# Patient Record
Sex: FEMALE | Race: WHITE | NOT HISPANIC OR LATINO | Employment: OTHER | URBAN - METROPOLITAN AREA
[De-identification: names, ages, dates, MRNs, and addresses within clinical notes are randomized per-mention and may not be internally consistent; named-entity substitution may affect disease eponyms.]

---

## 2017-01-23 ENCOUNTER — HOSPITAL ENCOUNTER (OUTPATIENT)
Dept: PULMONOLOGY | Facility: CLINIC | Age: 72
Discharge: HOME | End: 2017-01-23
Payer: MEDICARE

## 2017-01-23 ENCOUNTER — OFFICE VISIT (OUTPATIENT)
Dept: PULMONOLOGY | Facility: CLINIC | Age: 72
End: 2017-01-23
Payer: MEDICARE

## 2017-01-23 VITALS
HEART RATE: 78 BPM | BODY MASS INDEX: 23.92 KG/M2 | DIASTOLIC BLOOD PRESSURE: 68 MMHG | HEIGHT: 62 IN | WEIGHT: 130 LBS | SYSTOLIC BLOOD PRESSURE: 148 MMHG | OXYGEN SATURATION: 94 %

## 2017-01-23 DIAGNOSIS — J44.0 CHRONIC OBSTRUCTIVE PULMONARY DISEASE WITH ACUTE LOWER RESPIRATORY INFECTION: Primary | ICD-10-CM

## 2017-01-23 DIAGNOSIS — J43.1 PANLOBULAR EMPHYSEMA: ICD-10-CM

## 2017-01-23 LAB
PRE FEV1 FVC: 61
PRE FEV1: 0.47
PRE FVC: 0.77
PREDICTED FEV1 FVC: 79
PREDICTED FEV1: 2
PREDICTED FVC: 2.58

## 2017-01-23 PROCEDURE — 3077F SYST BP >= 140 MM HG: CPT | Mod: S$GLB,,, | Performed by: INTERNAL MEDICINE

## 2017-01-23 PROCEDURE — 94010 BREATHING CAPACITY TEST: CPT | Mod: S$GLB,,, | Performed by: INTERNAL MEDICINE

## 2017-01-23 PROCEDURE — 1160F RVW MEDS BY RX/DR IN RCRD: CPT | Mod: S$GLB,,, | Performed by: INTERNAL MEDICINE

## 2017-01-23 PROCEDURE — 1159F MED LIST DOCD IN RCRD: CPT | Mod: S$GLB,,, | Performed by: INTERNAL MEDICINE

## 2017-01-23 PROCEDURE — 1126F AMNT PAIN NOTED NONE PRSNT: CPT | Mod: S$GLB,,, | Performed by: INTERNAL MEDICINE

## 2017-01-23 PROCEDURE — 99499 UNLISTED E&M SERVICE: CPT | Mod: S$PBB,,, | Performed by: INTERNAL MEDICINE

## 2017-01-23 PROCEDURE — 3078F DIAST BP <80 MM HG: CPT | Mod: S$GLB,,, | Performed by: INTERNAL MEDICINE

## 2017-01-23 PROCEDURE — 99999 PR PBB SHADOW E&M-EST. PATIENT-LVL III: CPT | Mod: PBBFAC,,, | Performed by: INTERNAL MEDICINE

## 2017-01-23 PROCEDURE — 1157F ADVNC CARE PLAN IN RCRD: CPT | Mod: S$GLB,,, | Performed by: INTERNAL MEDICINE

## 2017-01-23 PROCEDURE — 99214 OFFICE O/P EST MOD 30 MIN: CPT | Mod: 25,S$GLB,, | Performed by: INTERNAL MEDICINE

## 2017-01-23 NOTE — PROGRESS NOTES
Subjective:       Patient ID: Leyla Mari is a 71 y.o. female.    Chief Complaint: Shortness of Breath and COPD    HPI  72 yo female with severe emphysema comes for follow up since she recently started a new bronchodilator. She states that she is doing well, and has had side effects. Tolerates the medication. bevespi.      No flowsheet data found.]  Review of Systems   Constitutional: Negative.    HENT: Negative.    Eyes: Negative.    Respiratory: Negative.         Severe copd   Cardiovascular: Negative.    Genitourinary: Negative.    Musculoskeletal: Negative.    Skin: Negative.    Gastrointestinal: Negative.    Neurological: Negative.    Psychiatric/Behavioral: Negative.        Objective:      Physical Exam   Constitutional: She is oriented to person, place, and time. She appears well-developed and well-nourished. No distress.   HENT:   Head: Normocephalic and atraumatic.   Right Ear: External ear normal.   Left Ear: External ear normal.   Nose: Nose normal.   Mouth/Throat: Oropharynx is clear and moist.   Eyes: Conjunctivae and EOM are normal. Pupils are equal, round, and reactive to light.   Neck: Normal range of motion. Neck supple. No JVD present. No thyromegaly present.   Cardiovascular: Normal rate, regular rhythm, normal heart sounds and intact distal pulses.  Exam reveals no gallop.    No murmur heard.  Pulmonary/Chest: Breath sounds normal. No stridor. No respiratory distress. She has no wheezes. She has no rales. She exhibits no tenderness.   Markedly decreased  Air entry   Abdominal: Soft. Bowel sounds are normal. She exhibits no distension and no mass. There is no tenderness. There is no rebound and no guarding.   Musculoskeletal: Normal range of motion. She exhibits no edema.   Lymphadenopathy:     She has no cervical adenopathy.   Neurological: She is alert and oriented to person, place, and time. She has normal reflexes. She displays normal reflexes. No cranial nerve deficit.   Skin: Skin is warm  and dry. No rash noted.   Psychiatric: She has a normal mood and affect. Her behavior is normal. Judgment and thought content normal.   Nursing note and vitals reviewed.          Assessment:       1. Chronic obstructive pulmonary disease with acute lower respiratory infection        Outpatient Encounter Prescriptions as of 1/23/2017   Medication Sig Dispense Refill    albuterol (PROAIR HFA) 90 mcg/actuation inhaler Inhale 1 puff into the lungs every 4 (four) hours as needed for Wheezing or Shortness of Breath. 1 Inhaler 12    albuterol (PROVENTIL) 2.5 mg /3 mL (0.083 %) nebulizer solution Take 3 mLs (2.5 mg total) by nebulization every 4 (four) hours as needed for Wheezing or Shortness of Breath. 120 each 11    alprazolam (XANAX) 0.25 MG tablet Take 1 tablet (0.25 mg total) by mouth 3 (three) times daily as needed. 90 tablet 3    aspirin (ECOTRIN) 81 MG EC tablet Take 81 mg by mouth once daily.        budesonide-formoterol 160-4.5 mcg (SYMBICORT) 160-4.5 mcg/actuation HFAA INHALE 2 PUFFS INTO THE LUNGS BY MOUTH TWICE DAILY 10.2 g 12    diltiazem (CARDIZEM CD) 180 MG 24 hr capsule Take 1 capsule (180 mg total) by mouth once daily. 90 capsule 3    doxycycline (MONODOX) 100 MG capsule Take 1 capsule (100 mg total) by mouth 2 (two) times daily. 20 capsule 0    FLUZONE HIGH-DOSE 2016-17, PF, 180 mcg/0.5 mL Syrg 1 Dose once.  0    hydrochlorothiazide (HYDRODIURIL) 25 MG tablet Take 1 tablet (25 mg total) by mouth once daily. 90 tablet 3    hydrOXYzine HCl (ATARAX) 25 MG tablet Take 1 tablet (25 mg total) by mouth nightly as needed for Itching. 30 tablet 0    potassium chloride SA (K-DUR,KLOR-CON) 20 MEQ tablet Take on tablet by mouth every day 90 tablet 3    pravastatin (PRAVACHOL) 20 MG tablet TAKE 1 TABLET BY MOUTH EVERY DAY 90 tablet 3    predniSONE (DELTASONE) 10 MG tablet 3 tabs every am x 7 days, then 2 tabs every am for 7 days/stop 35 tablet 1    TUDORZA PRESSAIR 400 mcg/actuation AePB Inhale 1 puff  into the lungs 2 (two) times daily. 1 each 12     No facility-administered encounter medications on file as of 1/23/2017.      Orders Placed This Encounter   Procedures    Spirometry without Bronchodilator     Standing Status:   Future     Standing Expiration Date:   1/23/2018     Plan:            Fev-1:Stable  At 0.48 liters  Sa02:95% IMP: Pulmonary Emphysema, Pink Puffer

## 2017-01-24 DIAGNOSIS — J43.8 OTHER EMPHYSEMA: Primary | ICD-10-CM

## 2017-01-29 RX ORDER — PRAVASTATIN SODIUM 20 MG/1
TABLET ORAL
Qty: 90 TABLET | Refills: 2 | Status: SHIPPED | OUTPATIENT
Start: 2017-01-29 | End: 2017-10-17 | Stop reason: SDUPTHER

## 2017-02-16 DIAGNOSIS — Z86.010 HISTORY OF COLON POLYPS: Primary | ICD-10-CM

## 2017-02-16 RX ORDER — POLYETHYLENE GLYCOL 3350, SODIUM SULFATE, SODIUM CHLORIDE, POTASSIUM CHLORIDE, SODIUM ASCORBATE, AND ASCORBIC ACID 7.5-2.691G
KIT ORAL
Qty: 1 BOTTLE | Refills: 0 | Status: ON HOLD | OUTPATIENT
Start: 2017-02-16 | End: 2017-02-27

## 2017-02-26 ENCOUNTER — HOSPITAL ENCOUNTER (INPATIENT)
Facility: HOSPITAL | Age: 72
LOS: 1 days | Discharge: HOME | DRG: 871 | End: 2017-02-27
Attending: EMERGENCY MEDICINE | Admitting: HOSPITALIST
Payer: MEDICARE

## 2017-02-26 DIAGNOSIS — R09.02 HYPOXIA: ICD-10-CM

## 2017-02-26 DIAGNOSIS — J18.9 CAP (COMMUNITY ACQUIRED PNEUMONIA): Primary | ICD-10-CM

## 2017-02-26 LAB
ALBUMIN SERPL BCP-MCNC: 3.3 G/DL
ALLENS TEST: ABNORMAL
ALP SERPL-CCNC: 99 U/L
ALT SERPL W/O P-5'-P-CCNC: 20 U/L
ANION GAP SERPL CALC-SCNC: 11 MMOL/L
AST SERPL-CCNC: 26 U/L
BASOPHILS # BLD AUTO: 0.01 K/UL
BASOPHILS NFR BLD: 0.1 %
BILIRUB SERPL-MCNC: 0.7 MG/DL
BNP SERPL-MCNC: 37 PG/ML
BUN SERPL-MCNC: 10 MG/DL
CALCIUM SERPL-MCNC: 9.2 MG/DL
CHLORIDE SERPL-SCNC: 100 MMOL/L
CO2 SERPL-SCNC: 23 MMOL/L
CREAT SERPL-MCNC: 0.7 MG/DL
DELSYS: ABNORMAL
DIFFERENTIAL METHOD: ABNORMAL
EOSINOPHIL # BLD AUTO: 0 K/UL
EOSINOPHIL NFR BLD: 0.3 %
ERYTHROCYTE [DISTWIDTH] IN BLOOD BY AUTOMATED COUNT: 13 %
EST. GFR  (AFRICAN AMERICAN): >60 ML/MIN/1.73 M^2
EST. GFR  (NON AFRICAN AMERICAN): >60 ML/MIN/1.73 M^2
FIO2: 28
FLOW: 2
FLUAV AG SPEC QL IA: NEGATIVE
FLUBV AG SPEC QL IA: NEGATIVE
GLUCOSE SERPL-MCNC: 163 MG/DL
HCO3 UR-SCNC: 24 MMOL/L (ref 24–28)
HCT VFR BLD AUTO: 42.6 %
HGB BLD-MCNC: 14.6 G/DL
LYMPHOCYTES # BLD AUTO: 0.8 K/UL
LYMPHOCYTES NFR BLD: 4.9 %
MCH RBC QN AUTO: 31.6 PG
MCHC RBC AUTO-ENTMCNC: 34.3 %
MCV RBC AUTO: 92 FL
MODE: ABNORMAL
MONOCYTES # BLD AUTO: 0.9 K/UL
MONOCYTES NFR BLD: 5.9 %
NEUTROPHILS # BLD AUTO: 13.5 K/UL
NEUTROPHILS NFR BLD: 88.8 %
PCO2 BLDA: 38.5 MMHG (ref 35–45)
PH SMN: 7.4 [PH] (ref 7.35–7.45)
PLATELET # BLD AUTO: 176 K/UL
PMV BLD AUTO: 10 FL
PO2 BLDA: 67 MMHG (ref 80–100)
POC BE: -1 MMOL/L
POC SATURATED O2: 93 % (ref 95–100)
POC TCO2: 25 MMOL/L (ref 23–27)
POTASSIUM SERPL-SCNC: 3.8 MMOL/L
PROT SERPL-MCNC: 7 G/DL
RBC # BLD AUTO: 4.62 M/UL
SAMPLE: ABNORMAL
SITE: ABNORMAL
SODIUM SERPL-SCNC: 134 MMOL/L
SP02: 92
SPECIMEN SOURCE: NORMAL
TROPONIN I SERPL DL<=0.01 NG/ML-MCNC: 0.02 NG/ML
WBC # BLD AUTO: 15.19 K/UL

## 2017-02-26 PROCEDURE — 36600 WITHDRAWAL OF ARTERIAL BLOOD: CPT

## 2017-02-26 PROCEDURE — 94640 AIRWAY INHALATION TREATMENT: CPT

## 2017-02-26 PROCEDURE — 96361 HYDRATE IV INFUSION ADD-ON: CPT

## 2017-02-26 PROCEDURE — 96365 THER/PROPH/DIAG IV INF INIT: CPT

## 2017-02-26 PROCEDURE — 87400 INFLUENZA A/B EACH AG IA: CPT | Mod: 59

## 2017-02-26 PROCEDURE — 80053 COMPREHEN METABOLIC PANEL: CPT

## 2017-02-26 PROCEDURE — 25000242 PHARM REV CODE 250 ALT 637 W/ HCPCS: Performed by: EMERGENCY MEDICINE

## 2017-02-26 PROCEDURE — 99285 EMERGENCY DEPT VISIT HI MDM: CPT | Mod: 25

## 2017-02-26 PROCEDURE — 82803 BLOOD GASES ANY COMBINATION: CPT

## 2017-02-26 PROCEDURE — 87040 BLOOD CULTURE FOR BACTERIA: CPT

## 2017-02-26 PROCEDURE — 25500020 PHARM REV CODE 255: Performed by: EMERGENCY MEDICINE

## 2017-02-26 PROCEDURE — 83880 ASSAY OF NATRIURETIC PEPTIDE: CPT

## 2017-02-26 PROCEDURE — 99291 CRITICAL CARE FIRST HOUR: CPT | Mod: ,,, | Performed by: EMERGENCY MEDICINE

## 2017-02-26 PROCEDURE — 63600175 PHARM REV CODE 636 W HCPCS: Performed by: EMERGENCY MEDICINE

## 2017-02-26 PROCEDURE — 85025 COMPLETE CBC W/AUTO DIFF WBC: CPT

## 2017-02-26 PROCEDURE — 20600001 HC STEP DOWN PRIVATE ROOM

## 2017-02-26 PROCEDURE — 84484 ASSAY OF TROPONIN QUANT: CPT

## 2017-02-26 PROCEDURE — 99900035 HC TECH TIME PER 15 MIN (STAT)

## 2017-02-26 PROCEDURE — 96375 TX/PRO/DX INJ NEW DRUG ADDON: CPT

## 2017-02-26 PROCEDURE — 27000221 HC OXYGEN, UP TO 24 HOURS

## 2017-02-26 PROCEDURE — 25000003 PHARM REV CODE 250: Performed by: EMERGENCY MEDICINE

## 2017-02-26 RX ORDER — HYDROXYZINE HYDROCHLORIDE 25 MG/1
25 TABLET, FILM COATED ORAL NIGHTLY PRN
Status: DISCONTINUED | OUTPATIENT
Start: 2017-02-26 | End: 2017-02-27 | Stop reason: HOSPADM

## 2017-02-26 RX ORDER — DILTIAZEM HYDROCHLORIDE 180 MG/1
180 CAPSULE, COATED, EXTENDED RELEASE ORAL DAILY
Status: DISCONTINUED | OUTPATIENT
Start: 2017-02-27 | End: 2017-02-27 | Stop reason: HOSPADM

## 2017-02-26 RX ORDER — IBUPROFEN 200 MG
24 TABLET ORAL
Status: DISCONTINUED | OUTPATIENT
Start: 2017-02-26 | End: 2017-02-27 | Stop reason: HOSPADM

## 2017-02-26 RX ORDER — ONDANSETRON 4 MG/1
4 TABLET, ORALLY DISINTEGRATING ORAL EVERY 8 HOURS PRN
Status: DISCONTINUED | OUTPATIENT
Start: 2017-02-26 | End: 2017-02-27 | Stop reason: HOSPADM

## 2017-02-26 RX ORDER — IBUPROFEN 200 MG
16 TABLET ORAL
Status: DISCONTINUED | OUTPATIENT
Start: 2017-02-26 | End: 2017-02-27 | Stop reason: HOSPADM

## 2017-02-26 RX ORDER — HYDROCODONE BITARTRATE AND ACETAMINOPHEN 10; 325 MG/1; MG/1
1 TABLET ORAL EVERY 4 HOURS PRN
Status: DISCONTINUED | OUTPATIENT
Start: 2017-02-26 | End: 2017-02-26

## 2017-02-26 RX ORDER — IPRATROPIUM BROMIDE AND ALBUTEROL SULFATE 2.5; .5 MG/3ML; MG/3ML
3 SOLUTION RESPIRATORY (INHALATION) EVERY 4 HOURS
Status: DISCONTINUED | OUTPATIENT
Start: 2017-02-27 | End: 2017-02-26

## 2017-02-26 RX ORDER — METHYLPREDNISOLONE SOD SUCC 125 MG
80 VIAL (EA) INJECTION EVERY 8 HOURS
Status: DISCONTINUED | OUTPATIENT
Start: 2017-02-26 | End: 2017-02-26

## 2017-02-26 RX ORDER — PREDNISONE 10 MG/1
40 TABLET ORAL DAILY
Status: DISCONTINUED | OUTPATIENT
Start: 2017-02-27 | End: 2017-02-27 | Stop reason: HOSPADM

## 2017-02-26 RX ORDER — IPRATROPIUM BROMIDE AND ALBUTEROL SULFATE 2.5; .5 MG/3ML; MG/3ML
3 SOLUTION RESPIRATORY (INHALATION)
Status: DISCONTINUED | OUTPATIENT
Start: 2017-02-27 | End: 2017-02-27

## 2017-02-26 RX ORDER — MOXIFLOXACIN HYDROCHLORIDE 400 MG/250ML
400 INJECTION, SOLUTION INTRAVENOUS
Status: COMPLETED | OUTPATIENT
Start: 2017-02-26 | End: 2017-02-26

## 2017-02-26 RX ORDER — ASPIRIN 81 MG/1
81 TABLET ORAL DAILY
Status: DISCONTINUED | OUTPATIENT
Start: 2017-02-27 | End: 2017-02-27 | Stop reason: HOSPADM

## 2017-02-26 RX ORDER — INSULIN ASPART 100 [IU]/ML
0-5 INJECTION, SOLUTION INTRAVENOUS; SUBCUTANEOUS
Status: DISCONTINUED | OUTPATIENT
Start: 2017-02-26 | End: 2017-02-27 | Stop reason: HOSPADM

## 2017-02-26 RX ORDER — HYDROCODONE BITARTRATE AND ACETAMINOPHEN 5; 325 MG/1; MG/1
1 TABLET ORAL EVERY 4 HOURS PRN
Status: DISCONTINUED | OUTPATIENT
Start: 2017-02-26 | End: 2017-02-26

## 2017-02-26 RX ORDER — FLUTICASONE FUROATE AND VILANTEROL 100; 25 UG/1; UG/1
1 POWDER RESPIRATORY (INHALATION) DAILY
Status: DISCONTINUED | OUTPATIENT
Start: 2017-02-27 | End: 2017-02-27 | Stop reason: HOSPADM

## 2017-02-26 RX ORDER — ALPRAZOLAM 0.25 MG/1
0.25 TABLET ORAL 3 TIMES DAILY PRN
Status: DISCONTINUED | OUTPATIENT
Start: 2017-02-26 | End: 2017-02-27 | Stop reason: HOSPADM

## 2017-02-26 RX ORDER — PRAVASTATIN SODIUM 20 MG/1
20 TABLET ORAL DAILY
Status: DISCONTINUED | OUTPATIENT
Start: 2017-02-27 | End: 2017-02-27 | Stop reason: HOSPADM

## 2017-02-26 RX ORDER — MOXIFLOXACIN HYDROCHLORIDE 400 MG/1
400 TABLET ORAL DAILY
Status: DISCONTINUED | OUTPATIENT
Start: 2017-02-27 | End: 2017-02-27

## 2017-02-26 RX ORDER — IPRATROPIUM BROMIDE AND ALBUTEROL SULFATE 2.5; .5 MG/3ML; MG/3ML
3 SOLUTION RESPIRATORY (INHALATION)
Status: COMPLETED | OUTPATIENT
Start: 2017-02-26 | End: 2017-02-26

## 2017-02-26 RX ORDER — GLUCAGON 1 MG
1 KIT INJECTION
Status: DISCONTINUED | OUTPATIENT
Start: 2017-02-26 | End: 2017-02-27 | Stop reason: HOSPADM

## 2017-02-26 RX ORDER — ACETAMINOPHEN 325 MG/1
650 TABLET ORAL EVERY 6 HOURS PRN
Status: DISCONTINUED | OUTPATIENT
Start: 2017-02-26 | End: 2017-02-26 | Stop reason: SDUPTHER

## 2017-02-26 RX ORDER — SODIUM CHLORIDE 9 MG/ML
INJECTION, SOLUTION INTRAVENOUS CONTINUOUS
Status: DISCONTINUED | OUTPATIENT
Start: 2017-02-26 | End: 2017-02-27 | Stop reason: HOSPADM

## 2017-02-26 RX ORDER — IPRATROPIUM BROMIDE AND ALBUTEROL SULFATE 2.5; .5 MG/3ML; MG/3ML
3 SOLUTION RESPIRATORY (INHALATION)
Status: DISCONTINUED | OUTPATIENT
Start: 2017-02-27 | End: 2017-02-26

## 2017-02-26 RX ORDER — PANTOPRAZOLE SODIUM 40 MG/1
40 TABLET, DELAYED RELEASE ORAL DAILY
Status: DISCONTINUED | OUTPATIENT
Start: 2017-02-27 | End: 2017-02-27 | Stop reason: HOSPADM

## 2017-02-26 RX ORDER — AMOXICILLIN 250 MG
1 CAPSULE ORAL 2 TIMES DAILY
Status: DISCONTINUED | OUTPATIENT
Start: 2017-02-26 | End: 2017-02-26

## 2017-02-26 RX ORDER — TIOTROPIUM BROMIDE 18 UG/1
1 CAPSULE ORAL; RESPIRATORY (INHALATION) DAILY
Status: DISCONTINUED | OUTPATIENT
Start: 2017-02-27 | End: 2017-02-27 | Stop reason: HOSPADM

## 2017-02-26 RX ORDER — ACETAMINOPHEN 325 MG/1
650 TABLET ORAL EVERY 4 HOURS PRN
Status: DISCONTINUED | OUTPATIENT
Start: 2017-02-26 | End: 2017-02-27 | Stop reason: HOSPADM

## 2017-02-26 RX ORDER — METHYLPREDNISOLONE SOD SUCC 125 MG
125 VIAL (EA) INJECTION
Status: COMPLETED | OUTPATIENT
Start: 2017-02-26 | End: 2017-02-26

## 2017-02-26 RX ORDER — MOXIFLOXACIN HYDROCHLORIDE 400 MG/250ML
400 INJECTION, SOLUTION INTRAVENOUS
Status: DISCONTINUED | OUTPATIENT
Start: 2017-02-27 | End: 2017-02-26

## 2017-02-26 RX ADMIN — MOXIFLOXACIN HYDROCHLORIDE 400 MG: 400 INJECTION, SOLUTION INTRAVENOUS at 09:02

## 2017-02-26 RX ADMIN — SODIUM CHLORIDE: 0.9 INJECTION, SOLUTION INTRAVENOUS at 10:02

## 2017-02-26 RX ADMIN — IPRATROPIUM BROMIDE AND ALBUTEROL SULFATE 3 ML: .5; 3 SOLUTION RESPIRATORY (INHALATION) at 04:02

## 2017-02-26 RX ADMIN — IOHEXOL 75 ML: 350 INJECTION, SOLUTION INTRAVENOUS at 08:02

## 2017-02-26 RX ADMIN — SODIUM CHLORIDE 500 ML: 0.9 INJECTION, SOLUTION INTRAVENOUS at 04:02

## 2017-02-26 RX ADMIN — METHYLPREDNISOLONE SODIUM SUCCINATE 125 MG: 125 INJECTION, POWDER, FOR SOLUTION INTRAMUSCULAR; INTRAVENOUS at 04:02

## 2017-02-26 NOTE — ED TRIAGE NOTES
Pt c/o SOB that started yesterday evening.  Pt states that she has been around people that are positive for the flu.  Pt denies CP.  Pt has hx of COPD.

## 2017-02-26 NOTE — IP AVS SNAPSHOT
[facility]  [address]  [facility] [address]  Phone: [phone]           Patient Discharge Instructions     Our goal is to set you up for success. This packet includes information on your condition, medications, and your home care. It will help you to care for yourself so you don't get sicker and need to go back to the hospital.     Please ask your nurse if you have any questions.        There are many details to remember when preparing to leave the hospital. Here is what you will need to do:    1. Take your medicine. If you are prescribed medications, review your Medication List in the following pages. You may have new medications to  at the pharmacy and others that you'll need to stop taking. Review the instructions for how and when to take your medications. Talk with your doctor or nurses if you are unsure of what to do.     2. Go to your follow-up appointments. Specific follow-up information is listed in the following pages. Your may be contacted by a transition nurse or clinical provider about future appointments. Be sure we have all of the phone numbers to reach you, if needed. Please contact your provider's office if you are unable to make an appointment.     3. Watch for warning signs. Your doctor or nurse will give you detailed warning signs to watch for and when to call for assistance. These instructions may also include educational information about your condition. If you experience any of warning signs to your health, call your doctor.               [facility]  Unless otherwise directed by your provider, please contact [facility], our nurse care line that is available for 24/7 assistance.     [phone] (toll-free)    Registered nurses in the [facility] provide clinical advisement, health education, appointment booking, and other advisory services.                    ** Verify the list of medication(s) below is accurate and up  to date. Carry this with you in case of emergency. If your medications have changed, please notify your healthcare provider.             Medication List      START taking these medications        Additional Info                      moxifloxacin 400 mg tablet   Commonly known as:  AVELOX   Quantity:  4 tablet   Refills:  0   Dose:  400 mg   Indications:  Pneumonia    Instructions:  Take 1 tablet (400 mg total) by mouth once daily.     Begin Date    AM    Noon    PM    Bedtime         CHANGE how you take these medications        Additional Info                      predniSONE 20 MG tablet   Commonly known as:  DELTASONE   Quantity:  8 tablet   Refills:  0   Dose:  40 mg   What changed:    - medication strength  - how much to take  - how to take this  - when to take this  - additional instructions    Last time this was given:  40 mg on 2/27/2017 10:00 AM   Instructions:  Take 2 tablets (40 mg total) by mouth once daily.     Begin Date    AM    Noon    PM    Bedtime         CONTINUE taking these medications        Additional Info                      * albuterol 90 mcg/actuation inhaler   Commonly known as:  PROAIR HFA   Quantity:  1 Inhaler   Refills:  12   Dose:  1 puff    Instructions:  Inhale 1 puff into the lungs every 4 (four) hours as needed for Wheezing or Shortness of Breath.     Begin Date    AM    Noon    PM    Bedtime       * albuterol 2.5 mg /3 mL (0.083 %) nebulizer solution   Commonly known as:  PROVENTIL   Quantity:  120 each   Refills:  11   Dose:  2.5 mg    Instructions:  Take 3 mLs (2.5 mg total) by nebulization every 4 (four) hours as needed for Wheezing or Shortness of Breath.     Begin Date    AM    Noon    PM    Bedtime       alprazolam 0.25 MG tablet   Commonly known as:  XANAX   Quantity:  90 tablet   Refills:  3   Dose:  0.25 mg    Instructions:  Take 1 tablet (0.25 mg total) by mouth 3 (three) times daily as needed.     Begin Date    AM    Noon    PM    Bedtime       aspirin 81 MG EC tablet    Commonly known as:  ECOTRIN   Refills:  0   Dose:  81 mg    Last time this was given:  81 mg on 2/27/2017 10:00 AM   Instructions:  Take 81 mg by mouth once daily.     Begin Date    AM    Noon    PM    Bedtime       BEVESPI AEROSPHERE INHL   Refills:  0   Dose:  2 puff    Instructions:  Inhale 2 puffs into the lungs 2 (two) times daily.     Begin Date    AM    Noon    PM    Bedtime       budesonide-formoterol 160-4.5 mcg 160-4.5 mcg/actuation Hfaa   Commonly known as:  SYMBICORT   Quantity:  10.2 g   Refills:  12    Instructions:  INHALE 2 PUFFS INTO THE LUNGS BY MOUTH TWICE DAILY     Begin Date    AM    Noon    PM    Bedtime       diltiaZEM 180 MG 24 hr capsule   Commonly known as:  CARDIZEM CD   Quantity:  90 capsule   Refills:  3   Dose:  180 mg    Last time this was given:  180 mg on 2/27/2017 10:00 AM   Instructions:  Take 1 capsule (180 mg total) by mouth once daily.     Begin Date    AM    Noon    PM    Bedtime       hydrochlorothiazide 25 MG tablet   Commonly known as:  HYDRODIURIL   Quantity:  90 tablet   Refills:  3   Dose:  25 mg    Last time this was given:  25 mg on 2/27/2017 10:05 AM   Instructions:  Take 1 tablet (25 mg total) by mouth once daily.     Begin Date    AM    Noon    PM    Bedtime       potassium chloride SA 20 MEQ tablet   Commonly known as:  K-DUR,KLOR-CON   Quantity:  90 tablet   Refills:  3    Last time this was given:  20 mEq on 2/27/2017 11:46 AM   Instructions:  Take on tablet by mouth every day     Begin Date    AM    Noon    PM    Bedtime       pravastatin 20 MG tablet   Commonly known as:  PRAVACHOL   Quantity:  90 tablet   Refills:  2    Last time this was given:  20 mg on 2/27/2017 10:01 AM   Instructions:  TAKE 1 TABLET BY MOUTH EVERY DAY     Begin Date    AM    Noon    PM    Bedtime       * Notice:  This list has 2 medication(s) that are the same as other medications prescribed for you. Read the directions carefully, and ask your doctor or other care provider to review them  with you.         Where to Get Your Medications      These medications were sent to [facility] [place] - [address] [facility]  [address] [facility] [address]     Phone:  [phone]     moxifloxacin 400 mg tablet    predniSONE 20 MG tablet                  Please bring to all follow up appointments:    1. A copy of your discharge instructions.  2. All medicines you are currently taking in their original bottles.  3. Identification and insurance card.    Please arrive 15 minutes ahead of scheduled appointment time.    Please call 24 hours in advance if you must reschedule your appointment and/or time.        Your Scheduled Appointments     Mar 31, 2017  8:40 AM CDT   Established Patient Visit with Anabel Cooper MD   [place] [facility] [place]    [address] [facility]  [place] [address] [phone]              Your Future Surgeries/Procedures     Mar 30, 2017   Surgery with Omid Lino MD   [facility]    [address] [address] [phone]              Follow-up Information     Follow up with Dulce Castro MD In 1 week.    Specialty:  Internal Medicine    Why:  Hospital follow-up for CAP/COPD exacerbation    Contact information:    [address] [facility]  [place] [address]  [phone]          Discharge Instructions     Future Orders    Activity as tolerated     Call MD for:  difficulty breathing or increased cough     Call MD for:  increased confusion or weakness     Call MD for:  persistent dizziness, light-headedness, or visual disturbances     OXYGEN FOR HOME USE     Questions:    Liter Flow:  2    Duration:  With activity    Qualifying SpO2:  82% ambulating on RA    Testing done at:  Exercise/Activity    Route:  nasal cannula    Portable mode:  continuous    Device:  home concentrator with portable unit    Length of need (in months):  3 mos    Patient  "condition with qualifying saturation:  COPD    Height:  5' 1" (1.549 m)    Weight:  59 kg (130 lb)    Does patient have medical equipment at home?:      Alternative treatment measures have been tried or considered and deemed clinically ineffective.:  Yes    Vendor:  [facility] Comment - Pending authorization from [facility]    Expected Date of Delivery:  2/27/2017    Expected Time of Delivery:          Primary Diagnosis     Your primary diagnosis was:  Respiratory Insufficiency      Admission Information     Date & Time Provider Department CSN    2/26/2017  3:35 PM Chandler Antunez MD [facility] [id number]      Care Providers     Provider Role Specialty Primary office phone    Chandler Antunez MD Attending Provider Hospitalist [phone]    Chandler Antunez MD Team Attending  Hospitalist [phone]      Your Vitals Were     BP                   140/62 (BP Location: Left arm, Patient Position: Lying, BP Method: Automatic)           Recent Lab Values        5/29/2013 11/14/2013                        8:50 AM  8:40 AM          A1C 5.9 6.0                     Pending Labs     Order Current Status    Legionella antigen, urine In process    Blood culture Preliminary result    Blood culture Preliminary result    Respiratory Viral Panel by PCR Nasal Swab Preliminary result      Allergies as of 2/27/2017        Reactions    Bactrim [Sulfamethoxazole-trimethoprim] Nausea Only    Codeine Itching    Keflex [Cephalexin] Other (See Comments)    Rhinorrhea, nausea. Tolerated Ceftriaxone June 2014    Ceftriaxone Rash    Morbilliform rash after receiving IV ceftriaxone    Clindamycin Hcl Rash    Vancomycin Analogues Rash    Morbilliform rash after receiving IV vancomycin      Advance Directives     An advance directive is a document which, in the event you are no longer able to make decisions for yourself, tells your healthcare team what kind of treatment you do or do not want to receive, or who you would like to " make those decisions for you.  If you do not currently have an advance directive, [facility] encourages you to create one.  For more information call:  [phone]), [phone] ([phone]),  or log on to [email or web address].        Smoking Cessation     If you would like to quit smoking:   You may be eligible for free services if you are a [place] resident and started smoking cigarettes before September 1, 1988.  Call the Smoking Cessation Trust (SCT) toll free at (597) 689-7763 or (469) 551-5173.   Call 8-711-QUIT-NOW if you do not meet the above criteria.            Language Assistance Services     ATTENTION: Language assistance services are available, free of charge. Please call [phone].      ATENCIÓN: Si habla espdonna, tiene a reeves disposición servicios gratuitos de asistencia lingüística. Llame al [phone].     CHÚ Ý: N?u b?n nói Ti?ng Vi?t, có các d?ch v? h? tr? ngôn ng? mi?n phí dành cho b?n. G?i s? [phone].        Pneumonmia Discharge Instructions                 [facility] complies with applicable Federal civil rights laws and does not discriminate on the basis of race, color, national origin, age, disability, or sex.

## 2017-02-26 NOTE — ED PROVIDER NOTES
"Encounter Date: 2017    SCRIBE #1 NOTE: I, Edwige Galarza, am scribing for, and in the presence of, Dr. Jessica.       History     Chief Complaint   Patient presents with    Shortness of Breath     "I think I have the flu"; reports SOB and back pain     Review of patient's allergies indicates:   Allergen Reactions    Bactrim [sulfamethoxazole-trimethoprim] Nausea Only    Codeine Itching    Keflex [cephalexin] Other (See Comments)     Rhinorrhea, nausea. Tolerated Ceftriaxone 2014    Ceftriaxone Rash     Morbilliform rash after receiving IV ceftriaxone    Clindamycin hcl Rash    Vancomycin analogues Rash     Morbilliform rash after receiving IV vancomycin     HPI Comments: Time seen by provider: 3:37 PM    This is a 71 y.o. female with a PMHx of HTN, COPD, lung nodules, sinus tachycardia, colon polyps, and HLD and a PSHx of appendectomy who presents with complaint of shortness of breath and right sided pleuritic chest pain since yesterday evening, as well as general myalgias. Patient endorses contact with someone who tested positive for the flu this week. Patient reports she used breathing treatment and inhaler without improvement of symptoms. She was on home O2 in the past, but patient states she is not anymore. She denies dysuria, GI symptoms, and appetite change. She additionally denies known heart conditions.       The history is provided by the patient.     Past Medical History:   Diagnosis Date    Actinic keratosis     Allergy     Cataract     Cellulitis of ankle     Colon polyps     COPD (chronic obstructive pulmonary disease)     Family history of colon cancer     Hyperlipidemia     Hypertension     Lung nodules     Sinus tachycardia      Past Surgical History:   Procedure Laterality Date    APPENDECTOMY       SECTION      x2    EYE SURGERY      TONSILLECTOMY       Family History   Problem Relation Age of Onset    Cancer Mother 79     colon    Heart disease Mother     " Hyperlipidemia Mother     Hypertension Mother     Skin cancer Mother     COPD Father     Diabetes Father     Heart disease Father     Hyperlipidemia Father     Hypertension Father     Cancer Sister     Heart disease Sister     Hyperlipidemia Sister     Hypertension Sister     Skin cancer Sister     Cancer Son     Melanoma Neg Hx     Psoriasis Neg Hx     Lupus Neg Hx     Eczema Neg Hx      Social History   Substance Use Topics    Smoking status: Former Smoker     Packs/day: 1.00     Years: 39.00     Types: Cigarettes     Quit date: 11/2/1995    Smokeless tobacco: Never Used    Alcohol use 1.2 oz/week     2 Glasses of wine per week      Comment: rarely has a glass of wine- not daily     Review of Systems   Constitutional: Negative for appetite change.   HENT: Negative for nosebleeds.    Eyes: Negative for redness.   Respiratory: Positive for shortness of breath.    Cardiovascular: Positive for chest pain.   Gastrointestinal: Negative for constipation, diarrhea and vomiting.   Genitourinary: Negative for difficulty urinating and dysuria.   Musculoskeletal: Positive for myalgias.   Skin: Negative for rash.   Neurological: Negative for speech difficulty.       Physical Exam   Initial Vitals   BP Pulse Resp Temp SpO2   02/26/17 1451 02/26/17 1451 02/26/17 1451 02/26/17 1451 02/26/17 1451   145/73 117 24 99 °F (37.2 °C) 92 %     Physical Exam    Nursing note and vitals reviewed.  Constitutional: She appears well-developed and well-nourished. No distress.   HENT:   Head: Normocephalic and atraumatic.   Mouth/Throat: Oropharynx is clear and moist.   Eyes: Conjunctivae and EOM are normal. Pupils are equal, round, and reactive to light.   Neck: Normal range of motion. Neck supple.   Cardiovascular: Regular rhythm, normal heart sounds and intact distal pulses. Tachycardia present.    Pulmonary/Chest:   Slight inspiratory wheezing RLL   Abdominal: Soft. Bowel sounds are normal. She exhibits no distension.  There is no tenderness.   Musculoskeletal: Normal range of motion. She exhibits no edema or tenderness.   Neurological: She is alert and oriented to person, place, and time.   Skin: Skin is warm and dry.   Psychiatric: She has a normal mood and affect.         ED Course   Critical Care  Date/Time: 2/28/2017 4:31 PM  Performed by: JIMMIE PADRON  Authorized by: JIMMIE PADRON   Direct patient critical care time: 20 minutes  Ordering / reviewing critical care time: 5 minutes  Documentation critical care time: 5 minutes  Consulting other physicians critical care time: 5 minutes  Total critical care time (exclusive of procedural time) : 35 minutes  Comments: Critical Care time: 35 minutes inclusive of direct patient care, review of previous records, interpretation of labs, imaging and ekg, as well as discussion of my impression and plan of care with the patient, family and other clinicians/consultants. This time is exclusive of any separate billable procedures and of treating other patients. Critical care was required for this patient who presented with shortness of breath, hypoxia, pneumonia, possible respiratory failure requiring my emergent evaluation and management in the emergency department.          Labs Reviewed   CBC W/ AUTO DIFFERENTIAL - Abnormal; Notable for the following:        Result Value    WBC 15.19 (*)     MCH 31.6 (*)     Gran # 13.5 (*)     Lymph # 0.8 (*)     Gran% 88.8 (*)     Lymph% 4.9 (*)     All other components within normal limits   COMPREHENSIVE METABOLIC PANEL - Abnormal; Notable for the following:     Sodium 134 (*)     Glucose 163 (*)     Albumin 3.3 (*)     All other components within normal limits   ISTAT PROCEDURE - Abnormal; Notable for the following:     POC PO2 67 (*)     POC SATURATED O2 93 (*)     All other components within normal limits   TROPONIN I   B-TYPE NATRIURETIC PEPTIDE   INFLUENZA A AND B ANTIGEN             Medical Decision Making:   History:   Old Medical Records: I  decided to obtain old medical records.  Initial Assessment:   This is an emergent evaluation of a 71 y.o.female patient with presentation of shortness of breath and right sided pleuritic chest pain.  Patient was HYPOXIC at 86-88% on room air upon arrival.  Initial differentials include but are not limited to: COPD, pneumonia, CHF, PE. Less likely pneumothorax, ACS, muscle strain  Plan: CXR, basic labs plus troponin, BNP, Influenza, Nebs, steroids, continuous pulse ox    Clinical Tests:   Lab Tests: Ordered and Reviewed  Radiological Study: Ordered and Reviewed            Scribe Attestation:   Scribe #1: I performed the above scribed service and the documentation accurately describes the services I performed. I attest to the accuracy of the note.    Attending Attestation:           Physician Attestation for Scribe:  Physician Attestation Statement for Scribe #1: I, Dr. Jessica, reviewed documentation, as scribed by Edwige Galarza in my presence, and it is both accurate and complete.         Attending ED Notes:   Patient did not have significant improvement after nebs.  She remained without significant wheezing either.  An ABG was performed to further elucidate her pathophysiology.  ABG reviewed by me showed hypoxia without hypercapnia.  A CTA/PE study was performed to rule out PE versus pneumonia or other process.  CTA showed no evidence of pulmonary embolism, however likely pneumonia on the right side.  Avelox 400 mg IV given.  I discussed the case with Hospital medicine will admit patient for further care.          ED Course     Clinical Impression:   The primary encounter diagnosis was CAP (community acquired pneumonia). A diagnosis of Hypoxia was also pertinent to this visit.    Disposition:   Disposition: Admitted  Condition: Stable       Srinivas Jessica MD  02/28/17 7834

## 2017-02-27 VITALS
TEMPERATURE: 98 F | HEIGHT: 61 IN | WEIGHT: 130 LBS | RESPIRATION RATE: 18 BRPM | DIASTOLIC BLOOD PRESSURE: 62 MMHG | BODY MASS INDEX: 24.55 KG/M2 | SYSTOLIC BLOOD PRESSURE: 140 MMHG | OXYGEN SATURATION: 95 % | HEART RATE: 83 BPM

## 2017-02-27 PROBLEM — J96.01 ACUTE RESPIRATORY FAILURE WITH HYPOXIA: Status: ACTIVE | Noted: 2017-02-27

## 2017-02-27 LAB
ALBUMIN SERPL BCP-MCNC: 3.2 G/DL
ALP SERPL-CCNC: 103 U/L
ALT SERPL W/O P-5'-P-CCNC: 18 U/L
ANION GAP SERPL CALC-SCNC: 10 MMOL/L
AST SERPL-CCNC: 19 U/L
BASOPHILS # BLD AUTO: 0 K/UL
BASOPHILS NFR BLD: 0 %
BILIRUB SERPL-MCNC: 0.3 MG/DL
BILIRUB UR QL STRIP: NEGATIVE
BUN SERPL-MCNC: 9 MG/DL
CALCIUM SERPL-MCNC: 9.5 MG/DL
CHLORIDE SERPL-SCNC: 102 MMOL/L
CLARITY UR REFRACT.AUTO: CLEAR
CO2 SERPL-SCNC: 27 MMOL/L
COLOR UR AUTO: YELLOW
CREAT SERPL-MCNC: 0.6 MG/DL
DIFFERENTIAL METHOD: ABNORMAL
EOSINOPHIL # BLD AUTO: 0 K/UL
EOSINOPHIL NFR BLD: 0 %
ERYTHROCYTE [DISTWIDTH] IN BLOOD BY AUTOMATED COUNT: 12.9 %
EST. GFR  (AFRICAN AMERICAN): >60 ML/MIN/1.73 M^2
EST. GFR  (NON AFRICAN AMERICAN): >60 ML/MIN/1.73 M^2
GLUCOSE SERPL-MCNC: 125 MG/DL
GLUCOSE UR QL STRIP: NEGATIVE
HCT VFR BLD AUTO: 45.2 %
HGB BLD-MCNC: 15.6 G/DL
HGB UR QL STRIP: NEGATIVE
KETONES UR QL STRIP: ABNORMAL
LEUKOCYTE ESTERASE UR QL STRIP: NEGATIVE
LYMPHOCYTES # BLD AUTO: 0.6 K/UL
LYMPHOCYTES NFR BLD: 4.1 %
MAGNESIUM SERPL-MCNC: 2.1 MG/DL
MCH RBC QN AUTO: 31.5 PG
MCHC RBC AUTO-ENTMCNC: 34.5 %
MCV RBC AUTO: 91 FL
MONOCYTES # BLD AUTO: 0.2 K/UL
MONOCYTES NFR BLD: 1.1 %
NEUTROPHILS # BLD AUTO: 13.2 K/UL
NEUTROPHILS NFR BLD: 94.4 %
NITRITE UR QL STRIP: NEGATIVE
PH UR STRIP: 5 [PH] (ref 5–8)
PHOSPHATE SERPL-MCNC: 3.8 MG/DL
PLATELET # BLD AUTO: 207 K/UL
PMV BLD AUTO: 10 FL
POCT GLUCOSE: 121 MG/DL (ref 70–110)
POCT GLUCOSE: 180 MG/DL (ref 70–110)
POTASSIUM SERPL-SCNC: 3.8 MMOL/L
PROT SERPL-MCNC: 7.1 G/DL
PROT UR QL STRIP: NEGATIVE
RBC # BLD AUTO: 4.96 M/UL
SODIUM SERPL-SCNC: 139 MMOL/L
SP GR UR STRIP: 1.01 (ref 1–1.03)
URN SPEC COLLECT METH UR: ABNORMAL
UROBILINOGEN UR STRIP-ACNC: NEGATIVE EU/DL
WBC # BLD AUTO: 14.02 K/UL

## 2017-02-27 PROCEDURE — 87633 RESP VIRUS 12-25 TARGETS: CPT

## 2017-02-27 PROCEDURE — 25000003 PHARM REV CODE 250: Performed by: STUDENT IN AN ORGANIZED HEALTH CARE EDUCATION/TRAINING PROGRAM

## 2017-02-27 PROCEDURE — 25000003 PHARM REV CODE 250: Performed by: EMERGENCY MEDICINE

## 2017-02-27 PROCEDURE — 85025 COMPLETE CBC W/AUTO DIFF WBC: CPT

## 2017-02-27 PROCEDURE — 81003 URINALYSIS AUTO W/O SCOPE: CPT

## 2017-02-27 PROCEDURE — 25000003 PHARM REV CODE 250: Performed by: INTERNAL MEDICINE

## 2017-02-27 PROCEDURE — 87449 NOS EACH ORGANISM AG IA: CPT

## 2017-02-27 PROCEDURE — 94761 N-INVAS EAR/PLS OXIMETRY MLT: CPT

## 2017-02-27 PROCEDURE — 36415 COLL VENOUS BLD VENIPUNCTURE: CPT

## 2017-02-27 PROCEDURE — 94640 AIRWAY INHALATION TREATMENT: CPT

## 2017-02-27 PROCEDURE — 27000221 HC OXYGEN, UP TO 24 HOURS

## 2017-02-27 PROCEDURE — 63600175 PHARM REV CODE 636 W HCPCS: Performed by: INTERNAL MEDICINE

## 2017-02-27 PROCEDURE — 84100 ASSAY OF PHOSPHORUS: CPT

## 2017-02-27 PROCEDURE — 80053 COMPREHEN METABOLIC PANEL: CPT

## 2017-02-27 PROCEDURE — 83735 ASSAY OF MAGNESIUM: CPT

## 2017-02-27 PROCEDURE — 99239 HOSP IP/OBS DSCHRG MGMT >30: CPT | Mod: GC,,, | Performed by: HOSPITALIST

## 2017-02-27 RX ORDER — LEVALBUTEROL 1.25 MG/.5ML
1.25 SOLUTION, CONCENTRATE RESPIRATORY (INHALATION) EVERY 8 HOURS
Status: DISCONTINUED | OUTPATIENT
Start: 2017-02-27 | End: 2017-02-27 | Stop reason: HOSPADM

## 2017-02-27 RX ORDER — PREDNISONE 20 MG/1
40 TABLET ORAL DAILY
Qty: 8 TABLET | Refills: 0 | Status: SHIPPED | OUTPATIENT
Start: 2017-02-27 | End: 2017-03-03

## 2017-02-27 RX ORDER — MOXIFLOXACIN HYDROCHLORIDE 400 MG/1
400 TABLET ORAL DAILY
Status: DISCONTINUED | OUTPATIENT
Start: 2017-02-27 | End: 2017-02-27 | Stop reason: HOSPADM

## 2017-02-27 RX ORDER — MOXIFLOXACIN HYDROCHLORIDE 400 MG/1
400 TABLET ORAL DAILY
Qty: 4 TABLET | Refills: 0 | Status: SHIPPED | OUTPATIENT
Start: 2017-02-27 | End: 2017-03-03

## 2017-02-27 RX ORDER — IPRATROPIUM BROMIDE AND ALBUTEROL SULFATE 2.5; .5 MG/3ML; MG/3ML
3 SOLUTION RESPIRATORY (INHALATION) EVERY 4 HOURS PRN
Status: DISCONTINUED | OUTPATIENT
Start: 2017-02-27 | End: 2017-02-27 | Stop reason: HOSPADM

## 2017-02-27 RX ORDER — POTASSIUM CHLORIDE 20 MEQ/1
20 TABLET, EXTENDED RELEASE ORAL ONCE
Status: COMPLETED | OUTPATIENT
Start: 2017-02-27 | End: 2017-02-27

## 2017-02-27 RX ORDER — HYDROCHLOROTHIAZIDE 12.5 MG/1
25 TABLET ORAL DAILY
Status: DISCONTINUED | OUTPATIENT
Start: 2017-02-27 | End: 2017-02-27 | Stop reason: HOSPADM

## 2017-02-27 RX ORDER — IPRATROPIUM BROMIDE 0.5 MG/2.5ML
0.5 SOLUTION RESPIRATORY (INHALATION) EVERY 8 HOURS
Status: DISCONTINUED | OUTPATIENT
Start: 2017-02-27 | End: 2017-02-27 | Stop reason: HOSPADM

## 2017-02-27 RX ORDER — ENOXAPARIN SODIUM 100 MG/ML
40 INJECTION SUBCUTANEOUS EVERY 24 HOURS
Status: DISCONTINUED | OUTPATIENT
Start: 2017-02-27 | End: 2017-02-27 | Stop reason: HOSPADM

## 2017-02-27 RX ADMIN — FLUTICASONE FUROATE AND VILANTEROL TRIFENATATE 1 PUFF: 100; 25 POWDER RESPIRATORY (INHALATION) at 10:02

## 2017-02-27 RX ADMIN — IPRATROPIUM BROMIDE 0.5 MG: 0.5 SOLUTION RESPIRATORY (INHALATION) at 08:02

## 2017-02-27 RX ADMIN — DILTIAZEM HYDROCHLORIDE 180 MG: 180 CAPSULE, COATED, EXTENDED RELEASE ORAL at 10:02

## 2017-02-27 RX ADMIN — ENOXAPARIN SODIUM 40 MG: 100 INJECTION SUBCUTANEOUS at 11:02

## 2017-02-27 RX ADMIN — POTASSIUM CHLORIDE 20 MEQ: 1500 TABLET, EXTENDED RELEASE ORAL at 11:02

## 2017-02-27 RX ADMIN — HYDROCHLOROTHIAZIDE 25 MG: 12.5 TABLET ORAL at 10:02

## 2017-02-27 RX ADMIN — ASPIRIN 81 MG: 81 TABLET, COATED ORAL at 10:02

## 2017-02-27 RX ADMIN — TIOTROPIUM BROMIDE 18 MCG: 18 CAPSULE ORAL; RESPIRATORY (INHALATION) at 03:02

## 2017-02-27 RX ADMIN — IPRATROPIUM BROMIDE 0.5 MG: 0.5 SOLUTION RESPIRATORY (INHALATION) at 05:02

## 2017-02-27 RX ADMIN — PANTOPRAZOLE SODIUM 40 MG: 40 TABLET, DELAYED RELEASE ORAL at 10:02

## 2017-02-27 RX ADMIN — PRAVASTATIN SODIUM 20 MG: 20 TABLET ORAL at 10:02

## 2017-02-27 RX ADMIN — PREDNISONE 40 MG: 10 TABLET ORAL at 10:02

## 2017-02-27 RX ADMIN — LEVALBUTEROL 1.25 MG: 1.25 SOLUTION, CONCENTRATE RESPIRATORY (INHALATION) at 05:02

## 2017-02-27 RX ADMIN — LEVALBUTEROL 1.25 MG: 1.25 SOLUTION, CONCENTRATE RESPIRATORY (INHALATION) at 08:02

## 2017-02-27 NOTE — ED NOTES
The patient is awake, alert and cooperative with a calm affect, patient is aware of environment, airway is open and patent, respirations are spontaneous, normal effort and rate noted, skin warm and dry, moves all extremities well, appearance: no apparent distress noted, bed placed in low position, side rails up x 2. Will continue to monitor. VSS. NAD

## 2017-02-27 NOTE — H&P
Hospital Medicine  History & Physical    SUBJECTIVE:     Chief Complaint/Reason for Admission: SOB    History of Present Illness:    Ms. Mari is a 70 yo F with a pmhx of COPD- emphysema, HTN, HPLD who presents to the ED with complaints of SOB x3 days not consistent with her home COPD baseline. She does not have a home oxygen requirement. She reports staying out late at a social event on , and since then, she notes SOB with severe dyspnea on exertion and subjective fevers, chills. She reports thinking that she had the flu since she was exposed to large crowds at a [employer] event. She denies rhinorrhea, pharyngitis, coryza, sputum production or myalgias. She has no GI symptoms. Patient reports taking an inhaled steroid- Symbicort,  and was recently switched to a new bronchodilator, Bevespi, by Dr. Guido in clinic. She reports no recent hospitalizations but has been hospitalized around 2 years ago for similar symptoms. Vitals signs have been stable with good perfusion and initial saturations at 88-92%. She has a 39 PY smoking history.     Past Medical History:   Diagnosis Date    Actinic keratosis     Allergy     Cataract     Cellulitis of ankle     Colon polyps     COPD (chronic obstructive pulmonary disease)     Family history of colon cancer     Hyperlipidemia     Hypertension     Lung nodules     Sinus tachycardia        Past Surgical History:   Procedure Laterality Date    APPENDECTOMY       SECTION      x2    EYE SURGERY      TONSILLECTOMY         Family History   Problem Relation Age of Onset    Cancer Mother 79     colon    Heart disease Mother     Hyperlipidemia Mother     Hypertension Mother     Skin cancer Mother     COPD Father     Diabetes Father     Heart disease Father     Hyperlipidemia Father     Hypertension Father     Cancer Sister     Heart disease Sister     Hyperlipidemia Sister     Hypertension Sister     Skin cancer Sister     Cancer Son      Melanoma Neg Hx     Psoriasis Neg Hx     Lupus Neg Hx     Eczema Neg Hx        Social History     Social History    Marital status:      Spouse name: N/A    Number of children: N/A    Years of education: N/A     Occupational History    retired      Social History Main Topics    Smoking status: Former Smoker     Packs/day: 1.00     Years: 39.00     Types: Cigarettes     Quit date: 11/2/1995    Smokeless tobacco: Never Used    Alcohol use 1.2 oz/week     2 Glasses of wine per week      Comment: rarely has a glass of wine- not daily    Drug use: No    Sexual activity: No     Other Topics Concern    Are You Pregnant Or Think You May Be? No    Breast-Feeding No     Social History Narrative       Current Facility-Administered Medications   Medication Dose Route Frequency Provider Last Rate Last Dose    0.9%  NaCl infusion   Intravenous Continuous Srinivas Jessica  mL/hr at 02/26/17 2250      acetaminophen tablet 650 mg  650 mg Oral Q4H PRN Vernon Wisdom MD        [START ON 2/27/2017] albuterol-ipratropium 2.5mg-0.5mg/3mL nebulizer solution 3 mL  3 mL Nebulization Q4H WAKE Vernon Wisdom MD        alprazolam tablet 0.25 mg  0.25 mg Oral TID PRN Vernon Wisdom MD        [START ON 2/27/2017] aspirin EC tablet 81 mg  81 mg Oral Daily Vernon Wisdom MD        dextrose 50% injection 12.5 g  12.5 g Intravenous PRN Vernon Wisdom MD        dextrose 50% injection 25 g  25 g Intravenous PRN Vernon Wisdom MD        glucagon (human recombinant) injection 1 mg  1 mg Intramuscular PRN Vernon Wisdom MD        glucose chewable tablet 16 g  16 g Oral PRN Vernon Wisdom MD        glucose chewable tablet 24 g  24 g Oral PRN Vernon Wisdom MD        hydrOXYzine HCl tablet 25 mg  25 mg Oral Nightly PRN Vernon Wisdom MD        insulin aspart pen 0-5 Units  0-5 Units Subcutaneous QID (AC + HS) PRN Vernon Romano  MD [facility]        ondansetron disintegrating tablet 4 mg  4 mg Oral Q8H PRN Srinivas Jessica MD        [START ON 2/27/2017] pantoprazole EC tablet 40 mg  40 mg Oral Daily Srinivas Jessica MD        [START ON 2/27/2017] pravastatin tablet 20 mg  20 mg Oral Daily Vernon Wisdom MD        [START ON 2/27/2017] predniSONE tablet 40 mg  40 mg Oral Daily Vernon Wisdom MD         Current Outpatient Prescriptions   Medication Sig Dispense Refill    alprazolam (XANAX) 0.25 MG tablet Take 1 tablet (0.25 mg total) by mouth 3 (three) times daily as needed. 90 tablet 3    aspirin (ECOTRIN) 81 MG EC tablet Take 81 mg by mouth once daily.        budesonide-formoterol 160-4.5 mcg (SYMBICORT) 160-4.5 mcg/actuation HFAA INHALE 2 PUFFS INTO THE LUNGS BY MOUTH TWICE DAILY 10.2 g 12    GLYCOPYRROLATE INHL Inhale into the lungs.      hydrochlorothiazide (HYDRODIURIL) 25 MG tablet Take 1 tablet (25 mg total) by mouth once daily. 90 tablet 3    albuterol (PROAIR HFA) 90 mcg/actuation inhaler Inhale 1 puff into the lungs every 4 (four) hours as needed for Wheezing or Shortness of Breath. 1 Inhaler 12    albuterol (PROVENTIL) 2.5 mg /3 mL (0.083 %) nebulizer solution Take 3 mLs (2.5 mg total) by nebulization every 4 (four) hours as needed for Wheezing or Shortness of Breath. 120 each 11    diltiazem (CARDIZEM CD) 180 MG 24 hr capsule Take 1 capsule (180 mg total) by mouth once daily. 90 capsule 3    doxycycline (MONODOX) 100 MG capsule Take 1 capsule (100 mg total) by mouth 2 (two) times daily. 20 capsule 0    FLUZONE HIGH-DOSE 2016-17, PF, 180 mcg/0.5 mL Syrg 1 Dose once.  0    hydrOXYzine HCl (ATARAX) 25 MG tablet Take 1 tablet (25 mg total) by mouth nightly as needed for Itching. 30 tablet 0    polyethylene glycol (MOVIPREP) 100-7.5-2.691 gram solution As directed/ dispense one kit 1 Bottle 0    potassium chloride SA (K-DUR,KLOR-CON) 20 MEQ tablet Take on tablet by mouth every day 90 tablet 3     pravastatin (PRAVACHOL) 20 MG tablet TAKE 1 TABLET BY MOUTH EVERY DAY 90 tablet 2    predniSONE (DELTASONE) 10 MG tablet 3 tabs every am x 7 days, then 2 tabs every am for 7 days/stop 35 tablet 1    TUDORZA PRESSAIR 400 mcg/actuation AePB Inhale 1 puff into the lungs 2 (two) times daily. 1 each 12       Review of patient's allergies indicates:   Allergen Reactions    Bactrim [sulfamethoxazole-trimethoprim] Nausea Only    Codeine Itching    Keflex [cephalexin] Other (See Comments)     Rhinorrhea, nausea. Tolerated Ceftriaxone June 2014    Ceftriaxone Rash     Morbilliform rash after receiving IV ceftriaxone    Clindamycin hcl Rash    Vancomycin analogues Rash     Morbilliform rash after receiving IV vancomycin         Review of Systems:  As above. Additionally:   Review of Systems   Constitutional: Positive for chills and malaise/fatigue. Negative for diaphoresis, fever and weight loss.   HENT: Negative for congestion and sore throat.    Eyes: Negative for blurred vision and discharge.   Respiratory: Positive for cough, shortness of breath and wheezing. Negative for hemoptysis, sputum production and stridor.    Cardiovascular: Positive for palpitations. Negative for chest pain, orthopnea, claudication, leg swelling and PND.   Gastrointestinal: Negative for abdominal pain, blood in stool, constipation, nausea and vomiting.   Genitourinary: Negative for dysuria, flank pain, frequency and urgency.   Musculoskeletal: Negative for back pain, falls, joint pain and myalgias.   Skin: Negative for rash.   Neurological: Positive for weakness and headaches. Negative for dizziness, tingling, tremors and focal weakness.       OBJECTIVE:     Vital Signs (Most Recent):  Vitals:    02/26/17 2150   BP:    Pulse: 101   Resp:    Temp:          Physical Exam:  Physical Exam   HENT:   Mouth/Throat: Oropharynx is clear and moist.   Eyes: EOM are normal. Pupils are equal, round, and reactive to light. No scleral icterus.   Neck:  Normal range of motion. No JVD present. No tracheal deviation present. No thyromegaly present.   Cardiovascular: Normal rate, regular rhythm, normal heart sounds and intact distal pulses.  Exam reveals no gallop and no friction rub.    No murmur heard.  Pulmonary/Chest: Effort normal. No respiratory distress. She has wheezes. She has no rales. She exhibits no tenderness.   BS distant   Abdominal: Soft. Bowel sounds are normal. She exhibits no distension and no mass. There is no tenderness. No hernia.   Musculoskeletal: Normal range of motion. She exhibits no edema, tenderness or deformity.   Lymphadenopathy:     She has no cervical adenopathy.   Neurological: She is alert. No cranial nerve deficit. Coordination normal.   Skin: Skin is warm and dry. No pallor.         Laboratory:  No results for input(s): INR in the last 168 hours.    Recent Labs  Lab 02/26/17  1602   WBC 15.19*   HGB 14.6   HCT 42.6        Lab Results   Component Value Date    TSH 2.762 05/08/2015    Q9QSDSY 8.3 01/15/2010       Recent Labs  Lab 02/26/17  1602   *   K 3.8      CO2 23   BUN 10   CREATININE 0.7   CALCIUM 9.2   PROT 7.0   BILITOT 0.7   ALKPHOS 99   ALT 20   AST 26       Recent Labs  Lab 02/26/17  1602   TROPONINI 0.022   ABG  Hemoglobin A1C   Date Value Ref Range Status   11/14/2013 6.0 4.5 - 6.2 % Final   05/29/2013 5.9 4.0 - 6.2 % Final       Recent Labs  Lab 02/26/17  1819   PH 7.403   PO2 67*   PCO2 38.5   HCO3 24.0   BE -1       Diagnostic Results:  Imaging Results         CTA Chest Non-Coronary (PE Study) (Final result) Result time:  02/26/17 20:45:21    Final result by Jorgito Salazar MD (02/26/17 20:45:21)    Impression:        No evidence of pulmonary thromboembolism.    Patchy foci of groundglass attenuation, tree in bud nodular opacity, and bronchiectasis, primarily involving the right upper and right middle lobe however the process is also noted to a lesser degree within the right lower lobe, and  possibly developing within the left lower lobe.  Taken together, findings are concerning for infectious or inflammatory process given the multifocality.  Aspiration is a less likely consideration.  Clinical correlation is recommended.  Although given the multifocality, this is unlikely a malignant process however followup exam is advised.    Mosaic attenuation of the pulmonary parenchyma suggests superimposed small airways disease or small vessel disease.    Grossly stable appearing thyroid nodule on the left, correlation with previous ultrasound is performed, otherwise, further nonemergent ultrasonic graphic evaluation is recommended.    Several additional findings above noting calcific density within the right breast, and slight heterogeneity of the left breast tissue, correlation with mammography advised if not previously performed.      Electronically signed by: HARIKA CAMPA MD  Date:     02/26/17  Time:    20:45     Narrative:    CTA chest non-coronary    Clinical history: Shortness of breath    Comparison: CT chest without contrast 10/27/2014    Technique:  Axial images of the thorax were obtained at 3 mm intervals during administration of 75 cc Omni 350 IV contrast following CTA chest non-coronary protocol.    Findings:  The structures at the base of the neck are remarkable for a low attenuating left thyroid nodule, measurement is difficult given motion artifact however appears to be grossly similar in comparison to the previous exam versus slightly smaller.  Correlation with ultrasound recommended if not previously performed.  There is no significant mediastinal lymphadenopathy.  The heart is not enlarged noting calcification in the distribution of the coronary arteries.  The visualized portions of the right kidney, adrenal glands, spleen, pancreas, liver and gallbladder are grossly unremarkable.  There is a questionable hypoattenuating lesion versus artifact within the interpolar region of the left  kidney, too small for characterization.  There is a small hiatal hernia.    The airways are grossly patent.  There is mosaic attenuation of the pulmonary parenchyma suggesting small airways disease or small vessel disease.  There is a pleural-based nodule abutting the pleural margin of the right upper lobe, unchanged since examination of 2014, strongly favoring a benign etiology.  There is patchy groundglass attenuation, tree in bud configuration nodules, groundglass nodules, and bronchiectasis involving the right middle lobe and inferior right lower lobe.  There is bandlike atelectasis of the right middle lobe.  The process partially involves scattered regions of the right lower lobe.  There is a probable calcified granuloma within the left lower lobe.  A nonspecific groundglass attenuating nodule is noted within the left lower lobe measuring up to 0.5 cm.  There is left basilar subsegmental atelectasis.  No appreciable volume of pleural fluid.  No pneumothorax.    Bolus timing is adequate for the evaluation of pulmonary thromboembolism.  Although exam is somewhat limited by respiratory motion, especially within the bilateral lower lobes, there is no convincing evidence of pulmonary arterial filling defect to the level of the distal segmental arteries, and several selected subsegmental arteries to suggest pulmonary thromboembolism.    There is osteopenia.  Degenerative changes are noted of the lower lumbar spine without focal osseous destructive process.  No significant axillary lymphadenopathy.  There is a calcific focus within the right breast.  There is atherosclerotic calcification of the aorta.            X-Ray Chest AP Portable (Final result) Result time:  02/26/17 16:43:35    Final result by Giana Christopher MD (02/26/17 16:43:35)    Impression:        No significant change is identified from the prior exams with no acute abnormality detected in the chest.      Electronically signed by: Giana  Ashwin  Date:     02/26/17  Time:    16:43     Narrative:    History: Asthma    Comparison: Chest radiographs from 11/10/16 and 6/29/15.     Technique: A single frontal chest radiograph was obtained and reviewed.    Findings:     Cardiac monitoring leads overlie the chest.    The mediastinal structures are midline. The cardiomediastinal silhouette appears unchanged.  Atherosclerotic plaque is noted overlying the aortic knob.     The lungs are symmetrically expanded and clear.  A rounded opacity projects over the right lower lung.  This appears stable from the prior exams and may represent a nipple shadow.  Otherwise, no evidence of focal pulmonary disease, pleural effusion, or pneumothorax is identified.      The soft tissues and osseous structures demonstrate nothing acute.            ABG    Recent Labs  Lab 02/26/17  1819   PH 7.403   PO2 67*   PCO2 38.5   HCO3 24.0   BE -1           ASSESSMENT/PLAN:     Ms. Mari is a 70 yo with COPD who presents with acute onset SOB and concern for CAP    Plan:    COPD exacerbation with acute hypoxic respiratory failure.   -Patient reports recent SOB; concern for CAP  -began Avelox IV in ED, continue with Avelox PO  -Duonebs q4h wake, supplemental oxygen  -start  home ICS/tiotropium tomorrow  -Begin PO prednisone course s/p Solumedrol x1 in ED  -LDSSI in case of steroid induced hyperglycemia  -Flu antigen negative  -s/p CT chest with negative result for PE in ED    Community aquired pneumonia  -began Avelox as above  -patient with WBC of 15k. No concern for sepsis given VSS  -MIVF began in ED    HTN   -resume home regimen: diltiazem XR, HCTZ    HPLD  -home statin    Sinus Tachycardia  -home Diltiazem, judicious use of Beta agonists while inpatient.     PPx: SCDs, PPI  Code status: full    Dispo: Admit to IM2.    Signing House Staff:  Vernon Wisdom MD  PGY1 Internal Medicine       I HAVE PERSONALLY TAKEN THE HISTORY, EXAMINED THIS PATIENT,  AND AGREE WITH THE RESIDENT'S  NOTE AS STATED ABOVE WITH THE FOLLOWING EXCEPTIONS: Patient seen and examined with the intern at 11:50pm on 2/27/17    Mrs. Mari is a 70yo lady with COPD, HTN and HLD.  She is followed by Dr. Guido in Pulmonary clinic for the COPD.  She now comes with complaints of dry cough, progressive shortness of breath and subjective fever and chills for 3 days.      Assessment and plan:  Community acquired pneumonia  -Patchy foci of groundglass attenuation, tree in bud nodular opacity, and bronchiectasis, primarily involving the right upper and right middle lobe and right lower lobe  -Check Legionella Uag  -Agree with Avelox  -Sputum cultures and gram stain  -Influenza screen negative    Sepsis  -Leukocytosis, tachycardia  -Blood cultures    Bronchiectasis  -Follow up with Pulmonary  -No signs of hemoptysis    COPD exacerbation  -Duonebs  -Prednisone 40mg po qday post solumedrol in the ED x 1    Incidental thyroid nodule on CT chest  -Follow up with Endocrinology    Temp:  [98.2 °F (36.8 °C)-99 °F (37.2 °C)]   Pulse:  []   Resp:  [23-42]   BP: (122-147)/(59-73)   SpO2:  [91 %-100 %]      Recent Results (from the past 24 hour(s))   CBC auto differential    Collection Time: 02/26/17  4:02 PM   Result Value Ref Range    WBC 15.19 (H) 3.90 - 12.70 K/uL    RBC 4.62 4.00 - 5.40 M/uL    Hemoglobin 14.6 12.0 - 16.0 g/dL    Hematocrit 42.6 37.0 - 48.5 %    MCV 92 82 - 98 fL    MCH 31.6 (H) 27.0 - 31.0 pg    MCHC 34.3 32.0 - 36.0 %    RDW 13.0 11.5 - 14.5 %    Platelets 176 150 - 350 K/uL    MPV 10.0 9.2 - 12.9 fL    Gran # 13.5 (H) 1.8 - 7.7 K/uL    Lymph # 0.8 (L) 1.0 - 4.8 K/uL    Mono # 0.9 0.3 - 1.0 K/uL    Eos # 0.0 0.0 - 0.5 K/uL    Baso # 0.01 0.00 - 0.20 K/uL    Gran% 88.8 (H) 38.0 - 73.0 %    Lymph% 4.9 (L) 18.0 - 48.0 %    Mono% 5.9 4.0 - 15.0 %    Eosinophil% 0.3 0.0 - 8.0 %    Basophil% 0.1 0.0 - 1.9 %    Differential Method Automated    Comprehensive metabolic panel    Collection Time: 02/26/17  4:02 PM   Result  Value Ref Range    Sodium 134 (L) 136 - 145 mmol/L    Potassium 3.8 3.5 - 5.1 mmol/L    Chloride 100 95 - 110 mmol/L    CO2 23 23 - 29 mmol/L    Glucose 163 (H) 70 - 110 mg/dL    BUN, Bld 10 8 - 23 mg/dL    Creatinine 0.7 0.5 - 1.4 mg/dL    Calcium 9.2 8.7 - 10.5 mg/dL    Total Protein 7.0 6.0 - 8.4 g/dL    Albumin 3.3 (L) 3.5 - 5.2 g/dL    Total Bilirubin 0.7 0.1 - 1.0 mg/dL    Alkaline Phosphatase 99 55 - 135 U/L    AST 26 10 - 40 U/L    ALT 20 10 - 44 U/L    Anion Gap 11 8 - 16 mmol/L    eGFR if African American >60.0 >60 mL/min/1.73 m^2    eGFR if non African American >60.0 >60 mL/min/1.73 m^2   Troponin I    Collection Time: 02/26/17  4:02 PM   Result Value Ref Range    Troponin I 0.022 0.000 - 0.026 ng/mL   B-Type natriuretic peptide (BNP)    Collection Time: 02/26/17  4:02 PM   Result Value Ref Range    BNP 37 0 - 99 pg/mL   Influenza antigen Nasopharyngeal Swab    Collection Time: 02/26/17  4:05 PM   Result Value Ref Range    Influenza A Ag, EIA Negative Negative    Influenza B Ag, EIA Negative Negative    Flu A & B Source Nasopharyngeal Swab    ISTAT PROCEDURE    Collection Time: 02/26/17  6:19 PM   Result Value Ref Range    POC PH 7.403 7.35 - 7.45    POC PCO2 38.5 35 - 45 mmHg    POC PO2 67 (L) 80 - 100 mmHg    POC HCO3 24.0 24 - 28 mmol/L    POC BE -1 -2 to 2 mmol/L    POC SATURATED O2 93 (L) 95 - 100 %    POC TCO2 25 23 - 27 mmol/L    Sample ARTERIAL     Site LR     Allens Test Pass     DelSys Nasal Can     Mode SPONT     Flow 2     FiO2 28     Sp02 92

## 2017-02-27 NOTE — PLAN OF CARE
Home Oxygen Evaluation    Date Performed: 2017    1) Patient's Home O2 Sat on room air, while at rest: 92        If O2 sats on room air at rest are 88% or below, patient qualifies. No additional testing needed. Document N/A in steps 2 and 3. If 89% or above, complete steps 2.      2) Patient's O2 Sat on room air while exercisin        If O2 sats on room air while exercising remain 89% or above patient does not qualify, no further testing needed Document N/A in step 3. If O2 sats on room air while exercising are 88% or below, continue to step 3.      3) Patient's O2 Sat while exercising on O2: 90 at 1 LPM         (Must show improvement from #2 for patients to qualify)    If O2 sats improve on oxygen, patient qualifies for portable oxygen. If not, the patient does not qualify.         Quincy Summers M.D.  PGY-1 Medicine  Pager# [phone]

## 2017-02-27 NOTE — PROGRESS NOTES
[facility] Hospital Medicine  Progress Note    Patient Name: Leyla Mari  MRN: [id number]  Patient Class: IP- Inpatient   Admission Date: 2/26/2017  Length of Stay: 1 days  Attending Physician: Chandler Antunez MD  Primary Care Provider: Dulce Castro MD    [facility] Medicine Team: [facility] HOSP MED Mil Summers MD    Subjective:     Principal Problem:Acute respiratory failure with hypoxia    HPI:  Ms. Mari is a 72 yo F with a pmhx of COPD- emphysema, HTN, HPLD who presents to the ED with complaints of SOB x3 days not consistent with her home COPD baseline. She does not have a home oxygen requirement. She reports staying out late at a social event on 2/23, and since then, she notes SOB with severe dyspnea on exertion and subjective fevers, chills. She reports thinking that she had the flu since she was exposed to large crowds at a [employer] [place] event. She denies rhinorrhea, pharyngitis, coryza, sputum production or myalgias. She has no GI symptoms. Patient reports taking an inhaled steroid- Symbicort,  and was recently switched to a new bronchodilator, Bevespi, by Dr. Guido in clinic. She reports no recent hospitalizations but has been hospitalized around 2 years ago for similar symptoms. Vitals signs have been stable with good perfusion and initial saturations at 88-92%. She has a 39 PY smoking history.    Hospital Course:       Interval History: Pt doing well this morning with minimal complaints of dypsnea and feels much better.    Review of Systems   Constitutional: Negative for chills and fever.   HENT: Negative for congestion, rhinorrhea and sore throat.    Eyes: Negative for photophobia and visual disturbance.   Respiratory: Positive for shortness of breath (minor). Negative for cough, chest tightness and wheezing.    Cardiovascular: Negative for chest pain, palpitations and leg swelling.   Gastrointestinal: Negative for abdominal distention, abdominal pain, constipation, diarrhea,  nausea and vomiting.   Genitourinary: Negative for dysuria.     Objective:     Vital Signs (Most Recent):  Temp: 98.3 °F (36.8 °C) (02/27/17 1200)  Pulse: 99 (02/27/17 1200)  Resp: 20 (02/27/17 1200)  BP: (!) 140/62 (02/27/17 1200)  SpO2: (!) 93 % (02/27/17 1200) Vital Signs (24h Range):  Temp:  [98.2 °F (36.8 °C)-99 °F (37.2 °C)] 98.3 °F (36.8 °C)  Pulse:  [] 99  Resp:  [19-42] 20  SpO2:  [91 %-100 %] 93 %  BP: (122-147)/(56-73) 140/62     Weight: 59 kg (130 lb)  Body mass index is 24.56 kg/(m^2).  No intake or output data in the 24 hours ending 02/27/17 1220     Physical Exam   Constitutional: She is oriented to person, place, and time. She appears well-developed and well-nourished. No distress.   HENT:   Head: Normocephalic and atraumatic.   Eyes: Conjunctivae and EOM are normal. Pupils are equal, round, and reactive to light.   Neck: Normal range of motion. Neck supple. No JVD present. No tracheal deviation present. No thyromegaly present.   Cardiovascular: Normal rate, regular rhythm, normal heart sounds and intact distal pulses.    No murmur heard.  Pulmonary/Chest: Effort normal. No respiratory distress. She has no wheezes. She has no rales.   Distant/decreased intensity of lung sounds diffusely likely 2/2 COPD   Musculoskeletal: Normal range of motion. She exhibits no edema or tenderness.   Neurological: She is alert and oriented to person, place, and time.   Skin: Skin is warm and dry. No rash noted. She is not diaphoretic. No erythema. No pallor.   Psychiatric: She has a normal mood and affect. Her behavior is normal. Judgment and thought content normal.       Significant Labs:     [facility]  Lab Results   Component Value Date     02/27/2017    K 3.8 02/27/2017     02/27/2017    CO2 27 02/27/2017    BUN 9 02/27/2017    CREATININE 0.6 02/27/2017    CALCIUM 9.5 02/27/2017    ANIONGAP 10 02/27/2017    ESTGFRAFRICA >60.0 02/27/2017    EGFRNONAA >60.0 02/27/2017     BMP  Lab Results   Component  Value Date     02/27/2017    K 3.8 02/27/2017     02/27/2017    CO2 27 02/27/2017    BUN 9 02/27/2017    CREATININE 0.6 02/27/2017    CALCIUM 9.5 02/27/2017    ANIONGAP 10 02/27/2017    ESTGFRAFRICA >60.0 02/27/2017    EGFRNONAA >60.0 02/27/2017     Lab Results   Component Value Date    ALT 18 02/27/2017    AST 19 02/27/2017    ALKPHOS 103 02/27/2017    BILITOT 0.3 02/27/2017     Urinalysis    Recent Labs  Lab 02/27/17  0507   COLORU Yellow   SPECGRAV 1.015   PHUR 5.0   PROTEINUA Negative   NITRITE Negative   LEUKOCYTESUR Negative   UROBILINOGEN Negative     Microbiology Results (last 7 days)     Procedure Component Value Units Date/Time    Respiratory Viral Panel by PCR Nasal Swab [[id number]] Collected:  02/27/17 0909    Order Status:  Completed Specimen:  Respiratory Updated:  02/27/17 1008     Respiratory Virus Panel, source nasal swab    Narrative:       Specimen Source->Nasal Swab    Blood culture [[id number]] Collected:  02/26/17 2111    Order Status:  Completed Specimen:  Blood from Peripheral, Antecubital, Right Updated:  02/27/17 0515     Blood Culture, Routine No Growth to date    Blood culture [[id number]] Collected:  02/26/17 2131    Order Status:  Completed Specimen:  Blood from Peripheral, Hand, Right Updated:  02/27/17 0515     Blood Culture, Routine No Growth to date    Culture, Respiratory with Gram Stain [[id number]]     Order Status:  No result Specimen:  Respiratory from Sputum, Expectorated           Significant Imaging:    CTA 2/26/17   Impression No evidence of pulmonary thromboembolism.    Patchy foci of groundglass attenuation, tree in bud nodular opacity, and bronchiectasis, primarily involving the right upper and right middle lobe however the process is also noted to a lesser degree within the right lower lobe, and possibly developing within the left lower lobe.  Taken together, findings are concerning for infectious or inflammatory process given the multifocality.  Aspiration is  a less likely consideration.  Clinical correlation is recommended.  Although given the multifocality, this is unlikely a malignant process however followup exam is advised.    Mosaic attenuation of the pulmonary parenchyma suggests superimposed small airways disease or small vessel disease.    Grossly stable appearing thyroid nodule on the left, correlation with previous ultrasound is performed, otherwise, further nonemergent ultrasonic graphic evaluation is recommended.    Several additional findings above noting calcific density within the right breast, and slight heterogeneity of the left breast tissue, correlation with mammography advised if not previously performed.      Electronically signed by: HARIKA CAMPA MD  Date:     02/26/17  Time:    20:45      CXR 2/26/17   Impression No significant change is identified from the prior exams with no acute abnormality detected in the chest.      Electronically signed by: Giana Christopher  Date:     02/26/17  Time:    16:43      Assessment/Plan:      * Acute respiratory failure with hypoxia  Improved  -Likely 2/2 COPD exacerbation complicated by possible CAP  -Patchy foci of groundglass attenuation, tree in bud nodular opacity, and bronchiectasis, primarily involving the right upper and right middle lobe and right lower lobe  -Check Legionella urine antigen  -Moxifloxacin IV, methylprednisolone IV, Duo-nebs, inhalers; will continue oral moxifloxacin and oral prednisone  -Sputum cultures and gram stain pending  -Influenza screen negative  - Will complete ambulatory testing without supplemental O2 to assess for home need    Chronic obstructive pulmonary disease  - See acute respiratory failure section    Hypertension  Stable  - Continue home HCTZ    Hyperlipidemia  - Continue home pravastatin and aspirin    CAP (community acquired pneumonia)  - Continue moxifloxacin and prednisone  - Pt improving with minimal evidence of systemic infection; pt afebrile, leukocytosis  resolving on steroids, UA negative, saturating well on minimal supplemental O2, CXR negative for acute change  - Pt likely to be discharged pending continued improvement    VTE Risk Mitigation         Ordered     enoxaparin injection 40 mg  Daily     Route:  Subcutaneous        02/27/17 0936     Medium Risk of VTE  Once      02/26/17 2203     Place sequential compression device  Until discontinued      02/26/17 2203        Dispo: Pt greatly improved from yesterday. Pt currently on oral moxifloxicin, oral prednisone, duo-nebs, and tiotropium. Pt doing well on minimal supplemental O2 and could be discharged in near future pending continued improvement.    Quincy Summers MD  Department of Hospital Medicine   [facility]

## 2017-02-27 NOTE — PROGRESS NOTES
Transfer note  Pt arrived to U room 8091 via stretcher. Pt placed on 3 liter of n/c per order. Vss, pt skin in tact, pt deined any pain or discomfort at this time. Pt saturations 95-96 on 3 liter. Will continue to monitor

## 2017-02-27 NOTE — SUBJECTIVE & OBJECTIVE
Interval History: Pt doing well this morning with minimal complaints of dypsnea and feels much better.    Review of Systems   Constitutional: Negative for chills and fever.   HENT: Negative for congestion, rhinorrhea and sore throat.    Eyes: Negative for photophobia and visual disturbance.   Respiratory: Positive for shortness of breath (minor). Negative for cough, chest tightness and wheezing.    Cardiovascular: Negative for chest pain, palpitations and leg swelling.   Gastrointestinal: Negative for abdominal distention, abdominal pain, constipation, diarrhea, nausea and vomiting.   Genitourinary: Negative for dysuria.     Objective:     Vital Signs (Most Recent):  Temp: 98.3 °F (36.8 °C) (02/27/17 1200)  Pulse: 99 (02/27/17 1200)  Resp: 20 (02/27/17 1200)  BP: (!) 140/62 (02/27/17 1200)  SpO2: (!) 93 % (02/27/17 1200) Vital Signs (24h Range):  Temp:  [98.2 °F (36.8 °C)-99 °F (37.2 °C)] 98.3 °F (36.8 °C)  Pulse:  [] 99  Resp:  [19-42] 20  SpO2:  [91 %-100 %] 93 %  BP: (122-147)/(56-73) 140/62     Weight: 59 kg (130 lb)  Body mass index is 24.56 kg/(m^2).  No intake or output data in the 24 hours ending 02/27/17 1220     Physical Exam   Constitutional: She is oriented to person, place, and time. She appears well-developed and well-nourished. No distress.   HENT:   Head: Normocephalic and atraumatic.   Eyes: Conjunctivae and EOM are normal. Pupils are equal, round, and reactive to light.   Neck: Normal range of motion. Neck supple. No JVD present. No tracheal deviation present. No thyromegaly present.   Cardiovascular: Normal rate, regular rhythm, normal heart sounds and intact distal pulses.    No murmur heard.  Pulmonary/Chest: Effort normal. No respiratory distress. She has no wheezes. She has no rales.   Distant/decreased intensity of lung sounds diffusely likely 2/2 COPD   Musculoskeletal: Normal range of motion. She exhibits no edema or tenderness.   Neurological: She is alert and oriented to person,  place, and time.   Skin: Skin is warm and dry. No rash noted. She is not diaphoretic. No erythema. No pallor.   Psychiatric: She has a normal mood and affect. Her behavior is normal. Judgment and thought content normal.       Significant Labs:     BMP  Lab Results   Component Value Date     02/27/2017    K 3.8 02/27/2017     02/27/2017    CO2 27 02/27/2017    BUN 9 02/27/2017    CREATININE 0.6 02/27/2017    CALCIUM 9.5 02/27/2017    ANIONGAP 10 02/27/2017    ESTGFRAFRICA >60.0 02/27/2017    EGFRNONAA >60.0 02/27/2017     BMP  Lab Results   Component Value Date     02/27/2017    K 3.8 02/27/2017     02/27/2017    CO2 27 02/27/2017    BUN 9 02/27/2017    CREATININE 0.6 02/27/2017    CALCIUM 9.5 02/27/2017    ANIONGAP 10 02/27/2017    ESTGFRAFRICA >60.0 02/27/2017    EGFRNONAA >60.0 02/27/2017     Lab Results   Component Value Date    ALT 18 02/27/2017    AST 19 02/27/2017    ALKPHOS 103 02/27/2017    BILITOT 0.3 02/27/2017     Urinalysis    Recent Labs  Lab 02/27/17  0507   COLORU Yellow   SPECGRAV 1.015   PHUR 5.0   PROTEINUA Negative   NITRITE Negative   LEUKOCYTESUR Negative   UROBILINOGEN Negative     Microbiology Results (last 7 days)     Procedure Component Value Units Date/Time    Respiratory Viral Panel by PCR Nasal Swab [[id number]] Collected:  02/27/17 0909    Order Status:  Completed Specimen:  Respiratory Updated:  02/27/17 1008     Respiratory Virus Panel, source nasal swab    Narrative:       Specimen Source->Nasal Swab    Blood culture [[id number]] Collected:  02/26/17 2111    Order Status:  Completed Specimen:  Blood from Peripheral, Antecubital, Right Updated:  02/27/17 0515     Blood Culture, Routine No Growth to date    Blood culture [[id number]] Collected:  02/26/17 2131    Order Status:  Completed Specimen:  Blood from Peripheral, Hand, Right Updated:  02/27/17 0515     Blood Culture, Routine No Growth to date    Culture, Respiratory with Gram Stain [[id number]]     Order  Status:  No result Specimen:  Respiratory from Sputum, Expectorated           Significant Imaging:    CTA 2/26/17   Impression No evidence of pulmonary thromboembolism.    Patchy foci of groundglass attenuation, tree in bud nodular opacity, and bronchiectasis, primarily involving the right upper and right middle lobe however the process is also noted to a lesser degree within the right lower lobe, and possibly developing within the left lower lobe.  Taken together, findings are concerning for infectious or inflammatory process given the multifocality.  Aspiration is a less likely consideration.  Clinical correlation is recommended.  Although given the multifocality, this is unlikely a malignant process however followup exam is advised.    Mosaic attenuation of the pulmonary parenchyma suggests superimposed small airways disease or small vessel disease.    Grossly stable appearing thyroid nodule on the left, correlation with previous ultrasound is performed, otherwise, further nonemergent ultrasonic graphic evaluation is recommended.    Several additional findings above noting calcific density within the right breast, and slight heterogeneity of the left breast tissue, correlation with mammography advised if not previously performed.      Electronically signed by: HARIKA CAMPA MD  Date:     02/26/17  Time:    20:45      CXR 2/26/17   Impression No significant change is identified from the prior exams with no acute abnormality detected in the chest.      Electronically signed by: Giana Christopher  Date:     02/26/17  Time:    16:43

## 2017-02-27 NOTE — ASSESSMENT & PLAN NOTE
Improved  -Likely 2/2 COPD exacerbation complicated by possible CAP  -Patchy foci of groundglass attenuation, tree in bud nodular opacity, and bronchiectasis, primarily involving the right upper and right middle lobe and right lower lobe  -Check Legionella urine antigen  -Moxifloxacin IV, methylprednisolone IV, Duo-nebs, inhalers; will continue oral moxifloxacin and oral prednisone  -Sputum cultures and gram stain pending  -Influenza screen negative  - Will complete ambulatory testing without supplemental O2 to assess for home need

## 2017-02-27 NOTE — PLAN OF CARE
02/27/2017      Leyla Mari  [address] [address]          [facility] Medicine Dept.  [facility] [address] [address] [phone] after hours  [phone] fax Principal Diagnosis:  Acute respiratory failure with hypoxia  Oxygen Requiring Condition: COPD                                          Portable Oxygen / Compressor    Pulse Oximetry:   82% saturation with exertion    Prescribe:  2 Liter/min Nasal Canula PRN ambulation    __________________________  Quincy Summers MD  02/27/2017

## 2017-02-27 NOTE — ASSESSMENT & PLAN NOTE
- Continue moxifloxacin and prednisone  - Pt improving with minimal evidence of systemic infection; pt afebrile, leukocytosis resolving on steroids, UA negative, saturating well on minimal supplemental O2, CXR negative for acute change  - Pt likely to be discharged pending continued improvement

## 2017-02-27 NOTE — NURSING
Home Oxygen Evaluation    Date Performed: 2017    1) Patient's Home O2 Sat on room air, while at rest: 92        If O2 sats on room air at rest are 88% or below, patient qualifies. No additional testing needed. Document N/A in steps 2 and 3. If 89% or above, complete steps 2.      2) Patient's O2 Sat on room air while exercisin        If O2 sats on room air while exercising remain 89% or above patient does not qualify, no further testing needed Document N/A in step 3. If O2 sats on room air while exercising are 88% or below, continue to step 3.      3) Patient's O2 Sat while exercising on O2: 90 at 1 LPM         (Must show improvement from #2 for patients to qualify)    If O2 sats improve on oxygen, patient qualifies for portable oxygen. If not, the patient does not qualify.

## 2017-02-28 NOTE — DISCHARGE SUMMARY
[facility] Hospital Medicine  Discharge Summary      Patient Name: Leyla Mari  MRN: [id number]  Admission Date: 2/26/2017  Hospital Length of Stay: 1 days  Discharge Date and Time:  02/27/2017 6:04 PM  Attending Physician: Chandler Antunez MD   Discharging Provider: Argentina Joseph MD  Primary Care Provider: Dulce Castro MD  [facility] Medicine Team: [facility]OSP MED 2 Argentina Joseph MD    HPI:   Ms. Mari is a 72 yo F with a pmhx of COPD- emphysema, HTN, HPLD who presents to the ED with complaints of SOB x3 days not consistent with her home COPD baseline. She does not have a home oxygen requirement. She reports staying out late at a social event on 2/23, and since then, she notes SOB with severe dyspnea on exertion and subjective fevers, chills. She reports thinking that she had the flu since she was exposed to large crowds at a [employer] event. She denies rhinorrhea, pharyngitis, coryza, sputum production or myalgias. She has no GI symptoms. Patient reports taking an inhaled steroid- Symbicort,  and was recently switched to a new bronchodilator, Bevespi, by Dr. Guido in clinic. She reports no recent hospitalizations but has been hospitalized around 2 years ago for similar symptoms. Vitals signs have been stable with good perfusion and initial saturations at 88-92%. She has a 39 PY smoking history.    * No surgery found *      Indwelling Lines/Drains at time of discharge:   Lines/Drains/Airways          No matching active lines, drains, or airways        Hospital Course:   Ms. Mari was treated conservatively as a COPD exacerbation. In the ED, she underwent a CTA which revealed no evidence of PE. It did show some bronchiectasis, groundglass attentuation and tree in bud nodular opacity in the RU/ML and LL lobes consistent with either infection or inflammation. It also comments on some abnormalities of breast and thyroid tissues which should be correlated further with imaging. She improved  overnight with nebs, steroids and antibiotics. She was able to be weaned off oxygen at rest but required with ambulation. She felt like she was ready for discharge and was set up with home oxygen in addition to a 5 day course of moxifloxacin and prednisone.      Consults:     Significant Diagnostic Studies: Labs:   CMP   Recent Labs  Lab 02/26/17  1602 02/27/17  0402   * 139   K 3.8 3.8    102   CO2 23 27   * 125*   BUN 10 9   CREATININE 0.7 0.6   CALCIUM 9.2 9.5   PROT 7.0 7.1   ALBUMIN 3.3* 3.2*   BILITOT 0.7 0.3   ALKPHOS 99 103   AST 26 19   ALT 20 18   ANIONGAP 11 10   ESTGFRAFRICA >60.0 >60.0   EGFRNONAA >60.0 >60.0    and CBC   Recent Labs  Lab 02/26/17  1602 02/27/17  0402   WBC 15.19* 14.02*   HGB 14.6 15.6   HCT 42.6 45.2    207     Microbiology:   Blood Culture   Lab Results   Component Value Date    LABBLOO No Growth to date 02/26/2017    and Sputum Culture   Lab Results   Component Value Date    GSRESP Few WBC's 12/04/2014    GSRESP <10 epithelial cells per low power field. 12/04/2014    GSRESP Moderate Gram positive cocci 12/04/2014    RESPIRATORYC Normal respiratory torsten 12/04/2014       Pending Diagnostic Studies:     Procedure Component Value Units Date/Time    Legionella antigen, urine [[id number]] Collected:  02/27/17 0507    Order Status:  Sent Lab Status:  In process Updated:  02/27/17 0620    Specimen:  Urine from Urine, Clean Catch         Final Active Diagnoses:    Diagnosis Date Noted POA    PRINCIPAL PROBLEM:  Acute respiratory failure with hypoxia [J96.01] 02/27/2017 Yes    CAP (community acquired pneumonia) [J18.9] 02/26/2017 Yes    Panlobular emphysema [J43.1]  Yes     Chronic    Hypertension [I10]  Yes     Chronic    Hyperlipidemia [E78.5]  Yes     Chronic    Chronic obstructive pulmonary disease [J44.9] 07/03/2012 Yes      Problems Resolved During this Admission:    Diagnosis Date Noted Date Resolved POA      No new Assessment & Plan notes have been  "filed under this hospital service since the last note was generated.  Service: Hospital Medicine      Discharged Condition: stable    Disposition: Home or Self Care    Follow Up:  Follow-up Information     Follow up with Dulce Castro MD In 1 week.    Specialty:  Internal Medicine    Why:  Hospital follow-up for CAP/COPD exacerbation    Contact information:    [address] [facility]  [place] [address]  [phone]          Patient Instructions:     OXYGEN FOR HOME USE   Order Specific Question Answer Comments   Liter Flow 2    Duration With activity    Qualifying SpO2: 82% ambulating on RA    Testing done at: Exercise/Activity    Route nasal cannula    Portable mode: continuous    Device home concentrator with portable unit    Length of need (in months): 3 mos    Patient condition with qualifying saturation COPD    Height: 5' 1" (1.549 m)    Weight: 59 kg (130 lb)    Alternative treatment measures have been tried or considered and deemed clinically ineffective. Yes    Vendor: [facility] Pending authorization from [facility]   Expected Date of Delivery: 2/27/2017      Activity as tolerated     Call MD for:  difficulty breathing or increased cough     Call MD for:  increased confusion or weakness     Call MD for:  persistent dizziness, light-headedness, or visual disturbances       Medications:  Reconciled Home Medications:   Current Discharge Medication List      START taking these medications    Details   moxifloxacin (AVELOX) 400 mg tablet Take 1 tablet (400 mg total) by mouth once daily.  Qty: 4 tablet, Refills: 0         CONTINUE these medications which have CHANGED    Details   predniSONE (DELTASONE) 20 MG tablet Take 2 tablets (40 mg total) by mouth once daily.  Qty: 8 tablet, Refills: 0         CONTINUE these medications which have NOT CHANGED    Details   albuterol (PROAIR HFA) 90 mcg/actuation inhaler Inhale 1 puff into the lungs every 4 (four) hours as needed for Wheezing or Shortness of Breath.  Qty: 1 " Inhaler, Refills: 12    Associated Diagnoses: Chronic obstructive pulmonary disease, unspecified COPD type      albuterol (PROVENTIL) 2.5 mg /3 mL (0.083 %) nebulizer solution Take 3 mLs (2.5 mg total) by nebulization every 4 (four) hours as needed for Wheezing or Shortness of Breath.  Qty: 120 each, Refills: 11    Associated Diagnoses: Chronic obstructive pulmonary disease, unspecified COPD type      alprazolam (XANAX) 0.25 MG tablet Take 1 tablet (0.25 mg total) by mouth 3 (three) times daily as needed.  Qty: 90 tablet, Refills: 3    Associated Diagnoses: Anxiety      aspirin (ECOTRIN) 81 MG EC tablet Take 81 mg by mouth once daily.        budesonide-formoterol 160-4.5 mcg (SYMBICORT) 160-4.5 mcg/actuation HFAA INHALE 2 PUFFS INTO THE LUNGS BY MOUTH TWICE DAILY  Qty: 10.2 g, Refills: 12    Associated Diagnoses: Chronic airway obstruction, not elsewhere classified      diltiazem (CARDIZEM CD) 180 MG 24 hr capsule Take 1 capsule (180 mg total) by mouth once daily.  Qty: 90 capsule, Refills: 3    Associated Diagnoses: Essential hypertension      GLYCOPYRROLATE/FORMOTEROL FUM (BEVESPI AEROSPHERE INHL) Inhale 2 puffs into the lungs 2 (two) times daily.      hydrochlorothiazide (HYDRODIURIL) 25 MG tablet Take 1 tablet (25 mg total) by mouth once daily.  Qty: 90 tablet, Refills: 3      potassium chloride SA (K-DUR,KLOR-CON) 20 MEQ tablet Take on tablet by mouth every day  Qty: 90 tablet, Refills: 3      pravastatin (PRAVACHOL) 20 MG tablet TAKE 1 TABLET BY MOUTH EVERY DAY  Qty: 90 tablet, Refills: 2         STOP taking these medications       hydrOXYzine HCl (ATARAX) 25 MG tablet Comments:   Reason for Stopping:         TUDORZA PRESSAIR 400 mcg/actuation AePB Comments:   Reason for Stopping:             Time spent on the discharge of patient: 20 minutes    Argentina Joseph MD  Department of Hospital Medicine  [facility]

## 2017-02-28 NOTE — NURSING
IV pulled and intact. O2 tank and  meds from pharmacy delivered. Discharge instructions reviewed. Daughter at bedside. Pt waiting on transport. Will continue to monitor.

## 2017-03-01 ENCOUNTER — PATIENT OUTREACH (OUTPATIENT)
Dept: ADMINISTRATIVE | Facility: CLINIC | Age: 72
End: 2017-03-01
Payer: MEDICARE

## 2017-03-01 LAB — L PNEUMO AG UR QL IA: NOT DETECTED

## 2017-03-01 NOTE — PATIENT INSTRUCTIONS
Pneumonia (Adult)  Pneumonia is an infection deep within the lungs. It is in the small air sacs (alveoli). Pneumonia may be caused by a virus or bacteria. Pneumonia caused by bacteria is usually treated with an antibiotic. Severe cases may need to be treated in the hospital. Milder cases can be treated at home. Symptoms usually start to get better during the first 2 days of treatment.    Home care  Follow these guidelines when caring for yourself at home:  · Rest at home for the first 2 to 3 days, or until you feel stronger. Dont let yourself get overly tired when you go back to your activities.  · Stay away from cigarette smoke - yours or other peoples.  · You may use acetaminophen or ibuprofen to control fever or pain, unless another medicine was prescribed. If you have chronic liver or kidney disease, talk with your health care provider before using these medicines. Also talk with your provider if youve had a stomach ulcer or GI bleeding. Dont give aspirin to anyone younger than 18 years of age who is ill with a fever. It may cause severe liver damage.  · Your appetite may be poor, so a light diet is fine.  · Drink 6 to 8 glasses of fluids every day to make sure you are getting enough fluids. Beverages can include water, sport drinks, sodas without caffeine, juices, tea, or soup. Fluids will help loosen secretions in the lung. This will make it easier for you to cough up the phlegm (sputum). If you also have heart or kidney disease, check with your health care provider before you drink extra fluids.  · Take antibiotic medicine prescribed until it is all gone, even if you are feeling better after a few days.  Follow-up care  Follow up with your health care provider in the next 2 to 3 days, or as advised. This is to be sure the medicine is helping you get better.  If you are 65 or older, you should get a pneumococcal vaccine and a yearly flu (influenza) shot. You should also get these vaccines if you have  chronic lung disease like asthma, emphysema, or COPD. Ask your provider about this.  When to seek medical advice  Call your health care provider right away if any of these occur:  · You dont get better within the first 48 hours of treatment  · Shortness of breath gets worse  · Rapid breathing (more than 25 breaths per minute)  · Coughing up blood  · Chest pain gets worse with breathing  · Fever of 102°F (38°C) or higher that doesnt get better with fever medicine  · Weakness, dizziness, or fainting that gets worse  · Thirst or dry mouth that gets worse  · Sinus pain, headache, or a stiff neck  · Chest pain not caused by coughing  Date Last Reviewed: 12/23/2014  © 4066-9844 [employer]. [address] [place] [address], [place] [address]. All rights reserved. This information is not intended as a substitute for professional medical care. Always follow your healthcare professional's instructions.

## 2017-03-02 LAB
RVP - ADENOVIRUS: NORMAL
RVP - HUMAN METAPNEUMOVIRUS (HMPV): NORMAL
RVP - INFLUENZA A SUBTYPE H1 - (SEASONAL): NORMAL
RVP - INFLUENZA A SUBTYPE H3 - (SEASONAL): NORMAL
RVP - INFLUENZA A: NORMAL
RVP - INFLUENZA B: NORMAL
RVP - PARAINFLUENZA VIRUS 1: NORMAL
RVP - PARAINFLUENZA VIRUS 2: NORMAL
RVP - PARAINFLUENZA VIRUS 3: NORMAL
RVP - RESPIRATORY SYNCTIAL VIRUS (RSV) A: NORMAL
RVP - RESPIRATORY SYNCTIAL VIRUS (RSV) B: NORMAL
RVP - RESPIRATORY VIRAL PANEL, SOURCE: NORMAL
RVP - RHINOVIRUS: NORMAL

## 2017-03-03 LAB
BACTERIA BLD CULT: NORMAL
BACTERIA BLD CULT: NORMAL

## 2017-03-10 ENCOUNTER — OFFICE VISIT (OUTPATIENT)
Dept: INTERNAL MEDICINE | Facility: CLINIC | Age: 72
End: 2017-03-10
Payer: MEDICARE

## 2017-03-10 VITALS
TEMPERATURE: 98 F | SYSTOLIC BLOOD PRESSURE: 140 MMHG | HEIGHT: 61 IN | RESPIRATION RATE: 16 BRPM | BODY MASS INDEX: 24.01 KG/M2 | WEIGHT: 127.19 LBS | DIASTOLIC BLOOD PRESSURE: 80 MMHG | HEART RATE: 78 BPM

## 2017-03-10 DIAGNOSIS — J44.9 CHRONIC OBSTRUCTIVE PULMONARY DISEASE, UNSPECIFIED COPD TYPE: ICD-10-CM

## 2017-03-10 DIAGNOSIS — J18.9 PNEUMONIA DUE TO INFECTIOUS ORGANISM, UNSPECIFIED LATERALITY, UNSPECIFIED PART OF LUNG: Primary | ICD-10-CM

## 2017-03-10 PROCEDURE — 99999 PR PBB SHADOW E&M-EST. PATIENT-LVL IV: CPT | Mod: PBBFAC,,, | Performed by: INTERNAL MEDICINE

## 2017-03-10 PROCEDURE — 3079F DIAST BP 80-89 MM HG: CPT | Mod: S$GLB,,, | Performed by: INTERNAL MEDICINE

## 2017-03-10 PROCEDURE — 99214 OFFICE O/P EST MOD 30 MIN: CPT | Mod: S$GLB,,, | Performed by: INTERNAL MEDICINE

## 2017-03-10 PROCEDURE — 1160F RVW MEDS BY RX/DR IN RCRD: CPT | Mod: S$GLB,,, | Performed by: INTERNAL MEDICINE

## 2017-03-10 PROCEDURE — 3077F SYST BP >= 140 MM HG: CPT | Mod: S$GLB,,, | Performed by: INTERNAL MEDICINE

## 2017-03-10 PROCEDURE — 99499 UNLISTED E&M SERVICE: CPT | Mod: S$PBB,,, | Performed by: INTERNAL MEDICINE

## 2017-03-10 PROCEDURE — 1157F ADVNC CARE PLAN IN RCRD: CPT | Mod: S$GLB,,, | Performed by: INTERNAL MEDICINE

## 2017-03-10 PROCEDURE — 1159F MED LIST DOCD IN RCRD: CPT | Mod: S$GLB,,, | Performed by: INTERNAL MEDICINE

## 2017-03-10 RX ORDER — MOXIFLOXACIN HYDROCHLORIDE 400 MG/1
400 TABLET ORAL DAILY
Qty: 7 TABLET | Refills: 0 | Status: SHIPPED | OUTPATIENT
Start: 2017-03-10 | End: 2017-03-17

## 2017-03-10 RX ORDER — PREDNISONE 10 MG/1
TABLET ORAL
Qty: 35 TABLET | Refills: 1 | Status: SHIPPED | OUTPATIENT
Start: 2017-03-10 | End: 2017-05-02 | Stop reason: ALTCHOICE

## 2017-03-10 NOTE — MR AVS SNAPSHOT
[place] [facility]   [facility]  [place] [address]  Phone: [phone]  Fax: [phone]                  Leyla Mari   3/10/2017 10:40 AM   Office Visit    Description:  Female : 1945   Provider:  Dulce Castro MD   Department:  [place] [facility]           Reason for Visit     Follow-up           Diagnoses this Visit        Comments    Pneumonia due to infectious organism, unspecified laterality, unspecified part of lung    -  Primary     Chronic obstructive pulmonary disease, unspecified COPD type                To Do List           Future Appointments        Provider Department Dept Phone    3/31/2017 8:40 AM Anabel Cooper MD [place] [facility] [phone]    3/31/2017 10:00 AM METH XR1 300 LB LIMIT [facility] [phone]      Your Future Surgeries/Procedures     Mar 30, 2017   Surgery with Omid Lino MD   [facility] ([facility])    [address] [place] [address] [phone]              Goals (5 Years of Data)     None       These Medications        Disp Refills Start End    predniSONE (DELTASONE) 10 MG tablet 35 tablet 1 3/10/2017     3 tabs every am x 7 days, then 2 tabs every am for 7 days/stop    Pharmacy: [facility]/pharmacy #[id number] [place], [place] - [address] Ph #: [phone]       moxifloxacin (AVELOX) 400 mg tablet 7 tablet 0 3/10/2017 3/17/2017    Take 1 tablet (400 mg total) by mouth once daily. - Oral    Pharmacy: [facility]/pharmacy #[id number] [facility] [place] - [address] Ph #: [phone]         [facility]     [facility] -  Assistance  Registered nurses in the [facility] provide clinical advisement, health education, appointment booking, and other advisory services.  Call for this free service at [phone].             Medications           Message regarding Medications     Verify the  changes and/or additions to your medication regime listed below are the same as discussed with your clinician today.  If any of these changes or additions are incorrect, please notify your healthcare provider.        START taking these NEW medications        Refills    predniSONE (DELTASONE) 10 MG tablet 1    Sig: 3 tabs every am x 7 days, then 2 tabs every am for 7 days/stop    Class: Normal    moxifloxacin (AVELOX) 400 mg tablet 0    Sig: Take 1 tablet (400 mg total) by mouth once daily.    Class: Normal    Route: Oral           Verify that the below list of medications is an accurate representation of the medications you are currently taking.  If none reported, the list may be blank. If incorrect, please contact your healthcare provider. Carry this list with you in case of emergency.           Current Medications     albuterol (PROAIR HFA) 90 mcg/actuation inhaler Inhale 1 puff into the lungs every 4 (four) hours as needed for Wheezing or Shortness of Breath.    albuterol (PROVENTIL) 2.5 mg /3 mL (0.083 %) nebulizer solution Take 3 mLs (2.5 mg total) by nebulization every 4 (four) hours as needed for Wheezing or Shortness of Breath.    alprazolam (XANAX) 0.25 MG tablet Take 1 tablet (0.25 mg total) by mouth 3 (three) times daily as needed.    aspirin (ECOTRIN) 81 MG EC tablet Take 81 mg by mouth once daily.      budesonide-formoterol 160-4.5 mcg (SYMBICORT) 160-4.5 mcg/actuation HFAA INHALE 2 PUFFS INTO THE LUNGS BY MOUTH TWICE DAILY    diltiazem (CARDIZEM CD) 180 MG 24 hr capsule Take 1 capsule (180 mg total) by mouth once daily.    GLYCOPYRROLATE/FORMOTEROL FUM (BEVESPI AEROSPHERE INHL) Inhale 2 puffs into the lungs 2 (two) times daily.    hydrochlorothiazide (HYDRODIURIL) 25 MG tablet Take 1 tablet (25 mg total) by mouth once daily.    potassium chloride SA (K-DUR,KLOR-CON) 20 MEQ tablet Take on tablet by mouth every day    pravastatin (PRAVACHOL) 20 MG tablet TAKE 1 TABLET BY MOUTH EVERY DAY    moxifloxacin  "(AVELOX) 400 mg tablet Take 1 tablet (400 mg total) by mouth once daily.    predniSONE (DELTASONE) 10 MG tablet 3 tabs every am x 7 days, then 2 tabs every am for 7 days/stop           Clinical Reference Information           Your Vitals Were     BP Pulse Temp Resp Height Weight    140/80 (BP Location: Right arm, Patient Position: Sitting, BP Method: Manual) 78 98.4 °F (36.9 °C) (Oral) 16 5' 1" (1.549 m) 57.7 kg (127 lb 3.3 oz)    BMI                24.04 kg/m2          Blood Pressure          Most Recent Value    BP  (!)  140/80      Allergies as of 3/10/2017     Bactrim [Sulfamethoxazole-trimethoprim]    Codeine    Keflex [Cephalexin]    Ceftriaxone    Clindamycin Hcl    Vancomycin Analogues      Immunizations Administered on Date of Encounter - 3/10/2017     None      Orders Placed During Today's Visit     Future Labs/Procedures Expected by Expires    X-Ray Chest PA And Lateral  3/10/2017 3/10/2018      Language Assistance Services     ATTENTION: Language assistance services are available, free of charge. Please call [phone].      ATENCIÓN: Si jose l quiles, tiene a reeves disposición servicios gratuitos de asistencia lingüística. Llame al [phone].     JOSE ANTONIO Ý: N?u b?n nói Ti?ng Vi?t, có các d?ch v? h? tr? ngôn ng? mi?n phí dành cho b?n. G?i s? [phone].         [facility] complies with applicable Federal civil rights laws and does not discriminate on the basis of race, color, national origin, age, disability, or sex.        "

## 2017-03-10 NOTE — PROGRESS NOTES
Transitional Care Note  Subjective:       Patient ID: Leyla Mari is a 71 y.o. female.  Chief Complaint: Follow-up    Family and/or Caretaker present at visit?  No.  Diagnostic tests reviewed/disposition: No diagnosic tests pending after this hospitalization.  Disease/illness education: Pneumonia  Home health/community services discussion/referrals: Patient does not have home health established from hospital visit.  They do not need home health.  If needed, we will set up home health for the patient.   Establishment or re-establishment of referral orders for community resources: No other necessary community resources.   Discussion with other health care providers: No discussion with other health care providers necessary.   HPI Comments: CC: followup of hospitalization for pneumonia  HPI:  The patient is a 71 y.o. year old female with COPD who presents to the office for followup of hospitalization for pneumonia.  The patient presented to the hospital complaining of right-sided sharp back and chest pain.  The pain is exacerbated with inspiration.  She also reported continue shortness of breath.  The patient still has pain, but states it has improved.  Dyspnea is stable.  Chest x-ray performed in the hospital was inconclusive, but chest CT showed pneumonia involving the right lung and possibly developing in the left lung.  She completed course of Avelox and prednisone, but states she does not feel like she is improving significantly.    PAST MEDICAL HISTORY:  Past Medical History:  No date: Actinic keratosis  No date: Allergy  No date: Cataract  No date: Cellulitis of ankle  No date: Colon polyps  No date: COPD (chronic obstructive pulmonary disease)  No date: Family history of colon cancer  No date: Hyperlipidemia  No date: Hypertension  No date: Lung nodules  No date: Sinus tachycardia    SURGICAL HISTORY:  Past Surgical History:  No date: APPENDECTOMY  No date:  SECTION      Comment: x2  No date: EYE  SURGERY  No date: TONSILLECTOMY    MEDS:  Medcard reviewed and updated    ALLERGIES: Allergy Card reviewed and updated    SOCIAL HISTORY:   The patient is a nonsmoker.          Review of Systems   Constitutional: Positive for fatigue. Negative for fever.   Respiratory: Positive for shortness of breath. Negative for cough and wheezing.    Cardiovascular: Positive for chest pain.   Gastrointestinal: Negative for nausea and vomiting.   Neurological: Negative for headaches.   Psychiatric/Behavioral: Negative for sleep disturbance.       Objective:      Physical Exam   Constitutional: She is oriented to person, place, and time. She appears well-developed and well-nourished.   Cardiovascular: Normal rate and regular rhythm.    No murmur heard.  Pulmonary/Chest: Effort normal and breath sounds normal.   Abdominal: Soft. Bowel sounds are normal.   Neurological: She is alert and oriented to person, place, and time.       Assessment:       1. Pneumonia due to infectious organism, unspecified laterality, unspecified part of lung    2. Chronic obstructive pulmonary disease, unspecified COPD type        Plan:     Leyla was seen today for follow-up.    Diagnoses and all orders for this visit:    Pneumonia due to infectious organism, unspecified laterality, unspecified part of lung  -     X-Ray Chest PA And Lateral; Future  -     Prescribed Avelox and prednisone  -     Contact office for worsening symptoms or failure to improve    Chronic obstructive pulmonary disease, unspecified COPD type  -     predniSONE (DELTASONE) 10 MG tablet; 3 tabs every am x 7 days, then 2 tabs every am for 7 days/stop    Other orders  -     moxifloxacin (AVELOX) 400 mg tablet; Take 1 tablet (400 mg total) by mouth once daily.

## 2017-03-27 ENCOUNTER — TELEPHONE (OUTPATIENT)
Dept: ENDOSCOPY | Facility: HOSPITAL | Age: 72
End: 2017-03-27

## 2017-03-27 ENCOUNTER — TELEPHONE (OUTPATIENT)
Dept: PULMONOLOGY | Facility: CLINIC | Age: 72
End: 2017-03-27

## 2017-03-27 NOTE — TELEPHONE ENCOUNTER
"Pt states that she is having "issues with my emphysema" and was advised by pulmonologist to cancel colonoscopy until issues resolve and she receives clearance by pulmonary. Pt stated she will call back to reschedule after she receives clearance.   "

## 2017-03-27 NOTE — TELEPHONE ENCOUNTER
Mrs Mari called to report she spent one night in [facility] the end of Feburary and was told she had pneumonia and that she had a CTA which was negative for pulmonary embolus. Dr Guido said a PE would have been fatal for her and he saw no pneumonia in her portable chest xray. Pt. States she has a colonoscopy scheduled for Thursday. Dr guido told Mrs Mari to cancel it and come see him Wed 2-29-17 with a chest xray. Pt. Agreed. Danielle Waldrop LPN.

## 2017-03-27 NOTE — TELEPHONE ENCOUNTER
----- Message from Elza Grande sent at 3/27/2017  9:18 AM CDT -----  Contact: Patient: [phone]  Patient called and has ?'s about a chest xray.  Patient can be reached at [phone].  Please call.    Thanks

## 2017-03-29 ENCOUNTER — OFFICE VISIT (OUTPATIENT)
Dept: PULMONOLOGY | Facility: CLINIC | Age: 72
End: 2017-03-29
Payer: MEDICARE

## 2017-03-29 ENCOUNTER — HOSPITAL ENCOUNTER (OUTPATIENT)
Dept: PULMONOLOGY | Facility: CLINIC | Age: 72
Discharge: HOME | End: 2017-03-29
Payer: MEDICARE

## 2017-03-29 VITALS
DIASTOLIC BLOOD PRESSURE: 80 MMHG | HEART RATE: 85 BPM | HEIGHT: 60 IN | BODY MASS INDEX: 25.32 KG/M2 | SYSTOLIC BLOOD PRESSURE: 144 MMHG | OXYGEN SATURATION: 90 % | WEIGHT: 129 LBS

## 2017-03-29 DIAGNOSIS — J44.1 COPD EXACERBATION: Primary | ICD-10-CM

## 2017-03-29 DIAGNOSIS — J44.0 CHRONIC OBSTRUCTIVE PULMONARY DISEASE WITH ACUTE LOWER RESPIRATORY INFECTION: ICD-10-CM

## 2017-03-29 DIAGNOSIS — J18.9 CAP (COMMUNITY ACQUIRED PNEUMONIA): ICD-10-CM

## 2017-03-29 DIAGNOSIS — I10 ESSENTIAL HYPERTENSION: ICD-10-CM

## 2017-03-29 LAB
PRE FEV1 FVC: 37
PRE FEV1: 0.38
PRE FVC: 1.02
PREDICTED FEV1 FVC: 80
PREDICTED FEV1: 1.84
PREDICTED FVC: 2.37

## 2017-03-29 PROCEDURE — 94010 BREATHING CAPACITY TEST: CPT | Mod: S$GLB,,, | Performed by: INTERNAL MEDICINE

## 2017-03-29 PROCEDURE — 1126F AMNT PAIN NOTED NONE PRSNT: CPT | Mod: S$GLB,,, | Performed by: INTERNAL MEDICINE

## 2017-03-29 PROCEDURE — 3079F DIAST BP 80-89 MM HG: CPT | Mod: S$GLB,,, | Performed by: INTERNAL MEDICINE

## 2017-03-29 PROCEDURE — 1157F ADVNC CARE PLAN IN RCRD: CPT | Mod: S$GLB,,, | Performed by: INTERNAL MEDICINE

## 2017-03-29 PROCEDURE — 99214 OFFICE O/P EST MOD 30 MIN: CPT | Mod: S$GLB,,, | Performed by: INTERNAL MEDICINE

## 2017-03-29 PROCEDURE — 3077F SYST BP >= 140 MM HG: CPT | Mod: S$GLB,,, | Performed by: INTERNAL MEDICINE

## 2017-03-29 PROCEDURE — 1160F RVW MEDS BY RX/DR IN RCRD: CPT | Mod: S$GLB,,, | Performed by: INTERNAL MEDICINE

## 2017-03-29 PROCEDURE — 99499 UNLISTED E&M SERVICE: CPT | Mod: S$PBB,,, | Performed by: INTERNAL MEDICINE

## 2017-03-29 PROCEDURE — 1159F MED LIST DOCD IN RCRD: CPT | Mod: S$GLB,,, | Performed by: INTERNAL MEDICINE

## 2017-03-29 PROCEDURE — 99999 PR PBB SHADOW E&M-EST. PATIENT-LVL III: CPT | Mod: PBBFAC,,, | Performed by: INTERNAL MEDICINE

## 2017-03-29 RX ORDER — DILTIAZEM HYDROCHLORIDE 180 MG/1
180 CAPSULE, COATED, EXTENDED RELEASE ORAL DAILY
Qty: 90 CAPSULE | Refills: 3 | Status: SHIPPED | OUTPATIENT
Start: 2017-03-29 | End: 2018-04-02 | Stop reason: SDUPTHER

## 2017-03-29 NOTE — PROGRESS NOTES
Subjective:       Patient ID: Leyla Mari is a 71 y.o. female.    Chief Complaint: COPD and Shortness of Breath    HPI  72 yo lady with advanced COPD was hospitalized overnight for a right sided pneumonia and exacerbation of her COPD. She presented with right sided pleuritic pain. A portable chest x-ray is clear with hyperinflation but on a CTA she has right upper and middle lobe infiltrates.  She could not survive a significant  Emboli event with her severe COPD.  She was discharged the following day on antibiotics and has continued to improve. She states that she is back to her baseline. She has a normal resting Sa02: 95%. But does desaturate with walking. Her PFT's today show a better FVC  than Michael: 1.02 liters today vs 0.77 but Fev-1 slightly lower today: 0.38 liters vs 0.47 liters. Chest is clear without localized wheezes or rales.  Will continue Bevespi and alubterol HFA prn and maintenance of 10 mg of prednisone.  Will ask to return in a month with a follow up CT since her process did not show up on the admitting chest x-ray      No flowsheet data found.]  Review of Systems   Constitutional: Negative.    HENT: Negative.    Eyes: Negative.    Respiratory: Negative.         Recent hospitalization for right sided pneumonia    Severe COPD   Cardiovascular: Negative.    Genitourinary: Negative.    Musculoskeletal: Negative.    Skin: Negative.         Hx of recurring cellulits   Gastrointestinal: Negative.    Neurological: Negative.    Psychiatric/Behavioral: Negative.        Objective:      Physical Exam   Pulmonary/Chest:   Clear  Without localized wheezes.    Musculoskeletal: She exhibits no edema.           Assessment:       No diagnosis found.    Outpatient Encounter Prescriptions as of 3/29/2017   Medication Sig Dispense Refill    albuterol (PROAIR HFA) 90 mcg/actuation inhaler Inhale 1 puff into the lungs every 4 (four) hours as needed for Wheezing or Shortness of Breath. 1 Inhaler 12    albuterol  (PROVENTIL) 2.5 mg /3 mL (0.083 %) nebulizer solution Take 3 mLs (2.5 mg total) by nebulization every 4 (four) hours as needed for Wheezing or Shortness of Breath. 120 each 11    alprazolam (XANAX) 0.25 MG tablet Take 1 tablet (0.25 mg total) by mouth 3 (three) times daily as needed. 90 tablet 3    aspirin (ECOTRIN) 81 MG EC tablet Take 81 mg by mouth once daily.        budesonide-formoterol 160-4.5 mcg (SYMBICORT) 160-4.5 mcg/actuation HFAA INHALE 2 PUFFS INTO THE LUNGS BY MOUTH TWICE DAILY 10.2 g 12    diltiazem (CARDIZEM CD) 180 MG 24 hr capsule Take 1 capsule (180 mg total) by mouth once daily. 90 capsule 3    GLYCOPYRROLATE/FORMOTEROL FUM (BEVESPI AEROSPHERE INHL) Inhale 2 puffs into the lungs 2 (two) times daily.      hydrochlorothiazide (HYDRODIURIL) 25 MG tablet Take 1 tablet (25 mg total) by mouth once daily. 90 tablet 3    potassium chloride SA (K-DUR,KLOR-CON) 20 MEQ tablet Take on tablet by mouth every day 90 tablet 3    pravastatin (PRAVACHOL) 20 MG tablet TAKE 1 TABLET BY MOUTH EVERY DAY 90 tablet 2    predniSONE (DELTASONE) 10 MG tablet 3 tabs every am x 7 days, then 2 tabs every am for 7 days/stop 35 tablet 1     No facility-administered encounter medications on file as of 3/29/2017.      No orders of the defined types were placed in this encounter.    Plan:         Resolving right sided pneumonia, Stable COPD

## 2017-03-31 ENCOUNTER — OFFICE VISIT (OUTPATIENT)
Dept: DERMATOLOGY | Facility: CLINIC | Age: 72
End: 2017-03-31
Payer: MEDICARE

## 2017-03-31 VITALS — WEIGHT: 129 LBS | BODY MASS INDEX: 25.19 KG/M2

## 2017-03-31 DIAGNOSIS — D23.9 HIDRADENOMA: ICD-10-CM

## 2017-03-31 DIAGNOSIS — L82.1 SEBORRHEIC KERATOSES: Primary | ICD-10-CM

## 2017-03-31 PROCEDURE — 1157F ADVNC CARE PLAN IN RCRD: CPT | Mod: S$GLB,,, | Performed by: DERMATOLOGY

## 2017-03-31 PROCEDURE — 99213 OFFICE O/P EST LOW 20 MIN: CPT | Mod: S$GLB,,, | Performed by: DERMATOLOGY

## 2017-03-31 PROCEDURE — 1159F MED LIST DOCD IN RCRD: CPT | Mod: S$GLB,,, | Performed by: DERMATOLOGY

## 2017-03-31 PROCEDURE — 99999 PR PBB SHADOW E&M-EST. PATIENT-LVL II: CPT | Mod: PBBFAC,,, | Performed by: DERMATOLOGY

## 2017-03-31 PROCEDURE — 1160F RVW MEDS BY RX/DR IN RCRD: CPT | Mod: S$GLB,,, | Performed by: DERMATOLOGY

## 2017-03-31 NOTE — MR AVS SNAPSHOT
[place] [facility]   [facility]  [place] [address]  Phone: [phone]  Fax: [phone]                  Leyla Mari   3/31/2017 8:40 AM   Office Visit    Description:  Female : 1945   Provider:  Anabel Cooper MD   Department:  [place] [facility]           Reason for Visit     Spot           Diagnoses this Visit        Comments    Seborrheic keratoses    -  Primary     Hidradenoma                To Do List           Future Appointments        Provider Department Dept Phone    2017 12:30 PM [facility] [address] [facility] [phone]    2017 2:00 PM Mert Guido MD [facility] Pulmonary [facility] [phone]      Goals (5 Years of Data)     None      Follow-Up and Disposition     Return in about 1 year (around 3/31/2018).      [facility]     [facility] -  Assistance  Unless otherwise directed by your provider, please contact [facility], our nurse care line that is available for  assistance.     Registered nurses in the [facility] provide: appointment scheduling, clinical advisement, health education, and other advisory services.  Call: [phone] (toll free)               Medications           Message regarding Medications     Verify the changes and/or additions to your medication regime listed below are the same as discussed with your clinician today.  If any of these changes or additions are incorrect, please notify your healthcare provider.             Verify that the below list of medications is an accurate representation of the medications you are currently taking.  If none reported, the list may be blank. If incorrect, please contact your healthcare provider. Carry this list with you in case of emergency.           Current Medications     albuterol (PROAIR HFA) 90 mcg/actuation inhaler Inhale 1 puff into the lungs every 4 (four) hours as needed for Wheezing  or Shortness of Breath.    albuterol (PROVENTIL) 2.5 mg /3 mL (0.083 %) nebulizer solution Take 3 mLs (2.5 mg total) by nebulization every 4 (four) hours as needed for Wheezing or Shortness of Breath.    alprazolam (XANAX) 0.25 MG tablet Take 1 tablet (0.25 mg total) by mouth 3 (three) times daily as needed.    aspirin (ECOTRIN) 81 MG EC tablet Take 81 mg by mouth once daily.      budesonide-formoterol 160-4.5 mcg (SYMBICORT) 160-4.5 mcg/actuation HFAA INHALE 2 PUFFS INTO THE LUNGS BY MOUTH TWICE DAILY    diltiaZEM (CARDIZEM CD) 180 MG 24 hr capsule Take 1 capsule (180 mg total) by mouth once daily.    GLYCOPYRROLATE/FORMOTEROL FUM (BEVESPI AEROSPHERE INHL) Inhale 2 puffs into the lungs 2 (two) times daily.    hydrochlorothiazide (HYDRODIURIL) 25 MG tablet Take 1 tablet (25 mg total) by mouth once daily.    potassium chloride SA (K-DUR,KLOR-CON) 20 MEQ tablet Take on tablet by mouth every day    pravastatin (PRAVACHOL) 20 MG tablet TAKE 1 TABLET BY MOUTH EVERY DAY    predniSONE (DELTASONE) 10 MG tablet 3 tabs every am x 7 days, then 2 tabs every am for 7 days/stop           Clinical Reference Information           Your Vitals Were     Weight BMI             58.5 kg (129 lb) 25.19 kg/m2         Allergies as of 3/31/2017     Bactrim [Sulfamethoxazole-trimethoprim]    Codeine    Keflex [Cephalexin]    Ceftriaxone    Clindamycin Hcl    Vancomycin Analogues      Immunizations Administered on Date of Encounter - 3/31/2017     None      Language Assistance Services     ATTENTION: Language assistance services are available, free of charge. Please call [phone].      ATENCIÓN: Si valela etta, tiene a reeves disposición servicios gratuitos de asistencia lingüística. Llame al [phone].     JOSE ANTONIO Ý: N?u b?n nói Ti?ng Vi?t, có các d?ch v? h? tr? ngôn ng? mi?n phí dành cho b?n. G?i s? [phone].         [place] [facility] complies with applicable Federal civil rights laws and does not discriminate on the basis of  race, color, national origin, age, disability, or sex.

## 2017-03-31 NOTE — PROGRESS NOTES
Subjective:       Patient ID:  Leyla Mari is a 71 y.o. female who presents for   Chief Complaint   Patient presents with    Spot     left forehead     HPI Comments: History of Present Illness: The patient presents with chief complaint of spot.  Location: forehead  Duration: mnths  Signs/Symptoms: growing    Prior treatments: none      Spot         Review of Systems   Constitutional: Negative for fever.   Skin: Negative for itching and rash.   Hematologic/Lymphatic: Does not bruise/bleed easily.        Objective:    Physical Exam   Constitutional: She appears well-developed and well-nourished. No distress.   Neurological: She is alert and oriented to person, place, and time. She is not disoriented.   Psychiatric: She has a normal mood and affect.   Skin:   Areas Examined (abnormalities noted in diagram):   Head / Face Inspection Performed  Neck Inspection Performed  Chest / Axilla Inspection Performed  Back Inspection Performed  RUE Inspected  LUE Inspection Performed              Diagram Legend        Pigmented verrucoid papule/plaque c/w seborrheic keratosis       See annotation      Assessment / Plan:        Seborrheic keratoses  reassurance  ..    Hidradenoma  reassurance  See previous note             Return in about 1 year (around 3/31/2018).

## 2017-04-11 ENCOUNTER — OFFICE VISIT (OUTPATIENT)
Dept: OPTOMETRY | Facility: CLINIC | Age: 72
End: 2017-04-11
Payer: MEDICARE

## 2017-04-11 DIAGNOSIS — H52.7 REFRACTIVE ERROR: ICD-10-CM

## 2017-04-11 DIAGNOSIS — Z96.1 PSEUDOPHAKIA OF BOTH EYES: ICD-10-CM

## 2017-04-11 DIAGNOSIS — H04.123 BILATERAL DRY EYES: Primary | ICD-10-CM

## 2017-04-11 PROCEDURE — 92014 COMPRE OPH EXAM EST PT 1/>: CPT | Mod: S$GLB,,, | Performed by: OPTOMETRIST

## 2017-04-11 PROCEDURE — 92015 DETERMINE REFRACTIVE STATE: CPT | Mod: S$GLB,,, | Performed by: OPTOMETRIST

## 2017-04-11 PROCEDURE — 99999 PR PBB SHADOW E&M-EST. PATIENT-LVL II: CPT | Mod: PBBFAC,,, | Performed by: OPTOMETRIST

## 2017-04-11 NOTE — PROGRESS NOTES
HPI     [place] [employer] [place], didn't pass vision test. Had CE/IOL   OU about 7 yr. Ago. Pt hadn't noticed anything until covering one eye   yesterday.  Wears Sensege reader +2.25.  Blur ou at dist, x mos, no assoc pain or red, no relief over time,   constant       Last edited by Raul Esquivel, OD on 4/11/2017  1:58 PM.     ROS     Negative for: Constitutional, Gastrointestinal, Neurological, Skin,   Genitourinary, Musculoskeletal, HENT, Endocrine, Cardiovascular, Eyes,   Respiratory, Psychiatric, Allergic/Imm, Heme/Lymph    Last edited by Raul Esquivel, OD on 4/11/2017  1:29 PM. (History)        Assessment /Plan     For exam results, see Encounter Report.    Bilateral dry eyes    Pseudophakia of both eyes    Refractive error      1. Some blur with blink. Recommend artificial tears. 1 drop 1-2x per day. Chronicity of disease and treatment discussed.  2. Monitor condition. Patient to report any changes. RTC 1 year recheck.  3. Spec Rx given. Different lens options discussed with patient. RTC 1 year full exam.

## 2017-04-26 ENCOUNTER — OFFICE VISIT (OUTPATIENT)
Dept: PULMONOLOGY | Facility: CLINIC | Age: 72
End: 2017-04-26
Payer: MEDICARE

## 2017-04-26 ENCOUNTER — HOSPITAL ENCOUNTER (OUTPATIENT)
Dept: RADIOLOGY | Facility: HOSPITAL | Age: 72
Discharge: HOME | End: 2017-04-26
Attending: INTERNAL MEDICINE
Payer: MEDICARE

## 2017-04-26 VITALS
SYSTOLIC BLOOD PRESSURE: 162 MMHG | HEIGHT: 60 IN | OXYGEN SATURATION: 94 % | WEIGHT: 131.63 LBS | DIASTOLIC BLOOD PRESSURE: 72 MMHG | BODY MASS INDEX: 25.84 KG/M2 | HEART RATE: 92 BPM

## 2017-04-26 DIAGNOSIS — J43.1 PANLOBULAR EMPHYSEMA: Primary | ICD-10-CM

## 2017-04-26 DIAGNOSIS — J15.0 PNEUMONIA DUE TO KLEBSIELLA PNEUMONIAE, UNSPECIFIED LATERALITY, UNSPECIFIED PART OF LUNG: ICD-10-CM

## 2017-04-26 DIAGNOSIS — J18.9 CAP (COMMUNITY ACQUIRED PNEUMONIA): ICD-10-CM

## 2017-04-26 PROCEDURE — 1159F MED LIST DOCD IN RCRD: CPT | Mod: S$GLB,,, | Performed by: INTERNAL MEDICINE

## 2017-04-26 PROCEDURE — 71250 CT THORAX DX C-: CPT | Mod: 26,,, | Performed by: RADIOLOGY

## 2017-04-26 PROCEDURE — 1126F AMNT PAIN NOTED NONE PRSNT: CPT | Mod: S$GLB,,, | Performed by: INTERNAL MEDICINE

## 2017-04-26 PROCEDURE — 1160F RVW MEDS BY RX/DR IN RCRD: CPT | Mod: S$GLB,,, | Performed by: INTERNAL MEDICINE

## 2017-04-26 PROCEDURE — 99499 UNLISTED E&M SERVICE: CPT | Mod: S$PBB,,, | Performed by: INTERNAL MEDICINE

## 2017-04-26 PROCEDURE — 3077F SYST BP >= 140 MM HG: CPT | Mod: S$GLB,,, | Performed by: INTERNAL MEDICINE

## 2017-04-26 PROCEDURE — 99214 OFFICE O/P EST MOD 30 MIN: CPT | Mod: S$GLB,,, | Performed by: INTERNAL MEDICINE

## 2017-04-26 PROCEDURE — 3078F DIAST BP <80 MM HG: CPT | Mod: S$GLB,,, | Performed by: INTERNAL MEDICINE

## 2017-04-26 PROCEDURE — 99999 PR PBB SHADOW E&M-EST. PATIENT-LVL III: CPT | Mod: PBBFAC,,, | Performed by: INTERNAL MEDICINE

## 2017-04-26 RX ORDER — ALBUTEROL SULFATE 90 UG/1
2 AEROSOL, METERED RESPIRATORY (INHALATION) EVERY 6 HOURS PRN
Qty: 1 INHALER | Refills: 11 | Status: SHIPPED | OUTPATIENT
Start: 2017-04-26 | End: 2017-08-17 | Stop reason: SDUPTHER

## 2017-04-26 NOTE — PROGRESS NOTES
Subjective:       Patient ID: Leyla Mari is a 71 y.o. female.    Chief Complaint: COPD and Pneumonia    HPI Patient comes with a CT to follow up on a patchy pneumonia noted on a CTA Feb 26th when she presented to the ER for assessment of right sided pleuritic pain and breathing distress. I fail to see anything on the plain chest x-ray but she had extensive patchy cotton wool exudative changes in the right middle lobe and several smaller areas in lower lobe. She still feels washed out. I gave her a pulse of prendisone when I saw her on 3/29.  Her CT today shows complete resolution of the process  And her Sa02 is 97%      No flowsheet data found.]  Review of Systems   Constitutional: Negative.    HENT: Negative.    Eyes: Negative.    Respiratory: Negative.         Right sided broncho pneumonia    COPD   Cardiovascular: Negative.    Genitourinary: Negative.    Musculoskeletal: Negative.    Skin: Negative.    Gastrointestinal: Negative.    Neurological: Negative.    Psychiatric/Behavioral: Negative.        Objective:      Physical Exam   Constitutional: She is oriented to person, place, and time. She appears well-developed and well-nourished. No distress.   HENT:   Head: Normocephalic and atraumatic.   Right Ear: External ear normal.   Left Ear: External ear normal.   Nose: Nose normal.   Mouth/Throat: Oropharynx is clear and moist.   Eyes: Conjunctivae and EOM are normal. Pupils are equal, round, and reactive to light.   Neck: Normal range of motion. Neck supple. No JVD present. No thyromegaly present.   Cardiovascular: Normal rate, regular rhythm, normal heart sounds and intact distal pulses.  Exam reveals no gallop.    No murmur heard.  Pulmonary/Chest: Breath sounds normal. No stridor. No respiratory distress. She has no wheezes. She has no rales. She exhibits no tenderness.   Sa02: 97%  Clear CT with findings of emphysema   Abdominal: Soft. Bowel sounds are normal. She exhibits no distension and no mass. There  is no tenderness. There is no rebound and no guarding.   Musculoskeletal: Normal range of motion. She exhibits no edema.   Lymphadenopathy:     She has no cervical adenopathy.   Neurological: She is alert and oriented to person, place, and time. She has normal reflexes. She displays normal reflexes. No cranial nerve deficit.   Skin: Skin is warm and dry. No rash noted.   Psychiatric: She has a normal mood and affect. Her behavior is normal. Judgment and thought content normal.   Nursing note and vitals reviewed.          Assessment:       1. Panlobular emphysema        Outpatient Encounter Prescriptions as of 4/26/2017   Medication Sig Dispense Refill    albuterol (PROVENTIL) 2.5 mg /3 mL (0.083 %) nebulizer solution Take 3 mLs (2.5 mg total) by nebulization every 4 (four) hours as needed for Wheezing or Shortness of Breath. 120 each 11    albuterol 90 mcg/actuation inhaler Inhale 2 puffs into the lungs every 6 (six) hours as needed for Wheezing (ventolin hfa per pts insurance). Rescue 1 Inhaler 11    alprazolam (XANAX) 0.25 MG tablet Take 1 tablet (0.25 mg total) by mouth 3 (three) times daily as needed. 90 tablet 3    aspirin (ECOTRIN) 81 MG EC tablet Take 81 mg by mouth once daily.        budesonide-formoterol 160-4.5 mcg (SYMBICORT) 160-4.5 mcg/actuation HFAA INHALE 2 PUFFS INTO THE LUNGS BY MOUTH TWICE DAILY 10.2 g 12    diltiaZEM (CARDIZEM CD) 180 MG 24 hr capsule Take 1 capsule (180 mg total) by mouth once daily. 90 capsule 3    GLYCOPYRROLATE/FORMOTEROL FUM (BEVESPI AEROSPHERE INHL) Inhale 2 puffs into the lungs 2 (two) times daily.      hydrochlorothiazide (HYDRODIURIL) 25 MG tablet Take 1 tablet (25 mg total) by mouth once daily. 90 tablet 3    potassium chloride SA (K-DUR,KLOR-CON) 20 MEQ tablet Take on tablet by mouth every day 90 tablet 3    pravastatin (PRAVACHOL) 20 MG tablet TAKE 1 TABLET BY MOUTH EVERY DAY 90 tablet 2    predniSONE (DELTASONE) 10 MG tablet 3 tabs every am x 7 days, then 2  tabs every am for 7 days/stop 35 tablet 1    [DISCONTINUED] albuterol (PROAIR HFA) 90 mcg/actuation inhaler Inhale 1 puff into the lungs every 4 (four) hours as needed for Wheezing or Shortness of Breath. 1 Inhaler 12     No facility-administered encounter medications on file as of 4/26/2017.      No orders of the defined types were placed in this encounter.    Plan:            D/C Prednsione continue maintenance inhalers. Resolved pneumonia

## 2017-04-27 RX ORDER — HYDROCHLOROTHIAZIDE 25 MG/1
TABLET ORAL
Qty: 90 TABLET | Refills: 3 | Status: SHIPPED | OUTPATIENT
Start: 2017-04-27 | End: 2018-04-15 | Stop reason: SDUPTHER

## 2017-04-28 DIAGNOSIS — J44.1 CHRONIC OBSTRUCTIVE PULMONARY DISEASE WITH ACUTE EXACERBATION: Primary | ICD-10-CM

## 2017-05-02 ENCOUNTER — TELEPHONE (OUTPATIENT)
Dept: INTERNAL MEDICINE | Facility: CLINIC | Age: 72
End: 2017-05-02

## 2017-05-02 ENCOUNTER — OFFICE VISIT (OUTPATIENT)
Dept: INTERNAL MEDICINE | Facility: CLINIC | Age: 72
End: 2017-05-02
Payer: MEDICARE

## 2017-05-02 VITALS
HEART RATE: 84 BPM | BODY MASS INDEX: 25.53 KG/M2 | WEIGHT: 130.06 LBS | DIASTOLIC BLOOD PRESSURE: 72 MMHG | HEIGHT: 60 IN | TEMPERATURE: 99 F | SYSTOLIC BLOOD PRESSURE: 130 MMHG

## 2017-05-02 DIAGNOSIS — M89.9 DISORDER OF BONE: ICD-10-CM

## 2017-05-02 DIAGNOSIS — R60.9 EDEMA, UNSPECIFIED TYPE: Primary | ICD-10-CM

## 2017-05-02 PROCEDURE — 1159F MED LIST DOCD IN RCRD: CPT | Mod: S$GLB,,, | Performed by: INTERNAL MEDICINE

## 2017-05-02 PROCEDURE — 1157F ADVNC CARE PLAN IN RCRD: CPT | Mod: S$GLB,,, | Performed by: INTERNAL MEDICINE

## 2017-05-02 PROCEDURE — 99214 OFFICE O/P EST MOD 30 MIN: CPT | Mod: S$GLB,,, | Performed by: INTERNAL MEDICINE

## 2017-05-02 PROCEDURE — 3075F SYST BP GE 130 - 139MM HG: CPT | Mod: S$GLB,,, | Performed by: INTERNAL MEDICINE

## 2017-05-02 PROCEDURE — 3078F DIAST BP <80 MM HG: CPT | Mod: S$GLB,,, | Performed by: INTERNAL MEDICINE

## 2017-05-02 PROCEDURE — 99999 PR PBB SHADOW E&M-EST. PATIENT-LVL III: CPT | Mod: PBBFAC,,, | Performed by: INTERNAL MEDICINE

## 2017-05-02 PROCEDURE — 1160F RVW MEDS BY RX/DR IN RCRD: CPT | Mod: S$GLB,,, | Performed by: INTERNAL MEDICINE

## 2017-05-02 PROCEDURE — 1125F AMNT PAIN NOTED PAIN PRSNT: CPT | Mod: S$GLB,,, | Performed by: INTERNAL MEDICINE

## 2017-05-02 RX ORDER — DOXYCYCLINE HYCLATE 100 MG
100 TABLET ORAL 2 TIMES DAILY
Qty: 14 TABLET | Refills: 0 | Status: SHIPPED | OUTPATIENT
Start: 2017-05-02 | End: 2017-05-08

## 2017-05-02 RX ORDER — FUROSEMIDE 20 MG/1
20 TABLET ORAL DAILY
Qty: 3 TABLET | Refills: 0 | Status: ON HOLD | OUTPATIENT
Start: 2017-05-02 | End: 2020-03-12 | Stop reason: HOSPADM

## 2017-05-02 NOTE — PROGRESS NOTES
CC: swelling and redness of both feet  HPI:  The patient is a 72 y.o. year old female who presents to the office for swelling and redness of bilateral feet.  Her symptoms started about 2-1/2 weeks ago.  She has also been on steroids intermittently since the end of February.  The patient has also completed two courses of antibiotics.  She complains of tingling sensation in her feet.    PAST MEDICAL HISTORY:  Past Medical History:   Diagnosis Date    Actinic keratosis     Allergy     Arthritis     Cataract     Cellulitis of ankle     Colon polyps     COPD (chronic obstructive pulmonary disease)     Family history of colon cancer     Hyperlipidemia     Hypertension     Lung nodules     Sinus tachycardia        SURGICAL HISTORY:  Past Surgical History:   Procedure Laterality Date    APPENDECTOMY      CATARACT EXTRACTION Bilateral 2007     SECTION      x2    EYE SURGERY      TONSILLECTOMY         MEDS:  Medcard reviewed and updated    ALLERGIES: Allergy Card reviewed and updated    SOCIAL HISTORY:   The patient is a nonsmoker.    PE:   APPEARANCE: Well nourished, well developed, in no acute distress.    CHEST: Lungs clear to auscultation with unlabored respirations.  CARDIOVASCULAR: Normal S1, S2. No murmurs. No carotid bruits. Bilateral pedal edema, right greater than left.  ABDOMEN: Bowel sounds normal. Not distended. Soft. No tenderness or masses.   SKIN: Bilateral erythema of lower extremities.  PSYCHIATRIC: The patient is oriented to person, place, and time and has a pleasant affect.        ASSESSMENT/PLAN:  Leyla was seen today for foot swelling.    Diagnoses and all orders for this visit:    Edema, unspecified type  -     Comprehensive metabolic panel; Future  -     Lipid panel; Future  -     TSH; Future  -     2D echo with color flow doppler; Future  -     Prescribed furosemide for 3 days  -     Doxycycline prescribed if erythema fails to improve    Disorder of bone  -     Vitamin D;  Future  -     DXA Bone Density Spine And Hip; Future

## 2017-05-02 NOTE — MR AVS SNAPSHOT
[place] [facility]   [facility]  [place] [address]  Phone: [phone]  Fax: [phone]                  Leyla Mari   2017 1:40 PM   Office Visit    Description:  Female : 1945   Provider:  Dulce Castro MD   Department:  [place] [facility]           Reason for Visit     Foot Swelling           Diagnoses this Visit        Comments    Edema, unspecified type    -  Primary     Disorder of bone                To Do List           Future Appointments        Provider Department Dept Phone    2017 9:30 AM LAB, [facility] [place] [facility] [phone]    2017 10:30 AM ECHO, [place] [facility] [phone]    2017 11:30 AM [facility], [facility]1 [facility] [place] [phone]    2017 1:45 PM [facility] [phone]    2017 2:30 PM [facility] [phone]      Goals (5 Years of Data)     None      Follow-Up and Disposition     Return in about 2 weeks (around 2017).       These Medications        Disp Refills Start End    doxycycline (VIBRA-TABS) 100 MG tablet 14 tablet 0 2017     Take 1 tablet (100 mg total) by mouth 2 (two) times daily. - Oral    Pharmacy: [facility]/pharmacy #[id number] - [place], [place] - [address] Ph #: [phone]       Notes to Pharmacy: Remain upright for 60 minutes after each dose.    furosemide (LASIX) 20 MG tablet 3 tablet 0 2017    Take 1 tablet (20 mg total) by mouth once daily. - Oral    Pharmacy: [facility]/pharmacy #[id number] - [place], [place] - [address] Ph #: [phone]         [facility]     [facility] -  Assistance  Unless otherwise directed by your provider, please contact [facility], our nurse care line that is available for  assistance.     Registered nurses in the [facility] provide: appointment scheduling, clinical  advisement, health education, and other advisory services.  Call: [phone] (toll free)               Medications           Message regarding Medications     Verify the changes and/or additions to your medication regime listed below are the same as discussed with your clinician today.  If any of these changes or additions are incorrect, please notify your healthcare provider.        START taking these NEW medications        Refills    doxycycline (VIBRA-TABS) 100 MG tablet 0    Sig: Take 1 tablet (100 mg total) by mouth 2 (two) times daily.    Class: Print    Route: Oral    furosemide (LASIX) 20 MG tablet 0    Sig: Take 1 tablet (20 mg total) by mouth once daily.    Class: Print    Route: Oral      STOP taking these medications     budesonide-formoterol 160-4.5 mcg (SYMBICORT) 160-4.5 mcg/actuation HFAA INHALE 2 PUFFS INTO THE LUNGS BY MOUTH TWICE DAILY    predniSONE (DELTASONE) 10 MG tablet 3 tabs every am x 7 days, then 2 tabs every am for 7 days/stop           Verify that the below list of medications is an accurate representation of the medications you are currently taking.  If none reported, the list may be blank. If incorrect, please contact your healthcare provider. Carry this list with you in case of emergency.           Current Medications     albuterol (PROVENTIL) 2.5 mg /3 mL (0.083 %) nebulizer solution Take 3 mLs (2.5 mg total) by nebulization every 4 (four) hours as needed for Wheezing or Shortness of Breath.    albuterol 90 mcg/actuation inhaler Inhale 2 puffs into the lungs every 6 (six) hours as needed for Wheezing (ventolin hfa per pts insurance). Rescue    alprazolam (XANAX) 0.25 MG tablet Take 1 tablet (0.25 mg total) by mouth 3 (three) times daily as needed.    aspirin (ECOTRIN) 81 MG EC tablet Take 81 mg by mouth once daily.      diltiaZEM (CARDIZEM CD) 180 MG 24 hr capsule Take 1 capsule (180 mg total) by mouth once daily.    GLYCOPYRROLATE/FORMOTEROL FUM (BEVESPI AEROSPHERE INHL) Inhale  2 puffs into the lungs 2 (two) times daily.    hydrochlorothiazide (HYDRODIURIL) 25 MG tablet TAKE 1 TABLET (25 MG TOTAL) BY MOUTH ONCE DAILY.    potassium chloride SA (K-DUR,KLOR-CON) 20 MEQ tablet Take on tablet by mouth every day    pravastatin (PRAVACHOL) 20 MG tablet TAKE 1 TABLET BY MOUTH EVERY DAY    doxycycline (VIBRA-TABS) 100 MG tablet Take 1 tablet (100 mg total) by mouth 2 (two) times daily.    furosemide (LASIX) 20 MG tablet Take 1 tablet (20 mg total) by mouth once daily.           Clinical Reference Information           Your Vitals Were     BP Pulse Temp Height Weight BMI    130/72 84 98.5 °F (36.9 °C) 5' (1.524 m) 59 kg (130 lb 1.1 oz) 25.4 kg/m2      Blood Pressure          Most Recent Value    BP  130/72      Allergies as of 5/2/2017     Bactrim [Sulfamethoxazole-trimethoprim]    Codeine    Keflex [Cephalexin]    Ceftriaxone    Clindamycin Hcl    Vancomycin Analogues      Immunizations Administered on Date of Encounter - 5/2/2017     None      Orders Placed During Today's Visit     Future Labs/Procedures Expected by Expires    Comprehensive metabolic panel  5/2/2017 7/1/2018    DXA Bone Density Spine And Hip  5/2/2017 5/2/2018    Lipid panel  5/2/2017 5/2/2018    TSH  5/2/2017 7/1/2018    Vitamin D  5/2/2017 5/2/2018    2D echo with color flow doppler  As directed 5/2/2018      Language Assistance Services     ATTENTION: Language assistance services are available, free of charge. Please call [phone].      ATENCIÓN: Si habla everardodonna, tiene a reeves disposición servicios gratuitos de asistencia lingüística. Llame al [phone].     [facility] Ý: N?u b?n nói Ti?ng Vi?t, có các d?ch v? h? tr? ngôn ng? mi?n phí dành cho b?n. G?i s? [phone].         [place] [facility] complies with applicable Federal civil rights laws and does not discriminate on the basis of race, color, national origin, age, disability, or sex.

## 2017-05-02 NOTE — TELEPHONE ENCOUNTER
----- Message from Krista Boykin MA sent at 5/1/2017  2:24 PM CDT -----  Contact: [phone]  Type: Sooner appointment than  is able to schedule    When is the first available appointment? 6/7/17    What is the nature of the appointment? Feet swelling    What appointment type: EP    Comments: Please advise and call. Thanks!

## 2017-05-08 ENCOUNTER — CLINICAL SUPPORT (OUTPATIENT)
Dept: CARDIOLOGY | Facility: CLINIC | Age: 72
End: 2017-05-08
Payer: MEDICARE

## 2017-05-08 ENCOUNTER — OFFICE VISIT (OUTPATIENT)
Dept: INTERNAL MEDICINE | Facility: CLINIC | Age: 72
End: 2017-05-08
Payer: MEDICARE

## 2017-05-08 ENCOUNTER — LAB VISIT (OUTPATIENT)
Dept: LAB | Facility: HOSPITAL | Age: 72
End: 2017-05-08
Attending: INTERNAL MEDICINE
Payer: MEDICARE

## 2017-05-08 VITALS
DIASTOLIC BLOOD PRESSURE: 68 MMHG | HEART RATE: 90 BPM | BODY MASS INDEX: 25.11 KG/M2 | TEMPERATURE: 99 F | SYSTOLIC BLOOD PRESSURE: 114 MMHG | HEIGHT: 60 IN | WEIGHT: 127.88 LBS

## 2017-05-08 DIAGNOSIS — R60.9 EDEMA, UNSPECIFIED TYPE: ICD-10-CM

## 2017-05-08 DIAGNOSIS — I35.9 NONRHEUMATIC AORTIC VALVE DISORDER: ICD-10-CM

## 2017-05-08 DIAGNOSIS — L03.90 CELLULITIS, UNSPECIFIED CELLULITIS SITE: Primary | ICD-10-CM

## 2017-05-08 DIAGNOSIS — M89.9 DISORDER OF BONE: ICD-10-CM

## 2017-05-08 LAB
25(OH)D3+25(OH)D2 SERPL-MCNC: 21 NG/ML
ALBUMIN SERPL BCP-MCNC: 4.1 G/DL
ALP SERPL-CCNC: 76 U/L
ALT SERPL W/O P-5'-P-CCNC: 19 U/L
ANION GAP SERPL CALC-SCNC: 10 MMOL/L
AORTIC VALVE REGURGITATION: NORMAL
AST SERPL-CCNC: 26 U/L
BILIRUB SERPL-MCNC: 0.6 MG/DL
BUN SERPL-MCNC: 13 MG/DL
CALCIUM SERPL-MCNC: 10 MG/DL
CHLORIDE SERPL-SCNC: 101 MMOL/L
CHOLEST/HDLC SERPL: 2.6 {RATIO}
CO2 SERPL-SCNC: 29 MMOL/L
CREAT SERPL-MCNC: 0.7 MG/DL
DIASTOLIC DYSFUNCTION: NO
EST. GFR  (AFRICAN AMERICAN): >60 ML/MIN/1.73 M^2
EST. GFR  (NON AFRICAN AMERICAN): >60 ML/MIN/1.73 M^2
ESTIMATED PA SYSTOLIC PRESSURE: 21.15
GLUCOSE SERPL-MCNC: 118 MG/DL
HDL/CHOLESTEROL RATIO: 37.8 %
HDLC SERPL-MCNC: 188 MG/DL
HDLC SERPL-MCNC: 71 MG/DL
LDLC SERPL CALC-MCNC: 99 MG/DL
NONHDLC SERPL-MCNC: 117 MG/DL
POTASSIUM SERPL-SCNC: 3.5 MMOL/L
PROT SERPL-MCNC: 7.1 G/DL
RETIRED EF AND QEF - SEE NOTES: 65 (ref 55–65)
SODIUM SERPL-SCNC: 140 MMOL/L
TRIGL SERPL-MCNC: 90 MG/DL
TSH SERPL DL<=0.005 MIU/L-ACNC: 1.5 UIU/ML

## 2017-05-08 PROCEDURE — 1159F MED LIST DOCD IN RCRD: CPT | Mod: S$GLB,,, | Performed by: INTERNAL MEDICINE

## 2017-05-08 PROCEDURE — 3078F DIAST BP <80 MM HG: CPT | Mod: S$GLB,,, | Performed by: INTERNAL MEDICINE

## 2017-05-08 PROCEDURE — 1157F ADVNC CARE PLAN IN RCRD: CPT | Mod: S$GLB,,, | Performed by: INTERNAL MEDICINE

## 2017-05-08 PROCEDURE — 3074F SYST BP LT 130 MM HG: CPT | Mod: S$GLB,,, | Performed by: INTERNAL MEDICINE

## 2017-05-08 PROCEDURE — 93306 TTE W/DOPPLER COMPLETE: CPT | Mod: S$GLB,,, | Performed by: INTERNAL MEDICINE

## 2017-05-08 PROCEDURE — 1160F RVW MEDS BY RX/DR IN RCRD: CPT | Mod: S$GLB,,, | Performed by: INTERNAL MEDICINE

## 2017-05-08 PROCEDURE — 99999 PR PBB SHADOW E&M-EST. PATIENT-LVL III: CPT | Mod: PBBFAC,,, | Performed by: INTERNAL MEDICINE

## 2017-05-08 PROCEDURE — 99213 OFFICE O/P EST LOW 20 MIN: CPT | Mod: S$GLB,,, | Performed by: INTERNAL MEDICINE

## 2017-05-08 RX ORDER — CIPROFLOXACIN 500 MG/1
500 TABLET ORAL 2 TIMES DAILY
Qty: 20 TABLET | Refills: 0 | Status: SHIPPED | OUTPATIENT
Start: 2017-05-08 | End: 2017-05-18

## 2017-05-08 NOTE — MR AVS SNAPSHOT
[place] [facility]   [facility]  [place] [address]  Phone: [phone]  Fax: [phone]                  Leyla Mari   2017 11:40 AM   Office Visit    Description:  Female : 1945   Provider:  Dulce Castro MD   Department:  [place] [facility]           Reason for Visit     Leg Swelling           Diagnoses this Visit        Comments    Cellulitis, unspecified cellulitis site    -  Primary            To Do List           Future Appointments        Provider Department Dept Phone    2017 1:45 PM PULMONARY FUNCTION [facility] Pulmonary Lab [phone]    2017 2:30 PM MD Thiago Marley [facility] - Pulmonary Services [phone]      Goals (5 Years of Data)     None      Follow-Up and Disposition     Return in about 2 weeks (around 2017).       These Medications        Disp Refills Start End    ciprofloxacin HCl (CIPRO) 500 MG tablet 20 tablet 0 2017    Take 1 tablet (500 mg total) by mouth 2 (two) times daily. - Oral    Pharmacy: [facility]/pharmacy #[id number] - [place], [place] - [address] Ph #: [phone]         [facility]     [facility] Care [facility] -  Assistance  Unless otherwise directed by your provider, please contact [facility], our nurse care line that is available for  assistance.     Registered nurses in the [facility] provide: appointment scheduling, clinical advisement, health education, and other advisory services.  Call: [phone] (toll free)               Medications           Message regarding Medications     Verify the changes and/or additions to your medication regime listed below are the same as discussed with your clinician today.  If any of these changes or additions are incorrect, please notify your healthcare provider.        STOP taking these medications     doxycycline (VIBRA-TABS) 100 MG tablet Take 1 tablet (100 mg total) by mouth 2 (two)  times daily.           Verify that the below list of medications is an accurate representation of the medications you are currently taking.  If none reported, the list may be blank. If incorrect, please contact your healthcare provider. Carry this list with you in case of emergency.           Current Medications     albuterol (PROVENTIL) 2.5 mg /3 mL (0.083 %) nebulizer solution Take 3 mLs (2.5 mg total) by nebulization every 4 (four) hours as needed for Wheezing or Shortness of Breath.    albuterol 90 mcg/actuation inhaler Inhale 2 puffs into the lungs every 6 (six) hours as needed for Wheezing (ventolin hfa per pts insurance). Rescue    alprazolam (XANAX) 0.25 MG tablet Take 1 tablet (0.25 mg total) by mouth 3 (three) times daily as needed.    aspirin (ECOTRIN) 81 MG EC tablet Take 81 mg by mouth once daily.      ciprofloxacin HCl (CIPRO) 500 MG tablet Take 1 tablet (500 mg total) by mouth 2 (two) times daily.    diltiaZEM (CARDIZEM CD) 180 MG 24 hr capsule Take 1 capsule (180 mg total) by mouth once daily.    furosemide (LASIX) 20 MG tablet Take 1 tablet (20 mg total) by mouth once daily.    GLYCOPYRROLATE/FORMOTEROL FUM (BEVESPI AEROSPHERE INHL) Inhale 2 puffs into the lungs 2 (two) times daily.    hydrochlorothiazide (HYDRODIURIL) 25 MG tablet TAKE 1 TABLET (25 MG TOTAL) BY MOUTH ONCE DAILY.    potassium chloride SA (K-DUR,KLOR-CON) 20 MEQ tablet Take on tablet by mouth every day    pravastatin (PRAVACHOL) 20 MG tablet TAKE 1 TABLET BY MOUTH EVERY DAY           Clinical Reference Information           Your Vitals Were     BP Pulse Temp Height Weight BMI    114/68 90 98.7 °F (37.1 °C) 5' (1.524 m) 58 kg (127 lb 13.9 oz) 24.97 kg/m2      Blood Pressure          Most Recent Value    BP  114/68      Allergies as of 5/8/2017     Bactrim [Sulfamethoxazole-trimethoprim]    Codeine    Keflex [Cephalexin]    Ceftriaxone    Clindamycin Hcl    Doxycycline    Vancomycin Analogues      Immunizations Administered on Date of  Encounter - 5/8/2017     None      Language Assistance Services     ATTENTION: Language assistance services are available, free of charge. Please call [phone].      ATENCIÓN: Si habla etta, tiene a reeves disposición servicios gratuitos de asistencia lingüística. Llame al [phone].     CHÚ Ý: N?u b?n nói Ti?ng Vi?t, có các d?ch v? h? tr? ngôn ng? mi?n phí dành cho b?n. G?i s? [phone].         [place] [facility] complies with applicable Federal civil rights laws and does not discriminate on the basis of race, color, national origin, age, disability, or sex.

## 2017-05-08 NOTE — PROGRESS NOTES
CC: cellulitis  HPI:  The patient is a 72 y.o. year old female who presents to the office for cellulitis.  She reports worsening swelling and erythema of bilateral lower extremities.  She reports no improvement with lasix.  She states she recalled that doxycycline caused a rash in the past, so she did not start taking doxycycline.     PAST MEDICAL HISTORY:  Past Medical History:   Diagnosis Date    Actinic keratosis     Allergy     Arthritis     Cataract     Cellulitis of ankle     Colon polyps     COPD (chronic obstructive pulmonary disease)     Family history of colon cancer     Hyperlipidemia     Hypertension     Lung nodules     Sinus tachycardia        SURGICAL HISTORY:  Past Surgical History:   Procedure Laterality Date    APPENDECTOMY      CATARACT EXTRACTION Bilateral 2007     SECTION      x2    EYE SURGERY      TONSILLECTOMY         MEDS:  Medcard reviewed and updated    ALLERGIES: Allergy Card reviewed and updated    SOCIAL HISTORY:   The patient is a nonsmoker.    PE:   APPEARANCE: Well nourished, well developed, in no acute distress.    CHEST: Lungs clear to auscultation with unlabored respirations.  CARDIOVASCULAR: Normal S1, S2. No murmurs. No carotid bruits. No pedal edema.  ABDOMEN: Bowel sounds normal. Not distended. Soft. No tenderness or masses.    SKIN: Positive swelling and mild erythema of bilateral lower extremities.  PSYCHIATRIC: The patient is oriented to person, place, and time and has a pleasant affect.        ASSESSMENT/PLAN:  Leyla was seen today for leg swelling.    Diagnoses and all orders for this visit:    Cellulitis, unspecified cellulitis site  -     Prescribed cipro    Other orders  -     ciprofloxacin HCl (CIPRO) 500 MG tablet; Take 1 tablet (500 mg total) by mouth 2 (two) times daily.

## 2017-05-11 ENCOUNTER — TELEPHONE (OUTPATIENT)
Dept: INTERNAL MEDICINE | Facility: CLINIC | Age: 72
End: 2017-05-11

## 2017-05-11 ENCOUNTER — PATIENT MESSAGE (OUTPATIENT)
Dept: INTERNAL MEDICINE | Facility: CLINIC | Age: 72
End: 2017-05-11

## 2017-05-11 NOTE — TELEPHONE ENCOUNTER
Please inform patient that bone density reveals osteopenia.  Recommend calcium and vitamin D supplementation.  Also, please advise if patient has had any improvement in her legs since starting antibiotics.

## 2017-05-11 NOTE — TELEPHONE ENCOUNTER
----- Message from Anne Green sent at 5/11/2017 11:01 AM CDT -----  Contact: [phone]  Patient would like to get test results.  Name of test (lab, mammo, etc.):  Bone density  Date of test:  5-8  Ordering provider: Matthew  Where was the test performed:  met  Comments:

## 2017-05-12 ENCOUNTER — PATIENT MESSAGE (OUTPATIENT)
Dept: INTERNAL MEDICINE | Facility: CLINIC | Age: 72
End: 2017-05-12

## 2017-05-15 ENCOUNTER — INITIAL CONSULT (OUTPATIENT)
Dept: CARDIOLOGY | Facility: CLINIC | Age: 72
End: 2017-05-15
Payer: MEDICARE

## 2017-05-15 ENCOUNTER — TELEPHONE (OUTPATIENT)
Dept: INTERNAL MEDICINE | Facility: CLINIC | Age: 72
End: 2017-05-15

## 2017-05-15 VITALS
HEIGHT: 60 IN | BODY MASS INDEX: 26.05 KG/M2 | SYSTOLIC BLOOD PRESSURE: 142 MMHG | WEIGHT: 132.69 LBS | DIASTOLIC BLOOD PRESSURE: 78 MMHG | HEART RATE: 88 BPM

## 2017-05-15 DIAGNOSIS — R60.0 BILATERAL EDEMA OF LOWER EXTREMITY: Primary | ICD-10-CM

## 2017-05-15 DIAGNOSIS — R60.0 BILATERAL EDEMA OF LOWER EXTREMITY: ICD-10-CM

## 2017-05-15 DIAGNOSIS — I87.8 VENOUS STASIS: Primary | ICD-10-CM

## 2017-05-15 DIAGNOSIS — L03.119 CELLULITIS OF LOWER EXTREMITY, UNSPECIFIED LATERALITY: ICD-10-CM

## 2017-05-15 PROCEDURE — 3077F SYST BP >= 140 MM HG: CPT | Mod: S$GLB,,, | Performed by: INTERNAL MEDICINE

## 2017-05-15 PROCEDURE — 99999 PR PBB SHADOW E&M-EST. PATIENT-LVL IV: CPT | Mod: PBBFAC,,, | Performed by: INTERNAL MEDICINE

## 2017-05-15 PROCEDURE — 1157F ADVNC CARE PLAN IN RCRD: CPT | Mod: S$GLB,,, | Performed by: INTERNAL MEDICINE

## 2017-05-15 PROCEDURE — 1159F MED LIST DOCD IN RCRD: CPT | Mod: S$GLB,,, | Performed by: INTERNAL MEDICINE

## 2017-05-15 PROCEDURE — 1125F AMNT PAIN NOTED PAIN PRSNT: CPT | Mod: S$GLB,,, | Performed by: INTERNAL MEDICINE

## 2017-05-15 PROCEDURE — 99213 OFFICE O/P EST LOW 20 MIN: CPT | Mod: S$GLB,,, | Performed by: INTERNAL MEDICINE

## 2017-05-15 PROCEDURE — 1160F RVW MEDS BY RX/DR IN RCRD: CPT | Mod: S$GLB,,, | Performed by: INTERNAL MEDICINE

## 2017-05-15 PROCEDURE — 3078F DIAST BP <80 MM HG: CPT | Mod: S$GLB,,, | Performed by: INTERNAL MEDICINE

## 2017-05-15 NOTE — PROGRESS NOTES
Subjective:    Patient ID:  Leyla Mari is a 72 y.o. Y.o.female who presents for evaluation of leg edema.      HPI: 72 year old female with history of COPD, HTN, recurrent episode of cellulitis. presents today for evaluation of bilateral leg selling and erythema. she denies any fever, chills or sweating. She had leg edema for along time, but recently it is worse, and red. No pain but its tender to touch. She stopped prednisone 20 years ago.no claudication. She quit smoking 23 years ago. No chest pain, chronic exertional SOB.        Past Medical History:   Diagnosis Date    Actinic keratosis     Allergy     Arthritis     Cataract     Cellulitis of ankle     Colon polyps     COPD (chronic obstructive pulmonary disease)     Family history of colon cancer     Hyperlipidemia     Hypertension     Lung nodules     Sinus tachycardia        indicated that her mother is alive. She indicated that her father is . She indicated that her sister is alive. She indicated that her maternal grandmother is . She indicated that her maternal grandfather is . She indicated that her paternal grandmother is . She indicated that her paternal grandfather is . She indicated that the status of her daughter is unknown. She indicated that her son is alive. She indicated that the status of her neg hx is unknown.     Social History     Social History    Marital status:      Spouse name: N/A    Number of children: N/A    Years of education: N/A     Occupational History    retired      Social History Main Topics    Smoking status: Former Smoker     Packs/day: 1.00     Years: 39.00     Types: Cigarettes     Quit date: 1995    Smokeless tobacco: Never Used    Alcohol use 1.2 oz/week     2 Glasses of wine per week      Comment: rarely has a glass of wine- not daily    Drug use: No    Sexual activity: No     Other Topics Concern    Are You Pregnant Or Think You May Be? No     Breast-Feeding No     Social History Narrative       Current Outpatient Prescriptions   Medication Sig    albuterol (PROVENTIL) 2.5 mg /3 mL (0.083 %) nebulizer solution Take 3 mLs (2.5 mg total) by nebulization every 4 (four) hours as needed for Wheezing or Shortness of Breath.    albuterol 90 mcg/actuation inhaler Inhale 2 puffs into the lungs every 6 (six) hours as needed for Wheezing (ventolin hfa per pts insurance). Rescue    alprazolam (XANAX) 0.25 MG tablet Take 1 tablet (0.25 mg total) by mouth 3 (three) times daily as needed.    aspirin (ECOTRIN) 81 MG EC tablet Take 81 mg by mouth once daily.      ciprofloxacin HCl (CIPRO) 500 MG tablet Take 1 tablet (500 mg total) by mouth 2 (two) times daily.    diltiaZEM (CARDIZEM CD) 180 MG 24 hr capsule Take 1 capsule (180 mg total) by mouth once daily.    GLYCOPYRROLATE/FORMOTEROL FUM (BEVESPI AEROSPHERE INHL) Inhale 2 puffs into the lungs 2 (two) times daily.    hydrochlorothiazide (HYDRODIURIL) 25 MG tablet TAKE 1 TABLET (25 MG TOTAL) BY MOUTH ONCE DAILY.    potassium chloride SA (K-DUR,KLOR-CON) 20 MEQ tablet Take on tablet by mouth every day    pravastatin (PRAVACHOL) 20 MG tablet TAKE 1 TABLET BY MOUTH EVERY DAY    furosemide (LASIX) 20 MG tablet Take 1 tablet (20 mg total) by mouth once daily.     No current facility-administered medications for this visit.        CMP  Sodium   Date Value Ref Range Status   05/08/2017 140 136 - 145 mmol/L Final     Potassium   Date Value Ref Range Status   05/08/2017 3.5 3.5 - 5.1 mmol/L Final     Chloride   Date Value Ref Range Status   05/08/2017 101 95 - 110 mmol/L Final     CO2   Date Value Ref Range Status   05/08/2017 29 23 - 29 mmol/L Final     Glucose   Date Value Ref Range Status   05/08/2017 118 (H) 70 - 110 mg/dL Final     BUN, Bld   Date Value Ref Range Status   05/08/2017 13 8 - 23 mg/dL Final     Creatinine   Date Value Ref Range Status   05/08/2017 0.7 0.5 - 1.4 mg/dL Final     Calcium   Date Value Ref  Range Status   05/08/2017 10.0 8.7 - 10.5 mg/dL Final     Total Protein   Date Value Ref Range Status   05/08/2017 7.1 6.0 - 8.4 g/dL Final     Albumin   Date Value Ref Range Status   05/08/2017 4.1 3.5 - 5.2 g/dL Final     Total Bilirubin   Date Value Ref Range Status   05/08/2017 0.6 0.1 - 1.0 mg/dL Final     Comment:     For infants and newborns, interpretation of results should be based  on gestational age, weight and in agreement with clinical  observations.  Premature Infant recommended reference ranges:  Up to 24 hours.............<8.0 mg/dL  Up to 48 hours............<12.0 mg/dL  3-5 days..................<15.0 mg/dL  6-29 days.................<15.0 mg/dL       Alkaline Phosphatase   Date Value Ref Range Status   05/08/2017 76 55 - 135 U/L Final     AST   Date Value Ref Range Status   05/08/2017 26 10 - 40 U/L Final     ALT   Date Value Ref Range Status   05/08/2017 19 10 - 44 U/L Final     Anion Gap   Date Value Ref Range Status   05/08/2017 10 8 - 16 mmol/L Final     eGFR if    Date Value Ref Range Status   05/08/2017 >60.0 >60 mL/min/1.73 m^2 Final     eGFR if non    Date Value Ref Range Status   05/08/2017 >60.0 >60 mL/min/1.73 m^2 Final     Comment:     Calculation used to obtain the estimated glomerular filtration  rate (eGFR) is the CKD-EPI equation. Since race is unknown   in our information system, the eGFR values for   -American and Non--American patients are given   for each creatinine result.         Lab Results   Component Value Date    WBC 14.02 (H) 02/27/2017    HGB 15.6 02/27/2017    HCT 45.2 02/27/2017    MCV 91 02/27/2017     02/27/2017       Review of Systems   Constitution: Negative for decreased appetite, fever and weight gain.   HENT: Negative.    Cardiovascular: Positive for leg swelling. Negative for chest pain, claudication and cyanosis.   Respiratory: Negative for cough, shortness of breath and wheezing.    Skin: Positive for  color change and rash. Negative for dry skin, itching and suspicious lesions.   Musculoskeletal: Negative for arthritis, back pain, joint swelling and muscle weakness.   Gastrointestinal: Negative.    Genitourinary: Negative.    Neurological: Negative.  Negative for loss of balance, numbness and paresthesias.        Objective:   BP (!) 142/78 (BP Location: Right arm, Patient Position: Sitting, BP Method: Manual)  Pulse 88  Ht 5' (1.524 m)  Wt 60.2 kg (132 lb 11.5 oz)  BMI 25.92 kg/m2  Physical Exam   Constitutional: She is oriented to person, place, and time. She appears well-developed and well-nourished. No distress.   HENT:   Head: Normocephalic and atraumatic.   Eyes: Conjunctivae and EOM are normal. Pupils are equal, round, and reactive to light.   Neck: Normal range of motion. Neck supple. No JVD present.   Cardiovascular: Normal rate, regular rhythm, normal heart sounds and intact distal pulses.  Exam reveals no gallop and no friction rub.    No murmur heard.  Pulmonary/Chest: Effort normal and breath sounds normal. No respiratory distress. She has no wheezes.   Musculoskeletal: Normal range of motion. She exhibits edema and tenderness.   Neurological: She is alert and oriented to person, place, and time.   Skin: Skin is warm. No rash noted. She is not diaphoretic. There is erythema. No pallor.           Assessment:       1. Venous stasis  CAR Ultrasound doppler venous legs bilat    CBC auto differential    Ambulatory consult to Infectious Disease   2. Bilateral edema of lower extremity  CAR Ultrasound doppler venous legs bilat    CBC auto differential   3. Cellulitis of lower extremity, unspecified laterality  Ambulatory consult to Infectious Disease        Plan:       Leyla was seen today for leg swelling.    Though no signs of infections appear today, I will ask for the infectious disease clinic opinion to rule out possible cellulitis given her history of recurrent infection and long term steroid use.  CBC ordered.  1. Venous doppler with comp.  2. Ace wrap .    Manju Becker

## 2017-05-15 NOTE — LETTER
May 15, 2017      Dulce Castro MD  [address]  [place] [address]           [facility]  [address]  [place] [address]  Phone: [phone]          Patient: Leyla Mari   MR Number: [id number]   YOB: 1945   Date of Visit: 5/15/2017       Dear Dr. Dulce Castro:    Thank you for referring Leyla Mari to me for evaluation. Attached you will find relevant portions of my assessment and plan of care.    If you have questions, please do not hesitate to call me. I look forward to following Leyla Mari along with you.    Sincerely,    Manju Becker MD    Enclosure  CC:  No Recipients    If you would like to receive this communication electronically, please contact [email or web address] or [phone] to request more information on [facility] access.    For providers and/or their staff who would like to refer a patient to [facility], please contact us through our one-stop-shop provider referral line, [facility] , at [phone].    If you feel you have received this communication in error or would no longer like to receive these types of communications, please e-mail [email or web address]

## 2017-05-15 NOTE — MR AVS SNAPSHOT
[facility]  [address]  [facility] [address]  Phone: [phone]                  Leyla Mari   5/15/2017 3:00 PM   Initial consult    Description:  Female : 1945   Provider:  Manju Becker MD   Department:  [facility]           Reason for Visit     Leg Swelling           Diagnoses this Visit        Comments    Venous stasis    -  Primary     Bilateral edema of lower extremity         Cellulitis of lower extremity, unspecified laterality                To Do List           Future Appointments        Provider Department Dept Phone    2017 11:40 AM Dulce Castro MD [place] [facility] [phone]    2017 1:45 PM PULMONARY FUNCTION [facility] - Pulmonary Lab [phone]    2017 2:30 PM Mert Guido MD [facility] - Pulmonary Services [phone]      Goals (5 Years of Data)     None      Follow-Up and Disposition     Call patient with results      [facility]     [facility] Nurse Care Line -  Assistance  Unless otherwise directed by your provider, please contact [facility], our nurse care line that is available for  assistance.     Registered nurses in the [facility] Center provide: appointment scheduling, clinical advisement, health education, and other advisory services.  Call: [phone] (toll free)               Medications           Message regarding Medications     Verify the changes and/or additions to your medication regime listed below are the same as discussed with your clinician today.  If any of these changes or additions are incorrect, please notify your healthcare provider.             Verify that the below list of medications is an accurate representation of the medications you are currently taking.  If none reported, the list may be blank. If incorrect, please contact your healthcare provider. Carry this list with you in case of emergency.           Current Medications      albuterol (PROVENTIL) 2.5 mg /3 mL (0.083 %) nebulizer solution Take 3 mLs (2.5 mg total) by nebulization every 4 (four) hours as needed for Wheezing or Shortness of Breath.    albuterol 90 mcg/actuation inhaler Inhale 2 puffs into the lungs every 6 (six) hours as needed for Wheezing (ventolin hfa per pts insurance). Rescue    alprazolam (XANAX) 0.25 MG tablet Take 1 tablet (0.25 mg total) by mouth 3 (three) times daily as needed.    aspirin (ECOTRIN) 81 MG EC tablet Take 81 mg by mouth once daily.      ciprofloxacin HCl (CIPRO) 500 MG tablet Take 1 tablet (500 mg total) by mouth 2 (two) times daily.    diltiaZEM (CARDIZEM CD) 180 MG 24 hr capsule Take 1 capsule (180 mg total) by mouth once daily.    furosemide (LASIX) 20 MG tablet Take 1 tablet (20 mg total) by mouth once daily.    GLYCOPYRROLATE/FORMOTEROL FUM (BEVESPI AEROSPHERE INHL) Inhale 2 puffs into the lungs 2 (two) times daily.    hydrochlorothiazide (HYDRODIURIL) 25 MG tablet TAKE 1 TABLET (25 MG TOTAL) BY MOUTH ONCE DAILY.    potassium chloride SA (K-DUR,KLOR-CON) 20 MEQ tablet Take on tablet by mouth every day    pravastatin (PRAVACHOL) 20 MG tablet TAKE 1 TABLET BY MOUTH EVERY DAY           Clinical Reference Information           Your Vitals Were     BP Pulse Height Weight BMI    142/78 (BP Location: Right arm, Patient Position: Sitting, BP Method: Manual) 88 5' (1.524 m) 60.2 kg (132 lb 11.5 oz) 25.92 kg/m2      Blood Pressure          Most Recent Value    BP  (!)  142/78      Allergies as of 5/15/2017     Bactrim [Sulfamethoxazole-trimethoprim]    Codeine    Keflex [Cephalexin]    Ceftriaxone    Clindamycin Hcl    Doxycycline    Vancomycin Analogues      Immunizations Administered on Date of Encounter - 5/15/2017     None      Orders Placed During Today's Visit      Normal Orders This Visit    Ambulatory consult to Infectious Disease     Future Labs/Procedures Expected by Expires    CAR Ultrasound doppler venous legs bilat  5/15/2017 (Approximate)  5/15/2018    CBC auto differential  5/15/2017 7/14/2018      Language Assistance Services     ATTENTION: Language assistance services are available, free of charge. Please call [phone].      ATENCIÓN: Si jose l quiles, tiene a reeves disposición servicios gratuitos de asistencia lingüística. Llame al [phone].     CHÚ Ý: N?u b?n nói Ti?ng Vi?t, có các d?ch v? h? tr? ngôn ng? mi?n phí dành cho b?n. G?i s? [phone].         [facility] complies with applicable Federal civil rights laws and does not discriminate on the basis of race, color, national origin, age, disability, or sex.

## 2017-05-16 ENCOUNTER — PATIENT MESSAGE (OUTPATIENT)
Dept: INTERNAL MEDICINE | Facility: CLINIC | Age: 72
End: 2017-05-16

## 2017-05-18 ENCOUNTER — CLINICAL SUPPORT (OUTPATIENT)
Dept: CARDIOLOGY | Facility: CLINIC | Age: 72
End: 2017-05-18
Payer: MEDICARE

## 2017-05-18 ENCOUNTER — OFFICE VISIT (OUTPATIENT)
Dept: INFECTIOUS DISEASES | Facility: CLINIC | Age: 72
End: 2017-05-18
Payer: MEDICARE

## 2017-05-18 ENCOUNTER — DOCUMENTATION ONLY (OUTPATIENT)
Dept: INFECTIOUS DISEASES | Facility: CLINIC | Age: 72
End: 2017-05-18

## 2017-05-18 VITALS
HEIGHT: 60 IN | DIASTOLIC BLOOD PRESSURE: 70 MMHG | HEART RATE: 89 BPM | SYSTOLIC BLOOD PRESSURE: 135 MMHG | TEMPERATURE: 98 F | BODY MASS INDEX: 26.41 KG/M2 | WEIGHT: 134.5 LBS

## 2017-05-18 DIAGNOSIS — I87.8 VENOUS STASIS: ICD-10-CM

## 2017-05-18 DIAGNOSIS — R60.0 BILATERAL EDEMA OF LOWER EXTREMITY: Primary | ICD-10-CM

## 2017-05-18 DIAGNOSIS — R60.0 BILATERAL EDEMA OF LOWER EXTREMITY: ICD-10-CM

## 2017-05-18 DIAGNOSIS — I87.2 VENOUS STASIS DERMATITIS, UNSPECIFIED LATERALITY: ICD-10-CM

## 2017-05-18 PROCEDURE — 93970 EXTREMITY STUDY: CPT | Mod: S$GLB,,, | Performed by: INTERNAL MEDICINE

## 2017-05-18 PROCEDURE — 3078F DIAST BP <80 MM HG: CPT | Mod: GC,S$GLB,, | Performed by: INTERNAL MEDICINE

## 2017-05-18 PROCEDURE — 99999 PR PBB SHADOW E&M-EST. PATIENT-LVL IV: CPT | Mod: PBBFAC,GC,, | Performed by: INTERNAL MEDICINE

## 2017-05-18 PROCEDURE — 3075F SYST BP GE 130 - 139MM HG: CPT | Mod: GC,S$GLB,, | Performed by: INTERNAL MEDICINE

## 2017-05-18 PROCEDURE — 1159F MED LIST DOCD IN RCRD: CPT | Mod: GC,S$GLB,, | Performed by: INTERNAL MEDICINE

## 2017-05-18 PROCEDURE — 99213 OFFICE O/P EST LOW 20 MIN: CPT | Mod: GC,S$GLB,, | Performed by: INTERNAL MEDICINE

## 2017-05-18 PROCEDURE — 1125F AMNT PAIN NOTED PAIN PRSNT: CPT | Mod: GC,S$GLB,, | Performed by: INTERNAL MEDICINE

## 2017-05-18 PROCEDURE — 1160F RVW MEDS BY RX/DR IN RCRD: CPT | Mod: GC,S$GLB,, | Performed by: INTERNAL MEDICINE

## 2017-05-18 PROCEDURE — 1157F ADVNC CARE PLAN IN RCRD: CPT | Mod: GC,S$GLB,, | Performed by: INTERNAL MEDICINE

## 2017-05-18 NOTE — PROGRESS NOTES
Subjective:      Patient ID: Leyla Mari is a 72 y.o. female.    Chief Complaint:Recurrent Skin Infections      History of Present Illness    71 y/o female comes to office referred by vascular for concern of lower extremities cellulitis. Patient has had lower extremities edema for a long time but recently has been worsening, and is more erythematous.  Patient has history of cellulitis in the past that required IV antibiotics. Patient reports that was on Prednisone for a COPD exacerbation, she stopped taking in 4/26/17, after she stopped she noticed than swelling has been worsening. Denies fever, chills, night sweats, but reports that has tenderness on lower extremities due to swelling.    Review of Systems   Constitution: Negative for chills, decreased appetite, fever, weakness, malaise/fatigue, night sweats, weight gain and weight loss.   HENT: Negative for congestion, ear pain, headaches, hearing loss, hoarse voice, sore throat and tinnitus.    Eyes: Negative for blurred vision, redness and visual disturbance.   Cardiovascular: Positive for leg swelling. Negative for chest pain and palpitations.   Respiratory: Positive for shortness of breath. Negative for cough, hemoptysis and sputum production.    Hematologic/Lymphatic: Negative for adenopathy. Does not bruise/bleed easily.   Skin: Positive for dry skin and itching. Negative for rash and suspicious lesions.   Musculoskeletal: Negative for back pain, joint pain, myalgias and neck pain.   Gastrointestinal: Negative for abdominal pain, constipation, diarrhea, heartburn, nausea and vomiting.   Genitourinary: Negative for dysuria, flank pain, frequency, hematuria, hesitancy and urgency.   Neurological: Positive for paresthesias. Negative for dizziness and numbness.   Psychiatric/Behavioral: Negative for depression and memory loss. The patient does not have insomnia and is not nervous/anxious.      Objective:   Physical Exam   Constitutional: She is oriented to  person, place, and time. No distress.   HENT:   Head: Normocephalic.   Mouth/Throat: No oropharyngeal exudate.   Eyes: No scleral icterus.   Cardiovascular: Normal rate and regular rhythm.  Exam reveals no gallop and no friction rub.    No murmur heard.  Pulmonary/Chest: Effort normal. No respiratory distress. She has no wheezes. She has no rales.   Abdominal: Soft. Bowel sounds are normal. She exhibits no distension. There is no tenderness. There is no rebound and no guarding.   Musculoskeletal: She exhibits edema (bilateral lower extremities) and tenderness (right > lower extremity).        Right lower leg: She exhibits swelling and edema.        Left lower leg: She exhibits swelling and edema.   No hot   Lymphadenopathy:     She has no cervical adenopathy.   Neurological: She is alert and oriented to person, place, and time. No cranial nerve deficit.   Skin: She is not diaphoretic.   Vitals reviewed.    Assessment:       1) Venous statis dermatitis  2) Bilateral lower extremities swelling    Plan:       1) No antibiotics for now  2) Follow lower extremities U/S results    Power Amos MD  Infectious Disease Fellow, PGY-4  Pager: [phone]  [facility]

## 2017-05-18 NOTE — PROGRESS NOTES
ID Staff  I have seen and examined the patient with the fellow and I agree with the fellow's history, physical exam, assessment and plan as stated. The plan of care has been discussed the fellow.     Michele Gomez  Infectious Diseases  [phone]

## 2017-05-18 NOTE — PROGRESS NOTES
Picture from today .    Power Amos MD  Infectious Disease Fellow, PGY-4  Pager: [phone]  [facility]

## 2017-05-18 NOTE — MR AVS SNAPSHOT
[facility]  [address]  [facility] [address]  Phone: [phone]  Fax: [phone]                  Leyla Mari   2017 9:00 AM   Office Visit    Description:  Female : 1945   Provider:  Power Carbajal MD   Department:  [facility]           Reason for Visit     Recurrent Skin Infections           Diagnoses this Visit        Comments    Bilateral edema of lower extremity    -  Primary     Venous stasis dermatitis, unspecified laterality                To Do List           Future Appointments        Provider Department Dept Phone    2017 2:00 PM VASCULAR, [place] - Vascular Cardiology [phone]    2017 11:40 AM Dulce Castro MD [place] - Internal Medicine [phone]    2017 1:45 PM [facility] [phone]    2017 2:30 PM MD [facility] Pul[facility] Services [phone]      Goals (5 Years of Data)     None      Follow-Up and Disposition     Return if symptoms worsen or fail to improve.      [facility] On Call     [facility] On Call Nurse Care Line -  Assistance  Unless otherwise directed by your provider, please contact [facility] On-Call, our nurse care line that is available for  assistance.     Registered nurses in the [facility] provide: appointment scheduling, clinical advisement, health education, and other advisory services.  Call: [phone] (toll free)               Medications           Message regarding Medications     Verify the changes and/or additions to your medication regime listed below are the same as discussed with your clinician today.  If any of these changes or additions are incorrect, please notify your healthcare provider.        STOP taking these medications     ciprofloxacin HCl (CIPRO) 500 MG tablet Take 1 tablet (500 mg total) by mouth 2 (two) times daily.           Verify that the below list of  medications is an accurate representation of the medications you are currently taking.  If none reported, the list may be blank. If incorrect, please contact your healthcare provider. Carry this list with you in case of emergency.           Current Medications     albuterol (PROVENTIL) 2.5 mg /3 mL (0.083 %) nebulizer solution Take 3 mLs (2.5 mg total) by nebulization every 4 (four) hours as needed for Wheezing or Shortness of Breath.    albuterol 90 mcg/actuation inhaler Inhale 2 puffs into the lungs every 6 (six) hours as needed for Wheezing (ventolin hfa per pts insurance). Rescue    alprazolam (XANAX) 0.25 MG tablet Take 1 tablet (0.25 mg total) by mouth 3 (three) times daily as needed.    aspirin (ECOTRIN) 81 MG EC tablet Take 81 mg by mouth once daily.      diltiaZEM (CARDIZEM CD) 180 MG 24 hr capsule Take 1 capsule (180 mg total) by mouth once daily.    GLYCOPYRROLATE/FORMOTEROL FUM (BEVESPI AEROSPHERE INHL) Inhale 2 puffs into the lungs 2 (two) times daily.    hydrochlorothiazide (HYDRODIURIL) 25 MG tablet TAKE 1 TABLET (25 MG TOTAL) BY MOUTH ONCE DAILY.    potassium chloride SA (K-DUR,KLOR-CON) 20 MEQ tablet Take on tablet by mouth every day    pravastatin (PRAVACHOL) 20 MG tablet TAKE 1 TABLET BY MOUTH EVERY DAY    furosemide (LASIX) 20 MG tablet Take 1 tablet (20 mg total) by mouth once daily.           Clinical Reference Information           Your Vitals Were     BP Pulse Temp Height Weight BMI    135/70 89 98.1 °F (36.7 °C) (Oral) 5' (1.524 m) 61 kg (134 lb 7.7 oz) 26.26 kg/m2      Blood Pressure          Most Recent Value    BP  135/70      Allergies as of 5/18/2017     Bactrim [Sulfamethoxazole-trimethoprim]    Codeine    Keflex [Cephalexin]    Ceftriaxone    Clindamycin Hcl    Doxycycline    Vancomycin Analogues      Immunizations Administered on Date of Encounter - 5/18/2017     None      Language Assistance Services     ATTENTION: Language assistance services are available, free of charge. Please  call [phone].      ATENCIÓN: Si habla español, tiene a reeves disposición servicios gratuitos de asistencia lingüística. Llame al [phone].     CHÚ Ý: N?u b?n nói Ti?ng Vi?t, có các d?ch v? h? tr? ngôn ng? mi?n phí dành cho b?n. G?i s? [phone].         [facility] complies with applicable Federal civil rights laws and does not discriminate on the basis of race, color, national origin, age, disability, or sex.

## 2017-05-22 ENCOUNTER — TELEPHONE (OUTPATIENT)
Dept: CARDIOLOGY | Facility: CLINIC | Age: 72
End: 2017-05-22

## 2017-05-22 DIAGNOSIS — I87.2 VENOUS INSUFFICIENCY: Primary | ICD-10-CM

## 2017-05-22 NOTE — TELEPHONE ENCOUNTER
----- Message from Manju Becker MD sent at 5/19/2017  4:48 PM CDT -----  Venous doppler shows evidence of varicose veins.  The infectious disease clinic ruled out infection.    Plan; use compression stocking daily as per our discussion.

## 2017-05-22 NOTE — TELEPHONE ENCOUNTER
Spoke with patient in regards to results.        Pt voiced understanding.    Mailed patient compression stocking RX.

## 2017-05-25 ENCOUNTER — TELEPHONE (OUTPATIENT)
Dept: CARDIOLOGY | Facility: CLINIC | Age: 72
End: 2017-05-25

## 2017-05-25 NOTE — TELEPHONE ENCOUNTER
----- Message from Paola Monique sent at 5/24/2017  4:40 PM CDT -----  Contact: Haydee Hubbard from [employer] [phone] is calling requesting a rx and orders for knee hi compression stockings be faxed to her at [phone].    Thanks

## 2017-06-05 DIAGNOSIS — J96.10 CHRONIC RESPIRATORY FAILURE, UNSPECIFIED WHETHER WITH HYPOXIA OR HYPERCAPNIA: ICD-10-CM

## 2017-06-05 DIAGNOSIS — J43.0 UNILATERAL EMPHYSEMA: Primary | ICD-10-CM

## 2017-06-12 DIAGNOSIS — J96.90 RESPIRATORY FAILURE, UNSPECIFIED CHRONICITY, UNSPECIFIED WHETHER WITH HYPOXIA OR HYPERCAPNIA: ICD-10-CM

## 2017-06-12 DIAGNOSIS — J44.9 CHRONIC OBSTRUCTIVE PULMONARY DISEASE, UNSPECIFIED COPD TYPE: Primary | ICD-10-CM

## 2017-06-26 ENCOUNTER — HOSPITAL ENCOUNTER (OUTPATIENT)
Dept: PULMONOLOGY | Facility: CLINIC | Age: 72
Discharge: HOME | End: 2017-06-26
Payer: MEDICARE

## 2017-06-26 ENCOUNTER — OFFICE VISIT (OUTPATIENT)
Dept: PULMONOLOGY | Facility: CLINIC | Age: 72
End: 2017-06-26
Payer: MEDICARE

## 2017-06-26 VITALS
BODY MASS INDEX: 23.55 KG/M2 | WEIGHT: 124.75 LBS | SYSTOLIC BLOOD PRESSURE: 134 MMHG | WEIGHT: 124.75 LBS | HEART RATE: 82 BPM | BODY MASS INDEX: 23.55 KG/M2 | OXYGEN SATURATION: 98 % | DIASTOLIC BLOOD PRESSURE: 74 MMHG | HEIGHT: 61 IN | HEIGHT: 61 IN

## 2017-06-26 DIAGNOSIS — J96.90 RESPIRATORY FAILURE, UNSPECIFIED CHRONICITY, UNSPECIFIED WHETHER WITH HYPOXIA OR HYPERCAPNIA: ICD-10-CM

## 2017-06-26 DIAGNOSIS — J44.9 CHRONIC OBSTRUCTIVE PULMONARY DISEASE, UNSPECIFIED COPD TYPE: ICD-10-CM

## 2017-06-26 DIAGNOSIS — J43.2 CENTRILOBULAR EMPHYSEMA: Primary | ICD-10-CM

## 2017-06-26 PROCEDURE — 1157F ADVNC CARE PLAN IN RCRD: CPT | Mod: S$GLB,,, | Performed by: INTERNAL MEDICINE

## 2017-06-26 PROCEDURE — 1126F AMNT PAIN NOTED NONE PRSNT: CPT | Mod: S$GLB,,, | Performed by: INTERNAL MEDICINE

## 2017-06-26 PROCEDURE — 94620 PR PULMONARY STRESS TESTING,SIMPLE: CPT | Mod: S$GLB,,, | Performed by: INTERNAL MEDICINE

## 2017-06-26 PROCEDURE — 99499 UNLISTED E&M SERVICE: CPT | Mod: S$PBB,,, | Performed by: INTERNAL MEDICINE

## 2017-06-26 PROCEDURE — 1159F MED LIST DOCD IN RCRD: CPT | Mod: S$GLB,,, | Performed by: INTERNAL MEDICINE

## 2017-06-26 PROCEDURE — 99999 PR PBB SHADOW E&M-EST. PATIENT-LVL III: CPT | Mod: PBBFAC,,, | Performed by: INTERNAL MEDICINE

## 2017-06-26 PROCEDURE — 99214 OFFICE O/P EST MOD 30 MIN: CPT | Mod: S$GLB,,, | Performed by: INTERNAL MEDICINE

## 2017-06-26 RX ORDER — BUDESONIDE AND FORMOTEROL FUMARATE DIHYDRATE 160; 4.5 UG/1; UG/1
2 AEROSOL RESPIRATORY (INHALATION) EVERY 12 HOURS
Qty: 1 INHALER | Refills: 6 | Status: SHIPPED | OUTPATIENT
Start: 2017-06-26 | End: 2018-03-14 | Stop reason: SDUPTHER

## 2017-06-26 NOTE — PROGRESS NOTES
Subjective:       Patient ID: Leyla Mari is a 72 y.o. female.    Chief Complaint: COPD and Shortness of Breath    HPI  71 yo female with severe COPD comes for a six minute walk test to qualify for supplemental oxygen with activity.  She feels well as long as she is at rest. Almost any activity leaves her breathless, but supplemental oxygen gives her freedom to move about. Her six minute walk test today confirms  That she desaturates dramatically with activity BUT her Sa02: stays normal with supplemental oxygen. She has a swollen right olecranon  Bursa from resting her elbows on the counter top. Not red or show signs of infection.      No flowsheet data found.]  Review of Systems   Constitutional: Negative.    HENT: Negative.    Eyes: Negative.    Respiratory: Negative.  Positive for dyspnea on extertion.         Activity aggravates her breathing. Aided by the use of oxygen.   Cardiovascular: Negative.    Genitourinary: Negative.    Musculoskeletal: Negative.    Skin: Negative.         Hx of repeated bouts of cellulitis  Hx of venous stasis. Normal venous doppler, No evidence of DVT   Gastrointestinal: Negative.    Neurological: Negative.    Psychiatric/Behavioral: Negative.        Objective:      Physical Exam   Constitutional: She is oriented to person, place, and time. She appears well-developed and well-nourished. No distress.   HENT:   Head: Normocephalic and atraumatic.   Right Ear: External ear normal.   Left Ear: External ear normal.   Nose: Nose normal.   Mouth/Throat: Oropharynx is clear and moist.   Eyes: Conjunctivae and EOM are normal. Pupils are equal, round, and reactive to light.   Neck: Normal range of motion. Neck supple. No JVD present. No thyromegaly present.   Cardiovascular: Normal rate, regular rhythm, normal heart sounds and intact distal pulses.  Exam reveals no gallop.    No murmur heard.  Pulmonary/Chest: Breath sounds normal. No stridor. No respiratory distress. She has no wheezes. She  has no rales. She exhibits no tenderness.   Peak flow 120 l/min RA Sa02: 93% and markedly quiet chest on auscultation.   Abdominal: Soft. Bowel sounds are normal. She exhibits no distension and no mass. There is no tenderness. There is no rebound and no guarding.   Musculoskeletal: Normal range of motion. She exhibits no edema.   Lymphadenopathy:     She has no cervical adenopathy.   Neurological: She is alert and oriented to person, place, and time. She has normal reflexes. She displays normal reflexes. No cranial nerve deficit.   Skin: Skin is warm and dry. No rash noted.   Psychiatric: She has a normal mood and affect. Her behavior is normal. Judgment and thought content normal.   Nursing note and vitals reviewed.          Assessment:       1. Centrilobular emphysema        Outpatient Encounter Prescriptions as of 6/26/2017   Medication Sig Dispense Refill    albuterol (PROVENTIL) 2.5 mg /3 mL (0.083 %) nebulizer solution Take 3 mLs (2.5 mg total) by nebulization every 4 (four) hours as needed for Wheezing or Shortness of Breath. 120 each 11    albuterol 90 mcg/actuation inhaler Inhale 2 puffs into the lungs every 6 (six) hours as needed for Wheezing (ventolin hfa per pts insurance). Rescue 1 Inhaler 11    alprazolam (XANAX) 0.25 MG tablet Take 1 tablet (0.25 mg total) by mouth 3 (three) times daily as needed. 90 tablet 3    aspirin (ECOTRIN) 81 MG EC tablet Take 81 mg by mouth once daily.        budesonide-formoterol 160-4.5 mcg (SYMBICORT) 160-4.5 mcg/actuation HFAA Inhale 2 puffs into the lungs every 12 (twelve) hours. Controller 1 Inhaler 6    diltiaZEM (CARDIZEM CD) 180 MG 24 hr capsule Take 1 capsule (180 mg total) by mouth once daily. 90 capsule 3    furosemide (LASIX) 20 MG tablet Take 1 tablet (20 mg total) by mouth once daily. 3 tablet 0    GLYCOPYRROLATE/FORMOTEROL FUM (BEVESPI AEROSPHERE INHL) Inhale 2 puffs into the lungs 2 (two) times daily.      hydrochlorothiazide (HYDRODIURIL) 25 MG  tablet TAKE 1 TABLET (25 MG TOTAL) BY MOUTH ONCE DAILY. 90 tablet 3    potassium chloride SA (K-DUR,KLOR-CON) 20 MEQ tablet Take on tablet by mouth every day 90 tablet 3    pravastatin (PRAVACHOL) 20 MG tablet TAKE 1 TABLET BY MOUTH EVERY DAY 90 tablet 2     No facility-administered encounter medications on file as of 6/26/2017.      No orders of the defined types were placed in this encounter.    Plan:           IMP Centrilobular emphysema with jose m desaturation with minimal activity. Swollen right olecranon bursa.

## 2017-06-26 NOTE — PROCEDURES
Leyla Mari is a 72 y.o.  female patient, who presents for a 6 minute walk test ordered by MD Hay.  The diagnosis is Qualify for Oxygen.  The patient's BMI is 23.6kg/m2. Predicted distance (lower limit of normal) is 299.32 meters.    Test Results:    The test was not completed.  The patient stopped 1 times for a total of 204 seconds.  The total time walked was 156 seconds.  During walking, the patient reported:  Dyspnea. The patient used no assistive devices during testing.     06/26/2017---------Distance: 121.92 meters (400 feet)     O2 Sat % Supplemental Oxygen Heart Rate Blood Pressure Sheila Scale   Pre-exercise  (Resting) 96 % Room Air 88 bpm 125/56 3   During Exercise 84 % Room Air 126 bpm 168/70 7-8   Post-exercise   96 % Room Air  96 bpm 133/58       Recovery Time: 212 seconds    Oxygen Qualification:     O2 Sat % Supplemental Oxygen Heart Rate Blood Pressure Sheila Scale   Pre-exercise  (Resting) 98 % 2 L/M  82 bpm  134/60  2    During Exercise 94 %  2 L/M  98 bpm  165/71  3    Post-exercise   98 %  2 L/M  88 bpm  139/66         Recovery Time: 160 seconds    Performing nurse/tech: CRISTHIAN Lazo    PREVIOUS STUDY:   The patient has not had a previous study.      CLINICAL INTERPRETATION:  Six minute walk distance is 121.92 meters (400 feet) with very heavy dyspnea.  During exercise, there was significant desaturation while breathing room air.  Both blood pressure and heart rate increased significantly with walking.  This may represent a hypertensive and a tachycardic response to exercise.  The patient did not report non-pulmonary symptoms during exercise.  Severe exercise impairment is likely due to desaturation and cardiovascular causes.  The patient did not complete the study, walking 156 seconds of the 360 second test.  The patient may benefit from using supplemental oxygen during exertion.  No previous study performed.  Based upon age and body mass index, exercise capacity is less than predicted.    Oxygen saturation did improve while breathing supplemental oxygen.

## 2017-06-28 DIAGNOSIS — J43.0 UNILATERAL EMPHYSEMA: ICD-10-CM

## 2017-06-28 DIAGNOSIS — J96.91 RESPIRATORY FAILURE WITH HYPOXIA, UNSPECIFIED CHRONICITY: Primary | ICD-10-CM

## 2017-07-10 ENCOUNTER — HOSPITAL ENCOUNTER (EMERGENCY)
Facility: HOSPITAL | Age: 72
Discharge: HOME | End: 2017-07-10
Attending: EMERGENCY MEDICINE
Payer: MEDICARE

## 2017-07-10 VITALS
DIASTOLIC BLOOD PRESSURE: 70 MMHG | HEART RATE: 105 BPM | WEIGHT: 125 LBS | RESPIRATION RATE: 20 BRPM | OXYGEN SATURATION: 95 % | BODY MASS INDEX: 23.6 KG/M2 | TEMPERATURE: 98 F | HEIGHT: 61 IN | SYSTOLIC BLOOD PRESSURE: 149 MMHG

## 2017-07-10 DIAGNOSIS — K52.9 COLITIS: Primary | ICD-10-CM

## 2017-07-10 LAB
ABO + RH BLD: NORMAL
ALBUMIN SERPL BCP-MCNC: 3.9 G/DL
ALP SERPL-CCNC: 107 U/L
ALT SERPL W/O P-5'-P-CCNC: 13 U/L
ANION GAP SERPL CALC-SCNC: 10 MMOL/L
AST SERPL-CCNC: 23 U/L
BASOPHILS # BLD AUTO: 0.02 K/UL
BASOPHILS NFR BLD: 0.2 %
BILIRUB SERPL-MCNC: 0.6 MG/DL
BLD GP AB SCN CELLS X3 SERPL QL: NORMAL
BLOOD GROUP ANTIBODIES SERPL: NORMAL
BUN SERPL-MCNC: 7 MG/DL
CALCIUM SERPL-MCNC: 9.8 MG/DL
CHLORIDE SERPL-SCNC: 97 MMOL/L
CO2 SERPL-SCNC: 26 MMOL/L
CREAT SERPL-MCNC: 0.7 MG/DL
DIFFERENTIAL METHOD: ABNORMAL
EOSINOPHIL # BLD AUTO: 0.2 K/UL
EOSINOPHIL NFR BLD: 1.6 %
ERYTHROCYTE [DISTWIDTH] IN BLOOD BY AUTOMATED COUNT: 12.8 %
EST. GFR  (AFRICAN AMERICAN): >60 ML/MIN/1.73 M^2
EST. GFR  (NON AFRICAN AMERICAN): >60 ML/MIN/1.73 M^2
GLUCOSE SERPL-MCNC: 104 MG/DL
HCT VFR BLD AUTO: 44.3 %
HGB BLD-MCNC: 15.7 G/DL
LYMPHOCYTES # BLD AUTO: 1.8 K/UL
LYMPHOCYTES NFR BLD: 17.2 %
MCH RBC QN AUTO: 31.2 PG
MCHC RBC AUTO-ENTMCNC: 35.4 %
MCV RBC AUTO: 88 FL
MONOCYTES # BLD AUTO: 0.8 K/UL
MONOCYTES NFR BLD: 8.2 %
NEUTROPHILS # BLD AUTO: 7.5 K/UL
NEUTROPHILS NFR BLD: 72.7 %
PLATELET # BLD AUTO: 199 K/UL
PMV BLD AUTO: 9.9 FL
POTASSIUM SERPL-SCNC: 3.6 MMOL/L
PROT SERPL-MCNC: 6.7 G/DL
RBC # BLD AUTO: 5.04 M/UL
SODIUM SERPL-SCNC: 133 MMOL/L
WBC # BLD AUTO: 10.27 K/UL

## 2017-07-10 PROCEDURE — 86900 BLOOD TYPING SEROLOGIC ABO: CPT

## 2017-07-10 PROCEDURE — 86901 BLOOD TYPING SEROLOGIC RH(D): CPT

## 2017-07-10 PROCEDURE — 96360 HYDRATION IV INFUSION INIT: CPT

## 2017-07-10 PROCEDURE — 99284 EMERGENCY DEPT VISIT MOD MDM: CPT | Mod: ,,, | Performed by: EMERGENCY MEDICINE

## 2017-07-10 PROCEDURE — 86077 PHYS BLOOD BANK SERV XMATCH: CPT | Mod: ,,, | Performed by: PATHOLOGY

## 2017-07-10 PROCEDURE — 25000003 PHARM REV CODE 250: Performed by: PHYSICIAN ASSISTANT

## 2017-07-10 PROCEDURE — 86870 RBC ANTIBODY IDENTIFICATION: CPT

## 2017-07-10 PROCEDURE — 25500020 PHARM REV CODE 255: Performed by: EMERGENCY MEDICINE

## 2017-07-10 PROCEDURE — 85025 COMPLETE CBC W/AUTO DIFF WBC: CPT

## 2017-07-10 PROCEDURE — 80053 COMPREHEN METABOLIC PANEL: CPT

## 2017-07-10 PROCEDURE — 99284 EMERGENCY DEPT VISIT MOD MDM: CPT | Mod: 25

## 2017-07-10 RX ORDER — CIPROFLOXACIN 500 MG/1
500 TABLET ORAL
Status: COMPLETED | OUTPATIENT
Start: 2017-07-10 | End: 2017-07-10

## 2017-07-10 RX ORDER — CIPROFLOXACIN 500 MG/1
500 TABLET ORAL 2 TIMES DAILY
Qty: 10 TABLET | Refills: 0 | Status: SHIPPED | OUTPATIENT
Start: 2017-07-10 | End: 2020-02-27 | Stop reason: SDUPTHER

## 2017-07-10 RX ADMIN — CIPROFLOXACIN HYDROCHLORIDE 500 MG: 500 TABLET, FILM COATED ORAL at 11:07

## 2017-07-10 RX ADMIN — IOHEXOL 75 ML: 350 INJECTION, SOLUTION INTRAVENOUS at 09:07

## 2017-07-10 RX ADMIN — SODIUM CHLORIDE 1000 ML: 0.9 INJECTION, SOLUTION INTRAVENOUS at 08:07

## 2017-07-11 NOTE — DISCHARGE INSTRUCTIONS
Take oral antibiotics 2 times a day for the next 5 days.  Follow up with colon and rectal services in 5 days for follow-up and also to schedule a repeat colonoscopy.  Rest.  Stay hydrated.  Start with soft/fluid diet such as mashed potatoes, soups, applesauce, and advance to solid foods as tolerated.  Return to the emergency department for new or worsening symptoms.    Future Appointments  Date Time Provider Department Center   9/27/2017 2:00 PM PULMONARY FUNCTION [facility]   9/27/2017 2:30 PM Mert Guido MD [facility] PULMS[facility]     Our goal in the emergency department is to always give you outstanding care and exceptional service. You may receive a survey by mail or e-mail in the next week regarding your experience in our ED. We would greatly appreciate your completing and returning the survey. Your feedback provides us with a way to recognize our staff who give very good care and it helps us learn how to improve when your experience was below our aspiration of excellence.

## 2017-07-11 NOTE — ED TRIAGE NOTES
Stared having diarrhea yesterday.  After her BM today she wiped and saw blood on the toilet paper.  States that she passed gas this afternoon and bright red blood came out.  Having lower abd cramping.    GENERAL: The patient is a 72 year old female in no apparent distress. She is alert and oriented x3.    HEENT: Head is normocephalic and atraumatic. Extraocular muscles are intact. Pupils are equal, round, and reactive to light and accommodation. Nares appeared normal. Mouth is well hydrated and without lesions. Mucous membranes are moist. Posterior pharynx clear of any exudate or lesions.    NECK: Supple. No carotid bruits. No lymphadenopathy or thyromegaly.    LUNGS: Clear to auscultation upper and middle lobes; diminished in the bases. Wears continuous O2 at 2L/NC.    HEART: Regular rate and rhythm without murmur.     ABDOMEN: Soft, nontender, and nondistended. Positive bowel sounds. No hepatosplenomegaly was noted.     EXTREMITIES: Without any cyanosis, clubbing, rash, lesions or edema.     NEUROLOGIC: Cranial nerves II through XII are grossly intact.     PSYCHIATRIC: Flat affect, but denies suicidal or homicidal ideations.    SKIN: No ulceration or induration present.

## 2017-07-11 NOTE — ED PROVIDER NOTES
Encounter Date: 7/10/2017    SCRIBE #1 NOTE: I, Eulalia Ramirez, didi scribing for, and in the presence of,  ARTURO Coffman. I have scribed the following portions of the note - Other sections scribed: JERRY TO.       History     Chief Complaint   Patient presents with    Rectal Bleeding     in low dose aspirin only     Time seen by provider: 7:48 PM    This is a 72 y.o. female with PM Hx of COPD, colon polyps, and appendectomy who presents with complaint of rectal bleeding. Pt endorses diarrhea and lower abdominal cramping since yesterday evening, after eating [employer] fried chicken. At 9:30 AM today she had one episode of diarrhea in which she noted blood on the tissue after wiping, no blood noted to be mixed in the stool. She then had another bowel movement shortly after with no blood in stool and only minimal blood noted on tissue. Associated decreased appetite and decreased PO intake. Pt denies fever, chills, SOB, dizziness, light headedness, rectal pain, or any other symptoms at this time. Last colonoscopy June 2015. Family hx of colon cancer. No hx of diverticulitis but does have hx of external hemorrhoids.       The history is provided by the patient and medical records.     Review of patient's allergies indicates:   Allergen Reactions    Bactrim [sulfamethoxazole-trimethoprim] Nausea Only    Codeine Itching    Keflex [cephalexin] Other (See Comments)     Rhinorrhea, nausea. Tolerated Ceftriaxone June 2014    Ceftriaxone Rash     Morbilliform rash after receiving IV ceftriaxone    Clindamycin hcl Rash    Doxycycline Rash    Vancomycin analogues Rash     Morbilliform rash after receiving IV vancomycin     Past Medical History:   Diagnosis Date    Actinic keratosis     Allergy     Arthritis     Cataract     Cellulitis of ankle     Colon polyps     COPD (chronic obstructive pulmonary disease)     Family history of colon cancer     Hyperlipidemia     Hypertension     Lung nodules     Sinus  tachycardia      Past Surgical History:   Procedure Laterality Date    APPENDECTOMY      CATARACT EXTRACTION Bilateral 2007     SECTION      x2    EYE SURGERY      TONSILLECTOMY       Family History   Problem Relation Age of Onset    Cancer Mother 79     colon    Heart disease Mother     Hyperlipidemia Mother     Hypertension Mother     Skin cancer Mother     Cataracts Mother     Glaucoma Mother     Thyroid disease Mother     COPD Father     Diabetes Father     Heart disease Father     Hyperlipidemia Father     Hypertension Father     Cancer Sister     Heart disease Sister     Hyperlipidemia Sister     Hypertension Sister     Skin cancer Sister     Cataracts Sister     Cancer Son     Blindness Paternal Grandmother     Diabetes Paternal Grandmother     Thyroid disease Daughter     Melanoma Neg Hx     Psoriasis Neg Hx     Lupus Neg Hx     Eczema Neg Hx     Amblyopia Neg Hx     Macular degeneration Neg Hx     Retinal detachment Neg Hx     Strabismus Neg Hx     Stroke Neg Hx      Social History   Substance Use Topics    Smoking status: Former Smoker     Packs/day: 1.00     Years: 39.00     Types: Cigarettes     Quit date: 1995    Smokeless tobacco: Never Used    Alcohol use 1.2 oz/week     2 Glasses of wine per week      Comment: rarely has a glass of wine- not daily     Review of Systems   Constitutional: Positive for appetite change (decreased appetite). Negative for chills and fever.   HENT: Negative for congestion.    Eyes: Negative for visual disturbance.   Respiratory: Negative for shortness of breath.    Cardiovascular: Negative for chest pain.   Gastrointestinal: Positive for abdominal pain (lower abdominal cramping) and diarrhea. Negative for blood in stool and rectal pain.        (+) Blood on tissue twice after wiping following bowel movements    Genitourinary: Negative for difficulty urinating.   Musculoskeletal: Negative for back pain.   Skin: Negative for  rash.   Neurological: Negative for dizziness and light-headedness.       Physical Exam     Initial Vitals [07/10/17 1755]   BP Pulse Resp Temp SpO2   (!) 199/89 94 18 98.4 °F (36.9 °C) (!) 93 %      MAP       125.67         Physical Exam    Nursing note and vitals reviewed.  Constitutional: She appears well-developed and well-nourished. She is not diaphoretic. No distress.   HENT:   Head: Normocephalic and atraumatic.   Nose: Nose normal.   Eyes: Conjunctivae and EOM are normal.   Neck: Normal range of motion.   Cardiovascular: Normal rate, regular rhythm and normal heart sounds. Exam reveals no friction rub.    No murmur heard.  Pulmonary/Chest: Breath sounds normal. No respiratory distress. She has no wheezes. She has no rales.   Abdominal: Soft. Bowel sounds are normal. She exhibits no distension. There is no tenderness. There is no rebound.   Genitourinary: Rectal exam shows external hemorrhoid (nontender nonbleeding) and guaiac positive stool. Guaiac positive stool. : Acceptable.  Genitourinary Comments: Chaperone present for rectal examination.    Musculoskeletal: Normal range of motion.   Neurological: She is alert and oriented to person, place, and time. She has normal strength. No sensory deficit.   Skin: Skin is warm and dry. No erythema. No pallor.   Psychiatric: She has a normal mood and affect. Her behavior is normal. Judgment and thought content normal.         ED Course   Procedures  Labs Reviewed   CBC W/ AUTO DIFFERENTIAL - Abnormal; Notable for the following:        Result Value    MCH 31.2 (*)     Lymph% 17.2 (*)     All other components within normal limits   COMPREHENSIVE METABOLIC PANEL - Abnormal; Notable for the following:     Sodium 133 (*)     BUN, Bld 7 (*)     All other components within normal limits   TYPE & SCREEN   ANTIBODY IDENTIFICATION             Medical Decision Making:   History:   Old Medical Records: I decided to obtain old medical records.  Clinical Tests:    Lab Tests: Ordered and Reviewed  Radiological Study: Ordered and Reviewed    Imaging Results          CT Abdomen Pelvis With Contrast (Final result)  Result time 07/10/17 22:44:22    Final result by Javad Arzate MD (07/10/17 22:44:22)                 Impression:      Circumferential wall thickening and inflammation of the descending colon, nonspecific in appearance and may be secondary to infectious or inflammatory colitis.  ______________________________________     Electronically signed by resident: ALOK RIVERA MD  Date:     07/10/17  Time:    21:44            As the supervising and teaching physician, I personally reviewed the images and resident's interpretation and I agree with the findings.          Electronically signed by: JAVAD ARZATE MD  Date:     07/10/17  Time:    22:44              Narrative:    Procedure comments: The patient was surveyed from the lung bases through the pelvis after the administration of 75 cc Omni 350 IV contrast and data was reconstructed for coronal, sagittal, and axial images.    Comparison:  CT chest abdomen and pelvis 04/26/2017.    Findings:  The lung bases are clear.  No pleural effusion is seen.      The visualized portions of the heart appear normal.    The liver is normal in size and attenuation.  No focal hepatic abnormality is seen.  The gallbladder is unremarkable.  No intrahepatic or extrahepatic biliary ductal dilatation is identified.  The portal vein, splenic vein and SMV are patent.    The stomach, spleen, pancreas, and adrenal glands are unremarkable.  There is a left upper quadrant splenule.    The kidneys are normal in size and location. They concentrate and excrete contrast appropriately.  No hydronephrosis is seen.  The ureters appear normal in course and caliber without evidence of ureteral dilatation. The urinary bladder is unremarkable.  The uterus is unremarkable.  No adnexal masses.      The visualized loops of small bowel show no evidence of  obstruction or inflammation.  The patient is status post appendectomy. There is circumferential wall thickening with surrounding fat stranding of the descending colon.  The sigmoid colon and rectum are decompressed.    No ascites, free fluid, or intraperitoneal free air is identified.  There is no evidence of lymph node enlargement in the abdomen or pelvis.    The abdominal aorta is normal in course and caliber with atherosclerotic calcifications.    The osseous structures demonstrate degenerative changes without acute abnormality.    The extraperitoneal soft tissues are unremarkable.                                 APC / Resident Notes:   DDX includes but is not limited to hemorrhoids, fissures, diverticulitis, colitis, gastroenteritis, musculoskeletal pain, GERD, anemia.  Will order labs and imaging, give IV fluids, and reassess.    CBC with no leukocytosis or anemia.  CMP with no significant electrolyte abnormalities.  Creatinine stable at 0.7.  No hyperbilirubinemia or transaminitis.  CT abdomen and pelvis shows wall thickening and inflammation in the descending colon which may be secondary to infectious or inflammatory colitis.    Patient updated with results.  She reports feeling improved.  She has gone to the bathroom to urinate multiple times while waiting in the ED and has not noticed any blood per rectum.  Patient is hemodynamically stable.  No indication for emergent blood transfusion at this time given stable H/H.  Her signs and symptoms today are most likely due to colitis.  I will give patient first dose of Cipro here and prescription for home.  She is to follow up closely with [facility] for ED visit and repeat colonoscopy.  Strict ED return precaution given.  All questions answered.  Patient is comfortable with plan and stable for discharge.  I reviewed patient's chart, labs, and imaging and discussed this case with my supervising KYE Moncada Attestation:   Antwan #1: I performed the above scribed  service and the documentation accurately describes the services I performed. I attest to the accuracy of the note.    Attending Attestation:     Physician Attestation Statement for NP/PA:   I have conducted a face to face encounter with this patient in addition to the NP/PA, due to Medical Complexity    Other NP/PA Attestation Additions:    History of Present Illness: History is as noted by the PA.  Patient did admit to me that she had taken a tablet of metronidazole which she had left over from a similar episode in the past.   Physical Exam: Skin is warm and dry no pallor no cyanosis HEENT sclerae clear PERRLA EOMI.  Pharynx is clear with moist mucous membranes.  Neck is supple without increased nodes.  Chest lungs are clear.  Heart regular rhythm no murmur or gallop.  Abdomen soft mild left lower quadrant tenderness no rebound no guarding and active bowel sounds.  Extremities nontender pulses are 2+ neuro patient is alert and conscious during acute focal deficits   Medical Decision Making: Agree with medical decision making of the PA.  Discussed with the patient likely diagnosis.  She is comfortable with the plan and disposition as per Ms. Mayen.       Physician Attestation for Scribe:  Physician Attestation Statement for Scribe #1: I, ARTURO Coffman, reviewed documentation, as scribed by Eulalia Ramirez in my presence, and it is both accurate and complete.                 ED Course     Clinical Impression:   The encounter diagnosis was Colitis.    Disposition:   Disposition: Discharged  Condition: Stable                        Monserrat Mayen PA-C  07/11/17 0130       Main Dempsey MD  07/11/17 6507

## 2017-07-19 ENCOUNTER — OFFICE VISIT (OUTPATIENT)
Dept: INTERNAL MEDICINE | Facility: CLINIC | Age: 72
End: 2017-07-19
Payer: MEDICARE

## 2017-07-19 VITALS
WEIGHT: 121.94 LBS | DIASTOLIC BLOOD PRESSURE: 62 MMHG | BODY MASS INDEX: 23.02 KG/M2 | TEMPERATURE: 99 F | HEART RATE: 75 BPM | HEIGHT: 61 IN | SYSTOLIC BLOOD PRESSURE: 131 MMHG | RESPIRATION RATE: 18 BRPM

## 2017-07-19 DIAGNOSIS — I10 ESSENTIAL HYPERTENSION: Primary | Chronic | ICD-10-CM

## 2017-07-19 PROCEDURE — 99213 OFFICE O/P EST LOW 20 MIN: CPT | Mod: S$GLB,,, | Performed by: INTERNAL MEDICINE

## 2017-07-19 PROCEDURE — 1126F AMNT PAIN NOTED NONE PRSNT: CPT | Mod: S$GLB,,, | Performed by: INTERNAL MEDICINE

## 2017-07-19 PROCEDURE — 99499 UNLISTED E&M SERVICE: CPT | Mod: S$PBB,,, | Performed by: INTERNAL MEDICINE

## 2017-07-19 PROCEDURE — 1159F MED LIST DOCD IN RCRD: CPT | Mod: S$GLB,,, | Performed by: INTERNAL MEDICINE

## 2017-07-19 PROCEDURE — 99999 PR PBB SHADOW E&M-EST. PATIENT-LVL III: CPT | Mod: PBBFAC,,, | Performed by: INTERNAL MEDICINE

## 2017-07-19 PROCEDURE — 1157F ADVNC CARE PLAN IN RCRD: CPT | Mod: S$GLB,,, | Performed by: INTERNAL MEDICINE

## 2017-07-21 LAB — PATHOLOGIST INTERPRETATION AB/XM: NORMAL

## 2017-08-17 DIAGNOSIS — J43.1 PANLOBULAR EMPHYSEMA: ICD-10-CM

## 2017-08-17 RX ORDER — ALBUTEROL SULFATE 90 UG/1
2 AEROSOL, METERED RESPIRATORY (INHALATION) EVERY 6 HOURS PRN
Qty: 1 INHALER | Refills: 11 | Status: SHIPPED | OUTPATIENT
Start: 2017-08-17 | End: 2019-08-01 | Stop reason: SDUPTHER

## 2017-08-22 DIAGNOSIS — F41.9 ANXIETY: ICD-10-CM

## 2017-08-22 DIAGNOSIS — J44.9 CHRONIC OBSTRUCTIVE PULMONARY DISEASE, UNSPECIFIED COPD TYPE: ICD-10-CM

## 2017-08-22 RX ORDER — ALBUTEROL SULFATE 0.83 MG/ML
2.5 SOLUTION RESPIRATORY (INHALATION) EVERY 4 HOURS PRN
Qty: 120 EACH | Refills: 11 | Status: SHIPPED | OUTPATIENT
Start: 2017-08-22 | End: 2019-12-16 | Stop reason: SDUPTHER

## 2017-08-22 RX ORDER — ALPRAZOLAM 0.25 MG/1
0.25 TABLET ORAL 3 TIMES DAILY PRN
Qty: 90 TABLET | Refills: 3 | Status: SHIPPED | OUTPATIENT
Start: 2017-08-22 | End: 2017-08-23 | Stop reason: SDUPTHER

## 2017-08-23 DIAGNOSIS — F41.9 ANXIETY: ICD-10-CM

## 2017-08-23 RX ORDER — ALPRAZOLAM 0.25 MG/1
TABLET ORAL
Qty: 90 TABLET | Refills: 1 | Status: SHIPPED | OUTPATIENT
Start: 2017-08-23 | End: 2018-08-15 | Stop reason: SDUPTHER

## 2017-09-26 ENCOUNTER — OFFICE VISIT (OUTPATIENT)
Dept: PULMONOLOGY | Facility: CLINIC | Age: 72
End: 2017-09-26
Payer: MEDICARE

## 2017-09-26 ENCOUNTER — HOSPITAL ENCOUNTER (OUTPATIENT)
Dept: PULMONOLOGY | Facility: CLINIC | Age: 72
Discharge: HOME | End: 2017-09-26
Payer: MEDICARE

## 2017-09-26 VITALS
SYSTOLIC BLOOD PRESSURE: 136 MMHG | OXYGEN SATURATION: 97 % | HEIGHT: 62 IN | DIASTOLIC BLOOD PRESSURE: 66 MMHG | WEIGHT: 118 LBS | BODY MASS INDEX: 21.71 KG/M2 | HEART RATE: 83 BPM

## 2017-09-26 DIAGNOSIS — J44.9 CHRONIC OBSTRUCTIVE PULMONARY DISEASE, UNSPECIFIED COPD TYPE: Primary | ICD-10-CM

## 2017-09-26 DIAGNOSIS — J44.1 CHRONIC OBSTRUCTIVE PULMONARY DISEASE WITH ACUTE EXACERBATION: ICD-10-CM

## 2017-09-26 LAB
PRE FEV1 FVC: 42
PRE FEV1: 0.47
PRE FVC: 1.13
PREDICTED FEV1 FVC: 79
PREDICTED FEV1: 1.98
PREDICTED FVC: 2.57

## 2017-09-26 PROCEDURE — 99499 UNLISTED E&M SERVICE: CPT | Mod: S$PBB,,, | Performed by: INTERNAL MEDICINE

## 2017-09-26 PROCEDURE — 3008F BODY MASS INDEX DOCD: CPT | Mod: S$GLB,,, | Performed by: INTERNAL MEDICINE

## 2017-09-26 PROCEDURE — 3078F DIAST BP <80 MM HG: CPT | Mod: S$GLB,,, | Performed by: INTERNAL MEDICINE

## 2017-09-26 PROCEDURE — 99214 OFFICE O/P EST MOD 30 MIN: CPT | Mod: 25,S$GLB,, | Performed by: INTERNAL MEDICINE

## 2017-09-26 PROCEDURE — 94010 BREATHING CAPACITY TEST: CPT | Mod: S$GLB,,, | Performed by: INTERNAL MEDICINE

## 2017-09-26 PROCEDURE — 99999 PR PBB SHADOW E&M-EST. PATIENT-LVL III: CPT | Mod: PBBFAC,,, | Performed by: INTERNAL MEDICINE

## 2017-09-26 PROCEDURE — 1126F AMNT PAIN NOTED NONE PRSNT: CPT | Mod: S$GLB,,, | Performed by: INTERNAL MEDICINE

## 2017-09-26 PROCEDURE — 1159F MED LIST DOCD IN RCRD: CPT | Mod: S$GLB,,, | Performed by: INTERNAL MEDICINE

## 2017-09-26 PROCEDURE — 1157F ADVNC CARE PLAN IN RCRD: CPT | Mod: S$GLB,,, | Performed by: INTERNAL MEDICINE

## 2017-09-26 PROCEDURE — 3075F SYST BP GE 130 - 139MM HG: CPT | Mod: S$GLB,,, | Performed by: INTERNAL MEDICINE

## 2017-09-27 ENCOUNTER — TELEPHONE (OUTPATIENT)
Dept: ENDOSCOPY | Facility: HOSPITAL | Age: 72
End: 2017-09-27

## 2017-10-02 DIAGNOSIS — J44.9 CHRONIC OBSTRUCTIVE PULMONARY DISEASE, UNSPECIFIED COPD TYPE: Primary | ICD-10-CM

## 2017-10-10 ENCOUNTER — ANESTHESIA EVENT (OUTPATIENT)
Dept: ENDOSCOPY | Facility: HOSPITAL | Age: 72
End: 2017-10-10
Payer: MEDICARE

## 2017-10-10 ENCOUNTER — HOSPITAL ENCOUNTER (OUTPATIENT)
Facility: HOSPITAL | Age: 72
Discharge: HOME | End: 2017-10-10
Attending: COLON & RECTAL SURGERY | Admitting: COLON & RECTAL SURGERY
Payer: MEDICARE

## 2017-10-10 ENCOUNTER — SURGERY (OUTPATIENT)
Age: 72
End: 2017-10-10

## 2017-10-10 ENCOUNTER — ANESTHESIA (OUTPATIENT)
Dept: ENDOSCOPY | Facility: HOSPITAL | Age: 72
End: 2017-10-10
Payer: MEDICARE

## 2017-10-10 VITALS
DIASTOLIC BLOOD PRESSURE: 66 MMHG | BODY MASS INDEX: 23.16 KG/M2 | SYSTOLIC BLOOD PRESSURE: 130 MMHG | HEIGHT: 60 IN | RESPIRATION RATE: 19 BRPM | OXYGEN SATURATION: 96 % | HEART RATE: 69 BPM | WEIGHT: 118 LBS | TEMPERATURE: 98 F

## 2017-10-10 DIAGNOSIS — Z80.0 FAMILY HISTORY OF COLON CANCER: Primary | ICD-10-CM

## 2017-10-10 DIAGNOSIS — Z86.010 HISTORY OF COLONIC POLYPS: Primary | ICD-10-CM

## 2017-10-10 DIAGNOSIS — Z86.010 HISTORY OF COLONIC POLYPS: ICD-10-CM

## 2017-10-10 PROCEDURE — 37000008 HC ANESTHESIA 1ST 15 MINUTES: Performed by: COLON & RECTAL SURGERY

## 2017-10-10 PROCEDURE — G0105 COLORECTAL SCRN; HI RISK IND: HCPCS | Performed by: COLON & RECTAL SURGERY

## 2017-10-10 PROCEDURE — 37000009 HC ANESTHESIA EA ADD 15 MINS: Performed by: COLON & RECTAL SURGERY

## 2017-10-10 PROCEDURE — 25000003 PHARM REV CODE 250: Performed by: COLON & RECTAL SURGERY

## 2017-10-10 PROCEDURE — 63600175 PHARM REV CODE 636 W HCPCS: Performed by: NURSE ANESTHETIST, CERTIFIED REGISTERED

## 2017-10-10 PROCEDURE — D9220A PRA ANESTHESIA: Mod: 33,CRNA,, | Performed by: NURSE ANESTHETIST, CERTIFIED REGISTERED

## 2017-10-10 PROCEDURE — D9220A PRA ANESTHESIA: Mod: 33,ANES,, | Performed by: ANESTHESIOLOGY

## 2017-10-10 RX ORDER — SODIUM CHLORIDE 9 MG/ML
INJECTION, SOLUTION INTRAVENOUS CONTINUOUS
Status: CANCELLED | OUTPATIENT
Start: 2017-10-10

## 2017-10-10 RX ORDER — SODIUM CHLORIDE 9 MG/ML
INJECTION, SOLUTION INTRAVENOUS CONTINUOUS
Status: DISCONTINUED | OUTPATIENT
Start: 2017-10-10 | End: 2017-10-10 | Stop reason: HOSPADM

## 2017-10-10 RX ORDER — PHENYLEPHRINE HYDROCHLORIDE 10 MG/ML
INJECTION INTRAVENOUS
Status: DISCONTINUED | OUTPATIENT
Start: 2017-10-10 | End: 2017-10-10

## 2017-10-10 RX ORDER — LIDOCAINE HCL/PF 100 MG/5ML
SYRINGE (ML) INTRAVENOUS
Status: DISCONTINUED | OUTPATIENT
Start: 2017-10-10 | End: 2017-10-10

## 2017-10-10 RX ORDER — PROPOFOL 10 MG/ML
VIAL (ML) INTRAVENOUS CONTINUOUS PRN
Status: DISCONTINUED | OUTPATIENT
Start: 2017-10-10 | End: 2017-10-10

## 2017-10-10 RX ORDER — PROPOFOL 10 MG/ML
VIAL (ML) INTRAVENOUS
Status: DISCONTINUED | OUTPATIENT
Start: 2017-10-10 | End: 2017-10-10

## 2017-10-10 RX ADMIN — PROPOFOL 150 MCG/KG/MIN: 10 INJECTION, EMULSION INTRAVENOUS at 11:10

## 2017-10-10 RX ADMIN — PROPOFOL 40 MG: 10 INJECTION, EMULSION INTRAVENOUS at 11:10

## 2017-10-10 RX ADMIN — PHENYLEPHRINE HYDROCHLORIDE 100 MCG: 10 INJECTION INTRAVENOUS at 11:10

## 2017-10-10 RX ADMIN — SODIUM CHLORIDE: 0.9 INJECTION, SOLUTION INTRAVENOUS at 10:10

## 2017-10-10 RX ADMIN — LIDOCAINE HYDROCHLORIDE 50 MG: 20 INJECTION, SOLUTION INTRAVENOUS at 11:10

## 2017-10-10 NOTE — ANESTHESIA PREPROCEDURE EVALUATION
10/10/2017  Leyla Mari is a 72 y.o., female with severe COPD here for colonoscopy    Anesthesia Evaluation    I have reviewed the Patient Summary Reports.    I have reviewed the Nursing Notes.   I have reviewed the Medications.     Review of Systems  Anesthesia Hx:  No problems with previous Anesthesia    Cardiovascular:   Hypertension    Pulmonary:   COPD (on 2L/min NC PRN at home), severe    Renal/:  Renal/ Normal     Hepatic/GI:  Hepatic/GI Normal    Neurological:  Neurology Normal    Endocrine:  Endocrine Normal        Physical Exam  General:  Well nourished    Airway/Jaw/Neck:  Airway Findings: Mouth Opening: Normal Tongue: Normal  General Airway Assessment: Adult  Mallampati: II  TM Distance: Normal, at least 6 cm       Chest/Lungs:  Chest/Lungs Findings: Decreased Breath Sounds Bilateral, Clear to auscultation     Heart/Vascular:  Heart Findings: Rate: Normal  Rhythm: Regular Rhythm             Anesthesia Plan  Type of Anesthesia, risks & benefits discussed:  Anesthesia Type:  general, MAC  Patient's Preference:   Intra-op Monitoring Plan: standard ASA monitors  Intra-op Monitoring Plan Comments:   Post Op Pain Control Plan:   Post Op Pain Control Plan Comments:   Induction:   IV  Beta Blocker:  Patient is not currently on a Beta-Blocker (No further documentation required).       Informed Consent: Patient understands risks and agrees with Anesthesia plan.  Questions answered. Anesthesia consent signed with patient.  ASA Score: 3     Day of Surgery Review of History & Physical:        Anesthesia Plan Notes: No recent COPD exacerbations, no wheezing on exam today, no recent albuterol rescue inhaler use         Ready For Surgery From Anesthesia Perspective.     Patient Active Problem List   Diagnosis    Cellulitis    History of Clostridium difficile infection    Chronic obstructive pulmonary  disease    Hypertension    Hyperlipidemia    Colon polyps    Lung nodules    Morbilliform rash    Urticaria due to drug allergy    Cellulitis of leg, left    Unspecified disorder of skin and subcutaneous tissue    COPD with acute exacerbation    Sinus tachycardia    Screening    Centrilobular emphysema    Panlobular emphysema    CAP (community acquired pneumonia)    Acute respiratory failure with hypoxia    Bilateral edema of lower extremity    Venous stasis    History of colonic polyps

## 2017-10-10 NOTE — H&P
Endoscopy H&P    Procedure : Colonoscopy      apersonal history of colon polyps      Past Medical History:   Diagnosis Date    Actinic keratosis     Allergy     Arthritis     Cataract     Cellulitis of ankle     Colon polyps     COPD (chronic obstructive pulmonary disease)     Family history of colon cancer     Hyperlipidemia     Hypertension     Lung nodules     Sinus tachycardia              Review of Systems -ROS:  GENERAL: No fever, chills, fatigability or weight loss.  CHEST: Denies TRAVIS, cyanosis, wheezing, cough and sputum production.  CARDIOVASCULAR: Denies chest pain, PND, orthopnea or reduced exercise tolerance.   Musculoskeletal ROS: negative for - gait disturbance or joint pain  Neurological ROS: negative for - confusion or memory loss        Physical Exam:  General: well developed, well nourished, no distress  Head: normocephalic  Neck: supple, symmetrical, trachea midline  Lungs:  clear to auscultation bilaterally and normal respiratory effort  Heart: regular rate and rhythm, S1, S2 normal, no murmur, rub or gallop and regular rate and rhythm  Abdomen: soft, non-tender non-distented; bowel sounds normal; no masses,  no organomegaly  Extremities: no cyanosis or edema, or clubbing       Moderate Sedation (choice): Mallampati Score 1    ASA : II    IMP:  personal history of colon polyps and family hx CT cancer    Plan: Colonoscopy with Moderate sedation.  I have explained the procedure including indications, alternatives, expected outcomes and potential complications. The patient appears to understand and gives informed consent. The patient is medically ready for surgery.

## 2017-10-10 NOTE — TRANSFER OF CARE
Anesthesia Transfer of Care Note    Patient: Leyla Mari    Procedure(s) Performed: Procedure(s) (LRB):  COLONOSCOPY (N/A)    Patient location: GI    Anesthesia Type: general    Transport from OR: Transported from OR on 6-10 L/min O2 by face mask with adequate spontaneous ventilation    Post pain: adequate analgesia    Post assessment: no apparent anesthetic complications and tolerated procedure well    Post vital signs: stable    Level of consciousness: responds to stimulation and sedated    Nausea/Vomiting: no nausea/vomiting    Complications: none    Transfer of care protocol was followed      Last vitals:   Visit Vitals  BP (!) 98/50 (BP Location: Left arm, Patient Position: Lying)   Pulse 63   Temp 36.6 °C (97.9 °F) (Temporal)   Resp 16   Ht 5' (1.524 m)   Wt 53.5 kg (118 lb)   SpO2 99%   Breastfeeding? No   BMI 23.05 kg/m²

## 2017-10-10 NOTE — ANESTHESIA POSTPROCEDURE EVALUATION
Anesthesia Post Evaluation    Patient: Leyla Mari    Procedure(s) Performed: Procedure(s) (LRB):  COLONOSCOPY (N/A)    Final Anesthesia Type: general  Patient location during evaluation: PACU  Patient participation: Yes- Able to Participate  Level of consciousness: responds to stimulation and awake  Post-procedure vital signs: reviewed and stable  Pain management: adequate  Airway patency: patent  PONV status at discharge: No PONV  Anesthetic complications: no      Cardiovascular status: hemodynamically stable  Respiratory status: unassisted  Hydration status: euvolemic  Follow-up not needed.        Visit Vitals  BP (!) 98/50 (BP Location: Left arm, Patient Position: Lying)   Pulse 63   Temp 36.6 °C (97.9 °F) (Temporal)   Resp 16   Ht 5' (1.524 m)   Wt 53.5 kg (118 lb)   SpO2 99%   Breastfeeding? No   BMI 23.05 kg/m²       Pain/Gwen Score: Pain Assessment Performed: Yes (10/10/2017 11:35 AM)  Presence of Pain: non-verbal indicators absent (10/10/2017 11:35 AM)  Gwen Score: 8 (10/10/2017 11:35 AM)

## 2017-10-10 NOTE — PATIENT INSTRUCTIONS
Discharge Summary/Instructions after an Endoscopic Procedure  Patient Name: Leyla Mari  Patient MRN: [id number]  Patient YOB: 1945  Tuesday, October 10, 2017  Omid Lino MD  RESTRICTIONS:  During your procedure today, you received medications for sedation.  These   medications may affect your judgment, balance and coordination.  Therefore,   for 24 hours, you have the following restrictions:   - DO NOT drive a car, operate machinery, make legal/financial decisions,   sign important papers or drink alcohol.    ACTIVITY:  The following day: return to full activity including work, except no heavy   lifting, straining or running for 3 days if polyps were removed.  DIET:  Eat and drink normally unless instructed otherwise.  TREATMENT FOR COMMON SIDE EFFECTS:  - Mild abdominal pain, belching, bloating or excessive gas: rest, eat   lightly and use a heating pad.  - Sore Throat: treat with throat lozenges and/or gargle with warm salt   water.  SYMPTOMS TO WATCH FOR AND REPORT TO YOUR PHYSICIAN:  1. Abdominal pain or bloating, other than gas cramps.  2. Chest pain.  3. Back pain.  4. Chills or fever occurring within 24 hours after the procedure.  5. Rectal bleeding, which would show as bright red, maroon, or black stools.   (A tablespoon of blood from the rectum is not serious, especially if   hemorrhoids are present.)  6. Vomiting.  7. Weakness or dizziness.  8. Because air was used during the procedure, expelling large amounts of air   from your rectum or belching is normal.  9. If a bowel prep was taken, you may not have a bowel movement for 1-3   days.  This is normal.  GO DIRECTLY TO THE EMERGENCY ROOM IF YOU HAVE ANY OF THE FOLLOWING:   Difficulty breathing   Chills and/or fever over 101 F   Persistent vomiting and/or vomiting blood   Severe abdominal pain   Severe chest pain   Black, tarry stools   Bleeding- more than one tablespoon  Your doctor recommends these additional instructions:  If any biopsies  were taken, your doctors clinic will call you in 1 to 2   weeks with any results.  You are being discharged to home.   Your physician has recommended a repeat colonoscopy in five years for   surveillance.  For questions, problems or results please call your physician - Omid Lino MD at Work:  [phone].  [facility], EMERGENCY ROOM PHONE NUMBER: [phone]  IF A COMPLICATION OR EMERGENCY SITUATION ARISES AND YOU ARE UNABLE TO REACH   YOUR PHYSICIAN - GO DIRECTLY TO THE EMERGENCY ROOM.  Omid Lino MD  10/10/2017 11:30:10 AM  This report has been verified and signed electronically.

## 2017-10-17 ENCOUNTER — TELEPHONE (OUTPATIENT)
Dept: ENDOSCOPY | Facility: HOSPITAL | Age: 72
End: 2017-10-17

## 2017-10-17 RX ORDER — PRAVASTATIN SODIUM 20 MG/1
TABLET ORAL
Qty: 90 TABLET | Refills: 2 | Status: SHIPPED | OUTPATIENT
Start: 2017-10-17 | End: 2018-07-26 | Stop reason: SDUPTHER

## 2017-11-09 ENCOUNTER — TELEPHONE (OUTPATIENT)
Dept: INTERNAL MEDICINE | Facility: CLINIC | Age: 72
End: 2017-11-09

## 2017-11-09 ENCOUNTER — PATIENT MESSAGE (OUTPATIENT)
Dept: INTERNAL MEDICINE | Facility: CLINIC | Age: 72
End: 2017-11-09

## 2017-11-09 DIAGNOSIS — Z12.39 SCREENING FOR BREAST CANCER: Primary | ICD-10-CM

## 2017-11-17 ENCOUNTER — HOSPITAL ENCOUNTER (OUTPATIENT)
Dept: RADIOLOGY | Facility: HOSPITAL | Age: 72
Discharge: HOME | End: 2017-11-17
Attending: INTERNAL MEDICINE
Payer: MEDICARE

## 2017-11-17 VITALS — HEIGHT: 60 IN | BODY MASS INDEX: 23.16 KG/M2 | WEIGHT: 118 LBS

## 2017-11-17 DIAGNOSIS — Z12.39 SCREENING FOR BREAST CANCER: ICD-10-CM

## 2017-11-17 PROCEDURE — 77067 SCR MAMMO BI INCL CAD: CPT | Mod: 26,,, | Performed by: RADIOLOGY

## 2017-11-17 PROCEDURE — 77067 SCR MAMMO BI INCL CAD: CPT | Mod: TC

## 2017-11-20 ENCOUNTER — OFFICE VISIT (OUTPATIENT)
Dept: INTERNAL MEDICINE | Facility: CLINIC | Age: 72
End: 2017-11-20
Payer: MEDICARE

## 2017-11-20 VITALS
SYSTOLIC BLOOD PRESSURE: 126 MMHG | BODY MASS INDEX: 23.06 KG/M2 | DIASTOLIC BLOOD PRESSURE: 64 MMHG | HEART RATE: 77 BPM | TEMPERATURE: 98 F | RESPIRATION RATE: 20 BRPM | HEIGHT: 61 IN | WEIGHT: 122.13 LBS

## 2017-11-20 DIAGNOSIS — I10 ESSENTIAL HYPERTENSION: Primary | ICD-10-CM

## 2017-11-20 DIAGNOSIS — J44.9 CHRONIC OBSTRUCTIVE PULMONARY DISEASE, UNSPECIFIED COPD TYPE: ICD-10-CM

## 2017-11-20 PROCEDURE — 99499 UNLISTED E&M SERVICE: CPT | Mod: S$PBB,,, | Performed by: INTERNAL MEDICINE

## 2017-11-20 PROCEDURE — 99999 PR PBB SHADOW E&M-EST. PATIENT-LVL III: CPT | Mod: PBBFAC,,, | Performed by: INTERNAL MEDICINE

## 2017-11-20 PROCEDURE — 99213 OFFICE O/P EST LOW 20 MIN: CPT | Mod: S$GLB,,, | Performed by: INTERNAL MEDICINE

## 2017-11-20 RX ORDER — CHOLECALCIFEROL (VITAMIN D3) 25 MCG
1000 TABLET ORAL DAILY
Status: ON HOLD | COMMUNITY
End: 2020-03-12 | Stop reason: HOSPADM

## 2017-11-20 NOTE — PROGRESS NOTES
CC: followup of hypertension  HPI:  The patient is a 72 y.o. year old female who presents to the office for followup of hypertension.  The patient denies any chest pain, shortness of breath, headache, blurred vision, excessive fatigue, nausea or vomiting.      PAST MEDICAL HISTORY:  Past Medical History:   Diagnosis Date    Actinic keratosis     Allergy     Arthritis     Cataract     Cellulitis of ankle     Colon polyps     COPD (chronic obstructive pulmonary disease)     Family history of colon cancer     Hyperlipidemia     Hypertension     Lung nodules     Sinus tachycardia        SURGICAL HISTORY:  Past Surgical History:   Procedure Laterality Date    APPENDECTOMY      CATARACT EXTRACTION Bilateral      SECTION      x2    COLONOSCOPY N/A 10/10/2017    Procedure: COLONOSCOPY;  Surgeon: Omid Lino MD;  Location: [address]);  Service: Endoscopy;  Laterality: N/A;  pt unable to be checked in with previous order    EYE SURGERY      TONSILLECTOMY         MEDS:  Medcard reviewed and updated    ALLERGIES: Allergy Card reviewed and updated    SOCIAL HISTORY:   The patient is a nonsmoker.    PE:   APPEARANCE: Well nourished, well developed, in no acute distress.    CHEST: Lungs clear to auscultation with unlabored respirations.  CARDIOVASCULAR: Normal S1, S2. No murmurs. No carotid bruits. No pedal edema.  ABDOMEN: Bowel sounds normal. Not distended. Soft. No tenderness or masses.   PSYCHIATRIC: The patient is oriented to person, place, and time and has a pleasant affect.        ASSESSMENT/PLAN:  Leyla was seen today for follow-up.    Diagnoses and all orders for this visit:    Essential hypertension  -     Blood pressure is controlled, continue current medications    Chronic obstructive pulmonary disease, unspecified COPD type  -     Continue supplemental oxygen      Answers for HPI/ROS submitted by the patient on 2017   activity change: No  unexpected weight change:  No  neck pain: No  hearing loss: No  rhinorrhea: No  trouble swallowing: No  eye discharge: No  visual disturbance: No  chest tightness: Yes  wheezing: Yes  chest pain: No  palpitations: Yes  blood in stool: No  constipation: No  vomiting: No  diarrhea: No  polydipsia: No  polyuria: No  difficulty urinating: No  hematuria: No  menstrual problem: No  dysuria: No  joint swelling: No  arthralgias: No  headaches: No  weakness: No  confusion: No  dysphoric mood: No

## 2017-12-21 RX ORDER — POTASSIUM CHLORIDE 20 MEQ/1
TABLET, EXTENDED RELEASE ORAL
Qty: 90 TABLET | Refills: 3 | Status: SHIPPED | OUTPATIENT
Start: 2017-12-21 | End: 2018-12-19 | Stop reason: SDUPTHER

## 2018-03-14 DIAGNOSIS — J43.2 CENTRILOBULAR EMPHYSEMA: ICD-10-CM

## 2018-03-14 RX ORDER — BUDESONIDE AND FORMOTEROL FUMARATE DIHYDRATE 160; 4.5 UG/1; UG/1
AEROSOL RESPIRATORY (INHALATION)
Qty: 10.2 INHALER | Refills: 6 | Status: SHIPPED | OUTPATIENT
Start: 2018-03-14 | End: 2018-09-24 | Stop reason: SDUPTHER

## 2018-04-02 DIAGNOSIS — I10 ESSENTIAL HYPERTENSION: ICD-10-CM

## 2018-04-02 RX ORDER — DILTIAZEM HYDROCHLORIDE 180 MG/1
180 CAPSULE, COATED, EXTENDED RELEASE ORAL DAILY
Qty: 90 CAPSULE | Refills: 3 | Status: CANCELLED | OUTPATIENT
Start: 2018-04-02

## 2018-04-02 RX ORDER — DILTIAZEM HYDROCHLORIDE 180 MG/1
180 CAPSULE, COATED, EXTENDED RELEASE ORAL DAILY
Qty: 90 CAPSULE | Refills: 3 | Status: SHIPPED | OUTPATIENT
Start: 2018-04-02 | End: 2018-04-04 | Stop reason: SDUPTHER

## 2018-04-02 NOTE — TELEPHONE ENCOUNTER
----- Message from Jaleesa S Jono sent at 4/2/2018 10:16 AM CDT -----  Contact: Pt [phone]  She's calling because she's trying to get a script filled by the name of Cardizem. Her cardiologist doctor will not filled the script because she haven't seen him in awhile. Patient said she know longer have a cardiology problem. She would like to speak with you to see if you would write her up a script. I told the patient that she'll have to be seen before having a script filled, she said the appointment is too far out and she need her medication as soon as possible.

## 2018-04-02 NOTE — TELEPHONE ENCOUNTER
Medication was refilled by cardiologist.  However, I do not have a problem refilling this medication in the future.

## 2018-04-04 DIAGNOSIS — I10 ESSENTIAL HYPERTENSION: ICD-10-CM

## 2018-04-04 RX ORDER — DILTIAZEM HYDROCHLORIDE 180 MG/1
180 CAPSULE, COATED, EXTENDED RELEASE ORAL DAILY
Qty: 90 CAPSULE | Refills: 3 | Status: SHIPPED | OUTPATIENT
Start: 2018-04-04 | End: 2019-06-11 | Stop reason: SDUPTHER

## 2018-04-12 ENCOUNTER — OFFICE VISIT (OUTPATIENT)
Dept: PULMONOLOGY | Facility: CLINIC | Age: 73
End: 2018-04-12
Payer: MEDICARE

## 2018-04-12 ENCOUNTER — HOSPITAL ENCOUNTER (OUTPATIENT)
Dept: PULMONOLOGY | Facility: CLINIC | Age: 73
Discharge: HOME | End: 2018-04-12
Payer: MEDICARE

## 2018-04-12 VITALS
SYSTOLIC BLOOD PRESSURE: 132 MMHG | OXYGEN SATURATION: 93 % | HEIGHT: 59 IN | WEIGHT: 123 LBS | HEART RATE: 90 BPM | BODY MASS INDEX: 24.8 KG/M2 | DIASTOLIC BLOOD PRESSURE: 74 MMHG

## 2018-04-12 DIAGNOSIS — J96.11 CHRONIC RESPIRATORY FAILURE WITH HYPOXIA: ICD-10-CM

## 2018-04-12 DIAGNOSIS — J44.9 CHRONIC OBSTRUCTIVE PULMONARY DISEASE, UNSPECIFIED COPD TYPE: ICD-10-CM

## 2018-04-12 DIAGNOSIS — J43.2 CENTRILOBULAR EMPHYSEMA: Primary | ICD-10-CM

## 2018-04-12 LAB
PRE FEV1 FVC: 43
PRE FEV1: 0.47
PRE FVC: 1.09
PREDICTED FEV1 FVC: 80
PREDICTED FEV1: 1.75
PREDICTED FVC: 2.24

## 2018-04-12 PROCEDURE — 99214 OFFICE O/P EST MOD 30 MIN: CPT | Mod: S$GLB,,, | Performed by: INTERNAL MEDICINE

## 2018-04-12 PROCEDURE — 99499 UNLISTED E&M SERVICE: CPT | Mod: S$GLB,,, | Performed by: INTERNAL MEDICINE

## 2018-04-12 PROCEDURE — 99999 PR PBB SHADOW E&M-EST. PATIENT-LVL III: CPT | Mod: PBBFAC,,, | Performed by: INTERNAL MEDICINE

## 2018-04-12 PROCEDURE — 3075F SYST BP GE 130 - 139MM HG: CPT | Mod: CPTII,S$GLB,, | Performed by: INTERNAL MEDICINE

## 2018-04-12 PROCEDURE — 3078F DIAST BP <80 MM HG: CPT | Mod: CPTII,S$GLB,, | Performed by: INTERNAL MEDICINE

## 2018-04-12 PROCEDURE — 94010 BREATHING CAPACITY TEST: CPT | Mod: S$GLB,,, | Performed by: INTERNAL MEDICINE

## 2018-04-14 NOTE — PROGRESS NOTES
Subjective:       Patient ID: Leyla Mari is a 72 y.o. female.    Chief Complaint: COPD; Shortness of Breath; and Cough    HPI 73 yo female with severe COPD who has done very well over the past six months ,  She has had no pulmonary issues and not had to call oce over that time. Feels well today,  Sa02: 92% of  2 liters   No further bouts of cellulitis      No flowsheet data found.]  Review of Systems   Constitutional: Negative.    HENT: Negative.    Eyes: Negative.    Respiratory: Negative.  Positive for dyspnea on extertion.         Stable COPD   Cardiovascular: Negative.    Genitourinary: Negative.    Musculoskeletal: Negative.    Skin: Negative.         Hx of repeated bouts of cellulitis   Gastrointestinal: Negative.    Neurological: Negative.    Psychiatric/Behavioral: Negative.        Objective:      Physical Exam   Constitutional: She is oriented to person, place, and time. She appears well-developed and well-nourished. No distress.   HENT:   Head: Normocephalic and atraumatic.   Right Ear: External ear normal.   Left Ear: External ear normal.   Nose: Nose normal.   Mouth/Throat: Oropharynx is clear and moist.   Eyes: Conjunctivae and EOM are normal. Pupils are equal, round, and reactive to light.   Neck: Normal range of motion. Neck supple. No JVD present. No thyromegaly present.   Cardiovascular: Normal rate, regular rhythm, normal heart sounds and intact distal pulses.  Exam reveals no gallop.    No murmur heard.  Pulmonary/Chest: Breath sounds normal. No stridor. No respiratory distress. She has no wheezes. She has no rales. She exhibits no tenderness.   Markedly decreased breath sounds   Abdominal: Soft. Bowel sounds are normal. She exhibits no distension and no mass. There is no tenderness. There is no rebound and no guarding.   Musculoskeletal: Normal range of motion. She exhibits no edema.   Lymphadenopathy:     She has no cervical adenopathy.   Neurological: She is alert and oriented to person,  place, and time. She has normal reflexes. She displays normal reflexes. No cranial nerve deficit.   Skin: Skin is warm and dry. No rash noted.   Psychiatric: She has a normal mood and affect. Her behavior is normal. Judgment and thought content normal.   Nursing note and vitals reviewed.          Assessment:       No diagnosis found.    Outpatient Encounter Prescriptions as of 4/12/2018   Medication Sig Dispense Refill    aclidinium bromide (TUDORZA PRESSAIR) 400 mcg/actuation AePB Inhale 1 puff into the lungs 2 (two) times daily. Controller 1 each 11    albuterol (PROVENTIL) 2.5 mg /3 mL (0.083 %) nebulizer solution Take 3 mLs (2.5 mg total) by nebulization every 4 (four) hours as needed for Wheezing or Shortness of Breath. 120 each 11    albuterol 90 mcg/actuation inhaler Inhale 2 puffs into the lungs every 6 (six) hours as needed for Wheezing (ventolin hfa per pts insurance). Rescue 1 Inhaler 11    alprazolam (XANAX) 0.25 MG tablet TAKE 1 TABLET BY MOUTH 3 TIMES A DAY AS NEEDED. 90 tablet 1    aspirin (ECOTRIN) 81 MG EC tablet Take 81 mg by mouth once daily.        diltiaZEM (CARDIZEM CD) 180 MG 24 hr capsule Take 1 capsule (180 mg total) by mouth once daily. 90 capsule 3    FLUZONE HIGH-DOSE 2017-18, PF, 180 mcg/0.5 mL vaccine TO BE ADMINISTERED BY PHARMACIST FOR IMMUNIZATION  0    furosemide (LASIX) 20 MG tablet Take 1 tablet (20 mg total) by mouth once daily. 3 tablet 0    hydrochlorothiazide (HYDRODIURIL) 25 MG tablet TAKE 1 TABLET (25 MG TOTAL) BY MOUTH ONCE DAILY. 90 tablet 3    potassium chloride SA (K-DUR,KLOR-CON) 20 MEQ tablet TAKE ONE TABLET BY MOUTH EVERY DAY 90 tablet 3    pravastatin (PRAVACHOL) 20 MG tablet TAKE 1 TABLET BY MOUTH EVERY DAY 90 tablet 2    SYMBICORT 160-4.5 mcg/actuation HFAA INHALE 2 PUFFS INTO THE LUNGS EVERY 12 HOURS 10.2 Inhaler 6    vitamin D 1000 units Tab Take 1,000 Units by mouth once daily.       No facility-administered encounter medications on file as of  4/12/2018.      No orders of the defined types were placed in this encounter.    Plan:          Stable pulmonary emphysema with compensated respiratory failure  Today Fev-1: 0.47  Liters:43%   March, 2017: Fev-1:0.38 liters  On Symbicort and Turdorza  Dec 27, 2016: Fev-1: 0.48 liters. !!

## 2018-04-16 ENCOUNTER — OFFICE VISIT (OUTPATIENT)
Dept: OPTOMETRY | Facility: CLINIC | Age: 73
End: 2018-04-16
Payer: MEDICARE

## 2018-04-16 DIAGNOSIS — H52.4 BILATERAL PRESBYOPIA: ICD-10-CM

## 2018-04-16 DIAGNOSIS — H04.123 BILATERAL DRY EYES: ICD-10-CM

## 2018-04-16 DIAGNOSIS — I10 ESSENTIAL HYPERTENSION: Primary | Chronic | ICD-10-CM

## 2018-04-16 PROCEDURE — 99999 PR PBB SHADOW E&M-EST. PATIENT-LVL II: CPT | Mod: PBBFAC,,, | Performed by: OPTOMETRIST

## 2018-04-16 PROCEDURE — 92014 COMPRE OPH EXAM EST PT 1/>: CPT | Mod: S$GLB,,, | Performed by: OPTOMETRIST

## 2018-04-16 RX ORDER — HYDROCHLOROTHIAZIDE 25 MG/1
TABLET ORAL
Qty: 90 TABLET | Refills: 3 | Status: SHIPPED | OUTPATIENT
Start: 2018-04-16 | End: 2019-04-03 | Stop reason: SDUPTHER

## 2018-04-16 NOTE — PROGRESS NOTES
HPI     Only uses OTC drops as needed for dryness  Does give relief,         Last edited by Raul Esquivel, OD on 4/16/2018 11:11 AM. (History)            Assessment /Plan     For exam results, see Encounter Report.    Essential hypertension    Bilateral dry eyes    Bilateral presbyopia      1. No HTN ret. Monitor condition. Patient to report any changes. RTC 1 year recheck.  2. Recommend artificial tears. 1 drop as needed per day. Chronicity of disease and treatment discussed.  3. Cont with OTC readers.

## 2018-05-21 DIAGNOSIS — J44.9 CHRONIC OBSTRUCTIVE PULMONARY DISEASE, UNSPECIFIED COPD TYPE: Primary | ICD-10-CM

## 2018-06-21 ENCOUNTER — LAB VISIT (OUTPATIENT)
Dept: LAB | Facility: HOSPITAL | Age: 73
End: 2018-06-21
Attending: INTERNAL MEDICINE
Payer: MEDICARE

## 2018-06-21 ENCOUNTER — OFFICE VISIT (OUTPATIENT)
Dept: INTERNAL MEDICINE | Facility: CLINIC | Age: 73
End: 2018-06-21
Payer: MEDICARE

## 2018-06-21 VITALS
HEIGHT: 59 IN | SYSTOLIC BLOOD PRESSURE: 140 MMHG | BODY MASS INDEX: 25.06 KG/M2 | DIASTOLIC BLOOD PRESSURE: 60 MMHG | HEART RATE: 84 BPM | TEMPERATURE: 99 F | WEIGHT: 124.31 LBS

## 2018-06-21 DIAGNOSIS — R73.01 IMPAIRED FASTING GLUCOSE: ICD-10-CM

## 2018-06-21 DIAGNOSIS — E55.9 VITAMIN D DEFICIENCY: ICD-10-CM

## 2018-06-21 DIAGNOSIS — I10 ESSENTIAL HYPERTENSION: ICD-10-CM

## 2018-06-21 DIAGNOSIS — I10 ESSENTIAL HYPERTENSION: Primary | ICD-10-CM

## 2018-06-21 LAB
25(OH)D3+25(OH)D2 SERPL-MCNC: 48 NG/ML
ALBUMIN SERPL BCP-MCNC: 4.4 G/DL
ALP SERPL-CCNC: 84 U/L
ALT SERPL W/O P-5'-P-CCNC: 15 U/L
ANION GAP SERPL CALC-SCNC: 12 MMOL/L
AST SERPL-CCNC: 22 U/L
BASOPHILS # BLD AUTO: 0.03 K/UL
BASOPHILS NFR BLD: 0.4 %
BILIRUB SERPL-MCNC: 0.5 MG/DL
BUN SERPL-MCNC: 18 MG/DL
CALCIUM SERPL-MCNC: 10.4 MG/DL
CHLORIDE SERPL-SCNC: 98 MMOL/L
CO2 SERPL-SCNC: 27 MMOL/L
CREAT SERPL-MCNC: 0.7 MG/DL
DIFFERENTIAL METHOD: ABNORMAL
EOSINOPHIL # BLD AUTO: 0.1 K/UL
EOSINOPHIL NFR BLD: 1.3 %
ERYTHROCYTE [DISTWIDTH] IN BLOOD BY AUTOMATED COUNT: 13.2 %
EST. GFR  (AFRICAN AMERICAN): >60 ML/MIN/1.73 M^2
EST. GFR  (NON AFRICAN AMERICAN): >60 ML/MIN/1.73 M^2
ESTIMATED AVG GLUCOSE: 111 MG/DL
GLUCOSE SERPL-MCNC: 93 MG/DL
HBA1C MFR BLD HPLC: 5.5 %
HCT VFR BLD AUTO: 44.6 %
HGB BLD-MCNC: 15.1 G/DL
IMM GRANULOCYTES # BLD AUTO: 0.02 K/UL
IMM GRANULOCYTES NFR BLD AUTO: 0.3 %
LYMPHOCYTES # BLD AUTO: 2 K/UL
LYMPHOCYTES NFR BLD: 27.7 %
MCH RBC QN AUTO: 32.1 PG
MCHC RBC AUTO-ENTMCNC: 33.9 G/DL
MCV RBC AUTO: 95 FL
MONOCYTES # BLD AUTO: 0.6 K/UL
MONOCYTES NFR BLD: 8.3 %
NEUTROPHILS # BLD AUTO: 4.4 K/UL
NEUTROPHILS NFR BLD: 62 %
NRBC BLD-RTO: 0 /100 WBC
PLATELET # BLD AUTO: 233 K/UL
PMV BLD AUTO: 10.4 FL
POTASSIUM SERPL-SCNC: 4.2 MMOL/L
PROT SERPL-MCNC: 7.2 G/DL
RBC # BLD AUTO: 4.7 M/UL
SODIUM SERPL-SCNC: 137 MMOL/L
TSH SERPL DL<=0.005 MIU/L-ACNC: 1.34 UIU/ML
WBC # BLD AUTO: 7.1 K/UL

## 2018-06-21 PROCEDURE — 99213 OFFICE O/P EST LOW 20 MIN: CPT | Mod: S$GLB,,, | Performed by: INTERNAL MEDICINE

## 2018-06-21 PROCEDURE — 80053 COMPREHEN METABOLIC PANEL: CPT

## 2018-06-21 PROCEDURE — 3078F DIAST BP <80 MM HG: CPT | Mod: CPTII,S$GLB,, | Performed by: INTERNAL MEDICINE

## 2018-06-21 PROCEDURE — 82306 VITAMIN D 25 HYDROXY: CPT

## 2018-06-21 PROCEDURE — 83036 HEMOGLOBIN GLYCOSYLATED A1C: CPT

## 2018-06-21 PROCEDURE — 36415 COLL VENOUS BLD VENIPUNCTURE: CPT | Mod: PO

## 2018-06-21 PROCEDURE — 85025 COMPLETE CBC W/AUTO DIFF WBC: CPT

## 2018-06-21 PROCEDURE — 3077F SYST BP >= 140 MM HG: CPT | Mod: CPTII,S$GLB,, | Performed by: INTERNAL MEDICINE

## 2018-06-21 PROCEDURE — 99999 PR PBB SHADOW E&M-EST. PATIENT-LVL III: CPT | Mod: PBBFAC,,, | Performed by: INTERNAL MEDICINE

## 2018-06-21 PROCEDURE — 84443 ASSAY THYROID STIM HORMONE: CPT

## 2018-06-21 NOTE — PROGRESS NOTES
CC: followup of hypertension  HPI:  The patient is a 73 y.o. year old female who presents to the office for followup of hypertension.  The patient denies any chest pain, shortness of breath, headache, blurred vision, excessive fatigue, nausea or vomiting.      PAST MEDICAL HISTORY:  Past Medical History:   Diagnosis Date    Actinic keratosis     Allergy     Arthritis     Cataract     Cellulitis of ankle     Colon polyps     COPD (chronic obstructive pulmonary disease)     Family history of colon cancer     Hyperlipidemia     Hypertension     Lung nodules     Sinus tachycardia        SURGICAL HISTORY:  Past Surgical History:   Procedure Laterality Date    APPENDECTOMY      CATARACT EXTRACTION Bilateral      SECTION      x2    COLONOSCOPY N/A 10/10/2017    Procedure: COLONOSCOPY;  Surgeon: Omid Lino MD;  Location: [address]);  Service: Endoscopy;  Laterality: N/A;  pt unable to be checked in with previous order    EYE SURGERY      TONSILLECTOMY         MEDS:  Medcard reviewed and updated    ALLERGIES: Allergy Card reviewed and updated    SOCIAL HISTORY:   The patient is a nonsmoker.    PE:   APPEARANCE: Well nourished, well developed, in no acute distress.    CHEST: Lungs clear to auscultation with unlabored respirations, on oxygen.  CARDIOVASCULAR: Normal S1, S2. No murmurs. No carotid bruits. No pedal edema.  ABDOMEN: Bowel sounds normal. Not distended. Soft. No tenderness or masses.   PSYCHIATRIC: The patient is oriented to person, place, and time and has a pleasant affect.        ASSESSMENT/PLAN:  Leyla was seen today for follow-up.    Diagnoses and all orders for this visit:    Essential hypertension  -     CBC auto differential; Future  -     Comprehensive metabolic panel; Future  -     TSH; Future    Vitamin D deficiency  -     Vitamin D; Future    Impaired fasting glucose  -     Hemoglobin A1c; Future

## 2018-07-26 RX ORDER — PRAVASTATIN SODIUM 20 MG/1
TABLET ORAL
Qty: 90 TABLET | Refills: 2 | Status: SHIPPED | OUTPATIENT
Start: 2018-07-26 | End: 2019-04-16 | Stop reason: SDUPTHER

## 2018-08-08 DIAGNOSIS — J44.9 CHRONIC OBSTRUCTIVE PULMONARY DISEASE, UNSPECIFIED COPD TYPE: ICD-10-CM

## 2018-08-09 DIAGNOSIS — J44.9 CHRONIC OBSTRUCTIVE PULMONARY DISEASE, UNSPECIFIED COPD TYPE: ICD-10-CM

## 2018-08-15 ENCOUNTER — PATIENT MESSAGE (OUTPATIENT)
Dept: PULMONOLOGY | Facility: CLINIC | Age: 73
End: 2018-08-15

## 2018-08-15 DIAGNOSIS — F41.9 ANXIETY: ICD-10-CM

## 2018-08-15 DIAGNOSIS — J44.9 CHRONIC OBSTRUCTIVE PULMONARY DISEASE, UNSPECIFIED COPD TYPE: ICD-10-CM

## 2018-08-15 RX ORDER — ALPRAZOLAM 0.25 MG/1
0.25 TABLET ORAL 3 TIMES DAILY PRN
Qty: 90 TABLET | Refills: 3 | Status: SHIPPED | OUTPATIENT
Start: 2018-08-15 | End: 2019-08-01 | Stop reason: SDUPTHER

## 2018-09-24 DIAGNOSIS — J43.2 CENTRILOBULAR EMPHYSEMA: ICD-10-CM

## 2018-09-24 RX ORDER — BUDESONIDE AND FORMOTEROL FUMARATE DIHYDRATE 160; 4.5 UG/1; UG/1
AEROSOL RESPIRATORY (INHALATION)
Qty: 10.2 INHALER | Refills: 11 | Status: SHIPPED | OUTPATIENT
Start: 2018-09-24 | End: 2019-08-02 | Stop reason: SDUPTHER

## 2018-09-24 NOTE — TELEPHONE ENCOUNTER
----- Message from Randi Major sent at 9/24/2018 11:55 AM CDT -----  Contact:      Pharmacy Calling    Reason for call:   Refill    [facility] Name:      [facility]    Prescription Name:    SYMBICORT 160-4.5 mcg/actuation HFAA  Phone Number:    [phone]  Additional Information:

## 2018-12-11 ENCOUNTER — OFFICE VISIT (OUTPATIENT)
Dept: PULMONOLOGY | Facility: CLINIC | Age: 73
End: 2018-12-11
Payer: MEDICARE

## 2018-12-11 VITALS
HEIGHT: 59 IN | HEART RATE: 86 BPM | OXYGEN SATURATION: 94 % | DIASTOLIC BLOOD PRESSURE: 76 MMHG | BODY MASS INDEX: 25.6 KG/M2 | SYSTOLIC BLOOD PRESSURE: 112 MMHG | WEIGHT: 127 LBS

## 2018-12-11 DIAGNOSIS — J44.9 CHRONIC OBSTRUCTIVE PULMONARY DISEASE, UNSPECIFIED COPD TYPE: Primary | ICD-10-CM

## 2018-12-11 DIAGNOSIS — J43.2 CENTRILOBULAR EMPHYSEMA: ICD-10-CM

## 2018-12-11 PROCEDURE — 3074F SYST BP LT 130 MM HG: CPT | Mod: CPTII,S$GLB,, | Performed by: INTERNAL MEDICINE

## 2018-12-11 PROCEDURE — 3078F DIAST BP <80 MM HG: CPT | Mod: CPTII,S$GLB,, | Performed by: INTERNAL MEDICINE

## 2018-12-11 PROCEDURE — 99214 OFFICE O/P EST MOD 30 MIN: CPT | Mod: S$GLB,,, | Performed by: INTERNAL MEDICINE

## 2018-12-11 PROCEDURE — 99999 PR PBB SHADOW E&M-EST. PATIENT-LVL II: CPT | Mod: PBBFAC,,, | Performed by: INTERNAL MEDICINE

## 2018-12-11 PROCEDURE — 1101F PT FALLS ASSESS-DOCD LE1/YR: CPT | Mod: CPTII,S$GLB,, | Performed by: INTERNAL MEDICINE

## 2018-12-12 NOTE — PROGRESS NOTES
Subjective:      Patient ID: Leyla Mari is a 73 y.o. female.    Chief Complaint: No chief complaint on file.    HPI  72 yo female with emphysema doing very well on Symbicort and Incruse. No pulmonary exacerbations since her last visit in April.       No flowsheet data found.  Review of Systems   Constitutional: Negative.    HENT: Negative.    Eyes: Negative.    Respiratory: Negative.         Stable but advance COPD   Cardiovascular: Negative.    Genitourinary: Negative.    Musculoskeletal: Negative.    Skin: Negative.         Hx of recurring bouts of cellulitis  Doing well   Gastrointestinal: Negative.    Neurological: Negative.    Psychiatric/Behavioral: Negative.      Objective:     Physical Exam   Constitutional: She is oriented to person, place, and time. She appears well-developed and well-nourished. No distress.   HENT:   Head: Normocephalic and atraumatic.   Right Ear: External ear normal.   Left Ear: External ear normal.   Nose: Nose normal.   Mouth/Throat: Oropharynx is clear and moist.   Eyes: Conjunctivae and EOM are normal. Pupils are equal, round, and reactive to light.   Neck: Normal range of motion. Neck supple. No JVD present. No thyromegaly present.   Cardiovascular: Normal rate, regular rhythm, normal heart sounds and intact distal pulses. Exam reveals no gallop.   No murmur heard.  Pulmonary/Chest: Breath sounds normal. No stridor. No respiratory distress. She has no wheezes. She has no rales. She exhibits no tenderness.   Markedly diminished breath sounds  No wheezes or rales   Abdominal: Soft. Bowel sounds are normal. She exhibits no distension and no mass. There is no tenderness. There is no rebound and no guarding.   Musculoskeletal: Normal range of motion. She exhibits no edema.   Lymphadenopathy:     She has no cervical adenopathy.   Neurological: She is alert and oriented to person, place, and time. She has normal reflexes. She displays normal reflexes. No cranial nerve deficit.   Skin:  Skin is warm and dry. No rash noted.   Psychiatric: She has a normal mood and affect. Her behavior is normal. Judgment and thought content normal.   Nursing note and vitals reviewed.      Assessment:     1. Chronic obstructive pulmonary disease, unspecified COPD type    2. Centrilobular emphysema      Outpatient Encounter Medications as of 12/11/2018   Medication Sig Dispense Refill    aclidinium bromide (TUDORZA PRESSAIR) 400 mcg/actuation AePB Inhale 1 puff into the lungs 2 (two) times daily. Controller 3 each 3    albuterol (PROVENTIL) 2.5 mg /3 mL (0.083 %) nebulizer solution Take 3 mLs (2.5 mg total) by nebulization every 4 (four) hours as needed for Wheezing or Shortness of Breath. 120 each 11    albuterol 90 mcg/actuation inhaler Inhale 2 puffs into the lungs every 6 (six) hours as needed for Wheezing (ventolin hfa per pts insurance). Rescue 1 Inhaler 11    ALPRAZolam (XANAX) 0.25 MG tablet Take 1 tablet (0.25 mg total) by mouth 3 (three) times daily as needed. 90 tablet 3    aspirin (ECOTRIN) 81 MG EC tablet Take 81 mg by mouth once daily.        budesonide-formoterol 160-4.5 mcg (SYMBICORT) 160-4.5 mcg/actuation HFAA INHALE 2 PUFFS INTO THE LUNGS EVERY 12 HOURS 10.2 Inhaler 11    diltiaZEM (CARDIZEM CD) 180 MG 24 hr capsule Take 1 capsule (180 mg total) by mouth once daily. 90 capsule 3    FLUZONE HIGH-DOSE 2017-18, PF, 180 mcg/0.5 mL vaccine TO BE ADMINISTERED BY PHARMACIST FOR IMMUNIZATION  0    FLUZONE HIGH-DOSE 2018-19, PF, 180 mcg/0.5 mL vaccine       hydroCHLOROthiazide (HYDRODIURIL) 25 MG tablet TAKE 1 TABLET (25 MG TOTAL) BY MOUTH ONCE DAILY. 90 tablet 3    potassium chloride SA (K-DUR,KLOR-CON) 20 MEQ tablet TAKE ONE TABLET BY MOUTH EVERY DAY 90 tablet 3    pravastatin (PRAVACHOL) 20 MG tablet TAKE 1 TABLET BY MOUTH EVERY DAY 90 tablet 2    vitamin D 1000 units Tab Take 1,000 Units by mouth once daily.      furosemide (LASIX) 20 MG tablet Take 1 tablet (20 mg total) by mouth once  daily. 3 tablet 0     No facility-administered encounter medications on file as of 12/11/2018.        Plan:   Stay the course, this is the best that she has been in several years.   Problem List Items Addressed This Visit     Chronic obstructive pulmonary disease - Primary    Overview     72 yo female with severe pulmonary emphyesma--pink puffer, comes for her periodic health visit. She looks and feels the best that she has felt in over a year. She has portable oxygen but at times to take it with her when she goes out. No pulmonary exacerbation since her last visit in April. On Symbicort and Incruse.          Current Assessment & Plan     Peak flow 130 l/min    Sa02: 97% on RA

## 2018-12-19 RX ORDER — POTASSIUM CHLORIDE 20 MEQ/1
TABLET, EXTENDED RELEASE ORAL
Qty: 90 TABLET | Refills: 3 | Status: SHIPPED | OUTPATIENT
Start: 2018-12-19 | End: 2019-12-11 | Stop reason: SDUPTHER

## 2018-12-19 NOTE — TELEPHONE ENCOUNTER
"----- Message from Celestinabryce Sy sent at 12/19/2018 10:36 AM CST -----  Contact: Kate/[facility]/ [phone]  RX request - refill or new RX.  Is this a refill or new RX:    RX name and strength: potassium chloride SA (K-DUR,KLOR-CON) 20 MEQ tablet 90 tablet   Directions:   Is this a 30 day or 90 day RX:  90  Local pharmacy or mail order pharmacy:    Pharmacy name and phone # (DON'T enter "on file" or "in chart"): [facility]/pharmacy #[id number] - [place], [place] - [address] -[phone] (Phone)  [phone] (Fax)      Comments:        "

## 2019-02-04 ENCOUNTER — PATIENT MESSAGE (OUTPATIENT)
Dept: PULMONOLOGY | Facility: CLINIC | Age: 74
End: 2019-02-04

## 2019-02-05 ENCOUNTER — TELEPHONE (OUTPATIENT)
Dept: PULMONOLOGY | Facility: CLINIC | Age: 74
End: 2019-02-05

## 2019-02-05 DIAGNOSIS — J43.1 PANLOBULAR EMPHYSEMA: Primary | Chronic | ICD-10-CM

## 2019-02-05 NOTE — TELEPHONE ENCOUNTER
----- Message from Caridad Sy sent at 2/5/2019 12:05 PM CST -----  Contact: Salome/[facility] phmarcy [phone]  .Needs Advice    Reason for call:        Communication Preference:phone    Additional Information:medication that's not coved Is the  tudorza the insurance recommend   incurse please call back to discuss   Received a message from Mrs Mari and later [facility] pharmacy that [employer] will no longer cover Tudorza, but will cover Incruse Ellipta. Dr Guido said Incruse was fine so it was sent to [facility], patient will use 1 inhalation daily. Mrs Mari informed. Danielle Waldrop LPN.

## 2019-04-01 DIAGNOSIS — J43.2 CENTRILOBULAR EMPHYSEMA: Primary | ICD-10-CM

## 2019-04-03 RX ORDER — HYDROCHLOROTHIAZIDE 25 MG/1
TABLET ORAL
Qty: 90 TABLET | Refills: 3 | Status: ON HOLD | OUTPATIENT
Start: 2019-04-03 | End: 2020-03-12 | Stop reason: SDUPTHER

## 2019-04-16 RX ORDER — PRAVASTATIN SODIUM 20 MG/1
TABLET ORAL
Qty: 90 TABLET | Refills: 2 | Status: SHIPPED | OUTPATIENT
Start: 2019-04-16 | End: 2019-09-16 | Stop reason: SDUPTHER

## 2019-04-17 ENCOUNTER — OFFICE VISIT (OUTPATIENT)
Dept: OPTOMETRY | Facility: CLINIC | Age: 74
End: 2019-04-17
Payer: MEDICARE

## 2019-04-17 DIAGNOSIS — H04.123 BILATERAL DRY EYES: Primary | ICD-10-CM

## 2019-04-17 DIAGNOSIS — H52.4 BILATERAL PRESBYOPIA: ICD-10-CM

## 2019-04-17 DIAGNOSIS — Z96.1 PSEUDOPHAKIA OF BOTH EYES: ICD-10-CM

## 2019-04-17 PROCEDURE — 99999 PR PBB SHADOW E&M-EST. PATIENT-LVL II: CPT | Mod: PBBFAC,,, | Performed by: OPTOMETRIST

## 2019-04-17 PROCEDURE — 92014 COMPRE OPH EXAM EST PT 1/>: CPT | Mod: S$GLB,,, | Performed by: OPTOMETRIST

## 2019-04-17 PROCEDURE — 92014 PR EYE EXAM, EST PATIENT,COMPREHESV: ICD-10-PCS | Mod: S$GLB,,, | Performed by: OPTOMETRIST

## 2019-04-17 PROCEDURE — 99999 PR PBB SHADOW E&M-EST. PATIENT-LVL II: ICD-10-PCS | Mod: PBBFAC,,, | Performed by: OPTOMETRIST

## 2019-04-17 NOTE — PROGRESS NOTES
JERRY HANNA 04/2018  Wears OTC +2.50 readers, seem to work fine.  Distance seems   fine without glasses.  Some trouble reading the paper in the morning,   clears up after a few hours. Not using any drops.   On oxygen.     Last edited by Raul Esquivel, OD on 4/17/2019 10:08 AM. (History)            Assessment /Plan     For exam results, see Encounter Report.    Bilateral dry eyes    Pseudophakia of both eyes    Bilateral presbyopia      1. Causing blur in morning, Recommend artificial tears. 1 drop 2x per day. Chronicity of disease and treatment discussed.  2. Monitor condition. Patient to report any changes. RTC 1 year recheck.  3. Cont with OTC readers.

## 2019-04-23 ENCOUNTER — TELEPHONE (OUTPATIENT)
Dept: INTERNAL MEDICINE | Facility: CLINIC | Age: 74
End: 2019-04-23

## 2019-06-05 ENCOUNTER — PATIENT MESSAGE (OUTPATIENT)
Dept: INTERNAL MEDICINE | Facility: CLINIC | Age: 74
End: 2019-06-05

## 2019-06-05 ENCOUNTER — TELEPHONE (OUTPATIENT)
Dept: INTERNAL MEDICINE | Facility: CLINIC | Age: 74
End: 2019-06-05

## 2019-06-05 DIAGNOSIS — I10 ESSENTIAL HYPERTENSION: Primary | ICD-10-CM

## 2019-06-05 DIAGNOSIS — E78.5 HYPERLIPIDEMIA, UNSPECIFIED HYPERLIPIDEMIA TYPE: Chronic | ICD-10-CM

## 2019-06-05 DIAGNOSIS — R73.01 IMPAIRED FASTING GLUCOSE: ICD-10-CM

## 2019-06-10 ENCOUNTER — LAB VISIT (OUTPATIENT)
Dept: LAB | Facility: HOSPITAL | Age: 74
End: 2019-06-10
Attending: INTERNAL MEDICINE
Payer: MEDICARE

## 2019-06-10 DIAGNOSIS — E78.5 HYPERLIPIDEMIA, UNSPECIFIED HYPERLIPIDEMIA TYPE: Chronic | ICD-10-CM

## 2019-06-10 DIAGNOSIS — R73.01 IMPAIRED FASTING GLUCOSE: ICD-10-CM

## 2019-06-10 DIAGNOSIS — I10 ESSENTIAL HYPERTENSION: ICD-10-CM

## 2019-06-10 LAB
ALBUMIN SERPL BCP-MCNC: 3.5 G/DL (ref 3.5–5.2)
ALP SERPL-CCNC: 124 U/L (ref 55–135)
ALT SERPL W/O P-5'-P-CCNC: 8 U/L (ref 10–44)
ANION GAP SERPL CALC-SCNC: 10 MMOL/L (ref 8–16)
AST SERPL-CCNC: 14 U/L (ref 10–40)
BASOPHILS # BLD AUTO: 0.04 K/UL (ref 0–0.2)
BASOPHILS NFR BLD: 0.4 % (ref 0–1.9)
BILIRUB SERPL-MCNC: 0.4 MG/DL (ref 0.1–1)
BUN SERPL-MCNC: 11 MG/DL (ref 8–23)
CALCIUM SERPL-MCNC: 10.5 MG/DL (ref 8.7–10.5)
CHLORIDE SERPL-SCNC: 99 MMOL/L (ref 95–110)
CHOLEST SERPL-MCNC: 161 MG/DL (ref 120–199)
CHOLEST/HDLC SERPL: 2.5 {RATIO} (ref 2–5)
CO2 SERPL-SCNC: 27 MMOL/L (ref 23–29)
CREAT SERPL-MCNC: 0.6 MG/DL (ref 0.5–1.4)
DIFFERENTIAL METHOD: ABNORMAL
EOSINOPHIL # BLD AUTO: 0.1 K/UL (ref 0–0.5)
EOSINOPHIL NFR BLD: 1 % (ref 0–8)
ERYTHROCYTE [DISTWIDTH] IN BLOOD BY AUTOMATED COUNT: 12.6 % (ref 11.5–14.5)
EST. GFR  (AFRICAN AMERICAN): >60 ML/MIN/1.73 M^2
EST. GFR  (NON AFRICAN AMERICAN): >60 ML/MIN/1.73 M^2
ESTIMATED AVG GLUCOSE: 114 MG/DL (ref 68–131)
GLUCOSE SERPL-MCNC: 113 MG/DL (ref 70–110)
HBA1C MFR BLD HPLC: 5.6 % (ref 4–5.6)
HCT VFR BLD AUTO: 44.4 % (ref 37–48.5)
HDLC SERPL-MCNC: 65 MG/DL (ref 40–75)
HDLC SERPL: 40.4 % (ref 20–50)
HGB BLD-MCNC: 14.6 G/DL (ref 12–16)
IMM GRANULOCYTES # BLD AUTO: 0.05 K/UL (ref 0–0.04)
IMM GRANULOCYTES NFR BLD AUTO: 0.5 % (ref 0–0.5)
LDLC SERPL CALC-MCNC: 80 MG/DL (ref 63–159)
LYMPHOCYTES # BLD AUTO: 1.3 K/UL (ref 1–4.8)
LYMPHOCYTES NFR BLD: 12.6 % (ref 18–48)
MCH RBC QN AUTO: 31.2 PG (ref 27–31)
MCHC RBC AUTO-ENTMCNC: 32.9 G/DL (ref 32–36)
MCV RBC AUTO: 95 FL (ref 82–98)
MONOCYTES # BLD AUTO: 0.8 K/UL (ref 0.3–1)
MONOCYTES NFR BLD: 7.8 % (ref 4–15)
NEUTROPHILS # BLD AUTO: 8 K/UL (ref 1.8–7.7)
NEUTROPHILS NFR BLD: 77.7 % (ref 38–73)
NONHDLC SERPL-MCNC: 96 MG/DL
NRBC BLD-RTO: 0 /100 WBC
PLATELET # BLD AUTO: 325 K/UL (ref 150–350)
PMV BLD AUTO: 9.6 FL (ref 9.2–12.9)
POTASSIUM SERPL-SCNC: 4 MMOL/L (ref 3.5–5.1)
PROT SERPL-MCNC: 7 G/DL (ref 6–8.4)
RBC # BLD AUTO: 4.68 M/UL (ref 4–5.4)
SODIUM SERPL-SCNC: 136 MMOL/L (ref 136–145)
TRIGL SERPL-MCNC: 80 MG/DL (ref 30–150)
WBC # BLD AUTO: 10.29 K/UL (ref 3.9–12.7)

## 2019-06-10 PROCEDURE — 80061 LIPID PANEL: CPT

## 2019-06-10 PROCEDURE — 83036 HEMOGLOBIN GLYCOSYLATED A1C: CPT

## 2019-06-10 PROCEDURE — 85025 COMPLETE CBC W/AUTO DIFF WBC: CPT

## 2019-06-10 PROCEDURE — 80053 COMPREHEN METABOLIC PANEL: CPT

## 2019-06-10 PROCEDURE — 36415 COLL VENOUS BLD VENIPUNCTURE: CPT | Mod: PO

## 2019-06-11 ENCOUNTER — OFFICE VISIT (OUTPATIENT)
Dept: INTERNAL MEDICINE | Facility: CLINIC | Age: 74
End: 2019-06-11
Payer: MEDICARE

## 2019-06-11 VITALS
TEMPERATURE: 98 F | HEIGHT: 59 IN | DIASTOLIC BLOOD PRESSURE: 72 MMHG | RESPIRATION RATE: 18 BRPM | SYSTOLIC BLOOD PRESSURE: 138 MMHG | BODY MASS INDEX: 24.44 KG/M2 | HEART RATE: 96 BPM | WEIGHT: 121.25 LBS

## 2019-06-11 DIAGNOSIS — I10 ESSENTIAL HYPERTENSION: Primary | ICD-10-CM

## 2019-06-11 DIAGNOSIS — I10 ESSENTIAL HYPERTENSION: ICD-10-CM

## 2019-06-11 DIAGNOSIS — E78.5 HYPERLIPIDEMIA, UNSPECIFIED HYPERLIPIDEMIA TYPE: ICD-10-CM

## 2019-06-11 DIAGNOSIS — R73.01 IMPAIRED FASTING GLUCOSE: ICD-10-CM

## 2019-06-11 PROCEDURE — 3078F PR MOST RECENT DIASTOLIC BLOOD PRESSURE < 80 MM HG: ICD-10-PCS | Mod: CPTII,S$GLB,, | Performed by: INTERNAL MEDICINE

## 2019-06-11 PROCEDURE — 99999 PR PBB SHADOW E&M-EST. PATIENT-LVL III: CPT | Mod: PBBFAC,,, | Performed by: INTERNAL MEDICINE

## 2019-06-11 PROCEDURE — 3075F SYST BP GE 130 - 139MM HG: CPT | Mod: CPTII,S$GLB,, | Performed by: INTERNAL MEDICINE

## 2019-06-11 PROCEDURE — 99213 PR OFFICE/OUTPT VISIT, EST, LEVL III, 20-29 MIN: ICD-10-PCS | Mod: S$GLB,,, | Performed by: INTERNAL MEDICINE

## 2019-06-11 PROCEDURE — 3075F PR MOST RECENT SYSTOLIC BLOOD PRESS GE 130-139MM HG: ICD-10-PCS | Mod: CPTII,S$GLB,, | Performed by: INTERNAL MEDICINE

## 2019-06-11 PROCEDURE — 99499 UNLISTED E&M SERVICE: CPT | Mod: S$GLB,,, | Performed by: INTERNAL MEDICINE

## 2019-06-11 PROCEDURE — 1101F PT FALLS ASSESS-DOCD LE1/YR: CPT | Mod: CPTII,S$GLB,, | Performed by: INTERNAL MEDICINE

## 2019-06-11 PROCEDURE — 99499 RISK ADDL DX/OHS AUDIT: ICD-10-PCS | Mod: S$GLB,,, | Performed by: INTERNAL MEDICINE

## 2019-06-11 PROCEDURE — 1101F PR PT FALLS ASSESS DOC 0-1 FALLS W/OUT INJ PAST YR: ICD-10-PCS | Mod: CPTII,S$GLB,, | Performed by: INTERNAL MEDICINE

## 2019-06-11 PROCEDURE — 99999 PR PBB SHADOW E&M-EST. PATIENT-LVL III: ICD-10-PCS | Mod: PBBFAC,,, | Performed by: INTERNAL MEDICINE

## 2019-06-11 PROCEDURE — 3078F DIAST BP <80 MM HG: CPT | Mod: CPTII,S$GLB,, | Performed by: INTERNAL MEDICINE

## 2019-06-11 PROCEDURE — 99213 OFFICE O/P EST LOW 20 MIN: CPT | Mod: S$GLB,,, | Performed by: INTERNAL MEDICINE

## 2019-06-11 RX ORDER — DILTIAZEM HYDROCHLORIDE 180 MG/1
180 CAPSULE, COATED, EXTENDED RELEASE ORAL DAILY
Qty: 90 CAPSULE | Refills: 3 | Status: SHIPPED | OUTPATIENT
Start: 2019-06-11 | End: 2020-05-28

## 2019-06-11 NOTE — PROGRESS NOTES
CC: followup of hypertension  HPI:  The patient is a 74 y.o. year old female who presents to the office for followup of hypertension.  The patient denies any chest pain, new shortness of breath, headache, blurred vision, nausea or vomiting, but complains of fatigue.  Review of labs reveals mildly elevated fasting glucose.      PAST MEDICAL HISTORY:  Past Medical History:   Diagnosis Date    Actinic keratosis     Allergy     Arthritis     Cataract     Cellulitis of ankle     Colon polyps     COPD (chronic obstructive pulmonary disease)     Family history of colon cancer     Hyperlipidemia     Hypertension     Lung nodules     Sinus tachycardia        SURGICAL HISTORY:  Past Surgical History:   Procedure Laterality Date    APPENDECTOMY      CATARACT EXTRACTION Bilateral      SECTION      x2    COLONOSCOPY N/A 10/10/2017    Performed by Omid [facility]ino [facility] at [facility] (4TH FLR)    COLONOSCOPY N/A 2015    [facility] at [facility] ([facility]TH F[facility])    EYE SURGERY      TONSILLECTOMY         MEDS:  Medcard reviewed and updated    ALLERGIES: Allergy Card reviewed and updated    SOCIAL HISTORY:   The patient is a nonsmoker.    PE:   APPEARANCE: Well nourished, well developed, in no acute distress.    CHEST: Lungs clear to auscultation with unlabored respirations.  CARDIOVASCULAR: Normal S1, S2. No murmurs. No carotid bruits. No pedal edema.  ABDOMEN: Bowel sounds normal. Not distended. Soft. No tenderness or masses.   PSYCHIATRIC: The patient is oriented to person, place, and time and has a pleasant affect.        ASSESSMENT/PLAN:  Leyla was seen today for results.    Diagnoses and all orders for this visit:    Essential hypertension  -     blood pressure is controlled    Impaired fasting glucose  -     stable    Hyperlipidemia, unspecified hyperlipidemia type  -     Comprehensive metabolic panel; Future  -     Lipid panel; Future  -     continue pravastatin

## 2019-07-18 ENCOUNTER — HOSPITAL ENCOUNTER (OUTPATIENT)
Dept: PULMONOLOGY | Facility: CLINIC | Age: 74
Discharge: HOME | End: 2019-07-18
Payer: MEDICARE

## 2019-07-18 ENCOUNTER — OFFICE VISIT (OUTPATIENT)
Dept: PULMONOLOGY | Facility: CLINIC | Age: 74
End: 2019-07-18
Payer: MEDICARE

## 2019-07-18 VITALS
WEIGHT: 118 LBS | OXYGEN SATURATION: 96 % | DIASTOLIC BLOOD PRESSURE: 66 MMHG | SYSTOLIC BLOOD PRESSURE: 118 MMHG | BODY MASS INDEX: 23.79 KG/M2 | HEART RATE: 88 BPM | HEIGHT: 59 IN

## 2019-07-18 DIAGNOSIS — J44.1 COPD EXACERBATION: Primary | ICD-10-CM

## 2019-07-18 DIAGNOSIS — J43.2 CENTRILOBULAR EMPHYSEMA: ICD-10-CM

## 2019-07-18 LAB
PRE FEV1 FVC: 38
PRE FEV1: 0.37
PRE FVC: 0.97
PREDICTED FEV1 FVC: 80
PREDICTED FEV1: 1.71
PREDICTED FVC: 2.21

## 2019-07-18 PROCEDURE — 99499 RISK ADDL DX/OHS AUDIT: ICD-10-PCS | Mod: S$GLB,,, | Performed by: INTERNAL MEDICINE

## 2019-07-18 PROCEDURE — 94010 BREATHING CAPACITY TEST: CPT | Mod: S$GLB,,, | Performed by: INTERNAL MEDICINE

## 2019-07-18 PROCEDURE — 3074F SYST BP LT 130 MM HG: CPT | Mod: CPTII,S$GLB,, | Performed by: INTERNAL MEDICINE

## 2019-07-18 PROCEDURE — 94010 BREATHING CAPACITY TEST: ICD-10-PCS | Mod: S$GLB,,, | Performed by: INTERNAL MEDICINE

## 2019-07-18 PROCEDURE — 1101F PT FALLS ASSESS-DOCD LE1/YR: CPT | Mod: CPTII,S$GLB,, | Performed by: INTERNAL MEDICINE

## 2019-07-18 PROCEDURE — 99214 OFFICE O/P EST MOD 30 MIN: CPT | Mod: 25,S$GLB,, | Performed by: INTERNAL MEDICINE

## 2019-07-18 PROCEDURE — 3078F DIAST BP <80 MM HG: CPT | Mod: CPTII,S$GLB,, | Performed by: INTERNAL MEDICINE

## 2019-07-18 PROCEDURE — 99214 PR OFFICE/OUTPT VISIT, EST, LEVL IV, 30-39 MIN: ICD-10-PCS | Mod: 25,S$GLB,, | Performed by: INTERNAL MEDICINE

## 2019-07-18 PROCEDURE — 99499 UNLISTED E&M SERVICE: CPT | Mod: S$GLB,,, | Performed by: INTERNAL MEDICINE

## 2019-07-18 PROCEDURE — 3078F PR MOST RECENT DIASTOLIC BLOOD PRESSURE < 80 MM HG: ICD-10-PCS | Mod: CPTII,S$GLB,, | Performed by: INTERNAL MEDICINE

## 2019-07-18 PROCEDURE — 99999 PR PBB SHADOW E&M-EST. PATIENT-LVL III: ICD-10-PCS | Mod: PBBFAC,,, | Performed by: INTERNAL MEDICINE

## 2019-07-18 PROCEDURE — 99999 PR PBB SHADOW E&M-EST. PATIENT-LVL III: CPT | Mod: PBBFAC,,, | Performed by: INTERNAL MEDICINE

## 2019-07-18 PROCEDURE — 1101F PR PT FALLS ASSESS DOC 0-1 FALLS W/OUT INJ PAST YR: ICD-10-PCS | Mod: CPTII,S$GLB,, | Performed by: INTERNAL MEDICINE

## 2019-07-18 PROCEDURE — 3074F PR MOST RECENT SYSTOLIC BLOOD PRESSURE < 130 MM HG: ICD-10-PCS | Mod: CPTII,S$GLB,, | Performed by: INTERNAL MEDICINE

## 2019-07-19 NOTE — PROGRESS NOTES
Subjective:      Patient ID: Leyla Mari is a 74 y.o. female.    Chief Complaint: COPD    HPI  73 yo female with severe emphysema, pink puffer, comes for her periodic visit. She has had about a 10% decline in the spirometry assessment from last visit  In April.,Fev=1_0.47 liters down to 0.37 liters.  and she is aware that the heat is difficult. She has a resting Sa02; 95% Room Air.   Peak flow is 100 l/min with little improvement after use of her albuterol inhaler.       No flowsheet data found.  Review of Systems   Constitutional: Negative.    HENT: Negative.    Eyes: Negative.    Respiratory: Negative.    Cardiovascular: Negative.    Genitourinary: Negative.    Musculoskeletal: Negative.    Skin: Negative.         Hx of recurrent celluliits that required IV antibiotics.    Gastrointestinal: Negative.         Hx of c dififcili infection  Hx of colon polyps   Neurological: Negative.    Psychiatric/Behavioral: Negative.      Objective:     Physical Exam   Constitutional: She is oriented to person, place, and time. She appears well-developed and well-nourished. No distress.   HENT:   Head: Normocephalic and atraumatic.   Right Ear: External ear normal.   Left Ear: External ear normal.   Nose: Nose normal.   Mouth/Throat: Oropharynx is clear and moist.   Eyes: Pupils are equal, round, and reactive to light. Conjunctivae and EOM are normal.   Neck: Normal range of motion. Neck supple. No JVD present. No thyromegaly present.   Cardiovascular: Normal rate, regular rhythm, normal heart sounds and intact distal pulses. Exam reveals no gallop.   No murmur heard.  Pulmonary/Chest: Breath sounds normal. No stridor. No respiratory distress. She has no wheezes. She has no rales. She exhibits no tenderness.   Decreased air entry throughout  Heart tones distant.   Abdominal: Soft. Bowel sounds are normal. She exhibits no distension and no mass. There is no tenderness. There is no rebound and no guarding.   Musculoskeletal:  Normal range of motion. She exhibits no edema.   Lymphadenopathy:     She has no cervical adenopathy.   Neurological: She is alert and oriented to person, place, and time. She has normal reflexes. She displays normal reflexes. No cranial nerve deficit.   Skin: Skin is warm and dry. No rash noted.   Psychiatric: She has a normal mood and affect. Her behavior is normal. Judgment and thought content normal.   Nursing note and vitals reviewed.      Assessment:     1. COPD exacerbation      Outpatient Encounter Medications as of 7/18/2019   Medication Sig Dispense Refill    albuterol (PROVENTIL) 2.5 mg /3 mL (0.083 %) nebulizer solution Take 3 mLs (2.5 mg total) by nebulization every 4 (four) hours as needed for Wheezing or Shortness of Breath. 120 each 11    albuterol 90 mcg/actuation inhaler Inhale 2 puffs into the lungs every 6 (six) hours as needed for Wheezing (ventolin hfa per pts insurance). Rescue 1 Inhaler 11    ALPRAZolam (XANAX) 0.25 MG tablet Take 1 tablet (0.25 mg total) by mouth 3 (three) times daily as needed. 90 tablet 3    aspirin (ECOTRIN) 81 MG EC tablet Take 81 mg by mouth once daily.        budesonide-formoterol 160-4.5 mcg (SYMBICORT) 160-4.5 mcg/actuation HFAA INHALE 2 PUFFS INTO THE LUNGS EVERY 12 HOURS 10.2 Inhaler 11    diltiaZEM (CARDIZEM CD) 180 MG 24 hr capsule Take 1 capsule (180 mg total) by mouth once daily. 90 capsule 3    FLUZONE HIGH-DOSE 2018-19, PF, 180 mcg/0.5 mL vaccine       furosemide (LASIX) 20 MG tablet Take 1 tablet (20 mg total) by mouth once daily. 3 tablet 0    glycopyrrolate-formoterol (BEVESPI AEROSPHERE) 9-4.8 mcg HFAA Inhale 2 puffs into the lungs 2 (two) times daily. Controller 1 Inhaler 11    hydroCHLOROthiazide (HYDRODIURIL) 25 MG tablet TAKE 1 TABLET (25 MG TOTAL) BY MOUTH ONCE DAILY. 90 tablet 3    potassium chloride SA (K-DUR,KLOR-CON) 20 MEQ tablet TAKE ONE TABLET BY MOUTH EVERY DAY 90 tablet 3    pravastatin (PRAVACHOL) 20 MG tablet TAKE 1 TABLET BY  MOUTH EVERY DAY 90 tablet 2    vitamin D 1000 units Tab Take 1,000 Units by mouth once daily.      [DISCONTINUED] umeclidinium (INCRUSE ELLIPTA) 62.5 mcg/actuation DsDv Inhale 1 each into the lungs once daily. Controller       No facility-administered encounter medications on file as of 7/18/2019.        Plan:    She does not feel that the incruse is helpful, request a script for bevespi Will try  Problem List Items Addressed This Visit     None      Visit Diagnoses     COPD exacerbation    -  Primary    Relevant Medications    glycopyrrolate-formoterol (BEVESPI AEROSPHERE) 9-4.8 mcg HFAA

## 2019-08-01 ENCOUNTER — PATIENT MESSAGE (OUTPATIENT)
Dept: PULMONOLOGY | Facility: CLINIC | Age: 74
End: 2019-08-01

## 2019-08-01 DIAGNOSIS — J43.2 CENTRILOBULAR EMPHYSEMA: Primary | ICD-10-CM

## 2019-08-01 DIAGNOSIS — F41.9 ANXIETY: ICD-10-CM

## 2019-08-01 RX ORDER — ALPRAZOLAM 0.25 MG/1
0.25 TABLET ORAL 3 TIMES DAILY PRN
Qty: 90 TABLET | Refills: 3 | Status: SHIPPED | OUTPATIENT
Start: 2019-08-01 | End: 2019-08-20 | Stop reason: SDUPTHER

## 2019-08-01 RX ORDER — ALBUTEROL SULFATE 90 UG/1
2 AEROSOL, METERED RESPIRATORY (INHALATION) EVERY 6 HOURS PRN
Qty: 3 INHALER | Refills: 3 | Status: SHIPPED | OUTPATIENT
Start: 2019-08-01 | End: 2019-08-02 | Stop reason: SDUPTHER

## 2019-08-02 DIAGNOSIS — J43.2 CENTRILOBULAR EMPHYSEMA: ICD-10-CM

## 2019-08-03 RX ORDER — ALBUTEROL SULFATE 90 UG/1
2 AEROSOL, METERED RESPIRATORY (INHALATION) EVERY 6 HOURS PRN
Qty: 3 INHALER | Refills: 3 | OUTPATIENT
Start: 2019-08-03 | End: 2019-08-20 | Stop reason: SDUPTHER

## 2019-08-03 RX ORDER — BUDESONIDE AND FORMOTEROL FUMARATE DIHYDRATE 160; 4.5 UG/1; UG/1
AEROSOL RESPIRATORY (INHALATION)
Qty: 10.2 INHALER | Refills: 11 | Status: SHIPPED | OUTPATIENT
Start: 2019-08-03 | End: 2020-06-20 | Stop reason: SDUPTHER

## 2019-08-20 ENCOUNTER — PATIENT MESSAGE (OUTPATIENT)
Dept: PULMONOLOGY | Facility: CLINIC | Age: 74
End: 2019-08-20

## 2019-08-20 DIAGNOSIS — J43.2 CENTRILOBULAR EMPHYSEMA: ICD-10-CM

## 2019-08-20 DIAGNOSIS — F41.9 ANXIETY: ICD-10-CM

## 2019-08-21 RX ORDER — ALBUTEROL SULFATE 90 UG/1
2 AEROSOL, METERED RESPIRATORY (INHALATION) EVERY 6 HOURS PRN
Qty: 3 INHALER | Refills: 3 | Status: SHIPPED | OUTPATIENT
Start: 2019-08-21 | End: 2020-04-13 | Stop reason: SDUPTHER

## 2019-08-21 RX ORDER — ALPRAZOLAM 0.25 MG/1
0.25 TABLET ORAL 3 TIMES DAILY PRN
Qty: 90 TABLET | Refills: 3 | Status: SHIPPED | OUTPATIENT
Start: 2019-08-21 | End: 2020-07-29 | Stop reason: SDUPTHER

## 2019-09-11 ENCOUNTER — LAB VISIT (OUTPATIENT)
Dept: LAB | Facility: HOSPITAL | Age: 74
End: 2019-09-11
Attending: INTERNAL MEDICINE
Payer: MEDICARE

## 2019-09-11 DIAGNOSIS — E78.5 HYPERLIPIDEMIA, UNSPECIFIED HYPERLIPIDEMIA TYPE: ICD-10-CM

## 2019-09-11 LAB
ALBUMIN SERPL BCP-MCNC: 4 G/DL (ref 3.5–5.2)
ALP SERPL-CCNC: 134 U/L (ref 55–135)
ALT SERPL W/O P-5'-P-CCNC: 9 U/L (ref 10–44)
ANION GAP SERPL CALC-SCNC: 12 MMOL/L (ref 8–16)
AST SERPL-CCNC: 17 U/L (ref 10–40)
BILIRUB SERPL-MCNC: 0.4 MG/DL (ref 0.1–1)
BUN SERPL-MCNC: 14 MG/DL (ref 8–23)
CALCIUM SERPL-MCNC: 10.4 MG/DL (ref 8.7–10.5)
CHLORIDE SERPL-SCNC: 99 MMOL/L (ref 95–110)
CHOLEST SERPL-MCNC: 228 MG/DL (ref 120–199)
CHOLEST/HDLC SERPL: 2.9 {RATIO} (ref 2–5)
CO2 SERPL-SCNC: 29 MMOL/L (ref 23–29)
CREAT SERPL-MCNC: 0.7 MG/DL (ref 0.5–1.4)
EST. GFR  (AFRICAN AMERICAN): >60 ML/MIN/1.73 M^2
EST. GFR  (NON AFRICAN AMERICAN): >60 ML/MIN/1.73 M^2
GLUCOSE SERPL-MCNC: 90 MG/DL (ref 70–110)
HDLC SERPL-MCNC: 79 MG/DL (ref 40–75)
HDLC SERPL: 34.6 % (ref 20–50)
LDLC SERPL CALC-MCNC: 133.4 MG/DL (ref 63–159)
NONHDLC SERPL-MCNC: 149 MG/DL
POTASSIUM SERPL-SCNC: 4 MMOL/L (ref 3.5–5.1)
PROT SERPL-MCNC: 7.6 G/DL (ref 6–8.4)
SODIUM SERPL-SCNC: 140 MMOL/L (ref 136–145)
TRIGL SERPL-MCNC: 78 MG/DL (ref 30–150)

## 2019-09-11 PROCEDURE — 36415 COLL VENOUS BLD VENIPUNCTURE: CPT | Mod: PO

## 2019-09-11 PROCEDURE — 80053 COMPREHEN METABOLIC PANEL: CPT

## 2019-09-11 PROCEDURE — 80061 LIPID PANEL: CPT

## 2019-09-16 ENCOUNTER — OFFICE VISIT (OUTPATIENT)
Dept: INTERNAL MEDICINE | Facility: CLINIC | Age: 74
End: 2019-09-16
Payer: MEDICARE

## 2019-09-16 VITALS
RESPIRATION RATE: 18 BRPM | TEMPERATURE: 98 F | WEIGHT: 118.19 LBS | HEIGHT: 59 IN | HEART RATE: 70 BPM | DIASTOLIC BLOOD PRESSURE: 56 MMHG | SYSTOLIC BLOOD PRESSURE: 120 MMHG | BODY MASS INDEX: 23.83 KG/M2

## 2019-09-16 DIAGNOSIS — E78.5 HYPERLIPIDEMIA, UNSPECIFIED HYPERLIPIDEMIA TYPE: ICD-10-CM

## 2019-09-16 DIAGNOSIS — I10 ESSENTIAL HYPERTENSION: Primary | ICD-10-CM

## 2019-09-16 PROCEDURE — 99499 UNLISTED E&M SERVICE: CPT | Mod: S$GLB,,, | Performed by: INTERNAL MEDICINE

## 2019-09-16 PROCEDURE — 3074F PR MOST RECENT SYSTOLIC BLOOD PRESSURE < 130 MM HG: ICD-10-PCS | Mod: CPTII,S$GLB,, | Performed by: INTERNAL MEDICINE

## 2019-09-16 PROCEDURE — 99999 PR PBB SHADOW E&M-EST. PATIENT-LVL IV: ICD-10-PCS | Mod: PBBFAC,,, | Performed by: INTERNAL MEDICINE

## 2019-09-16 PROCEDURE — 99499 RISK ADDL DX/OHS AUDIT: ICD-10-PCS | Mod: S$GLB,,, | Performed by: INTERNAL MEDICINE

## 2019-09-16 PROCEDURE — 1101F PT FALLS ASSESS-DOCD LE1/YR: CPT | Mod: CPTII,S$GLB,, | Performed by: INTERNAL MEDICINE

## 2019-09-16 PROCEDURE — 99213 OFFICE O/P EST LOW 20 MIN: CPT | Mod: S$GLB,,, | Performed by: INTERNAL MEDICINE

## 2019-09-16 PROCEDURE — 3078F PR MOST RECENT DIASTOLIC BLOOD PRESSURE < 80 MM HG: ICD-10-PCS | Mod: CPTII,S$GLB,, | Performed by: INTERNAL MEDICINE

## 2019-09-16 PROCEDURE — 1101F PR PT FALLS ASSESS DOC 0-1 FALLS W/OUT INJ PAST YR: ICD-10-PCS | Mod: CPTII,S$GLB,, | Performed by: INTERNAL MEDICINE

## 2019-09-16 PROCEDURE — 3078F DIAST BP <80 MM HG: CPT | Mod: CPTII,S$GLB,, | Performed by: INTERNAL MEDICINE

## 2019-09-16 PROCEDURE — 3074F SYST BP LT 130 MM HG: CPT | Mod: CPTII,S$GLB,, | Performed by: INTERNAL MEDICINE

## 2019-09-16 PROCEDURE — 99999 PR PBB SHADOW E&M-EST. PATIENT-LVL IV: CPT | Mod: PBBFAC,,, | Performed by: INTERNAL MEDICINE

## 2019-09-16 PROCEDURE — 99213 PR OFFICE/OUTPT VISIT, EST, LEVL III, 20-29 MIN: ICD-10-PCS | Mod: S$GLB,,, | Performed by: INTERNAL MEDICINE

## 2019-09-16 RX ORDER — PRAVASTATIN SODIUM 20 MG/1
20 TABLET ORAL DAILY
Qty: 90 TABLET | Refills: 2 | Status: SHIPPED | OUTPATIENT
Start: 2019-09-16 | End: 2020-06-08

## 2019-09-16 NOTE — PROGRESS NOTES
CC: followup of hyperlipidemia  HPI:  The patient is a 74 y.o. year old female who presents to the office for followup of hyperlipidemiq.  The patient denies any chest pain, worsening shortness of breath, headache, blurred vision, excessive fatigue, nausea or vomiting.  Review of labs reveals an elevated cholesterol.        PAST MEDICAL HISTORY:  Past Medical History:   Diagnosis Date    Actinic keratosis     Allergy     Arthritis     Cataract     Cellulitis of ankle     Colon polyps     COPD (chronic obstructive pulmonary disease)     Family history of colon cancer     Hyperlipidemia     Hypertension     Lung nodules     Sinus tachycardia        SURGICAL HISTORY:  Past Surgical History:   Procedure Laterality Date    APPENDECTOMY      CATARACT EXTRACTION Bilateral      SECTION      x2    COLONOSCOPY N/A 10/10/2017    [facility]formed [facility] (4TH F[facility])    COLONOSCOPY N/A 2015    [facility] at [facility] (4[facility] F[facility])    EYE SURGERY      TONSILLECTOMY         MEDS:  Medcard reviewed and updated    ALLERGIES: Allergy Card reviewed and updated    SOCIAL HISTORY:   The patient is a nonsmoker.    PE:   APPEARANCE: Well nourished, well developed, in no acute distress.    CHEST: Lungs clear to auscultation with unlabored respirations.  CARDIOVASCULAR: Normal S1, S2. No murmurs. No carotid bruits. No pedal edema.  ABDOMEN: Bowel sounds normal. Not distended. Soft. No tenderness or masses.   PSYCHIATRIC: The patient is oriented to person, place, and time and has a pleasant affect.        ASSESSMENT/PLAN:  Leyla was seen today for follow-up.    Diagnoses and all orders for this visit:    Essential hypertension  -     blood pressure is controlled    Hyperlipidemia, unspecified hyperlipidemia type  -     Comprehensive metabolic panel; Future  -     Lipid panel; Future  -     restart pravastatin    Other orders  -     pravastatin (PRAVACHOL) 20 MG tablet;  Take 1 tablet (20 mg total) by mouth once daily.

## 2019-10-03 DIAGNOSIS — J43.2 CENTRILOBULAR EMPHYSEMA: Primary | ICD-10-CM

## 2019-10-04 ENCOUNTER — PATIENT OUTREACH (OUTPATIENT)
Dept: ADMINISTRATIVE | Facility: OTHER | Age: 74
End: 2019-10-04

## 2019-10-08 ENCOUNTER — HOSPITAL ENCOUNTER (OUTPATIENT)
Dept: PULMONOLOGY | Facility: CLINIC | Age: 74
Discharge: HOME | End: 2019-10-08
Payer: MEDICARE

## 2019-10-08 ENCOUNTER — HOSPITAL ENCOUNTER (OUTPATIENT)
Dept: RADIOLOGY | Facility: HOSPITAL | Age: 74
Discharge: HOME | End: 2019-10-08
Attending: INTERNAL MEDICINE
Payer: MEDICARE

## 2019-10-08 ENCOUNTER — OFFICE VISIT (OUTPATIENT)
Dept: PULMONOLOGY | Facility: CLINIC | Age: 74
End: 2019-10-08
Payer: MEDICARE

## 2019-10-08 VITALS
HEART RATE: 90 BPM | OXYGEN SATURATION: 97 % | WEIGHT: 119 LBS | HEIGHT: 59 IN | SYSTOLIC BLOOD PRESSURE: 150 MMHG | BODY MASS INDEX: 23.99 KG/M2 | DIASTOLIC BLOOD PRESSURE: 78 MMHG

## 2019-10-08 DIAGNOSIS — J43.2 CENTRILOBULAR EMPHYSEMA: ICD-10-CM

## 2019-10-08 DIAGNOSIS — J43.2 CENTRILOBULAR EMPHYSEMA: Primary | ICD-10-CM

## 2019-10-08 LAB
FEF 25 75 LLN: 0.65
FEF 25 75 PRE REF: 6.9 %
FEF 25 75 REF: 2.5
FEV05 LLN: 0.52
FEV05 REF: 1.38
FEV1 FVC LLN: 64
FEV1 FVC PRE REF: 47.4 %
FEV1 FVC REF: 78
FEV1 LLN: 1.21
FEV1 PRE REF: 19.3 %
FEV1 REF: 1.71
FVC LLN: 1.57
FVC PRE REF: 40.4 %
FVC REF: 2.21
PEF LLN: 2.97
PEF PRE REF: 43.5 %
PEF REF: 4.43
PRE FEF 25 75: 0.17 L/S (ref 0.65–4.34)
PRE FET 100: 5.95 SEC
PRE FEV05 REF: 15.4 %
PRE FEV1 FVC: 37 % (ref 63.78–92.33)
PRE FEV1: 0.33 L (ref 1.21–2.2)
PRE FEV5: 0.21 L (ref 0.52–2.24)
PRE FVC: 0.89 L (ref 1.57–2.84)
PRE PEF: 1.93 L/S (ref 2.97–5.88)

## 2019-10-08 PROCEDURE — 3077F SYST BP >= 140 MM HG: CPT | Mod: CPTII,S$GLB,, | Performed by: INTERNAL MEDICINE

## 2019-10-08 PROCEDURE — 99999 PR PBB SHADOW E&M-EST. PATIENT-LVL III: ICD-10-PCS | Mod: PBBFAC,,, | Performed by: INTERNAL MEDICINE

## 2019-10-08 PROCEDURE — 71046 X-RAY EXAM CHEST 2 VIEWS: CPT | Mod: 26,,, | Performed by: RADIOLOGY

## 2019-10-08 PROCEDURE — 3077F PR MOST RECENT SYSTOLIC BLOOD PRESSURE >= 140 MM HG: ICD-10-PCS | Mod: CPTII,S$GLB,, | Performed by: INTERNAL MEDICINE

## 2019-10-08 PROCEDURE — 94010 BREATHING CAPACITY TEST: ICD-10-PCS | Mod: S$GLB,,, | Performed by: INTERNAL MEDICINE

## 2019-10-08 PROCEDURE — 3078F DIAST BP <80 MM HG: CPT | Mod: CPTII,S$GLB,, | Performed by: INTERNAL MEDICINE

## 2019-10-08 PROCEDURE — 99214 OFFICE O/P EST MOD 30 MIN: CPT | Mod: S$GLB,,, | Performed by: INTERNAL MEDICINE

## 2019-10-08 PROCEDURE — 71046 X-RAY EXAM CHEST 2 VIEWS: CPT | Mod: TC,FY

## 2019-10-08 PROCEDURE — 99214 PR OFFICE/OUTPT VISIT, EST, LEVL IV, 30-39 MIN: ICD-10-PCS | Mod: S$GLB,,, | Performed by: INTERNAL MEDICINE

## 2019-10-08 PROCEDURE — 99999 PR PBB SHADOW E&M-EST. PATIENT-LVL III: CPT | Mod: PBBFAC,,, | Performed by: INTERNAL MEDICINE

## 2019-10-08 PROCEDURE — 3078F PR MOST RECENT DIASTOLIC BLOOD PRESSURE < 80 MM HG: ICD-10-PCS | Mod: CPTII,S$GLB,, | Performed by: INTERNAL MEDICINE

## 2019-10-08 PROCEDURE — 71046 XR CHEST PA AND LATERAL: ICD-10-PCS | Mod: 26,,, | Performed by: RADIOLOGY

## 2019-10-08 PROCEDURE — 1101F PT FALLS ASSESS-DOCD LE1/YR: CPT | Mod: CPTII,S$GLB,, | Performed by: INTERNAL MEDICINE

## 2019-10-08 PROCEDURE — 1101F PR PT FALLS ASSESS DOC 0-1 FALLS W/OUT INJ PAST YR: ICD-10-PCS | Mod: CPTII,S$GLB,, | Performed by: INTERNAL MEDICINE

## 2019-10-08 PROCEDURE — 94010 BREATHING CAPACITY TEST: CPT | Mod: S$GLB,,, | Performed by: INTERNAL MEDICINE

## 2019-10-08 RX ORDER — UMECLIDINIUM 62.5 UG/1
AEROSOL, POWDER ORAL
COMMUNITY
Start: 2019-10-05 | End: 2020-03-09

## 2019-10-08 NOTE — PROGRESS NOTES
Subjective:       Patient ID: Leyla Mari is a 74 y.o. female.    Chief Complaint: COPD    HPI  73 yo female with severe but stable COPD, pink puffer, emphysema comes for her periodic health exam. She feels well, is aware of exertional dyspnea and uses supplemental oxygen for activities. She has a grandson who is the starting center for the [employer] football team, but is takes too much effort to attend games in person. Watches it on [employer].  Review of Systems   Constitutional: Negative.    HENT: Negative.    Eyes: Negative.    Respiratory: Negative.         Stable advanced COPD.. Fev-1: 0.33 liters, 37% of FVC.    Cardiovascular: Negative.    Gastrointestinal: Negative.         Hx of c difficil infection from antibiotics.    Genitourinary: Negative.    Musculoskeletal: Negative.    Skin: Negative.         Hx of recurrent bouts of cellulitis in the past , that has resolved.    Neurological: Negative.    Psychiatric/Behavioral: Negative.    All other systems reviewed and are negative.      Objective:      Physical Exam   Constitutional: She is oriented to person, place, and time. She appears well-developed and well-nourished. No distress.   HENT:   Head: Normocephalic and atraumatic.   Right Ear: External ear normal.   Left Ear: External ear normal.   Nose: Nose normal.   Mouth/Throat: Oropharynx is clear and moist.   Eyes: Pupils are equal, round, and reactive to light. Conjunctivae and EOM are normal.   Neck: Normal range of motion. Neck supple. No JVD present. No thyromegaly present.   Cardiovascular: Normal rate, regular rhythm, normal heart sounds and intact distal pulses. Exam reveals no gallop.   No murmur heard.  Pulmonary/Chest: Breath sounds normal. No stridor. No respiratory distress. She has no wheezes. She has no rales. She exhibits no tenderness.   Sa02: 98%  Room Air sitting still    Peak flow 100 l/min with no change with xopenex nebulizer treatment.    Fev-1:0.33 liters  Today's Chest x-ray  hyperinflated  With no acute changes     Abdominal: Soft. Bowel sounds are normal. She exhibits no distension and no mass. There is no tenderness. There is no rebound and no guarding.   Musculoskeletal: Normal range of motion. She exhibits no edema.   Lymphadenopathy:     She has no cervical adenopathy.   Neurological: She is alert and oriented to person, place, and time. She has normal reflexes. She displays normal reflexes. No cranial nerve deficit.   Skin: Skin is warm and dry. No rash noted.   Psychiatric: She has a normal mood and affect. Her behavior is normal. Judgment and thought content normal.   Nursing note and vitals reviewed.      Assessment:       1. Centrilobular emphysema        Plan:        IMP Pulmonary Emphysema. Sa02: 98%  Stable  Continue same medical plan.

## 2019-11-22 DIAGNOSIS — J44.9 CHRONIC OBSTRUCTIVE PULMONARY DISEASE, UNSPECIFIED COPD TYPE: ICD-10-CM

## 2019-11-22 RX ORDER — ALBUTEROL SULFATE 0.83 MG/ML
SOLUTION RESPIRATORY (INHALATION)
Qty: 300 ML | Refills: 11 | OUTPATIENT
Start: 2019-11-22

## 2019-12-11 RX ORDER — POTASSIUM CHLORIDE 20 MEQ/1
TABLET, EXTENDED RELEASE ORAL
Qty: 90 TABLET | Refills: 3 | Status: ON HOLD | OUTPATIENT
Start: 2019-12-11 | End: 2020-08-23 | Stop reason: HOSPADM

## 2019-12-16 ENCOUNTER — PATIENT MESSAGE (OUTPATIENT)
Dept: PULMONOLOGY | Facility: CLINIC | Age: 74
End: 2019-12-16

## 2019-12-16 DIAGNOSIS — J44.9 CHRONIC OBSTRUCTIVE PULMONARY DISEASE, UNSPECIFIED COPD TYPE: ICD-10-CM

## 2019-12-17 RX ORDER — ALBUTEROL SULFATE 0.83 MG/ML
2.5 SOLUTION RESPIRATORY (INHALATION) EVERY 4 HOURS PRN
Qty: 120 EACH | Refills: 11 | Status: SHIPPED | OUTPATIENT
Start: 2019-12-17 | End: 2021-02-03 | Stop reason: SDUPTHER

## 2019-12-27 ENCOUNTER — OFFICE VISIT (OUTPATIENT)
Dept: URGENT CARE | Facility: CLINIC | Age: 74
End: 2019-12-27
Payer: MEDICARE

## 2019-12-27 VITALS
HEART RATE: 92 BPM | SYSTOLIC BLOOD PRESSURE: 136 MMHG | TEMPERATURE: 98 F | HEIGHT: 59 IN | OXYGEN SATURATION: 96 % | WEIGHT: 119 LBS | RESPIRATION RATE: 22 BRPM | DIASTOLIC BLOOD PRESSURE: 67 MMHG | BODY MASS INDEX: 23.99 KG/M2

## 2019-12-27 DIAGNOSIS — J44.1 COPD EXACERBATION: Primary | ICD-10-CM

## 2019-12-27 PROCEDURE — 71046 XR CHEST PA AND LATERAL: ICD-10-PCS | Mod: S$GLB,,, | Performed by: RADIOLOGY

## 2019-12-27 PROCEDURE — 71046 X-RAY EXAM CHEST 2 VIEWS: CPT | Mod: S$GLB,,, | Performed by: RADIOLOGY

## 2019-12-27 PROCEDURE — 99214 OFFICE O/P EST MOD 30 MIN: CPT | Mod: S$GLB,,, | Performed by: PHYSICIAN ASSISTANT

## 2019-12-27 PROCEDURE — 99214 PR OFFICE/OUTPT VISIT, EST, LEVL IV, 30-39 MIN: ICD-10-PCS | Mod: S$GLB,,, | Performed by: PHYSICIAN ASSISTANT

## 2019-12-27 PROCEDURE — 99499 RISK ADDL DX/OHS AUDIT: ICD-10-PCS | Mod: S$GLB,,, | Performed by: PHYSICIAN ASSISTANT

## 2019-12-27 PROCEDURE — 99499 UNLISTED E&M SERVICE: CPT | Mod: S$GLB,,, | Performed by: PHYSICIAN ASSISTANT

## 2019-12-27 RX ORDER — MOXIFLOXACIN HYDROCHLORIDE 400 MG/1
400 TABLET ORAL DAILY
Qty: 7 TABLET | Refills: 0 | Status: SHIPPED | OUTPATIENT
Start: 2019-12-27 | End: 2020-01-03

## 2019-12-27 RX ORDER — PREDNISONE 20 MG/1
20 TABLET ORAL DAILY
Qty: 5 TABLET | Refills: 0 | Status: SHIPPED | OUTPATIENT
Start: 2019-12-27 | End: 2020-01-01

## 2019-12-27 NOTE — PROGRESS NOTES
"Subjective:       Patient ID: Leyla Mari is a 74 y.o. female.    Vitals:  height is 4' 11" (1.499 m) and weight is 54 kg (119 lb). Her oral temperature is 98.3 °F (36.8 °C). Her blood pressure is 136/67 and her pulse is 92. Her respiration is 22 (abnormal) and oxygen saturation is 96%.     Chief Complaint: Shortness of Breath    Pt states x5 days SOB, fatigue, swelling to feet. Pt has hx of COPD. Pt states at 8am she did her nebulizer treatment.   Pt is on oxygen at 3L      Shortness of Breath   This is a new problem. The current episode started in the past 7 days. The problem occurs constantly. The problem has been gradually worsening. Associated symptoms include leg swelling. Pertinent negatives include no ear pain, fever, hemoptysis, rash, sore throat, sputum production, vomiting or wheezing. She has tried ipratropium inhalers (nebulizer, oxgyen ) for the symptoms. Her past medical history is significant for COPD.       Constitution: Negative for chills, sweating, fatigue and fever.   HENT: Negative for ear pain, congestion, sinus pain, sinus pressure, sore throat and voice change.    Neck: Negative for painful lymph nodes.   Cardiovascular: Positive for leg swelling.   Eyes: Negative for eye redness.   Respiratory: Positive for COPD and shortness of breath. Negative for chest tightness, cough, sputum production, bloody sputum, stridor, wheezing and asthma.    Gastrointestinal: Negative for nausea and vomiting.   Musculoskeletal: Negative for muscle ache.   Skin: Negative for rash.   Allergic/Immunologic: Negative for seasonal allergies and asthma.   Hematologic/Lymphatic: Negative for swollen lymph nodes.       Objective:      Physical Exam   Constitutional: She is oriented to person, place, and time. She appears well-developed and well-nourished. She is cooperative.  Non-toxic appearance. She does not appear ill. No distress.   HENT:   Head: Normocephalic and atraumatic.   Right Ear: Hearing, tympanic " membrane, external ear and ear canal normal.   Left Ear: Hearing, tympanic membrane, external ear and ear canal normal.   Nose: Nose normal. No mucosal edema or rhinorrhea. Right sinus exhibits no maxillary sinus tenderness and no frontal sinus tenderness. Left sinus exhibits no maxillary sinus tenderness and no frontal sinus tenderness.   Mouth/Throat: Oropharynx is clear and moist and mucous membranes are normal. No oropharyngeal exudate, posterior oropharyngeal edema or posterior oropharyngeal erythema.   Eyes: Conjunctivae, EOM and lids are normal. Right eye exhibits no discharge. Left eye exhibits no discharge. No scleral icterus.   Neck: Trachea normal, normal range of motion, full passive range of motion without pain and phonation normal. Neck supple.   Cardiovascular: Normal rate, regular rhythm and normal heart sounds. Exam reveals no gallop.   No murmur heard.  Pulses:       Dorsalis pedis pulses are 2+ on the right side, and 2+ on the left side.        Posterior tibial pulses are 2+ on the right side, and 2+ on the left side.   Pulmonary/Chest: Effort normal and breath sounds normal. No respiratory distress. She has no decreased breath sounds. She has no wheezes. She has no rhonchi. She has no rales.   Musculoskeletal: She exhibits no edema or deformity.        Right lower leg: Normal. She exhibits no tenderness, no bony tenderness, no swelling, no edema, no deformity and no laceration.        Left lower leg: Normal. She exhibits no tenderness, no bony tenderness, no swelling, no edema, no deformity and no laceration.   Lymphadenopathy:        Head (right side): No submandibular and no tonsillar adenopathy present.        Head (left side): No submandibular and no tonsillar adenopathy present.   Neurological: She is alert and oriented to person, place, and time. Coordination normal.   Skin: Skin is warm, dry, intact, not diaphoretic and not pale. not left lower leg and not right lower leg  Psychiatric: She  has a normal mood and affect. Her speech is normal and behavior is normal. Judgment and thought content normal. Cognition and memory are normal.   Nursing note and vitals reviewed.        Assessment:       1. COPD exacerbation      Xr Chest Pa And Lateral    Result Date: 12/27/2019  EXAMINATION: XR CHEST PA AND LATERAL CLINICAL HISTORY: Shortness of breath TECHNIQUE: PA and lateral views of the chest were performed. COMPARISON: 10/08/2019 FINDINGS: Heart size and pulmonary vascularity are within normal limits.  Atherosclerotic calcification in the wall of the aortic arch and descending aorta.  Lungs are well-expanded and appear somewhat hyperinflated.  Lateral view demonstrates increase in the retrosternal air space and some flattening of the diaphragms suggesting some degree of COPD.  No acute consolidation no definite pleural fluid.  No pneumothorax.  Mild thoracolumbar scoliosis with degenerative changes involving the thoracic spine     No active cardiopulmonary disease and no significant interval change Electronically signed by: Noe Crowe MD Date:    12/27/2019 Time:    13:21    Plan:       Patient states this occurs twice a year and she is treated for a COPD exacerbation. Saw in chart that patient was previously treated with avelox and prednisone. She states her pulmonologist typically will treat her but he is out of the office for the holidays. Treating as discussed.    COPD exacerbation  -     XR CHEST PA AND LATERAL; Future; Expected date: 12/27/2019  -     moxifloxacin (AVELOX) 400 mg tablet; Take 1 tablet (400 mg total) by mouth once daily. for 7 days  Dispense: 7 tablet; Refill: 0  -     predniSONE (DELTASONE) 20 MG tablet; Take 1 tablet (20 mg total) by mouth once daily. for 5 days  Dispense: 5 tablet; Refill: 0      Patient Instructions   -Please take antibiotic to completion.  -Take oral steroid as prescribed.  -Follow up with your pulmonologist.    Please follow up with your primary care provider  within 2-5 days if your signs and symptoms have not resolved or worsen.     If your condition worsens or fails to improve we recommend that you receive another evaluation at the emergency room immediately or contact your primary medical clinic to discuss your concerns.   You must understand that you have received an Urgent Care treatment only and that you may be released before all of your medical problems are known or treated. You, the patient, will arrange for follow up care as instructed.          COPD Flare    You have had a flare-up of your COPD.  COPD, or chronic obstructive pulmonary disease, is a common lung disease. It causes your airways to become irritated and narrower. This makes it harder for you to breathe. Emphysema and chronic bronchitis are both types of COPD. This is a chronic condition, which means you always have it. Sometimes it gets worse. When this happens, it is called a flare-up.  Symptoms of COPD  People with COPD may have symptoms most of the time. In a flare-up, your symptoms get worse. These symptoms may mean you are having a flare-up:  · Shortness of breath, shallow or rapid breathing, or wheezing that gets worse  · Lung infection  · Cough that gets worse  · More mucus, thicker mucus or mucus of a different color  · Tiredness, decreased energy, or trouble doing your usual activities  · Fever  · Chest tightness  · Your symptoms dont get better even when you use your usual medicines, inhalers, and nebulizer  · Trouble talking  · You feel confused  Causes of flare-ups  Unfortunately, a flare-up can happen even though you did everything right, and you followed your doctors instructions. Some causes of flare-ups are:  · Smoking or secondhand smoke  · Colds, the flu, or respiratory infections  · Air pollution  · Sudden change in the weather  · Dust, irritating chemicals, or strong fumes  · Not taking your medicines as prescribed  Home care  Here are some things you can do at home to treat a  flare-up:  · Try not to panic. This makes it harder to breathe, and keeps you from doing the right things.  · Dont smoke or be around others who are smoking.  · Try to drink more fluids than usual during a flare-up, unless your doctor has told you not to because of heart and kidney problems. More fluids can help loosen the mucus.  · Use your inhalers and nebulizer, if you have one, as you have been told to.  · If you were given antibiotics, take them until they are used up or your doctor tells you to stop. Its important to finish the antibiotics, even though you feel better. This will make sure the infection has cleared.  · If you were given prednisone or another steroid, finish it even if you feel better.  Preventing a flare-up  Even though flare-ups happen, the best way to treat one is to prevent it before it starts. Here are some pointers:  · Dont smoke or be around others who are smoking.  · Take your medicines as you have been told.  · Talk with your doctor about getting a flu shot every year. Also find out if you need a pneumonia shot.  · If there is a weather advisory warning to stay indoors, try to stay inside when possible.  · Try to eat healthy and get plenty of sleep.  · Try to avoid things that usually set you off, like dust, chemical fumes, hairsprays, or strong perfumes.  Follow-up care  Follow up with your healthcare provider, or as advised.  If a culture was done, you will be told if your treatment needs to be changed. You can call as directed for the results.  If X-rays were done, you will be notified of any new findings that may affect your care.  Call 911  Call 911 if any of these occur:  · You have trouble breathing  · You feel confused or its difficult to wake you up  · You faint or lose consciousness  · You have a rapid heart rate  · You have new pain in your chest, arm, shoulder, neck or upper back  When to seek medical advice  Call your healthcare provider right away if any of these  occur:  · Wheezing or shortness of breath gets worse  · You need to use your inhalers more often than usual without relief  · Fever of 100.4°F (38ºC) or higher, or as directed by your healthcare provider  · Coughing up lots of dark-colored or bloody mucus (sputum)  · Chest pain with each breath  · You do not start to get better within 24 hours  · Swelling of your ankles gets worse  · Dizziness or weakness  Date Last Reviewed: 9/1/2016  © 5494-6343 [employer]. [address] [place] [address], [place] [address]. All rights reserved. This information is not intended as a substitute for professional medical care. Always follow your healthcare professional's instructions.

## 2019-12-27 NOTE — PATIENT INSTRUCTIONS
-Please take antibiotic to completion.  -Take oral steroid as prescribed.  -Follow up with your pulmonologist.    Please follow up with your primary care provider within 2-5 days if your signs and symptoms have not resolved or worsen.     If your condition worsens or fails to improve we recommend that you receive another evaluation at the emergency room immediately or contact your primary medical clinic to discuss your concerns.   You must understand that you have received an Urgent Care treatment only and that you may be released before all of your medical problems are known or treated. You, the patient, will arrange for follow up care as instructed.          COPD Flare    You have had a flare-up of your COPD.  COPD, or chronic obstructive pulmonary disease, is a common lung disease. It causes your airways to become irritated and narrower. This makes it harder for you to breathe. Emphysema and chronic bronchitis are both types of COPD. This is a chronic condition, which means you always have it. Sometimes it gets worse. When this happens, it is called a flare-up.  Symptoms of COPD  People with COPD may have symptoms most of the time. In a flare-up, your symptoms get worse. These symptoms may mean you are having a flare-up:  · Shortness of breath, shallow or rapid breathing, or wheezing that gets worse  · Lung infection  · Cough that gets worse  · More mucus, thicker mucus or mucus of a different color  · Tiredness, decreased energy, or trouble doing your usual activities  · Fever  · Chest tightness  · Your symptoms dont get better even when you use your usual medicines, inhalers, and nebulizer  · Trouble talking  · You feel confused  Causes of flare-ups  Unfortunately, a flare-up can happen even though you did everything right, and you followed your doctors instructions. Some causes of flare-ups are:  · Smoking or secondhand smoke  · Colds, the flu, or respiratory infections  · Air pollution  · Sudden change in  the weather  · Dust, irritating chemicals, or strong fumes  · Not taking your medicines as prescribed  Home care  Here are some things you can do at home to treat a flare-up:  · Try not to panic. This makes it harder to breathe, and keeps you from doing the right things.  · Dont smoke or be around others who are smoking.  · Try to drink more fluids than usual during a flare-up, unless your doctor has told you not to because of heart and kidney problems. More fluids can help loosen the mucus.  · Use your inhalers and nebulizer, if you have one, as you have been told to.  · If you were given antibiotics, take them until they are used up or your doctor tells you to stop. Its important to finish the antibiotics, even though you feel better. This will make sure the infection has cleared.  · If you were given prednisone or another steroid, finish it even if you feel better.  Preventing a flare-up  Even though flare-ups happen, the best way to treat one is to prevent it before it starts. Here are some pointers:  · Dont smoke or be around others who are smoking.  · Take your medicines as you have been told.  · Talk with your doctor about getting a flu shot every year. Also find out if you need a pneumonia shot.  · If there is a weather advisory warning to stay indoors, try to stay inside when possible.  · Try to eat healthy and get plenty of sleep.  · Try to avoid things that usually set you off, like dust, chemical fumes, hairsprays, or strong perfumes.  Follow-up care  Follow up with your healthcare provider, or as advised.  If a culture was done, you will be told if your treatment needs to be changed. You can call as directed for the results.  If X-rays were done, you will be notified of any new findings that may affect your care.  Call 911  Call 911 if any of these occur:  · You have trouble breathing  · You feel confused or its difficult to wake you up  · You faint or lose consciousness  · You have a rapid heart  rate  · You have new pain in your chest, arm, shoulder, neck or upper back  When to seek medical advice  Call your healthcare provider right away if any of these occur:  · Wheezing or shortness of breath gets worse  · You need to use your inhalers more often than usual without relief  · Fever of 100.4°F (38ºC) or higher, or as directed by your healthcare provider  · Coughing up lots of dark-colored or bloody mucus (sputum)  · Chest pain with each breath  · You do not start to get better within 24 hours  · Swelling of your ankles gets worse  · Dizziness or weakness  Date Last Reviewed: 9/1/2016  © 0945-6725 [employer]. [address] [place] [address], [place] [address]. All rights reserved. This information is not intended as a substitute for professional medical care. Always follow your healthcare professional's instructions.

## 2020-01-06 ENCOUNTER — PATIENT MESSAGE (OUTPATIENT)
Dept: PULMONOLOGY | Facility: CLINIC | Age: 75
End: 2020-01-06

## 2020-01-06 DIAGNOSIS — J44.1 ASTHMA EXACERBATION IN COPD: Primary | ICD-10-CM

## 2020-01-06 DIAGNOSIS — J45.901 ASTHMA EXACERBATION IN COPD: Primary | ICD-10-CM

## 2020-01-06 RX ORDER — PREDNISONE 10 MG/1
TABLET ORAL
Qty: 35 TABLET | Refills: 1 | Status: SHIPPED | OUTPATIENT
Start: 2020-01-06 | End: 2020-02-19 | Stop reason: SDUPTHER

## 2020-02-04 ENCOUNTER — PATIENT MESSAGE (OUTPATIENT)
Dept: PULMONOLOGY | Facility: CLINIC | Age: 75
End: 2020-02-04

## 2020-02-18 ENCOUNTER — PATIENT MESSAGE (OUTPATIENT)
Dept: PULMONOLOGY | Facility: CLINIC | Age: 75
End: 2020-02-18

## 2020-02-19 DIAGNOSIS — J45.901 ASTHMA EXACERBATION IN COPD: ICD-10-CM

## 2020-02-19 DIAGNOSIS — J44.1 ASTHMA EXACERBATION IN COPD: ICD-10-CM

## 2020-02-19 RX ORDER — PREDNISONE 10 MG/1
TABLET ORAL
Qty: 35 TABLET | Refills: 2 | Status: ON HOLD | OUTPATIENT
Start: 2020-02-19 | End: 2020-03-12 | Stop reason: HOSPADM

## 2020-02-26 ENCOUNTER — PATIENT MESSAGE (OUTPATIENT)
Dept: PULMONOLOGY | Facility: CLINIC | Age: 75
End: 2020-02-26

## 2020-02-27 ENCOUNTER — TELEPHONE (OUTPATIENT)
Dept: INTERNAL MEDICINE | Facility: CLINIC | Age: 75
End: 2020-02-27

## 2020-02-27 ENCOUNTER — PATIENT MESSAGE (OUTPATIENT)
Dept: INTERNAL MEDICINE | Facility: CLINIC | Age: 75
End: 2020-02-27

## 2020-02-27 RX ORDER — CIPROFLOXACIN 500 MG/1
500 TABLET ORAL 2 TIMES DAILY
Qty: 10 TABLET | Refills: 0 | Status: SHIPPED | OUTPATIENT
Start: 2020-02-27 | End: 2020-02-27 | Stop reason: SDUPTHER

## 2020-02-27 RX ORDER — CIPROFLOXACIN 500 MG/1
500 TABLET ORAL 2 TIMES DAILY
Qty: 10 TABLET | Refills: 0 | Status: SHIPPED | OUTPATIENT
Start: 2020-02-27 | End: 2020-03-03

## 2020-02-27 NOTE — TELEPHONE ENCOUNTER
Called to speak with the patient and she states she has some pressure and no blood no fever and its cloudy urine she has a copd flare up and she has not been drinking enough water she wants a Rx she has been taking a lot of medications she advised that she can not drive as she is having difficulty breathing.

## 2020-02-27 NOTE — TELEPHONE ENCOUNTER
----- Message from Aisha Solitario sent at 2/27/2020  8:57 AM CST -----  Contact: self/[phone]  .1 Patient would like to get medical advice.  Symptoms (please be specific): possible bladder infection - urine cloudy, burning sensation  How long has patient had these symptoms: 02/25/20  Pharmacy name and phone#:[id number] - [place], [place] - [address] [phone] (Phone) [phone] (Fax)  Any drug allergies: onfile  Comments: Patient would like to get medical advice.

## 2020-03-03 ENCOUNTER — PATIENT OUTREACH (OUTPATIENT)
Dept: ADMINISTRATIVE | Facility: OTHER | Age: 75
End: 2020-03-03

## 2020-03-05 ENCOUNTER — OFFICE VISIT (OUTPATIENT)
Dept: PULMONOLOGY | Facility: CLINIC | Age: 75
End: 2020-03-05
Payer: MEDICARE

## 2020-03-05 ENCOUNTER — HOSPITAL ENCOUNTER (OUTPATIENT)
Dept: PULMONOLOGY | Facility: CLINIC | Age: 75
Discharge: HOME | End: 2020-03-05
Payer: MEDICARE

## 2020-03-05 VITALS
DIASTOLIC BLOOD PRESSURE: 74 MMHG | SYSTOLIC BLOOD PRESSURE: 136 MMHG | HEART RATE: 74 BPM | WEIGHT: 116 LBS | BODY MASS INDEX: 23.39 KG/M2 | OXYGEN SATURATION: 98 % | HEIGHT: 59 IN

## 2020-03-05 DIAGNOSIS — J43.2 CENTRILOBULAR EMPHYSEMA: ICD-10-CM

## 2020-03-05 DIAGNOSIS — J44.1 COPD WITH ACUTE EXACERBATION: ICD-10-CM

## 2020-03-05 DIAGNOSIS — J44.1 COPD EXACERBATION: Primary | ICD-10-CM

## 2020-03-05 DIAGNOSIS — J44.9 CHRONIC OBSTRUCTIVE PULMONARY DISEASE: Primary | ICD-10-CM

## 2020-03-05 LAB
FEF 25 75 LLN: 0.63
FEF 25 75 PRE REF: 10.1 %
FEF 25 75 REF: 1.47
FEV05 LLN: 0.52
FEV05 REF: 1.38
FEV1 FVC LLN: 64
FEV1 FVC PRE REF: 44.4 %
FEV1 FVC REF: 78
FEV1 LLN: 1.2
FEV1 PRE REF: 18.8 %
FEV1 REF: 1.7
FVC LLN: 1.56
FVC PRE REF: 42 %
FVC REF: 2.2
PEF LLN: 2.97
PEF PRE REF: 35 %
PEF REF: 4.43
PHYSICIAN COMMENT: ABNORMAL
PRE FEF 25 75: 0.15 L/S (ref 0.63–2.31)
PRE FET 100: 7.35 SEC
PRE FEV05 REF: 15.2 %
PRE FEV1 FVC: 34.6 % (ref 63.67–92.34)
PRE FEV1: 0.32 L (ref 1.2–2.19)
PRE FEV5: 0.21 L (ref 0.52–2.24)
PRE FVC: 0.92 L (ref 1.56–2.83)
PRE PEF: 1.55 L/S (ref 2.97–5.88)

## 2020-03-05 PROCEDURE — 99214 PR OFFICE/OUTPT VISIT, EST, LEVL IV, 30-39 MIN: ICD-10-PCS | Mod: 25,S$GLB,, | Performed by: INTERNAL MEDICINE

## 2020-03-05 PROCEDURE — 99214 OFFICE O/P EST MOD 30 MIN: CPT | Mod: 25,S$GLB,, | Performed by: INTERNAL MEDICINE

## 2020-03-05 PROCEDURE — 94640 PR INHAL RX, AIRWAY OBST/DX SPUTUM INDUCT: ICD-10-PCS | Mod: S$GLB,,, | Performed by: INTERNAL MEDICINE

## 2020-03-05 PROCEDURE — 1101F PT FALLS ASSESS-DOCD LE1/YR: CPT | Mod: CPTII,S$GLB,, | Performed by: INTERNAL MEDICINE

## 2020-03-05 PROCEDURE — 99499 UNLISTED E&M SERVICE: CPT | Mod: S$GLB,,, | Performed by: INTERNAL MEDICINE

## 2020-03-05 PROCEDURE — 1101F PR PT FALLS ASSESS DOC 0-1 FALLS W/OUT INJ PAST YR: ICD-10-PCS | Mod: CPTII,S$GLB,, | Performed by: INTERNAL MEDICINE

## 2020-03-05 PROCEDURE — 94010 BREATHING CAPACITY TEST: CPT | Mod: S$GLB,,, | Performed by: INTERNAL MEDICINE

## 2020-03-05 PROCEDURE — 1159F PR MEDICATION LIST DOCUMENTED IN MEDICAL RECORD: ICD-10-PCS | Mod: S$GLB,,, | Performed by: INTERNAL MEDICINE

## 2020-03-05 PROCEDURE — 1126F AMNT PAIN NOTED NONE PRSNT: CPT | Mod: S$GLB,,, | Performed by: INTERNAL MEDICINE

## 2020-03-05 PROCEDURE — 3078F DIAST BP <80 MM HG: CPT | Mod: CPTII,S$GLB,, | Performed by: INTERNAL MEDICINE

## 2020-03-05 PROCEDURE — 99999 PR PBB SHADOW E&M-EST. PATIENT-LVL IV: ICD-10-PCS | Mod: PBBFAC,,, | Performed by: INTERNAL MEDICINE

## 2020-03-05 PROCEDURE — 3075F SYST BP GE 130 - 139MM HG: CPT | Mod: CPTII,S$GLB,, | Performed by: INTERNAL MEDICINE

## 2020-03-05 PROCEDURE — 99499 RISK ADDL DX/OHS AUDIT: ICD-10-PCS | Mod: S$GLB,,, | Performed by: INTERNAL MEDICINE

## 2020-03-05 PROCEDURE — 94010 BREATHING CAPACITY TEST: ICD-10-PCS | Mod: S$GLB,,, | Performed by: INTERNAL MEDICINE

## 2020-03-05 PROCEDURE — 1126F PR PAIN SEVERITY QUANTIFIED, NO PAIN PRESENT: ICD-10-PCS | Mod: S$GLB,,, | Performed by: INTERNAL MEDICINE

## 2020-03-05 PROCEDURE — 1159F MED LIST DOCD IN RCRD: CPT | Mod: S$GLB,,, | Performed by: INTERNAL MEDICINE

## 2020-03-05 PROCEDURE — 3078F PR MOST RECENT DIASTOLIC BLOOD PRESSURE < 80 MM HG: ICD-10-PCS | Mod: CPTII,S$GLB,, | Performed by: INTERNAL MEDICINE

## 2020-03-05 PROCEDURE — 99999 PR PBB SHADOW E&M-EST. PATIENT-LVL IV: CPT | Mod: PBBFAC,,, | Performed by: INTERNAL MEDICINE

## 2020-03-05 PROCEDURE — 3075F PR MOST RECENT SYSTOLIC BLOOD PRESS GE 130-139MM HG: ICD-10-PCS | Mod: CPTII,S$GLB,, | Performed by: INTERNAL MEDICINE

## 2020-03-05 PROCEDURE — 94640 AIRWAY INHALATION TREATMENT: CPT | Mod: S$GLB,,, | Performed by: INTERNAL MEDICINE

## 2020-03-06 NOTE — PROGRESS NOTES
Subjective:      Patient ID: Leyla Mari is a 74 y.o. female.    Chief Complaint: COPD    HPI  73 yo female with severe but stable  Emphysema   A pink puffer, comes for periodic follow up. She has had congestion and a cough for the past several weeks and the only med that seems to help is prednisone Her lat dose was several days ago.  PFT's today FVC: 0.92 liters  Fev-1:0.32  Liters  and Ratio  35%  Study in  July: FVC: 0.97 liters  Fev-1:0.37 liters  48%   Not a dramatic difference  March, 2017:  FVC:  1.02 Liters:43%   Fev-1:0.38 liters 37%   Remarkably stavbe over  3 years   She now uses oxygen for ambulation when she leaves home.     Uses Symbiocrt bid   Will ask her to use her albuterol in nebulizer mid AM and mid PM and maintainon 10 mg of prednisone.         No flowsheet data found.  Review of Systems   Constitutional: Negative.    HENT: Negative.    Eyes: Negative.    Respiratory: Positive for dyspnea on extertion.         Emphysema    Cardiovascular: Negative.    Genitourinary: Negative.    Musculoskeletal: Negative.    Skin: Negative.         Hx of repeat bouts of  Cellulitis that responded to IV antibiotics doing well   Gastrointestinal: Negative.    Neurological: Negative.    Psychiatric/Behavioral: Negative.      Objective:     Physical Exam   Constitutional: She is oriented to person, place, and time. She appears well-developed and well-nourished. No distress.   HENT:   Head: Normocephalic and atraumatic.   Right Ear: External ear normal.   Left Ear: External ear normal.   Nose: Nose normal.   Mouth/Throat: Oropharynx is clear and moist.   Eyes: Pupils are equal, round, and reactive to light. Conjunctivae and EOM are normal.   Neck: Normal range of motion. Neck supple. No JVD present. No thyromegaly present.   Cardiovascular: Normal rate, regular rhythm, normal heart sounds and intact distal pulses. Exam reveals no gallop.   No murmur heard.  Pulmonary/Chest: Breath sounds normal. No stridor. No  "respiratory distress. She has no wheezes. She has no rales. She exhibits no tenderness.   "Silent Chest"   Sa02: 94% on RA    Peak flow 110 l/mi, no change aftr albuterol nebulizer treatment   Abdominal: Soft. Bowel sounds are normal. She exhibits no distension and no mass. There is no tenderness. There is no rebound and no guarding.   Musculoskeletal: Normal range of motion. She exhibits no edema.   Lymphadenopathy:     She has no cervical adenopathy.   Neurological: She is alert and oriented to person, place, and time. She has normal reflexes. She displays normal reflexes. No cranial nerve deficit.   Skin: Skin is warm and dry. No rash noted.   Psychiatric: She has a normal mood and affect. Her behavior is normal. Judgment and thought content normal.   Nursing note and vitals reviewed.      Assessment:     No diagnosis found.  Outpatient Encounter Medications as of 3/5/2020   Medication Sig Dispense Refill    albuterol (PROVENTIL) 2.5 mg /3 mL (0.083 %) nebulizer solution Take 3 mLs (2.5 mg total) by nebulization every 4 (four) hours as needed for Wheezing or Shortness of Breath. 120 each 11    albuterol (PROVENTIL/VENTOLIN HFA) 90 mcg/actuation inhaler Inhale 2 puffs into the lungs every 6 (six) hours as needed for Wheezing or Shortness of Breath (ventolin hfa per pts insurance). Rescue 3 Inhaler 3    ALPRAZolam (XANAX) 0.25 MG tablet Take 1 tablet (0.25 mg total) by mouth 3 (three) times daily as needed for Anxiety. 90 tablet 3    aspirin (ECOTRIN) 81 MG EC tablet Take 81 mg by mouth once daily.        budesonide-formoterol 160-4.5 mcg (SYMBICORT) 160-4.5 mcg/actuation HFAA INHALE 2 PUFFS INTO THE LUNGS EVERY 12 HOURS 10.2 Inhaler 11    diltiaZEM (CARDIZEM CD) 180 MG 24 hr capsule Take 1 capsule (180 mg total) by mouth once daily. 90 capsule 3    FLUZONE HIGH-DOSE 2018-19, PF, 180 mcg/0.5 mL vaccine       FLUZONE HIGH-DOSE 2019-20, PF, 180 mcg/0.5 mL Syrg 0.5 mLs once.      furosemide (LASIX) 20 MG " tablet Take 1 tablet (20 mg total) by mouth once daily. 3 tablet 0    glycopyrrolate-formoterol (BEVESPI AEROSPHERE) 9-4.8 mcg HFAA Inhale 2 puffs into the lungs 2 (two) times daily. Controller (Patient not taking: Reported on 12/27/2019) 1 Inhaler 11    hydroCHLOROthiazide (HYDRODIURIL) 25 MG tablet TAKE 1 TABLET (25 MG TOTAL) BY MOUTH ONCE DAILY. 90 tablet 3    INCRUSE ELLIPTA 62.5 mcg/actuation DsDv       potassium chloride SA (K-DUR,KLOR-CON) 20 MEQ tablet TAKE 1 TABLET BY MOUTH EVERY DAY 90 tablet 3    pravastatin (PRAVACHOL) 20 MG tablet Take 1 tablet (20 mg total) by mouth once daily. 90 tablet 2    predniSONE (DELTASONE) 10 MG tablet 3 tabs every AM x 7 days, then 2 tabs every AM x 7 days /stop 35 tablet 2    vitamin D 1000 units Tab Take 1,000 Units by mouth once daily.       No facility-administered encounter medications on file as of 3/5/2020.        Plan:   Pulmonary Emphysema,,  PFT's stable but she is more symptomatic. Using steroids more than probably indicated  Problem List Items Addressed This Visit     None

## 2020-03-09 RX ORDER — UMECLIDINIUM 62.5 UG/1
AEROSOL, POWDER ORAL
Qty: 30 EACH | Refills: 11 | Status: SHIPPED | OUTPATIENT
Start: 2020-03-09 | End: 2021-03-11 | Stop reason: SDUPTHER

## 2020-03-10 ENCOUNTER — HOSPITAL ENCOUNTER (INPATIENT)
Facility: HOSPITAL | Age: 75
LOS: 2 days | Discharge: HOME | DRG: 191 | End: 2020-03-12
Attending: EMERGENCY MEDICINE | Admitting: EMERGENCY MEDICINE
Payer: MEDICARE

## 2020-03-10 DIAGNOSIS — J44.1 COPD EXACERBATION: Primary | ICD-10-CM

## 2020-03-10 DIAGNOSIS — E87.1 HYPONATREMIA: ICD-10-CM

## 2020-03-10 DIAGNOSIS — R07.9 CHEST PAIN: ICD-10-CM

## 2020-03-10 DIAGNOSIS — R06.02 SOB (SHORTNESS OF BREATH): ICD-10-CM

## 2020-03-10 LAB
ALBUMIN SERPL BCP-MCNC: 2.9 G/DL (ref 3.5–5.2)
ALBUMIN SERPL BCP-MCNC: 3.1 G/DL (ref 3.5–5.2)
ALLENS TEST: ABNORMAL
ALP SERPL-CCNC: 80 U/L (ref 55–135)
ALP SERPL-CCNC: 88 U/L (ref 55–135)
ALT SERPL W/O P-5'-P-CCNC: 9 U/L (ref 10–44)
ALT SERPL W/O P-5'-P-CCNC: 9 U/L (ref 10–44)
ANION GAP SERPL CALC-SCNC: 11 MMOL/L (ref 8–16)
ANION GAP SERPL CALC-SCNC: 14 MMOL/L (ref 8–16)
AST SERPL-CCNC: 13 U/L (ref 10–40)
AST SERPL-CCNC: 13 U/L (ref 10–40)
BASOPHILS # BLD AUTO: 0.03 K/UL (ref 0–0.2)
BASOPHILS NFR BLD: 0.3 % (ref 0–1.9)
BILIRUB SERPL-MCNC: 0.3 MG/DL (ref 0.1–1)
BILIRUB SERPL-MCNC: 0.5 MG/DL (ref 0.1–1)
BILIRUB UR QL STRIP: NEGATIVE
BNP SERPL-MCNC: 45 PG/ML (ref 0–99)
BUN SERPL-MCNC: 7 MG/DL (ref 8–23)
BUN SERPL-MCNC: 8 MG/DL (ref 6–30)
BUN SERPL-MCNC: 8 MG/DL (ref 8–23)
CALCIUM SERPL-MCNC: 9.3 MG/DL (ref 8.7–10.5)
CALCIUM SERPL-MCNC: 9.8 MG/DL (ref 8.7–10.5)
CHLORIDE SERPL-SCNC: 88 MMOL/L (ref 95–110)
CHLORIDE SERPL-SCNC: 89 MMOL/L (ref 95–110)
CHLORIDE SERPL-SCNC: 94 MMOL/L (ref 95–110)
CLARITY UR REFRACT.AUTO: CLEAR
CO2 SERPL-SCNC: 25 MMOL/L (ref 23–29)
CO2 SERPL-SCNC: 26 MMOL/L (ref 23–29)
COLOR UR AUTO: YELLOW
CREAT SERPL-MCNC: 0.2 MG/DL (ref 0.5–1.4)
CREAT SERPL-MCNC: 0.6 MG/DL (ref 0.5–1.4)
CREAT SERPL-MCNC: 0.6 MG/DL (ref 0.5–1.4)
D DIMER PPP IA.FEU-MCNC: 0.62 MG/L FEU
DIFFERENTIAL METHOD: ABNORMAL
EOSINOPHIL # BLD AUTO: 0 K/UL (ref 0–0.5)
EOSINOPHIL NFR BLD: 0.1 % (ref 0–8)
ERYTHROCYTE [DISTWIDTH] IN BLOOD BY AUTOMATED COUNT: 14.4 % (ref 11.5–14.5)
EST. GFR  (AFRICAN AMERICAN): >60 ML/MIN/1.73 M^2
EST. GFR  (AFRICAN AMERICAN): >60 ML/MIN/1.73 M^2
EST. GFR  (NON AFRICAN AMERICAN): >60 ML/MIN/1.73 M^2
EST. GFR  (NON AFRICAN AMERICAN): >60 ML/MIN/1.73 M^2
GLUCOSE SERPL-MCNC: 146 MG/DL (ref 70–110)
GLUCOSE SERPL-MCNC: 202 MG/DL (ref 70–110)
GLUCOSE SERPL-MCNC: 226 MG/DL (ref 70–110)
GLUCOSE UR QL STRIP: NEGATIVE
HCO3 UR-SCNC: 29.1 MMOL/L (ref 24–28)
HCT VFR BLD AUTO: 44 % (ref 37–48.5)
HCT VFR BLD CALC: 42 %PCV (ref 36–54)
HGB BLD-MCNC: 14.2 G/DL (ref 12–16)
HGB UR QL STRIP: ABNORMAL
IMM GRANULOCYTES # BLD AUTO: 0.06 K/UL (ref 0–0.04)
IMM GRANULOCYTES NFR BLD AUTO: 0.5 % (ref 0–0.5)
KETONES UR QL STRIP: ABNORMAL
LEUKOCYTE ESTERASE UR QL STRIP: NEGATIVE
LYMPHOCYTES # BLD AUTO: 0.3 K/UL (ref 1–4.8)
LYMPHOCYTES NFR BLD: 2.3 % (ref 18–48)
MAGNESIUM SERPL-MCNC: 1.7 MG/DL (ref 1.6–2.6)
MCH RBC QN AUTO: 28.3 PG (ref 27–31)
MCHC RBC AUTO-ENTMCNC: 32.3 G/DL (ref 32–36)
MCV RBC AUTO: 88 FL (ref 82–98)
MICROSCOPIC COMMENT: NORMAL
MONOCYTES # BLD AUTO: 0.4 K/UL (ref 0.3–1)
MONOCYTES NFR BLD: 3.3 % (ref 4–15)
NEUTROPHILS # BLD AUTO: 10.7 K/UL (ref 1.8–7.7)
NEUTROPHILS NFR BLD: 93.5 % (ref 38–73)
NITRITE UR QL STRIP: NEGATIVE
NRBC BLD-RTO: 0 /100 WBC
PCO2 BLDA: 46.2 MMHG (ref 35–45)
PH SMN: 7.41 [PH] (ref 7.35–7.45)
PH UR STRIP: 6 [PH] (ref 5–8)
PLATELET # BLD AUTO: 293 K/UL (ref 150–350)
PMV BLD AUTO: 9.3 FL (ref 9.2–12.9)
PO2 BLDA: 40 MMHG (ref 40–60)
POC BE: 4 MMOL/L
POC IONIZED CALCIUM: 1.04 MMOL/L (ref 1.06–1.42)
POC SATURATED O2: 74 % (ref 95–100)
POC TCO2 (MEASURED): 26 MMOL/L (ref 23–29)
POC TCO2: 30 MMOL/L (ref 24–29)
POTASSIUM BLD-SCNC: 3.7 MMOL/L (ref 3.5–5.1)
POTASSIUM SERPL-SCNC: 3.7 MMOL/L (ref 3.5–5.1)
POTASSIUM SERPL-SCNC: 4 MMOL/L (ref 3.5–5.1)
PROT SERPL-MCNC: 6.8 G/DL (ref 6–8.4)
PROT SERPL-MCNC: 7.2 G/DL (ref 6–8.4)
PROT UR QL STRIP: NEGATIVE
RBC # BLD AUTO: 5.02 M/UL (ref 4–5.4)
RBC #/AREA URNS AUTO: 3 /HPF (ref 0–4)
SAMPLE: ABNORMAL
SAMPLE: ABNORMAL
SITE: ABNORMAL
SODIUM BLD-SCNC: 124 MMOL/L (ref 136–145)
SODIUM SERPL-SCNC: 127 MMOL/L (ref 136–145)
SODIUM SERPL-SCNC: 131 MMOL/L (ref 136–145)
SP GR UR STRIP: 1.01 (ref 1–1.03)
TROPONIN I SERPL DL<=0.01 NG/ML-MCNC: <0.006 NG/ML (ref 0–0.03)
TROPONIN I SERPL DL<=0.01 NG/ML-MCNC: <0.006 NG/ML (ref 0–0.03)
URN SPEC COLLECT METH UR: ABNORMAL
WBC # BLD AUTO: 11.42 K/UL (ref 3.9–12.7)
WBC #/AREA URNS AUTO: 1 /HPF (ref 0–5)

## 2020-03-10 PROCEDURE — 25000242 PHARM REV CODE 250 ALT 637 W/ HCPCS

## 2020-03-10 PROCEDURE — 99223 PR INITIAL HOSPITAL CARE,LEVL III: ICD-10-PCS | Mod: AI,GC,, | Performed by: STUDENT IN AN ORGANIZED HEALTH CARE EDUCATION/TRAINING PROGRAM

## 2020-03-10 PROCEDURE — 87449 NOS EACH ORGANISM AG IA: CPT

## 2020-03-10 PROCEDURE — 99285 EMERGENCY DEPT VISIT HI MDM: CPT | Mod: ,,, | Performed by: EMERGENCY MEDICINE

## 2020-03-10 PROCEDURE — 93005 ELECTROCARDIOGRAM TRACING: CPT

## 2020-03-10 PROCEDURE — 11000001 HC ACUTE MED/SURG PRIVATE ROOM

## 2020-03-10 PROCEDURE — 96374 THER/PROPH/DIAG INJ IV PUSH: CPT

## 2020-03-10 PROCEDURE — 80053 COMPREHEN METABOLIC PANEL: CPT

## 2020-03-10 PROCEDURE — 94761 N-INVAS EAR/PLS OXIMETRY MLT: CPT

## 2020-03-10 PROCEDURE — 27000221 HC OXYGEN, UP TO 24 HOURS

## 2020-03-10 PROCEDURE — 80047 BASIC METABLC PNL IONIZED CA: CPT

## 2020-03-10 PROCEDURE — 99285 EMERGENCY DEPT VISIT HI MDM: CPT | Mod: 25

## 2020-03-10 PROCEDURE — 25000003 PHARM REV CODE 250: Performed by: STUDENT IN AN ORGANIZED HEALTH CARE EDUCATION/TRAINING PROGRAM

## 2020-03-10 PROCEDURE — 85025 COMPLETE CBC W/AUTO DIFF WBC: CPT

## 2020-03-10 PROCEDURE — 99223 1ST HOSP IP/OBS HIGH 75: CPT | Mod: AI,GC,, | Performed by: STUDENT IN AN ORGANIZED HEALTH CARE EDUCATION/TRAINING PROGRAM

## 2020-03-10 PROCEDURE — 94640 AIRWAY INHALATION TREATMENT: CPT

## 2020-03-10 PROCEDURE — 63600175 PHARM REV CODE 636 W HCPCS: Performed by: STUDENT IN AN ORGANIZED HEALTH CARE EDUCATION/TRAINING PROGRAM

## 2020-03-10 PROCEDURE — 63600175 PHARM REV CODE 636 W HCPCS: Performed by: EMERGENCY MEDICINE

## 2020-03-10 PROCEDURE — 27100107 HC POCKET PEAK FLOW METER

## 2020-03-10 PROCEDURE — 99900035 HC TECH TIME PER 15 MIN (STAT)

## 2020-03-10 PROCEDURE — 84484 ASSAY OF TROPONIN QUANT: CPT

## 2020-03-10 PROCEDURE — 93010 ELECTROCARDIOGRAM REPORT: CPT | Mod: ,,, | Performed by: INTERNAL MEDICINE

## 2020-03-10 PROCEDURE — 25000003 PHARM REV CODE 250: Performed by: EMERGENCY MEDICINE

## 2020-03-10 PROCEDURE — 83735 ASSAY OF MAGNESIUM: CPT

## 2020-03-10 PROCEDURE — 25000242 PHARM REV CODE 250 ALT 637 W/ HCPCS: Performed by: STUDENT IN AN ORGANIZED HEALTH CARE EDUCATION/TRAINING PROGRAM

## 2020-03-10 PROCEDURE — 85379 FIBRIN DEGRADATION QUANT: CPT

## 2020-03-10 PROCEDURE — 80053 COMPREHEN METABOLIC PANEL: CPT | Mod: 91

## 2020-03-10 PROCEDURE — 83880 ASSAY OF NATRIURETIC PEPTIDE: CPT

## 2020-03-10 PROCEDURE — 93010 EKG 12-LEAD: ICD-10-PCS | Mod: ,,, | Performed by: INTERNAL MEDICINE

## 2020-03-10 PROCEDURE — 81001 URINALYSIS AUTO W/SCOPE: CPT

## 2020-03-10 PROCEDURE — 96361 HYDRATE IV INFUSION ADD-ON: CPT

## 2020-03-10 PROCEDURE — 99285 PR EMERGENCY DEPT VISIT,LEVEL V: ICD-10-PCS | Mod: ,,, | Performed by: EMERGENCY MEDICINE

## 2020-03-10 PROCEDURE — 25000242 PHARM REV CODE 250 ALT 637 W/ HCPCS: Performed by: EMERGENCY MEDICINE

## 2020-03-10 PROCEDURE — 82803 BLOOD GASES ANY COMBINATION: CPT

## 2020-03-10 RX ORDER — METHYLPREDNISOLONE SOD SUCC 125 MG
125 VIAL (EA) INJECTION
Status: COMPLETED | OUTPATIENT
Start: 2020-03-10 | End: 2020-03-10

## 2020-03-10 RX ORDER — IPRATROPIUM BROMIDE AND ALBUTEROL SULFATE 2.5; .5 MG/3ML; MG/3ML
3 SOLUTION RESPIRATORY (INHALATION)
Status: COMPLETED | OUTPATIENT
Start: 2020-03-10 | End: 2020-03-10

## 2020-03-10 RX ORDER — CHOLECALCIFEROL (VITAMIN D3) 25 MCG
1000 TABLET ORAL DAILY
Status: DISCONTINUED | OUTPATIENT
Start: 2020-03-11 | End: 2020-03-11

## 2020-03-10 RX ORDER — ONDANSETRON 8 MG/1
8 TABLET, ORALLY DISINTEGRATING ORAL EVERY 8 HOURS PRN
Status: DISCONTINUED | OUTPATIENT
Start: 2020-03-10 | End: 2020-03-12 | Stop reason: HOSPADM

## 2020-03-10 RX ORDER — HYDROCHLOROTHIAZIDE 25 MG/1
25 TABLET ORAL DAILY
Status: DISCONTINUED | OUTPATIENT
Start: 2020-03-11 | End: 2020-03-10

## 2020-03-10 RX ORDER — AZITHROMYCIN 250 MG/1
500 TABLET, FILM COATED ORAL DAILY
Status: DISCONTINUED | OUTPATIENT
Start: 2020-03-10 | End: 2020-03-11

## 2020-03-10 RX ORDER — PROCHLORPERAZINE EDISYLATE 5 MG/ML
5 INJECTION INTRAMUSCULAR; INTRAVENOUS EVERY 6 HOURS PRN
Status: DISCONTINUED | OUTPATIENT
Start: 2020-03-10 | End: 2020-03-12 | Stop reason: HOSPADM

## 2020-03-10 RX ORDER — PREDNISONE 10 MG/1
10 TABLET ORAL DAILY
Status: DISCONTINUED | OUTPATIENT
Start: 2020-03-11 | End: 2020-03-10

## 2020-03-10 RX ORDER — IPRATROPIUM BROMIDE AND ALBUTEROL SULFATE 2.5; .5 MG/3ML; MG/3ML
3 SOLUTION RESPIRATORY (INHALATION) EVERY 6 HOURS
Status: DISCONTINUED | OUTPATIENT
Start: 2020-03-10 | End: 2020-03-10

## 2020-03-10 RX ORDER — IBUPROFEN 200 MG
24 TABLET ORAL
Status: DISCONTINUED | OUTPATIENT
Start: 2020-03-10 | End: 2020-03-12 | Stop reason: HOSPADM

## 2020-03-10 RX ORDER — GLUCAGON 1 MG
1 KIT INJECTION
Status: DISCONTINUED | OUTPATIENT
Start: 2020-03-10 | End: 2020-03-12 | Stop reason: HOSPADM

## 2020-03-10 RX ORDER — IBUPROFEN 200 MG
16 TABLET ORAL
Status: DISCONTINUED | OUTPATIENT
Start: 2020-03-10 | End: 2020-03-12 | Stop reason: HOSPADM

## 2020-03-10 RX ORDER — ALPRAZOLAM 0.25 MG/1
0.25 TABLET ORAL 3 TIMES DAILY PRN
Status: DISCONTINUED | OUTPATIENT
Start: 2020-03-10 | End: 2020-03-12 | Stop reason: HOSPADM

## 2020-03-10 RX ORDER — AZITHROMYCIN 250 MG/1
500 TABLET, FILM COATED ORAL DAILY
Status: DISCONTINUED | OUTPATIENT
Start: 2020-03-11 | End: 2020-03-10

## 2020-03-10 RX ORDER — SODIUM CHLORIDE 0.9 % (FLUSH) 0.9 %
10 SYRINGE (ML) INJECTION
Status: DISCONTINUED | OUTPATIENT
Start: 2020-03-10 | End: 2020-03-12 | Stop reason: HOSPADM

## 2020-03-10 RX ORDER — DILTIAZEM HYDROCHLORIDE 180 MG/1
180 CAPSULE, COATED, EXTENDED RELEASE ORAL DAILY
Status: DISCONTINUED | OUTPATIENT
Start: 2020-03-11 | End: 2020-03-12 | Stop reason: HOSPADM

## 2020-03-10 RX ORDER — ASPIRIN 81 MG/1
81 TABLET ORAL DAILY
Status: DISCONTINUED | OUTPATIENT
Start: 2020-03-11 | End: 2020-03-12 | Stop reason: HOSPADM

## 2020-03-10 RX ORDER — PRAVASTATIN SODIUM 10 MG/1
20 TABLET ORAL DAILY
Status: DISCONTINUED | OUTPATIENT
Start: 2020-03-11 | End: 2020-03-12 | Stop reason: HOSPADM

## 2020-03-10 RX ORDER — ENOXAPARIN SODIUM 100 MG/ML
40 INJECTION SUBCUTANEOUS EVERY 24 HOURS
Status: DISCONTINUED | OUTPATIENT
Start: 2020-03-10 | End: 2020-03-11

## 2020-03-10 RX ORDER — IPRATROPIUM BROMIDE AND ALBUTEROL SULFATE 2.5; .5 MG/3ML; MG/3ML
3 SOLUTION RESPIRATORY (INHALATION)
Status: DISCONTINUED | OUTPATIENT
Start: 2020-03-10 | End: 2020-03-11

## 2020-03-10 RX ORDER — PREDNISONE 20 MG/1
40 TABLET ORAL DAILY
Status: DISCONTINUED | OUTPATIENT
Start: 2020-03-11 | End: 2020-03-10

## 2020-03-10 RX ORDER — IPRATROPIUM BROMIDE AND ALBUTEROL SULFATE 2.5; .5 MG/3ML; MG/3ML
SOLUTION RESPIRATORY (INHALATION)
Status: COMPLETED
Start: 2020-03-10 | End: 2020-03-10

## 2020-03-10 RX ORDER — PREDNISONE 10 MG/1
40 TABLET ORAL DAILY
Status: DISCONTINUED | OUTPATIENT
Start: 2020-03-10 | End: 2020-03-11

## 2020-03-10 RX ORDER — ASPIRIN 325 MG
325 TABLET ORAL
Status: COMPLETED | OUTPATIENT
Start: 2020-03-10 | End: 2020-03-10

## 2020-03-10 RX ADMIN — IPRATROPIUM BROMIDE AND ALBUTEROL SULFATE 3 ML: .5; 3 SOLUTION RESPIRATORY (INHALATION) at 12:03

## 2020-03-10 RX ADMIN — IPRATROPIUM BROMIDE AND ALBUTEROL SULFATE 3 ML: .5; 3 SOLUTION RESPIRATORY (INHALATION) at 03:03

## 2020-03-10 RX ADMIN — IPRATROPIUM BROMIDE AND ALBUTEROL SULFATE 3 ML: .5; 3 SOLUTION RESPIRATORY (INHALATION) at 01:03

## 2020-03-10 RX ADMIN — AZITHROMYCIN 500 MG: 250 TABLET, FILM COATED ORAL at 09:03

## 2020-03-10 RX ADMIN — SODIUM CHLORIDE 1000 ML: 0.9 INJECTION, SOLUTION INTRAVENOUS at 04:03

## 2020-03-10 RX ADMIN — IPRATROPIUM BROMIDE AND ALBUTEROL SULFATE 3 ML: .5; 3 SOLUTION RESPIRATORY (INHALATION) at 07:03

## 2020-03-10 RX ADMIN — ENOXAPARIN SODIUM 40 MG: 100 INJECTION SUBCUTANEOUS at 09:03

## 2020-03-10 RX ADMIN — PREDNISONE 40 MG: 20 TABLET ORAL at 09:03

## 2020-03-10 RX ADMIN — METHYLPREDNISOLONE SODIUM SUCCINATE 125 MG: 125 INJECTION, POWDER, FOR SOLUTION INTRAMUSCULAR; INTRAVENOUS at 01:03

## 2020-03-10 RX ADMIN — ASPIRIN 325 MG ORAL TABLET 325 MG: 325 PILL ORAL at 01:03

## 2020-03-10 NOTE — ED TRIAGE NOTES
Patient reports to ED w/ complaints of SOB, swelling to bilateral ankles. Patient reports starting in December, but worsening currently. Patient reports bladder infection and was on cipro, still reporting cloudy urine. Reports some leg tingling

## 2020-03-11 LAB
ALBUMIN SERPL BCP-MCNC: 2.6 G/DL (ref 3.5–5.2)
ALBUMIN SERPL BCP-MCNC: 2.7 G/DL (ref 3.5–5.2)
ALBUMIN SERPL BCP-MCNC: 2.7 G/DL (ref 3.5–5.2)
ALBUMIN SERPL BCP-MCNC: 2.8 G/DL (ref 3.5–5.2)
ALP SERPL-CCNC: 71 U/L (ref 55–135)
ALP SERPL-CCNC: 72 U/L (ref 55–135)
ALP SERPL-CCNC: 72 U/L (ref 55–135)
ALP SERPL-CCNC: 73 U/L (ref 55–135)
ALT SERPL W/O P-5'-P-CCNC: 8 U/L (ref 10–44)
ALT SERPL W/O P-5'-P-CCNC: 8 U/L (ref 10–44)
ALT SERPL W/O P-5'-P-CCNC: 9 U/L (ref 10–44)
ALT SERPL W/O P-5'-P-CCNC: 9 U/L (ref 10–44)
ANION GAP SERPL CALC-SCNC: 10 MMOL/L (ref 8–16)
ANION GAP SERPL CALC-SCNC: 7 MMOL/L (ref 8–16)
ANION GAP SERPL CALC-SCNC: 7 MMOL/L (ref 8–16)
ANION GAP SERPL CALC-SCNC: 8 MMOL/L (ref 8–16)
AST SERPL-CCNC: 12 U/L (ref 10–40)
AST SERPL-CCNC: 12 U/L (ref 10–40)
AST SERPL-CCNC: 13 U/L (ref 10–40)
AST SERPL-CCNC: 14 U/L (ref 10–40)
BASOPHILS # BLD AUTO: 0 K/UL (ref 0–0.2)
BASOPHILS NFR BLD: 0 % (ref 0–1.9)
BILIRUB SERPL-MCNC: 0.2 MG/DL (ref 0.1–1)
BUN SERPL-MCNC: 12 MG/DL (ref 8–23)
BUN SERPL-MCNC: 15 MG/DL (ref 8–23)
BUN SERPL-MCNC: 8 MG/DL (ref 8–23)
BUN SERPL-MCNC: 8 MG/DL (ref 8–23)
CALCIUM SERPL-MCNC: 9.3 MG/DL (ref 8.7–10.5)
CALCIUM SERPL-MCNC: 9.3 MG/DL (ref 8.7–10.5)
CALCIUM SERPL-MCNC: 9.4 MG/DL (ref 8.7–10.5)
CALCIUM SERPL-MCNC: 9.5 MG/DL (ref 8.7–10.5)
CHLORIDE SERPL-SCNC: 95 MMOL/L (ref 95–110)
CHLORIDE SERPL-SCNC: 98 MMOL/L (ref 95–110)
CO2 SERPL-SCNC: 28 MMOL/L (ref 23–29)
CO2 SERPL-SCNC: 29 MMOL/L (ref 23–29)
CO2 SERPL-SCNC: 29 MMOL/L (ref 23–29)
CO2 SERPL-SCNC: 30 MMOL/L (ref 23–29)
CREAT SERPL-MCNC: 0.5 MG/DL (ref 0.5–1.4)
CREAT SERPL-MCNC: 0.6 MG/DL (ref 0.5–1.4)
CREAT SERPL-MCNC: 0.6 MG/DL (ref 0.5–1.4)
CREAT SERPL-MCNC: 0.7 MG/DL (ref 0.5–1.4)
DIFFERENTIAL METHOD: ABNORMAL
EOSINOPHIL # BLD AUTO: 0 K/UL (ref 0–0.5)
EOSINOPHIL NFR BLD: 0 % (ref 0–8)
ERYTHROCYTE [DISTWIDTH] IN BLOOD BY AUTOMATED COUNT: 14.2 % (ref 11.5–14.5)
EST. GFR  (AFRICAN AMERICAN): >60 ML/MIN/1.73 M^2
EST. GFR  (NON AFRICAN AMERICAN): >60 ML/MIN/1.73 M^2
GLUCOSE SERPL-MCNC: 125 MG/DL (ref 70–110)
GLUCOSE SERPL-MCNC: 180 MG/DL (ref 70–110)
GLUCOSE SERPL-MCNC: 237 MG/DL (ref 70–110)
GLUCOSE SERPL-MCNC: 243 MG/DL (ref 70–110)
HCT VFR BLD AUTO: 39.3 % (ref 37–48.5)
HGB BLD-MCNC: 12.6 G/DL (ref 12–16)
IMM GRANULOCYTES # BLD AUTO: 0.03 K/UL (ref 0–0.04)
IMM GRANULOCYTES NFR BLD AUTO: 0.7 % (ref 0–0.5)
LYMPHOCYTES # BLD AUTO: 0.3 K/UL (ref 1–4.8)
LYMPHOCYTES NFR BLD: 7.1 % (ref 18–48)
MAGNESIUM SERPL-MCNC: 1.9 MG/DL (ref 1.6–2.6)
MCH RBC QN AUTO: 28.5 PG (ref 27–31)
MCHC RBC AUTO-ENTMCNC: 32.1 G/DL (ref 32–36)
MCV RBC AUTO: 89 FL (ref 82–98)
MONOCYTES # BLD AUTO: 0.1 K/UL (ref 0.3–1)
MONOCYTES NFR BLD: 2.4 % (ref 4–15)
NEUTROPHILS # BLD AUTO: 3.8 K/UL (ref 1.8–7.7)
NEUTROPHILS NFR BLD: 89.8 % (ref 38–73)
NRBC BLD-RTO: 0 /100 WBC
PHOSPHATE SERPL-MCNC: 3.1 MG/DL (ref 2.7–4.5)
PLATELET # BLD AUTO: 247 K/UL (ref 150–350)
PMV BLD AUTO: 9.2 FL (ref 9.2–12.9)
POTASSIUM SERPL-SCNC: 4 MMOL/L (ref 3.5–5.1)
POTASSIUM SERPL-SCNC: 4.1 MMOL/L (ref 3.5–5.1)
PROT SERPL-MCNC: 6.1 G/DL (ref 6–8.4)
PROT SERPL-MCNC: 6.2 G/DL (ref 6–8.4)
RBC # BLD AUTO: 4.42 M/UL (ref 4–5.4)
SODIUM SERPL-SCNC: 132 MMOL/L (ref 136–145)
SODIUM SERPL-SCNC: 134 MMOL/L (ref 136–145)
SODIUM SERPL-SCNC: 135 MMOL/L (ref 136–145)
SODIUM SERPL-SCNC: 136 MMOL/L (ref 136–145)
WBC # BLD AUTO: 4.2 K/UL (ref 3.9–12.7)

## 2020-03-11 PROCEDURE — 94640 AIRWAY INHALATION TREATMENT: CPT

## 2020-03-11 PROCEDURE — 25000242 PHARM REV CODE 250 ALT 637 W/ HCPCS: Performed by: STUDENT IN AN ORGANIZED HEALTH CARE EDUCATION/TRAINING PROGRAM

## 2020-03-11 PROCEDURE — 85025 COMPLETE CBC W/AUTO DIFF WBC: CPT

## 2020-03-11 PROCEDURE — 99233 PR SUBSEQUENT HOSPITAL CARE,LEVL III: ICD-10-PCS | Mod: GC,,, | Performed by: STUDENT IN AN ORGANIZED HEALTH CARE EDUCATION/TRAINING PROGRAM

## 2020-03-11 PROCEDURE — 97165 OT EVAL LOW COMPLEX 30 MIN: CPT

## 2020-03-11 PROCEDURE — 11000001 HC ACUTE MED/SURG PRIVATE ROOM

## 2020-03-11 PROCEDURE — 97161 PT EVAL LOW COMPLEX 20 MIN: CPT

## 2020-03-11 PROCEDURE — 97535 SELF CARE MNGMENT TRAINING: CPT

## 2020-03-11 PROCEDURE — 27000221 HC OXYGEN, UP TO 24 HOURS

## 2020-03-11 PROCEDURE — 99233 SBSQ HOSP IP/OBS HIGH 50: CPT | Mod: GC,,, | Performed by: STUDENT IN AN ORGANIZED HEALTH CARE EDUCATION/TRAINING PROGRAM

## 2020-03-11 PROCEDURE — 63600175 PHARM REV CODE 636 W HCPCS: Performed by: STUDENT IN AN ORGANIZED HEALTH CARE EDUCATION/TRAINING PROGRAM

## 2020-03-11 PROCEDURE — 36415 COLL VENOUS BLD VENIPUNCTURE: CPT

## 2020-03-11 PROCEDURE — 83735 ASSAY OF MAGNESIUM: CPT

## 2020-03-11 PROCEDURE — 25000003 PHARM REV CODE 250: Performed by: STUDENT IN AN ORGANIZED HEALTH CARE EDUCATION/TRAINING PROGRAM

## 2020-03-11 PROCEDURE — 84100 ASSAY OF PHOSPHORUS: CPT

## 2020-03-11 PROCEDURE — 97116 GAIT TRAINING THERAPY: CPT

## 2020-03-11 PROCEDURE — 80053 COMPREHEN METABOLIC PANEL: CPT

## 2020-03-11 RX ORDER — PREDNISONE 10 MG/1
40 TABLET ORAL DAILY
Status: DISCONTINUED | OUTPATIENT
Start: 2020-03-12 | End: 2020-03-12 | Stop reason: HOSPADM

## 2020-03-11 RX ORDER — FERROUS SULFATE, DRIED 160(50) MG
2 TABLET, EXTENDED RELEASE ORAL 2 TIMES DAILY
Status: DISCONTINUED | OUTPATIENT
Start: 2020-03-11 | End: 2020-03-12 | Stop reason: HOSPADM

## 2020-03-11 RX ORDER — PREDNISONE 10 MG/1
20 TABLET ORAL DAILY
Status: DISCONTINUED | OUTPATIENT
Start: 2020-03-14 | End: 2020-03-12 | Stop reason: HOSPADM

## 2020-03-11 RX ORDER — PREDNISONE 10 MG/1
10 TABLET ORAL DAILY
Status: DISCONTINUED | OUTPATIENT
Start: 2020-03-17 | End: 2020-03-12 | Stop reason: HOSPADM

## 2020-03-11 RX ORDER — FLUTICASONE FUROATE AND VILANTEROL 100; 25 UG/1; UG/1
1 POWDER RESPIRATORY (INHALATION) DAILY
Status: DISCONTINUED | OUTPATIENT
Start: 2020-03-11 | End: 2020-03-12 | Stop reason: HOSPADM

## 2020-03-11 RX ORDER — FAMOTIDINE 20 MG/1
20 TABLET, FILM COATED ORAL 2 TIMES DAILY
Status: DISCONTINUED | OUTPATIENT
Start: 2020-03-11 | End: 2020-03-12 | Stop reason: HOSPADM

## 2020-03-11 RX ORDER — TIOTROPIUM BROMIDE 18 UG/1
1 CAPSULE ORAL; RESPIRATORY (INHALATION) DAILY
Status: DISCONTINUED | OUTPATIENT
Start: 2020-03-11 | End: 2020-03-12 | Stop reason: HOSPADM

## 2020-03-11 RX ORDER — IPRATROPIUM BROMIDE AND ALBUTEROL SULFATE 2.5; .5 MG/3ML; MG/3ML
3 SOLUTION RESPIRATORY (INHALATION) EVERY 4 HOURS PRN
Status: DISCONTINUED | OUTPATIENT
Start: 2020-03-11 | End: 2020-03-12 | Stop reason: HOSPADM

## 2020-03-11 RX ORDER — ENOXAPARIN SODIUM 100 MG/ML
30 INJECTION SUBCUTANEOUS EVERY 24 HOURS
Status: DISCONTINUED | OUTPATIENT
Start: 2020-03-11 | End: 2020-03-12 | Stop reason: HOSPADM

## 2020-03-11 RX ADMIN — DILTIAZEM HYDROCHLORIDE 180 MG: 180 CAPSULE, COATED, EXTENDED RELEASE ORAL at 08:03

## 2020-03-11 RX ADMIN — PRAVASTATIN SODIUM 20 MG: 10 TABLET ORAL at 08:03

## 2020-03-11 RX ADMIN — OYSTER SHELL CALCIUM WITH VITAMIN D 2 TABLET: 500; 200 TABLET, FILM COATED ORAL at 09:03

## 2020-03-11 RX ADMIN — IPRATROPIUM BROMIDE AND ALBUTEROL SULFATE 3 ML: .5; 3 SOLUTION RESPIRATORY (INHALATION) at 09:03

## 2020-03-11 RX ADMIN — ASPIRIN 81 MG: 81 TABLET, COATED ORAL at 08:03

## 2020-03-11 RX ADMIN — ENOXAPARIN SODIUM 30 MG: 30 INJECTION SUBCUTANEOUS at 05:03

## 2020-03-11 RX ADMIN — VITAMIN D, TAB 1000IU (100/BT) 1000 UNITS: 25 TAB at 08:03

## 2020-03-11 RX ADMIN — TIOTROPIUM BROMIDE 18 MCG: 18 CAPSULE ORAL; RESPIRATORY (INHALATION) at 05:03

## 2020-03-11 RX ADMIN — FLUTICASONE FUROATE AND VILANTEROL TRIFENATATE 1 PUFF: 100; 25 POWDER RESPIRATORY (INHALATION) at 05:03

## 2020-03-11 RX ADMIN — FAMOTIDINE 20 MG: 20 TABLET ORAL at 09:03

## 2020-03-11 RX ADMIN — IPRATROPIUM BROMIDE AND ALBUTEROL SULFATE 3 ML: .5; 3 SOLUTION RESPIRATORY (INHALATION) at 12:03

## 2020-03-11 NOTE — SUBJECTIVE & OBJECTIVE
Past Medical History:   Diagnosis Date    Actinic keratosis     Allergy     Arthritis     Cataract     Cellulitis of ankle     Colon polyps     COPD (chronic obstructive pulmonary disease)     Family history of colon cancer     Hyperlipidemia     Hypertension     Lung nodules     Sinus tachycardia        Past Surgical History:   Procedure Laterality Date    APPENDECTOMY      CATARACT EXTRACTION Bilateral      SECTION      x2    COLONOSCOPY N/A 10/10/2017    Procedure: COLONOSCOPY;  Surgeon: Omid Lino MD;  Location: [address]);  Service: Endoscopy;  Laterality: N/A;  pt unable to be checked in with previous order    EYE SURGERY      TONSILLECTOMY         Review of patient's allergies indicates:   Allergen Reactions    Bactrim [sulfamethoxazole-trimethoprim] Nausea Only    Codeine Itching    Keflex [cephalexin] Other (See Comments)     Rhinorrhea, nausea. Tolerated Ceftriaxone 2014    Ceftriaxone Rash     Morbilliform rash after receiving IV ceftriaxone    Clindamycin hcl Rash    Doxycycline Rash    Vancomycin analogues Rash     Morbilliform rash after receiving IV vancomycin       No current facility-administered medications on file prior to encounter.      Current Outpatient Medications on File Prior to Encounter   Medication Sig    albuterol (PROVENTIL) 2.5 mg /3 mL (0.083 %) nebulizer solution Take 3 mLs (2.5 mg total) by nebulization every 4 (four) hours as needed for Wheezing or Shortness of Breath.    ALPRAZolam (XANAX) 0.25 MG tablet Take 1 tablet (0.25 mg total) by mouth 3 (three) times daily as needed for Anxiety.    aspirin (ECOTRIN) 81 MG EC tablet Take 81 mg by mouth once daily.      budesonide-formoterol 160-4.5 mcg (SYMBICORT) 160-4.5 mcg/actuation HFAA INHALE 2 PUFFS INTO THE LUNGS EVERY 12 HOURS    diltiaZEM (CARDIZEM CD) 180 MG 24 hr capsule Take 1 capsule (180 mg total) by mouth once daily.    hydroCHLOROthiazide (HYDRODIURIL) 25 MG  tablet TAKE 1 TABLET (25 MG TOTAL) BY MOUTH ONCE DAILY.    potassium chloride SA (K-DUR,KLOR-CON) 20 MEQ tablet TAKE 1 TABLET BY MOUTH EVERY DAY    pravastatin (PRAVACHOL) 20 MG tablet Take 1 tablet (20 mg total) by mouth once daily.    predniSONE (DELTASONE) 10 MG tablet 3 tabs every AM x 7 days, then 2 tabs every AM x 7 days /stop    vitamin D 1000 units Tab Take 1,000 Units by mouth once daily.    albuterol (PROVENTIL/VENTOLIN HFA) 90 mcg/actuation inhaler Inhale 2 puffs into the lungs every 6 (six) hours as needed for Wheezing or Shortness of Breath (ventolin hfa per pts insurance). Rescue    FLUZONE HIGH-DOSE -, PF, 180 mcg/0.5 mL vaccine     FLUZONE HIGH-DOSE -, PF, 180 mcg/0.5 mL Syrg 0.5 mLs once.    furosemide (LASIX) 20 MG tablet Take 1 tablet (20 mg total) by mouth once daily.    INCRUSE ELLIPTA 62.5 mcg/actuation DsDv INHALE 1 PUFF DAILY    [DISCONTINUED] glycopyrrolate-formoterol (BEVESPI AEROSPHERE) 9-4.8 mcg HFAA Inhale 2 puffs into the lungs 2 (two) times daily. Controller (Patient not taking: Reported on 2019)     Family History     Problem Relation (Age of Onset)    Blindness Paternal Grandmother    Breast cancer Sister    COPD Father    Cancer Mother (79), Sister, Son    Cataracts Mother, Sister    Diabetes Father, Paternal Grandmother    Glaucoma Mother    Heart disease Mother, Father, Sister    Hyperlipidemia Mother, Father, Sister    Hypertension Mother, Father, Sister    Skin cancer Mother, Sister    Thyroid disease Mother, Daughter        Tobacco Use    Smoking status: Former Smoker     Packs/day: 1.00     Years: 39.00     Pack years: 39.00     Types: Cigarettes     Last attempt to quit: 1995     Years since quittin.3    Smokeless tobacco: Never Used   Substance and Sexual Activity    Alcohol use: Yes     Alcohol/week: 2.0 standard drinks     Types: 2 Glasses of wine per week     Comment: rarely has a glass of wine- not daily    Drug use: No     Sexual activity: Never     Review of Systems   Constitutional: Positive for chills and fatigue. Negative for activity change, appetite change, fever and unexpected weight change.   HENT: Negative for congestion, hearing loss, rhinorrhea and sore throat.    Eyes: Negative for photophobia and visual disturbance.   Respiratory: Positive for shortness of breath. Negative for cough and choking.    Cardiovascular: Positive for chest pain (pleuritic) and leg swelling.   Gastrointestinal: Negative for abdominal distention, abdominal pain, blood in stool, diarrhea and nausea.   Genitourinary: Positive for urgency. Negative for difficulty urinating, dysuria, flank pain and hematuria.   Musculoskeletal: Negative for arthralgias, back pain, myalgias and neck pain.   Skin: Negative for color change and rash.   Neurological: Positive for numbness. Negative for seizures.     Objective:     Vital Signs (Most Recent):  Temp: 99.8 °F (37.7 °C) (03/10/20 1229)  Pulse: 99 (03/10/20 1902)  Resp: 20 (03/10/20 1902)  BP: (!) 148/60 (03/10/20 1901)  SpO2: 96 % (03/10/20 1901) Vital Signs (24h Range):  Temp:  [99.8 °F (37.7 °C)] 99.8 °F (37.7 °C)  Pulse:  [] 99  Resp:  [18-30] 20  SpO2:  [89 %-98 %] 96 %  BP: (125-162)/(59-74) 148/60     Weight: 52.2 kg (115 lb)  Body mass index is 23.23 kg/m².    Physical Exam   Constitutional: She is oriented to person, place, and time. She appears well-developed and well-nourished. No distress.   HENT:   Head: Normocephalic and atraumatic.   Mouth/Throat: Oropharynx is clear and moist.   Eyes: Pupils are equal, round, and reactive to light. Conjunctivae and EOM are normal.   Neck: Normal range of motion. Neck supple.   Cardiovascular: Normal rate, regular rhythm, normal heart sounds and intact distal pulses. Exam reveals no friction rub.   No murmur heard.  Pulmonary/Chest: No stridor. She has no wheezes. She has no rales. She exhibits no tenderness.   On 3 L NC  Decreased air entry. No rales or  wheezes   Abdominal: Soft. Bowel sounds are normal. She exhibits no distension and no mass. There is no tenderness.   Musculoskeletal: Normal range of motion. She exhibits edema.   Neurological: She is alert and oriented to person, place, and time.   Skin: Skin is warm and dry. She is not diaphoretic. No erythema.   Nursing note and vitals reviewed.        CRANIAL NERVES     CN III, IV, VI   Pupils are equal, round, and reactive to light.  Extraocular motions are normal.        Significant Labs: All pertinent labs within the past 24 hours have been reviewed.    Significant Imaging: I have reviewed all pertinent imaging results/findings within the past 24 hours.

## 2020-03-11 NOTE — ASSESSMENT & PLAN NOTE
Patient with worsening sob and pleuritic chest pain. Patient reports increase supplemental oxygen use at home. Denies fever but has chills. No recent sick contact, or travel. Denies upper respiratory symptoms. No recent change in meds. On prednisone 10 mg, Symbicort, and elipta for COPD.  Patient is tachypnic with low-grade fever 99.8. Decreased air entry in lung. No wheezing or rales.  WBC 11.42, hb 14.2, Na 127, Pco2 46.2, Po2 74, Tr <0.006, D-dimer 0.62, BNP 45, UA is not septic  CXR showed hyperinflated lungs  In the ED patient was given methylpred injection.    - scheduled duonebs.  - oxygen supplement  - prednisone   - antibiotics  - respiratory viral panel  - legionella antigen

## 2020-03-11 NOTE — ED NOTES
Admitted to floor. Diagnosis, medications, & POC discussed with patient. Patient verbalized understanding. All questions and concerns answered. No needs expressed at the time. Pt is awake, alert and oriented x4 with no acute distress noted. Respirations even and unlabored. Pt on 2L oxygen per nasal cannula, sats 96%. Pt on portable telemetry monitor per MD order. Per stretcher out of ED to floor. All belongings sent with patient.

## 2020-03-11 NOTE — ED NOTES
Report received from FRENCH Seaman. Care assumed. Pt resting comfortably and independently repositioned in stretcher with bed locked in lowest position for safety. NAD and patient states she feels a little better. Respirations even and unlabored and visible chest rise noted. Pt remains on 3L oxygen per NC; oxygen sats 95%. Patient offered bathroom assistance and denies need at this time. Pt informed of low sodium level and educated about seizure precautions. Pt verbalized understanding. Seizure precautions initiated at this time. Patient on cardiac monitor, automatic blood pressure cuff, and pulse oximeter; suction at bedside, side rails up x 2 and padded, head of bed elevated to 75 degrees, call light within reach, bed low and locked.  Pt instructed to call if assistance is needed. No needs at this time.

## 2020-03-11 NOTE — PLAN OF CARE
CM spoke with patient at bedside. CM explained the role of the CM/SW in assisting with discharge planning. Information in medical records verified with patient. Patient lives with son in a single story home with five steps at entrance, home DME utilized. Patient anticipates being discharged under the care of herself and family once medically stable. CM name and number on board.         PCP: Dulce Castro MD        PHARM:   [facility] #[id number] - [place], [place] - [address]  [address]  [place] [address]  Phone: [phone] Fax: [phone]    [facility]  [address]  [facility] [address]  Phone: [phone] Fax: [phone]        Payor: [employer] / Plan: [employer] / Product Type: Medicare Advantage /          03/11/20 1248   Discharge Assessment   Assessment Type Discharge Planning Assessment   Confirmed/corrected address and phone number on facesheet? Yes   Assessment information obtained from? Patient;Medical Record   Expected Length of Stay (days) 3   Communicated expected length of stay with patient/caregiver yes   Prior to hospitilization cognitive status: No Deficits;Alert/Oriented   Prior to hospitalization functional status: Independent   Current cognitive status: Alert/Oriented;No Deficits   Current Functional Status: Independent   Lives With child(ernesto), adult   Able to Return to Prior Arrangements yes   Is patient able to care for self after discharge? Yes   Patient's perception of discharge disposition home or selfcare   Patient currently being followed by outpatient case management? No   Patient currently receives any other outside agency services? No   Equipment Currently Used at Home oxygen;nebulizer   Do you have any problems affording any of your prescribed medications? No   Is the patient taking medications as prescribed? yes   Does the patient have transportation home? Yes   Transportation Anticipated  family or friend will provide   Does the patient receive services at the [facility]? No   Discharge Plan A Home   Discharge Plan B Home;Home with family   DME Needed Upon Discharge  none   Patient/Family in Agreement with Plan yes       Haydee Murguia RN, BSN     Ext [phone]

## 2020-03-11 NOTE — H&P
[facility] Hospital Medicine  History & Physical    Patient Name: Leyla Mari  MRN: [id number]  Admission Date: 3/10/2020  Attending Physician: Nithin Pino MD   Primary Care Provider: Dulce Castro MD    [facility] Medicine Team: [facility] 5 Gage Cleveland MD     Patient information was obtained from patient, past medical records and ER records.     Subjective:     Principal Problem:COPD with acute exacerbation    Chief Complaint:   Chief Complaint   Patient presents with    Shortness of Breath     emphysema, on home 02 of 3L, more SOB than usual        HPI: Ms Leyla Mari is 74 years old female patient with past medical history of COPD (on maintenance prednisone dose 10 mg daily, Elipta, and Symbicort), HTN, and HLD. Presented to the ED today after having worsening sob three days ago. Started to have pleuritic chest pain one day ago which prompted her to come to the ED because she was afraid she's having pneumonia. Patient is on 3 L oxygen at home as needed and recently she is been using it more frequent. She also reports fatigue. Patient denies fever, chills, cough, wheezing, and sore throat. Denies recent sick contact, and recent travel. Patient reports a recent UTI and finished her cipro course 3 weeks ago. Patient currently denies any urinary symptoms. Last time she smoked 26 years ago and last admission for COPD exacerbation was 3 years ago. Patient received influenza vaccine and no recent medication change.    In the ED patient was tachypnic, sating at 89% on 3L, and low-grade fever at 99.8. Pco2 46, CXR showed hyperinflated lungs. She was given methylpred and IVF.    Past Medical History:   Diagnosis Date    Actinic keratosis     Allergy     Arthritis     Cataract     Cellulitis of ankle     Colon polyps     COPD (chronic obstructive pulmonary disease)     Family history of colon cancer     Hyperlipidemia     Hypertension     Lung nodules     Sinus  tachycardia        Past Surgical History:   Procedure Laterality Date    APPENDECTOMY      CATARACT EXTRACTION Bilateral      SECTION      x2    COLONOSCOPY N/A 10/10/2017    Procedure: COLONOSCOPY;  Surgeon: Omid Lino MD;  Location: [facility] ([address] [place] [address]);  Service: Endoscopy;  Laterality: N/A;  pt unable to be checked in with previous order    EYE SURGERY      TONSILLECTOMY         Review of patient's allergies indicates:   Allergen Reactions    Bactrim [sulfamethoxazole-trimethoprim] Nausea Only    Codeine Itching    Keflex [cephalexin] Other (See Comments)     Rhinorrhea, nausea. Tolerated Ceftriaxone 2014    Ceftriaxone Rash     Morbilliform rash after receiving IV ceftriaxone    Clindamycin hcl Rash    Doxycycline Rash    Vancomycin analogues Rash     Morbilliform rash after receiving IV vancomycin       No current facility-administered medications on file prior to encounter.      Current Outpatient Medications on File Prior to Encounter   Medication Sig    albuterol (PROVENTIL) 2.5 mg /3 mL (0.083 %) nebulizer solution Take 3 mLs (2.5 mg total) by nebulization every 4 (four) hours as needed for Wheezing or Shortness of Breath.    ALPRAZolam (XANAX) 0.25 MG tablet Take 1 tablet (0.25 mg total) by mouth 3 (three) times daily as needed for Anxiety.    aspirin (ECOTRIN) 81 MG EC tablet Take 81 mg by mouth once daily.      budesonide-formoterol 160-4.5 mcg (SYMBICORT) 160-4.5 mcg/actuation HFAA INHALE 2 PUFFS INTO THE LUNGS EVERY 12 HOURS    diltiaZEM (CARDIZEM CD) 180 MG 24 hr capsule Take 1 capsule (180 mg total) by mouth once daily.    hydroCHLOROthiazide (HYDRODIURIL) 25 MG tablet TAKE 1 TABLET (25 MG TOTAL) BY MOUTH ONCE DAILY.    potassium chloride SA (K-DUR,KLOR-CON) 20 MEQ tablet TAKE 1 TABLET BY MOUTH EVERY DAY    pravastatin (PRAVACHOL) 20 MG tablet Take 1 tablet (20 mg total) by mouth once daily.    predniSONE (DELTASONE) 10 MG tablet 3 tabs every AM x 7 days,  then 2 tabs every AM x 7 days /stop    vitamin D 1000 units Tab Take 1,000 Units by mouth once daily.    albuterol (PROVENTIL/VENTOLIN HFA) 90 mcg/actuation inhaler Inhale 2 puffs into the lungs every 6 (six) hours as needed for Wheezing or Shortness of Breath (ventolin hfa per pts insurance). Rescue    FLUZONE HIGH-DOSE -, PF, 180 mcg/0.5 mL vaccine     FLUZONE HIGH-DOSE , PF, 180 mcg/0.5 mL Syrg 0.5 mLs once.    furosemide (LASIX) 20 MG tablet Take 1 tablet (20 mg total) by mouth once daily.    INCRUSE ELLIPTA 62.5 mcg/actuation DsDv INHALE 1 PUFF DAILY    [DISCONTINUED] glycopyrrolate-formoterol (BEVESPI AEROSPHERE) 9-4.8 mcg HFAA Inhale 2 puffs into the lungs 2 (two) times daily. Controller (Patient not taking: Reported on 2019)     Family History     Problem Relation (Age of Onset)    Blindness Paternal Grandmother    Breast cancer Sister    COPD Father    Cancer Mother (79), Sister, Son    Cataracts Mother, Sister    Diabetes Father, Paternal Grandmother    Glaucoma Mother    Heart disease Mother, Father, Sister    Hyperlipidemia Mother, Father, Sister    Hypertension Mother, Father, Sister    Skin cancer Mother, Sister    Thyroid disease Mother, Daughter        Tobacco Use    Smoking status: Former Smoker     Packs/day: 1.00     Years: 39.00     Pack years: 39.00     Types: Cigarettes     Last attempt to quit: 1995     Years since quittin.3    Smokeless tobacco: Never Used   Substance and Sexual Activity    Alcohol use: Yes     Alcohol/week: 2.0 standard drinks     Types: 2 Glasses of wine per week     Comment: rarely has a glass of wine- not daily    Drug use: No    Sexual activity: Never     Review of Systems   Constitutional: Positive for chills and fatigue. Negative for activity change, appetite change, fever and unexpected weight change.   HENT: Negative for congestion, hearing loss, rhinorrhea and sore throat.    Eyes: Negative for photophobia and visual  disturbance.   Respiratory: Positive for shortness of breath. Negative for cough and choking.    Cardiovascular: Positive for chest pain (pleuritic) and leg swelling.   Gastrointestinal: Negative for abdominal distention, abdominal pain, blood in stool, diarrhea and nausea.   Genitourinary: Positive for urgency. Negative for difficulty urinating, dysuria, flank pain and hematuria.   Musculoskeletal: Negative for arthralgias, back pain, myalgias and neck pain.   Skin: Negative for color change and rash.   Neurological: Positive for numbness. Negative for seizures.     Objective:     Vital Signs (Most Recent):  Temp: 99.8 °F (37.7 °C) (03/10/20 1229)  Pulse: 99 (03/10/20 1902)  Resp: 20 (03/10/20 1902)  BP: (!) 148/60 (03/10/20 1901)  SpO2: 96 % (03/10/20 1901) Vital Signs (24h Range):  Temp:  [99.8 °F (37.7 °C)] 99.8 °F (37.7 °C)  Pulse:  [] 99  Resp:  [18-30] 20  SpO2:  [89 %-98 %] 96 %  BP: (125-162)/(59-74) 148/60     Weight: 52.2 kg (115 lb)  Body mass index is 23.23 kg/m².    Physical Exam   Constitutional: She is oriented to person, place, and time. She appears well-developed and well-nourished. No distress.   HENT:   Head: Normocephalic and atraumatic.   Mouth/Throat: Oropharynx is clear and moist.   Eyes: Pupils are equal, round, and reactive to light. Conjunctivae and EOM are normal.   Neck: Normal range of motion. Neck supple.   Cardiovascular: Normal rate, regular rhythm, normal heart sounds and intact distal pulses. Exam reveals no friction rub.   No murmur heard.  Pulmonary/Chest: No stridor. She has no wheezes. She has no rales. She exhibits no tenderness.   On 3 L NC  Decreased air entry. No rales or wheezes   Abdominal: Soft. Bowel sounds are normal. She exhibits no distension and no mass. There is no tenderness.   Musculoskeletal: Normal range of motion. She exhibits edema.   Neurological: She is alert and oriented to person, place, and time.   Skin: Skin is warm and dry. She is not  diaphoretic. No erythema.   Nursing note and vitals reviewed.        CRANIAL NERVES     CN III, IV, VI   Pupils are equal, round, and reactive to light.  Extraocular motions are normal.        Significant Labs: All pertinent labs within the past 24 hours have been reviewed.    Significant Imaging: I have reviewed all pertinent imaging results/findings within the past 24 hours.    Assessment/Plan:     * COPD with acute exacerbation  Patient with worsening sob and pleuritic chest pain. Patient reports increase supplemental oxygen use at home. Denies fever but has chills. No recent sick contact, or travel. Denies upper respiratory symptoms. No recent change in meds. On prednisone 10 mg, Symbicort, and elipta for COPD.  Patient is tachypnic with low-grade fever 99.8. Decreased air entry in lung. No wheezing or rales.  WBC 11.42, hb 14.2, Na 127, Pco2 46.2, Po2 74, Tr <0.006, D-dimer 0.62, BNP 45, UA is not septic  CXR showed hyperinflated lungs  In the ED patient was given methylpred injection.    - scheduled duonebs.  - oxygen supplement  - prednisone   - antibiotics  - respiratory viral panel  - legionella antigen      Hyponatremia  Patient was found hyponatremic in the ED. Na 127. Patient denies increase or decrease in oral intake. On exam she does not appear dehydrated with normal skin turgor and oral cavity is clear and moist.   Suspect d/t increase free water intake.    - hold hydrochlorothiazide  - restrict fluid intake       Hyperlipidemia  - Continue home pravastatin and aspirin      Hypertension  - hold home HCTZ.  - continue home diltiazem         VTE Risk Mitigation (From admission, onward)         Ordered     enoxaparin injection 40 mg  Daily      03/10/20 1938     IP VTE HIGH RISK PATIENT  Once      03/10/20 1938                   Gage Cleveland MD  Department of Hospital Medicine   [facility] [address]

## 2020-03-11 NOTE — PLAN OF CARE
No goals set, pt does not require additional acute PT services at this time.     Pt educated on asking medical staff for PT consult if changes in functional status occurs.     - Pollo Gonzales, PT, DPT  3/11/2020

## 2020-03-11 NOTE — PROGRESS NOTES
[facility] Medicine  Progress Note    Patient Name: Leyla Mari  MRN: [id number]  Patient Class: IP- Inpatient   Admission Date: 3/10/2020  Length of Stay: 1 days  Attending Physician: Nithin Pino MD  Primary Care Provider: Dulce Castro MD    [facility] Medicine Team: [facility] 5 Gage Cleveland MD    Subjective:     Principal Problem:COPD with acute exacerbation        HPI:  Ms Leyla Mari is 74 years old female patient with past medical history of COPD (on maintenance prednisone dose 10 mg daily, Elipta, and Symbicort), HTN, and HLD. Presented to the ED today after having worsening sob three days ago. Started to have pleuritic chest pain one day ago which prompted her to come to the ED because she was afraid she's having pneumonia. Patient is on 3 L oxygen at home as needed and recently she is been using it more frequent. She also reports fatigue. Patient denies fever, cough, wheezing, and sore throat. Denies recent sick contact, and recent travel. Patient reports a recent UTI and finished her cipro course 3 weeks ago. Patient currently denies any urinary symptoms. Last time she smoked 26 years ago and last admission for COPD exacerbation was 3 years ago. Patient received influenza vaccine. Patient was taking prednisone 20 mg prednisone and her pulmonologist (dr. Guido) changed it to 10 daily last Wednesday but she has not taken it since then.     In the ED patient was tachypnic, sating at 89% on 3L, and low-grade fever at 99.8. Pco2 46, CXR showed hyperinflated lungs. She was given methylpred and IVF.    Overview/Hospital Course:  Patient was admitted for COPD exacerbation and hyponatremia. CXR showed hyperinflated lungs without opacities or infiltration . She was started on duonebs and steroids. Hyponatremia returned to normal after fluid restriction. Patient sodium is improving with fluid restriction.    Interval History: No acute events overnight. Patient  symptoms is improving. Patient feels she returned to her baseline. Exam showed no wheezing or rales. scheduled duonebs changed to prn.       Review of Systems   Constitutional: Positive for chills and fatigue. Negative for activity change, appetite change, fever and unexpected weight change.   HENT: Negative for congestion, hearing loss, rhinorrhea and sore throat.    Eyes: Negative for photophobia and visual disturbance.   Respiratory: Positive for shortness of breath. Negative for cough and choking.    Cardiovascular: Positive for chest pain (pleuritic) and leg swelling.   Gastrointestinal: Negative for abdominal distention, abdominal pain, blood in stool, diarrhea and nausea.   Genitourinary: Positive for urgency. Negative for difficulty urinating, dysuria, flank pain and hematuria.   Musculoskeletal: Negative for arthralgias, back pain, myalgias and neck pain.   Skin: Negative for color change and rash.   Neurological: Positive for numbness. Negative for seizures.     Objective:     Vital Signs (Most Recent):  Temp: 98.5 °F (36.9 °C) (03/11/20 1559)  Pulse: 84 (03/11/20 1611)  Resp: 18 (03/11/20 1559)  BP: (!) 121/58 (03/11/20 1559)  SpO2: 95 % (03/11/20 1559) Vital Signs (24h Range):  Temp:  [97 °F (36.1 °C)-98.7 °F (37.1 °C)] 98.5 °F (36.9 °C)  Pulse:  [] 84  Resp:  [16-31] 18  SpO2:  [93 %-99 %] 95 %  BP: (119-162)/(57-81) 121/58     Weight: 49.7 kg (109 lb 9.1 oz)  Body mass index is 22.13 kg/m².    Intake/Output Summary (Last 24 hours) at 3/11/2020 1640  Last data filed at 3/11/2020 0600  Gross per 24 hour   Intake 1620 ml   Output 1000 ml   Net 620 ml      Physical Exam   Constitutional: She is oriented to person, place, and time. She appears well-developed and well-nourished. No distress.   HENT:   Head: Normocephalic and atraumatic.   Mouth/Throat: Oropharynx is clear and moist.   Eyes: Pupils are equal, round, and reactive to light. Conjunctivae and EOM are normal.   Neck: Normal range of motion.  Neck supple.   Cardiovascular: Normal rate, regular rhythm, normal heart sounds and intact distal pulses. Exam reveals no friction rub.   No murmur heard.  Pulmonary/Chest: No stridor. She has no wheezes. She has no rales. She exhibits no tenderness.   On 3 L NC  Decreased air entry. No rales or wheezes   Abdominal: Soft. Bowel sounds are normal. She exhibits no distension and no mass. There is no tenderness.   Musculoskeletal: Normal range of motion. She exhibits edema.   Neurological: She is alert and oriented to person, place, and time.   Skin: Skin is warm and dry. She is not diaphoretic. No erythema.   Nursing note and vitals reviewed.      Significant Labs: All pertinent labs within the past 24 hours have been reviewed.    Significant Imaging: I have reviewed all pertinent imaging results/findings within the past 24 hours.      Assessment/Plan:      * COPD with acute exacerbation  Patient with worsening sob and pleuritic chest pain. Patient reports increase supplemental oxygen use at home. Denies fever but has chills. No recent sick contact, or travel. Denies upper respiratory symptoms. No recent change in meds. On prednisone 10 mg, Symbicort, and elipta for COPD.  Patient is tachypnic with low-grade fever 99.8. Decreased air entry in lung. No wheezing or rales.  WBC 11.42, hb 14.2, Na 127, Pco2 46.2, Po2 74, Tr <0.006, D-dimer 0.62, BNP 45, UA is not septic  CXR showed hyperinflated lungs  In the ED patient was given methylpred injection.    - prn duonebs.  - oxygen supplement  - prednisone   - antibiotics  - respiratory viral panel  - legionella antigen      Hyponatremia  Patient was found hyponatremic in the ED. Na 127. Patient denies increase or decrease in oral intake. On exam she does not appear dehydrated with normal skin turgor and oral cavity is clear and moist.   Suspect d/t increase free water intake.    - hold hydrochlorothiazide  - restrict fluid intake   - sodium level  improving.    Hyperlipidemia  - Continue home pravastatin and aspirin      Hypertension  - hold home HCTZ.  - continue home diltiazem         VTE Risk Mitigation (From admission, onward)         Ordered     enoxaparin injection 30 mg  Daily      03/11/20 0743     IP VTE HIGH RISK PATIENT  Once      03/10/20 1938                      Gage Cleveland MD  Department of Hospital Medicine   [facility]

## 2020-03-11 NOTE — PLAN OF CARE
Sitting up in recliner with daughter at bedside, breathing even and unlabored. No c/o pain or discomfort. Safety maintained during stay.

## 2020-03-11 NOTE — PLAN OF CARE
03/11/20 1218   Post-Acute Status   Post-Acute Authorization Other   Other Status No Post-Acute Service Needs

## 2020-03-11 NOTE — ED NOTES
Attempted to call report. RN stated she was unavailable to take report at this time. Will re-attempt.

## 2020-03-11 NOTE — ED PROVIDER NOTES
Encounter Date: 3/10/2020       History     Chief Complaint   Patient presents with    Shortness of Breath     emphysema, on home 02 of 3L, more SOB than usual     75 yo female w/ Hx of COPD p/w shortness of breath, chest pressure, c/w prior COPD exacerbations. Reports she does not feel daily inhalers have been as effective. Mild cough, denies significant URI symptoms, fevers, chills, nausea, vomiting. No LE edema, hemoptysis. Denies crushing chest pain. Otherwise in usual state of health. On home O2, quit smoking many years ago. No recent travel/sick contacts.        Review of patient's allergies indicates:   Allergen Reactions    Bactrim [sulfamethoxazole-trimethoprim] Nausea Only    Codeine Itching    Keflex [cephalexin] Other (See Comments)     Rhinorrhea, nausea. Tolerated Ceftriaxone 2014    Ceftriaxone Rash     Morbilliform rash after receiving IV ceftriaxone    Clindamycin hcl Rash    Doxycycline Rash    Vancomycin analogues Rash     Morbilliform rash after receiving IV vancomycin     Past Medical History:   Diagnosis Date    Actinic keratosis     Allergy     Arthritis     Cataract     Cellulitis of ankle     Colon polyps     COPD (chronic obstructive pulmonary disease)     Family history of colon cancer     Hyperlipidemia     Hypertension     Lung nodules     Sinus tachycardia      Past Surgical History:   Procedure Laterality Date    APPENDECTOMY      CATARACT EXTRACTION Bilateral      SECTION      x2    COLONOSCOPY N/A 10/10/2017    Procedure: COLONOSCOPY;  Surgeon: Omid Lino MD;  Location: [facility] ([address] [place] [address]);  Service: Endoscopy;  Laterality: N/A;  pt unable to be checked in with previous order    EYE SURGERY      TONSILLECTOMY       Family History   Problem Relation Age of Onset    Cancer Mother 79        colon    Heart disease Mother     Hyperlipidemia Mother     Hypertension Mother     Skin cancer Mother     Cataracts Mother     Glaucoma Mother      Thyroid disease Mother     COPD Father     Diabetes Father     Heart disease Father     Hyperlipidemia Father     Hypertension Father     Cancer Sister     Heart disease Sister     Hyperlipidemia Sister     Hypertension Sister     Skin cancer Sister     Cataracts Sister     Breast cancer Sister     Cancer Son     Blindness Paternal Grandmother     Diabetes Paternal Grandmother     Thyroid disease Daughter     Melanoma Neg Hx     Psoriasis Neg Hx     Lupus Neg Hx     Eczema Neg Hx     Amblyopia Neg Hx     Macular degeneration Neg Hx     Retinal detachment Neg Hx     Strabismus Neg Hx     Stroke Neg Hx      Social History     Tobacco Use    Smoking status: Former Smoker     Packs/day: 1.00     Years: 39.00     Pack years: 39.00     Types: Cigarettes     Last attempt to quit: 1995     Years since quittin.3    Smokeless tobacco: Never Used   Substance Use Topics    Alcohol use: Yes     Alcohol/week: 2.0 standard drinks     Types: 2 Glasses of wine per week     Comment: rarely has a glass of wine- not daily    Drug use: No     Review of Systems   Constitutional: Negative for fever.   HENT: Negative for sore throat.    Respiratory: Positive for cough, chest tightness, shortness of breath and wheezing.    Cardiovascular: Negative for chest pain.   Gastrointestinal: Negative for nausea.   Genitourinary: Negative for dysuria.   Musculoskeletal: Negative for back pain.   Skin: Negative for rash.   Neurological: Negative for weakness.   Hematological: Does not bruise/bleed easily.       Physical Exam     Initial Vitals [03/10/20 1229]   BP Pulse Resp Temp SpO2   137/73 100 (!) 26 99.8 °F (37.7 °C) (!) 89 %      MAP       --         Physical Exam    Nursing note and vitals reviewed.  Constitutional: She appears well-developed and well-nourished. She is not diaphoretic. No distress.   HENT:   Head: Normocephalic and atraumatic.   Nose: Nose normal.   Eyes: EOM are normal. Pupils are  equal, round, and reactive to light. No scleral icterus.   Neck: Normal range of motion. Neck supple.   Cardiovascular: Normal rate, regular rhythm, normal heart sounds and intact distal pulses. Exam reveals no gallop and no friction rub.    No murmur heard.  Pulmonary/Chest: No stridor. She is in respiratory distress. She has wheezes. She has no rhonchi. She has no rales.   Speaking in shortened sentences. Moderate retractions on inspiration. Very quiet lung sounds, prolonged expiratory phase.    Abdominal: Soft. Normal appearance and bowel sounds are normal. She exhibits no distension. There is no tenderness. There is no rebound and no guarding.   Musculoskeletal: Normal range of motion. She exhibits no edema or tenderness.   Neurological: She is oriented to person, place, and time. No cranial nerve deficit.   Skin: Skin is warm and dry. No rash noted.   Psychiatric: She has a normal mood and affect. Her behavior is normal.         ED Course   Procedures  Labs Reviewed   CBC W/ AUTO DIFFERENTIAL - Abnormal; Notable for the following components:       Result Value    Gran # (ANC) 10.7 (*)     Immature Grans (Abs) 0.06 (*)     Lymph # 0.3 (*)     Gran% 93.5 (*)     Lymph% 2.3 (*)     Mono% 3.3 (*)     All other components within normal limits   COMPREHENSIVE METABOLIC PANEL - Abnormal; Notable for the following components:    Sodium 127 (*)     Chloride 88 (*)     Glucose 146 (*)     Albumin 3.1 (*)     ALT 9 (*)     All other components within normal limits   URINALYSIS, REFLEX TO URINE CULTURE - Abnormal; Notable for the following components:    Ketones, UA Trace (*)     Occult Blood UA 1+ (*)     All other components within normal limits    Narrative:     Preferred Collection Type->Urine, Clean Catch   D DIMER, QUANTITATIVE - Abnormal; Notable for the following components:    D-Dimer 0.62 (*)     All other components within normal limits    Narrative:     ADD ON DDIMR [id number] PER KAYKAY STEPHENS MD   03/10/2020  15:50   ADD-ON MG #[id number] PER KAYKAY STEPHENS MD 15:57  03/10/2020    ISTAT PROCEDURE - Abnormal; Notable for the following components:    POC PCO2 46.2 (*)     POC HCO3 29.1 (*)     POC SATURATED O2 74 (*)     POC TCO2 30 (*)     All other components within normal limits   ISTAT PROCEDURE - Abnormal; Notable for the following components:    POC Glucose 202 (*)     POC Creatinine 0.2 (*)     POC Sodium 124 (*)     POC Chloride 89 (*)     POC Ionized Calcium 1.04 (*)     All other components within normal limits   TROPONIN I   TROPONIN I   B-TYPE NATRIURETIC PEPTIDE   D DIMER, QUANTITATIVE   MAGNESIUM   MAGNESIUM    Narrative:     ADD ON DDIMR [id number] PER KAYKAY STEPHENS MD  03/10/2020  15:50   ADD-ON MG #[id number] PER KAYKAY STEPHENS MD 15:57  03/10/2020    URINALYSIS MICROSCOPIC    Narrative:     Preferred Collection Type->Urine, Clean Catch   LEGIONELLA ANTIGEN, URINE RANDOM   CULTURE, LEGIONELLA   ISTAT CHEM8     EKG Readings: (Independently Interpreted)   Initial Reading: No STEMI. Rhythm: Normal Sinus Rhythm. Heart Rate: 97. Ectopy: No Ectopy.   Poor quality EKG, No ST segment elevation or depression concerning for acute ischemia.        ECG Results          EKG 12-lead (Final result)  Result time 03/10/20 20:20:27    Final result by Interface, Lab In [facility] (03/10/20 20:20:27)                 Narrative:    Test Reason : R07.9,    Vent. Rate : 097 BPM     Atrial Rate : 097 BPM     P-R Int : 144 ms          QRS Dur : 066 ms      QT Int : 322 ms       P-R-T Axes : 080 -32 073 degrees     QTc Int : 408 ms    Baseline Artifact  Sinus rhythm with Premature supraventricular complexes  Low QRS voltage in limb leads Now present  Left axis deviation  Abnormal ECG  When compared with ECG of 26-FEB-2017 16:02,  Confirmed by JOAO ARGUELLES MD ([id number]) on 3/10/2020 8:20:13 PM    Referred By: System System           Confirmed By:JOAO ARGUELLES MD                            Imaging Results           X-Ray Chest AP Portable (Final result)  Result time 03/10/20 13:28:40    Final result by Rafiq Albert MD (03/10/20 13:28:40)                 Impression:      Normal chest      Electronically signed by: Rafiq Albert MD  Date:    03/10/2020  Time:    13:28             Narrative:    EXAMINATION:  XR CHEST AP PORTABLE    CLINICAL HISTORY:  Chest Pain;    TECHNIQUE:  Single frontal view of the chest was performed.    COMPARISON:  12/27/2019    FINDINGS:  Cardiac size is normal.  Lungs are clear and no infiltrate is noted.                                 Medical Decision Making:   Initial Assessment:   73 yo p/w shortness of breath, wheezing, chest tightness. Similar to prior COPD exacerbations. No PE risk factors. No significant infectious symptoms. Vitals decent, no tachycardia or hypotension. Some minimal LE edema. Suspect COPD exacerbation. CHF, PNA, PE less likely. D-Dimer below age-adjusted cutoff. Trop negative. CXR without evidence of consolidation. Given nebsx2, prednisone. Still quiet and wheezing, but patient improved. Initial sodium 127. Repeat with istat 124. Unclear cause. Possibly contributing to respiratory muscle weakness? Will admit given electrolyte abnormalities. Continue Tx for COPD.                                  Clinical Impression:       ICD-10-CM ICD-9-CM   1. COPD exacerbation J44.1 491.21   2. Chest pain R07.9 786.50   3. SOB (shortness of breath) R06.02 786.05   4. Hyponatremia E87.1 276.1         Disposition:   Disposition: Admitted  Condition: Stable     ED Disposition Condition    Admit                           Pedro Beverly MD  03/11/20 1339

## 2020-03-11 NOTE — ED NOTES
Telemetry Verification   Patient placed on Telemetry Box  Verified with War Room  Box # [id number]   Monitor Tech Lauren   Rate 96   Rhythm Normal Sinus

## 2020-03-11 NOTE — ASSESSMENT & PLAN NOTE
Patient was found hyponatremic in the ED. Na 127. Patient denies increase or decrease in oral intake. On exam she does not appear dehydrated with normal skin turgor and oral cavity is clear and moist.   Suspect d/t increase free water intake.    - hold hydrochlorothiazide  - restrict fluid intake

## 2020-03-11 NOTE — ED NOTES
Pt informed that MD would like to take a sputum sample to be sampled. Pt states that she does not have a productive cough at this time.

## 2020-03-11 NOTE — ASSESSMENT & PLAN NOTE
Patient with worsening sob and pleuritic chest pain. Patient reports increase supplemental oxygen use at home. Denies fever but has chills. No recent sick contact, or travel. Denies upper respiratory symptoms. No recent change in meds. On prednisone 10 mg, Symbicort, and elipta for COPD.  Patient is tachypnic with low-grade fever 99.8. Decreased air entry in lung. No wheezing or rales.  WBC 11.42, hb 14.2, Na 127, Pco2 46.2, Po2 74, Tr <0.006, D-dimer 0.62, BNP 45, UA is not septic  CXR showed hyperinflated lungs  In the ED patient was given methylpred injection.    - prn duonebs.  - oxygen supplement  - prednisone   - antibiotics  - respiratory viral panel  - legionella antigen

## 2020-03-11 NOTE — SUBJECTIVE & OBJECTIVE
Interval History: No acute events overnight. Patient symptoms is improving. Patient feels she returned to her baseline. Exam showed no wheezing or rales. scheduled duonebs changed to prn.       Review of Systems   Constitutional: Positive for chills and fatigue. Negative for activity change, appetite change, fever and unexpected weight change.   HENT: Negative for congestion, hearing loss, rhinorrhea and sore throat.    Eyes: Negative for photophobia and visual disturbance.   Respiratory: Positive for shortness of breath. Negative for cough and choking.    Cardiovascular: Positive for chest pain (pleuritic) and leg swelling.   Gastrointestinal: Negative for abdominal distention, abdominal pain, blood in stool, diarrhea and nausea.   Genitourinary: Positive for urgency. Negative for difficulty urinating, dysuria, flank pain and hematuria.   Musculoskeletal: Negative for arthralgias, back pain, myalgias and neck pain.   Skin: Negative for color change and rash.   Neurological: Positive for numbness. Negative for seizures.     Objective:     Vital Signs (Most Recent):  Temp: 98.5 °F (36.9 °C) (03/11/20 1559)  Pulse: 84 (03/11/20 1611)  Resp: 18 (03/11/20 1559)  BP: (!) 121/58 (03/11/20 1559)  SpO2: 95 % (03/11/20 1559) Vital Signs (24h Range):  Temp:  [97 °F (36.1 °C)-98.7 °F (37.1 °C)] 98.5 °F (36.9 °C)  Pulse:  [] 84  Resp:  [16-31] 18  SpO2:  [93 %-99 %] 95 %  BP: (119-162)/(57-81) 121/58     Weight: 49.7 kg (109 lb 9.1 oz)  Body mass index is 22.13 kg/m².    Intake/Output Summary (Last 24 hours) at 3/11/2020 1640  Last data filed at 3/11/2020 0600  Gross per 24 hour   Intake 1620 ml   Output 1000 ml   Net 620 ml      Physical Exam   Constitutional: She is oriented to person, place, and time. She appears well-developed and well-nourished. No distress.   HENT:   Head: Normocephalic and atraumatic.   Mouth/Throat: Oropharynx is clear and moist.   Eyes: Pupils are equal, round, and reactive to light. Conjunctivae  and EOM are normal.   Neck: Normal range of motion. Neck supple.   Cardiovascular: Normal rate, regular rhythm, normal heart sounds and intact distal pulses. Exam reveals no friction rub.   No murmur heard.  Pulmonary/Chest: No stridor. She has no wheezes. She has no rales. She exhibits no tenderness.   On 3 L NC  Decreased air entry. No rales or wheezes   Abdominal: Soft. Bowel sounds are normal. She exhibits no distension and no mass. There is no tenderness.   Musculoskeletal: Normal range of motion. She exhibits edema.   Neurological: She is alert and oriented to person, place, and time.   Skin: Skin is warm and dry. She is not diaphoretic. No erythema.   Nursing note and vitals reviewed.      Significant Labs: All pertinent labs within the past 24 hours have been reviewed.    Significant Imaging: I have reviewed all pertinent imaging results/findings within the past 24 hours.

## 2020-03-11 NOTE — CARE UPDATE
Rapid Response Nurse AI Alert     AI alert received, chart check completed abnormal VS noted. Charge RNAnia contacted, no concerns verbalized at this time, instructed to call [phone] for further concerns or assistance.

## 2020-03-11 NOTE — PT/OT/SLP EVAL
Occupational Therapy   Evaluation and Discharge Note    Name: Leyla Mari  MRN: [id number]  Admitting Diagnosis:  COPD with acute exacerbation      Recommendations:     Discharge Recommendations: home  Discharge Equipment Recommendations:  none  Barriers to discharge:  None    Assessment:     Leyla Mari is a 74 y.o. female with a medical diagnosis of COPD with acute exacerbation. At this time, patient is functioning at their prior level of function and does not require further acute OT services.     Plan:     During this hospitalization, patient does not require further acute OT services.  Please re-consult if situation changes.    · Plan of Care Reviewed with: patient    Subjective     Chief Complaint: none  Patient/Family Comments/goals: to go home    Occupational Profile:  Living Environment: Patient lives with son in a 1 SH with 3 steps to porch and another step to enter through doorway. Patient has a B HRs at the 3 DANIS, but they are wide apart. Patient does have a step with a railing on R side (going up) from dining room area. Patient has a regular toilet and no grab bars. Patient has a walk in shower with a threshold to get in and no grab bar. Patient was independent with ADLs and functional mobility tasks PTA. Patient still drives and goes to the grocery on her own. Patient also reports that she uses 3 L of oxygen at home as needed, but is on currently on 2 L of oxygen during this hospitalization.   Equipment Used at home:  oxygen(May have a shower chair, RW, rollator, SPC, 3 prong cane from mother but does not use them)    Pain/Comfort:  · Pain Rating 1: 0/10  · Pain Rating Post-Intervention 1: 0/10    Patients cultural, spiritual, Baptist conflicts given the current situation: no    Objective:     Communicated with: HANG prior to session.  Patient found HOB elevated upon OT entry to room.    General Precautions: Standard, fall   Orthopedic Precautions:N/A   Braces: N/A     Occupational  Performance:    Bed Mobility:    · Patient completed Supine to Sit with independence    Functional Mobility/Transfers:  · Patient completed Sit <> Stand Transfer with independence  with  no assistive device   · Patient completed Toilet Transfer bed>toilet; toilet>bedside chair with functional ambulation technique with modified independence with  no AD    Activities of Daily Living:  · Grooming: independence standing at sink to wash/dry hands, wash face, and perform oral care   · Upper Body Dressing: independence Donning back gown  · Lower Body Dressing: independence Liscomb and donning socks  · Toileting: independence per patient report and simulating task during toilet transfer    Cognitive/Visual Perceptual:  Cognitive/Psychosocial Skills:     -       Oriented to: Person, Place, Time and Situation   -       Follows Commands/attention:Follows multistep  commands  -       Communication: clear/fluent  -       Memory: No Deficits noted  -       Safety awareness/insight to disability: intact   -       Mood/Affect/Coping skills/emotional control: Appropriate to situation  Visual/Perceptual:      -Intact      Physical Exam:  Upper Extremity Range of Motion:     -       Right Upper Extremity: WFL  -       Left Upper Extremity: WFL  Upper Extremity Strength:    -       Right Upper Extremity: WFL  -       Left Upper Extremity: WFL   Strength:    -       Right Upper Extremity: WFL  -       Left Upper Extremity: WFL  Fine Motor Coordination:    -       Intact  Gross motor coordination:   WFL    AMPAC 6 Click ADL:  AMPAC Total Score: 24    Treatment & Education:  Role of OT and POC  Safety  ADL retraining  Functional mobility training  Discharge planning  Education:    Patient left up in chair with call button in reach and all needs met. (nurse notified)    GOALS:   Multidisciplinary Problems     Occupational Therapy Goals     Not on file          Multidisciplinary Problems (Resolved)        Problem: Occupational Therapy  Goal    Goal Priority Disciplines Outcome Interventions   Occupational Therapy Goal   (Resolved)     OT, PT/OT Met                    History:     Past Medical History:   Diagnosis Date    Actinic keratosis     Allergy     Arthritis     Cataract     Cellulitis of ankle     Colon polyps     COPD (chronic obstructive pulmonary disease)     Family history of colon cancer     Hyperlipidemia     Hypertension     Lung nodules     Sinus tachycardia        Past Surgical History:   Procedure Laterality Date    APPENDECTOMY      CATARACT EXTRACTION Bilateral 2007     SECTION      x2    COLONOSCOPY N/A 10/10/2017    Procedure: COLONOSCOPY;  Surgeon: Omid Lino MD;  Location: [address]);  Service: Endoscopy;  Laterality: N/A;  pt unable to be checked in with previous order    EYE SURGERY      TONSILLECTOMY         Time Tracking:     OT Date of Treatment: 20  OT Start Time: 915  OT Stop Time: 938  OT Total Time (min): 23 min    Billable Minutes:Evaluation 10  Self Care/Home Management 13    SUZIE Macias  3/11/2020

## 2020-03-11 NOTE — PT/OT/SLP EVAL
Physical Therapy Evaluation and Discharge Note    Patient Name:  Leyla Mari   MRN:  [id number]    Recommendations:     Discharge Recommendations:  home   Discharge Equipment Recommendations: none   Barriers to discharge: None    Assessment:     Leyla Mari is a 74 y.o. female admitted with a medical diagnosis of COPD with acute exacerbation. At this time, patient is functioning at their prior level of function and does not require further acute PT services.     Recent Surgery: * No surgery found *      Plan:     During this hospitalization, patient does not require further acute PT services.  Please re-consult if situation changes.      Subjective     Patient/Family Comments/goals: Pt pleasant and willing to participate with therapy on this date.    Pain/Comfort:  · Pain Rating 1: 0/10    Patients cultural, spiritual, Anabaptist conflicts given the current situation: no    Living Environment:  Pt lives with son in [facility] w/3 [facility] and [facility] handrails.   Prior to admission, patients level of function was (I) and driving.  Equipment used at home: walker, rolling, rollator, cane, straight, shower chair, wheelchair, oxygen.  DME owned (not currently used): none.  Upon discharge, patient will have assistance from son.    Objective:     Communicated with NSG prior to session.  Patient found up in chair with   upon PT entry to room.    General Precautions: Standard, fall   Orthopedic Precautions:N/A   Braces: N/A     Exams:  · Cognitive Exam:  Patient is oriented to Person, Place, Time and Situation  · Gross Motor Coordination:  WFL  · RLE ROM: WFL  · RLE Strength: WFL  · LLE ROM: WFL  · LLE Strength: WFL    Functional Mobility:  · Transfers:     · Sit to Stand:  modified independence with rolling walker  · Gait: ~190ft Mod I w/RW on 2L O2  · Balance: Mod I    AM-PAC 6 CLICK MOBILITY  Total Score:24       Therapeutic Activities and Exercises:   - Pt educated on:   -PT roles, expectations, and POC    -Safety with  mobility   -Benefits of OOB activities to increase strength and functional mobility    -Performing ther ex for increasing LE ROM and strength   -Discharge recommendations     AM-PAC 6 CLICK MOBILITY  Total Score:24     Patient left up in chair with all lines intact and call button in reach.    GOALS:   Multidisciplinary Problems     Physical Therapy Goals     Not on file          Multidisciplinary Problems (Resolved)        Problem: Physical Therapy Goal    Goal Priority Disciplines Outcome Goal Variances Interventions   Physical Therapy Goal   (Resolved)     PT, PT/OT Met                     History:     Past Medical History:   Diagnosis Date    Actinic keratosis     Allergy     Arthritis     Cataract     Cellulitis of ankle     Colon polyps     COPD (chronic obstructive pulmonary disease)     Family history of colon cancer     Hyperlipidemia     Hypertension     Lung nodules     Sinus tachycardia        Past Surgical History:   Procedure Laterality Date    APPENDECTOMY      CATARACT EXTRACTION Bilateral      SECTION      x2    COLONOSCOPY N/A 10/10/2017    Procedure: COLONOSCOPY;  Surgeon: Omid Lino MD;  Location: [facility] ([address] [place] [address]);  Service: Endoscopy;  Laterality: N/A;  pt unable to be checked in with previous order    EYE SURGERY      TONSILLECTOMY         Time Tracking:     PT Received On: 20  PT Start Time: 1032     PT Stop Time: 1049  PT Total Time (min): 17 min     Billable Minutes: Evaluation 8 and Gait Training 9      Noe Gonzales, PT  2020

## 2020-03-11 NOTE — HPI
Ms Leyla Mari is 74 years old female patient with past medical history of COPD (on maintenance prednisone dose 10 mg daily, Elipta, and Symbicort), HTN, and HLD. Presented to the ED today after having worsening sob three days ago. Started to have pleuritic chest pain one day ago which prompted her to come to the ED because she was afraid she's having pneumonia. Patient is on 3 L oxygen at home as needed and recently she is been using it more frequent. She also reports fatigue. Patient denies fever, cough, wheezing, and sore throat. Denies recent sick contact, and recent travel. Patient reports a recent UTI and finished her cipro course 3 weeks ago. Patient currently denies any urinary symptoms. Last time she smoked 26 years ago and last admission for COPD exacerbation was 3 years ago. Patient received influenza vaccine. Patient was taking prednisone 20 mg prednisone and her pulmonologist (dr. Guido) changed it to 10 daily last Wednesday but she has not taken it since then.     In the ED patient was tachypnic, sating at 89% on 3L, and low-grade fever at 99.8. Pco2 46, CXR showed hyperinflated lungs. She was given methylpred and IVF.

## 2020-03-11 NOTE — PLAN OF CARE
Problem: Occupational Therapy Goal  Goal: Occupational Therapy Goal  Outcome: Met   Evaluation/Discharge only. No acute OT needs at this time. No goals established. Re-consult if situation changes.    SUZIE Macias  3/11/2020

## 2020-03-11 NOTE — ASSESSMENT & PLAN NOTE
Patient was found hyponatremic in the ED. Na 127. Patient denies increase or decrease in oral intake. On exam she does not appear dehydrated with normal skin turgor and oral cavity is clear and moist.   Suspect d/t increase free water intake.    - hold hydrochlorothiazide  - restrict fluid intake   - sodium level improving.

## 2020-03-11 NOTE — HOSPITAL COURSE
Patient was admitted for COPD exacerbation and hyponatremia. CXR showed hyperinflated lungs without opacities or infiltration . She was started on duonebs and steroids. Patient sodium is improved with fluid restriction. Patient symptoms improved with treatment and oxygen requirement decreased. Patient felt that she returned to her baseline. Patient discharged in good condition with steroid taper and pulmonology appointment follow up. Hydrochlorothiazide held until PCP appointment.

## 2020-03-12 VITALS
BODY MASS INDEX: 22.09 KG/M2 | SYSTOLIC BLOOD PRESSURE: 119 MMHG | HEART RATE: 69 BPM | WEIGHT: 109.56 LBS | OXYGEN SATURATION: 97 % | RESPIRATION RATE: 16 BRPM | TEMPERATURE: 98 F | DIASTOLIC BLOOD PRESSURE: 58 MMHG | HEIGHT: 59 IN

## 2020-03-12 LAB
ALBUMIN SERPL BCP-MCNC: 2.5 G/DL (ref 3.5–5.2)
ALP SERPL-CCNC: 64 U/L (ref 55–135)
ALT SERPL W/O P-5'-P-CCNC: 9 U/L (ref 10–44)
ANION GAP SERPL CALC-SCNC: 8 MMOL/L (ref 8–16)
AST SERPL-CCNC: 14 U/L (ref 10–40)
BASOPHILS # BLD AUTO: 0.01 K/UL (ref 0–0.2)
BASOPHILS NFR BLD: 0.1 % (ref 0–1.9)
BILIRUB SERPL-MCNC: 0.1 MG/DL (ref 0.1–1)
BUN SERPL-MCNC: 11 MG/DL (ref 8–23)
CALCIUM SERPL-MCNC: 9.2 MG/DL (ref 8.7–10.5)
CHLORIDE SERPL-SCNC: 101 MMOL/L (ref 95–110)
CO2 SERPL-SCNC: 29 MMOL/L (ref 23–29)
CREAT SERPL-MCNC: 0.5 MG/DL (ref 0.5–1.4)
DIFFERENTIAL METHOD: ABNORMAL
EOSINOPHIL # BLD AUTO: 0 K/UL (ref 0–0.5)
EOSINOPHIL NFR BLD: 0.1 % (ref 0–8)
ERYTHROCYTE [DISTWIDTH] IN BLOOD BY AUTOMATED COUNT: 14.5 % (ref 11.5–14.5)
EST. GFR  (AFRICAN AMERICAN): >60 ML/MIN/1.73 M^2
EST. GFR  (NON AFRICAN AMERICAN): >60 ML/MIN/1.73 M^2
GLUCOSE SERPL-MCNC: 89 MG/DL (ref 70–110)
HCT VFR BLD AUTO: 36.4 % (ref 37–48.5)
HGB BLD-MCNC: 11.3 G/DL (ref 12–16)
IMM GRANULOCYTES # BLD AUTO: 0.06 K/UL (ref 0–0.04)
IMM GRANULOCYTES NFR BLD AUTO: 0.4 % (ref 0–0.5)
LYMPHOCYTES # BLD AUTO: 0.8 K/UL (ref 1–4.8)
LYMPHOCYTES NFR BLD: 5.6 % (ref 18–48)
MAGNESIUM SERPL-MCNC: 1.9 MG/DL (ref 1.6–2.6)
MCH RBC QN AUTO: 28.1 PG (ref 27–31)
MCHC RBC AUTO-ENTMCNC: 31 G/DL (ref 32–36)
MCV RBC AUTO: 91 FL (ref 82–98)
MONOCYTES # BLD AUTO: 0.7 K/UL (ref 0.3–1)
MONOCYTES NFR BLD: 4.8 % (ref 4–15)
NEUTROPHILS # BLD AUTO: 13.1 K/UL (ref 1.8–7.7)
NEUTROPHILS NFR BLD: 89 % (ref 38–73)
NRBC BLD-RTO: 0 /100 WBC
PHOSPHATE SERPL-MCNC: 2.8 MG/DL (ref 2.7–4.5)
PLATELET # BLD AUTO: 288 K/UL (ref 150–350)
PMV BLD AUTO: 9.3 FL (ref 9.2–12.9)
POTASSIUM SERPL-SCNC: 4 MMOL/L (ref 3.5–5.1)
PROT SERPL-MCNC: 5.4 G/DL (ref 6–8.4)
RBC # BLD AUTO: 4.02 M/UL (ref 4–5.4)
SODIUM SERPL-SCNC: 138 MMOL/L (ref 136–145)
WBC # BLD AUTO: 14.73 K/UL (ref 3.9–12.7)

## 2020-03-12 PROCEDURE — 85025 COMPLETE CBC W/AUTO DIFF WBC: CPT

## 2020-03-12 PROCEDURE — 25000003 PHARM REV CODE 250: Performed by: STUDENT IN AN ORGANIZED HEALTH CARE EDUCATION/TRAINING PROGRAM

## 2020-03-12 PROCEDURE — 84100 ASSAY OF PHOSPHORUS: CPT

## 2020-03-12 PROCEDURE — 99239 PR HOSPITAL DISCHARGE DAY,>30 MIN: ICD-10-PCS | Mod: GC,,, | Performed by: STUDENT IN AN ORGANIZED HEALTH CARE EDUCATION/TRAINING PROGRAM

## 2020-03-12 PROCEDURE — 63600175 PHARM REV CODE 636 W HCPCS: Performed by: STUDENT IN AN ORGANIZED HEALTH CARE EDUCATION/TRAINING PROGRAM

## 2020-03-12 PROCEDURE — 99239 HOSP IP/OBS DSCHRG MGMT >30: CPT | Mod: GC,,, | Performed by: STUDENT IN AN ORGANIZED HEALTH CARE EDUCATION/TRAINING PROGRAM

## 2020-03-12 PROCEDURE — 80053 COMPREHEN METABOLIC PANEL: CPT

## 2020-03-12 PROCEDURE — 25000242 PHARM REV CODE 250 ALT 637 W/ HCPCS: Performed by: STUDENT IN AN ORGANIZED HEALTH CARE EDUCATION/TRAINING PROGRAM

## 2020-03-12 PROCEDURE — 36415 COLL VENOUS BLD VENIPUNCTURE: CPT

## 2020-03-12 PROCEDURE — 83735 ASSAY OF MAGNESIUM: CPT

## 2020-03-12 RX ORDER — FAMOTIDINE 20 MG/1
40 TABLET, FILM COATED ORAL DAILY
Qty: 60 TABLET | Refills: 2 | Status: SHIPPED | OUTPATIENT
Start: 2020-03-12 | End: 2020-06-17

## 2020-03-12 RX ORDER — FERROUS SULFATE, DRIED 160(50) MG
2 TABLET, EXTENDED RELEASE ORAL 2 TIMES DAILY
Qty: 120 TABLET | Refills: 2 | COMMUNITY
Start: 2020-03-12 | End: 2020-06-17

## 2020-03-12 RX ORDER — PREDNISONE 10 MG/1
TABLET ORAL
Qty: 36 TABLET | Refills: 0 | Status: SHIPPED | OUTPATIENT
Start: 2020-03-13 | End: 2020-04-13 | Stop reason: SDUPTHER

## 2020-03-12 RX ORDER — HYDROCHLOROTHIAZIDE 25 MG/1
25 TABLET ORAL DAILY
Qty: 90 TABLET | Refills: 3
Start: 2020-03-12 | End: 2020-03-23

## 2020-03-12 RX ADMIN — FAMOTIDINE 20 MG: 20 TABLET ORAL at 08:03

## 2020-03-12 RX ADMIN — FLUTICASONE FUROATE AND VILANTEROL TRIFENATATE 1 PUFF: 100; 25 POWDER RESPIRATORY (INHALATION) at 08:03

## 2020-03-12 RX ADMIN — DILTIAZEM HYDROCHLORIDE 180 MG: 180 CAPSULE, COATED, EXTENDED RELEASE ORAL at 08:03

## 2020-03-12 RX ADMIN — PREDNISONE 40 MG: 10 TABLET ORAL at 08:03

## 2020-03-12 RX ADMIN — TIOTROPIUM BROMIDE 18 MCG: 18 CAPSULE ORAL; RESPIRATORY (INHALATION) at 08:03

## 2020-03-12 RX ADMIN — OYSTER SHELL CALCIUM WITH VITAMIN D 2 TABLET: 500; 200 TABLET, FILM COATED ORAL at 08:03

## 2020-03-12 RX ADMIN — ASPIRIN 81 MG: 81 TABLET, COATED ORAL at 08:03

## 2020-03-12 RX ADMIN — PRAVASTATIN SODIUM 20 MG: 10 TABLET ORAL at 08:03

## 2020-03-12 NOTE — PLAN OF CARE
03/12/20 1110   Final Note   Assessment Type Final Discharge Note   Anticipated Discharge Disposition Home   What phone number can be called within the next 1-3 days to see how you are doing after discharge?   (Leyla Kamaljit [phone])   Hospital Follow Up  Appt(s) scheduled? Yes   Discharge plans and expectations educations in teach back method with documentation complete? Yes   Right Care Referral Info   Post Acute Recommendation No Care       Haydee Murguia RN, BSN     Ext [phone]

## 2020-03-12 NOTE — DISCHARGE SUMMARY
[facility] Hospital Medicine  Discharge Summary      Patient Name: Leyla Mari  MRN: [id number]  Admission Date: 3/10/2020  Hospital Length of Stay: 2 days  Discharge Date and Time: 3/12/2020.  Attending Physician: Nithin Pino MD   Discharging Provider: Gage Cleveland MD  Primary Care Provider: Dulce Castro MD  [facility] Medicine Team: [facility] 5 Gage Cleveland MD    HPI:   Ms Leyla Mari is 74 years old female patient with past medical history of COPD (on maintenance prednisone dose 10 mg daily, Elipta, and Symbicort), HTN, and HLD. Presented to the ED today after having worsening sob three days ago. Started to have pleuritic chest pain one day ago which prompted her to come to the ED because she was afraid she's having pneumonia. Patient is on 3 L oxygen at home as needed and recently she is been using it more frequent. She also reports fatigue. Patient denies fever, cough, wheezing, and sore throat. Denies recent sick contact, and recent travel. Patient reports a recent UTI and finished her cipro course 3 weeks ago. Patient currently denies any urinary symptoms. Last time she smoked 26 years ago and last admission for COPD exacerbation was 3 years ago. Patient received influenza vaccine. Patient was taking prednisone 20 mg prednisone and her pulmonologist (dr. Guido) changed it to 10 daily last Wednesday but she has not taken it since then.     In the ED patient was tachypnic, sating at 89% on 3L, and low-grade fever at 99.8. Pco2 46, CXR showed hyperinflated lungs. She was given methylpred and IVF.    * No surgery found *      Hospital Course:   Patient was admitted for COPD exacerbation and hyponatremia. CXR showed hyperinflated lungs without opacities or infiltration . She was started on duonebs and steroids. Patient sodium is improved with fluid restriction. Patient symptoms improved with treatment and oxygen requirement decreased to baseline. Patient felt that  she returned to her baseline. Patient discharged in good condition with steroid taper and pulmonology appointment follow up. Hydrochlorothiazide held until PCP appointment.     Vitals:    03/12/20 0321 03/12/20 0732 03/12/20 0805 03/12/20 0822   BP: (!) 121/59  (!) 119/58    BP Location:   Right arm    Patient Position: Lying  Sitting    Pulse: 75 77 69 69   Resp: 15  16 16   Temp: 98.2 °F (36.8 °C)  97.7 °F (36.5 °C)    TempSrc: Oral  Oral    SpO2: 96%  97%    Weight:       Height:         Physical Exam   Constitutional: She is oriented to person, place, and time. She appears well-developed and well-nourished. No distress.   HENT:   Head: Normocephalic and atraumatic.   Mouth/Throat: Oropharynx is clear and moist.   Eyes: Pupils are equal, round, and reactive to light. Conjunctivae and EOM are normal.   Neck: Normal range of motion. Neck supple.   Cardiovascular: Normal rate, regular rhythm, normal heart sounds and intact distal pulses. Exam reveals no friction rub.   No murmur heard.  Pulmonary/Chest: No stridor. She has no wheezes. She has no rales. She exhibits no tenderness.   On 2 L NC  Decreased air entry. No rales or wheezes   Abdominal: Soft. Bowel sounds are normal. She exhibits no distension and no mass. There is no tenderness.   Musculoskeletal: Normal range of motion. She exhibits edema.   Neurological: She is alert and oriented to person, place, and time.   Skin: Skin is warm and dry. She is not diaphoretic. No erythema.   Nursing note and vitals reviewed.      Consults:     No new Assessment & Plan notes have been filed under this hospital service since the last note was generated.  Service: Hospital Medicine    Final Active Diagnoses:    Diagnosis Date Noted POA    PRINCIPAL PROBLEM:  COPD with acute exacerbation [J44.1] 12/03/2014 Yes    Hyponatremia [E87.1] 03/10/2020 Unknown    Chest pain [R07.9] 03/10/2020 Yes    Hyperlipidemia [E78.5]  Yes     Chronic    Hypertension [I10]  Yes     Chronic       Problems Resolved During this Admission:       Discharged Condition: good    Disposition: Home or Self Care    Follow Up:  Follow-up Information     Schedule an appointment as soon as possible for a visit with Main Dotson MD.    Specialty:  Pulmonary Disease  Why:  Hospital Discharge Follow Up  Contact information:  [facility]  [facility] [address]  [phone]             Sudha Castex, CNS On 3/27/2020.    Specialty:  Infectious Diseases  Why:  appointment at 9:00am.   Contact information:  [facility] [address]  [phone]             [facility] PULMONARY MEDICINE In 1 week.    Specialty:  Pulmonology  Contact information:  [address] [address]  [phone]           Dulce Castro MD.    Specialty:  Internal Medicine  Contact information:  [address] [facility] [address]  [phone]                 Patient Instructions:      Ambulatory referral/consult to Pulmonology   Standing Status: Future   Referral Priority: Routine Referral Type: Consultation   Referral Reason: Specialty Services Required   Referred to Provider: STEPHON DOTSON Requested Specialty: Pulmonary Disease   Number of Visits Requested: 1     Ambulatory referral/consult to Internal Medicine   Standing Status: Future   Referral Priority: Routine Referral Type: Consultation   Referral Reason: Specialty Services Required   Requested Specialty: Internal Medicine   Number of Visits Requested: 1     Diet Adult Regular     Notify your health care provider if you experience any of the following:  temperature >100.4     Notify your health care provider if you experience any of the following:  persistent nausea and vomiting or diarrhea     Notify your health care provider if you experience any of the following:  severe uncontrolled pain     Notify your health care provider if you experience any of the following:  redness, tenderness, or signs of infection (pain,  swelling, redness, odor or green/yellow discharge around incision site)     Notify your health care provider if you experience any of the following:  difficulty breathing or increased cough     Notify your health care provider if you experience any of the following:  severe persistent headache     Notify your health care provider if you experience any of the following:  worsening rash     Notify your health care provider if you experience any of the following:  persistent dizziness, light-headedness, or visual disturbances     Notify your health care provider if you experience any of the following:  increased confusion or weakness     Notify your health care provider if you experience any of the following:     Activity as tolerated           Pending Diagnostic Studies:     Procedure Component Value Units Date/Time    Legionella antigen, urine [[id number]] Collected:  03/10/20 2121    Order Status:  Sent Lab Status:  In process Updated:  03/10/20 2145    Specimen:  Urine, Clean Catch          Medications:  Reconciled Home Medications:      Medication List      START taking these medications    calcium-vitamin D3 500 mg(1,250mg) -200 unit per tablet  Commonly known as:  OS-ALIE 500 + D3  Take 2 tablets by mouth 2 (two) times daily.     famotidine 20 MG tablet  Commonly known as:  PEPCID  Take 2 tablets (40 mg total) by mouth once daily.        CHANGE how you take these medications    predniSONE 10 MG tablet  Commonly known as:  DELTASONE  Take 4 tablets (40 mg total) by mouth once daily for 1 day, THEN 2 tablets (20 mg total) once daily for 3 days, THEN 1 tablet (10 mg total) once daily for 26 days.  Start taking on:  March 13, 2020  What changed:  See the new instructions.        CONTINUE taking these medications    * albuterol 90 mcg/actuation inhaler  Commonly known as:  PROVENTIL/VENTOLIN HFA  Inhale 2 puffs into the lungs every 6 (six) hours as needed for Wheezing or Shortness of Breath (ventolin hfa per pts  insurance). Rescue     * albuterol 2.5 mg /3 mL (0.083 %) nebulizer solution  Commonly known as:  PROVENTIL  Take 3 mLs (2.5 mg total) by nebulization every 4 (four) hours as needed for Wheezing or Shortness of Breath.     ALPRAZolam 0.25 MG tablet  Commonly known as:  XANAX  Take 1 tablet (0.25 mg total) by mouth 3 (three) times daily as needed for Anxiety.     aspirin 81 MG EC tablet  Commonly known as:  ECOTRIN  Take 81 mg by mouth once daily.     budesonide-formoterol 160-4.5 mcg 160-4.5 mcg/actuation Hfaa  Commonly known as:  SYMBICORT  INHALE 2 PUFFS INTO THE LUNGS EVERY 12 HOURS     diltiaZEM 180 MG 24 hr capsule  Commonly known as:  CARDIZEM CD  Take 1 capsule (180 mg total) by mouth once daily.     hydroCHLOROthiazide 25 MG tablet  Commonly known as:  HYDRODIURIL  Take 1 tablet (25 mg total) by mouth once daily.     INCRUSE ELLIPTA 62.5 mcg/actuation Dsdv  Generic drug:  umeclidinium  INHALE 1 PUFF DAILY     potassium chloride SA 20 MEQ tablet  Commonly known as:  K-DUR,KLOR-CON  TAKE 1 TABLET BY MOUTH EVERY DAY     pravastatin 20 MG tablet  Commonly known as:  PRAVACHOL  Take 1 tablet (20 mg total) by mouth once daily.         * This list has 2 medication(s) that are the same as other medications prescribed for you. Read the directions carefully, and ask your doctor or other care provider to review them with you.            STOP taking these medications    FLUZONE HIGH-DOSE 2018-19 (PF) 180 mcg/0.5 mL vaccine  Generic drug:  influenza     FLUZONE HIGH-DOSE 2019-20 (PF) 180 mcg/0.5 mL Syrg  Generic drug:  flu vacc [id number](65yr up)PF     furosemide 20 MG tablet  Commonly known as:  LASIX     glycopyrrolate-formoteroL 9-4.8 mcg Hfaa  Commonly known as:  BEVESPI AEROSPHERE     vitamin D 1000 units Tab  Commonly known as:  VITAMIN D3            Indwelling Lines/Drains at time of discharge:   Lines/Drains/Airways     None                 Time spent on the discharge of patient: >30 minutes  Patient was seen and  examined on the date of discharge and determined to be suitable for discharge.         Gage Cleveland MD  Department of Hospital Medicine  [facility]

## 2020-03-13 LAB — L PNEUMO AG UR QL IA: NOT DETECTED

## 2020-03-16 ENCOUNTER — PATIENT OUTREACH (OUTPATIENT)
Dept: ADMINISTRATIVE | Facility: CLINIC | Age: 75
End: 2020-03-16

## 2020-03-16 NOTE — PATIENT INSTRUCTIONS
Discharge Instructions: COPD  You have been diagnosed with chronic obstructive pulmonary disease (COPD). This is a name given to a group of diseases that limit the flow of air in and out of your lungs. This makes it harder to breathe. With COPD, you are also more likely to get lung infections. COPD includes chronic bronchitis and emphysema. COPD is most often caused by heavy, long-term cigarette smoking.  Home care  Quit smoking  · If you smoke, quit. It is the best thing you can do for your COPD and your overall health.  · Join a stop-smoking program. There are even telephone, text message, and Internet programs to help you quit.  · Ask your healthcare provider about medicines or other methods to help you quit.  · Ask family members to quit smoking as well.  · Don't allow people to smoke in your home, in your car, or when they are around you.  Protect yourself from infection  · Wash your hands often. Do your best to keep your hands away from your face. Most germs are spread from your hands to your mouth.  · Get a flu shot every year. Also ask your provider about pneumonia vaccines.  · Avoid crowds. It's especially important to do this in the winter when more people have colds and flu.  · To stay healthy, get enough sleep, exercise regularly, and eat a balanced diet. You should:  ¨ Get about 8 hours of sleep every night.  ¨ Try to exercise for at least 30 minutes on most days.  ¨ Have healthy foods including fruits and vegetables, 100% whole grains, lean meats and fish, and low-fat dairy products. Try to stay away from foods high in fats and sugar.  Take your medicines  Take your medicines exactly as directed. Don't skip doses.  Manage your stress  Stress can make COPD worse. Use this stress management technique:  · Find a quiet place and sit or lie in a comfortable position.  · Close your eyes and perform breathing exercises for several minutes. Ask your provider about the best way to breathe.  Pulmonary  rehabilitation  · Pulmonary rehab can help you feel better. These programs include exercise, breathing techniques, information about COPD, counseling, and help for smokers.  · Ask your provider or your local hospital about programs in your area.  When to call your healthcare provider  Call your provider immediately if you have any of the following:  · Shortness of breath, wheezing, or coughing  · Increased mucus  · Yellow, green, bloody, or smelly mucus  · Fever or chills  · Tightness in your chest that does not go away with rest or medicine  · An irregular heartbeat or a feeling that your heart is beating very fast  · Swollen ankles   Date Last Reviewed: 5/1/2016  © 8578-3576 [employer]. [address] [place] [address], [place] [address]. All rights reserved. This information is not intended as a substitute for professional medical care. Always follow your healthcare professional's instructions.

## 2020-03-23 RX ORDER — HYDROCHLOROTHIAZIDE 25 MG/1
TABLET ORAL
Qty: 90 TABLET | Refills: 3 | Status: SHIPPED | OUTPATIENT
Start: 2020-03-23 | End: 2021-01-14

## 2020-04-13 ENCOUNTER — PATIENT MESSAGE (OUTPATIENT)
Dept: PULMONOLOGY | Facility: CLINIC | Age: 75
End: 2020-04-13

## 2020-04-13 DIAGNOSIS — J43.2 CENTRILOBULAR EMPHYSEMA: ICD-10-CM

## 2020-04-13 RX ORDER — ALBUTEROL SULFATE 90 UG/1
2 AEROSOL, METERED RESPIRATORY (INHALATION) EVERY 6 HOURS PRN
Qty: 24 G | Refills: 3 | OUTPATIENT
Start: 2020-04-13 | End: 2020-09-10

## 2020-04-13 RX ORDER — PREDNISONE 10 MG/1
TABLET ORAL
Qty: 60 TABLET | Refills: 6 | Status: SHIPPED | OUTPATIENT
Start: 2020-04-13 | End: 2020-12-15

## 2020-04-23 ENCOUNTER — OFFICE VISIT (OUTPATIENT)
Dept: INTERNAL MEDICINE | Facility: CLINIC | Age: 75
End: 2020-04-23
Payer: MEDICARE

## 2020-04-23 ENCOUNTER — LAB VISIT (OUTPATIENT)
Dept: LAB | Facility: HOSPITAL | Age: 75
End: 2020-04-23
Attending: INTERNAL MEDICINE
Payer: MEDICARE

## 2020-04-23 VITALS
OXYGEN SATURATION: 97 % | WEIGHT: 106.25 LBS | DIASTOLIC BLOOD PRESSURE: 80 MMHG | SYSTOLIC BLOOD PRESSURE: 148 MMHG | HEIGHT: 59 IN | TEMPERATURE: 99 F | BODY MASS INDEX: 21.42 KG/M2 | HEART RATE: 76 BPM | RESPIRATION RATE: 18 BRPM

## 2020-04-23 DIAGNOSIS — R60.9 EDEMA, UNSPECIFIED TYPE: ICD-10-CM

## 2020-04-23 DIAGNOSIS — I10 ESSENTIAL HYPERTENSION: Primary | ICD-10-CM

## 2020-04-23 DIAGNOSIS — I10 ESSENTIAL HYPERTENSION: ICD-10-CM

## 2020-04-23 LAB
ANION GAP SERPL CALC-SCNC: 11 MMOL/L (ref 8–16)
BUN SERPL-MCNC: 16 MG/DL (ref 8–23)
CALCIUM SERPL-MCNC: 9.5 MG/DL (ref 8.7–10.5)
CHLORIDE SERPL-SCNC: 95 MMOL/L (ref 95–110)
CO2 SERPL-SCNC: 27 MMOL/L (ref 23–29)
CREAT SERPL-MCNC: 0.7 MG/DL (ref 0.5–1.4)
EST. GFR  (AFRICAN AMERICAN): >60 ML/MIN/1.73 M^2
EST. GFR  (NON AFRICAN AMERICAN): >60 ML/MIN/1.73 M^2
GLUCOSE SERPL-MCNC: 186 MG/DL (ref 70–110)
POTASSIUM SERPL-SCNC: 4 MMOL/L (ref 3.5–5.1)
SODIUM SERPL-SCNC: 133 MMOL/L (ref 136–145)
TSH SERPL DL<=0.005 MIU/L-ACNC: 0.88 UIU/ML (ref 0.4–4)

## 2020-04-23 PROCEDURE — 36415 COLL VENOUS BLD VENIPUNCTURE: CPT | Mod: PO

## 2020-04-23 PROCEDURE — 1101F PR PT FALLS ASSESS DOC 0-1 FALLS W/OUT INJ PAST YR: ICD-10-PCS | Mod: CPTII,S$GLB,, | Performed by: INTERNAL MEDICINE

## 2020-04-23 PROCEDURE — 1125F PR PAIN SEVERITY QUANTIFIED, PAIN PRESENT: ICD-10-PCS | Mod: S$GLB,,, | Performed by: INTERNAL MEDICINE

## 2020-04-23 PROCEDURE — 99999 PR PBB SHADOW E&M-EST. PATIENT-LVL III: CPT | Mod: PBBFAC,,, | Performed by: INTERNAL MEDICINE

## 2020-04-23 PROCEDURE — 3079F PR MOST RECENT DIASTOLIC BLOOD PRESSURE 80-89 MM HG: ICD-10-PCS | Mod: CPTII,S$GLB,, | Performed by: INTERNAL MEDICINE

## 2020-04-23 PROCEDURE — 99999 PR PBB SHADOW E&M-EST. PATIENT-LVL III: ICD-10-PCS | Mod: PBBFAC,,, | Performed by: INTERNAL MEDICINE

## 2020-04-23 PROCEDURE — 80048 BASIC METABOLIC PNL TOTAL CA: CPT

## 2020-04-23 PROCEDURE — 3079F DIAST BP 80-89 MM HG: CPT | Mod: CPTII,S$GLB,, | Performed by: INTERNAL MEDICINE

## 2020-04-23 PROCEDURE — 84443 ASSAY THYROID STIM HORMONE: CPT

## 2020-04-23 PROCEDURE — 1101F PT FALLS ASSESS-DOCD LE1/YR: CPT | Mod: CPTII,S$GLB,, | Performed by: INTERNAL MEDICINE

## 2020-04-23 PROCEDURE — 1159F PR MEDICATION LIST DOCUMENTED IN MEDICAL RECORD: ICD-10-PCS | Mod: S$GLB,,, | Performed by: INTERNAL MEDICINE

## 2020-04-23 PROCEDURE — 1125F AMNT PAIN NOTED PAIN PRSNT: CPT | Mod: S$GLB,,, | Performed by: INTERNAL MEDICINE

## 2020-04-23 PROCEDURE — 99213 PR OFFICE/OUTPT VISIT, EST, LEVL III, 20-29 MIN: ICD-10-PCS | Mod: S$GLB,,, | Performed by: INTERNAL MEDICINE

## 2020-04-23 PROCEDURE — 1159F MED LIST DOCD IN RCRD: CPT | Mod: S$GLB,,, | Performed by: INTERNAL MEDICINE

## 2020-04-23 PROCEDURE — 3077F PR MOST RECENT SYSTOLIC BLOOD PRESSURE >= 140 MM HG: ICD-10-PCS | Mod: CPTII,S$GLB,, | Performed by: INTERNAL MEDICINE

## 2020-04-23 PROCEDURE — 3077F SYST BP >= 140 MM HG: CPT | Mod: CPTII,S$GLB,, | Performed by: INTERNAL MEDICINE

## 2020-04-23 PROCEDURE — 99213 OFFICE O/P EST LOW 20 MIN: CPT | Mod: S$GLB,,, | Performed by: INTERNAL MEDICINE

## 2020-04-23 RX ORDER — CALCIUM CARB/VITAMIN D3/VIT K1 500-500-40
1000 TABLET,CHEWABLE ORAL DAILY
COMMUNITY
End: 2022-01-11 | Stop reason: SDUPTHER

## 2020-04-23 NOTE — PROGRESS NOTES
CC: leg swelling and blue feet  HPI:  The patient is a 74 y.o. year old female who presents to the office for leg swelling and blue feet.  She reports onset of symptoms worsened while she was in the hospital last month.  She reports her feet have turned blue intermittently since she wore compression hose on one occasion.  She denies any pain, but reports some numbness or tingling.      PAST MEDICAL HISTORY:  Past Medical History:   Diagnosis Date    Actinic keratosis     Allergy     Arthritis     Cataract     Cellulitis of ankle     Colon polyps     COPD (chronic obstructive pulmonary disease)     Family history of colon cancer     Hyperlipidemia     Hypertension     Lung nodules     Sinus tachycardia        SURGICAL HISTORY:  Past Surgical History:   Procedure Laterality Date    APPENDECTOMY      CATARACT EXTRACTION Bilateral      SECTION      x2    COLONOSCOPY N/A 10/10/2017    Procedure: COLONOSCOPY;  Surgeon: Omid Lino MD;  Location: [address]);  Service: Endoscopy;  Laterality: N/A;  pt unable to be checked in with previous order    EYE SURGERY      TONSILLECTOMY         MEDS:  Medcard reviewed and updated    ALLERGIES: Allergy Card reviewed and updated    SOCIAL HISTORY:   The patient is a nonsmoker.    PE:   APPEARANCE: Well nourished, well developed, in no acute distress.    CHEST: Lungs clear to auscultation with unlabored respirations.  On oxygen.  CARDIOVASCULAR: Normal S1, S2. No murmurs. No carotid bruits. Positive 4+ pedal edema.  2+ dorsalis pedis pulses bilaterally.  ABDOMEN: Bowel sounds normal. Not distended. Soft. No tenderness or masses.   PSYCHIATRIC: The patient is oriented to person, place, and time and has a pleasant affect.        ASSESSMENT/PLAN:  Leyla was seen today for foot swelling.    Diagnoses and all orders for this visit:    Essential hypertension  -     Basic metabolic panel; Future  -     TSH; Future  -     blood pressure is mildly  elevated    Edema, unspecified type  -     Basic metabolic panel; Future  -     TSH; Future

## 2020-04-24 ENCOUNTER — PATIENT MESSAGE (OUTPATIENT)
Dept: INTERNAL MEDICINE | Facility: CLINIC | Age: 75
End: 2020-04-24

## 2020-04-24 ENCOUNTER — TELEPHONE (OUTPATIENT)
Dept: PULMONOLOGY | Facility: CLINIC | Age: 75
End: 2020-04-24

## 2020-04-24 ENCOUNTER — TELEPHONE (OUTPATIENT)
Dept: INTERNAL MEDICINE | Facility: CLINIC | Age: 75
End: 2020-04-24

## 2020-04-24 RX ORDER — FUROSEMIDE 20 MG/1
20 TABLET ORAL DAILY
Qty: 3 TABLET | Refills: 0 | Status: SHIPPED | OUTPATIENT
Start: 2020-04-24 | End: 2020-05-07 | Stop reason: SDUPTHER

## 2020-04-24 NOTE — TELEPHONE ENCOUNTER
I spoke To Mrs Valadez about her visit with Dr Guido that was postponed due to Covid. Mrs valadez says she has a flip phone only and wants to wait till the clinic opens on May or June. Dhe is a longtime patient of Dr Guido. Danielle Silva

## 2020-04-24 NOTE — TELEPHONE ENCOUNTER
Please inform patient that thyroid study is normal.  Electrolytes are stable with an elevated glucose and mildly depressed sodium.  Recommend patient take furosemide instead of hydrochlorothiazide for 3 days to reduce swelling.  Prescription for furosemide has been sent to [facility] electronically.  The prescription is for 3 pills only.

## 2020-05-05 ENCOUNTER — PATIENT MESSAGE (OUTPATIENT)
Dept: PULMONOLOGY | Facility: CLINIC | Age: 75
End: 2020-05-05

## 2020-05-07 ENCOUNTER — PATIENT MESSAGE (OUTPATIENT)
Dept: INTERNAL MEDICINE | Facility: CLINIC | Age: 75
End: 2020-05-07

## 2020-05-07 ENCOUNTER — TELEPHONE (OUTPATIENT)
Dept: INTERNAL MEDICINE | Facility: CLINIC | Age: 75
End: 2020-05-07

## 2020-05-07 DIAGNOSIS — R60.9 EDEMA, UNSPECIFIED TYPE: Primary | ICD-10-CM

## 2020-05-07 RX ORDER — FUROSEMIDE 20 MG/1
20 TABLET ORAL DAILY
Qty: 3 TABLET | Refills: 0 | Status: ON HOLD | OUTPATIENT
Start: 2020-05-07 | End: 2020-05-10 | Stop reason: HOSPADM

## 2020-05-07 NOTE — TELEPHONE ENCOUNTER
Please advise if patient has resumed wearing compression stockings.  Have sent a prescription for 3 more days of furosemide.  If patient is able to tolerated, I recommend she wear compression stockings.  In addition, I recommend we repeat electrolytes in 1 week to ensure her sodium is not dropping too low and schedule echocardiogram to evaluate cardiac function.  She should not take the hydrochlorothiazide while taking the furosemide.

## 2020-05-08 ENCOUNTER — HOSPITAL ENCOUNTER (OUTPATIENT)
Facility: HOSPITAL | Age: 75
Discharge: HOME | DRG: 189 | End: 2020-05-10
Attending: EMERGENCY MEDICINE | Admitting: HOSPITALIST
Payer: MEDICARE

## 2020-05-08 DIAGNOSIS — J96.21 ACUTE ON CHRONIC RESPIRATORY FAILURE WITH HYPOXEMIA: ICD-10-CM

## 2020-05-08 DIAGNOSIS — R06.02 SHORTNESS OF BREATH: Primary | ICD-10-CM

## 2020-05-08 LAB
ALBUMIN SERPL BCP-MCNC: 3.8 G/DL (ref 3.5–5.2)
ALP SERPL-CCNC: 101 U/L (ref 55–135)
ALT SERPL W/O P-5'-P-CCNC: 14 U/L (ref 10–44)
ANION GAP SERPL CALC-SCNC: 13 MMOL/L (ref 8–16)
AST SERPL-CCNC: 18 U/L (ref 10–40)
BASOPHILS # BLD AUTO: 0.05 K/UL (ref 0–0.2)
BASOPHILS NFR BLD: 0.5 % (ref 0–1.9)
BILIRUB SERPL-MCNC: 0.4 MG/DL (ref 0.1–1)
BNP SERPL-MCNC: 47 PG/ML (ref 0–99)
BUN SERPL-MCNC: 11 MG/DL (ref 8–23)
CALCIUM SERPL-MCNC: 10 MG/DL (ref 8.7–10.5)
CHLORIDE SERPL-SCNC: 93 MMOL/L (ref 95–110)
CO2 SERPL-SCNC: 28 MMOL/L (ref 23–29)
CREAT SERPL-MCNC: 0.6 MG/DL (ref 0.5–1.4)
DIFFERENTIAL METHOD: ABNORMAL
EOSINOPHIL # BLD AUTO: 0.1 K/UL (ref 0–0.5)
EOSINOPHIL NFR BLD: 1.5 % (ref 0–8)
ERYTHROCYTE [DISTWIDTH] IN BLOOD BY AUTOMATED COUNT: 14.3 % (ref 11.5–14.5)
EST. GFR  (AFRICAN AMERICAN): >60 ML/MIN/1.73 M^2
EST. GFR  (NON AFRICAN AMERICAN): >60 ML/MIN/1.73 M^2
GLUCOSE SERPL-MCNC: 114 MG/DL (ref 70–110)
HCT VFR BLD AUTO: 49.5 % (ref 37–48.5)
HGB BLD-MCNC: 15.8 G/DL (ref 12–16)
IMM GRANULOCYTES # BLD AUTO: 0.06 K/UL (ref 0–0.04)
IMM GRANULOCYTES NFR BLD AUTO: 0.6 % (ref 0–0.5)
LYMPHOCYTES # BLD AUTO: 0.9 K/UL (ref 1–4.8)
LYMPHOCYTES NFR BLD: 8.8 % (ref 18–48)
MCH RBC QN AUTO: 28.7 PG (ref 27–31)
MCHC RBC AUTO-ENTMCNC: 31.9 G/DL (ref 32–36)
MCV RBC AUTO: 90 FL (ref 82–98)
MONOCYTES # BLD AUTO: 0.9 K/UL (ref 0.3–1)
MONOCYTES NFR BLD: 9.4 % (ref 4–15)
NEUTROPHILS # BLD AUTO: 7.6 K/UL (ref 1.8–7.7)
NEUTROPHILS NFR BLD: 79.2 % (ref 38–73)
NRBC BLD-RTO: 0 /100 WBC
PLATELET # BLD AUTO: 340 K/UL (ref 150–350)
PMV BLD AUTO: 9 FL (ref 9.2–12.9)
POTASSIUM SERPL-SCNC: 3.9 MMOL/L (ref 3.5–5.1)
PROT SERPL-MCNC: 7.3 G/DL (ref 6–8.4)
RBC # BLD AUTO: 5.5 M/UL (ref 4–5.4)
SARS-COV-2 RDRP RESP QL NAA+PROBE: NEGATIVE
SODIUM SERPL-SCNC: 134 MMOL/L (ref 136–145)
TROPONIN I SERPL DL<=0.01 NG/ML-MCNC: <0.006 NG/ML (ref 0–0.03)
WBC # BLD AUTO: 9.62 K/UL (ref 3.9–12.7)

## 2020-05-08 PROCEDURE — 99223 PR INITIAL HOSPITAL CARE,LEVL III: ICD-10-PCS | Mod: AI,GC,, | Performed by: HOSPITALIST

## 2020-05-08 PROCEDURE — 85025 COMPLETE CBC W/AUTO DIFF WBC: CPT

## 2020-05-08 PROCEDURE — 94761 N-INVAS EAR/PLS OXIMETRY MLT: CPT

## 2020-05-08 PROCEDURE — 99285 EMERGENCY DEPT VISIT HI MDM: CPT | Mod: ,,, | Performed by: PHYSICIAN ASSISTANT

## 2020-05-08 PROCEDURE — U0002 COVID-19 LAB TEST NON-CDC: HCPCS

## 2020-05-08 PROCEDURE — 80053 COMPREHEN METABOLIC PANEL: CPT

## 2020-05-08 PROCEDURE — 63600175 PHARM REV CODE 636 W HCPCS

## 2020-05-08 PROCEDURE — 20600001 HC STEP DOWN PRIVATE ROOM

## 2020-05-08 PROCEDURE — G0378 HOSPITAL OBSERVATION PER HR: HCPCS

## 2020-05-08 PROCEDURE — 99285 EMERGENCY DEPT VISIT HI MDM: CPT | Mod: 25

## 2020-05-08 PROCEDURE — 63700000 PHARM REV CODE 250 ALT 637 W/O HCPCS: Performed by: STUDENT IN AN ORGANIZED HEALTH CARE EDUCATION/TRAINING PROGRAM

## 2020-05-08 PROCEDURE — 99223 1ST HOSP IP/OBS HIGH 75: CPT | Mod: AI,GC,, | Performed by: HOSPITALIST

## 2020-05-08 PROCEDURE — 96372 THER/PROPH/DIAG INJ SC/IM: CPT | Mod: 59

## 2020-05-08 PROCEDURE — 83880 ASSAY OF NATRIURETIC PEPTIDE: CPT

## 2020-05-08 PROCEDURE — 96374 THER/PROPH/DIAG INJ IV PUSH: CPT

## 2020-05-08 PROCEDURE — 93010 ELECTROCARDIOGRAM REPORT: CPT | Mod: ,,, | Performed by: INTERNAL MEDICINE

## 2020-05-08 PROCEDURE — 99285 PR EMERGENCY DEPT VISIT,LEVEL V: ICD-10-PCS | Mod: ,,, | Performed by: PHYSICIAN ASSISTANT

## 2020-05-08 PROCEDURE — 94640 AIRWAY INHALATION TREATMENT: CPT

## 2020-05-08 PROCEDURE — 93010 EKG 12-LEAD: ICD-10-PCS | Mod: ,,, | Performed by: INTERNAL MEDICINE

## 2020-05-08 PROCEDURE — 84484 ASSAY OF TROPONIN QUANT: CPT

## 2020-05-08 PROCEDURE — 63600175 PHARM REV CODE 636 W HCPCS: Performed by: PHYSICIAN ASSISTANT

## 2020-05-08 PROCEDURE — 27000221 HC OXYGEN, UP TO 24 HOURS

## 2020-05-08 PROCEDURE — 25000242 PHARM REV CODE 250 ALT 637 W/ HCPCS: Performed by: PHYSICIAN ASSISTANT

## 2020-05-08 PROCEDURE — 93005 ELECTROCARDIOGRAM TRACING: CPT

## 2020-05-08 RX ORDER — DILTIAZEM HYDROCHLORIDE 180 MG/1
180 CAPSULE, COATED, EXTENDED RELEASE ORAL DAILY
Status: DISCONTINUED | OUTPATIENT
Start: 2020-05-09 | End: 2020-05-10 | Stop reason: HOSPADM

## 2020-05-08 RX ORDER — PREDNISONE 20 MG/1
40 TABLET ORAL DAILY
Status: DISCONTINUED | OUTPATIENT
Start: 2020-05-09 | End: 2020-05-09

## 2020-05-08 RX ORDER — TIOTROPIUM BROMIDE 18 UG/1
1 CAPSULE ORAL; RESPIRATORY (INHALATION) DAILY
Status: DISCONTINUED | OUTPATIENT
Start: 2020-05-09 | End: 2020-05-10 | Stop reason: HOSPADM

## 2020-05-08 RX ORDER — PREDNISONE 20 MG/1
40 TABLET ORAL
Status: COMPLETED | OUTPATIENT
Start: 2020-05-08 | End: 2020-05-08

## 2020-05-08 RX ORDER — ALPRAZOLAM 0.25 MG/1
0.25 TABLET ORAL DAILY PRN
Status: DISCONTINUED | OUTPATIENT
Start: 2020-05-08 | End: 2020-05-10 | Stop reason: HOSPADM

## 2020-05-08 RX ORDER — PRAVASTATIN SODIUM 20 MG/1
20 TABLET ORAL DAILY
Status: DISCONTINUED | OUTPATIENT
Start: 2020-05-09 | End: 2020-05-10 | Stop reason: HOSPADM

## 2020-05-08 RX ORDER — FLUTICASONE FUROATE AND VILANTEROL 100; 25 UG/1; UG/1
1 POWDER RESPIRATORY (INHALATION) DAILY
Status: DISCONTINUED | OUTPATIENT
Start: 2020-05-09 | End: 2020-05-10 | Stop reason: HOSPADM

## 2020-05-08 RX ORDER — AZITHROMYCIN 250 MG/1
500 TABLET, FILM COATED ORAL ONCE
Status: COMPLETED | OUTPATIENT
Start: 2020-05-08 | End: 2020-05-08

## 2020-05-08 RX ORDER — ENOXAPARIN SODIUM 100 MG/ML
40 INJECTION SUBCUTANEOUS EVERY 24 HOURS
Status: DISCONTINUED | OUTPATIENT
Start: 2020-05-08 | End: 2020-05-10 | Stop reason: HOSPADM

## 2020-05-08 RX ORDER — IPRATROPIUM BROMIDE AND ALBUTEROL SULFATE 2.5; .5 MG/3ML; MG/3ML
3 SOLUTION RESPIRATORY (INHALATION)
Status: COMPLETED | OUTPATIENT
Start: 2020-05-08 | End: 2020-05-08

## 2020-05-08 RX ORDER — FUROSEMIDE 10 MG/ML
80 INJECTION INTRAMUSCULAR; INTRAVENOUS
Status: DISCONTINUED | OUTPATIENT
Start: 2020-05-08 | End: 2020-05-08

## 2020-05-08 RX ORDER — SODIUM CHLORIDE 0.9 % (FLUSH) 0.9 %
10 SYRINGE (ML) INJECTION
Status: DISCONTINUED | OUTPATIENT
Start: 2020-05-08 | End: 2020-05-10 | Stop reason: HOSPADM

## 2020-05-08 RX ORDER — ASPIRIN 81 MG/1
81 TABLET ORAL DAILY
Status: DISCONTINUED | OUTPATIENT
Start: 2020-05-09 | End: 2020-05-10 | Stop reason: HOSPADM

## 2020-05-08 RX ORDER — HYDROCHLOROTHIAZIDE 12.5 MG/1
25 TABLET ORAL DAILY
Status: DISCONTINUED | OUTPATIENT
Start: 2020-05-09 | End: 2020-05-10 | Stop reason: HOSPADM

## 2020-05-08 RX ORDER — FUROSEMIDE 10 MG/ML
40 INJECTION INTRAMUSCULAR; INTRAVENOUS
Status: COMPLETED | OUTPATIENT
Start: 2020-05-08 | End: 2020-05-08

## 2020-05-08 RX ADMIN — FUROSEMIDE 40 MG: 10 INJECTION, SOLUTION INTRAMUSCULAR; INTRAVENOUS at 12:05

## 2020-05-08 RX ADMIN — PREDNISONE 40 MG: 20 TABLET ORAL at 03:05

## 2020-05-08 RX ADMIN — IPRATROPIUM BROMIDE AND ALBUTEROL SULFATE 3 ML: .5; 3 SOLUTION RESPIRATORY (INHALATION) at 02:05

## 2020-05-08 RX ADMIN — AZITHROMYCIN 500 MG: 250 TABLET, FILM COATED ORAL at 06:05

## 2020-05-08 RX ADMIN — ENOXAPARIN SODIUM 40 MG: 100 INJECTION SUBCUTANEOUS at 06:05

## 2020-05-08 RX ADMIN — IPRATROPIUM BROMIDE AND ALBUTEROL SULFATE 3 ML: .5; 3 SOLUTION RESPIRATORY (INHALATION) at 01:05

## 2020-05-08 NOTE — ED NOTES
DIONE Lyle notified pt became very short of breath when standing to put her underwear on.  Pt did not appear to be able to tolerate ambulating

## 2020-05-08 NOTE — ASSESSMENT & PLAN NOTE
Patient with a long history of COPD. Pulmonologist is Dr. Florez    Plan  Continue home inhalers

## 2020-05-08 NOTE — ED PROVIDER NOTES
Encounter Date: 5/8/2020       History     Chief Complaint   Patient presents with    Foot Swelling     bilateral feet swelling, on home O2 for COPD, now having increased SOB with ambulation      75-year-old female with chronic hypoxic respiratory failure on home O2 (2L/min) secondary to COPD, hypertension, hyperlipidemia presents for foot swelling and shortness of breath.  Her foot swelling has been present for over a month and a half after being discharged from the hospital after a 2 day admission for pneumonia.  She was advised to use compression stockings but she stopped using them when her leg turned blue.  She was given Lasix by her PCP but this did not improve the foot swelling.  She started to have worsening shortness of breath about a week and a half ago.  States that she is unable to do small tasks such as walking to the bathroom without becoming extremely winded.  Symptoms are improved with using her rescue inhaler and with rest.  She has a chronic, mild cough which is unchanged from her baseline.  She thinks that she has been wheezing as others have commented on it.  She denies chest tightness, chest pain, fever/chills, orthopnea, abdominal or facial swelling.        Review of patient's allergies indicates:   Allergen Reactions    Bactrim [sulfamethoxazole-trimethoprim] Nausea Only    Codeine Itching    Keflex [cephalexin] Other (See Comments)     Rhinorrhea, nausea. Tolerated Ceftriaxone June 2014    Ceftriaxone Rash     Morbilliform rash after receiving IV ceftriaxone    Clindamycin hcl Rash    Doxycycline Rash    Vancomycin analogues Rash     Morbilliform rash after receiving IV vancomycin     Past Medical History:   Diagnosis Date    Actinic keratosis     Allergy     Arthritis     Cataract     Cellulitis of ankle     Colon polyps     COPD (chronic obstructive pulmonary disease)     Family history of colon cancer     Hyperlipidemia     Hypertension     Lung nodules     Sinus  tachycardia      Past Surgical History:   Procedure Laterality Date    APPENDECTOMY      CATARACT EXTRACTION Bilateral 2007     SECTION      x2    COLONOSCOPY N/A 10/10/2017    Procedure: COLONOSCOPY;  Surgeon: Omid Lino MD;  Location: [facility] ([address] [place] [address]);  Service: Endoscopy;  Laterality: N/A;  pt unable to be checked in with previous order    EYE SURGERY      TONSILLECTOMY       Family History   Problem Relation Age of Onset    Cancer Mother 79        colon    Heart disease Mother     Hyperlipidemia Mother     Hypertension Mother     Skin cancer Mother     Cataracts Mother     Glaucoma Mother     Thyroid disease Mother     COPD Father     Diabetes Father     Heart disease Father     Hyperlipidemia Father     Hypertension Father     Cancer Sister     Heart disease Sister     Hyperlipidemia Sister     Hypertension Sister     Skin cancer Sister     Cataracts Sister     Breast cancer Sister     Cancer Son     Blindness Paternal Grandmother     Diabetes Paternal Grandmother     Thyroid disease Daughter     Melanoma Neg Hx     Psoriasis Neg Hx     Lupus Neg Hx     Eczema Neg Hx     Amblyopia Neg Hx     Macular degeneration Neg Hx     Retinal detachment Neg Hx     Strabismus Neg Hx     Stroke Neg Hx      Social History     Tobacco Use    Smoking status: Former Smoker     Packs/day: 1.00     Years: 39.00     Pack years: 39.00     Types: Cigarettes     Last attempt to quit: 1995     Years since quittin.5    Smokeless tobacco: Never Used   Substance Use Topics    Alcohol use: Yes     Alcohol/week: 2.0 standard drinks     Types: 2 Glasses of wine per week     Comment: rarely has a glass of wine- not daily    Drug use: No     Review of Systems   Constitutional: Negative for chills, diaphoresis, fatigue and fever.   HENT: Negative for sore throat.    Respiratory: Positive for cough, shortness of breath and wheezing. Negative for apnea, choking, chest tightness  and stridor.    Cardiovascular: Positive for leg swelling. Negative for chest pain.   Gastrointestinal: Negative for abdominal pain, diarrhea, nausea and vomiting.   Genitourinary: Negative for dysuria.   Musculoskeletal: Negative for back pain and myalgias.   Skin: Negative for rash.   Neurological: Negative for weakness.   Hematological: Does not bruise/bleed easily.       Physical Exam     Initial Vitals [05/08/20 1101]   BP Pulse Resp Temp SpO2   (!) 140/75 94 18 98.5 °F (36.9 °C) (!) 89 %      MAP       --         Physical Exam    Nursing note and vitals reviewed.  Constitutional: She appears well-developed and well-nourished. She is not diaphoretic. No distress.   HENT:   Head: Normocephalic and atraumatic.   Eyes: EOM are normal. Pupils are equal, round, and reactive to light.   Neck: Normal range of motion.   Cardiovascular: Normal rate, regular rhythm, normal heart sounds and intact distal pulses. Exam reveals no gallop and no friction rub.    No murmur heard.  2+ pitting bilateral pedal edema   Pulmonary/Chest: Tachypnea noted. No respiratory distress. She has decreased breath sounds. She has wheezes. She has no rhonchi. She has no rales. She exhibits no tenderness.   Abdominal: Soft. Bowel sounds are normal. She exhibits no distension and no mass. There is no tenderness. There is no rebound and no guarding.   Musculoskeletal: Normal range of motion.   Neurological: She is alert and oriented to person, place, and time.   Skin: Skin is warm and dry.   Psychiatric: She has a normal mood and affect.         ED Course   Procedures  Labs Reviewed   CBC W/ AUTO DIFFERENTIAL - Abnormal; Notable for the following components:       Result Value    RBC 5.50 (*)     Hematocrit 49.5 (*)     Mean Corpuscular Hemoglobin Conc 31.9 (*)     MPV 9.0 (*)     Immature Granulocytes 0.6 (*)     Immature Grans (Abs) 0.06 (*)     Lymph # 0.9 (*)     Gran% 79.2 (*)     Lymph% 8.8 (*)     All other components within normal limits    COMPREHENSIVE METABOLIC PANEL - Abnormal; Notable for the following components:    Sodium 134 (*)     Chloride 93 (*)     Glucose 114 (*)     All other components within normal limits   B-TYPE NATRIURETIC PEPTIDE   TROPONIN I   SARS-COV-2 RNA AMPLIFICATION, QUAL    Narrative:     What symptom criteria does the patient meet?->Shortness of  breath or difficulty breathing     EKG Readings: (Independently Interpreted)   Initial Reading: No STEMI. Previous EKG: Compared with most recent EKG Previous EKG Date: 3/12/2020. Rhythm: Normal Sinus Rhythm. Heart Rate: 83. Ectopy: No Ectopy. Conduction: Normal. Clinical Impression: Normal Sinus Rhythm     ECG Results          EKG 12-lead (Final result)  Result time 05/08/20 13:58:44    Final result by Interface, Lab In [facility] (05/08/20 13:58:44)                 Narrative:    Test Reason : R06.02,    Vent. Rate : 083 BPM     Atrial Rate : 083 BPM     P-R Int : 136 ms          QRS Dur : 074 ms      QT Int : 344 ms       P-R-T Axes : 059 -62 062 degrees     QTc Int : 404 ms    Age and gender specific analysis  Normal sinus rhythm  Left axis deviation  Abnormal ECG  When compared with ECG of 10-MAR-2020 12:39,  Premature supraventricular complexes are no longer Present    Confirmed by RACHELLE NICHOLE MD ([id number]) on 5/8/2020 1:58:37 PM    Referred By: [email or web address]   SELF           Confirmed By:RACHELLE NICHOLE MD                            Imaging Results          X-Ray Chest AP Portable (Final result)  Result time 05/08/20 11:40:23    Final result by Sudha Duron MD (05/08/20 11:40:23)                 Impression:      No acute radiographic abnormality.      Electronically signed by: Sudha Duron MD  Date:    05/08/2020  Time:    11:40             Narrative:    EXAMINATION:  XR CHEST AP PORTABLE    CLINICAL HISTORY:  Suspected Covid-19 Virus Infection;    TECHNIQUE:  Single frontal view of the chest was performed.    COMPARISON:  March 10, 2020    FINDINGS:  The heart size is not  enlarged.  There is calcification along the wall of the aorta.  There is no pleural fluid.  Lungs demonstrate no focal consolidation.                                 Medical Decision Making:   History:   Old Medical Records: I decided to obtain old medical records.  Old Records Summarized: records from previous admission(s).       <> Summary of Records: Patient discharged 03/12/2020 after a 2 day admission for pneumonia.  Most recent echo performed in 2017 which showed normal EF.  Initial Assessment:   75-year-old female presenting for leg swelling and shortness of breath.  She is initially lying mildly hypoxic with O2 saturation of 89% on her home O2 of 2 liters/minute.  Oxygen improved to the mid 90s with increase in oxygen to 3 liters/minute.  The patient is tachypneic but is able to speak in short sentences.  Lung sounds decreased with expiratory wheezes.  She has some pedal edema but no swelling of her legs and no JVD.  Differential Diagnosis:   COPD exacerbation  No history of CHF, however potential for fluid overload  Renal dysfunction  Lower suspicion for pneumonia or COVID-19 given no convincing infectious symptoms  Independently Interpreted Test(s):   I have ordered and independently interpreted X-rays - see summary below.       <> Summary of X-Ray Reading(s): No consolidation or pulmonary edema  I have ordered and independently interpreted EKG Reading(s) - see prior notes  Clinical Tests:   Lab Tests: Ordered and Reviewed  Radiological Study: Ordered and Reviewed  Medical Tests: Ordered and Reviewed  ED Management:  Will check labs, give IV Lasix, do chest x-ray and reassess.    No improvement with IV Lasix, minimal diuresis after an hour.  Chest x-ray does not show clear signs of fluid overload or consolidation.  Will give duo nebs and prednisone and reassess.    Patient reports no improvement after duo nebs and prednisone.  In attempting to obtain an ambulatory O2 saturation, the patient became  extremely tachypneic and desaturated with sitting up to put her pants on.  Given her inability to ambulate without desaturation, will admit to Hospital Medicine for further management.  Patient comfortable with admission.  I discussed this patient with my supervising physician.                   ED Course as of May 08 1607   Fri May 08, 2020   1245 COVID-19 Rapid Screening [CC]      ED Course User Index  [CC] Hallie Drew PA-C                Clinical Impression:       ICD-10-CM ICD-9-CM   1. Shortness of breath R06.02 786.05         Disposition:   Disposition: Admitted  Condition: Fair     ED Disposition Condition    Admit                           Hallie Drew PA-C  05/08/20 2472

## 2020-05-08 NOTE — SUBJECTIVE & OBJECTIVE
Past Medical History:   Diagnosis Date    Actinic keratosis     Allergy     Arthritis     Cataract     Cellulitis of ankle     Colon polyps     COPD (chronic obstructive pulmonary disease)     Family history of colon cancer     Hyperlipidemia     Hypertension     Lung nodules     Sinus tachycardia        Past Surgical History:   Procedure Laterality Date    APPENDECTOMY      CATARACT EXTRACTION Bilateral      SECTION      x2    COLONOSCOPY N/A 10/10/2017    Procedure: COLONOSCOPY;  Surgeon: Omid Lino MD;  Location: [address]);  Service: Endoscopy;  Laterality: N/A;  pt unable to be checked in with previous order    EYE SURGERY      TONSILLECTOMY         Review of patient's allergies indicates:   Allergen Reactions    Bactrim [sulfamethoxazole-trimethoprim] Nausea Only    Codeine Itching    Keflex [cephalexin] Other (See Comments)     Rhinorrhea, nausea. Tolerated Ceftriaxone 2014    Ceftriaxone Rash     Morbilliform rash after receiving IV ceftriaxone    Clindamycin hcl Rash    Doxycycline Rash    Vancomycin analogues Rash     Morbilliform rash after receiving IV vancomycin       No current facility-administered medications on file prior to encounter.      Current Outpatient Medications on File Prior to Encounter   Medication Sig    albuterol (PROVENTIL) 2.5 mg /3 mL (0.083 %) nebulizer solution Take 3 mLs (2.5 mg total) by nebulization every 4 (four) hours as needed for Wheezing or Shortness of Breath.    albuterol (PROVENTIL/VENTOLIN HFA) 90 mcg/actuation inhaler Inhale 2 puffs into the lungs every 6 (six) hours as needed for Wheezing or Shortness of Breath (Pro Air HFA per insurance). Rescue    ALPRAZolam (XANAX) 0.25 MG tablet Take 1 tablet (0.25 mg total) by mouth 3 (three) times daily as needed for Anxiety.    aspirin (ECOTRIN) 81 MG EC tablet Take 81 mg by mouth once daily.      budesonide-formoterol 160-4.5 mcg (SYMBICORT) 160-4.5 mcg/actuation  HFAA INHALE 2 PUFFS INTO THE LUNGS EVERY 12 HOURS    calcium-vitamin D3 (OS-ALIE 500 + D3) 500 mg(1,250mg) -200 unit per tablet Take 2 tablets by mouth 2 (two) times daily. (Patient not taking: Reported on 2020)    cholecalciferol, vitamin D3, (VITAMIN D3) 10 mcg (400 unit) Cap Take 1,000 Units by mouth once daily.    diltiaZEM (CARDIZEM CD) 180 MG 24 hr capsule Take 1 capsule (180 mg total) by mouth once daily.    famotidine (PEPCID) 20 MG tablet Take 2 tablets (40 mg total) by mouth once daily.    furosemide (LASIX) 20 MG tablet Take 1 tablet (20 mg total) by mouth once daily. for 3 days    hydroCHLOROthiazide (HYDRODIURIL) 25 MG tablet TAKE 1 TABLET BY MOUTH EVERY DAY    INCRUSE ELLIPTA 62.5 mcg/actuation DsDv INHALE 1 PUFF DAILY    potassium chloride SA (K-DUR,KLOR-CON) 20 MEQ tablet TAKE 1 TABLET BY MOUTH EVERY DAY    pravastatin (PRAVACHOL) 20 MG tablet Take 1 tablet (20 mg total) by mouth once daily.    predniSONE (DELTASONE) 10 MG tablet 1or 2 tabs every AM     Family History     Problem Relation (Age of Onset)    Blindness Paternal Grandmother    Breast cancer Sister    COPD Father    Cancer Mother (79), Sister, Son    Cataracts Mother, Sister    Diabetes Father, Paternal Grandmother    Glaucoma Mother    Heart disease Mother, Father, Sister    Hyperlipidemia Mother, Father, Sister    Hypertension Mother, Father, Sister    Skin cancer Mother, Sister    Thyroid disease Mother, Daughter        Tobacco Use    Smoking status: Former Smoker     Packs/day: 1.00     Years: 39.00     Pack years: 39.00     Types: Cigarettes     Last attempt to quit: 1995     Years since quittin.5    Smokeless tobacco: Never Used   Substance and Sexual Activity    Alcohol use: Yes     Alcohol/week: 2.0 standard drinks     Types: 2 Glasses of wine per week     Comment: rarely has a glass of wine- not daily    Drug use: No    Sexual activity: Never     Review of Systems   Constitutional: Positive for  activity change. Negative for chills and fever.   HENT: Negative for congestion, ear pain, hearing loss, mouth sores, sinus pain and sore throat.    Eyes: Negative for pain and visual disturbance.   Respiratory: Positive for cough and shortness of breath.    Cardiovascular: Negative for chest pain and palpitations.   Gastrointestinal: Negative for abdominal pain, constipation, diarrhea, nausea and vomiting.   Endocrine: Negative for cold intolerance and heat intolerance.   Genitourinary: Negative for difficulty urinating and hematuria.   Musculoskeletal: Positive for arthralgias. Negative for joint swelling and neck stiffness.   Skin: Negative for rash and wound.   Allergic/Immunologic: Negative for food allergies.   Neurological: Negative for seizures, weakness, light-headedness, numbness and headaches.   Hematological: Negative for adenopathy.   Psychiatric/Behavioral: Negative for agitation and confusion. The patient is nervous/anxious.      Objective:     Vital Signs (Most Recent):  Temp: 98.5 °F (36.9 °C) (05/08/20 1718)  Pulse: 86 (05/08/20 1718)  Resp: 20 (05/08/20 1718)  BP: 134/63 (05/08/20 1718)  SpO2: 96 % (05/08/20 1718) Vital Signs (24h Range):  Temp:  [98.1 °F (36.7 °C)-98.6 °F (37 °C)] 98.5 °F (36.9 °C)  Pulse:  [73-94] 86  Resp:  [16-27] 20  SpO2:  [88 %-100 %] 96 %  BP: (124-144)/(59-75) 134/63     Weight: 50.8 kg (112 lb)  Body mass index is 22.62 kg/m².    Physical Exam   Constitutional: She is oriented to person, place, and time. She appears well-developed and well-nourished.   Laying comfortably in bed   HENT:   Head: Normocephalic and atraumatic.   Eyes: EOM are normal.   Neck: Neck supple.   Cardiovascular: Normal rate, regular rhythm, normal heart sounds and intact distal pulses. Exam reveals no gallop and no friction rub.   No murmur heard.  Pulmonary/Chest: Effort normal and breath sounds normal. No respiratory distress. She has no wheezes.   On 2l nasal canula   Musculoskeletal: Normal  range of motion.   Left leg swollen.    Neurological: She is alert and oriented to person, place, and time.   Skin: Skin is warm and dry.   Psychiatric: She has a normal mood and affect.   Nursing note and vitals reviewed.       Significant Labs:   CBC:   Recent Labs   Lab 05/08/20  1158   WBC 9.62   HGB 15.8   HCT 49.5*        CMP:   Recent Labs   Lab 05/08/20  1158   *   K 3.9   CL 93*   CO2 28   *   BUN 11   CREATININE 0.6   CALCIUM 10.0   PROT 7.3   ALBUMIN 3.8   BILITOT 0.4   ALKPHOS 101   AST 18   ALT 14   ANIONGAP 13   EGFRNONAA >60.0       Significant Imaging: I have reviewed and interpreted all pertinent imaging results/findings within the past 24 hours.

## 2020-05-08 NOTE — ASSESSMENT & PLAN NOTE
75-year-old female with chronic hypoxic respiratory failure on home O2 (2L/min) secondary to COPD, hypertension, hyperlipidemia presents for foot swelling and shortness of breath. Patient states it has gotten worse the past two weeks with exertion.     DDX includes copd exacerbation, progression of disease, CHF. CHF unlikely in setting of normal bnp and clear chest x-ray. However she does have swelling in her left leg. Patient has a long history of COPD and she could have an infection exacerbating the condition. Unlikely in the setting of normal cxr and normal white count. Possible that patients COPD has just progressed.    Plan  Will continue home inhalers for COPD  Pred 20 mg qd  Azithromycin  TTE   Received one time dose of lasix in ER

## 2020-05-08 NOTE — H&P
[facility] Hospital Medicine  History & Physical    Patient Name: Leyla Mari  MRN: [id number]  Admission Date: 5/8/2020  Attending Physician: Harshil Jung MD   Primary Care Provider: Dulce Castro MD    [facility] Medicine Team: [facility]OSP MED 4 Cole Arthur MD     Patient information was obtained from patient, past medical records and ER records.     Subjective:     Principal Problem:Acute on chronic respiratory failure with hypoxemia    Chief Complaint:   Chief Complaint   Patient presents with    Foot Swelling     bilateral feet swelling, on home O2 for COPD, now having increased SOB with ambulation         HPI: 75-year-old female with chronic hypoxic respiratory failure on home O2 (2L/min) secondary to COPD, hypertension, hyperlipidemia presents for foot swelling and shortness of breath. Patient states that for the past two weeks she has been short of breath with exertion. She has had copd for around 20 years but started requring 2 liters of oxygen 6 months ago. Now she gets short of breath just walking to the bathroom. She has also noticed swelling in her legs. She has had stockings in the past but stopped wearing them due to her feet turning blue. She has talked to her pcp about this problem and they gave her oral lasix. She has that has not helped her sob and it is still there. She has a minor non productive cough. She chest tightness, chest pain, fever/chills, orthopnea, abdominal or facial swelling.     Patient came into the ER with normal vital signs. However, she became very winded when she got up to go to the bathroom. She also desatted when just putting her pants on for an ambulatory 02 saturation. The ER gave her a dose of lasix and pred 40 without improvement.. She is admitted to hospital management for further workup and management.     Past Medical History:   Diagnosis Date    Actinic keratosis     Allergy     Arthritis     Cataract     Cellulitis of ankle      Colon polyps     COPD (chronic obstructive pulmonary disease)     Family history of colon cancer     Hyperlipidemia     Hypertension     Lung nodules     Sinus tachycardia        Past Surgical History:   Procedure Laterality Date    APPENDECTOMY      CATARACT EXTRACTION Bilateral 2007     SECTION      x2    COLONOSCOPY N/A 10/10/2017    Procedure: COLONOSCOPY;  Surgeon: Omid Lino MD;  Location: [address]);  Service: Endoscopy;  Laterality: N/A;  pt unable to be checked in with previous order    EYE SURGERY      TONSILLECTOMY         Review of patient's allergies indicates:   Allergen Reactions    Bactrim [sulfamethoxazole-trimethoprim] Nausea Only    Codeine Itching    Keflex [cephalexin] Other (See Comments)     Rhinorrhea, nausea. Tolerated Ceftriaxone 2014    Ceftriaxone Rash     Morbilliform rash after receiving IV ceftriaxone    Clindamycin hcl Rash    Doxycycline Rash    Vancomycin analogues Rash     Morbilliform rash after receiving IV vancomycin       No current facility-administered medications on file prior to encounter.      Current Outpatient Medications on File Prior to Encounter   Medication Sig    albuterol (PROVENTIL) 2.5 mg /3 mL (0.083 %) nebulizer solution Take 3 mLs (2.5 mg total) by nebulization every 4 (four) hours as needed for Wheezing or Shortness of Breath.    albuterol (PROVENTIL/VENTOLIN HFA) 90 mcg/actuation inhaler Inhale 2 puffs into the lungs every 6 (six) hours as needed for Wheezing or Shortness of Breath (Pro Air HFA per insurance). Rescue    ALPRAZolam (XANAX) 0.25 MG tablet Take 1 tablet (0.25 mg total) by mouth 3 (three) times daily as needed for Anxiety.    aspirin (ECOTRIN) 81 MG EC tablet Take 81 mg by mouth once daily.      budesonide-formoterol 160-4.5 mcg (SYMBICORT) 160-4.5 mcg/actuation HFAA INHALE 2 PUFFS INTO THE LUNGS EVERY 12 HOURS    calcium-vitamin D3 (OS-ALIE 500 + D3) 500 mg(1,250mg) -200 unit per tablet Take 2  tablets by mouth 2 (two) times daily. (Patient not taking: Reported on 2020)    cholecalciferol, vitamin D3, (VITAMIN D3) 10 mcg (400 unit) Cap Take 1,000 Units by mouth once daily.    diltiaZEM (CARDIZEM CD) 180 MG 24 hr capsule Take 1 capsule (180 mg total) by mouth once daily.    famotidine (PEPCID) 20 MG tablet Take 2 tablets (40 mg total) by mouth once daily.    furosemide (LASIX) 20 MG tablet Take 1 tablet (20 mg total) by mouth once daily. for 3 days    hydroCHLOROthiazide (HYDRODIURIL) 25 MG tablet TAKE 1 TABLET BY MOUTH EVERY DAY    INCRUSE ELLIPTA 62.5 mcg/actuation DsDv INHALE 1 PUFF DAILY    potassium chloride SA (K-DUR,KLOR-CON) 20 MEQ tablet TAKE 1 TABLET BY MOUTH EVERY DAY    pravastatin (PRAVACHOL) 20 MG tablet Take 1 tablet (20 mg total) by mouth once daily.    predniSONE (DELTASONE) 10 MG tablet 1or 2 tabs every AM     Family History     Problem Relation (Age of Onset)    Blindness Paternal Grandmother    Breast cancer Sister    COPD Father    Cancer Mother (79), Sister, Son    Cataracts Mother, Sister    Diabetes Father, Paternal Grandmother    Glaucoma Mother    Heart disease Mother, Father, Sister    Hyperlipidemia Mother, Father, Sister    Hypertension Mother, Father, Sister    Skin cancer Mother, Sister    Thyroid disease Mother, Daughter        Tobacco Use    Smoking status: Former Smoker     Packs/day: 1.00     Years: 39.00     Pack years: 39.00     Types: Cigarettes     Last attempt to quit: 1995     Years since quittin.5    Smokeless tobacco: Never Used   Substance and Sexual Activity    Alcohol use: Yes     Alcohol/week: 2.0 standard drinks     Types: 2 Glasses of wine per week     Comment: rarely has a glass of wine- not daily    Drug use: No    Sexual activity: Never     Review of Systems   Constitutional: Positive for activity change. Negative for chills and fever.   HENT: Negative for congestion, ear pain, hearing loss, mouth sores, sinus pain and sore  throat.    Eyes: Negative for pain and visual disturbance.   Respiratory: Positive for cough and shortness of breath.    Cardiovascular: Negative for chest pain and palpitations.   Gastrointestinal: Negative for abdominal pain, constipation, diarrhea, nausea and vomiting.   Endocrine: Negative for cold intolerance and heat intolerance.   Genitourinary: Negative for difficulty urinating and hematuria.   Musculoskeletal: Positive for arthralgias. Negative for joint swelling and neck stiffness.   Skin: Negative for rash and wound.   Allergic/Immunologic: Negative for food allergies.   Neurological: Negative for seizures, weakness, light-headedness, numbness and headaches.   Hematological: Negative for adenopathy.   Psychiatric/Behavioral: Negative for agitation and confusion. The patient is nervous/anxious.      Objective:     Vital Signs (Most Recent):  Temp: 98.5 °F (36.9 °C) (05/08/20 1718)  Pulse: 86 (05/08/20 1718)  Resp: 20 (05/08/20 1718)  BP: 134/63 (05/08/20 1718)  SpO2: 96 % (05/08/20 1718) Vital Signs (24h Range):  Temp:  [98.1 °F (36.7 °C)-98.6 °F (37 °C)] 98.5 °F (36.9 °C)  Pulse:  [73-94] 86  Resp:  [16-27] 20  SpO2:  [88 %-100 %] 96 %  BP: (124-144)/(59-75) 134/63     Weight: 50.8 kg (112 lb)  Body mass index is 22.62 kg/m².    Physical Exam   Constitutional: She is oriented to person, place, and time. She appears well-developed and well-nourished.   Laying comfortably in bed   HENT:   Head: Normocephalic and atraumatic.   Eyes: EOM are normal.   Neck: Neck supple.   Cardiovascular: Normal rate, regular rhythm, normal heart sounds and intact distal pulses. Exam reveals no gallop and no friction rub.   No murmur heard.  Pulmonary/Chest: Effort normal and breath sounds normal. No respiratory distress. She has no wheezes.   On 2l nasal canula   Musculoskeletal: Normal range of motion.   Left leg swollen.    Neurological: She is alert and oriented to person, place, and time.   Skin: Skin is warm and dry.    Psychiatric: She has a normal mood and affect.   Nursing note and vitals reviewed.       Significant Labs:   CBC:   Recent Labs   Lab 05/08/20  1158   WBC 9.62   HGB 15.8   HCT 49.5*        CMP:   Recent Labs   Lab 05/08/20  1158   *   K 3.9   CL 93*   CO2 28   *   BUN 11   CREATININE 0.6   CALCIUM 10.0   PROT 7.3   ALBUMIN 3.8   BILITOT 0.4   ALKPHOS 101   AST 18   ALT 14   ANIONGAP 13   EGFRNONAA >60.0       Significant Imaging: I have reviewed and interpreted all pertinent imaging results/findings within the past 24 hours.    Assessment/Plan:     * Acute on chronic respiratory failure with hypoxemia  75-year-old female with chronic hypoxic respiratory failure on home O2 (2L/min) secondary to COPD, hypertension, hyperlipidemia presents for foot swelling and shortness of breath. Patient states it has gotten worse the past two weeks with exertion.     DDX includes copd exacerbation, progression of disease, CHF. CHF unlikely in setting of normal bnp and clear chest x-ray. However she does have swelling in her left leg. Patient has a long history of COPD and she could have an infection exacerbating the condition. Unlikely in the setting of normal cxr and normal white count. Possible that patients COPD has just progressed.    Plan  Will continue home inhalers for COPD  Pred 20 mg qd  Azithromycin  TTE   Received one time dose of lasix in ER      Chronic obstructive pulmonary disease  Patient with a long history of COPD. Pulmonologist is Dr. Florez    Plan  Continue home inhalers       Bilateral edema of lower extremity  Patient with more swelling on her left leg. Has been going on for over a month. Could be related to CHF but unlikely. Most likely attributable to venous stasis    Plan  TTE  US lower extremities rule out DVT      Chest pain  Patient not currently endorsing chest pain except when she coughs    Plan  Continue home baby asprin       Hyperlipidemia  Continue home  statin      Hypertension  Continue home dilt and hctz.         VTE Risk Mitigation (From admission, onward)         Ordered     enoxaparin injection 40 mg  Daily      05/08/20 1710     IP VTE HIGH RISK PATIENT  Once      05/08/20 1710     Place sequential compression device  Until discontinued      05/08/20 1710     IP VTE HIGH RISK PATIENT  Once      05/08/20 1710     Place sequential compression device  Until discontinued      05/08/20 1710                   Cole Arthur MD  Department of Hospital Medicine   [facility]

## 2020-05-08 NOTE — HPI
75-year-old female with chronic hypoxic respiratory failure on home O2 (2L/min) secondary to COPD, hypertension, hyperlipidemia presents for foot swelling and shortness of breath. Patient states that for the past two weeks she has been short of breath with exertion. She has had copd for around 20 years but started requring 2 liters of oxygen 6 months ago. Now she gets short of breath just walking to the bathroom. She has also noticed swelling in her legs. She has had stockings in the past but stopped wearing them due to her feet turning blue. She has talked to her pcp about this problem and they gave her oral lasix. She has that has not helped her sob and it is still there. She has a minor non productive cough. She chest tightness, chest pain, fever/chills, orthopnea, abdominal or facial swelling.     Patient came into the ER with normal vital signs. However, she became very winded when she got up to go to the bathroom. She also desatted when just putting her pants on for an ambulatory 02 saturation. The ER gave her a dose of lasix and pred 40 without improvement.. She is admitted to hospital management for further workup and management.    DISCHARGE

## 2020-05-08 NOTE — ASSESSMENT & PLAN NOTE
Patient not currently endorsing chest pain except when she coughs    Plan  Continue home baby asprin

## 2020-05-08 NOTE — ASSESSMENT & PLAN NOTE
Patient with more swelling on her left leg. Has been going on for over a month. Could be related to CHF but unlikely. Most likely attributable to venous stasis    Plan  TTE  US lower extremities rule out DVT

## 2020-05-08 NOTE — ED NOTES
Pt identifiers Leyla Mari were checked and are correct  LOC: The patient is awake, alert, aware of environment with an appropriate affect. Oriented x4, speaking appropriately  APPEARANCE: Pt resting comfortably, in no acute distress, pt is clean and well groomed, clothing properly fastened  SKIN: Skin warm, dry and intact, normal skin turgor, moist mucus membranes  RESPIRATORY: Airway is open and patent, respirations are spontaneous, Breath sounds clear to upper lung fields, diminished to lower lung fields Slight shortness of breath noted  Pt has O2 at 3L/min per nasal cannula   CARDIAC: Normal rate and rhythm, 2 + peripheral edema noted, capillary refill < 3 seconds, bilateral radial pulses 2+  ABDOMEN: Soft, nontender, nondistended. Bowel sounds present to all four quad of abd on auscultation  NEUROLOGIC: PERRL, facial expression is symmetrical, patient moving all extremities spontaneously, normal sensation in all extremities when touched with a finger.  Follows all commands appropriately  MUSCULOSKELETAL: No obvious deformities.

## 2020-05-09 LAB
ALBUMIN SERPL BCP-MCNC: 3.4 G/DL (ref 3.5–5.2)
ANION GAP SERPL CALC-SCNC: 11 MMOL/L (ref 8–16)
ASCENDING AORTA: 2.6 CM
AV INDEX (PROSTH): 1
AV MEAN GRADIENT: 2 MMHG
AV PEAK GRADIENT: 4 MMHG
AV VALVE AREA: 3.14 CM2
AV VELOCITY RATIO: 0.84
BASOPHILS # BLD AUTO: 0.01 K/UL (ref 0–0.2)
BASOPHILS NFR BLD: 0.2 % (ref 0–1.9)
BILIRUB UR QL STRIP: NEGATIVE
BSA FOR ECHO PROCEDURE: 1.41 M2
BUN SERPL-MCNC: 17 MG/DL (ref 8–23)
CALCIUM SERPL-MCNC: 9.8 MG/DL (ref 8.7–10.5)
CHLORIDE SERPL-SCNC: 91 MMOL/L (ref 95–110)
CLARITY UR REFRACT.AUTO: CLEAR
CO2 SERPL-SCNC: 34 MMOL/L (ref 23–29)
COLOR UR AUTO: YELLOW
CREAT SERPL-MCNC: 0.7 MG/DL (ref 0.5–1.4)
CV ECHO LV RWT: 0.41 CM
DIFFERENTIAL METHOD: ABNORMAL
DOP CALC AO PEAK VEL: 0.98 M/S
DOP CALC AO VTI: 18.89 CM
DOP CALC LVOT AREA: 3.1 CM2
DOP CALC LVOT DIAMETER: 2 CM
DOP CALC LVOT PEAK VEL: 0.82 M/S
DOP CALC LVOT STROKE VOLUME: 59.41 CM3
DOP CALCLVOT PEAK VEL VTI: 18.92 CM
E WAVE DECELERATION TIME: 121.96 MSEC
E/A RATIO: 0.66
E/E' RATIO: 7.67 M/S
ECHO LV POSTERIOR WALL: 0.7 CM (ref 0.6–1.1)
EOSINOPHIL # BLD AUTO: 0 K/UL (ref 0–0.5)
EOSINOPHIL NFR BLD: 0 % (ref 0–8)
ERYTHROCYTE [DISTWIDTH] IN BLOOD BY AUTOMATED COUNT: 13.8 % (ref 11.5–14.5)
EST. GFR  (AFRICAN AMERICAN): >60 ML/MIN/1.73 M^2
EST. GFR  (NON AFRICAN AMERICAN): >60 ML/MIN/1.73 M^2
FRACTIONAL SHORTENING: 42 % (ref 28–44)
GLUCOSE SERPL-MCNC: 100 MG/DL (ref 70–110)
GLUCOSE UR QL STRIP: NEGATIVE
HCT VFR BLD AUTO: 46.4 % (ref 37–48.5)
HGB BLD-MCNC: 14.7 G/DL (ref 12–16)
HGB UR QL STRIP: ABNORMAL
IMM GRANULOCYTES # BLD AUTO: 0.02 K/UL (ref 0–0.04)
IMM GRANULOCYTES NFR BLD AUTO: 0.3 % (ref 0–0.5)
INTERVENTRICULAR SEPTUM: 0.6 CM (ref 0.6–1.1)
IVRT: 97.05 MSEC
KETONES UR QL STRIP: NEGATIVE
LA MAJOR: 4.75 CM
LA MINOR: 4.81 CM
LA WIDTH: 2.66 CM
LEFT ATRIUM SIZE: 2.62 CM
LEFT ATRIUM VOLUME INDEX: 20.2 ML/M2
LEFT ATRIUM VOLUME: 28.31 CM3
LEFT INTERNAL DIMENSION IN SYSTOLE: 1.97 CM (ref 2.1–4)
LEFT VENTRICLE DIASTOLIC VOLUME INDEX: 33.85 ML/M2
LEFT VENTRICLE DIASTOLIC VOLUME: 47.46 ML
LEFT VENTRICLE MASS INDEX: 39 G/M2
LEFT VENTRICLE SYSTOLIC VOLUME INDEX: 8.8 ML/M2
LEFT VENTRICLE SYSTOLIC VOLUME: 12.29 ML
LEFT VENTRICULAR INTERNAL DIMENSION IN DIASTOLE: 3.4 CM (ref 3.5–6)
LEFT VENTRICULAR MASS: 54.28 G
LEUKOCYTE ESTERASE UR QL STRIP: NEGATIVE
LV LATERAL E/E' RATIO: 6.9 M/S
LV SEPTAL E/E' RATIO: 8.63 M/S
LYMPHOCYTES # BLD AUTO: 0.4 K/UL (ref 1–4.8)
LYMPHOCYTES NFR BLD: 6.5 % (ref 18–48)
MAGNESIUM SERPL-MCNC: 1.9 MG/DL (ref 1.6–2.6)
MCH RBC QN AUTO: 29.1 PG (ref 27–31)
MCHC RBC AUTO-ENTMCNC: 31.7 G/DL (ref 32–36)
MCV RBC AUTO: 92 FL (ref 82–98)
MICROSCOPIC COMMENT: NORMAL
MONOCYTES # BLD AUTO: 0.3 K/UL (ref 0.3–1)
MONOCYTES NFR BLD: 4.9 % (ref 4–15)
MV PEAK A VEL: 1.04 M/S
MV PEAK E VEL: 0.69 M/S
NEUTROPHILS # BLD AUTO: 5.6 K/UL (ref 1.8–7.7)
NEUTROPHILS NFR BLD: 88.1 % (ref 38–73)
NITRITE UR QL STRIP: NEGATIVE
NRBC BLD-RTO: 0 /100 WBC
PH UR STRIP: 5 [PH] (ref 5–8)
PHOSPHATE SERPL-MCNC: 5.3 MG/DL (ref 2.7–4.5)
PISA TR MAX VEL: 2.72 M/S
PLATELET # BLD AUTO: 323 K/UL (ref 150–350)
PMV BLD AUTO: 9.1 FL (ref 9.2–12.9)
POTASSIUM SERPL-SCNC: 4 MMOL/L (ref 3.5–5.1)
PROT UR QL STRIP: NEGATIVE
PULM VEIN S/D RATIO: 1.58
PV PEAK D VEL: 0.38 M/S
PV PEAK S VEL: 0.6 M/S
RA MAJOR: 3.99 CM
RA PRESSURE: 3 MMHG
RA WIDTH: 2.39 CM
RBC # BLD AUTO: 5.06 M/UL (ref 4–5.4)
RBC #/AREA URNS AUTO: 1 /HPF (ref 0–4)
RIGHT VENTRICULAR END-DIASTOLIC DIMENSION: 2.68 CM
RV TISSUE DOPPLER FREE WALL SYSTOLIC VELOCITY 1 (APICAL 4 CHAMBER VIEW): 14.98 CM/S
SINUS: 2.82 CM
SODIUM SERPL-SCNC: 136 MMOL/L (ref 136–145)
SP GR UR STRIP: 1.02 (ref 1–1.03)
SQUAMOUS #/AREA URNS AUTO: 0 /HPF
STJ: 2.72 CM
TDI LATERAL: 0.1 M/S
TDI SEPTAL: 0.08 M/S
TDI: 0.09 M/S
TR MAX PG: 30 MMHG
TRICUSPID ANNULAR PLANE SYSTOLIC EXCURSION: 1.69 CM
TSH SERPL DL<=0.005 MIU/L-ACNC: 0.5 UIU/ML (ref 0.4–4)
TV REST PULMONARY ARTERY PRESSURE: 33 MMHG
URN SPEC COLLECT METH UR: ABNORMAL
WBC # BLD AUTO: 6.3 K/UL (ref 3.9–12.7)
WBC #/AREA URNS AUTO: 3 /HPF (ref 0–5)

## 2020-05-09 PROCEDURE — 63600175 PHARM REV CODE 636 W HCPCS

## 2020-05-09 PROCEDURE — 80069 RENAL FUNCTION PANEL: CPT

## 2020-05-09 PROCEDURE — 99232 SBSQ HOSP IP/OBS MODERATE 35: CPT | Mod: GC,,, | Performed by: HOSPITALIST

## 2020-05-09 PROCEDURE — 25000003 PHARM REV CODE 250

## 2020-05-09 PROCEDURE — 99900035 HC TECH TIME PER 15 MIN (STAT)

## 2020-05-09 PROCEDURE — 85025 COMPLETE CBC W/AUTO DIFF WBC: CPT

## 2020-05-09 PROCEDURE — 94761 N-INVAS EAR/PLS OXIMETRY MLT: CPT

## 2020-05-09 PROCEDURE — 96376 TX/PRO/DX INJ SAME DRUG ADON: CPT | Mod: 59

## 2020-05-09 PROCEDURE — 63700000 PHARM REV CODE 250 ALT 637 W/O HCPCS: Performed by: STUDENT IN AN ORGANIZED HEALTH CARE EDUCATION/TRAINING PROGRAM

## 2020-05-09 PROCEDURE — 84443 ASSAY THYROID STIM HORMONE: CPT

## 2020-05-09 PROCEDURE — G0378 HOSPITAL OBSERVATION PER HR: HCPCS

## 2020-05-09 PROCEDURE — 25000242 PHARM REV CODE 250 ALT 637 W/ HCPCS

## 2020-05-09 PROCEDURE — 36415 COLL VENOUS BLD VENIPUNCTURE: CPT

## 2020-05-09 PROCEDURE — 83735 ASSAY OF MAGNESIUM: CPT

## 2020-05-09 PROCEDURE — 81001 URINALYSIS AUTO W/SCOPE: CPT

## 2020-05-09 PROCEDURE — 96372 THER/PROPH/DIAG INJ SC/IM: CPT

## 2020-05-09 PROCEDURE — 94640 AIRWAY INHALATION TREATMENT: CPT

## 2020-05-09 PROCEDURE — 99232 PR SUBSEQUENT HOSPITAL CARE,LEVL II: ICD-10-PCS | Mod: GC,,, | Performed by: HOSPITALIST

## 2020-05-09 PROCEDURE — 96375 TX/PRO/DX INJ NEW DRUG ADDON: CPT | Mod: 59

## 2020-05-09 PROCEDURE — 20600001 HC STEP DOWN PRIVATE ROOM

## 2020-05-09 PROCEDURE — 27000221 HC OXYGEN, UP TO 24 HOURS

## 2020-05-09 RX ORDER — AZITHROMYCIN 250 MG/1
250 TABLET, FILM COATED ORAL DAILY
Status: COMPLETED | OUTPATIENT
Start: 2020-05-09 | End: 2020-05-10

## 2020-05-09 RX ORDER — METHYLPREDNISOLONE SOD SUCC 125 MG
125 VIAL (EA) INJECTION EVERY 8 HOURS
Status: COMPLETED | OUTPATIENT
Start: 2020-05-09 | End: 2020-05-10

## 2020-05-09 RX ORDER — IPRATROPIUM BROMIDE AND ALBUTEROL SULFATE 2.5; .5 MG/3ML; MG/3ML
3 SOLUTION RESPIRATORY (INHALATION)
Status: DISCONTINUED | OUTPATIENT
Start: 2020-05-09 | End: 2020-05-10 | Stop reason: HOSPADM

## 2020-05-09 RX ORDER — MONTELUKAST SODIUM 10 MG/1
10 TABLET ORAL DAILY
Status: DISCONTINUED | OUTPATIENT
Start: 2020-05-09 | End: 2020-05-10 | Stop reason: HOSPADM

## 2020-05-09 RX ADMIN — MONTELUKAST 10 MG: 10 TABLET, FILM COATED ORAL at 10:05

## 2020-05-09 RX ADMIN — ENOXAPARIN SODIUM 40 MG: 100 INJECTION SUBCUTANEOUS at 05:05

## 2020-05-09 RX ADMIN — PREDNISONE 40 MG: 20 TABLET ORAL at 08:05

## 2020-05-09 RX ADMIN — METHYLPREDNISOLONE SODIUM SUCCINATE 125 MG: 125 INJECTION, POWDER, FOR SOLUTION INTRAMUSCULAR; INTRAVENOUS at 10:05

## 2020-05-09 RX ADMIN — DILTIAZEM HYDROCHLORIDE 180 MG: 180 CAPSULE, COATED, EXTENDED RELEASE ORAL at 08:05

## 2020-05-09 RX ADMIN — HYDROCHLOROTHIAZIDE 25 MG: 12.5 TABLET ORAL at 08:05

## 2020-05-09 RX ADMIN — IPRATROPIUM BROMIDE AND ALBUTEROL SULFATE 3 ML: .5; 3 SOLUTION RESPIRATORY (INHALATION) at 12:05

## 2020-05-09 RX ADMIN — AZITHROMYCIN 250 MG: 250 TABLET, FILM COATED ORAL at 10:05

## 2020-05-09 RX ADMIN — IPRATROPIUM BROMIDE AND ALBUTEROL SULFATE 3 ML: .5; 3 SOLUTION RESPIRATORY (INHALATION) at 07:05

## 2020-05-09 RX ADMIN — FLUTICASONE FUROATE AND VILANTEROL TRIFENATATE 1 PUFF: 100; 25 POWDER RESPIRATORY (INHALATION) at 09:05

## 2020-05-09 RX ADMIN — METHYLPREDNISOLONE SODIUM SUCCINATE 125 MG: 125 INJECTION, POWDER, FOR SOLUTION INTRAMUSCULAR; INTRAVENOUS at 01:05

## 2020-05-09 RX ADMIN — ASPIRIN 81 MG: 81 TABLET, COATED ORAL at 08:05

## 2020-05-09 RX ADMIN — PRAVASTATIN SODIUM 20 MG: 20 TABLET ORAL at 08:05

## 2020-05-09 RX ADMIN — TIOTROPIUM BROMIDE 18 MCG: 18 CAPSULE ORAL; RESPIRATORY (INHALATION) at 09:05

## 2020-05-09 NOTE — PLAN OF CARE
AAOX4, Stand-by Assist x1. NAD VSS. Oxygen admin via NC at 2L. Fall protocol maintained. Bed in lowest position with call light, bedside table and personal belongings in reach. No falls occurred during shift. POC discussed with pt. Will continue to monitor.

## 2020-05-09 NOTE — PROGRESS NOTES
Echo completed bedside today. Patient currently on 2L NC. UA collected today. Azithromycin scheduled for 2 days. Prednisone discontinued and solumedrol started q8hr.

## 2020-05-09 NOTE — PROGRESS NOTES
[facility] Hospital Medicine  Progress Note    Patient Name: Leyla Mari  MRN: [id number]  Patient Class: IP- Inpatient   Admission Date: 5/8/2020  Length of Stay: 1 days  Attending Physician: Mert Bourgeois MD  Primary Care Provider: Dulce Castro MD    [facility] Medicine Team: [facility]OSP MED 4 Cole Arthur MD    Subjective:     Principal Problem:Acute on chronic respiratory failure with hypoxemia        HPI:  75-year-old female with chronic hypoxic respiratory failure on home O2 (2L/min) secondary to COPD, hypertension, hyperlipidemia presents for foot swelling and shortness of breath. Patient states that for the past two weeks she has been short of breath with exertion. She has had copd for around 20 years but started requring 2 liters of oxygen 6 months ago. Now she gets short of breath just walking to the bathroom. She has also noticed swelling in her legs. She has had stockings in the past but stopped wearing them due to her feet turning blue. She has talked to her pcp about this problem and they gave her oral lasix. She has that has not helped her sob and it is still there. She has a minor non productive cough. She chest tightness, chest pain, fever/chills, orthopnea, abdominal or facial swelling.     Patient came into the ER with normal vital signs. However, she became very winded when she got up to go to the bathroom. She also desatted when just putting her pants on for an ambulatory 02 saturation. The ER gave her a dose of lasix and pred 40 without improvement.. She is admitted to hospital management for further workup and management.     Overview/Hospital Course:  No notes on file    Interval History: NAEON. Patient still on home oxygen. Will switch to iv steroids. Will have patient work with pt and ot to determine functional status.     Review of Systems   Constitutional: Positive for activity change. Negative for chills and fever.   HENT: Negative for congestion, ear  pain, hearing loss, mouth sores, sinus pain and sore throat.    Eyes: Negative for pain and visual disturbance.   Respiratory: Positive for cough and shortness of breath.    Cardiovascular: Negative for chest pain and palpitations.   Gastrointestinal: Negative for abdominal pain, constipation, diarrhea, nausea and vomiting.   Endocrine: Negative for cold intolerance and heat intolerance.   Genitourinary: Negative for difficulty urinating and hematuria.   Musculoskeletal: Positive for arthralgias. Negative for joint swelling and neck stiffness.   Skin: Negative for rash and wound.   Allergic/Immunologic: Negative for food allergies.   Neurological: Negative for seizures, weakness, light-headedness, numbness and headaches.   Hematological: Negative for adenopathy.   Psychiatric/Behavioral: Negative for agitation and confusion. The patient is nervous/anxious.      Objective:     Vital Signs (Most Recent):  Temp: 98.6 °F (37 °C) (05/09/20 1131)  Pulse: 102 (05/09/20 1256)  Resp: 20 (05/09/20 1256)  BP: (!) 108/52 (05/09/20 1131)  SpO2: (!) 94 %(goal for SpO2 is 90% per order) (05/09/20 1256) Vital Signs (24h Range):  Temp:  [98.1 °F (36.7 °C)-98.7 °F (37.1 °C)] 98.6 °F (37 °C)  Pulse:  [] 102  Resp:  [14-27] 20  SpO2:  [88 %-100 %] 94 %  BP: (100-135)/(52-63) 108/52     Weight: 47.6 kg (105 lb)  Body mass index is 21.21 kg/m².    Intake/Output Summary (Last 24 hours) at 5/9/2020 1353  Last data filed at 5/9/2020 0200  Gross per 24 hour   Intake 150 ml   Output 1000 ml   Net -850 ml      Physical Exam   Constitutional: She is oriented to person, place, and time. She appears well-developed and well-nourished.   Laying comfortably in bed   HENT:   Head: Normocephalic and atraumatic.   Eyes: EOM are normal.   Neck: Neck supple.   Cardiovascular: Normal rate, regular rhythm, normal heart sounds and intact distal pulses. Exam reveals no gallop and no friction rub.   No murmur heard.  Pulmonary/Chest: Effort normal and  breath sounds normal. No respiratory distress. She has no wheezes.   On 2l nasal canula   Musculoskeletal: Normal range of motion.   Left leg swollen.    Neurological: She is alert and oriented to person, place, and time.   Skin: Skin is warm and dry.   Psychiatric: She has a normal mood and affect.   Nursing note and vitals reviewed.      Significant Labs:   CBC:   Recent Labs   Lab 05/08/20  1158 05/09/20  0311   WBC 9.62 6.30   HGB 15.8 14.7   HCT 49.5* 46.4    323     CMP:   Recent Labs   Lab 05/08/20  1158 05/09/20  0311   * 136   K 3.9 4.0   CL 93* 91*   CO2 28 34*   * 100   BUN 11 17   CREATININE 0.6 0.7   CALCIUM 10.0 9.8   PROT 7.3  --    ALBUMIN 3.8 3.4*   BILITOT 0.4  --    ALKPHOS 101  --    AST 18  --    ALT 14  --    ANIONGAP 13 11   EGFRNONAA >60.0 >60.0       Significant Imaging: I have reviewed and interpreted all pertinent imaging results/findings within the past 24 hours.      Assessment/Plan:      * Acute on chronic respiratory failure with hypoxemia  75-year-old female with chronic hypoxic respiratory failure on home O2 (2L/min) secondary to COPD, hypertension, hyperlipidemia presents for foot swelling and shortness of breath. Patient states it has gotten worse the past two weeks with exertion.     DDX includes copd exacerbation, progression of disease, CHF. CHF unlikely in setting of normal bnp and clear chest x-ray. However she does have swelling in her left leg. Patient has a long history of COPD and she could have an infection exacerbating the condition. Unlikely in the setting of normal cxr and normal white count. Possible that patients COPD has just progressed.    Plan  Will continue home inhalers for COPD  IV solumedrol   Azithromycin  TTE   Received one time dose of lasix in ER      Chronic obstructive pulmonary disease  Patient with a long history of COPD. Pulmonologist is Dr. Florez    Plan  Continue home inhalers       Bilateral edema of lower extremity  Patient  with more swelling on her left leg. Has been going on for over a month. Could be related to CHF but unlikely. Most likely attributable to venous stasis    Plan  TTE  US lower extremities rule out DVT      Chest pain  Patient not currently endorsing chest pain except when she coughs    Plan  Continue home baby asprin       Hyperlipidemia  Continue home statin      Hypertension  Continue home dilt and hctz.         VTE Risk Mitigation (From admission, onward)         Ordered     enoxaparin injection 40 mg  Daily      05/08/20 1710     IP VTE HIGH RISK PATIENT  Once      05/08/20 1710     Place sequential compression device  Until discontinued      05/08/20 1710     IP VTE HIGH RISK PATIENT  Once      05/08/20 1710     Place sequential compression device  Until discontinued      05/08/20 1710                      Cole Arthur MD  Department of Hospital Medicine   [facility]

## 2020-05-09 NOTE — SUBJECTIVE & OBJECTIVE
Interval History: NAEON. Patient still on home oxygen. Will switch to iv steroids. Will have patient work with pt and ot to determine functional status.     Review of Systems   Constitutional: Positive for activity change. Negative for chills and fever.   HENT: Negative for congestion, ear pain, hearing loss, mouth sores, sinus pain and sore throat.    Eyes: Negative for pain and visual disturbance.   Respiratory: Positive for cough and shortness of breath.    Cardiovascular: Negative for chest pain and palpitations.   Gastrointestinal: Negative for abdominal pain, constipation, diarrhea, nausea and vomiting.   Endocrine: Negative for cold intolerance and heat intolerance.   Genitourinary: Negative for difficulty urinating and hematuria.   Musculoskeletal: Positive for arthralgias. Negative for joint swelling and neck stiffness.   Skin: Negative for rash and wound.   Allergic/Immunologic: Negative for food allergies.   Neurological: Negative for seizures, weakness, light-headedness, numbness and headaches.   Hematological: Negative for adenopathy.   Psychiatric/Behavioral: Negative for agitation and confusion. The patient is nervous/anxious.      Objective:     Vital Signs (Most Recent):  Temp: 98.6 °F (37 °C) (05/09/20 1131)  Pulse: 102 (05/09/20 1256)  Resp: 20 (05/09/20 1256)  BP: (!) 108/52 (05/09/20 1131)  SpO2: (!) 94 %(goal for SpO2 is 90% per order) (05/09/20 1256) Vital Signs (24h Range):  Temp:  [98.1 °F (36.7 °C)-98.7 °F (37.1 °C)] 98.6 °F (37 °C)  Pulse:  [] 102  Resp:  [14-27] 20  SpO2:  [88 %-100 %] 94 %  BP: (100-135)/(52-63) 108/52     Weight: 47.6 kg (105 lb)  Body mass index is 21.21 kg/m².    Intake/Output Summary (Last 24 hours) at 5/9/2020 1353  Last data filed at 5/9/2020 0200  Gross per 24 hour   Intake 150 ml   Output 1000 ml   Net -850 ml      Physical Exam   Constitutional: She is oriented to person, place, and time. She appears well-developed and well-nourished.   Laying comfortably  in bed   HENT:   Head: Normocephalic and atraumatic.   Eyes: EOM are normal.   Neck: Neck supple.   Cardiovascular: Normal rate, regular rhythm, normal heart sounds and intact distal pulses. Exam reveals no gallop and no friction rub.   No murmur heard.  Pulmonary/Chest: Effort normal and breath sounds normal. No respiratory distress. She has no wheezes.   On 2l nasal canula   Musculoskeletal: Normal range of motion.   Left leg swollen.    Neurological: She is alert and oriented to person, place, and time.   Skin: Skin is warm and dry.   Psychiatric: She has a normal mood and affect.   Nursing note and vitals reviewed.      Significant Labs:   CBC:   Recent Labs   Lab 05/08/20  1158 05/09/20  0311   WBC 9.62 6.30   HGB 15.8 14.7   HCT 49.5* 46.4    323     CMP:   Recent Labs   Lab 05/08/20  1158 05/09/20  0311   * 136   K 3.9 4.0   CL 93* 91*   CO2 28 34*   * 100   BUN 11 17   CREATININE 0.6 0.7   CALCIUM 10.0 9.8   PROT 7.3  --    ALBUMIN 3.8 3.4*   BILITOT 0.4  --    ALKPHOS 101  --    AST 18  --    ALT 14  --    ANIONGAP 13 11   EGFRNONAA >60.0 >60.0       Significant Imaging: I have reviewed and interpreted all pertinent imaging results/findings within the past 24 hours.

## 2020-05-09 NOTE — ASSESSMENT & PLAN NOTE
75-year-old female with chronic hypoxic respiratory failure on home O2 (2L/min) secondary to COPD, hypertension, hyperlipidemia presents for foot swelling and shortness of breath. Patient states it has gotten worse the past two weeks with exertion.     DDX includes copd exacerbation, progression of disease, CHF. CHF unlikely in setting of normal bnp and clear chest x-ray. However she does have swelling in her left leg. Patient has a long history of COPD and she could have an infection exacerbating the condition. Unlikely in the setting of normal cxr and normal white count. Possible that patients COPD has just progressed.    Plan  Will continue home inhalers for COPD  IV solumedrol   Azithromycin  TTE   Received one time dose of lasix in ER

## 2020-05-10 VITALS
HEIGHT: 59 IN | WEIGHT: 105 LBS | DIASTOLIC BLOOD PRESSURE: 55 MMHG | TEMPERATURE: 99 F | BODY MASS INDEX: 21.17 KG/M2 | HEART RATE: 100 BPM | RESPIRATION RATE: 18 BRPM | OXYGEN SATURATION: 97 % | SYSTOLIC BLOOD PRESSURE: 107 MMHG

## 2020-05-10 PROBLEM — R07.9 CHEST PAIN: Status: RESOLVED | Noted: 2020-03-10 | Resolved: 2020-05-10

## 2020-05-10 PROBLEM — R06.02 SHORTNESS OF BREATH: Status: RESOLVED | Noted: 2020-05-08 | Resolved: 2020-05-10

## 2020-05-10 LAB
ALBUMIN SERPL BCP-MCNC: 3.5 G/DL (ref 3.5–5.2)
ANION GAP SERPL CALC-SCNC: 12 MMOL/L (ref 8–16)
BASOPHILS # BLD AUTO: 0 K/UL (ref 0–0.2)
BASOPHILS NFR BLD: 0 % (ref 0–1.9)
BUN SERPL-MCNC: 16 MG/DL (ref 8–23)
CALCIUM SERPL-MCNC: 10.2 MG/DL (ref 8.7–10.5)
CHLORIDE SERPL-SCNC: 90 MMOL/L (ref 95–110)
CO2 SERPL-SCNC: 35 MMOL/L (ref 23–29)
CREAT SERPL-MCNC: 0.7 MG/DL (ref 0.5–1.4)
DIFFERENTIAL METHOD: ABNORMAL
EOSINOPHIL # BLD AUTO: 0 K/UL (ref 0–0.5)
EOSINOPHIL NFR BLD: 0 % (ref 0–8)
ERYTHROCYTE [DISTWIDTH] IN BLOOD BY AUTOMATED COUNT: 13.6 % (ref 11.5–14.5)
EST. GFR  (AFRICAN AMERICAN): >60 ML/MIN/1.73 M^2
EST. GFR  (NON AFRICAN AMERICAN): >60 ML/MIN/1.73 M^2
GLUCOSE SERPL-MCNC: 153 MG/DL (ref 70–110)
HCT VFR BLD AUTO: 47.8 % (ref 37–48.5)
HGB BLD-MCNC: 14.8 G/DL (ref 12–16)
IMM GRANULOCYTES # BLD AUTO: 0.03 K/UL (ref 0–0.04)
IMM GRANULOCYTES NFR BLD AUTO: 0.4 % (ref 0–0.5)
LYMPHOCYTES # BLD AUTO: 0.5 K/UL (ref 1–4.8)
LYMPHOCYTES NFR BLD: 6.9 % (ref 18–48)
MAGNESIUM SERPL-MCNC: 2.2 MG/DL (ref 1.6–2.6)
MCH RBC QN AUTO: 28.4 PG (ref 27–31)
MCHC RBC AUTO-ENTMCNC: 31 G/DL (ref 32–36)
MCV RBC AUTO: 92 FL (ref 82–98)
MONOCYTES # BLD AUTO: 0.1 K/UL (ref 0.3–1)
MONOCYTES NFR BLD: 1.2 % (ref 4–15)
NEUTROPHILS # BLD AUTO: 6.9 K/UL (ref 1.8–7.7)
NEUTROPHILS NFR BLD: 91.5 % (ref 38–73)
NRBC BLD-RTO: 0 /100 WBC
PHOSPHATE SERPL-MCNC: 4.6 MG/DL (ref 2.7–4.5)
PLATELET # BLD AUTO: 380 K/UL (ref 150–350)
PMV BLD AUTO: 8.9 FL (ref 9.2–12.9)
POTASSIUM SERPL-SCNC: 4.6 MMOL/L (ref 3.5–5.1)
RBC # BLD AUTO: 5.21 M/UL (ref 4–5.4)
SODIUM SERPL-SCNC: 137 MMOL/L (ref 136–145)
WBC # BLD AUTO: 7.54 K/UL (ref 3.9–12.7)

## 2020-05-10 PROCEDURE — 99900035 HC TECH TIME PER 15 MIN (STAT)

## 2020-05-10 PROCEDURE — 27000221 HC OXYGEN, UP TO 24 HOURS

## 2020-05-10 PROCEDURE — G0378 HOSPITAL OBSERVATION PER HR: HCPCS

## 2020-05-10 PROCEDURE — 99238 PR HOSPITAL DISCHARGE DAY,<30 MIN: ICD-10-PCS | Mod: GC,,, | Performed by: HOSPITALIST

## 2020-05-10 PROCEDURE — 63700000 PHARM REV CODE 250 ALT 637 W/O HCPCS: Performed by: STUDENT IN AN ORGANIZED HEALTH CARE EDUCATION/TRAINING PROGRAM

## 2020-05-10 PROCEDURE — 94640 AIRWAY INHALATION TREATMENT: CPT

## 2020-05-10 PROCEDURE — 96376 TX/PRO/DX INJ SAME DRUG ADON: CPT

## 2020-05-10 PROCEDURE — 36415 COLL VENOUS BLD VENIPUNCTURE: CPT

## 2020-05-10 PROCEDURE — 83735 ASSAY OF MAGNESIUM: CPT

## 2020-05-10 PROCEDURE — 80069 RENAL FUNCTION PANEL: CPT

## 2020-05-10 PROCEDURE — 63600175 PHARM REV CODE 636 W HCPCS

## 2020-05-10 PROCEDURE — 25000242 PHARM REV CODE 250 ALT 637 W/ HCPCS

## 2020-05-10 PROCEDURE — 99238 HOSP IP/OBS DSCHRG MGMT 30/<: CPT | Mod: GC,,, | Performed by: HOSPITALIST

## 2020-05-10 PROCEDURE — 85025 COMPLETE CBC W/AUTO DIFF WBC: CPT

## 2020-05-10 PROCEDURE — 94761 N-INVAS EAR/PLS OXIMETRY MLT: CPT

## 2020-05-10 PROCEDURE — 25000003 PHARM REV CODE 250

## 2020-05-10 RX ORDER — MONTELUKAST SODIUM 10 MG/1
10 TABLET ORAL DAILY
Qty: 30 TABLET | Refills: 0 | Status: SHIPPED | OUTPATIENT
Start: 2020-05-11 | End: 2020-06-02

## 2020-05-10 RX ORDER — METHYLPREDNISOLONE SOD SUCC 125 MG
VIAL (EA) INJECTION
Status: DISCONTINUED
Start: 2020-05-10 | End: 2020-05-10 | Stop reason: HOSPADM

## 2020-05-10 RX ADMIN — IPRATROPIUM BROMIDE AND ALBUTEROL SULFATE 3 ML: .5; 3 SOLUTION RESPIRATORY (INHALATION) at 08:05

## 2020-05-10 RX ADMIN — HYDROCHLOROTHIAZIDE 25 MG: 12.5 TABLET ORAL at 09:05

## 2020-05-10 RX ADMIN — DILTIAZEM HYDROCHLORIDE 180 MG: 180 CAPSULE, COATED, EXTENDED RELEASE ORAL at 09:05

## 2020-05-10 RX ADMIN — FLUTICASONE FUROATE AND VILANTEROL TRIFENATATE 1 PUFF: 100; 25 POWDER RESPIRATORY (INHALATION) at 09:05

## 2020-05-10 RX ADMIN — MONTELUKAST 10 MG: 10 TABLET, FILM COATED ORAL at 09:05

## 2020-05-10 RX ADMIN — ASPIRIN 81 MG: 81 TABLET, COATED ORAL at 09:05

## 2020-05-10 RX ADMIN — PRAVASTATIN SODIUM 20 MG: 20 TABLET ORAL at 09:05

## 2020-05-10 RX ADMIN — AZITHROMYCIN 250 MG: 250 TABLET, FILM COATED ORAL at 09:05

## 2020-05-10 RX ADMIN — METHYLPREDNISOLONE SODIUM SUCCINATE 125 MG: 125 INJECTION, POWDER, FOR SOLUTION INTRAMUSCULAR; INTRAVENOUS at 05:05

## 2020-05-10 RX ADMIN — TIOTROPIUM BROMIDE 18 MCG: 18 CAPSULE ORAL; RESPIRATORY (INHALATION) at 09:05

## 2020-05-10 NOTE — DISCHARGE SUMMARY
[facility] Hospital Medicine  Discharge Summary      Patient Name: Leyla Mari  MRN: [id number]  Admission Date: 5/8/2020  Hospital Length of Stay: 2 days  Discharge Date and Time:  05/10/2020 12:34 PM  Attending Physician: No att. providers found   Discharging Provider: Vin Cherry MD  Primary Care Provider: Dulce Castro MD  Hospital Medicine Team: [facility] HOSP MED 4 Vin Cherry MD    HPI:   75-year-old female with chronic hypoxic respiratory failure on home O2 (2L/min) secondary to COPD, hypertension, hyperlipidemia presents for foot swelling and shortness of breath. Patient states that for the past two weeks she has been short of breath with exertion. She has had copd for around 20 years but started requring 2 liters of oxygen 6 months ago. Now she gets short of breath just walking to the bathroom. She has also noticed swelling in her legs. She has had stockings in the past but stopped wearing them due to her feet turning blue. She has talked to her pcp about this problem and they gave her oral lasix. She has that has not helped her sob and it is still there. She has a minor non productive cough. She chest tightness, chest pain, fever/chills, orthopnea, abdominal or facial swelling.     Patient came into the ER with normal vital signs. However, she became very winded when she got up to go to the bathroom. She also desatted when just putting her pants on for an ambulatory 02 saturation. The ER gave her a dose of lasix and pred 40 without improvement.. She is admitted to hospital management for further workup and management.     * No surgery found *      Hospital Course:   Patient's symptoms improved significantly with solumedrol 125 q8h x 3 doses and duo-nebs q6h during the day. CXR without evidence of edema, BNP WNL. TTE with EF of 70%, no WMA or DD. PA pressure 33. TSH normal. LE u/s negative for DVT.     Discharged patient back on home regimen with addition of Singulair. Referred  to Pulmonary Rehab and follow up with Dr. Guido.     Patient's dyspnea likely represents progression of her lung disease, and had resolved at the time of discharge. Mrs. Mari was able to be up, moving around her room, dressing herself, and performing ADLs. Excited to celebrate Mother's Day with her family. Lower extremity edema likely 2/2 venous stasis and improved significantly when the patient was ambulatory again.      Consults: None.     Physical Exam   Constitutional: She is oriented to person, place, and time. She appears well-developed and well-nourished.   Sitting up on the side of her bed.   HENT:   Head: Normocephalic and atraumatic.   Eyes: EOM are normal.   Neck: Neck supple.   Cardiovascular: Normal rate, regular rhythm, normal heart sounds and intact distal pulses. Exam reveals no gallop and no friction rub.   No murmur heard.  Pulmonary/Chest: Effort normal and breath sounds normal. No respiratory distress. She has no wheezes.   On 2l nasal canula   Musculoskeletal: Normal range of motion.   Left leg swollen, improved significantly.    Neurological: She is alert and oriented to person, place, and time.   Skin: Skin is warm and dry.   Psychiatric: She has a normal mood and affect.   Nursing note and vitals reviewed.    * Acute on chronic respiratory failure with hypoxemia  75-year-old female with chronic hypoxic respiratory failure on home O2 (2L/min) secondary to COPD, hypertension, hyperlipidemia presents for foot swelling and shortness of breath. Patient states it has gotten worse the past two weeks with exertion.     DDX includes copd exacerbation, progression of disease, CHF. CHF unlikely in setting of normal bnp and clear chest x-ray. However she does have swelling in her left leg. Patient has a long history of COPD and she could have an infection exacerbating the condition. Unlikely in the setting of normal cxr and normal white count. Possible that patients COPD has just  progressed.    Plan  Will continue home inhalers for COPD  IV solumedrol for 3 doses, with signficant improvement in her symptoms.  Azithromycin completed  TTE without evidence of HF or pHTN   Discontinued lasix.      Venous stasis        Bilateral edema of lower extremity  Venous Stasis    Patient with more swelling on her left leg. Has been going on for over a month. Could be related to CHF but unlikely. Most likely attributable to venous stasis    Plan  TTE unremarkable  US lower extremities without evidence of DVT.  Improved significantly at discharge      Hyperlipidemia  Continue home statin      Hypertension  Continue home dilt and hctz.       Chronic obstructive pulmonary disease  Patient with a long history of COPD. Pulmonologist is Dr. Gautam Xiong, Spiriva and sadie while inpatient  Singulair daily  Resume home inhalers at d/c        Final Active Diagnoses:    Diagnosis Date Noted POA    PRINCIPAL PROBLEM:  Acute on chronic respiratory failure with hypoxemia [J96.21] 05/08/2020 Yes    Bilateral edema of lower extremity [R60.0] 05/15/2017 Yes    Venous stasis [I87.8] 05/15/2017 Yes    Hyperlipidemia [E78.5]  Yes     Chronic    Hypertension [I10]  Yes     Chronic    Chronic obstructive pulmonary disease [J44.9] 07/03/2012 Yes      Problems Resolved During this Admission:    Diagnosis Date Noted Date Resolved POA    Shortness of breath [R06.02] 05/08/2020 05/10/2020 Yes    Chest pain [R07.9] 03/10/2020 05/10/2020 Yes       Discharged Condition: good    Disposition: Home or Self Care    Follow Up:  Follow-up Information     Schedule an appointment as soon as possible for a visit with Main Guido MD.    Specialty:  Pulmonary Disease  Contact information:  [facility] [address] [facility] [address]  [phone]             Schedule an appointment as soon as possible for a visit with Dulce Castro MD.    Specialty:  Internal Medicine  Contact information:  [address] [facility]  [place]  [place] [address]  [phone]                 Patient Instructions:      Ambulatory referral/consult to Pulmonary Rehab   Standing Status: Future   Referral Priority: Routine Referral Type: Consultation   Referral Reason: Specialty Services Required   Requested Specialty: Pulmonary Disease   Number of Visits Requested: 1     Diet Adult Regular     Notify your health care provider if you experience any of the following:  temperature >100.4     Notify your health care provider if you experience any of the following:  persistent nausea and vomiting or diarrhea     Notify your health care provider if you experience any of the following:  redness, tenderness, or signs of infection (pain, swelling, redness, odor or green/yellow discharge around incision site)     Notify your health care provider if you experience any of the following:  difficulty breathing or increased cough     Notify your health care provider if you experience any of the following:  persistent dizziness, light-headedness, or visual disturbances     Notify your health care provider if you experience any of the following:  increased confusion or weakness     Activity as tolerated       Significant Diagnostic Studies: Labs: All labs within the past 24 hours have been reviewed    Pending Diagnostic Studies:     None         Medications:  Reconciled Home Medications:      Medication List      START taking these medications    montelukast 10 mg tablet  Commonly known as:  SINGULAIR  Take 1 tablet (10 mg total) by mouth once daily.  Start taking on:  May 11, 2020        CONTINUE taking these medications    * albuterol 2.5 mg /3 mL (0.083 %) nebulizer solution  Commonly known as:  PROVENTIL  Take 3 mLs (2.5 mg total) by nebulization every 4 (four) hours as needed for Wheezing or Shortness of Breath.     * albuterol 90 mcg/actuation inhaler  Commonly known as:  PROVENTIL/VENTOLIN HFA  Inhale 2 puffs into the lungs every 6 (six) hours as needed for Wheezing or Shortness  of Breath (Pro Air HFA per insurance). Rescue     ALPRAZolam 0.25 MG tablet  Commonly known as:  XANAX  Take 1 tablet (0.25 mg total) by mouth 3 (three) times daily as needed for Anxiety.     aspirin 81 MG EC tablet  Commonly known as:  ECOTRIN  Take 81 mg by mouth once daily.     budesonide-formoterol 160-4.5 mcg 160-4.5 mcg/actuation Hfaa  Commonly known as:  SYMBICORT  INHALE 2 PUFFS INTO THE LUNGS EVERY 12 HOURS     calcium-vitamin D3 500 mg(1,250mg) -200 unit per tablet  Commonly known as:  OS-ALIE 500 + D3  Take 2 tablets by mouth 2 (two) times daily.     diltiaZEM 180 MG 24 hr capsule  Commonly known as:  CARDIZEM CD  Take 1 capsule (180 mg total) by mouth once daily.     famotidine 20 MG tablet  Commonly known as:  PEPCID  Take 2 tablets (40 mg total) by mouth once daily.     hydroCHLOROthiazide 25 MG tablet  Commonly known as:  HYDRODIURIL  TAKE 1 TABLET BY MOUTH EVERY DAY     INCRUSE ELLIPTA 62.5 mcg/actuation inhalation capsule  Generic drug:  umeclidinium  INHALE 1 PUFF DAILY     potassium chloride SA 20 MEQ tablet  Commonly known as:  K-DUR,KLOR-CON  TAKE 1 TABLET BY MOUTH EVERY DAY     pravastatin 20 MG tablet  Commonly known as:  PRAVACHOL  Take 1 tablet (20 mg total) by mouth once daily.     predniSONE 10 MG tablet  Commonly known as:  DELTASONE  1or 2 tabs every AM     VITAMIN D3 10 mcg (400 unit) Cap  Generic drug:  cholecalciferol (vitamin D3)  Take 1,000 Units by mouth once daily.         * This list has 2 medication(s) that are the same as other medications prescribed for you. Read the directions carefully, and ask your doctor or other care provider to review them with you.            STOP taking these medications    furosemide 20 MG tablet  Commonly known as:  LASIX            Indwelling Lines/Drains at time of discharge:   Lines/Drains/Airways     None                 Time spent on the discharge of patient: 45 minutes  Patient was seen and examined on the date of discharge and determined to be  suitable for discharge.         Vin Cherry MD  Department of Hospital Medicine  [facility]

## 2020-05-10 NOTE — PT/OT/SLP PROGRESS
Occupational Therapy      Patient Name:  Leyla Mari   MRN:  [id number]    Patient not seen today secondary to patient discharging today and therapy orders discharged prior to entering room for an evaluation.  SUZIE Macias  5/10/2020

## 2020-05-10 NOTE — ASSESSMENT & PLAN NOTE
75-year-old female with chronic hypoxic respiratory failure on home O2 (2L/min) secondary to COPD, hypertension, hyperlipidemia presents for foot swelling and shortness of breath. Patient states it has gotten worse the past two weeks with exertion.     DDX includes copd exacerbation, progression of disease, CHF. CHF unlikely in setting of normal bnp and clear chest x-ray. However she does have swelling in her left leg. Patient has a long history of COPD and she could have an infection exacerbating the condition. Unlikely in the setting of normal cxr and normal white count. Possible that patients COPD has just progressed.    Plan  Will continue home inhalers for COPD  IV solumedrol for 3 doses, with signficant improvement in her symptoms.  Azithromycin completed  TTE without evidence of HF or pHTN   Discontinued lasix.

## 2020-05-10 NOTE — ASSESSMENT & PLAN NOTE
Patient with a long history of COPD. Pulmonologist is Kyler Dunn and sadie while inpatient  Singulair daily  Resume home inhalers at d/c

## 2020-05-10 NOTE — PLAN OF CARE
Pt free of injuries/falls/trauma.   VSS on 2L O2  No complaints of SOB or chest pain.   Plan of care reviewed with pt, understanding verbalized. No questions at this time.   Bed in low position, wheels locked, call light in reach.  Will continue to monitor.

## 2020-05-10 NOTE — PT/OT/SLP PROGRESS
Physical Therapy      Patient Name:  Leyla Mari   MRN:  [id number]    Patient not seen today secondary to patient with signed d/c order this date. Per RN - orders d/c'd prior to PT entry.     Daniella Villela, PT   05/10/2020

## 2020-05-10 NOTE — HOSPITAL COURSE
Patient's symptoms improved significantly with solumedrol 125 q8h x 3 doses and duo-nebs q6h during the day. CXR without evidence of edema, BNP WNL. TTE with EF of 70%, no WMA or DD. PA pressure 33. TSH normal. LE u/s negative for DVT.     Discharged patient back on home regimen with addition of Singulair. Referred to Pulmonary Rehab and follow up with Dr. Guido.     Patient's dyspnea likely represents progression of her lung disease, and had resolved at the time of discharge. Mrs. Mari was able to be up, moving around her room, dressing herself, and performing ADLs. Excited to celebrate Mother's Day with her family. Lower extremity edema likely 2/2 venous stasis and improved significantly when the patient was ambulatory again.

## 2020-05-10 NOTE — PROGRESS NOTES
Patient is ready for discharge. Patient stable alert and oriented. IVs removed. No complaints of pain. Discussed discharge plan. Reviewed medications and side effects, appointments, and answered questions with patient and family.

## 2020-05-10 NOTE — ASSESSMENT & PLAN NOTE
Venous Stasis    Patient with more swelling on her left leg. Has been going on for over a month. Could be related to CHF but unlikely. Most likely attributable to venous stasis    Plan  TTE unremarkable  US lower extremities without evidence of DVT.  Improved significantly at discharge

## 2020-05-12 ENCOUNTER — PATIENT OUTREACH (OUTPATIENT)
Dept: ADMINISTRATIVE | Facility: CLINIC | Age: 75
End: 2020-05-12

## 2020-05-12 ENCOUNTER — TELEPHONE (OUTPATIENT)
Dept: INTERNAL MEDICINE | Facility: CLINIC | Age: 75
End: 2020-05-12

## 2020-05-12 ENCOUNTER — PATIENT MESSAGE (OUTPATIENT)
Dept: PULMONOLOGY | Facility: CLINIC | Age: 75
End: 2020-05-12

## 2020-05-12 DIAGNOSIS — J44.1 COPD EXACERBATION: Primary | ICD-10-CM

## 2020-05-12 NOTE — PATIENT INSTRUCTIONS

## 2020-05-18 ENCOUNTER — PATIENT OUTREACH (OUTPATIENT)
Dept: ADMINISTRATIVE | Facility: OTHER | Age: 75
End: 2020-05-18

## 2020-05-19 ENCOUNTER — OFFICE VISIT (OUTPATIENT)
Dept: PULMONOLOGY | Facility: CLINIC | Age: 75
End: 2020-05-19
Payer: MEDICARE

## 2020-05-19 VITALS
BODY MASS INDEX: 22.38 KG/M2 | SYSTOLIC BLOOD PRESSURE: 134 MMHG | DIASTOLIC BLOOD PRESSURE: 76 MMHG | WEIGHT: 111 LBS | HEART RATE: 89 BPM | HEIGHT: 59 IN | OXYGEN SATURATION: 94 %

## 2020-05-19 DIAGNOSIS — J43.2 CENTRILOBULAR EMPHYSEMA: Primary | ICD-10-CM

## 2020-05-19 PROCEDURE — 99214 PR OFFICE/OUTPT VISIT, EST, LEVL IV, 30-39 MIN: ICD-10-PCS | Mod: S$GLB,,, | Performed by: INTERNAL MEDICINE

## 2020-05-19 PROCEDURE — 99999 PR PBB SHADOW E&M-EST. PATIENT-LVL III: ICD-10-PCS | Mod: PBBFAC,,, | Performed by: INTERNAL MEDICINE

## 2020-05-19 PROCEDURE — 99499 RISK ADDL DX/OHS AUDIT: ICD-10-PCS | Mod: S$GLB,,, | Performed by: INTERNAL MEDICINE

## 2020-05-19 PROCEDURE — 3078F PR MOST RECENT DIASTOLIC BLOOD PRESSURE < 80 MM HG: ICD-10-PCS | Mod: CPTII,S$GLB,, | Performed by: INTERNAL MEDICINE

## 2020-05-19 PROCEDURE — 3075F PR MOST RECENT SYSTOLIC BLOOD PRESS GE 130-139MM HG: ICD-10-PCS | Mod: CPTII,S$GLB,, | Performed by: INTERNAL MEDICINE

## 2020-05-19 PROCEDURE — 99499 UNLISTED E&M SERVICE: CPT | Mod: S$GLB,,, | Performed by: INTERNAL MEDICINE

## 2020-05-19 PROCEDURE — 1101F PT FALLS ASSESS-DOCD LE1/YR: CPT | Mod: CPTII,S$GLB,, | Performed by: INTERNAL MEDICINE

## 2020-05-19 PROCEDURE — 1126F AMNT PAIN NOTED NONE PRSNT: CPT | Mod: S$GLB,,, | Performed by: INTERNAL MEDICINE

## 2020-05-19 PROCEDURE — 1126F PR PAIN SEVERITY QUANTIFIED, NO PAIN PRESENT: ICD-10-PCS | Mod: S$GLB,,, | Performed by: INTERNAL MEDICINE

## 2020-05-19 PROCEDURE — 99214 OFFICE O/P EST MOD 30 MIN: CPT | Mod: S$GLB,,, | Performed by: INTERNAL MEDICINE

## 2020-05-19 PROCEDURE — 1101F PR PT FALLS ASSESS DOC 0-1 FALLS W/OUT INJ PAST YR: ICD-10-PCS | Mod: CPTII,S$GLB,, | Performed by: INTERNAL MEDICINE

## 2020-05-19 PROCEDURE — 1159F PR MEDICATION LIST DOCUMENTED IN MEDICAL RECORD: ICD-10-PCS | Mod: S$GLB,,, | Performed by: INTERNAL MEDICINE

## 2020-05-19 PROCEDURE — 3075F SYST BP GE 130 - 139MM HG: CPT | Mod: CPTII,S$GLB,, | Performed by: INTERNAL MEDICINE

## 2020-05-19 PROCEDURE — 99999 PR PBB SHADOW E&M-EST. PATIENT-LVL III: CPT | Mod: PBBFAC,,, | Performed by: INTERNAL MEDICINE

## 2020-05-19 PROCEDURE — 1159F MED LIST DOCD IN RCRD: CPT | Mod: S$GLB,,, | Performed by: INTERNAL MEDICINE

## 2020-05-19 PROCEDURE — 3078F DIAST BP <80 MM HG: CPT | Mod: CPTII,S$GLB,, | Performed by: INTERNAL MEDICINE

## 2020-05-21 NOTE — PROGRESS NOTES
Subjective:      Patient ID: Leyla Mari is a 75 y.o. female.    Chief Complaint: No chief complaint on file.    HPI  74 yo female patient of mine for many years, mother of Laney in our lab, comes for follow up for recent hospitalization from 5/8 to 5/0 for worsening breathing and pre tibial and ankle edema. She is feeling better. I reviewed her medical record and every illness associated with edema was check for and was negative: GFR:>60  Her 2-D Echo normal ER:70 and CVP: 3 and : 33,normal LV diastolic function.  Chest x-ray is clear with findings of hyper inflation.HCo2: 35  BNP:45    Today alert oriented, no edema, she did receive some diuretics in the hospital. Sa02: 94% of 2 liters.       No flowsheet data found.  Review of Systems   Constitutional: Negative.    HENT: Negative.    Eyes: Negative.    Respiratory: Negative.         Compensated respiratory failure secondary to pulmonary emphysema   Cardiovascular: Positive for leg swelling.        Recent hospitalization for edema. Comprehensive work up unrevealing.   Genitourinary: Negative.    Musculoskeletal: Negative.    Skin: Negative.         Hx of recurrent bouts of cellulitis doing well none.   Gastrointestinal: Negative.    Neurological: Negative.    Psychiatric/Behavioral: Negative.      Objective:     Physical Exam   Constitutional: She is oriented to person, place, and time. She appears well-developed and well-nourished. No distress.   HENT:   Head: Normocephalic and atraumatic.   Right Ear: External ear normal.   Left Ear: External ear normal.   Nose: Nose normal.   Mouth/Throat: Oropharynx is clear and moist.   Eyes: Pupils are equal, round, and reactive to light. Conjunctivae and EOM are normal.   Neck: Normal range of motion. Neck supple. No JVD present. No thyromegaly present.   Cardiovascular: Normal rate, regular rhythm, normal heart sounds and intact distal pulses. Exam reveals no gallop.   No murmur heard.  Pulmonary/Chest: Breath sounds  normal. No stridor. No respiratory distress. She has no wheezes. She has no rales. She exhibits no tenderness.   Markedly decreased air entry     Sa02: 94 on 2  Liters    Peak flow 100 l/min   Abdominal: Soft. Bowel sounds are normal. She exhibits no distension and no mass. There is no tenderness. There is no rebound and no guarding.   Musculoskeletal: Normal range of motion. She exhibits no edema.   No edema   Lymphadenopathy:     She has no cervical adenopathy.   Neurological: She is alert and oriented to person, place, and time. She has normal reflexes. She displays normal reflexes. No cranial nerve deficit.   Skin: Skin is warm and dry. No rash noted.   Psychiatric: She has a normal mood and affect. Her behavior is normal. Judgment and thought content normal.   Nursing note and vitals reviewed.      Assessment:     1. Centrilobular emphysema      Outpatient Encounter Medications as of 5/19/2020   Medication Sig Dispense Refill    albuterol (PROVENTIL) 2.5 mg /3 mL (0.083 %) nebulizer solution Take 3 mLs (2.5 mg total) by nebulization every 4 (four) hours as needed for Wheezing or Shortness of Breath. 120 each 11    albuterol (PROVENTIL/VENTOLIN HFA) 90 mcg/actuation inhaler Inhale 2 puffs into the lungs every 6 (six) hours as needed for Wheezing or Shortness of Breath (Pro Air HFA per insurance). Rescue 24 g 3    ALPRAZolam (XANAX) 0.25 MG tablet Take 1 tablet (0.25 mg total) by mouth 3 (three) times daily as needed for Anxiety. 90 tablet 3    aspirin (ECOTRIN) 81 MG EC tablet Take 81 mg by mouth once daily.        budesonide-formoterol 160-4.5 mcg (SYMBICORT) 160-4.5 mcg/actuation HFAA INHALE 2 PUFFS INTO THE LUNGS EVERY 12 HOURS 10.2 Inhaler 11    calcium-vitamin D3 (OS-ALIE 500 + D3) 500 mg(1,250mg) -200 unit per tablet Take 2 tablets by mouth 2 (two) times daily. (Patient not taking: Reported on 4/23/2020) 120 tablet 2    cholecalciferol, vitamin D3, (VITAMIN D3) 10 mcg (400 unit) Cap Take 1,000 Units  by mouth once daily.      diltiaZEM (CARDIZEM CD) 180 MG 24 hr capsule Take 1 capsule (180 mg total) by mouth once daily. 90 capsule 3    famotidine (PEPCID) 20 MG tablet Take 2 tablets (40 mg total) by mouth once daily. (Patient not taking: Reported on 5/12/2020) 60 tablet 2    hydroCHLOROthiazide (HYDRODIURIL) 25 MG tablet TAKE 1 TABLET BY MOUTH EVERY DAY 90 tablet 3    INCRUSE ELLIPTA 62.5 mcg/actuation DsDv INHALE 1 PUFF DAILY 1 each 11    montelukast (SINGULAIR) 10 mg tablet Take 1 tablet (10 mg total) by mouth once daily. 30 tablet 0    potassium chloride SA (K-DUR,KLOR-CON) 20 MEQ tablet TAKE 1 TABLET BY MOUTH EVERY DAY 90 tablet 3    pravastatin (PRAVACHOL) 20 MG tablet Take 1 tablet (20 mg total) by mouth once daily. 90 tablet 2    predniSONE (DELTASONE) 10 MG tablet 1or 2 tabs every AM 60 tablet 6     No facility-administered encounter medications on file as of 5/19/2020.        Plan:   Compensated respiratory failure will double her maintenance dose of 10 mg of prednisone to 20 mg and ask to call in 5 days.  I do not have an idea why she had edema.  Problem List Items Addressed This Visit     Centrilobular emphysema - Primary

## 2020-05-24 DIAGNOSIS — I10 ESSENTIAL HYPERTENSION: ICD-10-CM

## 2020-05-28 RX ORDER — DILTIAZEM HYDROCHLORIDE 180 MG/1
180 CAPSULE, COATED, EXTENDED RELEASE ORAL DAILY
Qty: 90 CAPSULE | Refills: 3 | Status: SHIPPED | OUTPATIENT
Start: 2020-05-28 | End: 2021-03-30 | Stop reason: SDUPTHER

## 2020-06-17 ENCOUNTER — OFFICE VISIT (OUTPATIENT)
Dept: INTERNAL MEDICINE | Facility: CLINIC | Age: 75
End: 2020-06-17
Payer: MEDICARE

## 2020-06-17 VITALS
HEART RATE: 94 BPM | WEIGHT: 112.44 LBS | HEIGHT: 59 IN | RESPIRATION RATE: 14 BRPM | BODY MASS INDEX: 22.67 KG/M2 | SYSTOLIC BLOOD PRESSURE: 146 MMHG | DIASTOLIC BLOOD PRESSURE: 72 MMHG | OXYGEN SATURATION: 95 % | TEMPERATURE: 99 F

## 2020-06-17 DIAGNOSIS — H65.111 NON-RECURRENT ACUTE ALLERGIC OTITIS MEDIA OF RIGHT EAR: Primary | ICD-10-CM

## 2020-06-17 DIAGNOSIS — H65.191 ACUTE EFFUSION OF RIGHT EAR: ICD-10-CM

## 2020-06-17 PROCEDURE — 99999 PR PBB SHADOW E&M-EST. PATIENT-LVL IV: CPT | Mod: PBBFAC,,, | Performed by: NURSE PRACTITIONER

## 2020-06-17 PROCEDURE — 99999 PR PBB SHADOW E&M-EST. PATIENT-LVL IV: ICD-10-PCS | Mod: PBBFAC,,, | Performed by: NURSE PRACTITIONER

## 2020-06-17 PROCEDURE — 99214 PR OFFICE/OUTPT VISIT, EST, LEVL IV, 30-39 MIN: ICD-10-PCS | Mod: S$GLB,,, | Performed by: NURSE PRACTITIONER

## 2020-06-17 PROCEDURE — 99214 OFFICE O/P EST MOD 30 MIN: CPT | Mod: S$GLB,,, | Performed by: NURSE PRACTITIONER

## 2020-06-17 RX ORDER — AZITHROMYCIN 250 MG/1
TABLET, FILM COATED ORAL
Qty: 6 TABLET | Refills: 0 | Status: SHIPPED | OUTPATIENT
Start: 2020-06-17 | End: 2020-06-22

## 2020-06-17 RX ORDER — ALBUTEROL SULFATE 0.83 MG/ML
SOLUTION RESPIRATORY (INHALATION)
Status: ON HOLD | COMMUNITY
Start: 2020-06-09 | End: 2020-08-23 | Stop reason: HOSPADM

## 2020-06-17 RX ORDER — CETIRIZINE HYDROCHLORIDE 10 MG/1
10 TABLET ORAL DAILY
Qty: 14 TABLET | Refills: 0 | Status: SHIPPED | OUTPATIENT
Start: 2020-06-17 | End: 2020-07-30 | Stop reason: SDUPTHER

## 2020-06-17 NOTE — PROGRESS NOTES
"[facility] Note    Chief Complaint      Chief Complaint   Patient presents with    Otalgia     Right ear    Facial Swelling     Right side; cheek and under right eye    Leg Swelling     Both ankles and legs; pt state "pins and needles sensation"     History of Present Illness      Leyla Mari is a 75 y.o. female patient of Dr. Cavazos who is new to me and presents today for right ear pain and c/o recurrent swelling to bilateral ankles and feet. Pt reports that within the past week started with pain and feeling of fluid in right ear, she noticed swelling on right cheek under eye, feels that it is her sinuses. Pt denies fever, chills, worsening sob or cp.  Pt was recently in hospital for COPD exacerbation, pt wears O2  2L prn, pt on HCTZ, prednisone, uses inhalers prn, bilateral ankle swelling is chronic    Problem List Items Addressed This Visit     None      Visit Diagnoses     Non-recurrent acute allergic otitis media of right ear    -  Primary    Relevant Medications    cetirizine (ZYRTEC) 10 MG tablet    Acute effusion of right ear        Relevant Medications    cetirizine (ZYRTEC) 10 MG tablet          Health Maintenance   Topic Date Due    TETANUS VACCINE  1961    DEXA SCAN  2020    Colonoscopy  10/10/2022    Lipid Panel  2024    Pneumococcal Vaccine (65+ Low/Medium Risk)  Completed       Past Medical History:   Diagnosis Date    Actinic keratosis     Arthritis     Cataract     Cellulitis of ankle     Colon polyps     COPD (chronic obstructive pulmonary disease)     home O2 (2L/min)     Family history of colon cancer     History of Clostridium difficile infection 7/3/2012    Hyperlipidemia     Hypertension     Lung nodules     Sinus tachycardia        Past Surgical History:   Procedure Laterality Date    APPENDECTOMY      CATARACT EXTRACTION Bilateral      SECTION      x2    COLONOSCOPY N/A 10/10/2017    Procedure: COLONOSCOPY;  " Surgeon: Omid Lino MD;  Location: [facility] ([address] [place] [address]);  Service: Endoscopy;  Laterality: N/A;  pt unable to be checked in with previous order    EYE SURGERY      TONSILLECTOMY         family history includes Blindness in her paternal grandmother; Breast cancer in her sister; COPD in her father; Cancer in her sister and son; Cancer (age of onset: 79) in her mother; Cataracts in her mother and sister; Diabetes in her father and paternal grandmother; Glaucoma in her mother; Heart disease in her father, mother, and sister; Hyperlipidemia in her father, mother, and sister; Hypertension in her father, mother, and sister; Skin cancer in her mother and sister; Thyroid disease in her daughter and mother.    Social History     Tobacco Use    Smoking status: Former Smoker     Packs/day: 1.00     Years: 39.00     Pack years: 39.00     Types: Cigarettes     Quit date: 1995     Years since quittin.6    Smokeless tobacco: Never Used   Substance Use Topics    Alcohol use: Yes     Alcohol/week: 2.0 standard drinks     Types: 2 Glasses of wine per week     Frequency: Monthly or less     Drinks per session: 1 or 2     Binge frequency: Never     Comment: rarely has a glass of wine- not daily    Drug use: No       Review of Systems   HENT: Positive for ear pain. Negative for ear discharge, hearing loss and sore throat.    Respiratory: Negative for cough.    Gastrointestinal: Negative for abdominal pain, diarrhea and vomiting.   Musculoskeletal: Negative for neck pain.   Skin: Negative for rash.   Neurological: Negative for headaches.        Outpatient Encounter Medications as of 2020   Medication Sig Dispense Refill    albuterol (PROVENTIL) 2.5 mg /3 mL (0.083 %) nebulizer solution Take 3 mLs (2.5 mg total) by nebulization every 4 (four) hours as needed for Wheezing or Shortness of Breath. 120 each 11    albuterol (PROVENTIL) 2.5 mg /3 mL (0.083 %) nebulizer solution       albuterol (PROVENTIL/VENTOLIN HFA)  90 mcg/actuation inhaler Inhale 2 puffs into the lungs every 6 (six) hours as needed for Wheezing or Shortness of Breath (Pro Air HFA per insurance). Rescue 24 g 3    ALPRAZolam (XANAX) 0.25 MG tablet Take 1 tablet (0.25 mg total) by mouth 3 (three) times daily as needed for Anxiety. 90 tablet 3    aspirin (ECOTRIN) 81 MG EC tablet Take 81 mg by mouth once daily.        cholecalciferol, vitamin D3, (VITAMIN D3) 10 mcg (400 unit) Cap Take 1,000 Units by mouth once daily.      diltiaZEM (CARDIZEM CD) 180 MG 24 hr capsule TAKE 1 CAPSULE (180 MG TOTAL) BY MOUTH ONCE DAILY. 90 capsule 3    hydroCHLOROthiazide (HYDRODIURIL) 25 MG tablet TAKE 1 TABLET BY MOUTH EVERY DAY 90 tablet 3    INCRUSE ELLIPTA 62.5 mcg/actuation DsDv INHALE 1 PUFF DAILY 1 each 11    potassium chloride SA (K-DUR,KLOR-CON) 20 MEQ tablet TAKE 1 TABLET BY MOUTH EVERY DAY 90 tablet 3    pravastatin (PRAVACHOL) 20 MG tablet TAKE 1 TABLET BY MOUTH EVERY DAY 90 tablet 2    predniSONE (DELTASONE) 10 MG tablet 1or 2 tabs every AM 60 tablet 6    [DISCONTINUED] budesonide-formoterol 160-4.5 mcg (SYMBICORT) 160-4.5 mcg/actuation HFAA INHALE 2 PUFFS INTO THE LUNGS EVERY 12 HOURS 10.2 Inhaler 11    [] azithromycin (Z-NALLELY) 250 MG tablet Take 2 tablets (500 mg total) by mouth once daily for 1 day, THEN 1 tablet (250 mg total) once daily for 4 days. 6 tablet 0    cetirizine (ZYRTEC) 10 MG tablet Take 1 tablet (10 mg total) by mouth once daily. 14 tablet 0    [DISCONTINUED] calcium-vitamin D3 (OS-ALIE 500 + D3) 500 mg(1,250mg) -200 unit per tablet Take 2 tablets by mouth 2 (two) times daily. (Patient not taking: Reported on 2020) 120 tablet 2    [DISCONTINUED] famotidine (PEPCID) 20 MG tablet Take 2 tablets (40 mg total) by mouth once daily. (Patient not taking: Reported on 2020) 60 tablet 2    [DISCONTINUED] montelukast (SINGULAIR) 10 mg tablet TAKE 1 TABLET BY MOUTH EVERY DAY 30 tablet 0     No facility-administered encounter  "medications on file as of 6/17/2020.         Review of patient's allergies indicates:   Allergen Reactions    Bactrim [sulfamethoxazole-trimethoprim] Nausea Only    Codeine Itching    Keflex [cephalexin] Other (See Comments)     Rhinorrhea, nausea. Tolerated Ceftriaxone June 2014    Ceftriaxone Rash     Morbilliform rash after receiving IV ceftriaxone    Clindamycin hcl Rash    Doxycycline Rash    Vancomycin analogues Rash     Morbilliform rash after receiving IV vancomycin       Physical Exam      Vital Signs  Temp: 98.5 °F (36.9 °C)  Temp src: Oral  Pulse: 94  Resp: 14  SpO2: 95 %  BP: (!) 146/72  BP Location: Left arm  Patient Position: Sitting  Pain Score:   4  Height and Weight  Height: 4' 11" (149.9 cm)  Weight: 51 kg (112 lb 7 oz)  BSA (Calculated - sq m): 1.46 sq meters  BMI (Calculated): 22.7  Weight in (lb) to have BMI = 25: 123.5]    Physical Exam  Vitals signs and nursing note reviewed.   Constitutional:       Appearance: Normal appearance. She is well-developed.   HENT:      Head: Normocephalic and atraumatic.      Right Ear: External ear normal.      Left Ear: External ear normal.      Ears:      Comments: Redness to right ear canal with clear effusion noted, slight redness to oropharynx noted  Eyes:      Conjunctiva/sclera: Conjunctivae normal.      Pupils: Pupils are equal, round, and reactive to light.   Neck:      Musculoskeletal: Normal range of motion and neck supple.      Thyroid: No thyromegaly.      Vascular: No JVD.      Trachea: No tracheal deviation.   Cardiovascular:      Rate and Rhythm: Normal rate and regular rhythm.      Heart sounds: Normal heart sounds. No murmur.   Pulmonary:      Effort: Pulmonary effort is normal.      Breath sounds: Normal breath sounds.   Chest:      Chest wall: No tenderness.   Abdominal:      General: Bowel sounds are normal.      Palpations: Abdomen is soft.      Tenderness: There is no abdominal tenderness. There is no guarding.   Musculoskeletal: " Normal range of motion.   Lymphadenopathy:      Cervical: No cervical adenopathy.   Skin:     General: Skin is warm and dry.      Comments: Trace swelling to bilateral ankles noted   Neurological:      Mental Status: She is alert and oriented to person, place, and time.   Psychiatric:         Behavior: Behavior normal.         Thought Content: Thought content normal.         Judgment: Judgment normal.          Laboratory:  CBC:  Recent Labs   Lab Result Units 05/08/20  1158 05/09/20  0311 05/10/20  0632   WBC K/uL 9.62 6.30 7.54   RBC M/uL 5.50* 5.06 5.21   Hemoglobin g/dL 15.8 14.7 14.8   Hematocrit % 49.5* 46.4 47.8   Platelets K/uL 340 323 380*   Mean Corpuscular Volume fL 90 92 92   Mean Corpuscular Hemoglobin pg 28.7 29.1 28.4   Mean Corpuscular Hemoglobin Conc g/dL 31.9* 31.7* 31.0*     CMP:  Recent Labs   Lab Result Units 05/08/20  1158  05/10/20  0632   Glucose mg/dL 114*   < > 153*   Calcium mg/dL 10.0   < > 10.2   Albumin g/dL 3.8   < > 3.5   Total Protein g/dL 7.3  --   --    Sodium mmol/L 134*   < > 137   Potassium mmol/L 3.9   < > 4.6   CO2 mmol/L 28   < > 35*   Chloride mmol/L 93*   < > 90*   BUN, Bld mg/dL 11   < > 16   Alkaline Phosphatase U/L 101  --   --    ALT U/L 14  --   --    AST U/L 18  --   --    Total Bilirubin mg/dL 0.4  --   --     < > = values in this interval not displayed.     URINALYSIS:  Recent Labs   Lab Result Units 05/09/20  1005   Color, UA  Yellow   Specific Gravity, UA  1.020   pH, UA  5.0   Protein, UA  Negative   Nitrite, UA  Negative   Leukocytes, UA  Negative      LIPIDS:  Recent Labs   Lab Result Units 04/23/20  1406 05/09/20  0311   TSH uIU/mL 0.884 0.498     TSH:  Recent Labs   Lab Result Units 04/23/20  1406 05/09/20  0311   TSH uIU/mL 0.884 0.498     A1C:  No results for input(s): HGBA1C in the last 2160 hours.      Assessment/Plan     Leyla Mari is a 75 y.o.female with:    1. Non-recurrent acute allergic otitis media of right ear  - azithromycin (Z-NALLELY) 250 MG  tablet; Take 2 tablets (500 mg total) by mouth once daily for 1 day, THEN 1 tablet (250 mg total) once daily for 4 days.  Dispense: 6 tablet; Refill: 0  - cetirizine (ZYRTEC) 10 MG tablet; Take 1 tablet (10 mg total) by mouth once daily.  Dispense: 14 tablet; Refill: 0    2. Acute effusion of right ear  - azithromycin (Z-NALLELY) 250 MG tablet; Take 2 tablets (500 mg total) by mouth once daily for 1 day, THEN 1 tablet (250 mg total) once daily for 4 days.  Dispense: 6 tablet; Refill: 0  - cetirizine (ZYRTEC) 10 MG tablet; Take 1 tablet (10 mg total) by mouth once daily.  Dispense: 14 tablet; Refill: 0      -Continue current medications and maintain follow up with specialists.  Return to clinic as needed for any concerns       SULY FernandezDignity Health [facility] [place]

## 2020-06-18 ENCOUNTER — TELEPHONE (OUTPATIENT)
Dept: INTERNAL MEDICINE | Facility: CLINIC | Age: 75
End: 2020-06-18

## 2020-06-18 NOTE — TELEPHONE ENCOUNTER
Pt was started on a z-pack yesterday by laney, and is now having stomach cramps and diarrhea today     **Laney is out today, can you please advise

## 2020-06-18 NOTE — TELEPHONE ENCOUNTER
Per Dr. Becerra:    Have pt take a otc Pepcid and eat with anbx. Take probiotics 2 hours after anbx dose.

## 2020-06-18 NOTE — TELEPHONE ENCOUNTER
----- Message from Jeanie Barr sent at 6/18/2020  9:36 AM CDT -----  Type:  Needs Medical Advice    Who Called: marsh     Would the patient rather a call back or a response via [facility]? Call back   Best Call Back Number: [phone]  Additional Information   Pt would like to give an update on her medication. Per the pt is have stomach pain and diarrhea

## 2020-06-26 ENCOUNTER — TELEPHONE (OUTPATIENT)
Dept: INTERNAL MEDICINE | Facility: CLINIC | Age: 75
End: 2020-06-26

## 2020-06-26 RX ORDER — LEVOFLOXACIN 250 MG/1
250 TABLET ORAL DAILY
Qty: 5 TABLET | Refills: 0 | Status: SHIPPED | OUTPATIENT
Start: 2020-06-26 | End: 2020-07-01

## 2020-06-26 NOTE — TELEPHONE ENCOUNTER
Pt states she did not continue the medication. She took two days worth and stopped. Stated the stomach cramps and diarrhea were too severe. She is still experiencing bad ear pain causing dizziness.

## 2020-06-26 NOTE — TELEPHONE ENCOUNTER
anbx was ordered on 6/17, she should be completed it by now. Pt allergic to everything else that my help, if she is almost completed it then just stop taking med.

## 2020-06-26 NOTE — TELEPHONE ENCOUNTER
Pt states zpack is causing her stomach problems, asking for a different medication     please advise

## 2020-06-26 NOTE — TELEPHONE ENCOUNTER
----- Message from Mindy Blum sent at 6/26/2020  9:08 AM CDT -----  Regarding: medication  Needs Advice    Reason for call:      Pt states that the azithromycin (Z-NALLELY) 250 MG tablet 6 is causing her to have stomach problems and diarrhea. Mrs. Mayer is asking that you send a different medication to the pharmacy that she can take. She also states that she can not come into the office right now.    Communication Preference: Leyla [phone]    Additional Information:       Mrs. Mayer is requesting a call back when sent to the [facility]/pharmacy #[id number].

## 2020-07-27 ENCOUNTER — TELEPHONE (OUTPATIENT)
Dept: INTERNAL MEDICINE | Facility: CLINIC | Age: 75
End: 2020-07-27
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
## 2020-07-29 DIAGNOSIS — F41.9 ANXIETY: ICD-10-CM

## 2020-07-29 RX ORDER — ALPRAZOLAM 0.25 MG/1
0.25 TABLET ORAL 3 TIMES DAILY PRN
Qty: 90 TABLET | Refills: 3 | Status: SHIPPED | OUTPATIENT
Start: 2020-07-29 | End: 2021-05-14 | Stop reason: SDUPTHER

## 2020-07-30 ENCOUNTER — OFFICE VISIT (OUTPATIENT)
Dept: INTERNAL MEDICINE | Facility: CLINIC | Age: 75
End: 2020-07-30
Payer: MEDICARE

## 2020-07-30 VITALS
HEIGHT: 59 IN | WEIGHT: 116.63 LBS | BODY MASS INDEX: 23.51 KG/M2 | RESPIRATION RATE: 18 BRPM | SYSTOLIC BLOOD PRESSURE: 140 MMHG | DIASTOLIC BLOOD PRESSURE: 74 MMHG | TEMPERATURE: 99 F

## 2020-07-30 DIAGNOSIS — H65.114 RECURRENT ACUTE ALLERGIC OTITIS MEDIA OF RIGHT EAR: ICD-10-CM

## 2020-07-30 DIAGNOSIS — J06.9 URTI (ACUTE UPPER RESPIRATORY INFECTION): Primary | ICD-10-CM

## 2020-07-30 PROCEDURE — 99214 OFFICE O/P EST MOD 30 MIN: CPT | Mod: S$GLB,,, | Performed by: NURSE PRACTITIONER

## 2020-07-30 PROCEDURE — 99214 PR OFFICE/OUTPT VISIT, EST, LEVL IV, 30-39 MIN: ICD-10-PCS | Mod: S$GLB,,, | Performed by: NURSE PRACTITIONER

## 2020-07-30 PROCEDURE — 99999 PR PBB SHADOW E&M-EST. PATIENT-LVL III: CPT | Mod: PBBFAC,,, | Performed by: NURSE PRACTITIONER

## 2020-07-30 PROCEDURE — 99999 PR PBB SHADOW E&M-EST. PATIENT-LVL III: ICD-10-PCS | Mod: PBBFAC,,, | Performed by: NURSE PRACTITIONER

## 2020-07-30 RX ORDER — LEVOFLOXACIN 500 MG/1
500 TABLET, FILM COATED ORAL DAILY
Qty: 7 TABLET | Refills: 0 | Status: SHIPPED | OUTPATIENT
Start: 2020-07-30 | End: 2020-08-06

## 2020-07-30 RX ORDER — CETIRIZINE HYDROCHLORIDE 10 MG/1
10 TABLET ORAL DAILY
Qty: 30 TABLET | Refills: 1 | Status: SHIPPED | OUTPATIENT
Start: 2020-07-30 | End: 2020-08-21

## 2020-07-30 NOTE — PROGRESS NOTES
[facility] Note    Chief Complaint      Chief Complaint   Patient presents with    Otalgia     right possible fluid in ear     Medication Refill     pravastatin 20mg      History of Present Illness      Leyla Mari is a 75 y.o. female patient of Dr. Cavazos who presents today for recurrent fluid in R ear and sinus problems.  Patient was treated for sinus infection of fluid buildup in her ears about a month ago.  Patient started with Z-Nnamdi was able to take after 2 days due to diarrhea and upset stomach.  Patient switched to Levaquin for her, was able to tolerate 5 days of antibiotic and cetirizine.  Patient states did get better with pain in ears with fluid and sinus problems returned.  Patient states usually takes more than a week of antibiotics, she doesn't respond well to PO anbx. C/o right cheek and nasal swelling, nasal congestion, sinus pressure, PND. Denies fever, chills, worsening SOB, CP, trouble swallowing    Problem List Items Addressed This Visit     None      Visit Diagnoses     URTI (acute upper respiratory infection)    -  Primary    Relevant Medications    cetirizine (ZYRTEC) 10 MG tablet    levoFLOXacin (LEVAQUIN) 500 MG tablet    Other Relevant Orders    Ambulatory referral/consult to ENT    Recurrent acute allergic otitis media of right ear        Relevant Medications    cetirizine (ZYRTEC) 10 MG tablet    levoFLOXacin (LEVAQUIN) 500 MG tablet          Health Maintenance   Topic Date Due    TETANUS VACCINE  04/30/1963    DEXA SCAN  05/08/2020    Colonoscopy  10/10/2022    Lipid Panel  09/11/2024    Hepatitis C Screening  Completed    Pneumococcal Vaccine (65+ Low/Medium Risk)  Completed       Past Medical History:   Diagnosis Date    Actinic keratosis     Arthritis     Cataract     Cellulitis of ankle     Colon polyps     COPD (chronic obstructive pulmonary disease)     home O2 (2L/min)     Family history of colon cancer     History of Clostridium difficile  infection 7/3/2012    Hyperlipidemia     Hypertension     Lung nodules     Sinus tachycardia        Past Surgical History:   Procedure Laterality Date    APPENDECTOMY      CATARACT EXTRACTION Bilateral      SECTION      x2    COLONOSCOPY N/A 10/10/2017    Procedure: COLONOSCOPY;  Surgeon: Omid Lino MD;  Location: [address];  Service: Endoscopy;  Laterality: N/A;  pt unable to be checked in with previous order    EYE SURGERY      TONSILLECTOMY         family history includes Blindness in her paternal grandmother; Breast cancer in her sister; COPD in her father; Cancer in her sister and son; Cancer (age of onset: 79) in her mother; Cataracts in her mother and sister; Diabetes in her father and paternal grandmother; Glaucoma in her mother; Heart disease in her father, mother, and sister; Hyperlipidemia in her father, mother, and sister; Hypertension in her father, mother, and sister; Skin cancer in her mother and sister; Thyroid disease in her daughter and mother.    Social History     Tobacco Use    Smoking status: Former Smoker     Packs/day: 1.00     Years: 39.00     Pack years: 39.00     Types: Cigarettes     Quit date: 1995     Years since quittin.7    Smokeless tobacco: Never Used   Substance Use Topics    Alcohol use: Yes     Alcohol/week: 2.0 standard drinks     Types: 2 Glasses of wine per week     Frequency: Monthly or less     Drinks per session: 1 or 2     Binge frequency: Never     Comment: rarely has a glass of wine- not daily    Drug use: No       Review of Systems   Constitutional: Negative for chills and fever.   HENT: Positive for congestion, ear pain, hearing loss, sinus pain and sore throat.    Respiratory: Negative for shortness of breath.    Cardiovascular: Negative for chest pain and palpitations.   Gastrointestinal: Negative for nausea and vomiting.   Skin: Negative for rash.   Neurological: Negative for dizziness and headaches.    Psychiatric/Behavioral: The patient is not nervous/anxious.         Outpatient Encounter Medications as of 7/30/2020   Medication Sig Dispense Refill    pravastatin (PRAVACHOL) 20 MG tablet TAKE 1 TABLET BY MOUTH EVERY DAY 90 tablet 2    albuterol (PROVENTIL) 2.5 mg /3 mL (0.083 %) nebulizer solution Take 3 mLs (2.5 mg total) by nebulization every 4 (four) hours as needed for Wheezing or Shortness of Breath. 120 each 11    albuterol (PROVENTIL) 2.5 mg /3 mL (0.083 %) nebulizer solution       albuterol (PROVENTIL/VENTOLIN HFA) 90 mcg/actuation inhaler Inhale 2 puffs into the lungs every 6 (six) hours as needed for Wheezing or Shortness of Breath (Pro Air HFA per insurance). Rescue 24 g 3    ALPRAZolam (XANAX) 0.25 MG tablet Take 1 tablet (0.25 mg total) by mouth 3 (three) times daily as needed for Anxiety. 90 tablet 3    aspirin (ECOTRIN) 81 MG EC tablet Take 81 mg by mouth once daily.        budesonide-formoterol 160-4.5 mcg (SYMBICORT) 160-4.5 mcg/actuation HFAA INHALE 2 PUFFS INTO THE LUNGS EVERY 12 HOURS 10.2 Inhaler 11    cetirizine (ZYRTEC) 10 MG tablet Take 1 tablet (10 mg total) by mouth once daily. 30 tablet 1    cholecalciferol, vitamin D3, (VITAMIN D3) 10 mcg (400 unit) Cap Take 1,000 Units by mouth once daily.      diltiaZEM (CARDIZEM CD) 180 MG 24 hr capsule TAKE 1 CAPSULE (180 MG TOTAL) BY MOUTH ONCE DAILY. 90 capsule 3    hydroCHLOROthiazide (HYDRODIURIL) 25 MG tablet TAKE 1 TABLET BY MOUTH EVERY DAY 90 tablet 3    INCRUSE ELLIPTA 62.5 mcg/actuation DsDv INHALE 1 PUFF DAILY 1 each 11    levoFLOXacin (LEVAQUIN) 500 MG tablet Take 1 tablet (500 mg total) by mouth once daily. for 7 days 7 tablet 0    potassium chloride SA (K-DUR,KLOR-CON) 20 MEQ tablet TAKE 1 TABLET BY MOUTH EVERY DAY 90 tablet 3    predniSONE (DELTASONE) 10 MG tablet 1or 2 tabs every AM 60 tablet 6    [DISCONTINUED] budesonide-formoterol 160-4.5 mcg (SYMBICORT) 160-4.5 mcg/actuation HFAA INHALE 2 PUFFS INTO THE LUNGS  "EVERY 12 HOURS 10.2 Inhaler 11    [DISCONTINUED] cetirizine (ZYRTEC) 10 MG tablet Take 1 tablet (10 mg total) by mouth once daily. 14 tablet 0     No facility-administered encounter medications on file as of 7/30/2020.         Review of patient's allergies indicates:   Allergen Reactions    Bactrim [sulfamethoxazole-trimethoprim] Nausea Only    Codeine Itching    Keflex [cephalexin] Other (See Comments)     Rhinorrhea, nausea. Tolerated Ceftriaxone June 2014    Ceftriaxone Rash     Morbilliform rash after receiving IV ceftriaxone    Clindamycin hcl Rash    Doxycycline Rash    Vancomycin analogues Rash     Morbilliform rash after receiving IV vancomycin       Physical Exam      Vital Signs  Temp: 99.2 °F (37.3 °C)  Temp src: Oral  Resp: 18  BP: (!) 140/74  BP Location: Left arm  Patient Position: Sitting  Pain Score: 0-No pain  Height and Weight  Height: 4' 11" (149.9 cm)  Weight: 52.9 kg (116 lb 10 oz)  BSA (Calculated - sq m): 1.48 sq meters  BMI (Calculated): 23.5  Weight in (lb) to have BMI = 25: 123.5]    Physical Exam  Vitals signs and nursing note reviewed.   Constitutional:       Appearance: Normal appearance. She is well-developed.   HENT:      Head: Normocephalic and atraumatic.      Comments: Bilateral ears with excessive clear effusion noted, with erythema noted to oropharynx     Right Ear: External ear normal.      Left Ear: External ear normal.   Eyes:      Conjunctiva/sclera: Conjunctivae normal.   Neck:      Musculoskeletal: Normal range of motion and neck supple.      Thyroid: No thyromegaly.      Vascular: No JVD.      Trachea: No tracheal deviation.   Cardiovascular:      Rate and Rhythm: Normal rate and regular rhythm.      Heart sounds: Normal heart sounds. No murmur.   Pulmonary:      Effort: Pulmonary effort is normal.      Breath sounds: Normal breath sounds.   Chest:      Chest wall: No tenderness.   Musculoskeletal: Normal range of motion.   Lymphadenopathy:      Cervical: No " cervical adenopathy.   Skin:     General: Skin is warm and dry.      Comments: Swelling noted to right cheek/nose area   Neurological:      Mental Status: She is alert and oriented to person, place, and time.   Psychiatric:         Behavior: Behavior normal.         Thought Content: Thought content normal.         Judgment: Judgment normal.          Laboratory:  CBC:  Recent Labs   Lab Result Units 05/08/20  1158 05/09/20  0311 05/10/20  0632   WBC K/uL 9.62 6.30 7.54   RBC M/uL 5.50* 5.06 5.21   Hemoglobin g/dL 15.8 14.7 14.8   Hematocrit % 49.5* 46.4 47.8   Platelets K/uL 340 323 380*   Mean Corpuscular Volume fL 90 92 92   Mean Corpuscular Hemoglobin pg 28.7 29.1 28.4   Mean Corpuscular Hemoglobin Conc g/dL 31.9* 31.7* 31.0*     CMP:  Recent Labs   Lab Result Units 05/08/20  1158  05/10/20  0632   Glucose mg/dL 114*   < > 153*   Calcium mg/dL 10.0   < > 10.2   Albumin g/dL 3.8   < > 3.5   Total Protein g/dL 7.3  --   --    Sodium mmol/L 134*   < > 137   Potassium mmol/L 3.9   < > 4.6   CO2 mmol/L 28   < > 35*   Chloride mmol/L 93*   < > 90*   BUN, Bld mg/dL 11   < > 16   Alkaline Phosphatase U/L 101  --   --    ALT U/L 14  --   --    AST U/L 18  --   --    Total Bilirubin mg/dL 0.4  --   --     < > = values in this interval not displayed.     URINALYSIS:  Recent Labs   Lab Result Units 05/09/20  1005   Color, UA  Yellow   Specific Gravity, UA  1.020   pH, UA  5.0   Protein, UA  Negative   Nitrite, UA  Negative   Leukocytes, UA  Negative      LIPIDS:  Recent Labs   Lab Result Units 05/09/20  0311   TSH uIU/mL 0.498     TSH:  Recent Labs   Lab Result Units 05/09/20  0311   TSH uIU/mL 0.498     A1C:  No results for input(s): HGBA1C in the last 2160 hours.      Assessment/Plan     Leyla Mari is a 75 y.o.female with:    1. URTI (acute upper respiratory infection)  - Ambulatory referral/consult to ENT; Future  - cetirizine (ZYRTEC) 10 MG tablet; Take 1 tablet (10 mg total) by mouth once daily.  Dispense: 30 tablet;  Refill: 1  - levoFLOXacin (LEVAQUIN) 500 MG tablet; Take 1 tablet (500 mg total) by mouth once daily. for 7 days  Dispense: 7 tablet; Refill: 0    2. Recurrent acute allergic otitis media of right ear  - cetirizine (ZYRTEC) 10 MG tablet; Take 1 tablet (10 mg total) by mouth once daily.  Dispense: 30 tablet; Refill: 1  - levoFLOXacin (LEVAQUIN) 500 MG tablet; Take 1 tablet (500 mg total) by mouth once daily. for 7 days  Dispense: 7 tablet; Refill: 0      -Continue current medications and maintain follow up with specialists.  Return to clinic as needed for any concerns   Stay hydrated with water, use nasal spray prn      Erin Jiminez, NP-C [facility] [place]

## 2020-08-04 RX ORDER — PRAVASTATIN SODIUM 20 MG/1
20 TABLET ORAL DAILY
Qty: 90 TABLET | Refills: 2 | Status: SHIPPED | OUTPATIENT
Start: 2020-08-04 | End: 2021-04-29

## 2020-08-13 ENCOUNTER — PATIENT OUTREACH (OUTPATIENT)
Dept: ADMINISTRATIVE | Facility: OTHER | Age: 75
End: 2020-08-13

## 2020-08-14 ENCOUNTER — OFFICE VISIT (OUTPATIENT)
Dept: OTOLARYNGOLOGY | Facility: CLINIC | Age: 75
End: 2020-08-14
Payer: MEDICARE

## 2020-08-14 ENCOUNTER — CLINICAL SUPPORT (OUTPATIENT)
Dept: AUDIOLOGY | Facility: CLINIC | Age: 75
End: 2020-08-14
Payer: MEDICARE

## 2020-08-14 VITALS — WEIGHT: 116 LBS | BODY MASS INDEX: 23.43 KG/M2

## 2020-08-14 DIAGNOSIS — R42 DIZZINESS: ICD-10-CM

## 2020-08-14 DIAGNOSIS — H90.0 CONDUCTIVE HEARING LOSS, BILATERAL: ICD-10-CM

## 2020-08-14 DIAGNOSIS — H90.0 CONDUCTIVE HEARING LOSS, BILATERAL: Primary | ICD-10-CM

## 2020-08-14 DIAGNOSIS — H93.8X1 EAR FULLNESS, RIGHT: Primary | ICD-10-CM

## 2020-08-14 DIAGNOSIS — J06.9 URTI (ACUTE UPPER RESPIRATORY INFECTION): ICD-10-CM

## 2020-08-14 PROCEDURE — 1101F PR PT FALLS ASSESS DOC 0-1 FALLS W/OUT INJ PAST YR: ICD-10-PCS | Mod: CPTII,S$GLB,, | Performed by: OTOLARYNGOLOGY

## 2020-08-14 PROCEDURE — 1126F AMNT PAIN NOTED NONE PRSNT: CPT | Mod: S$GLB,,, | Performed by: OTOLARYNGOLOGY

## 2020-08-14 PROCEDURE — 92567 TYMPANOMETRY: CPT | Mod: S$GLB,,, | Performed by: PHYSICIAN ASSISTANT

## 2020-08-14 PROCEDURE — 99203 OFFICE O/P NEW LOW 30 MIN: CPT | Mod: S$GLB,,, | Performed by: OTOLARYNGOLOGY

## 2020-08-14 PROCEDURE — 1159F PR MEDICATION LIST DOCUMENTED IN MEDICAL RECORD: ICD-10-PCS | Mod: S$GLB,,, | Performed by: OTOLARYNGOLOGY

## 2020-08-14 PROCEDURE — 1159F MED LIST DOCD IN RCRD: CPT | Mod: S$GLB,,, | Performed by: OTOLARYNGOLOGY

## 2020-08-14 PROCEDURE — 1126F PR PAIN SEVERITY QUANTIFIED, NO PAIN PRESENT: ICD-10-PCS | Mod: S$GLB,,, | Performed by: OTOLARYNGOLOGY

## 2020-08-14 PROCEDURE — 1101F PT FALLS ASSESS-DOCD LE1/YR: CPT | Mod: CPTII,S$GLB,, | Performed by: OTOLARYNGOLOGY

## 2020-08-14 PROCEDURE — 99999 PR PBB SHADOW E&M-EST. PATIENT-LVL I: ICD-10-PCS | Mod: PBBFAC,,, | Performed by: PHYSICIAN ASSISTANT

## 2020-08-14 PROCEDURE — 92557 PR COMPREHENSIVE HEARING TEST: ICD-10-PCS | Mod: S$GLB,,, | Performed by: PHYSICIAN ASSISTANT

## 2020-08-14 PROCEDURE — 92567 PR TYMPA2METRY: ICD-10-PCS | Mod: S$GLB,,, | Performed by: PHYSICIAN ASSISTANT

## 2020-08-14 PROCEDURE — 99999 PR PBB SHADOW E&M-EST. PATIENT-LVL III: CPT | Mod: PBBFAC,,, | Performed by: OTOLARYNGOLOGY

## 2020-08-14 PROCEDURE — 92557 COMPREHENSIVE HEARING TEST: CPT | Mod: S$GLB,,, | Performed by: PHYSICIAN ASSISTANT

## 2020-08-14 PROCEDURE — 99203 PR OFFICE/OUTPT VISIT, NEW, LEVL III, 30-44 MIN: ICD-10-PCS | Mod: S$GLB,,, | Performed by: OTOLARYNGOLOGY

## 2020-08-14 PROCEDURE — 99999 PR PBB SHADOW E&M-EST. PATIENT-LVL I: CPT | Mod: PBBFAC,,, | Performed by: PHYSICIAN ASSISTANT

## 2020-08-14 PROCEDURE — 99999 PR PBB SHADOW E&M-EST. PATIENT-LVL III: ICD-10-PCS | Mod: PBBFAC,,, | Performed by: OTOLARYNGOLOGY

## 2020-08-14 NOTE — PROGRESS NOTES
Subjective:       Patient ID: Leyla Mari is a 75 y.o. female.    Chief Complaint: fluid in ears and dizziness    HPI     Leyla Mari is a 75 y.o. female presents on referral from primary care for evaluation of possible serous otitis media and dizziness.  The problem has been present for a couple of months.  She reports a fullness sensation of her right ear.  She denies any associated hearing loss.  She has been treated with antibiotics, with no improvement in the feeling of fullness.  She also notes some dizziness.  The dizziness only occurs with positional changes.  She describes it as a slight sense of motion lasting seconds.  She denies any room spinning vertigo.  She does also note some balance difficulties.    Review of Systems   Constitutional: Negative for chills, fever and unexpected weight change.   HENT: Positive for ear pain, facial swelling, postnasal drip and sinus pressure/congestion. Negative for sore throat and trouble swallowing.    Eyes: Negative for pain and visual disturbance.   Respiratory: Positive for shortness of breath and wheezing. Negative for apnea and chest tightness.    Cardiovascular: Negative for chest pain and palpitations.   Gastrointestinal: Negative for abdominal pain and nausea.   Endocrine: Negative for cold intolerance and heat intolerance.   Musculoskeletal: Negative for joint swelling and neck stiffness.   Integumentary:  Negative for color change and rash.   Neurological: Positive for dizziness. Negative for facial asymmetry and headaches.   Hematological: Negative for adenopathy. Bruises/bleeds easily.   Psychiatric/Behavioral: Negative for agitation. The patient is nervous/anxious.          Objective:      Physical Exam  Vitals signs and nursing note reviewed.   Constitutional:       General: She is not in acute distress.     Appearance: She is well-developed.   HENT:      Head: Normocephalic and atraumatic.      Right Ear: Tympanic membrane, ear canal and external  ear normal. No middle ear effusion. Tympanic membrane has normal mobility (No obvious effusion noted.  She has some very mild tympanosclerosis.  She is able to insufflate the ear successfully.).      Left Ear: Tympanic membrane, ear canal and external ear normal.  No middle ear effusion. Tympanic membrane has normal mobility.      Nose: Nose normal.      Mouth/Throat:      Pharynx: Uvula midline.   Eyes:      Conjunctiva/sclera: Conjunctivae normal.      Pupils: Pupils are equal, round, and reactive to light.   Neck:      Musculoskeletal: Normal range of motion.      Thyroid: No thyromegaly.      Trachea: No tracheal deviation.   Cardiovascular:      Rate and Rhythm: Normal rate and regular rhythm.   Pulmonary:      Effort: Pulmonary effort is normal. No respiratory distress.   Musculoskeletal: Normal range of motion.   Lymphadenopathy:      Head:      Right side of head: No submental, submandibular or tonsillar adenopathy.      Left side of head: No submental, submandibular or tonsillar adenopathy.      Cervical: No cervical adenopathy.   Neurological:      Mental Status: She is alert and oriented to person, place, and time.   Psychiatric:         Behavior: Behavior normal.       Data:      Tympanometry revealed Type As in the right ear and Type As in the left ear.  Audiogram results revealed a mild conductive hearing loss bilaterally.  Speech reception thresholds were noted at 25 dB in the right ear and 20 dB in the left ear.  Speech discrimination scores were 92% in the right ear and 92% in the left ear.      Assessment:       1. Ear fullness, right    2. URTI (acute upper respiratory infection)    3. Conductive hearing loss, bilateral    4. Dizziness        Plan:         with patient regarding her symptoms.  I do not believe that she has an effusion based on exam and audiologic evaluation.  I discussed the option of a diagnostic myringotomy however the patient was not interested in this.  I suspect her ear  fullness is likely musculoskeletal in nature.    Dizziness likely secondary to Presbystasis recommend vestibular exercises.  Handout given to the patient with instructions.    Follow up as needed.    Answers for HPI/ROS submitted by the patient on 8/10/2020   Foot swelling?: Yes

## 2020-08-14 NOTE — PROGRESS NOTES
Leyla Mari was seen today in the clinic for an audiologic evaluation.  Patients main complaint was a feeling of fluid in her ears.    Tympanometry revealed Type As in the right ear and Type As in the left ear.  Audiogram results revealed a mild conductive hearing loss bilaterally.  Speech reception thresholds were noted at 25 dB in the right ear and 20 dB in the left ear.  Speech discrimination scores were 92% in the right ear and 92% in the left ear.    Recommendations:  1. Otologic evaluation  2. Annual audiogram  3. Noise protection when in noise

## 2020-08-14 NOTE — PROGRESS NOTES
Answers for HPI/ROS submitted by the patient on 8/10/2020   ear pain: Yes  facial swelling: Yes  sinus pressure : Yes  postnasal drip: Yes  shortness of breath: Yes  wheezing: Yes  Foot swelling?: Yes  dizziness: Yes  Bruises or bleeds easily: Yes  nervous/ anxious: Yes

## 2020-08-22 ENCOUNTER — HOSPITAL ENCOUNTER (INPATIENT)
Facility: HOSPITAL | Age: 75
LOS: 1 days | Discharge: HOME | DRG: 193 | End: 2020-08-23
Attending: EMERGENCY MEDICINE | Admitting: HOSPITALIST
Payer: MEDICARE

## 2020-08-22 DIAGNOSIS — J18.9 PNEUMONIA OF RIGHT LOWER LOBE DUE TO INFECTIOUS ORGANISM: Primary | ICD-10-CM

## 2020-08-22 DIAGNOSIS — J44.1 COPD WITH ACUTE EXACERBATION: ICD-10-CM

## 2020-08-22 DIAGNOSIS — R06.02 SOB (SHORTNESS OF BREATH): ICD-10-CM

## 2020-08-22 DIAGNOSIS — R07.9 CHEST PAIN: ICD-10-CM

## 2020-08-22 PROBLEM — J96.21 ACUTE ON CHRONIC RESPIRATORY FAILURE WITH HYPOXIA: Status: ACTIVE | Noted: 2017-02-27

## 2020-08-22 PROBLEM — Z79.52 LONG TERM CURRENT USE OF SYSTEMIC STEROIDS: Chronic | Status: ACTIVE | Noted: 2020-08-22

## 2020-08-22 PROBLEM — Z75.8 DISCHARGE PLANNING ISSUES: Status: ACTIVE | Noted: 2020-08-22

## 2020-08-22 LAB
ALBUMIN SERPL BCP-MCNC: 3.3 G/DL (ref 3.5–5.2)
ALLENS TEST: ABNORMAL
ALP SERPL-CCNC: 72 U/L (ref 55–135)
ALT SERPL W/O P-5'-P-CCNC: 13 U/L (ref 10–44)
ANION GAP SERPL CALC-SCNC: 10 MMOL/L (ref 8–16)
AST SERPL-CCNC: 16 U/L (ref 10–40)
BASOPHILS # BLD AUTO: 0.03 K/UL (ref 0–0.2)
BASOPHILS NFR BLD: 0.2 % (ref 0–1.9)
BILIRUB SERPL-MCNC: 0.5 MG/DL (ref 0.1–1)
BNP SERPL-MCNC: 62 PG/ML (ref 0–99)
BUN SERPL-MCNC: 15 MG/DL (ref 8–23)
CALCIUM SERPL-MCNC: 10 MG/DL (ref 8.7–10.5)
CHLORIDE SERPL-SCNC: 101 MMOL/L (ref 95–110)
CO2 SERPL-SCNC: 27 MMOL/L (ref 23–29)
CREAT SERPL-MCNC: 0.6 MG/DL (ref 0.5–1.4)
DIFFERENTIAL METHOD: ABNORMAL
EOSINOPHIL # BLD AUTO: 0 K/UL (ref 0–0.5)
EOSINOPHIL NFR BLD: 0.1 % (ref 0–8)
ERYTHROCYTE [DISTWIDTH] IN BLOOD BY AUTOMATED COUNT: 14.8 % (ref 11.5–14.5)
EST. GFR  (AFRICAN AMERICAN): >60 ML/MIN/1.73 M^2
EST. GFR  (NON AFRICAN AMERICAN): >60 ML/MIN/1.73 M^2
GLUCOSE SERPL-MCNC: 155 MG/DL (ref 70–110)
HCO3 UR-SCNC: 28.5 MMOL/L (ref 24–28)
HCT VFR BLD AUTO: 47.7 % (ref 37–48.5)
HGB BLD-MCNC: 15.5 G/DL (ref 12–16)
IMM GRANULOCYTES # BLD AUTO: 0.14 K/UL (ref 0–0.04)
IMM GRANULOCYTES NFR BLD AUTO: 0.9 % (ref 0–0.5)
LYMPHOCYTES # BLD AUTO: 0.6 K/UL (ref 1–4.8)
LYMPHOCYTES NFR BLD: 3.9 % (ref 18–48)
MCH RBC QN AUTO: 29.9 PG (ref 27–31)
MCHC RBC AUTO-ENTMCNC: 32.5 G/DL (ref 32–36)
MCV RBC AUTO: 92 FL (ref 82–98)
MONOCYTES # BLD AUTO: 0.6 K/UL (ref 0.3–1)
MONOCYTES NFR BLD: 4 % (ref 4–15)
NEUTROPHILS # BLD AUTO: 14.1 K/UL (ref 1.8–7.7)
NEUTROPHILS NFR BLD: 90.9 % (ref 38–73)
NRBC BLD-RTO: 0 /100 WBC
PCO2 BLDA: 43.8 MMHG (ref 35–45)
PH SMN: 7.42 [PH] (ref 7.35–7.45)
PLATELET # BLD AUTO: 243 K/UL (ref 150–350)
PMV BLD AUTO: 9.4 FL (ref 9.2–12.9)
PO2 BLDA: 63 MMHG (ref 40–60)
POC BE: 4 MMOL/L
POC SATURATED O2: 92 % (ref 95–100)
POC TCO2: 30 MMOL/L (ref 24–29)
POCT GLUCOSE: 207 MG/DL (ref 70–110)
POTASSIUM SERPL-SCNC: 3.9 MMOL/L (ref 3.5–5.1)
PROCALCITONIN SERPL IA-MCNC: 0.07 NG/ML
PROT SERPL-MCNC: 6.7 G/DL (ref 6–8.4)
RBC # BLD AUTO: 5.19 M/UL (ref 4–5.4)
SAMPLE: ABNORMAL
SARS-COV-2 RDRP RESP QL NAA+PROBE: NEGATIVE
SITE: ABNORMAL
SODIUM SERPL-SCNC: 138 MMOL/L (ref 136–145)
TROPONIN I SERPL DL<=0.01 NG/ML-MCNC: 0.01 NG/ML (ref 0–0.03)
WBC # BLD AUTO: 15.52 K/UL (ref 3.9–12.7)

## 2020-08-22 PROCEDURE — 84145 PROCALCITONIN (PCT): CPT

## 2020-08-22 PROCEDURE — 63600175 PHARM REV CODE 636 W HCPCS: Performed by: EMERGENCY MEDICINE

## 2020-08-22 PROCEDURE — 84484 ASSAY OF TROPONIN QUANT: CPT

## 2020-08-22 PROCEDURE — 82803 BLOOD GASES ANY COMBINATION: CPT

## 2020-08-22 PROCEDURE — 93005 ELECTROCARDIOGRAM TRACING: CPT

## 2020-08-22 PROCEDURE — 96365 THER/PROPH/DIAG IV INF INIT: CPT

## 2020-08-22 PROCEDURE — 83880 ASSAY OF NATRIURETIC PEPTIDE: CPT

## 2020-08-22 PROCEDURE — 11000001 HC ACUTE MED/SURG PRIVATE ROOM

## 2020-08-22 PROCEDURE — 99285 EMERGENCY DEPT VISIT HI MDM: CPT | Mod: ,,, | Performed by: EMERGENCY MEDICINE

## 2020-08-22 PROCEDURE — 96375 TX/PRO/DX INJ NEW DRUG ADDON: CPT

## 2020-08-22 PROCEDURE — 99223 PR INITIAL HOSPITAL CARE,LEVL III: ICD-10-PCS | Mod: AI,GC,, | Performed by: HOSPITALIST

## 2020-08-22 PROCEDURE — 99285 PR EMERGENCY DEPT VISIT,LEVEL V: ICD-10-PCS | Mod: ,,, | Performed by: EMERGENCY MEDICINE

## 2020-08-22 PROCEDURE — 99223 1ST HOSP IP/OBS HIGH 75: CPT | Mod: AI,GC,, | Performed by: HOSPITALIST

## 2020-08-22 PROCEDURE — 85025 COMPLETE CBC W/AUTO DIFF WBC: CPT

## 2020-08-22 PROCEDURE — 99285 EMERGENCY DEPT VISIT HI MDM: CPT | Mod: 25

## 2020-08-22 PROCEDURE — 93010 ELECTROCARDIOGRAM REPORT: CPT | Mod: ,,, | Performed by: INTERNAL MEDICINE

## 2020-08-22 PROCEDURE — 25000242 PHARM REV CODE 250 ALT 637 W/ HCPCS: Performed by: STUDENT IN AN ORGANIZED HEALTH CARE EDUCATION/TRAINING PROGRAM

## 2020-08-22 PROCEDURE — 99900035 HC TECH TIME PER 15 MIN (STAT)

## 2020-08-22 PROCEDURE — 63600175 PHARM REV CODE 636 W HCPCS: Performed by: STUDENT IN AN ORGANIZED HEALTH CARE EDUCATION/TRAINING PROGRAM

## 2020-08-22 PROCEDURE — 93010 EKG 12-LEAD: ICD-10-PCS | Mod: ,,, | Performed by: INTERNAL MEDICINE

## 2020-08-22 PROCEDURE — U0002 COVID-19 LAB TEST NON-CDC: HCPCS

## 2020-08-22 PROCEDURE — 25000242 PHARM REV CODE 250 ALT 637 W/ HCPCS: Performed by: EMERGENCY MEDICINE

## 2020-08-22 PROCEDURE — 94640 AIRWAY INHALATION TREATMENT: CPT

## 2020-08-22 PROCEDURE — 80053 COMPREHEN METABOLIC PANEL: CPT

## 2020-08-22 RX ORDER — TIOTROPIUM BROMIDE 18 UG/1
1 CAPSULE ORAL; RESPIRATORY (INHALATION) DAILY
Status: DISCONTINUED | OUTPATIENT
Start: 2020-08-23 | End: 2020-08-23 | Stop reason: HOSPADM

## 2020-08-22 RX ORDER — ASPIRIN 81 MG/1
81 TABLET ORAL DAILY
Status: DISCONTINUED | OUTPATIENT
Start: 2020-08-23 | End: 2020-08-23 | Stop reason: HOSPADM

## 2020-08-22 RX ORDER — PREDNISONE 10 MG/1
40 TABLET ORAL DAILY
Status: DISCONTINUED | OUTPATIENT
Start: 2020-08-23 | End: 2020-08-23 | Stop reason: HOSPADM

## 2020-08-22 RX ORDER — TALC
6 POWDER (GRAM) TOPICAL NIGHTLY PRN
Status: DISCONTINUED | OUTPATIENT
Start: 2020-08-22 | End: 2020-08-23 | Stop reason: HOSPADM

## 2020-08-22 RX ORDER — SODIUM CHLORIDE 0.9 % (FLUSH) 0.9 %
10 SYRINGE (ML) INJECTION
Status: CANCELLED | OUTPATIENT
Start: 2020-08-22

## 2020-08-22 RX ORDER — INSULIN ASPART 100 [IU]/ML
0-5 INJECTION, SOLUTION INTRAVENOUS; SUBCUTANEOUS
Status: DISCONTINUED | OUTPATIENT
Start: 2020-08-22 | End: 2020-08-22

## 2020-08-22 RX ORDER — PRAVASTATIN SODIUM 10 MG/1
20 TABLET ORAL DAILY
Status: DISCONTINUED | OUTPATIENT
Start: 2020-08-23 | End: 2020-08-23 | Stop reason: HOSPADM

## 2020-08-22 RX ORDER — IBUPROFEN 200 MG
16 TABLET ORAL
Status: DISCONTINUED | OUTPATIENT
Start: 2020-08-22 | End: 2020-08-23 | Stop reason: HOSPADM

## 2020-08-22 RX ORDER — MAGNESIUM SULFATE HEPTAHYDRATE 40 MG/ML
2 INJECTION, SOLUTION INTRAVENOUS
Status: COMPLETED | OUTPATIENT
Start: 2020-08-22 | End: 2020-08-22

## 2020-08-22 RX ORDER — CETIRIZINE HYDROCHLORIDE 10 MG/1
10 TABLET ORAL DAILY
Status: DISCONTINUED | OUTPATIENT
Start: 2020-08-23 | End: 2020-08-23 | Stop reason: HOSPADM

## 2020-08-22 RX ORDER — LEVOFLOXACIN 5 MG/ML
750 INJECTION, SOLUTION INTRAVENOUS
Status: COMPLETED | OUTPATIENT
Start: 2020-08-22 | End: 2020-08-22

## 2020-08-22 RX ORDER — IBUPROFEN 200 MG
24 TABLET ORAL
Status: DISCONTINUED | OUTPATIENT
Start: 2020-08-22 | End: 2020-08-23 | Stop reason: HOSPADM

## 2020-08-22 RX ORDER — SODIUM CHLORIDE 0.9 % (FLUSH) 0.9 %
10 SYRINGE (ML) INJECTION
Status: DISCONTINUED | OUTPATIENT
Start: 2020-08-22 | End: 2020-08-23 | Stop reason: HOSPADM

## 2020-08-22 RX ORDER — IPRATROPIUM BROMIDE AND ALBUTEROL SULFATE 2.5; .5 MG/3ML; MG/3ML
3 SOLUTION RESPIRATORY (INHALATION) EVERY 4 HOURS PRN
Status: DISCONTINUED | OUTPATIENT
Start: 2020-08-22 | End: 2020-08-23 | Stop reason: HOSPADM

## 2020-08-22 RX ORDER — HEPARIN SODIUM 5000 [USP'U]/ML
5000 INJECTION, SOLUTION INTRAVENOUS; SUBCUTANEOUS EVERY 8 HOURS
Status: DISCONTINUED | OUTPATIENT
Start: 2020-08-22 | End: 2020-08-23 | Stop reason: HOSPADM

## 2020-08-22 RX ORDER — FLUTICASONE FUROATE AND VILANTEROL 100; 25 UG/1; UG/1
1 POWDER RESPIRATORY (INHALATION) DAILY
Status: DISCONTINUED | OUTPATIENT
Start: 2020-08-23 | End: 2020-08-23 | Stop reason: HOSPADM

## 2020-08-22 RX ORDER — IPRATROPIUM BROMIDE AND ALBUTEROL SULFATE 2.5; .5 MG/3ML; MG/3ML
3 SOLUTION RESPIRATORY (INHALATION)
Status: DISCONTINUED | OUTPATIENT
Start: 2020-08-22 | End: 2020-08-23 | Stop reason: HOSPADM

## 2020-08-22 RX ORDER — ACETAMINOPHEN 325 MG/1
650 TABLET ORAL EVERY 4 HOURS PRN
Status: DISCONTINUED | OUTPATIENT
Start: 2020-08-22 | End: 2020-08-23 | Stop reason: HOSPADM

## 2020-08-22 RX ORDER — METHYLPREDNISOLONE SOD SUCC 125 MG
125 VIAL (EA) INJECTION
Status: COMPLETED | OUTPATIENT
Start: 2020-08-22 | End: 2020-08-22

## 2020-08-22 RX ORDER — IPRATROPIUM BROMIDE AND ALBUTEROL SULFATE 2.5; .5 MG/3ML; MG/3ML
3 SOLUTION RESPIRATORY (INHALATION)
Status: COMPLETED | OUTPATIENT
Start: 2020-08-22 | End: 2020-08-22

## 2020-08-22 RX ORDER — DILTIAZEM HYDROCHLORIDE 180 MG/1
180 CAPSULE, COATED, EXTENDED RELEASE ORAL DAILY
Status: DISCONTINUED | OUTPATIENT
Start: 2020-08-23 | End: 2020-08-23 | Stop reason: HOSPADM

## 2020-08-22 RX ORDER — GLUCAGON 1 MG
1 KIT INJECTION
Status: DISCONTINUED | OUTPATIENT
Start: 2020-08-22 | End: 2020-08-23 | Stop reason: HOSPADM

## 2020-08-22 RX ADMIN — IPRATROPIUM BROMIDE AND ALBUTEROL SULFATE 3 ML: .5; 3 SOLUTION RESPIRATORY (INHALATION) at 12:08

## 2020-08-22 RX ADMIN — MAGNESIUM SULFATE 2 G: 2 INJECTION INTRAVENOUS at 12:08

## 2020-08-22 RX ADMIN — IPRATROPIUM BROMIDE AND ALBUTEROL SULFATE 3 ML: .5; 2.5 SOLUTION RESPIRATORY (INHALATION) at 10:08

## 2020-08-22 RX ADMIN — METHYLPREDNISOLONE SODIUM SUCCINATE 125 MG: 125 INJECTION, POWDER, FOR SOLUTION INTRAMUSCULAR; INTRAVENOUS at 12:08

## 2020-08-22 RX ADMIN — LEVOFLOXACIN 750 MG: 750 INJECTION, SOLUTION INTRAVENOUS at 03:08

## 2020-08-22 NOTE — ED TRIAGE NOTES
Patient presents to the ED with shortness of breath worsening with exertion. Pt has a hx of COPD. On 2 liters NC at home PRN. States last night she felt so short of breath she had to sleep with O2 on

## 2020-08-22 NOTE — NURSING
Patient arrived to [facility]. AAOx4. Ambulated with assist from stretcher to bed. O2 2liters a min via n/c. SOB on exertion. Mood is pleasant. Vital signs stable. No c/o pain or discomfort.

## 2020-08-22 NOTE — ED NOTES
Patient identifiers verified and correct for Leyla Mari.    LOC: The patient is awake, alert and aware of environment with an appropriate affect, the patient is oriented x 3 and speaking appropriately.  APPEARANCE: Patient resting comfortably and in no acute distress, patient is clean and well groomed, patient's clothing is properly fastened.  SKIN: The skin is warm and dry, color consistent with ethnicity, patient has normal skin turgor and moist mucus membranes, skin intact, no breakdown or bruising noted.  MUSCULOSKELETAL: Patient moving all extremities spontaneously. Generalized fatigue   RESPIRATORY: Airway is open and patent, respirations are spontaneous. Patient complaining of shortness of breath, worsening with exertion. Pt has a history of COPD. Uses 2L o2 NC as needed at home.   CARDIAC: Patient has a normal rate, no periphreal edema noted, capillary refill < 3 seconds.  ABDOMEN: Soft and non tender to palpation.

## 2020-08-22 NOTE — ED PROVIDER NOTES
Encounter Date: 8/22/2020       History     Chief Complaint   Patient presents with    Shortness of Breath     Hx of COPD. Denies fever, cough, chills.      HPI   75-year-old female with a history of COPD, hypertension, hyperlipidemia, history of lung nodules, cataracts, arthritis presenting with concern for COPD exacerbation associated with shortness of breath.  Patient states that she usually uses 2 L nasal cannula with activity but lately including last night required to sleep and at rest.  She denies any fevers or chills but reports worsening shortness of breath and difficulty catching her breath with any movement.  Denies any chest pain, lightheadedness, dizziness, falls or trauma.  Denies any hematochezia, melena or hemoptysis.  Denies any back pain, neck pain, arm pain, jaw pain or diaphoresis.  Onset has been gradual, worsening in the last 24 hr.  No other aggravating or alleviating factors.  Current pain scale 0/10.  She does endorse some lower extremity edema.  She also endorses right ear pain.    Review of patient's allergies indicates:   Allergen Reactions    Bactrim [sulfamethoxazole-trimethoprim] Nausea Only    Codeine Itching    Keflex [cephalexin] Other (See Comments)     Rhinorrhea, nausea. Tolerated Ceftriaxone June 2014    Ceftriaxone Rash     Morbilliform rash after receiving IV ceftriaxone    Clindamycin hcl Rash    Doxycycline Rash    Vancomycin analogues Rash     Morbilliform rash after receiving IV vancomycin     Past Medical History:   Diagnosis Date    Actinic keratosis     Arthritis     Cataract     Cellulitis of ankle     Colon polyps     COPD (chronic obstructive pulmonary disease)     home O2 (2L/min)     Family history of colon cancer     History of Clostridium difficile infection 7/3/2012    Hyperlipidemia     Hypertension     Lung nodules     Sinus tachycardia      Past Surgical History:   Procedure Laterality Date    APPENDECTOMY      CATARACT EXTRACTION  Bilateral 2007     SECTION      x2    COLONOSCOPY N/A 10/10/2017    Procedure: COLONOSCOPY;  Surgeon: Omid Lino MD;  Location: [facility] ([address] [place] [address]);  Service: Endoscopy;  Laterality: N/A;  pt unable to be checked in with previous order    EYE SURGERY      TONSILLECTOMY       Family History   Problem Relation Age of Onset    Cancer Mother 79        colon    Heart disease Mother     Hyperlipidemia Mother     Hypertension Mother     Skin cancer Mother     Cataracts Mother     Glaucoma Mother     Thyroid disease Mother     COPD Father     Diabetes Father     Heart disease Father     Hyperlipidemia Father     Hypertension Father     Cancer Sister     Heart disease Sister     Hyperlipidemia Sister     Hypertension Sister     Skin cancer Sister     Cataracts Sister     Breast cancer Sister     Cancer Son     Blindness Paternal Grandmother     Diabetes Paternal Grandmother     Thyroid disease Daughter     Melanoma Neg Hx     Psoriasis Neg Hx     Lupus Neg Hx     Eczema Neg Hx     Amblyopia Neg Hx     Macular degeneration Neg Hx     Retinal detachment Neg Hx     Strabismus Neg Hx     Stroke Neg Hx      Social History     Tobacco Use    Smoking status: Former Smoker     Packs/day: 1.00     Years: 39.00     Pack years: 39.00     Types: Cigarettes     Quit date: 1995     Years since quittin.8    Smokeless tobacco: Never Used   Substance Use Topics    Alcohol use: Yes     Alcohol/week: 2.0 standard drinks     Types: 2 Glasses of wine per week     Frequency: Monthly or less     Drinks per session: 1 or 2     Binge frequency: Never     Comment: rarely has a glass of wine- not daily    Drug use: No     Review of Systems   Constitutional: Positive for fatigue. Negative for diaphoresis and fever.   HENT: Positive for ear pain. Negative for congestion and sore throat.    Eyes: Negative for photophobia and visual disturbance.   Respiratory: Positive for cough, shortness of  breath and wheezing. Negative for chest tightness.    Cardiovascular: Positive for leg swelling. Negative for chest pain and palpitations.   Gastrointestinal: Negative for abdominal distention, abdominal pain, nausea and vomiting.   Endocrine: Negative for polyphagia and polyuria.   Genitourinary: Negative for dysuria and hematuria.   Musculoskeletal: Negative for myalgias, neck pain and neck stiffness.   Skin: Negative for color change and wound.   Neurological: Negative for facial asymmetry, speech difficulty, weakness and light-headedness.   Psychiatric/Behavioral: Negative for behavioral problems and confusion. The patient is nervous/anxious.        Physical Exam     Initial Vitals [08/22/20 1138]   BP Pulse Resp Temp SpO2   (!) 165/76 98 16 98.3 °F (36.8 °C) (!) 91 %      MAP       --         Physical Exam    Nursing note and vitals reviewed.    Gen/Constitutional: Interactive.  Acute respiratory distress.  Head: Normocephalic, Atraumatic  Neck: supple, no masses or LAD, no JVD  Eyes: PERRLA, conjunctiva clear  Ears, Nose and Throat: No rhinorrhea or stridor.  Cardiac:  Tachycardic rate, Reg Rhythm, No murmur, rubs or gallops  Pulmonary:  Diminished breath sounds at the bases, poor air movement, tachypnea, increased work of breathing  GI: Abdomen soft, non-tender, non-distended; no rebound or guarding  : No CVA tenderness.  Musculoskeletal: Extremities warm, well perfused, no erythema, no edema  Skin: No rashes, no cyanosis or jaundice  Neuro: Alert and Oriented x 3; No focal motor or sensory deficits.    Psych: Normal affect, anxious      ED Course   Procedures  Labs Reviewed   CBC W/ AUTO DIFFERENTIAL - Abnormal; Notable for the following components:       Result Value    WBC 15.52 (*)     RDW 14.8 (*)     Immature Granulocytes 0.9 (*)     Gran # (ANC) 14.1 (*)     Immature Grans (Abs) 0.14 (*)     Lymph # 0.6 (*)     Gran% 90.9 (*)     Lymph% 3.9 (*)     All other components within normal limits    COMPREHENSIVE METABOLIC PANEL - Abnormal; Notable for the following components:    Glucose 155 (*)     Albumin 3.3 (*)     All other components within normal limits   ISTAT PROCEDURE - Abnormal; Notable for the following components:    POC PO2 63 (*)     POC HCO3 28.5 (*)     POC SATURATED O2 92 (*)     POC TCO2 30 (*)     All other components within normal limits   B-TYPE NATRIURETIC PEPTIDE   TROPONIN I   SARS-COV-2 RNA AMPLIFICATION, QUAL   PROCALCITONIN   PROCALCITONIN    Narrative:     ADD-ON PCAL #[id number] PER CAMELIA KISER MD 16:10  08/22/2020    POCT GLUCOSE MONITORING CONTINUOUS     EKG Readings: (Independently Interpreted)   Rhythm: Normal Sinus Rhythm. Heart Rate: 85. Ectopy: No Ectopy. ST Segments: Non-Specific ST Segment Depression. Axis: Left Axis Deviation.     ECG Results          EKG 12-lead (In process)  Result time 08/22/20 21:12:12    In process by Interface, Lab In [facility] (08/22/20 21:12:12)                 Narrative:    Test Reason : R06.02,    Vent. Rate : 085 BPM     Atrial Rate : 085 BPM     P-R Int : 132 ms          QRS Dur : 070 ms      QT Int : 374 ms       P-R-T Axes : 071 -71 072 degrees     QTc Int : 445 ms    Normal sinus rhythm  Left axis deviation  Cannot rule out Inferior infarct ,age undetermined  Anteroseptal infarct ,age undetermined  Abnormal ECG  When compared with ECG of 08-MAY-2020 12:09,  Anteroseptal infarct is now Present  No significant change was found    Referred By: [email or web address]   SELF           Confirmed By:                             Imaging Results           X-Ray Chest AP Portable (Final result)  Result time 08/22/20 12:44:52    Final result by Mercy Panda MD (08/22/20 12:44:52)                 Impression:      Interval development of focal abnormal opacification in the right lower lung since May 8, 2020.  Given the interval development over a short period of time, this is most likely due to an acute process such as infection.  Follow-up chest  radiographs after resolution of clinical symptoms are recommended to document radiographic resolution.    This report was flagged in Epic as abnormal.      Electronically signed by: Mercy Panda MD  Date:    08/22/2020  Time:    12:44             Narrative:    EXAMINATION:  XR CHEST AP PORTABLE    CLINICAL HISTORY:  sob;    TECHNIQUE:  Single frontal view of the chest was performed.    COMPARISON:  May 8, 2020    FINDINGS:  Since the previous examination, focal abnormal airspace opacification has developed in the right lower lung.  Differential considerations include pneumonic consolidation.  Pulmonary nodule seems less likely given the rapid development since the previous examination.  The left lung remains clear.  There is no large pleural effusion or pneumothorax.  There are degenerative changes of the spine as previously.  There are atherosclerotic calcifications of the aorta.                              X-Rays:   Independently Interpreted Readings:   Chest X-Ray: Normal heart size. Right lower lobe pneumonia, no pneumothorax or free air     Medical Decision Making:   History:   Old Medical Records: I decided to obtain old medical records.  Old Records Summarized: records from clinic visits and records from previous admission(s).  Initial Assessment:   75-year-old female with a history of COPD, hypertension, hyperlipidemia, history of lung nodules, cataracts, arthritis presenting with concern for COPD exacerbation associated with shortness of breath.  Differential Diagnosis:   COPD exacerbation, pneumonia, COVID, pleural effusion, PE, ACS, CHF  Independently Interpreted Test(s):   I have ordered and independently interpreted X-rays - see prior notes.  I have ordered and independently interpreted EKG Reading(s) - see prior notes  Clinical Tests:   Lab Tests: Ordered and Reviewed  Radiological Study: Ordered and Reviewed  Medical Tests: Ordered and Reviewed    Emergent evaluation of patient presenting with  shortness of breath.  Exam and history with likely COPD exacerbation.  However patient does endorse increased leg swelling recently.  Onset was sudden associated with dyspnea on exertion, shortness of breath and increased work of breathing.  She is currently on basal prednisone, 20 mg daily.  Denies any antibiotic use recently.  Denies any chest pain, lightheadedness or dizziness.  ECG obtained immediately upon arrival with no signs of ischemia or STEMI on my read.  Chest x-ray obtained, IV line, labs, cardiac and heart failure workup as well.  She was given Solu-Medrol 125 mg as a steroid burst for her COPD exacerbation as she has wheezing on exam.  Increased oxygen requirements during evaluation.  Chest x-ray shows right lower lobe pneumonia type picture.  Given relative hypoxemia, increased work of breathing will treat for community-acquired pneumonia and superimposed on top of COPD exacerbation.  She did show improvement with duo nebs, Solu-Medrol and oxygen requirements.  She was given magnesium sulfate as well.  Patient was given IV dose of Levaquin in the ED, and discussed case with hospital medicine.  Patient is admitted for pneumonia and COPD exacerbation.  Patient agreeable to admission plan.  Do not suspect ACS, PE, CHF at this time.     Complexity: High - level 5                                   Clinical Impression:       ICD-10-CM ICD-9-CM   1. Pneumonia of right lower lobe due to infectious organism  J18.1 486   2. SOB (shortness of breath)  R06.02 786.05   3. COPD with acute exacerbation  J44.1 491.21   4. Chest pain  R07.9 786.50         Disposition:   Disposition: Admitted  Condition: Fair     ED Disposition Condition    Admit              Moe Schuler DO, [facility]  Emergency Staff Physician   Dept of Emergency Medicine   [facility]  Spectralink: [phone]               Moe Schuler DO  08/23/20 0531

## 2020-08-22 NOTE — HPI
74 y/o F with PMHx of COPD for 20 yrs, HTN, hyperlipidemia, history of lung nodules, and chronic steroid use presents with SOB greater than baseline over 7 days duration. Patient uses 2 L nasal cannula with daily activity; prior night had increased oxygen requirement at rest and concomitant gradual pain on respiration. Located diffusely around back. Described as mild pain. She denies any constitutional symptoms, rhinorrhea, sore throat, choking, hematochezia, coughing, sputum production, chest pain, palpitations, changes in bowel and bladder habits, melena, nausea, vomiting, rash, dizziness, facial asymmetry, or syncope. Endorses leg swelling. Aggravated with movement. Pain alleviated by tylenol.

## 2020-08-23 VITALS
SYSTOLIC BLOOD PRESSURE: 144 MMHG | BODY MASS INDEX: 25.55 KG/M2 | WEIGHT: 126.75 LBS | OXYGEN SATURATION: 94 % | HEART RATE: 104 BPM | TEMPERATURE: 98 F | HEIGHT: 59 IN | RESPIRATION RATE: 17 BRPM | DIASTOLIC BLOOD PRESSURE: 67 MMHG

## 2020-08-23 LAB
ALBUMIN SERPL BCP-MCNC: 2.9 G/DL (ref 3.5–5.2)
ALP SERPL-CCNC: 64 U/L (ref 55–135)
ALT SERPL W/O P-5'-P-CCNC: 12 U/L (ref 10–44)
ANION GAP SERPL CALC-SCNC: 8 MMOL/L (ref 8–16)
AST SERPL-CCNC: 14 U/L (ref 10–40)
BASOPHILS # BLD AUTO: 0.01 K/UL (ref 0–0.2)
BASOPHILS NFR BLD: 0.1 % (ref 0–1.9)
BILIRUB SERPL-MCNC: 0.3 MG/DL (ref 0.1–1)
BUN SERPL-MCNC: 14 MG/DL (ref 8–23)
CALCIUM SERPL-MCNC: 7.7 MG/DL (ref 8.7–10.5)
CHLORIDE SERPL-SCNC: 99 MMOL/L (ref 95–110)
CO2 SERPL-SCNC: 30 MMOL/L (ref 23–29)
CREAT SERPL-MCNC: 0.6 MG/DL (ref 0.5–1.4)
DIFFERENTIAL METHOD: ABNORMAL
EOSINOPHIL # BLD AUTO: 0 K/UL (ref 0–0.5)
EOSINOPHIL NFR BLD: 0 % (ref 0–8)
ERYTHROCYTE [DISTWIDTH] IN BLOOD BY AUTOMATED COUNT: 14.7 % (ref 11.5–14.5)
EST. GFR  (AFRICAN AMERICAN): >60 ML/MIN/1.73 M^2
EST. GFR  (NON AFRICAN AMERICAN): >60 ML/MIN/1.73 M^2
GLUCOSE SERPL-MCNC: 157 MG/DL (ref 70–110)
HCT VFR BLD AUTO: 43.5 % (ref 37–48.5)
HGB BLD-MCNC: 14 G/DL (ref 12–16)
IMM GRANULOCYTES # BLD AUTO: 0.07 K/UL (ref 0–0.04)
IMM GRANULOCYTES NFR BLD AUTO: 0.8 % (ref 0–0.5)
LYMPHOCYTES # BLD AUTO: 0.5 K/UL (ref 1–4.8)
LYMPHOCYTES NFR BLD: 5.7 % (ref 18–48)
MCH RBC QN AUTO: 30 PG (ref 27–31)
MCHC RBC AUTO-ENTMCNC: 32.2 G/DL (ref 32–36)
MCV RBC AUTO: 93 FL (ref 82–98)
MONOCYTES # BLD AUTO: 0.3 K/UL (ref 0.3–1)
MONOCYTES NFR BLD: 3.3 % (ref 4–15)
NEUTROPHILS # BLD AUTO: 8.3 K/UL (ref 1.8–7.7)
NEUTROPHILS NFR BLD: 90.1 % (ref 38–73)
NRBC BLD-RTO: 0 /100 WBC
PLATELET # BLD AUTO: 238 K/UL (ref 150–350)
PMV BLD AUTO: 9.5 FL (ref 9.2–12.9)
POCT GLUCOSE: 135 MG/DL (ref 70–110)
POTASSIUM SERPL-SCNC: 3.5 MMOL/L (ref 3.5–5.1)
PROT SERPL-MCNC: 5.9 G/DL (ref 6–8.4)
RBC # BLD AUTO: 4.67 M/UL (ref 4–5.4)
SODIUM SERPL-SCNC: 137 MMOL/L (ref 136–145)
WBC # BLD AUTO: 9.2 K/UL (ref 3.9–12.7)

## 2020-08-23 PROCEDURE — 25000242 PHARM REV CODE 250 ALT 637 W/ HCPCS: Performed by: STUDENT IN AN ORGANIZED HEALTH CARE EDUCATION/TRAINING PROGRAM

## 2020-08-23 PROCEDURE — 94640 AIRWAY INHALATION TREATMENT: CPT

## 2020-08-23 PROCEDURE — 36415 COLL VENOUS BLD VENIPUNCTURE: CPT

## 2020-08-23 PROCEDURE — 85025 COMPLETE CBC W/AUTO DIFF WBC: CPT

## 2020-08-23 PROCEDURE — 99238 PR HOSPITAL DISCHARGE DAY,<30 MIN: ICD-10-PCS | Mod: GC,,, | Performed by: HOSPITALIST

## 2020-08-23 PROCEDURE — 25000003 PHARM REV CODE 250: Performed by: STUDENT IN AN ORGANIZED HEALTH CARE EDUCATION/TRAINING PROGRAM

## 2020-08-23 PROCEDURE — 99900035 HC TECH TIME PER 15 MIN (STAT)

## 2020-08-23 PROCEDURE — 27000221 HC OXYGEN, UP TO 24 HOURS

## 2020-08-23 PROCEDURE — 94761 N-INVAS EAR/PLS OXIMETRY MLT: CPT

## 2020-08-23 PROCEDURE — 99238 HOSP IP/OBS DSCHRG MGMT 30/<: CPT | Mod: GC,,, | Performed by: HOSPITALIST

## 2020-08-23 PROCEDURE — 63600175 PHARM REV CODE 636 W HCPCS: Performed by: STUDENT IN AN ORGANIZED HEALTH CARE EDUCATION/TRAINING PROGRAM

## 2020-08-23 PROCEDURE — 80053 COMPREHEN METABOLIC PANEL: CPT

## 2020-08-23 RX ORDER — PREDNISONE 20 MG/1
40 TABLET ORAL DAILY
Qty: 6 TABLET | Refills: 0 | Status: SHIPPED | OUTPATIENT
Start: 2020-08-24 | End: 2020-08-27

## 2020-08-23 RX ORDER — LEVOFLOXACIN 750 MG/1
750 TABLET ORAL DAILY
Qty: 4 TABLET | Refills: 0 | Status: SHIPPED | OUTPATIENT
Start: 2020-08-24 | End: 2020-08-27 | Stop reason: SDUPTHER

## 2020-08-23 RX ADMIN — PRAVASTATIN SODIUM 20 MG: 10 TABLET ORAL at 08:08

## 2020-08-23 RX ADMIN — LEVOFLOXACIN 750 MG: 500 TABLET, FILM COATED ORAL at 08:08

## 2020-08-23 RX ADMIN — IPRATROPIUM BROMIDE AND ALBUTEROL SULFATE 3 ML: .5; 2.5 SOLUTION RESPIRATORY (INHALATION) at 01:08

## 2020-08-23 RX ADMIN — PREDNISONE 40 MG: 10 TABLET ORAL at 08:08

## 2020-08-23 RX ADMIN — CETIRIZINE HYDROCHLORIDE 10 MG: 10 TABLET, FILM COATED ORAL at 08:08

## 2020-08-23 RX ADMIN — IPRATROPIUM BROMIDE AND ALBUTEROL SULFATE 3 ML: .5; 2.5 SOLUTION RESPIRATORY (INHALATION) at 08:08

## 2020-08-23 RX ADMIN — TIOTROPIUM BROMIDE 18 MCG: 18 CAPSULE ORAL; RESPIRATORY (INHALATION) at 11:08

## 2020-08-23 RX ADMIN — FLUTICASONE FUROATE AND VILANTEROL TRIFENATATE 1 PUFF: 100; 25 POWDER RESPIRATORY (INHALATION) at 08:08

## 2020-08-23 RX ADMIN — DILTIAZEM HYDROCHLORIDE 180 MG: 180 CAPSULE, COATED, EXTENDED RELEASE ORAL at 08:08

## 2020-08-23 RX ADMIN — ASPIRIN 81 MG: 81 TABLET, COATED ORAL at 08:08

## 2020-08-23 NOTE — ASSESSMENT & PLAN NOTE
76 y/o patient with 20 yr history of COPD on 40 year smoking history. Quit smoking 20 years prior.

## 2020-08-23 NOTE — SUBJECTIVE & OBJECTIVE
Past Medical History:   Diagnosis Date    Actinic keratosis     Arthritis     Cataract     Cellulitis of ankle     Colon polyps     COPD (chronic obstructive pulmonary disease)     home O2 (2L/min)     Family history of colon cancer     History of Clostridium difficile infection 7/3/2012    Hyperlipidemia     Hypertension     Lung nodules     Sinus tachycardia        Past Surgical History:   Procedure Laterality Date    APPENDECTOMY      CATARACT EXTRACTION Bilateral      SECTION      x2    COLONOSCOPY N/A 10/10/2017    Procedure: COLONOSCOPY;  Surgeon: Omid Lino MD;  Location: [address]);  Service: Endoscopy;  Laterality: N/A;  pt unable to be checked in with previous order    EYE SURGERY      TONSILLECTOMY         Review of patient's allergies indicates:   Allergen Reactions    Bactrim [sulfamethoxazole-trimethoprim] Nausea Only    Codeine Itching    Keflex [cephalexin] Other (See Comments)     Rhinorrhea, nausea. Tolerated Ceftriaxone 2014    Ceftriaxone Rash     Morbilliform rash after receiving IV ceftriaxone    Clindamycin hcl Rash    Doxycycline Rash    Vancomycin analogues Rash     Morbilliform rash after receiving IV vancomycin       No current facility-administered medications on file prior to encounter.      Current Outpatient Medications on File Prior to Encounter   Medication Sig    albuterol (PROVENTIL) 2.5 mg /3 mL (0.083 %) nebulizer solution Take 3 mLs (2.5 mg total) by nebulization every 4 (four) hours as needed for Wheezing or Shortness of Breath.    albuterol (PROVENTIL/VENTOLIN HFA) 90 mcg/actuation inhaler Inhale 2 puffs into the lungs every 6 (six) hours as needed for Wheezing or Shortness of Breath (Pro Air HFA per insurance). Rescue    ALPRAZolam (XANAX) 0.25 MG tablet Take 1 tablet (0.25 mg total) by mouth 3 (three) times daily as needed for Anxiety.    aspirin (ECOTRIN) 81 MG EC tablet Take 81 mg by mouth once daily.       cetirizine (ZYRTEC) 10 MG tablet TAKE 1 TABLET BY MOUTH EVERY DAY    cholecalciferol, vitamin D3, (VITAMIN D3) 10 mcg (400 unit) Cap Take 1,000 Units by mouth once daily.    diltiaZEM (CARDIZEM CD) 180 MG 24 hr capsule TAKE 1 CAPSULE (180 MG TOTAL) BY MOUTH ONCE DAILY.    hydroCHLOROthiazide (HYDRODIURIL) 25 MG tablet TAKE 1 TABLET BY MOUTH EVERY DAY    potassium chloride SA (K-DUR,KLOR-CON) 20 MEQ tablet TAKE 1 TABLET BY MOUTH EVERY DAY    pravastatin (PRAVACHOL) 20 MG tablet Take 1 tablet (20 mg total) by mouth once daily.    predniSONE (DELTASONE) 10 MG tablet 1or 2 tabs every AM    albuterol (PROVENTIL) 2.5 mg /3 mL (0.083 %) nebulizer solution     budesonide-formoterol 160-4.5 mcg (SYMBICORT) 160-4.5 mcg/actuation HFAA INHALE 2 PUFFS INTO THE LUNGS EVERY 12 HOURS    INCRUSE ELLIPTA 62.5 mcg/actuation DsDv INHALE 1 PUFF DAILY    [DISCONTINUED] budesonide-formoterol 160-4.5 mcg (SYMBICORT) 160-4.5 mcg/actuation HFAA INHALE 2 PUFFS INTO THE LUNGS EVERY 12 HOURS     Family History     Problem Relation (Age of Onset)    Blindness Paternal Grandmother    Breast cancer Sister    COPD Father    Cancer Mother (79), Sister, Son    Cataracts Mother, Sister    Diabetes Father, Paternal Grandmother    Glaucoma Mother    Heart disease Mother, Father, Sister    Hyperlipidemia Mother, Father, Sister    Hypertension Mother, Father, Sister    Skin cancer Mother, Sister    Thyroid disease Mother, Daughter        Tobacco Use    Smoking status: Former Smoker     Packs/day: 1.00     Years: 39.00     Pack years: 39.00     Types: Cigarettes     Quit date: 1995     Years since quittin.8    Smokeless tobacco: Never Used   Substance and Sexual Activity    Alcohol use: Yes     Alcohol/week: 2.0 standard drinks     Types: 2 Glasses of wine per week     Frequency: Monthly or less     Drinks per session: 1 or 2     Binge frequency: Never     Comment: rarely has a glass of wine- not daily    Drug use: No    Sexual  activity: Never     Review of Systems   Constitutional: Negative.    HENT: Negative for rhinorrhea, sore throat, trouble swallowing and voice change.    Eyes: Negative for photophobia and visual disturbance.   Respiratory: Positive for shortness of breath and wheezing. Negative for cough, choking and chest tightness.    Cardiovascular: Positive for leg swelling. Negative for chest pain and palpitations.   Gastrointestinal: Negative for abdominal pain, blood in stool, nausea and vomiting.   Genitourinary: Negative for difficulty urinating and dysuria.   Musculoskeletal: Negative for arthralgias, myalgias, neck pain and neck stiffness.   Skin: Negative for rash and wound.   Neurological: Negative for dizziness, light-headedness, numbness and headaches.   Psychiatric/Behavioral: Negative for behavioral problems, confusion and decreased concentration.     Objective:     Vital Signs (Most Recent):  Temp: 98.3 °F (36.8 °C) (08/22/20 1835)  Pulse: 94 (08/22/20 1835)  Resp: 19 (08/22/20 1835)  BP: 139/70 (08/22/20 1835)  SpO2: 95 % (08/22/20 1835) Vital Signs (24h Range):  Temp:  [98.3 °F (36.8 °C)] 98.3 °F (36.8 °C)  Pulse:  [84-98] 94  Resp:  [16-25] 19  SpO2:  [91 %-96 %] 95 %  BP: (132-165)/(61-76) 139/70     Weight: 52.6 kg (116 lb)  Body mass index is 23.43 kg/m².    Physical Exam  Constitutional:       General: She is not in acute distress.     Appearance: She is normal weight. She is ill-appearing. She is not diaphoretic.   HENT:      Head: Normocephalic and atraumatic.      Right Ear: External ear normal.      Left Ear: External ear normal.      Nose: No congestion or rhinorrhea.      Mouth/Throat:      Mouth: Mucous membranes are moist.      Pharynx: No oropharyngeal exudate or posterior oropharyngeal erythema.   Eyes:      General: No scleral icterus.        Right eye: No discharge.         Left eye: No discharge.      Extraocular Movements: Extraocular movements intact.      Conjunctiva/sclera: Conjunctivae  normal.      Pupils: Pupils are equal, round, and reactive to light.   Neck:      Musculoskeletal: Normal range of motion and neck supple. No neck rigidity or muscular tenderness.   Cardiovascular:      Rate and Rhythm: Normal rate and regular rhythm.      Pulses: Normal pulses.      Heart sounds: Normal heart sounds.   Pulmonary:      Effort: Accessory muscle usage and respiratory distress present.      Breath sounds: Examination of the right-lower field reveals decreased breath sounds. Examination of the left-lower field reveals decreased breath sounds. Decreased breath sounds and wheezing present.      Comments: Pursed lip breathing with prolonged expiration, chest hyper inflated. Forced expiratory time >5 seconds. Breath sounds are quiet at bases of right and left lung.   Chest:      Chest wall: No tenderness.   Abdominal:      General: Abdomen is flat.      Tenderness: There is no abdominal tenderness.   Musculoskeletal: Normal range of motion.         General: Swelling present. No deformity.      Right lower leg: Edema present.      Left lower leg: Edema present.   Skin:     General: Skin is warm and dry.      Capillary Refill: Capillary refill takes less than 2 seconds.      Coloration: Skin is pale. Skin is not jaundiced.      Findings: Erythema present.   Neurological:      General: No focal deficit present.      Mental Status: She is alert and oriented to person, place, and time.      Cranial Nerves: No cranial nerve deficit.   Psychiatric:         Mood and Affect: Mood normal.         Behavior: Behavior normal.           CRANIAL NERVES     CN III, IV, VI   Pupils are equal, round, and reactive to light.       Significant Labs: All pertinent labs within the past 24 hours have been reviewed.    Significant Imaging: I have reviewed all pertinent imaging results/findings within the past 24 hours.

## 2020-08-23 NOTE — H&P
[facility] Hospital Medicine  History & Physical    Patient Name: Leyla Mari  MRN: [id number]  Admission Date: 2020  Attending Physician: Rosalind Coello MD   Primary Care Provider: Dulce Castro MD    [facility] Medicine Team: [facility]OSP MED 5 Jl Green MD     Patient information was obtained from patient, past medical records and ER records.     Subjective:     Principal Problem:Acute respiratory failure with hypoxia    Chief Complaint:   Chief Complaint   Patient presents with    Shortness of Breath     Hx of COPD. Denies fever, cough, chills.         HPI: 74 y/o F with PMHx of COPD for 20 yrs, HTN, hyperlipidemia, history of lung nodules, and chronic steroid use presents with SOB greater than baseline over 7 days duration. Patient uses 2 L nasal cannula with daily activity; prior night had increased oxygen requirement at rest and concomitant gradual pain on respiration. Located diffusely around back. Described as mild pain. She denies any constitutional symptoms, rhinorrhea, sore throat, choking, hematochezia, coughing, sputum production, chest pain, palpitations, changes in bowel and bladder habits, melena, nausea, vomiting, rash, dizziness, facial asymmetry, or syncope. Endorses leg swelling. Aggravated with movement. Pain alleviated by tylenol.     Past Medical History:   Diagnosis Date    Actinic keratosis     Arthritis     Cataract     Cellulitis of ankle     Colon polyps     COPD (chronic obstructive pulmonary disease)     home O2 (2L/min)     Family history of colon cancer     History of Clostridium difficile infection 7/3/2012    Hyperlipidemia     Hypertension     Lung nodules     Sinus tachycardia        Past Surgical History:   Procedure Laterality Date    APPENDECTOMY      CATARACT EXTRACTION Bilateral      SECTION      x2    COLONOSCOPY N/A 10/10/2017    Procedure: COLONOSCOPY;  Surgeon: Omid Lino MD;  Location: [address]  [facility]);  Service: Endoscopy;  Laterality: N/A;  pt unable to be checked in with previous order    EYE SURGERY      TONSILLECTOMY         Review of patient's allergies indicates:   Allergen Reactions    Bactrim [sulfamethoxazole-trimethoprim] Nausea Only    Codeine Itching    Keflex [cephalexin] Other (See Comments)     Rhinorrhea, nausea. Tolerated Ceftriaxone June 2014    Ceftriaxone Rash     Morbilliform rash after receiving IV ceftriaxone    Clindamycin hcl Rash    Doxycycline Rash    Vancomycin analogues Rash     Morbilliform rash after receiving IV vancomycin       No current facility-administered medications on file prior to encounter.      Current Outpatient Medications on File Prior to Encounter   Medication Sig    albuterol (PROVENTIL) 2.5 mg /3 mL (0.083 %) nebulizer solution Take 3 mLs (2.5 mg total) by nebulization every 4 (four) hours as needed for Wheezing or Shortness of Breath.    albuterol (PROVENTIL/VENTOLIN HFA) 90 mcg/actuation inhaler Inhale 2 puffs into the lungs every 6 (six) hours as needed for Wheezing or Shortness of Breath (Pro Air HFA per insurance). Rescue    ALPRAZolam (XANAX) 0.25 MG tablet Take 1 tablet (0.25 mg total) by mouth 3 (three) times daily as needed for Anxiety.    aspirin (ECOTRIN) 81 MG EC tablet Take 81 mg by mouth once daily.      cetirizine (ZYRTEC) 10 MG tablet TAKE 1 TABLET BY MOUTH EVERY DAY    cholecalciferol, vitamin D3, (VITAMIN D3) 10 mcg (400 unit) Cap Take 1,000 Units by mouth once daily.    diltiaZEM (CARDIZEM CD) 180 MG 24 hr capsule TAKE 1 CAPSULE (180 MG TOTAL) BY MOUTH ONCE DAILY.    hydroCHLOROthiazide (HYDRODIURIL) 25 MG tablet TAKE 1 TABLET BY MOUTH EVERY DAY    potassium chloride SA (K-DUR,KLOR-CON) 20 MEQ tablet TAKE 1 TABLET BY MOUTH EVERY DAY    pravastatin (PRAVACHOL) 20 MG tablet Take 1 tablet (20 mg total) by mouth once daily.    predniSONE (DELTASONE) 10 MG tablet 1or 2 tabs every AM    albuterol (PROVENTIL) 2.5 mg /3 mL  (0.083 %) nebulizer solution     budesonide-formoterol 160-4.5 mcg (SYMBICORT) 160-4.5 mcg/actuation HFAA INHALE 2 PUFFS INTO THE LUNGS EVERY 12 HOURS    INCRUSE ELLIPTA 62.5 mcg/actuation DsDv INHALE 1 PUFF DAILY    [DISCONTINUED] budesonide-formoterol 160-4.5 mcg (SYMBICORT) 160-4.5 mcg/actuation HFAA INHALE 2 PUFFS INTO THE LUNGS EVERY 12 HOURS     Family History     Problem Relation (Age of Onset)    Blindness Paternal Grandmother    Breast cancer Sister    COPD Father    Cancer Mother (79), Sister, Son    Cataracts Mother, Sister    Diabetes Father, Paternal Grandmother    Glaucoma Mother    Heart disease Mother, Father, Sister    Hyperlipidemia Mother, Father, Sister    Hypertension Mother, Father, Sister    Skin cancer Mother, Sister    Thyroid disease Mother, Daughter        Tobacco Use    Smoking status: Former Smoker     Packs/day: 1.00     Years: 39.00     Pack years: 39.00     Types: Cigarettes     Quit date: 1995     Years since quittin.8    Smokeless tobacco: Never Used   Substance and Sexual Activity    Alcohol use: Yes     Alcohol/week: 2.0 standard drinks     Types: 2 Glasses of wine per week     Frequency: Monthly or less     Drinks per session: 1 or 2     Binge frequency: Never     Comment: rarely has a glass of wine- not daily    Drug use: No    Sexual activity: Never     Review of Systems   Constitutional: Negative.    HENT: Negative for rhinorrhea, sore throat, trouble swallowing and voice change.    Eyes: Negative for photophobia and visual disturbance.   Respiratory: Positive for shortness of breath and wheezing. Negative for cough, choking and chest tightness.    Cardiovascular: Positive for leg swelling. Negative for chest pain and palpitations.   Gastrointestinal: Negative for abdominal pain, blood in stool, nausea and vomiting.   Genitourinary: Negative for difficulty urinating and dysuria.   Musculoskeletal: Negative for arthralgias, myalgias, neck pain and neck  stiffness.   Skin: Negative for rash and wound.   Neurological: Negative for dizziness, light-headedness, numbness and headaches.   Psychiatric/Behavioral: Negative for behavioral problems, confusion and decreased concentration.     Objective:     Vital Signs (Most Recent):  Temp: 98.3 °F (36.8 °C) (08/22/20 1835)  Pulse: 94 (08/22/20 1835)  Resp: 19 (08/22/20 1835)  BP: 139/70 (08/22/20 1835)  SpO2: 95 % (08/22/20 1835) Vital Signs (24h Range):  Temp:  [98.3 °F (36.8 °C)] 98.3 °F (36.8 °C)  Pulse:  [84-98] 94  Resp:  [16-25] 19  SpO2:  [91 %-96 %] 95 %  BP: (132-165)/(61-76) 139/70     Weight: 52.6 kg (116 lb)  Body mass index is 23.43 kg/m².    Physical Exam  Constitutional:       General: She is not in acute distress.     Appearance: She is normal weight. She is ill-appearing. She is not diaphoretic.   HENT:      Head: Normocephalic and atraumatic.      Right Ear: External ear normal.      Left Ear: External ear normal.      Nose: No congestion or rhinorrhea.      Mouth/Throat:      Mouth: Mucous membranes are moist.      Pharynx: No oropharyngeal exudate or posterior oropharyngeal erythema.   Eyes:      General: No scleral icterus.        Right eye: No discharge.         Left eye: No discharge.      Extraocular Movements: Extraocular movements intact.      Conjunctiva/sclera: Conjunctivae normal.      Pupils: Pupils are equal, round, and reactive to light.   Neck:      Musculoskeletal: Normal range of motion and neck supple. No neck rigidity or muscular tenderness.   Cardiovascular:      Rate and Rhythm: Normal rate and regular rhythm.      Pulses: Normal pulses.      Heart sounds: Normal heart sounds.   Pulmonary:      Effort: Accessory muscle usage and respiratory distress present.      Breath sounds: Examination of the right-lower field reveals decreased breath sounds. Examination of the left-lower field reveals decreased breath sounds. Decreased breath sounds and wheezing present.      Comments: Pursed lip  breathing with prolonged expiration, chest hyper inflated. Forced expiratory time >5 seconds. Breath sounds are quiet at bases of right and left lung.   Chest:      Chest wall: No tenderness.   Abdominal:      General: Abdomen is flat.      Tenderness: There is no abdominal tenderness.   Musculoskeletal: Normal range of motion.         General: Swelling present. No deformity.      Right lower leg: Edema present.      Left lower leg: Edema present.   Skin:     General: Skin is warm and dry.      Capillary Refill: Capillary refill takes less than 2 seconds.      Coloration: Skin is pale. Skin is not jaundiced.      Findings: Erythema present.   Neurological:      General: No focal deficit present.      Mental Status: She is alert and oriented to person, place, and time.      Cranial Nerves: No cranial nerve deficit.   Psychiatric:         Mood and Affect: Mood normal.         Behavior: Behavior normal.           CRANIAL NERVES     CN III, IV, VI   Pupils are equal, round, and reactive to light.       Significant Labs: All pertinent labs within the past 24 hours have been reviewed.    Significant Imaging: I have reviewed all pertinent imaging results/findings within the past 24 hours.    Assessment/Plan:     * Acute respiratory failure with hypoxia  74 y/o patient with acute hypoxemic respiratory failure on PMHx of acute on chronic COPD and chronic steroid use. Previous ECHO (05/09/2020) EF 70%. CXR concerning for right lower lobe pneumonia. CBC with elevated WBC (unclear if new steroid regimen or acute change secondary to infectious etiology). Treated in ED with pulse dose steroids, IV magnesium, and oxygen therapy; patient stabilized.     - vitals Q4 hrs  - duonebs with PRN home inhalers  - empiric CAP treatment 5 days          CAP (community acquired pneumonia)  - see primary problem      Chronic obstructive pulmonary disease  74 y/o patient with 20 yr history of COPD on 40 year smoking history. Quit smoking 20  years prior.       Hyperlipidemia  - continue pravastatin    Hypertension  - continue home bp medications            VTE Risk Mitigation (From admission, onward)         Ordered     heparin (porcine) injection 5,000 Units  Every 8 hours      08/22/20 1453     IP VTE HIGH RISK PATIENT  Once      08/22/20 1453     Place sequential compression device  Until discontinued      08/22/20 1453                   Jl Green MD  Department of Hospital Medicine   [facility]

## 2020-08-23 NOTE — PLAN OF CARE
Went over plan of care - all questions answered. No complaints voiced. No falls or injuries. Ready to go home. Will continue to monitor

## 2020-08-23 NOTE — PLAN OF CARE
CM met with patient at the bedside to discuss discharge planning assessment. Pt lives with son and has transportation home at discharge.  Pt verified PCP and Pharmacy. CM will continue to follow for discharge needs.         08/23/20 1049   Discharge Assessment   Assessment Type Discharge Planning Assessment   Confirmed/corrected address and phone number on facesheet? Yes   Assessment information obtained from? Patient   Expected Length of Stay (days) 2   Communicated expected length of stay with patient/caregiver yes   Prior to hospitilization cognitive status: Alert/Oriented   Prior to hospitalization functional status: Independent   Current cognitive status: Alert/Oriented   Current Functional Status: Independent   Lives With child(ernesto), adult   Able to Return to Prior Arrangements yes   Is patient able to care for self after discharge? Yes   Patient's perception of discharge disposition home or selfcare   Readmission Within the Last 30 Days no previous admission in last 30 days   Patient currently being followed by outpatient case management? No   Patient currently receives any other outside agency services? No   Equipment Currently Used at Home oxygen;nebulizer   Do you have any problems affording any of your prescribed medications? No   Is the patient taking medications as prescribed? yes   Does the patient have transportation home? Yes   Transportation Anticipated family or friend will provide   Does the patient receive services at the [facility]? No   Discharge Plan A Home with family   Discharge Plan B Home with family   DME Needed Upon Discharge  none   Patient/Family in Agreement with Plan yes

## 2020-08-23 NOTE — ASSESSMENT & PLAN NOTE
Concern about future risk of developing glucocorticoid complications that may threaten already vulnerable mobility    - maintain optimal calcium and vitamin D  - reduce corticoid steroid use to minimize risk

## 2020-08-23 NOTE — HOSPITAL COURSE
76 y/o patient with PMHx of COPD for 20 yrs, HTN, hyperlipidemia, history of lung nodules, and chronic steroid use presents with SOB greater than baseline over 7 days duration. ECG unremarkable. Chest x-ray indicative of right lower lung pneumonia. Treated with Solu-Medrol 125 mg steroid burst for initial COPD exacerbation, oxygen therapy, and levofloxacin 750 mg QD for 5 days.

## 2020-08-23 NOTE — DISCHARGE SUMMARY
[facility] Hospital Medicine  Discharge Summary      Patient Name: Leyla Mari  MRN: [id number]  Admission Date: 8/22/2020  Hospital Length of Stay: 1 days  Discharge Date and Time:  08/23/2020 3:00 PM  Attending Physician: No att. providers found   Discharging Provider: Jl Green MD  Primary Care Provider: Dulce Castro MD  [facility] Medicine Team: [facility]OSP MED 5 Jl Green MD    HPI:   76 y/o F with PMHx of COPD for 20 yrs, HTN, hyperlipidemia, history of lung nodules, and chronic steroid use presents with SOB greater than baseline over 7 days duration. Patient uses 2 L nasal cannula with daily activity; prior night had increased oxygen requirement at rest and concomitant gradual pain on respiration. Located diffusely around back. Described as mild pain. She denies any constitutional symptoms, rhinorrhea, sore throat, choking, hematochezia, coughing, sputum production, chest pain, palpitations, changes in bowel and bladder habits, melena, nausea, vomiting, rash, dizziness, facial asymmetry, or syncope. Endorses leg swelling. Aggravated with movement. Pain alleviated by tylenol.     * No surgery found *      Hospital Course:   76 y/o patient with PMHx of COPD for 20 yrs, HTN, hyperlipidemia, history of lung nodules, and chronic steroid use presents with SOB greater than baseline over 7 days duration. ECG unremarkable. Chest x-ray indicative of right lower lung pneumonia. Treated with Solu-Medrol 125 mg steroid burst for initial COPD exacerbation, oxygen therapy, and levofloxacin 750 mg QD for 5 days.      Vitals:    08/23/20 1320   BP:    Pulse: 104   Resp: 17   Temp:      Physical Exam   Constitutional: She is oriented to person, place, and time and well-developed, well-nourished, and in no distress. No distress.   HENT:   Head: Normocephalic and atraumatic.   Nose: Nose normal.   Mouth/Throat: Oropharynx is clear and moist.   Eyes: Pupils are equal, round, and reactive to  light. Conjunctivae and EOM are normal.   Neck: Normal range of motion. Neck supple.   Cardiovascular: Normal rate and regular rhythm.   Pulmonary/Chest: Effort normal and breath sounds normal. No stridor. No respiratory distress. She has no wheezes.   Abdominal: Soft. There is no abdominal tenderness. There is no rebound.   Musculoskeletal: Normal range of motion.         General: No tenderness or edema.   Neurological: She is alert and oriented to person, place, and time. Gait normal.   Skin: Skin is warm and dry. She is not diaphoretic.   Psychiatric: Affect and judgment normal.       Consults:     * Acute on chronic respiratory failure with hypoxia  - continue home COPD medication regimen  - empiric CAP treatment (levofloxicin) for 5 days    CAP (community acquired pneumonia)  - see primary problem    Pneumonia of right lower lobe due to infectious organism  - see primary problem    COPD with acute exacerbation  - see primary problem    Chronic obstructive pulmonary disease  - continue home COPD medications    Long term current use of systemic steroids  Concern about future risk of developing glucocorticoid complications that may threaten already vulnerable mobility    - maintain optimal calcium and vitamin D  - reduce corticoid steroid use to minimize risk    Hyperlipidemia  - continue home pravastatin    Essential hypertension  - continue home blood pressure medications    Discharge planning issues  - follow up with pulmonology       Final Active Diagnoses:    Diagnosis Date Noted POA    PRINCIPAL PROBLEM:  Acute on chronic respiratory failure with hypoxia [J96.21] 02/27/2017 Yes    CAP (community acquired pneumonia) [J18.9] 02/26/2017 Yes    Pneumonia of right lower lobe due to infectious organism [J18.1] 08/22/2020 Yes    COPD with acute exacerbation [J44.1] 12/03/2014 Yes    Chronic obstructive pulmonary disease [J44.9] 07/03/2012 Yes    Long term current use of systemic steroids [Z79.52] 08/22/2020  Not Applicable     Chronic    Essential hypertension [I10]  Yes    Hyperlipidemia [E78.5]  Yes     Chronic    Discharge planning issues [Z02.9] 08/22/2020 Not Applicable      Problems Resolved During this Admission:       Discharged Condition: stable    Disposition: Home or Self Care    Follow Up:    Patient Instructions:      Notify your health care provider if you experience any of the following:  temperature >100.4     Notify your health care provider if you experience any of the following:  persistent nausea and vomiting or diarrhea     Notify your health care provider if you experience any of the following:  severe uncontrolled pain     Notify your health care provider if you experience any of the following:  redness, tenderness, or signs of infection (pain, swelling, redness, odor or green/yellow discharge around incision site)     Notify your health care provider if you experience any of the following:  difficulty breathing or increased cough     Notify your health care provider if you experience any of the following:  severe persistent headache     Notify your health care provider if you experience any of the following:  worsening rash     Notify your health care provider if you experience any of the following:  persistent dizziness, light-headedness, or visual disturbances     Notify your health care provider if you experience any of the following:  increased confusion or weakness     Notify your health care provider if you experience any of the following:     Activity as tolerated       Significant Diagnostic Studies: Labs:   CMP   Recent Labs   Lab 08/22/20  1225 08/23/20  0434    137   K 3.9 3.5    99   CO2 27 30*   * 157*   BUN 15 14   CREATININE 0.6 0.6   CALCIUM 10.0 7.7*   PROT 6.7 5.9*   ALBUMIN 3.3* 2.9*   BILITOT 0.5 0.3   ALKPHOS 72 64   AST 16 14   ALT 13 12   ANIONGAP 10 8   ESTGFRAFRICA >60.0 >60.0   EGFRNONAA >60.0 >60.0   , CBC   Recent Labs   Lab 08/22/20  1225  08/23/20  0434   WBC 15.52* 9.20   HGB 15.5 14.0   HCT 47.7 43.5    238    and All labs within the past 24 hours have been reviewed    Pending Diagnostic Studies:     None         Medications:  Reconciled Home Medications:      Medication List      START taking these medications    levoFLOXacin 750 MG tablet  Commonly known as: LEVAQUIN  Take 1 tablet (750 mg total) by mouth once daily. for 4 days  Start taking on: August 24, 2020        CHANGE how you take these medications    * albuterol 2.5 mg /3 mL (0.083 %) nebulizer solution  Commonly known as: PROVENTIL  Take 3 mLs (2.5 mg total) by nebulization every 4 (four) hours as needed for Wheezing or Shortness of Breath.  What changed: Another medication with the same name was removed. Continue taking this medication, and follow the directions you see here.     * albuterol 90 mcg/actuation inhaler  Commonly known as: PROVENTIL/VENTOLIN HFA  Inhale 2 puffs into the lungs every 6 (six) hours as needed for Wheezing or Shortness of Breath (Pro Air HFA per insurance). Rescue  What changed: Another medication with the same name was removed. Continue taking this medication, and follow the directions you see here.     * predniSONE 10 MG tablet  Commonly known as: DELTASONE  1or 2 tabs every AM  What changed: Another medication with the same name was added. Make sure you understand how and when to take each.     * predniSONE 20 MG tablet  Commonly known as: DELTASONE  Take 2 tablets (40 mg total) by mouth once daily. For 3 days, after words continue home regimen of prednisone. for 3 days  Start taking on: August 24, 2020  What changed: You were already taking a medication with the same name, and this prescription was added. Make sure you understand how and when to take each.         * This list has 4 medication(s) that are the same as other medications prescribed for you. Read the directions carefully, and ask your doctor or other care provider to review them with you.             CONTINUE taking these medications    ALPRAZolam 0.25 MG tablet  Commonly known as: XANAX  Take 1 tablet (0.25 mg total) by mouth 3 (three) times daily as needed for Anxiety.     aspirin 81 MG EC tablet  Commonly known as: ECOTRIN  Take 81 mg by mouth once daily.     budesonide-formoterol 160-4.5 mcg 160-4.5 mcg/actuation Hfaa  Commonly known as: SYMBICORT  INHALE 2 PUFFS INTO THE LUNGS EVERY 12 HOURS     cetirizine 10 MG tablet  Commonly known as: ZYRTEC  TAKE 1 TABLET BY MOUTH EVERY DAY     diltiaZEM 180 MG 24 hr capsule  Commonly known as: CARDIZEM CD  TAKE 1 CAPSULE (180 MG TOTAL) BY MOUTH ONCE DAILY.     hydroCHLOROthiazide 25 MG tablet  Commonly known as: HYDRODIURIL  TAKE 1 TABLET BY MOUTH EVERY DAY     INCRUSE ELLIPTA 62.5 mcg/actuation inhalation capsule  Generic drug: umeclidinium  INHALE 1 PUFF DAILY     pravastatin 20 MG tablet  Commonly known as: PRAVACHOL  Take 1 tablet (20 mg total) by mouth once daily.     VITAMIN D3 10 mcg (400 unit) Cap  Generic drug: cholecalciferol (vitamin D3)  Take 1,000 Units by mouth once daily.        STOP taking these medications    potassium chloride SA 20 MEQ tablet  Commonly known as: K-DUR,KLOR-CON            Indwelling Lines/Drains at time of discharge:   Lines/Drains/Airways     None                 Time spent on the discharge of patient: 35 minutes  Patient was seen and examined on the date of discharge and determined to be suitable for discharge.         Jl Green MD  Department of Hospital Medicine  [facility]

## 2020-08-23 NOTE — ASSESSMENT & PLAN NOTE
74 y/o patient with acute hypoxemic respiratory failure on PMHx of acute on chronic COPD and chronic steroid use. Previous ECHO (05/09/2020) EF 70%. CXR concerning for right lower lobe pneumonia. CBC with elevated WBC (unclear if new steroid regimen or acute change secondary to infectious etiology). Treated in ED with pulse dose steroids, IV magnesium, and oxygen therapy; patient stabilized.     - vitals Q4 hrs  - duonebs with PRN home inhalers  - empiric CAP treatment 5 days

## 2020-08-23 NOTE — PLAN OF CARE
Problem: Adult Inpatient Plan of Care  Goal: Plan of Care Review  Outcome: Met  Goal: Patient-Specific Goal (Individualization)  Outcome: Met  Goal: Absence of Hospital-Acquired Illness or Injury  Outcome: Met  Goal: Optimal Comfort and Wellbeing  Outcome: Met  Goal: Readiness for Transition of Care  Outcome: Met  Goal: Rounds/Family Conference  Outcome: Met   Patient is discharged to home. Family will be here at 2pm to pick patient up. Verbalized understanding of discharge orders.

## 2020-08-24 ENCOUNTER — PATIENT OUTREACH (OUTPATIENT)
Dept: ADMINISTRATIVE | Facility: CLINIC | Age: 75
End: 2020-08-24

## 2020-08-24 NOTE — PROGRESS NOTES
C3 nurse attempted to contact patient. No answer. The following message was left for the patient to return the call:  Good afternoon, I am a nurse calling on behalf of [facility] from the [facility].  This is a Transitional Care Call for Leyla Mari. When you have a moment please contact us at [phone] or [phone] Monday through Friday, between the hours of 8 am to 4 pm. We look forward to speaking with you. On behalf of [facility] have a nice day.    The patient does not have a scheduled HOSFU appointment within 7-14 days post hospital discharge date 8/23/20. Message sent to Physician staff to assist with HOSFU appointment scheduling.

## 2020-08-24 NOTE — PLAN OF CARE
08/24/20 0752   Final Note   Assessment Type Final Discharge Note   Anticipated Discharge Disposition Home   What phone number can be called within the next 1-3 days to see how you are doing after discharge? [phone]   Right Care Referral Info   Post Acute Recommendation No Care   Post-Acute Status   Post-Acute Authorization HME   E Status Set-up Complete   Discharge Delays None known at this time

## 2020-08-25 NOTE — PATIENT INSTRUCTIONS
Shortness of Breath (Dyspnea)  Shortness of breath is the feeling that you can't catch your breath or get enough air. It is also known as dyspnea.  Dyspnea can be caused by many different conditions. They include:  · Acute asthma attack.  · Worsening of chronic lung diseases such as chronic bronchitis and emphysema.  · Heart failure. This is when weak heart muscle allows extra fluid to collect in the lungs.  · Panic attacks or anxiety. Fear can cause rapid breathing (hyperventilation).  · Pneumonia, or an infection in the lung tissue.  · Exposure to toxic substances, fumes, smoke, or certain medicines.  · Blood clot in the lung (pulmonary embolism). This is often from a piece of blood clot in a deep vein of the leg (deep vein thrombosis) that breaks off and travels to the lungs.  · Heart attack or heart-related chest pain (angina).  · Anemia.  · Collapsed lung (pneumothorax).  · Dehydration.  · Pregnancy.  Based on your visit today, the exact cause of your shortness of breath is not certain. Your tests dont show any of the serious causes of dyspnea. You may need other tests to find out if you have a serious problem. Its important to watch for any new symptoms or symptoms that get worse. Follow up with your healthcare provider as directed.  Home care  Follow these tips to take care of yourself at home:  · When your symptoms are better, go back to your usual activities.  · If you smoke, you should stop. Join a quit-smoking program or ask your healthcare provider for help.  · Eat a healthy diet and get plenty of sleep.  · Get regular exercise. Talk with your healthcare provider before starting to exercise, especially if you have other medical problems.  · Cut down on the amount of caffeine and stimulants you consume.  Follow-up care  Follow up with your healthcare provider, or as advised.  If tests were done, you will be told if your treatment needs to be changed. You can call as directed for the results.  (Note:  If an X-ray was taken, a specialist will review it. You will be notified of any new findings that may affect your care.)  Call 911 or get immediate medical care  Shortness of breath may be a sign of a serious medical problem. For example, it may be a problem with your heart or lungs. Call 911 if you have worsening shortness of breath or trouble breathing, especially with any of the symptoms below:  · You are confused or its difficult to wake you.  · You faint or lose consciousness.  · You have a fast heartbeat, or your heartbeat is irregular.  · You are coughing up blood.  · You have pain in your chest, arm, shoulder, neck, or upper back.  · You break out in a sweat.  When to seek medical advice  Call your healthcare provider right away if any of these occur:  · Slight shortness of breath or wheezing  · Redness, pain or swelling in your leg, arm, or other body area  · Swelling in both legs or ankles  · Fast weight gain  · Dizziness or weakness  · Fever of 100.4ºF (38ºC) or higher, or as directed by your healthcare provider  Date Last Reviewed: 9/13/2015  © 3343-2217 [employer]. [address] [place] [address]. All rights reserved. This information is not intended as a substitute for professional medical care. Always follow your healthcare professional's instructions.        Discharge Instructions: COPD  You have been diagnosed with chronic obstructive pulmonary disease (COPD). This is a name given to a group of diseases that limit the flow of air in and out of your lungs. This makes it harder to breathe. With COPD, you are also more likely to get lung infections. COPD includes chronic bronchitis and emphysema. COPD is most often caused by heavy, long-term cigarette smoking.  Home care  Quit smoking  · If you smoke, quit. It is the best thing you can do for your COPD and your overall health.  · Join a stop-smoking program. There are even telephone, text message, and Internet programs to help  you quit.  · Ask your healthcare provider about medicines or other methods to help you quit.  · Ask family members to quit smoking as well.  · Don't allow people to smoke in your home, in your car, or when they are around you.  Protect yourself from infection  · Wash your hands often. Do your best to keep your hands away from your face. Most germs are spread from your hands to your mouth.  · Get a flu shot every year. Also ask your provider about pneumonia vaccines.  · Avoid crowds. It's especially important to do this in the winter when more people have colds and flu.  · To stay healthy, get enough sleep, exercise regularly, and eat a balanced diet. You should:  ¨ Get about 8 hours of sleep every night.  ¨ Try to exercise for at least 30 minutes on most days.  ¨ Have healthy foods including fruits and vegetables, 100% whole grains, lean meats and fish, and low-fat dairy products. Try to stay away from foods high in fats and sugar.  Take your medicines  Take your medicines exactly as directed. Don't skip doses.  Manage your stress  Stress can make COPD worse. Use this stress management technique:  · Find a quiet place and sit or lie in a comfortable position.  · Close your eyes and perform breathing exercises for several minutes. Ask your provider about the best way to breathe.  Pulmonary rehabilitation  · Pulmonary rehab can help you feel better. These programs include exercise, breathing techniques, information about COPD, counseling, and help for smokers.  · Ask your provider or your local hospital about programs in your area.  When to call your healthcare provider  Call your provider immediately if you have any of the following:  · Shortness of breath, wheezing, or coughing  · Increased mucus  · Yellow, green, bloody, or smelly mucus  · Fever or chills  · Tightness in your chest that does not go away with rest or medicine  · An irregular heartbeat or a feeling that your heart is beating very fast  · Swollen  ankles   Date Last Reviewed: 5/1/2016  © 4012-2035 [employer]. [address] [place] [address], [place] [address]. All rights reserved. This information is not intended as a substitute for professional medical care. Always follow your healthcare professional's instructions.

## 2020-08-27 ENCOUNTER — TELEPHONE (OUTPATIENT)
Dept: PULMONOLOGY | Facility: CLINIC | Age: 75
End: 2020-08-27

## 2020-08-27 DIAGNOSIS — J44.1 COPD EXACERBATION: Primary | ICD-10-CM

## 2020-08-27 DIAGNOSIS — J43.1 PANLOBULAR EMPHYSEMA: ICD-10-CM

## 2020-08-27 RX ORDER — LEVOFLOXACIN 750 MG/1
750 TABLET ORAL DAILY
Qty: 4 TABLET | Refills: 1 | Status: SHIPPED | OUTPATIENT
Start: 2020-08-27 | End: 2020-08-31

## 2020-08-27 NOTE — TELEPHONE ENCOUNTER
Laney Martinez, daughter of Leyla Mari came by clinic to say Mrs Mari was in our hospital overnight last Saturday with Pneumonia and was discharged Sunday afternoon with Levaquin 750mg to take I a day, 4 tablets given. Patient feels she needs more Levaquin so Dr Guido authorized a refill and also said to take 30mg of Prednisone for 5 days, then 20mg for 5 days and to return to 10mg Prednisone daily. I also told Mrs Mari to call back if she doesn't she improvement. She understands. Danielle Waldrop LPN

## 2020-09-08 ENCOUNTER — OFFICE VISIT (OUTPATIENT)
Dept: PULMONOLOGY | Facility: CLINIC | Age: 75
End: 2020-09-08
Payer: MEDICARE

## 2020-09-08 ENCOUNTER — HOSPITAL ENCOUNTER (OUTPATIENT)
Dept: RADIOLOGY | Facility: HOSPITAL | Age: 75
Discharge: HOME | End: 2020-09-08
Attending: INTERNAL MEDICINE
Payer: MEDICARE

## 2020-09-08 VITALS
DIASTOLIC BLOOD PRESSURE: 76 MMHG | OXYGEN SATURATION: 95 % | SYSTOLIC BLOOD PRESSURE: 146 MMHG | HEIGHT: 59 IN | BODY MASS INDEX: 24.6 KG/M2 | WEIGHT: 122 LBS

## 2020-09-08 DIAGNOSIS — J44.9 CHRONIC OBSTRUCTIVE PULMONARY DISEASE, UNSPECIFIED COPD TYPE: ICD-10-CM

## 2020-09-08 DIAGNOSIS — J44.1 COPD EXACERBATION: ICD-10-CM

## 2020-09-08 DIAGNOSIS — J18.9 PNEUMONIA OF RIGHT LOWER LOBE DUE TO INFECTIOUS ORGANISM: Primary | ICD-10-CM

## 2020-09-08 PROCEDURE — 1126F PR PAIN SEVERITY QUANTIFIED, NO PAIN PRESENT: ICD-10-PCS | Mod: S$GLB,,, | Performed by: INTERNAL MEDICINE

## 2020-09-08 PROCEDURE — 3078F PR MOST RECENT DIASTOLIC BLOOD PRESSURE < 80 MM HG: ICD-10-PCS | Mod: CPTII,S$GLB,, | Performed by: INTERNAL MEDICINE

## 2020-09-08 PROCEDURE — 3078F DIAST BP <80 MM HG: CPT | Mod: CPTII,S$GLB,, | Performed by: INTERNAL MEDICINE

## 2020-09-08 PROCEDURE — 99214 OFFICE O/P EST MOD 30 MIN: CPT | Mod: S$GLB,,, | Performed by: INTERNAL MEDICINE

## 2020-09-08 PROCEDURE — 71046 XR CHEST PA AND LATERAL: ICD-10-PCS | Mod: 26,,, | Performed by: RADIOLOGY

## 2020-09-08 PROCEDURE — 3077F SYST BP >= 140 MM HG: CPT | Mod: CPTII,S$GLB,, | Performed by: INTERNAL MEDICINE

## 2020-09-08 PROCEDURE — 71046 X-RAY EXAM CHEST 2 VIEWS: CPT | Mod: TC,FY

## 2020-09-08 PROCEDURE — 99999 PR PBB SHADOW E&M-EST. PATIENT-LVL III: ICD-10-PCS | Mod: PBBFAC,,, | Performed by: INTERNAL MEDICINE

## 2020-09-08 PROCEDURE — 71046 X-RAY EXAM CHEST 2 VIEWS: CPT | Mod: 26,,, | Performed by: RADIOLOGY

## 2020-09-08 PROCEDURE — 1159F PR MEDICATION LIST DOCUMENTED IN MEDICAL RECORD: ICD-10-PCS | Mod: S$GLB,,, | Performed by: INTERNAL MEDICINE

## 2020-09-08 PROCEDURE — 1101F PR PT FALLS ASSESS DOC 0-1 FALLS W/OUT INJ PAST YR: ICD-10-PCS | Mod: CPTII,S$GLB,, | Performed by: INTERNAL MEDICINE

## 2020-09-08 PROCEDURE — 99999 PR PBB SHADOW E&M-EST. PATIENT-LVL III: CPT | Mod: PBBFAC,,, | Performed by: INTERNAL MEDICINE

## 2020-09-08 PROCEDURE — 1126F AMNT PAIN NOTED NONE PRSNT: CPT | Mod: S$GLB,,, | Performed by: INTERNAL MEDICINE

## 2020-09-08 PROCEDURE — 3077F PR MOST RECENT SYSTOLIC BLOOD PRESSURE >= 140 MM HG: ICD-10-PCS | Mod: CPTII,S$GLB,, | Performed by: INTERNAL MEDICINE

## 2020-09-08 PROCEDURE — 99214 PR OFFICE/OUTPT VISIT, EST, LEVL IV, 30-39 MIN: ICD-10-PCS | Mod: S$GLB,,, | Performed by: INTERNAL MEDICINE

## 2020-09-08 PROCEDURE — 1159F MED LIST DOCD IN RCRD: CPT | Mod: S$GLB,,, | Performed by: INTERNAL MEDICINE

## 2020-09-08 PROCEDURE — 1101F PT FALLS ASSESS-DOCD LE1/YR: CPT | Mod: CPTII,S$GLB,, | Performed by: INTERNAL MEDICINE
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
## 2020-09-09 DIAGNOSIS — J44.9 CHRONIC OBSTRUCTIVE PULMONARY DISEASE, UNSPECIFIED COPD TYPE: Primary | ICD-10-CM

## 2020-09-09 DIAGNOSIS — J96.21 ACUTE ON CHRONIC RESPIRATORY FAILURE WITH HYPOXIA: ICD-10-CM

## 2020-09-23 ENCOUNTER — PATIENT MESSAGE (OUTPATIENT)
Dept: PULMONOLOGY | Facility: CLINIC | Age: 75
End: 2020-09-23

## 2020-09-24 ENCOUNTER — PATIENT MESSAGE (OUTPATIENT)
Dept: INTERNAL MEDICINE | Facility: CLINIC | Age: 75
End: 2020-09-24

## 2020-09-24 DIAGNOSIS — J06.9 URTI (ACUTE UPPER RESPIRATORY INFECTION): ICD-10-CM

## 2020-09-24 DIAGNOSIS — H65.114 RECURRENT ACUTE ALLERGIC OTITIS MEDIA OF RIGHT EAR: ICD-10-CM

## 2020-09-24 RX ORDER — CETIRIZINE HYDROCHLORIDE 10 MG/1
10 TABLET ORAL DAILY
Qty: 90 TABLET | Refills: 1 | Status: CANCELLED | OUTPATIENT
Start: 2020-09-24

## 2020-11-10 ENCOUNTER — OFFICE VISIT (OUTPATIENT)
Dept: INTERNAL MEDICINE | Facility: CLINIC | Age: 75
End: 2020-11-10
Payer: MEDICARE

## 2020-11-10 ENCOUNTER — HOSPITAL ENCOUNTER (OUTPATIENT)
Dept: RADIOLOGY | Facility: OTHER | Age: 75
Discharge: HOME | End: 2020-11-10
Attending: NURSE PRACTITIONER
Payer: MEDICARE

## 2020-11-10 DIAGNOSIS — M79.605 PAIN IN BOTH LOWER EXTREMITIES: ICD-10-CM

## 2020-11-10 DIAGNOSIS — M79.604 PAIN IN BOTH LOWER EXTREMITIES: ICD-10-CM

## 2020-11-10 DIAGNOSIS — R60.0 LOWER EXTREMITY EDEMA: ICD-10-CM

## 2020-11-10 DIAGNOSIS — R60.0 LOWER EXTREMITY EDEMA: Primary | ICD-10-CM

## 2020-11-10 PROCEDURE — 1125F PR PAIN SEVERITY QUANTIFIED, PAIN PRESENT: ICD-10-PCS | Mod: S$GLB,,, | Performed by: NURSE PRACTITIONER

## 2020-11-10 PROCEDURE — 3288F PR FALLS RISK ASSESSMENT DOCUMENTED: ICD-10-PCS | Mod: CPTII,S$GLB,, | Performed by: NURSE PRACTITIONER

## 2020-11-10 PROCEDURE — 3288F FALL RISK ASSESSMENT DOCD: CPT | Mod: CPTII,S$GLB,, | Performed by: NURSE PRACTITIONER

## 2020-11-10 PROCEDURE — 93925 US LOWER EXTREMITY ARTERIES BILATERAL: ICD-10-PCS | Mod: 26,,, | Performed by: RADIOLOGY

## 2020-11-10 PROCEDURE — 93970 EXTREMITY STUDY: CPT | Mod: TC

## 2020-11-10 PROCEDURE — 93925 LOWER EXTREMITY STUDY: CPT | Mod: 26,,, | Performed by: RADIOLOGY

## 2020-11-10 PROCEDURE — 1101F PT FALLS ASSESS-DOCD LE1/YR: CPT | Mod: CPTII,S$GLB,, | Performed by: NURSE PRACTITIONER

## 2020-11-10 PROCEDURE — 99214 OFFICE O/P EST MOD 30 MIN: CPT | Mod: S$GLB,,, | Performed by: NURSE PRACTITIONER

## 2020-11-10 PROCEDURE — 99999 PR PBB SHADOW E&M-EST. PATIENT-LVL IV: ICD-10-PCS | Mod: PBBFAC,,, | Performed by: NURSE PRACTITIONER

## 2020-11-10 PROCEDURE — 1157F ADVNC CARE PLAN IN RCRD: CPT | Mod: S$GLB,,, | Performed by: NURSE PRACTITIONER

## 2020-11-10 PROCEDURE — 99214 PR OFFICE/OUTPT VISIT, EST, LEVL IV, 30-39 MIN: ICD-10-PCS | Mod: S$GLB,,, | Performed by: NURSE PRACTITIONER

## 2020-11-10 PROCEDURE — 1157F PR ADVANCE CARE PLAN OR EQUIV PRESENT IN MEDICAL RECORD: ICD-10-PCS | Mod: S$GLB,,, | Performed by: NURSE PRACTITIONER

## 2020-11-10 PROCEDURE — 93970 EXTREMITY STUDY: CPT | Mod: 26,,, | Performed by: RADIOLOGY

## 2020-11-10 PROCEDURE — 1125F AMNT PAIN NOTED PAIN PRSNT: CPT | Mod: S$GLB,,, | Performed by: NURSE PRACTITIONER

## 2020-11-10 PROCEDURE — 93970 US LOWER EXTREMITY VEINS BILATERAL: ICD-10-PCS | Mod: 26,,, | Performed by: RADIOLOGY

## 2020-11-10 PROCEDURE — 99999 PR PBB SHADOW E&M-EST. PATIENT-LVL IV: CPT | Mod: PBBFAC,,, | Performed by: NURSE PRACTITIONER

## 2020-11-10 PROCEDURE — 1101F PR PT FALLS ASSESS DOC 0-1 FALLS W/OUT INJ PAST YR: ICD-10-PCS | Mod: CPTII,S$GLB,, | Performed by: NURSE PRACTITIONER

## 2020-11-10 PROCEDURE — 93925 LOWER EXTREMITY STUDY: CPT | Mod: TC

## 2020-11-10 RX ORDER — POTASSIUM CHLORIDE 1500 MG/1
TABLET, EXTENDED RELEASE ORAL
Qty: 7 TABLET | Refills: 0 | Status: ON HOLD | OUTPATIENT
Start: 2020-11-10 | End: 2021-08-18 | Stop reason: HOSPADM

## 2020-11-10 RX ORDER — FUROSEMIDE 20 MG/1
TABLET ORAL
Qty: 7 TABLET | Refills: 0 | Status: SHIPPED | OUTPATIENT
Start: 2020-11-10 | End: 2020-11-10

## 2020-11-10 RX ORDER — FUROSEMIDE 20 MG/1
TABLET ORAL
Qty: 7 TABLET | Refills: 0 | Status: SHIPPED | OUTPATIENT
Start: 2020-11-10 | End: 2020-11-30 | Stop reason: SDUPTHER

## 2020-11-10 RX ORDER — POTASSIUM CHLORIDE 1500 MG/1
TABLET, EXTENDED RELEASE ORAL
COMMUNITY
End: 2020-11-10 | Stop reason: SDUPTHER

## 2020-11-10 NOTE — PROGRESS NOTES
[facility] Note    Chief Complaint      Chief Complaint   Patient presents with    Foot Swelling     bilateral      History of Present Illness      Leyla Mari is a 75 y.o. female patient of Dr. Castro's who presents today for worsening chronic swelling to bilateral lower extremities. Pt reports has gotten worse over the past several weeks. Pt wears O2, denies worsening SOB, chest congestion, CP, is urinating her norm daily. Denies redness, drainage. Doesn't get much activity due to oxygen dependency, sits a lot. Feels like pins and needles, stinging.  Pt very anxious in clinic about lower extremity edema, daughter with pt.   O2- 96% on 2L    Problem List Items Addressed This Visit     None      Visit Diagnoses     Lower extremity edema    -  Primary    Relevant Medications    potassium chloride (K-TAB) 20 mEq    furosemide (LASIX) 20 MG tablet    Other Relevant Orders    CV Ultrasound doppler venous legs bilat    US Lower Extremity Veins Bilateral (Completed)    US Lower Extremity Arteries Bilateral (Completed)    Pain in both lower extremities        Relevant Orders    US Lower Extremity Veins Bilateral (Completed)    US Lower Extremity Arteries Bilateral (Completed)          Health Maintenance   Topic Date Due    TETANUS VACCINE  04/30/1963    DEXA SCAN  05/08/2020    Colonoscopy  10/10/2022    Lipid Panel  09/11/2024    Hepatitis C Screening  Completed    Pneumococcal Vaccine (65+ Low/Medium Risk)  Completed       Past Medical History:   Diagnosis Date    Actinic keratosis     Arthritis     Cataract     Cellulitis of ankle     Colon polyps     COPD (chronic obstructive pulmonary disease)     home O2 (2L/min)     Family history of colon cancer     History of Clostridium difficile infection 7/3/2012    Hyperlipidemia     Hypertension     Lung nodules     Sinus tachycardia        Past Surgical History:   Procedure Laterality Date    APPENDECTOMY      CATARACT EXTRACTION  Bilateral 2007     SECTION      x2    COLONOSCOPY N/A 10/10/2017    Procedure: COLONOSCOPY;  Surgeon: Omid Lino MD;  Location: [facility] ([address] [place] [address]);  Service: Endoscopy;  Laterality: N/A;  pt unable to be checked in with previous order    EYE SURGERY      TONSILLECTOMY         family history includes Blindness in her paternal grandmother; Breast cancer in her sister; COPD in her father; Cancer in her sister and son; Cancer (age of onset: 79) in her mother; Cataracts in her mother and sister; Diabetes in her father and paternal grandmother; Glaucoma in her mother; Heart disease in her father, mother, and sister; Hyperlipidemia in her father, mother, and sister; Hypertension in her father, mother, and sister; Skin cancer in her mother and sister; Thyroid disease in her daughter and mother.    Social History     Tobacco Use    Smoking status: Former Smoker     Packs/day: 1.00     Years: 39.00     Pack years: 39.00     Types: Cigarettes     Quit date: 1995     Years since quittin.0    Smokeless tobacco: Never Used   Substance Use Topics    Alcohol use: Yes     Alcohol/week: 2.0 standard drinks     Types: 2 Glasses of wine per week     Frequency: Monthly or less     Drinks per session: 1 or 2     Binge frequency: Never     Comment: rarely has a glass of wine- not daily    Drug use: No       Review of Systems   Constitutional: Negative for chills and fever.   Respiratory: Negative for shortness of breath.    Cardiovascular: Positive for leg swelling. Negative for chest pain and palpitations.   Gastrointestinal: Negative for constipation, diarrhea and nausea.   Genitourinary: Negative for dysuria.   Skin: Negative for rash.   Neurological: Negative for dizziness and weakness.        Outpatient Encounter Medications as of 11/10/2020   Medication Sig Dispense Refill    albuterol (PROVENTIL) 2.5 mg /3 mL (0.083 %) nebulizer solution Take 3 mLs (2.5 mg total) by nebulization every 4 (four) hours  as needed for Wheezing or Shortness of Breath. 120 each 11    albuterol (PROVENTIL/VENTOLIN HFA) 90 mcg/actuation inhaler INHALE 2 PUFFS INTO LUNGS EVERY 6 HOURS AS NEEDED FOR WHEEZING OR SHORTNESS OF BREATH 1 g 12    ALPRAZolam (XANAX) 0.25 MG tablet Take 1 tablet (0.25 mg total) by mouth 3 (three) times daily as needed for Anxiety. 90 tablet 3    aspirin (ECOTRIN) 81 MG EC tablet Take 81 mg by mouth once daily.        budesonide-formoterol 160-4.5 mcg (SYMBICORT) 160-4.5 mcg/actuation HFAA INHALE 2 PUFFS INTO THE LUNGS EVERY 12 HOURS 10.2 Inhaler 11    cetirizine (ZYRTEC) 10 MG tablet TAKE 1 TABLET BY MOUTH EVERY DAY 30 tablet 1    cholecalciferol, vitamin D3, (VITAMIN D3) 10 mcg (400 unit) Cap Take 1,000 Units by mouth once daily.      diltiaZEM (CARDIZEM CD) 180 MG 24 hr capsule TAKE 1 CAPSULE (180 MG TOTAL) BY MOUTH ONCE DAILY. 90 capsule 3    hydroCHLOROthiazide (HYDRODIURIL) 25 MG tablet TAKE 1 TABLET BY MOUTH EVERY DAY 90 tablet 3    INCRUSE ELLIPTA 62.5 mcg/actuation DsDv INHALE 1 PUFF DAILY 1 each 11    pravastatin (PRAVACHOL) 20 MG tablet Take 1 tablet (20 mg total) by mouth once daily. 90 tablet 2    predniSONE (DELTASONE) 10 MG tablet 1or 2 tabs every AM 60 tablet 6    furosemide (LASIX) 20 MG tablet Take lasix 20mg with potassium 20meq daily 7 tablet 0    potassium chloride (K-TAB) 20 mEq Take potassium 20meq with lasix 20mg daily 7 tablet 0    [DISCONTINUED] budesonide-formoterol 160-4.5 mcg (SYMBICORT) 160-4.5 mcg/actuation HFAA INHALE 2 PUFFS INTO THE LUNGS EVERY 12 HOURS 10.2 Inhaler 11    [DISCONTINUED] furosemide (LASIX) 20 MG tablet Take lasix 20mg with potassium 20mg daily 7 tablet 0    [DISCONTINUED] potassium chloride (K-TAB) 20 mEq Take by mouth.       No facility-administered encounter medications on file as of 11/10/2020.         Review of patient's allergies indicates:   Allergen Reactions    Bactrim [sulfamethoxazole-trimethoprim] Nausea Only    Codeine Itching     "Keflex [cephalexin] Other (See Comments)     Rhinorrhea, nausea. Tolerated Ceftriaxone June 2014    Ceftriaxone Rash     Morbilliform rash after receiving IV ceftriaxone    Clindamycin hcl Rash    Doxycycline Rash    Vancomycin analogues Rash     Morbilliform rash after receiving IV vancomycin       Physical Exam      Vital Signs  Pulse: 92  BP: (!) 150/78  BP Location: Left arm  Patient Position: Sitting  Pain Score:   4  Pain Loc: Foot  Height and Weight  Height: 4' 11" (149.9 cm)  Weight: (unable)  Weight in (lb) to have BMI = 25: 123.5]    Physical Exam  Vitals signs and nursing note reviewed.   Constitutional:       Appearance: Normal appearance. She is well-developed.   HENT:      Head: Normocephalic and atraumatic.      Right Ear: External ear normal.      Left Ear: External ear normal.   Eyes:      Conjunctiva/sclera: Conjunctivae normal.   Neck:      Musculoskeletal: Normal range of motion and neck supple.      Thyroid: No thyromegaly.      Vascular: No JVD.      Trachea: No tracheal deviation.   Cardiovascular:      Rate and Rhythm: Normal rate and regular rhythm.      Heart sounds: Normal heart sounds.   Pulmonary:      Effort: Pulmonary effort is normal.      Breath sounds: Normal breath sounds.   Abdominal:      General: Bowel sounds are normal.      Palpations: Abdomen is soft.   Musculoskeletal: Normal range of motion.   Skin:     General: Skin is warm and dry.      Findings: Erythema present.      Comments: Bilateral lower extremity edema +2-3 from ankles to toes, no drainage, slight redness to right extremity   Neurological:      Mental Status: She is alert and oriented to person, place, and time.   Psychiatric:         Behavior: Behavior normal.         Thought Content: Thought content normal.         Judgment: Judgment normal.          Laboratory:  CBC:  Recent Labs   Lab Result Units 08/22/20  1225 08/23/20  0434   WBC K/uL 15.52* 9.20   RBC M/uL 5.19 4.67   Hemoglobin g/dL 15.5 14.0 "   Hematocrit % 47.7 43.5   Platelets K/uL 243 238   MCV fL 92 93   MCH pg 29.9 30.0   MCHC g/dL 32.5 32.2     CMP:  Recent Labs   Lab Result Units 08/22/20  1225 08/23/20  0434   Glucose mg/dL 155* 157*   Calcium mg/dL 10.0 7.7*   Albumin g/dL 3.3* 2.9*   Total Protein g/dL 6.7 5.9*   Sodium mmol/L 138 137   Potassium mmol/L 3.9 3.5   CO2 mmol/L 27 30*   Chloride mmol/L 101 99   BUN mg/dL 15 14   Alkaline Phosphatase U/L 72 64   ALT U/L 13 12   AST U/L 16 14   Total Bilirubin mg/dL 0.5 0.3     URINALYSIS:  No results for input(s): COLORU, CLARITYU, SPECGRAV, PHUR, PROTEINUA, GLUCOSEU, BILIRUBINCON, BLOODU, WBCU, RBCU, BACTERIA, MUCUS, NITRITE, LEUKOCYTESUR, UROBILINOGEN, HYALINECASTS in the last 2160 hours.   LIPIDS:  No results for input(s): TSH, HDL, CHOL, TRIG, LDLCALC, CHOLHDL, NONHDLCHOL, TOTALCHOLEST in the last 2160 hours.  TSH:  No results for input(s): TSH in the last 2160 hours.  A1C:  No results for input(s): HGBA1C in the last 2160 hours.      Assessment/Plan     Leyla Mari is a 75 y.o.female with:    Encounter Diagnoses   Name Primary?    Lower extremity edema Yes    Pain in both lower extremities         PLAN  VICKY ordered  Few days of lasix  Instructed support hose with zipper at least a few hours a day, elevate      -Continue current medications and maintain follow up with specialists.  Return to clinic as needed for any concerns       Erin Jiminez, NP-C [facility]

## 2020-11-11 ENCOUNTER — PATIENT MESSAGE (OUTPATIENT)
Dept: INTERNAL MEDICINE | Facility: CLINIC | Age: 75
End: 2020-11-11

## 2020-11-14 VITALS
HEART RATE: 92 BPM | DIASTOLIC BLOOD PRESSURE: 78 MMHG | SYSTOLIC BLOOD PRESSURE: 150 MMHG | BODY MASS INDEX: 24.64 KG/M2 | HEIGHT: 59 IN

## 2020-11-30 DIAGNOSIS — R60.0 LOWER EXTREMITY EDEMA: ICD-10-CM

## 2020-12-02 ENCOUNTER — PATIENT MESSAGE (OUTPATIENT)
Dept: INTERNAL MEDICINE | Facility: CLINIC | Age: 75
End: 2020-12-02

## 2020-12-02 DIAGNOSIS — R60.0 LOWER EXTREMITY EDEMA: ICD-10-CM

## 2020-12-02 RX ORDER — FUROSEMIDE 20 MG/1
TABLET ORAL
Qty: 7 TABLET | Refills: 0 | OUTPATIENT
Start: 2020-12-02

## 2020-12-02 RX ORDER — FUROSEMIDE 20 MG/1
TABLET ORAL
Qty: 7 TABLET | Refills: 0 | Status: SHIPPED | OUTPATIENT
Start: 2020-12-02 | End: 2020-12-16 | Stop reason: SDUPTHER

## 2020-12-16 ENCOUNTER — LAB VISIT (OUTPATIENT)
Dept: LAB | Facility: HOSPITAL | Age: 75
End: 2020-12-16
Attending: INTERNAL MEDICINE
Payer: MEDICARE

## 2020-12-16 ENCOUNTER — OFFICE VISIT (OUTPATIENT)
Dept: INTERNAL MEDICINE | Facility: CLINIC | Age: 75
End: 2020-12-16
Payer: MEDICARE

## 2020-12-16 VITALS
SYSTOLIC BLOOD PRESSURE: 144 MMHG | BODY MASS INDEX: 23.51 KG/M2 | DIASTOLIC BLOOD PRESSURE: 70 MMHG | OXYGEN SATURATION: 89 % | HEART RATE: 101 BPM | WEIGHT: 116.38 LBS

## 2020-12-16 DIAGNOSIS — R60.0 LOWER EXTREMITY EDEMA: Primary | ICD-10-CM

## 2020-12-16 DIAGNOSIS — R60.0 LOWER EXTREMITY EDEMA: ICD-10-CM

## 2020-12-16 LAB
ALBUMIN SERPL BCP-MCNC: 3.6 G/DL (ref 3.5–5.2)
ALP SERPL-CCNC: 64 U/L (ref 55–135)
ALT SERPL W/O P-5'-P-CCNC: 31 U/L (ref 10–44)
ANION GAP SERPL CALC-SCNC: 12 MMOL/L (ref 8–16)
AST SERPL-CCNC: 26 U/L (ref 10–40)
BILIRUB SERPL-MCNC: 0.3 MG/DL (ref 0.1–1)
BUN SERPL-MCNC: 15 MG/DL (ref 8–23)
CALCIUM SERPL-MCNC: 10.5 MG/DL (ref 8.7–10.5)
CHLORIDE SERPL-SCNC: 93 MMOL/L (ref 95–110)
CO2 SERPL-SCNC: 33 MMOL/L (ref 23–29)
CREAT SERPL-MCNC: 0.7 MG/DL (ref 0.5–1.4)
EST. GFR  (AFRICAN AMERICAN): >60 ML/MIN/1.73 M^2
EST. GFR  (NON AFRICAN AMERICAN): >60 ML/MIN/1.73 M^2
GLUCOSE SERPL-MCNC: 153 MG/DL (ref 70–110)
POTASSIUM SERPL-SCNC: 4.4 MMOL/L (ref 3.5–5.1)
PROT SERPL-MCNC: 6.6 G/DL (ref 6–8.4)
SODIUM SERPL-SCNC: 138 MMOL/L (ref 136–145)

## 2020-12-16 PROCEDURE — 3078F PR MOST RECENT DIASTOLIC BLOOD PRESSURE < 80 MM HG: ICD-10-PCS | Mod: CPTII,GC,S$GLB, | Performed by: STUDENT IN AN ORGANIZED HEALTH CARE EDUCATION/TRAINING PROGRAM

## 2020-12-16 PROCEDURE — 1159F PR MEDICATION LIST DOCUMENTED IN MEDICAL RECORD: ICD-10-PCS | Mod: GC,S$GLB,, | Performed by: STUDENT IN AN ORGANIZED HEALTH CARE EDUCATION/TRAINING PROGRAM

## 2020-12-16 PROCEDURE — 99999 PR PBB SHADOW E&M-EST. PATIENT-LVL IV: ICD-10-PCS | Mod: PBBFAC,GC,, | Performed by: STUDENT IN AN ORGANIZED HEALTH CARE EDUCATION/TRAINING PROGRAM

## 2020-12-16 PROCEDURE — 99214 OFFICE O/P EST MOD 30 MIN: CPT | Mod: GC,S$GLB,, | Performed by: STUDENT IN AN ORGANIZED HEALTH CARE EDUCATION/TRAINING PROGRAM

## 2020-12-16 PROCEDURE — 36415 COLL VENOUS BLD VENIPUNCTURE: CPT

## 2020-12-16 PROCEDURE — 1159F MED LIST DOCD IN RCRD: CPT | Mod: GC,S$GLB,, | Performed by: STUDENT IN AN ORGANIZED HEALTH CARE EDUCATION/TRAINING PROGRAM

## 2020-12-16 PROCEDURE — 99999 PR PBB SHADOW E&M-EST. PATIENT-LVL IV: CPT | Mod: PBBFAC,GC,, | Performed by: STUDENT IN AN ORGANIZED HEALTH CARE EDUCATION/TRAINING PROGRAM

## 2020-12-16 PROCEDURE — 80053 COMPREHEN METABOLIC PANEL: CPT

## 2020-12-16 PROCEDURE — 3077F PR MOST RECENT SYSTOLIC BLOOD PRESSURE >= 140 MM HG: ICD-10-PCS | Mod: CPTII,GC,S$GLB, | Performed by: STUDENT IN AN ORGANIZED HEALTH CARE EDUCATION/TRAINING PROGRAM

## 2020-12-16 PROCEDURE — 3077F SYST BP >= 140 MM HG: CPT | Mod: CPTII,GC,S$GLB, | Performed by: STUDENT IN AN ORGANIZED HEALTH CARE EDUCATION/TRAINING PROGRAM

## 2020-12-16 PROCEDURE — 3078F DIAST BP <80 MM HG: CPT | Mod: CPTII,GC,S$GLB, | Performed by: STUDENT IN AN ORGANIZED HEALTH CARE EDUCATION/TRAINING PROGRAM

## 2020-12-16 PROCEDURE — 99214 PR OFFICE/OUTPT VISIT, EST, LEVL IV, 30-39 MIN: ICD-10-PCS | Mod: GC,S$GLB,, | Performed by: STUDENT IN AN ORGANIZED HEALTH CARE EDUCATION/TRAINING PROGRAM

## 2020-12-16 RX ORDER — FUROSEMIDE 40 MG/1
40 TABLET ORAL DAILY
Qty: 30 TABLET | Refills: 0 | Status: SHIPPED | OUTPATIENT
Start: 2020-12-16 | End: 2021-02-23

## 2020-12-16 NOTE — PROGRESS NOTES
Clinic Note  12/16/2020      Subjective:       Patient ID:  Leyla is a 75 y.o. female being seen for an established visit.    Chief Complaint: Foot Swelling    HPI    75-year-old female with chronic hypoxic respiratory failure 2/2 COPD on home 2L O2  hypertension, hyperlipidemia, 3 hospitalizations since 5/2020 for copd exacerbation  presents for LE edema.  Pt states that for the past 7 months she has been having LE edema that slightly improved with lasix. Denies any associated cp/chest discomfort, palpitations, lightheadedness, abdominal pain/discomfort. Thorough workup during hospitalizations revealed unremarkable TTE, renal function, liver function, and LE ultrasounds. labs in august did reveal low albumin (2.9). She states that she was instructed to take 20 mg lasix qd, wear stockings, and elevate legs. She however, has robert unable to wear the stockings 2/2 discomfort. while she was on 20 mg lasix swelling improved however did not go a way.  Denies inc salt use, smoking or drinking.  pt lives with son and performs ADLs independently.    Review of Systems   Constitutional: Negative for chills, fever and weight loss.   HENT: Negative.    Eyes: Negative for blurred vision.   Respiratory: Positive for shortness of breath. Negative for cough.    Cardiovascular: Positive for leg swelling. Negative for chest pain and palpitations.   Gastrointestinal: Negative for abdominal pain, blood in stool, constipation, diarrhea, melena, nausea and vomiting.   Musculoskeletal: Negative for myalgias.   Skin: Negative.    Neurological: Negative for dizziness, loss of consciousness and weakness.       Past Medical History:   Diagnosis Date    Actinic keratosis     Arthritis     Cataract     Cellulitis of ankle     Colon polyps     COPD (chronic obstructive pulmonary disease)     home O2 (2L/min)     Family history of colon cancer     History of Clostridium difficile infection 7/3/2012    Hyperlipidemia     Hypertension      Lung nodules     Sinus tachycardia        Family History   Problem Relation Age of Onset    Cancer Mother 79        colon    Heart disease Mother     Hyperlipidemia Mother     Hypertension Mother     Skin cancer Mother     Cataracts Mother     Glaucoma Mother     Thyroid disease Mother     COPD Father     Diabetes Father     Heart disease Father     Hyperlipidemia Father     Hypertension Father     Cancer Sister     Heart disease Sister     Hyperlipidemia Sister     Hypertension Sister     Skin cancer Sister     Cataracts Sister     Breast cancer Sister     Cancer Son     Blindness Paternal Grandmother     Diabetes Paternal Grandmother     Thyroid disease Daughter     Melanoma Neg Hx     Psoriasis Neg Hx     Lupus Neg Hx     Eczema Neg Hx     Amblyopia Neg Hx     Macular degeneration Neg Hx     Retinal detachment Neg Hx     Strabismus Neg Hx     Stroke Neg Hx         reports that she quit smoking about 25 years ago. Her smoking use included cigarettes. She has a 39.00 pack-year smoking history. She has never used smokeless tobacco. She reports current alcohol use of about 2.0 standard drinks of alcohol per week. She reports that she does not use drugs.    Medication List with Changes/Refills   New Medications    FUROSEMIDE (LASIX) 40 MG TABLET    Take 1 tablet (40 mg total) by mouth once daily.   Current Medications    ALBUTEROL (PROVENTIL) 2.5 MG /3 ML (0.083 %) NEBULIZER SOLUTION    Take 3 mLs (2.5 mg total) by nebulization every 4 (four) hours as needed for Wheezing or Shortness of Breath.    ALBUTEROL (PROVENTIL/VENTOLIN HFA) 90 MCG/ACTUATION INHALER    INHALE 2 PUFFS INTO LUNGS EVERY 6 HOURS AS NEEDED FOR WHEEZING OR SHORTNESS OF BREATH    ALPRAZOLAM (XANAX) 0.25 MG TABLET    Take 1 tablet (0.25 mg total) by mouth 3 (three) times daily as needed for Anxiety.    ASPIRIN (ECOTRIN) 81 MG EC TABLET    Take 81 mg by mouth once daily.      BUDESONIDE-FORMOTEROL 160-4.5 MCG  (SYMBICORT) 160-4.5 MCG/ACTUATION HFAA    INHALE 2 PUFFS INTO THE LUNGS EVERY 12 HOURS    CETIRIZINE (ZYRTEC) 10 MG TABLET    TAKE 1 TABLET BY MOUTH EVERY DAY    CHOLECALCIFEROL, VITAMIN D3, (VITAMIN D3) 10 MCG (400 UNIT) CAP    Take 1,000 Units by mouth once daily.    DILTIAZEM (CARDIZEM CD) 180 MG 24 HR CAPSULE    TAKE 1 CAPSULE (180 MG TOTAL) BY MOUTH ONCE DAILY.    HYDROCHLOROTHIAZIDE (HYDRODIURIL) 25 MG TABLET    TAKE 1 TABLET BY MOUTH EVERY DAY    INCRUSE ELLIPTA 62.5 MCG/ACTUATION DSDV    INHALE 1 PUFF DAILY    POTASSIUM CHLORIDE (K-TAB) 20 MEQ    Take potassium 20meq with lasix 20mg daily    PRAVASTATIN (PRAVACHOL) 20 MG TABLET    Take 1 tablet (20 mg total) by mouth once daily.    PREDNISONE (DELTASONE) 10 MG TABLET    TAKE 1 TO 2 TABLETS BY MOUTH EVERY MORNING   Discontinued Medications    FUROSEMIDE (LASIX) 20 MG TABLET    Take lasix 20mg with potassium 20meq daily     Review of patient's allergies indicates:   Allergen Reactions    Bactrim [sulfamethoxazole-trimethoprim] Nausea Only    Codeine Itching    Keflex [cephalexin] Other (See Comments)     Rhinorrhea, nausea. Tolerated Ceftriaxone June 2014    Ceftriaxone Rash     Morbilliform rash after receiving IV ceftriaxone    Clindamycin hcl Rash    Doxycycline Rash    Vancomycin analogues Rash     Morbilliform rash after receiving IV vancomycin       Patient Active Problem List   Diagnosis    Cellulitis    History of Clostridium difficile infection    Chronic obstructive pulmonary disease    Essential hypertension    Hyperlipidemia    Colon polyps    Lung nodules    Morbilliform rash    Urticaria due to drug allergy    Cellulitis of leg, left    Unspecified disorder of skin and subcutaneous tissue    COPD with acute exacerbation    Sinus tachycardia    Centrilobular emphysema    Panlobular emphysema    CAP (community acquired pneumonia)    Acute on chronic respiratory failure with hypoxia    Bilateral edema of lower extremity     "Venous stasis    History of colonic polyps    Family history of colon cancer    Hyponatremia    Acute on chronic respiratory failure with hypoxemia    Pneumonia of right lower lobe due to infectious organism    Discharge planning issues    Long term current use of systemic steroids           Objective:      BP (!) 144/70 (BP Location: Right arm, Patient Position: Sitting, BP Method: Medium (Manual))   Pulse 101   Wt 52.8 kg (116 lb 6.5 oz)   SpO2 (!) 89% Comment: pt on oxygen 2 litters  BMI 23.51 kg/m²   Estimated body mass index is 23.51 kg/m² as calculated from the following:    Height as of 11/10/20: 4' 11" (1.499 m).    Weight as of this encounter: 52.8 kg (116 lb 6.5 oz).  Physical Exam   Constitutional: She is oriented to person, place, and time and well-developed, well-nourished, and in no distress. No distress.   Eyes: No scleral icterus.   Neck: Normal range of motion. Neck supple. No JVD present.   Abdominal: Soft. Bowel sounds are normal. She exhibits no distension. There is no abdominal tenderness.   Musculoskeletal:         General: Edema present.      Comments: RLE: 3+edema to the level of the knee. Erythematous skin discoloration on the dorsal and medial aspect, conssitent with venous insuficiency  LLE:  2+ pitting edema to the level of midshin   Neurological: She is alert and oriented to person, place, and time.   Skin: Skin is warm and dry.   Nursing note and vitals reviewed.        Assessment and Plan:           Lower extremity edema  Likely secondary to venous insufficiency given prior workup has already ruled out cardiac, renal, and liver etiologies. DVT also ruled out by ANISHA HERNANDEZ.   Will inc lasix to 40 mg qd, obtain CMP in order to assess electrolytes and renal function. Educated pt on fluid and salt restriction     -     furosemide (LASIX) 40 MG tablet; Take 1 tablet (40 mg total) by mouth once daily.  -     Comprehensive Metabolic Panel; Future        Follow up in about 4 weeks " (around 1/13/2021).    Other Orders Placed This Visit:  Orders Placed This Encounter   Procedures    Comprehensive Metabolic Panel         Abhay Lacey

## 2020-12-16 NOTE — PATIENT INSTRUCTIONS
Understanding Chronic Venous Insufficiency  Problems with the veins in the legs may lead to chronic venous insufficiency (CVI). CVI means that there is a long-term problem with the veins not being able to pump blood back to your heart. When this happens, blood stays in the legs and causes swelling and aching.   Two problems that may lead to chronic venous insufficiency are:  · Damaged valves. Valves keep blood flowing from the legs through the blood vessels and back to the heart. When the valves are damaged, blood does not flow as well.   · Deep vein thrombosis (DVT). Blood clots may form in the deep veins of the legs. This may cause pain, redness, and swelling in the legs. It may also block the flow of blood back to the heart. Seek immediate medical care if you have these symptoms.  · A blood clot in the leg can also break off and travel to the lungs. This is called pulmonary embolism (PE). In the lungs, the clot can cut off the flow of blood. This may cause chest pain, trouble breathing, sweating, a fast heartbeat, coughing (may cough up blood), and fainting. It is a medical emergency and may cause death. Call 911 if you have these symptoms.  · Healthcare providers call the two conditions, DVT and PE, venous thromboembolism (VTE).  CVI cant be cured, but you can control leg swelling to reduce the likelihood of ulcers (sores).  Recognizing the symptoms  Be aware of the following:  · If you stand or sit with your feet down for long periods, your legs may ache or feel heavy.  · Swollen ankles are possibly the most common symptom of CVI.  · As swelling increases, the skin over your ankles may show red spots or a brownish tinge. The skin may feel leathery or scaly, and may start to itch.  · If swelling is not controlled, an ulcer (open wound) may form.  What you can do  Reduce your risk of developing ulcers by doing the following:  · Increase blood flow back to your heart by elevating your legs, exercising daily,  and wearing elastic stockings.  · Boost blood flow in your legs by losing excess weight.  · If you must stand or sit in one place for a period of time, keep your blood moving by wiggling your toes, shifting your body position, and rising up on the balls of your feet.    Date Last Reviewed: 5/1/2016  © 7589-2655 [employer]. [address] [place] [address], [place] [address]. All rights reserved. This information is not intended as a substitute for professional medical care. Always follow your healthcare professional's instructions.

## 2021-01-15 ENCOUNTER — IMMUNIZATION (OUTPATIENT)
Dept: INTERNAL MEDICINE | Facility: CLINIC | Age: 76
End: 2021-01-15
Payer: MEDICARE

## 2021-01-15 DIAGNOSIS — Z23 NEED FOR VACCINATION: Primary | ICD-10-CM

## 2021-01-15 PROCEDURE — 91300 COVID-19, MRNA, LNP-S, PF, 30 MCG/0.3 ML DOSE VACCINE: CPT | Mod: PBBFAC | Performed by: INTERNAL MEDICINE

## 2021-02-03 DIAGNOSIS — J44.9 CHRONIC OBSTRUCTIVE PULMONARY DISEASE, UNSPECIFIED COPD TYPE: ICD-10-CM

## 2021-02-04 RX ORDER — ALBUTEROL SULFATE 0.83 MG/ML
2.5 SOLUTION RESPIRATORY (INHALATION) EVERY 4 HOURS PRN
Qty: 150 EACH | Refills: 11 | Status: ON HOLD | OUTPATIENT
Start: 2021-02-04 | End: 2021-08-18 | Stop reason: HOSPADM

## 2021-02-05 ENCOUNTER — IMMUNIZATION (OUTPATIENT)
Dept: INTERNAL MEDICINE | Facility: CLINIC | Age: 76
End: 2021-02-05
Payer: MEDICARE

## 2021-02-05 DIAGNOSIS — Z23 NEED FOR VACCINATION: Primary | ICD-10-CM

## 2021-02-05 PROCEDURE — 0002A COVID-19, MRNA, LNP-S, PF, 30 MCG/0.3 ML DOSE VACCINE: CPT | Mod: PBBFAC | Performed by: INTERNAL MEDICINE

## 2021-02-05 PROCEDURE — 91300 COVID-19, MRNA, LNP-S, PF, 30 MCG/0.3 ML DOSE VACCINE: CPT | Mod: PBBFAC | Performed by: INTERNAL MEDICINE

## 2021-02-23 DIAGNOSIS — R60.0 LOWER EXTREMITY EDEMA: ICD-10-CM

## 2021-02-23 RX ORDER — FUROSEMIDE 20 MG/1
20 TABLET ORAL DAILY
Qty: 90 TABLET | Refills: 3 | Status: SHIPPED | OUTPATIENT
Start: 2021-02-23 | End: 2021-09-10

## 2021-03-09 ENCOUNTER — PATIENT MESSAGE (OUTPATIENT)
Dept: PULMONOLOGY | Facility: CLINIC | Age: 76
End: 2021-03-09

## 2021-03-09 DIAGNOSIS — J44.1 COPD EXACERBATION: Primary | ICD-10-CM

## 2021-03-09 DIAGNOSIS — J96.21 ACUTE ON CHRONIC RESPIRATORY FAILURE WITH HYPOXIA: ICD-10-CM

## 2021-03-12 RX ORDER — UMECLIDINIUM 62.5 UG/1
AEROSOL, POWDER ORAL
Qty: 30 EACH | Refills: 11 | Status: SHIPPED | OUTPATIENT
Start: 2021-03-12 | End: 2022-01-11 | Stop reason: SDUPTHER

## 2021-03-17 ENCOUNTER — PATIENT OUTREACH (OUTPATIENT)
Dept: ADMINISTRATIVE | Facility: HOSPITAL | Age: 76
End: 2021-03-17

## 2021-03-18 ENCOUNTER — PATIENT MESSAGE (OUTPATIENT)
Dept: RESEARCH | Facility: HOSPITAL | Age: 76
End: 2021-03-18

## 2021-03-26 ENCOUNTER — PATIENT MESSAGE (OUTPATIENT)
Dept: RESEARCH | Facility: HOSPITAL | Age: 76
End: 2021-03-26

## 2021-03-30 ENCOUNTER — PATIENT MESSAGE (OUTPATIENT)
Dept: ADMINISTRATIVE | Facility: HOSPITAL | Age: 76
End: 2021-03-30

## 2021-03-30 DIAGNOSIS — I10 ESSENTIAL HYPERTENSION: ICD-10-CM

## 2021-03-30 DIAGNOSIS — J43.2 CENTRILOBULAR EMPHYSEMA: ICD-10-CM

## 2021-03-30 RX ORDER — HYDROCHLOROTHIAZIDE 25 MG/1
25 TABLET ORAL DAILY
Qty: 90 TABLET | Refills: 3 | Status: ON HOLD | OUTPATIENT
Start: 2021-03-30 | End: 2021-05-03 | Stop reason: HOSPADM

## 2021-03-30 RX ORDER — DILTIAZEM HYDROCHLORIDE 180 MG/1
180 CAPSULE, COATED, EXTENDED RELEASE ORAL DAILY
Qty: 90 CAPSULE | Refills: 3 | Status: SHIPPED | OUTPATIENT
Start: 2021-03-30 | End: 2022-01-11 | Stop reason: SDUPTHER

## 2021-05-01 ENCOUNTER — HOSPITAL ENCOUNTER (INPATIENT)
Facility: HOSPITAL | Age: 76
LOS: 2 days | Discharge: HOME HEALTH | DRG: 176 | End: 2021-05-03
Attending: EMERGENCY MEDICINE | Admitting: HOSPITALIST
Payer: MEDICARE

## 2021-05-01 DIAGNOSIS — R06.02 SHORTNESS OF BREATH: ICD-10-CM

## 2021-05-01 DIAGNOSIS — I26.99 ACUTE PULMONARY EMBOLISM, UNSPECIFIED PULMONARY EMBOLISM TYPE, UNSPECIFIED WHETHER ACUTE COR PULMONALE PRESENT: Primary | ICD-10-CM

## 2021-05-01 DIAGNOSIS — I26.99 ACUTE PULMONARY EMBOLISM: ICD-10-CM

## 2021-05-01 PROBLEM — R23.3 EASY BRUISING: Status: ACTIVE | Noted: 2021-05-01

## 2021-05-01 PROBLEM — J96.11 CHRONIC RESPIRATORY FAILURE WITH HYPOXIA: Status: ACTIVE | Noted: 2020-05-08

## 2021-05-01 PROBLEM — E87.4 MIXED ACID BASE BALANCE DISORDER: Status: ACTIVE | Noted: 2021-05-01

## 2021-05-01 PROBLEM — E83.52 HYPERCALCEMIA: Status: ACTIVE | Noted: 2021-05-01

## 2021-05-01 LAB
ALBUMIN SERPL BCP-MCNC: 3.9 G/DL (ref 3.5–5.2)
ALLENS TEST: ABNORMAL
ALP SERPL-CCNC: 88 U/L (ref 55–135)
ALT SERPL W/O P-5'-P-CCNC: 23 U/L (ref 10–44)
ANION GAP SERPL CALC-SCNC: 17 MMOL/L (ref 8–16)
APTT BLDCRRT: <21 SEC (ref 21–32)
AST SERPL-CCNC: 23 U/L (ref 10–40)
BASOPHILS # BLD AUTO: 0.06 K/UL (ref 0–0.2)
BASOPHILS NFR BLD: 0.3 % (ref 0–1.9)
BILIRUB SERPL-MCNC: 0.4 MG/DL (ref 0.1–1)
BNP SERPL-MCNC: 98 PG/ML (ref 0–99)
BUN SERPL-MCNC: 19 MG/DL (ref 8–23)
BUN SERPL-MCNC: 22 MG/DL (ref 6–30)
CALCIUM SERPL-MCNC: 11.4 MG/DL (ref 8.7–10.5)
CHLORIDE SERPL-SCNC: 90 MMOL/L (ref 95–110)
CHLORIDE SERPL-SCNC: 92 MMOL/L (ref 95–110)
CO2 SERPL-SCNC: 29 MMOL/L (ref 23–29)
CREAT SERPL-MCNC: 0.5 MG/DL (ref 0.5–1.4)
CREAT SERPL-MCNC: 0.7 MG/DL (ref 0.5–1.4)
CTP QC/QA: YES
D DIMER PPP IA.FEU-MCNC: 1.67 MG/L FEU
DIFFERENTIAL METHOD: ABNORMAL
EOSINOPHIL # BLD AUTO: 0 K/UL (ref 0–0.5)
EOSINOPHIL NFR BLD: 0.1 % (ref 0–8)
ERYTHROCYTE [DISTWIDTH] IN BLOOD BY AUTOMATED COUNT: 13.7 % (ref 11.5–14.5)
EST. GFR  (AFRICAN AMERICAN): >60 ML/MIN/1.73 M^2
EST. GFR  (NON AFRICAN AMERICAN): >60 ML/MIN/1.73 M^2
FIBRINOGEN PPP-MCNC: 275 MG/DL (ref 182–366)
GLUCOSE SERPL-MCNC: 218 MG/DL (ref 70–110)
GLUCOSE SERPL-MCNC: 219 MG/DL (ref 70–110)
HCO3 UR-SCNC: 38 MMOL/L (ref 24–28)
HCT VFR BLD AUTO: 50.9 % (ref 37–48.5)
HCT VFR BLD CALC: 52 %PCV (ref 36–54)
HGB BLD-MCNC: 17.4 G/DL (ref 12–16)
IMM GRANULOCYTES # BLD AUTO: 0.29 K/UL (ref 0–0.04)
IMM GRANULOCYTES NFR BLD AUTO: 1.5 % (ref 0–0.5)
INR PPP: 1 (ref 0.8–1.2)
LACTATE SERPL-SCNC: 1.9 MMOL/L (ref 0.5–2.2)
LACTATE SERPL-SCNC: 2.6 MMOL/L (ref 0.5–2.2)
LYMPHOCYTES # BLD AUTO: 0.7 K/UL (ref 1–4.8)
LYMPHOCYTES NFR BLD: 3.5 % (ref 18–48)
MCH RBC QN AUTO: 31.4 PG (ref 27–31)
MCHC RBC AUTO-ENTMCNC: 34.2 G/DL (ref 32–36)
MCV RBC AUTO: 92 FL (ref 82–98)
MONOCYTES # BLD AUTO: 0.7 K/UL (ref 0.3–1)
MONOCYTES NFR BLD: 3.4 % (ref 4–15)
NEUTROPHILS # BLD AUTO: 17.6 K/UL (ref 1.8–7.7)
NEUTROPHILS NFR BLD: 91.2 % (ref 38–73)
NRBC BLD-RTO: 0 /100 WBC
PATH REV BLD -IMP: NORMAL
PCO2 BLDA: 51.9 MMHG (ref 35–45)
PH SMN: 7.47 [PH] (ref 7.35–7.45)
PLATELET # BLD AUTO: 282 K/UL (ref 150–450)
PMV BLD AUTO: 9.4 FL (ref 9.2–12.9)
PO2 BLDA: 51 MMHG (ref 40–60)
POC BE: 14 MMOL/L
POC IONIZED CALCIUM: 1.18 MMOL/L (ref 1.06–1.42)
POC SATURATED O2: 87 % (ref 95–100)
POC TCO2 (MEASURED): 36 MMOL/L (ref 23–29)
POC TCO2: 40 MMOL/L (ref 24–29)
POTASSIUM BLD-SCNC: 3.3 MMOL/L (ref 3.5–5.1)
POTASSIUM SERPL-SCNC: 3.4 MMOL/L (ref 3.5–5.1)
PROT SERPL-MCNC: 7.5 G/DL (ref 6–8.4)
PROTHROMBIN TIME: 10.6 SEC (ref 9–12.5)
PTH-INTACT SERPL-MCNC: 56 PG/ML (ref 9–77)
RBC # BLD AUTO: 5.54 M/UL (ref 4–5.4)
SAMPLE: ABNORMAL
SAMPLE: ABNORMAL
SARS-COV-2 RDRP RESP QL NAA+PROBE: NEGATIVE
SITE: ABNORMAL
SODIUM BLD-SCNC: 134 MMOL/L (ref 136–145)
SODIUM SERPL-SCNC: 136 MMOL/L (ref 136–145)
TROPONIN I SERPL DL<=0.01 NG/ML-MCNC: 0.01 NG/ML (ref 0–0.03)
WBC # BLD AUTO: 19.25 K/UL (ref 3.9–12.7)

## 2021-05-01 PROCEDURE — 93005 ELECTROCARDIOGRAM TRACING: CPT

## 2021-05-01 PROCEDURE — 84484 ASSAY OF TROPONIN QUANT: CPT | Performed by: PHYSICIAN ASSISTANT

## 2021-05-01 PROCEDURE — 80053 COMPREHEN METABOLIC PANEL: CPT | Performed by: PHYSICIAN ASSISTANT

## 2021-05-01 PROCEDURE — 93010 ELECTROCARDIOGRAM REPORT: CPT | Mod: ,,, | Performed by: INTERNAL MEDICINE

## 2021-05-01 PROCEDURE — 83605 ASSAY OF LACTIC ACID: CPT | Mod: 91 | Performed by: STUDENT IN AN ORGANIZED HEALTH CARE EDUCATION/TRAINING PROGRAM

## 2021-05-01 PROCEDURE — 83970 ASSAY OF PARATHORMONE: CPT | Performed by: STUDENT IN AN ORGANIZED HEALTH CARE EDUCATION/TRAINING PROGRAM

## 2021-05-01 PROCEDURE — 83605 ASSAY OF LACTIC ACID: CPT | Performed by: PHYSICIAN ASSISTANT

## 2021-05-01 PROCEDURE — 63600175 PHARM REV CODE 636 W HCPCS: Performed by: PHYSICIAN ASSISTANT

## 2021-05-01 PROCEDURE — 85730 THROMBOPLASTIN TIME PARTIAL: CPT | Performed by: PHYSICIAN ASSISTANT

## 2021-05-01 PROCEDURE — 11000001 HC ACUTE MED/SURG PRIVATE ROOM

## 2021-05-01 PROCEDURE — 99291 CRITICAL CARE FIRST HOUR: CPT | Mod: CS,,, | Performed by: EMERGENCY MEDICINE

## 2021-05-01 PROCEDURE — 87040 BLOOD CULTURE FOR BACTERIA: CPT | Mod: 59 | Performed by: PHYSICIAN ASSISTANT

## 2021-05-01 PROCEDURE — 93010 EKG 12-LEAD: ICD-10-PCS | Mod: ,,, | Performed by: INTERNAL MEDICINE

## 2021-05-01 PROCEDURE — 36415 COLL VENOUS BLD VENIPUNCTURE: CPT | Performed by: STUDENT IN AN ORGANIZED HEALTH CARE EDUCATION/TRAINING PROGRAM

## 2021-05-01 PROCEDURE — 85610 PROTHROMBIN TIME: CPT | Performed by: PHYSICIAN ASSISTANT

## 2021-05-01 PROCEDURE — 99223 PR INITIAL HOSPITAL CARE,LEVL III: ICD-10-PCS | Mod: AI,GC,, | Performed by: HOSPITALIST

## 2021-05-01 PROCEDURE — 94640 AIRWAY INHALATION TREATMENT: CPT

## 2021-05-01 PROCEDURE — 82397 CHEMILUMINESCENT ASSAY: CPT | Performed by: STUDENT IN AN ORGANIZED HEALTH CARE EDUCATION/TRAINING PROGRAM

## 2021-05-01 PROCEDURE — 99223 1ST HOSP IP/OBS HIGH 75: CPT | Mod: AI,GC,, | Performed by: HOSPITALIST

## 2021-05-01 PROCEDURE — 82803 BLOOD GASES ANY COMBINATION: CPT

## 2021-05-01 PROCEDURE — 85384 FIBRINOGEN ACTIVITY: CPT | Performed by: PHYSICIAN ASSISTANT

## 2021-05-01 PROCEDURE — 85379 FIBRIN DEGRADATION QUANT: CPT | Performed by: PHYSICIAN ASSISTANT

## 2021-05-01 PROCEDURE — 85060 PATHOLOGIST REVIEW: ICD-10-PCS | Mod: ,,, | Performed by: PATHOLOGY

## 2021-05-01 PROCEDURE — 25000003 PHARM REV CODE 250: Performed by: STUDENT IN AN ORGANIZED HEALTH CARE EDUCATION/TRAINING PROGRAM

## 2021-05-01 PROCEDURE — 94761 N-INVAS EAR/PLS OXIMETRY MLT: CPT

## 2021-05-01 PROCEDURE — 85025 COMPLETE CBC W/AUTO DIFF WBC: CPT | Performed by: PHYSICIAN ASSISTANT

## 2021-05-01 PROCEDURE — 99900035 HC TECH TIME PER 15 MIN (STAT)

## 2021-05-01 PROCEDURE — 99285 EMERGENCY DEPT VISIT HI MDM: CPT | Mod: 25

## 2021-05-01 PROCEDURE — 25500020 PHARM REV CODE 255: Performed by: EMERGENCY MEDICINE

## 2021-05-01 PROCEDURE — 86803 HEPATITIS C AB TEST: CPT | Performed by: EMERGENCY MEDICINE

## 2021-05-01 PROCEDURE — 85060 BLOOD SMEAR INTERPRETATION: CPT | Mod: ,,, | Performed by: PATHOLOGY

## 2021-05-01 PROCEDURE — 63600175 PHARM REV CODE 636 W HCPCS: Performed by: STUDENT IN AN ORGANIZED HEALTH CARE EDUCATION/TRAINING PROGRAM

## 2021-05-01 PROCEDURE — 25000003 PHARM REV CODE 250: Performed by: PHYSICIAN ASSISTANT

## 2021-05-01 PROCEDURE — 99291 PR CRITICAL CARE, E/M 30-74 MINUTES: ICD-10-PCS | Mod: CS,,, | Performed by: EMERGENCY MEDICINE

## 2021-05-01 PROCEDURE — U0002 COVID-19 LAB TEST NON-CDC: HCPCS | Performed by: PHYSICIAN ASSISTANT

## 2021-05-01 PROCEDURE — 83880 ASSAY OF NATRIURETIC PEPTIDE: CPT | Performed by: PHYSICIAN ASSISTANT

## 2021-05-01 PROCEDURE — 25000242 PHARM REV CODE 250 ALT 637 W/ HCPCS: Performed by: STUDENT IN AN ORGANIZED HEALTH CARE EDUCATION/TRAINING PROGRAM

## 2021-05-01 RX ORDER — PRAVASTATIN SODIUM 10 MG/1
20 TABLET ORAL DAILY
Status: DISCONTINUED | OUTPATIENT
Start: 2021-05-02 | End: 2021-05-03 | Stop reason: HOSPADM

## 2021-05-01 RX ORDER — PREDNISONE 10 MG/1
10 TABLET ORAL DAILY
Status: DISCONTINUED | OUTPATIENT
Start: 2021-05-02 | End: 2021-05-03 | Stop reason: HOSPADM

## 2021-05-01 RX ORDER — DILTIAZEM HYDROCHLORIDE 180 MG/1
180 CAPSULE, COATED, EXTENDED RELEASE ORAL DAILY
Status: DISCONTINUED | OUTPATIENT
Start: 2021-05-02 | End: 2021-05-03 | Stop reason: HOSPADM

## 2021-05-01 RX ORDER — CETIRIZINE HYDROCHLORIDE 5 MG/1
10 TABLET ORAL DAILY
Status: DISCONTINUED | OUTPATIENT
Start: 2021-05-02 | End: 2021-05-03 | Stop reason: HOSPADM

## 2021-05-01 RX ORDER — ALBUTEROL SULFATE 90 UG/1
1 AEROSOL, METERED RESPIRATORY (INHALATION) EVERY 6 HOURS PRN
Status: DISCONTINUED | OUTPATIENT
Start: 2021-05-01 | End: 2021-05-03 | Stop reason: HOSPADM

## 2021-05-01 RX ORDER — SODIUM CHLORIDE 0.9 % (FLUSH) 0.9 %
10 SYRINGE (ML) INJECTION
Status: DISCONTINUED | OUTPATIENT
Start: 2021-05-01 | End: 2021-05-03 | Stop reason: HOSPADM

## 2021-05-01 RX ORDER — TALC
6 POWDER (GRAM) TOPICAL NIGHTLY PRN
Status: DISCONTINUED | OUTPATIENT
Start: 2021-05-01 | End: 2021-05-03 | Stop reason: HOSPADM

## 2021-05-01 RX ORDER — HEPARIN SODIUM,PORCINE/D5W 25000/250
0-40 INTRAVENOUS SOLUTION INTRAVENOUS CONTINUOUS
Status: DISCONTINUED | OUTPATIENT
Start: 2021-05-01 | End: 2021-05-02

## 2021-05-01 RX ORDER — ALPRAZOLAM 0.25 MG/1
0.25 TABLET ORAL 3 TIMES DAILY PRN
Status: DISCONTINUED | OUTPATIENT
Start: 2021-05-01 | End: 2021-05-03 | Stop reason: HOSPADM

## 2021-05-01 RX ORDER — IBUPROFEN 200 MG
16 TABLET ORAL
Status: DISCONTINUED | OUTPATIENT
Start: 2021-05-01 | End: 2021-05-03 | Stop reason: HOSPADM

## 2021-05-01 RX ORDER — IBUPROFEN 200 MG
24 TABLET ORAL
Status: DISCONTINUED | OUTPATIENT
Start: 2021-05-01 | End: 2021-05-03 | Stop reason: HOSPADM

## 2021-05-01 RX ORDER — FLUTICASONE FUROATE AND VILANTEROL 100; 25 UG/1; UG/1
1 POWDER RESPIRATORY (INHALATION) DAILY
Refills: 11 | Status: DISCONTINUED | OUTPATIENT
Start: 2021-05-02 | End: 2021-05-03 | Stop reason: HOSPADM

## 2021-05-01 RX ORDER — PREDNISONE 5 MG/1
5 TABLET ORAL NIGHTLY
Status: DISCONTINUED | OUTPATIENT
Start: 2021-05-01 | End: 2021-05-03 | Stop reason: HOSPADM

## 2021-05-01 RX ORDER — LIDOCAINE HYDROCHLORIDE 10 MG/ML
10 INJECTION INFILTRATION; PERINEURAL
Status: COMPLETED | OUTPATIENT
Start: 2021-05-01 | End: 2021-05-01

## 2021-05-01 RX ORDER — ALBUTEROL SULFATE 2.5 MG/.5ML
2.5 SOLUTION RESPIRATORY (INHALATION) EVERY 4 HOURS
Refills: 11 | Status: DISCONTINUED | OUTPATIENT
Start: 2021-05-01 | End: 2021-05-03

## 2021-05-01 RX ORDER — FUROSEMIDE 20 MG/1
20 TABLET ORAL DAILY
Status: DISCONTINUED | OUTPATIENT
Start: 2021-05-02 | End: 2021-05-01

## 2021-05-01 RX ORDER — GLUCAGON 1 MG
1 KIT INJECTION
Status: DISCONTINUED | OUTPATIENT
Start: 2021-05-01 | End: 2021-05-03 | Stop reason: HOSPADM

## 2021-05-01 RX ORDER — TALC
6 POWDER (GRAM) TOPICAL NIGHTLY PRN
Status: DISCONTINUED | OUTPATIENT
Start: 2021-05-01 | End: 2021-05-01

## 2021-05-01 RX ORDER — POTASSIUM CHLORIDE 20 MEQ/1
40 TABLET, EXTENDED RELEASE ORAL ONCE
Status: COMPLETED | OUTPATIENT
Start: 2021-05-01 | End: 2021-05-01

## 2021-05-01 RX ORDER — SODIUM CHLORIDE 9 MG/ML
INJECTION, SOLUTION INTRAVENOUS CONTINUOUS
Status: ACTIVE | OUTPATIENT
Start: 2021-05-01 | End: 2021-05-02

## 2021-05-01 RX ORDER — CHOLECALCIFEROL (VITAMIN D3) 25 MCG
1000 TABLET ORAL DAILY
Status: DISCONTINUED | OUTPATIENT
Start: 2021-05-02 | End: 2021-05-01

## 2021-05-01 RX ADMIN — BACITRACIN ZINC, NEOMYCIN SULFATE, POLYMYXIN B SULFATE 1 EACH: 3.5; 5000; 4 OINTMENT TOPICAL at 04:05

## 2021-05-01 RX ADMIN — POTASSIUM CHLORIDE 40 MEQ: 1500 TABLET, EXTENDED RELEASE ORAL at 08:05

## 2021-05-01 RX ADMIN — PREDNISONE 5 MG: 5 TABLET ORAL at 08:05

## 2021-05-01 RX ADMIN — ALBUTEROL SULFATE 2.5 MG: 2.5 SOLUTION RESPIRATORY (INHALATION) at 09:05

## 2021-05-01 RX ADMIN — LIDOCAINE HYDROCHLORIDE 10 ML: 10 INJECTION, SOLUTION INFILTRATION; PERINEURAL at 04:05

## 2021-05-01 RX ADMIN — SODIUM CHLORIDE: 0.9 INJECTION, SOLUTION INTRAVENOUS at 10:05

## 2021-05-01 RX ADMIN — IOHEXOL 75 ML: 350 INJECTION, SOLUTION INTRAVENOUS at 03:05

## 2021-05-01 RX ADMIN — HEPARIN SODIUM AND DEXTROSE 18 UNITS/KG/HR: 10000; 5 INJECTION INTRAVENOUS at 08:05

## 2021-05-02 PROBLEM — E87.4 MIXED ACID BASE BALANCE DISORDER: Status: RESOLVED | Noted: 2021-05-01 | Resolved: 2021-05-02

## 2021-05-02 LAB
ALBUMIN SERPL BCP-MCNC: 2.9 G/DL (ref 3.5–5.2)
ALP SERPL-CCNC: 66 U/L (ref 55–135)
ALT SERPL W/O P-5'-P-CCNC: 17 U/L (ref 10–44)
ANION GAP SERPL CALC-SCNC: 11 MMOL/L (ref 8–16)
APTT BLDCRRT: 29.3 SEC (ref 21–32)
APTT BLDCRRT: 86.8 SEC (ref 21–32)
ASCENDING AORTA: 2.88 CM
AST SERPL-CCNC: 16 U/L (ref 10–40)
AV INDEX (PROSTH): 0.85
AV MEAN GRADIENT: 2 MMHG
AV PEAK GRADIENT: 3 MMHG
AV VALVE AREA: 2.74 CM2
AV VELOCITY RATIO: 0.81
BASOPHILS # BLD AUTO: 0.02 K/UL (ref 0–0.2)
BASOPHILS NFR BLD: 0.2 % (ref 0–1.9)
BILIRUB SERPL-MCNC: 0.3 MG/DL (ref 0.1–1)
BSA FOR ECHO PROCEDURE: 1.44 M2
BUN SERPL-MCNC: 17 MG/DL (ref 8–23)
CA-I BLDV-SCNC: 1.11 MMOL/L (ref 1.06–1.42)
CALCIUM SERPL-MCNC: 9 MG/DL (ref 8.7–10.5)
CHLORIDE SERPL-SCNC: 100 MMOL/L (ref 95–110)
CO2 SERPL-SCNC: 28 MMOL/L (ref 23–29)
CREAT SERPL-MCNC: 0.6 MG/DL (ref 0.5–1.4)
CV ECHO LV RWT: 0.41 CM
DIFFERENTIAL METHOD: ABNORMAL
DOP CALC AO PEAK VEL: 0.91 M/S
DOP CALC AO VTI: 19.34 CM
DOP CALC LVOT AREA: 3.2 CM2
DOP CALC LVOT DIAMETER: 2.03 CM
DOP CALC LVOT PEAK VEL: 0.74 M/S
DOP CALC LVOT STROKE VOLUME: 53.05 CM3
DOP CALCLVOT PEAK VEL VTI: 16.4 CM
E WAVE DECELERATION TIME: 184.42 MSEC
E/A RATIO: 0.8
E/E' RATIO: 10.77 M/S
ECHO LV POSTERIOR WALL: 0.8 CM (ref 0.6–1.1)
EJECTION FRACTION: 65 %
EOSINOPHIL # BLD AUTO: 0 K/UL (ref 0–0.5)
EOSINOPHIL NFR BLD: 0.2 % (ref 0–8)
ERYTHROCYTE [DISTWIDTH] IN BLOOD BY AUTOMATED COUNT: 13.7 % (ref 11.5–14.5)
EST. GFR  (AFRICAN AMERICAN): >60 ML/MIN/1.73 M^2
EST. GFR  (NON AFRICAN AMERICAN): >60 ML/MIN/1.73 M^2
ESTIMATED AVG GLUCOSE: 163 MG/DL (ref 68–131)
FRACTIONAL SHORTENING: 39 % (ref 28–44)
GLUCOSE SERPL-MCNC: 115 MG/DL (ref 70–110)
HBA1C MFR BLD: 7.3 % (ref 4–5.6)
HCT VFR BLD AUTO: 39.5 % (ref 37–48.5)
HGB BLD-MCNC: 13.3 G/DL (ref 12–16)
IMM GRANULOCYTES # BLD AUTO: 0.15 K/UL (ref 0–0.04)
IMM GRANULOCYTES NFR BLD AUTO: 1.3 % (ref 0–0.5)
INTERVENTRICULAR SEPTUM: 0.8 CM (ref 0.6–1.1)
IVRT: 97.05 MSEC
LA MAJOR: 5.14 CM
LA MINOR: 5.11 CM
LA WIDTH: 3.14 CM
LEFT ATRIUM SIZE: 2.55 CM
LEFT ATRIUM VOLUME INDEX MOD: 23.1 ML/M2
LEFT ATRIUM VOLUME INDEX: 24.4 ML/M2
LEFT ATRIUM VOLUME MOD: 33 CM3
LEFT ATRIUM VOLUME: 34.88 CM3
LEFT INTERNAL DIMENSION IN SYSTOLE: 2.34 CM (ref 2.1–4)
LEFT VENTRICLE DIASTOLIC VOLUME INDEX: 44.92 ML/M2
LEFT VENTRICLE DIASTOLIC VOLUME: 64.24 ML
LEFT VENTRICLE MASS INDEX: 62 G/M2
LEFT VENTRICLE SYSTOLIC VOLUME INDEX: 13.2 ML/M2
LEFT VENTRICLE SYSTOLIC VOLUME: 18.88 ML
LEFT VENTRICULAR INTERNAL DIMENSION IN DIASTOLE: 3.86 CM (ref 3.5–6)
LEFT VENTRICULAR MASS: 88.18 G
LV LATERAL E/E' RATIO: 11.67 M/S
LV SEPTAL E/E' RATIO: 10 M/S
LYMPHOCYTES # BLD AUTO: 0.7 K/UL (ref 1–4.8)
LYMPHOCYTES NFR BLD: 6.3 % (ref 18–48)
MAGNESIUM SERPL-MCNC: 1.7 MG/DL (ref 1.6–2.6)
MCH RBC QN AUTO: 31.2 PG (ref 27–31)
MCHC RBC AUTO-ENTMCNC: 33.7 G/DL (ref 32–36)
MCV RBC AUTO: 93 FL (ref 82–98)
MONOCYTES # BLD AUTO: 0.7 K/UL (ref 0.3–1)
MONOCYTES NFR BLD: 5.9 % (ref 4–15)
MV A" WAVE DURATION": 9.13 MSEC
MV PEAK A VEL: 0.87 M/S
MV PEAK E VEL: 0.7 M/S
MV STENOSIS PRESSURE HALF TIME: 53.48 MS
MV VALVE AREA P 1/2 METHOD: 4.11 CM2
NEUTROPHILS # BLD AUTO: 9.9 K/UL (ref 1.8–7.7)
NEUTROPHILS NFR BLD: 86.1 % (ref 38–73)
NRBC BLD-RTO: 0 /100 WBC
PHOSPHATE SERPL-MCNC: 3.8 MG/DL (ref 2.7–4.5)
PISA TR MAX VEL: 2.63 M/S
PLATELET # BLD AUTO: 210 K/UL (ref 150–450)
PMV BLD AUTO: 9.2 FL (ref 9.2–12.9)
POTASSIUM SERPL-SCNC: 3.3 MMOL/L (ref 3.5–5.1)
PROT SERPL-MCNC: 5.3 G/DL (ref 6–8.4)
PULM VEIN S/D RATIO: 1.6
PV PEAK D VEL: 0.3 M/S
PV PEAK S VEL: 0.48 M/S
RA MAJOR: 4.25 CM
RA PRESSURE: 3 MMHG
RA WIDTH: 2.28 CM
RBC # BLD AUTO: 4.26 M/UL (ref 4–5.4)
RIGHT VENTRICULAR END-DIASTOLIC DIMENSION: 2.54 CM
RV TISSUE DOPPLER FREE WALL SYSTOLIC VELOCITY 1 (APICAL 4 CHAMBER VIEW): 11.94 CM/S
SINUS: 2.69 CM
SODIUM SERPL-SCNC: 139 MMOL/L (ref 136–145)
STJ: 2.23 CM
TDI LATERAL: 0.06 M/S
TDI SEPTAL: 0.07 M/S
TDI: 0.07 M/S
TR MAX PG: 28 MMHG
TRICUSPID ANNULAR PLANE SYSTOLIC EXCURSION: 1.93 CM
TV REST PULMONARY ARTERY PRESSURE: 31 MMHG
WBC # BLD AUTO: 11.51 K/UL (ref 3.9–12.7)

## 2021-05-02 PROCEDURE — 99233 SBSQ HOSP IP/OBS HIGH 50: CPT | Mod: GC,,, | Performed by: HOSPITALIST

## 2021-05-02 PROCEDURE — 97165 OT EVAL LOW COMPLEX 30 MIN: CPT

## 2021-05-02 PROCEDURE — 99900035 HC TECH TIME PER 15 MIN (STAT)

## 2021-05-02 PROCEDURE — 80053 COMPREHEN METABOLIC PANEL: CPT | Performed by: STUDENT IN AN ORGANIZED HEALTH CARE EDUCATION/TRAINING PROGRAM

## 2021-05-02 PROCEDURE — 85730 THROMBOPLASTIN TIME PARTIAL: CPT | Performed by: HOSPITALIST

## 2021-05-02 PROCEDURE — 94640 AIRWAY INHALATION TREATMENT: CPT

## 2021-05-02 PROCEDURE — 25000003 PHARM REV CODE 250: Performed by: STUDENT IN AN ORGANIZED HEALTH CARE EDUCATION/TRAINING PROGRAM

## 2021-05-02 PROCEDURE — 83735 ASSAY OF MAGNESIUM: CPT | Performed by: STUDENT IN AN ORGANIZED HEALTH CARE EDUCATION/TRAINING PROGRAM

## 2021-05-02 PROCEDURE — 11000001 HC ACUTE MED/SURG PRIVATE ROOM

## 2021-05-02 PROCEDURE — 84100 ASSAY OF PHOSPHORUS: CPT | Performed by: STUDENT IN AN ORGANIZED HEALTH CARE EDUCATION/TRAINING PROGRAM

## 2021-05-02 PROCEDURE — 63600175 PHARM REV CODE 636 W HCPCS: Performed by: STUDENT IN AN ORGANIZED HEALTH CARE EDUCATION/TRAINING PROGRAM

## 2021-05-02 PROCEDURE — 85025 COMPLETE CBC W/AUTO DIFF WBC: CPT | Performed by: STUDENT IN AN ORGANIZED HEALTH CARE EDUCATION/TRAINING PROGRAM

## 2021-05-02 PROCEDURE — 99233 PR SUBSEQUENT HOSPITAL CARE,LEVL III: ICD-10-PCS | Mod: GC,,, | Performed by: HOSPITALIST

## 2021-05-02 PROCEDURE — 25000242 PHARM REV CODE 250 ALT 637 W/ HCPCS: Performed by: STUDENT IN AN ORGANIZED HEALTH CARE EDUCATION/TRAINING PROGRAM

## 2021-05-02 PROCEDURE — 36415 COLL VENOUS BLD VENIPUNCTURE: CPT | Performed by: HOSPITALIST

## 2021-05-02 PROCEDURE — 83036 HEMOGLOBIN GLYCOSYLATED A1C: CPT | Performed by: STUDENT IN AN ORGANIZED HEALTH CARE EDUCATION/TRAINING PROGRAM

## 2021-05-02 PROCEDURE — 27000221 HC OXYGEN, UP TO 24 HOURS

## 2021-05-02 PROCEDURE — 82330 ASSAY OF CALCIUM: CPT | Performed by: STUDENT IN AN ORGANIZED HEALTH CARE EDUCATION/TRAINING PROGRAM

## 2021-05-02 PROCEDURE — 94761 N-INVAS EAR/PLS OXIMETRY MLT: CPT

## 2021-05-02 RX ORDER — MAGNESIUM SULFATE HEPTAHYDRATE 40 MG/ML
2 INJECTION, SOLUTION INTRAVENOUS ONCE
Status: COMPLETED | OUTPATIENT
Start: 2021-05-02 | End: 2021-05-02

## 2021-05-02 RX ORDER — POTASSIUM CHLORIDE 20 MEQ/1
40 TABLET, EXTENDED RELEASE ORAL ONCE
Status: COMPLETED | OUTPATIENT
Start: 2021-05-02 | End: 2021-05-02

## 2021-05-02 RX ADMIN — POTASSIUM CHLORIDE 40 MEQ: 1500 TABLET, EXTENDED RELEASE ORAL at 10:05

## 2021-05-02 RX ADMIN — ALBUTEROL SULFATE 2.5 MG: 2.5 SOLUTION RESPIRATORY (INHALATION) at 12:05

## 2021-05-02 RX ADMIN — ALPRAZOLAM 0.25 MG: 0.25 TABLET ORAL at 08:05

## 2021-05-02 RX ADMIN — APIXABAN 10 MG: 5 TABLET, FILM COATED ORAL at 08:05

## 2021-05-02 RX ADMIN — APIXABAN 10 MG: 5 TABLET, FILM COATED ORAL at 02:05

## 2021-05-02 RX ADMIN — ALBUTEROL SULFATE 2.5 MG: 2.5 SOLUTION RESPIRATORY (INHALATION) at 09:05

## 2021-05-02 RX ADMIN — MAGNESIUM SULFATE 2 G: 2 INJECTION INTRAVENOUS at 10:05

## 2021-05-02 RX ADMIN — ALBUTEROL SULFATE 2.5 MG: 2.5 SOLUTION RESPIRATORY (INHALATION) at 04:05

## 2021-05-02 RX ADMIN — PREDNISONE 10 MG: 10 TABLET ORAL at 10:05

## 2021-05-02 RX ADMIN — CETIRIZINE HYDROCHLORIDE 10 MG: 5 TABLET ORAL at 10:05

## 2021-05-02 RX ADMIN — PRAVASTATIN SODIUM 20 MG: 10 TABLET ORAL at 10:05

## 2021-05-02 RX ADMIN — DILTIAZEM HYDROCHLORIDE 180 MG: 180 CAPSULE, COATED, EXTENDED RELEASE ORAL at 10:05

## 2021-05-02 RX ADMIN — ALBUTEROL SULFATE 2.5 MG: 2.5 SOLUTION RESPIRATORY (INHALATION) at 08:05

## 2021-05-02 RX ADMIN — PREDNISONE 5 MG: 5 TABLET ORAL at 08:05

## 2021-05-03 VITALS
BODY MASS INDEX: 22.18 KG/M2 | HEART RATE: 85 BPM | SYSTOLIC BLOOD PRESSURE: 137 MMHG | HEIGHT: 59 IN | RESPIRATION RATE: 18 BRPM | TEMPERATURE: 98 F | DIASTOLIC BLOOD PRESSURE: 65 MMHG | OXYGEN SATURATION: 95 % | WEIGHT: 110 LBS

## 2021-05-03 LAB
ALBUMIN SERPL BCP-MCNC: 3 G/DL (ref 3.5–5.2)
ALP SERPL-CCNC: 65 U/L (ref 55–135)
ALT SERPL W/O P-5'-P-CCNC: 16 U/L (ref 10–44)
ANION GAP SERPL CALC-SCNC: 9 MMOL/L (ref 8–16)
AST SERPL-CCNC: 19 U/L (ref 10–40)
BASOPHILS # BLD AUTO: 0.04 K/UL (ref 0–0.2)
BASOPHILS NFR BLD: 0.3 % (ref 0–1.9)
BILIRUB SERPL-MCNC: 0.4 MG/DL (ref 0.1–1)
BUN SERPL-MCNC: 13 MG/DL (ref 8–23)
CALCIUM SERPL-MCNC: 8.5 MG/DL (ref 8.7–10.5)
CHLORIDE SERPL-SCNC: 104 MMOL/L (ref 95–110)
CO2 SERPL-SCNC: 27 MMOL/L (ref 23–29)
CREAT SERPL-MCNC: 0.5 MG/DL (ref 0.5–1.4)
DIFFERENTIAL METHOD: ABNORMAL
EOSINOPHIL # BLD AUTO: 0 K/UL (ref 0–0.5)
EOSINOPHIL NFR BLD: 0.3 % (ref 0–8)
ERYTHROCYTE [DISTWIDTH] IN BLOOD BY AUTOMATED COUNT: 13.8 % (ref 11.5–14.5)
EST. GFR  (AFRICAN AMERICAN): >60 ML/MIN/1.73 M^2
EST. GFR  (NON AFRICAN AMERICAN): >60 ML/MIN/1.73 M^2
GLUCOSE SERPL-MCNC: 77 MG/DL (ref 70–110)
HCT VFR BLD AUTO: 42.4 % (ref 37–48.5)
HCV AB SERPL QL IA: NEGATIVE
HGB BLD-MCNC: 13.5 G/DL (ref 12–16)
IMM GRANULOCYTES # BLD AUTO: 0.21 K/UL (ref 0–0.04)
IMM GRANULOCYTES NFR BLD AUTO: 1.8 % (ref 0–0.5)
LYMPHOCYTES # BLD AUTO: 0.7 K/UL (ref 1–4.8)
LYMPHOCYTES NFR BLD: 6.3 % (ref 18–48)
MAGNESIUM SERPL-MCNC: 2.3 MG/DL (ref 1.6–2.6)
MCH RBC QN AUTO: 31.2 PG (ref 27–31)
MCHC RBC AUTO-ENTMCNC: 31.8 G/DL (ref 32–36)
MCV RBC AUTO: 98 FL (ref 82–98)
MONOCYTES # BLD AUTO: 0.8 K/UL (ref 0.3–1)
MONOCYTES NFR BLD: 6.6 % (ref 4–15)
NEUTROPHILS # BLD AUTO: 9.7 K/UL (ref 1.8–7.7)
NEUTROPHILS NFR BLD: 84.7 % (ref 38–73)
NRBC BLD-RTO: 0 /100 WBC
PATH REV BLD -IMP: NORMAL
PHOSPHATE SERPL-MCNC: 2.2 MG/DL (ref 2.7–4.5)
PLATELET # BLD AUTO: 214 K/UL (ref 150–450)
PMV BLD AUTO: 9.5 FL (ref 9.2–12.9)
POTASSIUM SERPL-SCNC: 4.8 MMOL/L (ref 3.5–5.1)
PROT SERPL-MCNC: 5.6 G/DL (ref 6–8.4)
RBC # BLD AUTO: 4.33 M/UL (ref 4–5.4)
SODIUM SERPL-SCNC: 140 MMOL/L (ref 136–145)
WBC # BLD AUTO: 11.45 K/UL (ref 3.9–12.7)

## 2021-05-03 PROCEDURE — 97530 THERAPEUTIC ACTIVITIES: CPT

## 2021-05-03 PROCEDURE — 63600175 PHARM REV CODE 636 W HCPCS: Performed by: STUDENT IN AN ORGANIZED HEALTH CARE EDUCATION/TRAINING PROGRAM

## 2021-05-03 PROCEDURE — 85025 COMPLETE CBC W/AUTO DIFF WBC: CPT | Performed by: STUDENT IN AN ORGANIZED HEALTH CARE EDUCATION/TRAINING PROGRAM

## 2021-05-03 PROCEDURE — 84100 ASSAY OF PHOSPHORUS: CPT | Performed by: STUDENT IN AN ORGANIZED HEALTH CARE EDUCATION/TRAINING PROGRAM

## 2021-05-03 PROCEDURE — 94761 N-INVAS EAR/PLS OXIMETRY MLT: CPT

## 2021-05-03 PROCEDURE — 99238 PR HOSPITAL DISCHARGE DAY,<30 MIN: ICD-10-PCS | Mod: GC,,, | Performed by: HOSPITALIST

## 2021-05-03 PROCEDURE — 25000003 PHARM REV CODE 250: Performed by: STUDENT IN AN ORGANIZED HEALTH CARE EDUCATION/TRAINING PROGRAM

## 2021-05-03 PROCEDURE — 83735 ASSAY OF MAGNESIUM: CPT | Performed by: STUDENT IN AN ORGANIZED HEALTH CARE EDUCATION/TRAINING PROGRAM

## 2021-05-03 PROCEDURE — 80053 COMPREHEN METABOLIC PANEL: CPT | Performed by: STUDENT IN AN ORGANIZED HEALTH CARE EDUCATION/TRAINING PROGRAM

## 2021-05-03 PROCEDURE — 27000221 HC OXYGEN, UP TO 24 HOURS

## 2021-05-03 PROCEDURE — 99900035 HC TECH TIME PER 15 MIN (STAT)

## 2021-05-03 PROCEDURE — 36415 COLL VENOUS BLD VENIPUNCTURE: CPT | Performed by: STUDENT IN AN ORGANIZED HEALTH CARE EDUCATION/TRAINING PROGRAM

## 2021-05-03 PROCEDURE — 97161 PT EVAL LOW COMPLEX 20 MIN: CPT

## 2021-05-03 PROCEDURE — 99238 HOSP IP/OBS DSCHRG MGMT 30/<: CPT | Mod: GC,,, | Performed by: HOSPITALIST

## 2021-05-03 PROCEDURE — 25000003 PHARM REV CODE 250: Performed by: HOSPITALIST

## 2021-05-03 RX ORDER — IPRATROPIUM BROMIDE 0.5 MG/2.5ML
500 SOLUTION RESPIRATORY (INHALATION) 4 TIMES DAILY
Qty: 62.5 ML | Refills: 5
Start: 2021-05-03 | End: 2021-05-03 | Stop reason: SDUPTHER

## 2021-05-03 RX ORDER — IPRATROPIUM BROMIDE 0.5 MG/2.5ML
500 SOLUTION RESPIRATORY (INHALATION) 4 TIMES DAILY
Qty: 62.5 ML | Refills: 5 | Status: ON HOLD
Start: 2021-05-03 | End: 2021-12-27 | Stop reason: HOSPADM

## 2021-05-03 RX ORDER — IPRATROPIUM BROMIDE 0.5 MG/2.5ML
0.5 SOLUTION RESPIRATORY (INHALATION) EVERY 4 HOURS
Status: DISCONTINUED | OUTPATIENT
Start: 2021-05-03 | End: 2021-05-03

## 2021-05-03 RX ADMIN — DILTIAZEM HYDROCHLORIDE 180 MG: 180 CAPSULE, COATED, EXTENDED RELEASE ORAL at 08:05

## 2021-05-03 RX ADMIN — APIXABAN 10 MG: 5 TABLET, FILM COATED ORAL at 08:05

## 2021-05-03 RX ADMIN — PREDNISONE 10 MG: 10 TABLET ORAL at 08:05

## 2021-05-03 RX ADMIN — PRAVASTATIN SODIUM 20 MG: 10 TABLET ORAL at 08:05

## 2021-05-06 ENCOUNTER — HOSPITAL ENCOUNTER (EMERGENCY)
Facility: HOSPITAL | Age: 76
Discharge: HOME | End: 2021-05-06
Attending: EMERGENCY MEDICINE
Payer: MEDICARE

## 2021-05-06 VITALS
DIASTOLIC BLOOD PRESSURE: 68 MMHG | HEIGHT: 59 IN | SYSTOLIC BLOOD PRESSURE: 142 MMHG | OXYGEN SATURATION: 98 % | BODY MASS INDEX: 22.18 KG/M2 | TEMPERATURE: 98 F | WEIGHT: 110 LBS | HEART RATE: 95 BPM | RESPIRATION RATE: 22 BRPM

## 2021-05-06 DIAGNOSIS — Z51.89 VISIT FOR WOUND CHECK: Primary | ICD-10-CM

## 2021-05-06 LAB
BACTERIA BLD CULT: NORMAL
BACTERIA BLD CULT: NORMAL

## 2021-05-06 PROCEDURE — 99284 EMERGENCY DEPT VISIT MOD MDM: CPT | Mod: ,,, | Performed by: EMERGENCY MEDICINE

## 2021-05-06 PROCEDURE — 99284 PR EMERGENCY DEPT VISIT,LEVEL IV: ICD-10-PCS | Mod: ,,, | Performed by: EMERGENCY MEDICINE

## 2021-05-06 PROCEDURE — 99281 EMR DPT VST MAYX REQ PHY/QHP: CPT

## 2021-05-13 ENCOUNTER — PATIENT MESSAGE (OUTPATIENT)
Dept: PULMONOLOGY | Facility: CLINIC | Age: 76
End: 2021-05-13

## 2021-05-14 DIAGNOSIS — F41.9 ANXIETY: ICD-10-CM

## 2021-05-14 DIAGNOSIS — J44.1 COPD EXACERBATION: Primary | ICD-10-CM

## 2021-05-14 RX ORDER — ALPRAZOLAM 0.25 MG/1
0.25 TABLET ORAL 3 TIMES DAILY PRN
Qty: 90 TABLET | Refills: 3 | Status: SHIPPED | OUTPATIENT
Start: 2021-05-14 | End: 2022-01-11 | Stop reason: SDUPTHER

## 2021-05-14 RX ORDER — PREDNISONE 5 MG/1
TABLET ORAL
Qty: 60 TABLET | Refills: 11 | Status: ON HOLD | OUTPATIENT
Start: 2021-05-14 | End: 2021-12-03 | Stop reason: HOSPADM

## 2021-05-18 ENCOUNTER — OFFICE VISIT (OUTPATIENT)
Dept: INTERNAL MEDICINE | Facility: CLINIC | Age: 76
End: 2021-05-18
Payer: MEDICARE

## 2021-05-18 ENCOUNTER — LAB VISIT (OUTPATIENT)
Dept: LAB | Facility: HOSPITAL | Age: 76
End: 2021-05-18
Payer: MEDICARE

## 2021-05-18 ENCOUNTER — DOCUMENT SCAN (OUTPATIENT)
Dept: HOME HEALTH SERVICES | Facility: HOSPITAL | Age: 76
End: 2021-05-18
Payer: MEDICARE

## 2021-05-18 VITALS
BODY MASS INDEX: 20.98 KG/M2 | OXYGEN SATURATION: 98 % | DIASTOLIC BLOOD PRESSURE: 74 MMHG | WEIGHT: 104.06 LBS | HEART RATE: 97 BPM | HEIGHT: 59 IN | SYSTOLIC BLOOD PRESSURE: 130 MMHG

## 2021-05-18 DIAGNOSIS — J43.9 PULMONARY EMPHYSEMA, UNSPECIFIED EMPHYSEMA TYPE: Primary | ICD-10-CM

## 2021-05-18 DIAGNOSIS — R60.0 BILATERAL EDEMA OF LOWER EXTREMITY: ICD-10-CM

## 2021-05-18 DIAGNOSIS — J96.21 ACUTE ON CHRONIC RESPIRATORY FAILURE WITH HYPOXIA: ICD-10-CM

## 2021-05-18 DIAGNOSIS — S81.812D LACERATION OF LEFT LOWER LEG, SUBSEQUENT ENCOUNTER: ICD-10-CM

## 2021-05-18 DIAGNOSIS — I26.99 ACUTE PULMONARY EMBOLISM, UNSPECIFIED PULMONARY EMBOLISM TYPE, UNSPECIFIED WHETHER ACUTE COR PULMONALE PRESENT: ICD-10-CM

## 2021-05-18 DIAGNOSIS — R07.9 ACUTE CHEST PAIN: ICD-10-CM

## 2021-05-18 LAB
ALBUMIN SERPL BCP-MCNC: 3.4 G/DL (ref 3.5–5.2)
ALP SERPL-CCNC: 109 U/L (ref 55–135)
ALT SERPL W/O P-5'-P-CCNC: 25 U/L (ref 10–44)
ANION GAP SERPL CALC-SCNC: 11 MMOL/L (ref 8–16)
AST SERPL-CCNC: 21 U/L (ref 10–40)
BILIRUB SERPL-MCNC: 0.4 MG/DL (ref 0.1–1)
BUN SERPL-MCNC: 15 MG/DL (ref 8–23)
CALCIUM SERPL-MCNC: 9.9 MG/DL (ref 8.7–10.5)
CHLORIDE SERPL-SCNC: 102 MMOL/L (ref 95–110)
CO2 SERPL-SCNC: 27 MMOL/L (ref 23–29)
CREAT SERPL-MCNC: 0.6 MG/DL (ref 0.5–1.4)
EST. GFR  (AFRICAN AMERICAN): >60 ML/MIN/1.73 M^2
EST. GFR  (NON AFRICAN AMERICAN): >60 ML/MIN/1.73 M^2
GLUCOSE SERPL-MCNC: 148 MG/DL (ref 70–110)
POTASSIUM SERPL-SCNC: 4.5 MMOL/L (ref 3.5–5.1)
PROT SERPL-MCNC: 6.4 G/DL (ref 6–8.4)
SODIUM SERPL-SCNC: 140 MMOL/L (ref 136–145)

## 2021-05-18 PROCEDURE — 80053 COMPREHEN METABOLIC PANEL: CPT | Performed by: STUDENT IN AN ORGANIZED HEALTH CARE EDUCATION/TRAINING PROGRAM

## 2021-05-18 PROCEDURE — 1159F MED LIST DOCD IN RCRD: CPT | Mod: GC,S$GLB,, | Performed by: STUDENT IN AN ORGANIZED HEALTH CARE EDUCATION/TRAINING PROGRAM

## 2021-05-18 PROCEDURE — 99213 OFFICE O/P EST LOW 20 MIN: CPT | Mod: GC,S$GLB,, | Performed by: STUDENT IN AN ORGANIZED HEALTH CARE EDUCATION/TRAINING PROGRAM

## 2021-05-18 PROCEDURE — 36415 COLL VENOUS BLD VENIPUNCTURE: CPT | Performed by: STUDENT IN AN ORGANIZED HEALTH CARE EDUCATION/TRAINING PROGRAM

## 2021-05-18 PROCEDURE — 99999 PR PBB SHADOW E&M-EST. PATIENT-LVL V: ICD-10-PCS | Mod: PBBFAC,GC,, | Performed by: STUDENT IN AN ORGANIZED HEALTH CARE EDUCATION/TRAINING PROGRAM

## 2021-05-18 PROCEDURE — 99999 PR PBB SHADOW E&M-EST. PATIENT-LVL V: CPT | Mod: PBBFAC,GC,, | Performed by: STUDENT IN AN ORGANIZED HEALTH CARE EDUCATION/TRAINING PROGRAM

## 2021-05-18 PROCEDURE — 99213 PR OFFICE/OUTPT VISIT, EST, LEVL III, 20-29 MIN: ICD-10-PCS | Mod: GC,S$GLB,, | Performed by: STUDENT IN AN ORGANIZED HEALTH CARE EDUCATION/TRAINING PROGRAM

## 2021-05-18 PROCEDURE — 1159F PR MEDICATION LIST DOCUMENTED IN MEDICAL RECORD: ICD-10-PCS | Mod: GC,S$GLB,, | Performed by: STUDENT IN AN ORGANIZED HEALTH CARE EDUCATION/TRAINING PROGRAM

## 2021-05-18 RX ORDER — POTASSIUM CHLORIDE 20 MEQ/1
20 TABLET, EXTENDED RELEASE ORAL DAILY
Qty: 90 TABLET | Refills: 3 | Status: ON HOLD | OUTPATIENT
Start: 2021-05-18 | End: 2021-08-18 | Stop reason: HOSPADM

## 2021-05-19 ENCOUNTER — PATIENT MESSAGE (OUTPATIENT)
Dept: INTERNAL MEDICINE | Facility: CLINIC | Age: 76
End: 2021-05-19

## 2021-05-20 ENCOUNTER — TELEPHONE (OUTPATIENT)
Dept: INTERNAL MEDICINE | Facility: CLINIC | Age: 76
End: 2021-05-20

## 2021-05-21 ENCOUNTER — EXTERNAL HOME HEALTH (OUTPATIENT)
Dept: HOME HEALTH SERVICES | Facility: HOSPITAL | Age: 76
End: 2021-05-21

## 2021-05-22 ENCOUNTER — HOSPITAL ENCOUNTER (OUTPATIENT)
Facility: HOSPITAL | Age: 76
Discharge: HOME | End: 2021-05-24
Attending: EMERGENCY MEDICINE | Admitting: HOSPITALIST
Payer: MEDICARE

## 2021-05-22 DIAGNOSIS — R06.02 SOB (SHORTNESS OF BREATH): ICD-10-CM

## 2021-05-22 DIAGNOSIS — J44.1 COPD EXACERBATION: Primary | ICD-10-CM

## 2021-05-22 LAB
ALBUMIN SERPL BCP-MCNC: 3.3 G/DL (ref 3.5–5.2)
ALLENS TEST: ABNORMAL
ALP SERPL-CCNC: 114 U/L (ref 55–135)
ALT SERPL W/O P-5'-P-CCNC: 28 U/L (ref 10–44)
ANION GAP SERPL CALC-SCNC: 9 MMOL/L (ref 8–16)
AST SERPL-CCNC: 25 U/L (ref 10–40)
BASOPHILS # BLD AUTO: 0.04 K/UL (ref 0–0.2)
BASOPHILS NFR BLD: 0.3 % (ref 0–1.9)
BILIRUB SERPL-MCNC: 0.3 MG/DL (ref 0.1–1)
BNP SERPL-MCNC: 81 PG/ML (ref 0–99)
BUN SERPL-MCNC: 19 MG/DL (ref 8–23)
CALCIUM SERPL-MCNC: 9.6 MG/DL (ref 8.7–10.5)
CHLORIDE SERPL-SCNC: 102 MMOL/L (ref 95–110)
CO2 SERPL-SCNC: 31 MMOL/L (ref 23–29)
CREAT SERPL-MCNC: 0.7 MG/DL (ref 0.5–1.4)
CTP QC/QA: YES
DIFFERENTIAL METHOD: ABNORMAL
EOSINOPHIL # BLD AUTO: 0.1 K/UL (ref 0–0.5)
EOSINOPHIL NFR BLD: 0.9 % (ref 0–8)
ERYTHROCYTE [DISTWIDTH] IN BLOOD BY AUTOMATED COUNT: 14.1 % (ref 11.5–14.5)
EST. GFR  (AFRICAN AMERICAN): >60 ML/MIN/1.73 M^2
EST. GFR  (NON AFRICAN AMERICAN): >60 ML/MIN/1.73 M^2
GLUCOSE SERPL-MCNC: 124 MG/DL (ref 70–110)
HCO3 UR-SCNC: 34.6 MMOL/L (ref 24–28)
HCT VFR BLD AUTO: 47.9 % (ref 37–48.5)
HGB BLD-MCNC: 15.1 G/DL (ref 12–16)
IMM GRANULOCYTES # BLD AUTO: 0.17 K/UL (ref 0–0.04)
IMM GRANULOCYTES NFR BLD AUTO: 1.4 % (ref 0–0.5)
LACTATE SERPL-SCNC: 1.9 MMOL/L (ref 0.5–2.2)
LYMPHOCYTES # BLD AUTO: 0.9 K/UL (ref 1–4.8)
LYMPHOCYTES NFR BLD: 7.5 % (ref 18–48)
MCH RBC QN AUTO: 30.8 PG (ref 27–31)
MCHC RBC AUTO-ENTMCNC: 31.5 G/DL (ref 32–36)
MCV RBC AUTO: 98 FL (ref 82–98)
MONOCYTES # BLD AUTO: 0.7 K/UL (ref 0.3–1)
MONOCYTES NFR BLD: 5.9 % (ref 4–15)
NEUTROPHILS # BLD AUTO: 10.5 K/UL (ref 1.8–7.7)
NEUTROPHILS NFR BLD: 84 % (ref 38–73)
NRBC BLD-RTO: 0 /100 WBC
PCO2 BLDA: 63.6 MMHG (ref 35–45)
PH SMN: 7.34 [PH] (ref 7.35–7.45)
PLATELET # BLD AUTO: 271 K/UL (ref 150–450)
PMV BLD AUTO: 9.3 FL (ref 9.2–12.9)
PO2 BLDA: 77 MMHG (ref 40–60)
POC BE: 9 MMOL/L
POC SATURATED O2: 94 % (ref 95–100)
POC TCO2: 36 MMOL/L (ref 24–29)
POTASSIUM SERPL-SCNC: 4 MMOL/L (ref 3.5–5.1)
PROT SERPL-MCNC: 6.4 G/DL (ref 6–8.4)
RBC # BLD AUTO: 4.91 M/UL (ref 4–5.4)
SAMPLE: ABNORMAL
SARS-COV-2 RDRP RESP QL NAA+PROBE: NEGATIVE
SITE: ABNORMAL
SODIUM SERPL-SCNC: 142 MMOL/L (ref 136–145)
TROPONIN I SERPL DL<=0.01 NG/ML-MCNC: 0.01 NG/ML (ref 0–0.03)
WBC # BLD AUTO: 12.46 K/UL (ref 3.9–12.7)

## 2021-05-22 PROCEDURE — 63600175 PHARM REV CODE 636 W HCPCS: Performed by: EMERGENCY MEDICINE

## 2021-05-22 PROCEDURE — 99285 EMERGENCY DEPT VISIT HI MDM: CPT | Mod: 25

## 2021-05-22 PROCEDURE — 99284 PR EMERGENCY DEPT VISIT,LEVEL IV: ICD-10-PCS | Mod: CS,,, | Performed by: EMERGENCY MEDICINE

## 2021-05-22 PROCEDURE — 94761 N-INVAS EAR/PLS OXIMETRY MLT: CPT

## 2021-05-22 PROCEDURE — 96374 THER/PROPH/DIAG INJ IV PUSH: CPT | Mod: 59

## 2021-05-22 PROCEDURE — 85025 COMPLETE CBC W/AUTO DIFF WBC: CPT | Performed by: EMERGENCY MEDICINE

## 2021-05-22 PROCEDURE — 93010 ELECTROCARDIOGRAM REPORT: CPT | Mod: ,,, | Performed by: INTERNAL MEDICINE

## 2021-05-22 PROCEDURE — 82800 BLOOD PH: CPT

## 2021-05-22 PROCEDURE — 25000242 PHARM REV CODE 250 ALT 637 W/ HCPCS: Performed by: EMERGENCY MEDICINE

## 2021-05-22 PROCEDURE — 94640 AIRWAY INHALATION TREATMENT: CPT

## 2021-05-22 PROCEDURE — 99900035 HC TECH TIME PER 15 MIN (STAT)

## 2021-05-22 PROCEDURE — 25500020 PHARM REV CODE 255: Performed by: EMERGENCY MEDICINE

## 2021-05-22 PROCEDURE — 99284 EMERGENCY DEPT VISIT MOD MDM: CPT | Mod: CS,,, | Performed by: EMERGENCY MEDICINE

## 2021-05-22 PROCEDURE — 93010 EKG 12-LEAD: ICD-10-PCS | Mod: ,,, | Performed by: INTERNAL MEDICINE

## 2021-05-22 PROCEDURE — 83880 ASSAY OF NATRIURETIC PEPTIDE: CPT | Performed by: EMERGENCY MEDICINE

## 2021-05-22 PROCEDURE — 93005 ELECTROCARDIOGRAM TRACING: CPT

## 2021-05-22 PROCEDURE — 84484 ASSAY OF TROPONIN QUANT: CPT | Performed by: EMERGENCY MEDICINE

## 2021-05-22 PROCEDURE — 80053 COMPREHEN METABOLIC PANEL: CPT | Performed by: EMERGENCY MEDICINE

## 2021-05-22 PROCEDURE — 82803 BLOOD GASES ANY COMBINATION: CPT

## 2021-05-22 PROCEDURE — U0002 COVID-19 LAB TEST NON-CDC: HCPCS | Performed by: EMERGENCY MEDICINE

## 2021-05-22 PROCEDURE — 83605 ASSAY OF LACTIC ACID: CPT | Performed by: EMERGENCY MEDICINE

## 2021-05-22 PROCEDURE — 27000221 HC OXYGEN, UP TO 24 HOURS

## 2021-05-22 RX ORDER — IPRATROPIUM BROMIDE AND ALBUTEROL SULFATE 2.5; .5 MG/3ML; MG/3ML
3 SOLUTION RESPIRATORY (INHALATION)
Status: COMPLETED | OUTPATIENT
Start: 2021-05-22 | End: 2021-05-22

## 2021-05-22 RX ORDER — ALBUTEROL SULFATE 2.5 MG/.5ML
5 SOLUTION RESPIRATORY (INHALATION)
Status: COMPLETED | OUTPATIENT
Start: 2021-05-23 | End: 2021-05-23

## 2021-05-22 RX ORDER — METHYLPREDNISOLONE SOD SUCC 125 MG
125 VIAL (EA) INJECTION
Status: COMPLETED | OUTPATIENT
Start: 2021-05-22 | End: 2021-05-22

## 2021-05-22 RX ADMIN — IOHEXOL 100 ML: 350 INJECTION, SOLUTION INTRAVENOUS at 06:05

## 2021-05-22 RX ADMIN — IPRATROPIUM BROMIDE AND ALBUTEROL SULFATE 3 ML: .5; 2.5 SOLUTION RESPIRATORY (INHALATION) at 06:05

## 2021-05-22 RX ADMIN — IOHEXOL 100 ML: 350 INJECTION, SOLUTION INTRAVENOUS at 09:05

## 2021-05-22 RX ADMIN — METHYLPREDNISOLONE SODIUM SUCCINATE 125 MG: 125 INJECTION, POWDER, FOR SOLUTION INTRAMUSCULAR; INTRAVENOUS at 05:05

## 2021-05-22 RX ADMIN — IPRATROPIUM BROMIDE AND ALBUTEROL SULFATE 3 ML: .5; 2.5 SOLUTION RESPIRATORY (INHALATION) at 05:05

## 2021-05-23 PROBLEM — J44.1 COPD EXACERBATION: Status: ACTIVE | Noted: 2021-05-23

## 2021-05-23 LAB
ALBUMIN SERPL BCP-MCNC: 3 G/DL (ref 3.5–5.2)
ALP SERPL-CCNC: 112 U/L (ref 55–135)
ALT SERPL W/O P-5'-P-CCNC: 23 U/L (ref 10–44)
ANION GAP SERPL CALC-SCNC: 11 MMOL/L (ref 8–16)
AST SERPL-CCNC: 20 U/L (ref 10–40)
BASOPHILS # BLD AUTO: 0.04 K/UL (ref 0–0.2)
BASOPHILS NFR BLD: 0.4 % (ref 0–1.9)
BILIRUB SERPL-MCNC: 0.3 MG/DL (ref 0.1–1)
BUN SERPL-MCNC: 17 MG/DL (ref 8–23)
CALCIUM SERPL-MCNC: 9.1 MG/DL (ref 8.7–10.5)
CHLORIDE SERPL-SCNC: 106 MMOL/L (ref 95–110)
CO2 SERPL-SCNC: 21 MMOL/L (ref 23–29)
CREAT SERPL-MCNC: 0.6 MG/DL (ref 0.5–1.4)
DIFFERENTIAL METHOD: ABNORMAL
EOSINOPHIL # BLD AUTO: 0 K/UL (ref 0–0.5)
EOSINOPHIL NFR BLD: 0 % (ref 0–8)
ERYTHROCYTE [DISTWIDTH] IN BLOOD BY AUTOMATED COUNT: 14.2 % (ref 11.5–14.5)
EST. GFR  (AFRICAN AMERICAN): >60 ML/MIN/1.73 M^2
EST. GFR  (NON AFRICAN AMERICAN): >60 ML/MIN/1.73 M^2
ESTIMATED AVG GLUCOSE: 174 MG/DL (ref 68–131)
GLUCOSE SERPL-MCNC: 176 MG/DL (ref 70–110)
HBA1C MFR BLD: 7.7 % (ref 4–5.6)
HCT VFR BLD AUTO: 49.1 % (ref 37–48.5)
HGB BLD-MCNC: 15.3 G/DL (ref 12–16)
IMM GRANULOCYTES # BLD AUTO: 0.13 K/UL (ref 0–0.04)
IMM GRANULOCYTES NFR BLD AUTO: 1.2 % (ref 0–0.5)
LYMPHOCYTES # BLD AUTO: 0.4 K/UL (ref 1–4.8)
LYMPHOCYTES NFR BLD: 3.6 % (ref 18–48)
MAGNESIUM SERPL-MCNC: 1.9 MG/DL (ref 1.6–2.6)
MCH RBC QN AUTO: 30.7 PG (ref 27–31)
MCHC RBC AUTO-ENTMCNC: 31.2 G/DL (ref 32–36)
MCV RBC AUTO: 98 FL (ref 82–98)
MONOCYTES # BLD AUTO: 0.1 K/UL (ref 0.3–1)
MONOCYTES NFR BLD: 0.8 % (ref 4–15)
NEUTROPHILS # BLD AUTO: 10.1 K/UL (ref 1.8–7.7)
NEUTROPHILS NFR BLD: 94 % (ref 38–73)
NRBC BLD-RTO: 0 /100 WBC
PHOSPHATE SERPL-MCNC: 4.4 MG/DL (ref 2.7–4.5)
PLATELET # BLD AUTO: 205 K/UL (ref 150–450)
PMV BLD AUTO: 9 FL (ref 9.2–12.9)
POTASSIUM SERPL-SCNC: 4.6 MMOL/L (ref 3.5–5.1)
PROT SERPL-MCNC: 6 G/DL (ref 6–8.4)
RBC # BLD AUTO: 4.99 M/UL (ref 4–5.4)
SODIUM SERPL-SCNC: 138 MMOL/L (ref 136–145)
WBC # BLD AUTO: 10.75 K/UL (ref 3.9–12.7)

## 2021-05-23 PROCEDURE — 94640 AIRWAY INHALATION TREATMENT: CPT | Mod: 76

## 2021-05-23 PROCEDURE — 25000242 PHARM REV CODE 250 ALT 637 W/ HCPCS: Performed by: PHYSICIAN ASSISTANT

## 2021-05-23 PROCEDURE — 99220 PR INITIAL OBSERVATION CARE,LEVL III: CPT | Mod: ,,, | Performed by: PHYSICIAN ASSISTANT

## 2021-05-23 PROCEDURE — 84100 ASSAY OF PHOSPHORUS: CPT | Performed by: PHYSICIAN ASSISTANT

## 2021-05-23 PROCEDURE — 99220 PR INITIAL OBSERVATION CARE,LEVL III: ICD-10-PCS | Mod: ,,, | Performed by: PHYSICIAN ASSISTANT

## 2021-05-23 PROCEDURE — 83735 ASSAY OF MAGNESIUM: CPT | Performed by: PHYSICIAN ASSISTANT

## 2021-05-23 PROCEDURE — 99900035 HC TECH TIME PER 15 MIN (STAT)

## 2021-05-23 PROCEDURE — 85025 COMPLETE CBC W/AUTO DIFF WBC: CPT | Performed by: PHYSICIAN ASSISTANT

## 2021-05-23 PROCEDURE — 94761 N-INVAS EAR/PLS OXIMETRY MLT: CPT

## 2021-05-23 PROCEDURE — 94640 AIRWAY INHALATION TREATMENT: CPT

## 2021-05-23 PROCEDURE — 27000221 HC OXYGEN, UP TO 24 HOURS

## 2021-05-23 PROCEDURE — 25000003 PHARM REV CODE 250: Performed by: PHYSICIAN ASSISTANT

## 2021-05-23 PROCEDURE — G0378 HOSPITAL OBSERVATION PER HR: HCPCS

## 2021-05-23 PROCEDURE — 25000242 PHARM REV CODE 250 ALT 637 W/ HCPCS: Performed by: EMERGENCY MEDICINE

## 2021-05-23 PROCEDURE — 83036 HEMOGLOBIN GLYCOSYLATED A1C: CPT | Performed by: PHYSICIAN ASSISTANT

## 2021-05-23 PROCEDURE — 80053 COMPREHEN METABOLIC PANEL: CPT | Performed by: PHYSICIAN ASSISTANT

## 2021-05-23 PROCEDURE — 63600175 PHARM REV CODE 636 W HCPCS: Performed by: PHYSICIAN ASSISTANT

## 2021-05-23 RX ORDER — ACETAMINOPHEN 325 MG/1
650 TABLET ORAL EVERY 4 HOURS PRN
Status: DISCONTINUED | OUTPATIENT
Start: 2021-05-23 | End: 2021-05-24 | Stop reason: HOSPADM

## 2021-05-23 RX ORDER — IPRATROPIUM BROMIDE AND ALBUTEROL SULFATE 2.5; .5 MG/3ML; MG/3ML
3 SOLUTION RESPIRATORY (INHALATION) EVERY 4 HOURS PRN
Status: DISCONTINUED | OUTPATIENT
Start: 2021-05-23 | End: 2021-05-24 | Stop reason: HOSPADM

## 2021-05-23 RX ORDER — FUROSEMIDE 20 MG/1
20 TABLET ORAL DAILY
Status: DISCONTINUED | OUTPATIENT
Start: 2021-05-23 | End: 2021-05-24 | Stop reason: HOSPADM

## 2021-05-23 RX ORDER — SODIUM CHLORIDE 0.9 % (FLUSH) 0.9 %
10 SYRINGE (ML) INJECTION
Status: DISCONTINUED | OUTPATIENT
Start: 2021-05-23 | End: 2021-05-24 | Stop reason: HOSPADM

## 2021-05-23 RX ORDER — IBUPROFEN 200 MG
16 TABLET ORAL
Status: DISCONTINUED | OUTPATIENT
Start: 2021-05-23 | End: 2021-05-24 | Stop reason: HOSPADM

## 2021-05-23 RX ORDER — SODIUM CHLORIDE 0.9 % (FLUSH) 0.9 %
5 SYRINGE (ML) INJECTION
Status: DISCONTINUED | OUTPATIENT
Start: 2021-05-23 | End: 2021-05-24 | Stop reason: HOSPADM

## 2021-05-23 RX ORDER — ONDANSETRON 8 MG/1
8 TABLET, ORALLY DISINTEGRATING ORAL EVERY 8 HOURS PRN
Status: DISCONTINUED | OUTPATIENT
Start: 2021-05-23 | End: 2021-05-24 | Stop reason: HOSPADM

## 2021-05-23 RX ORDER — IPRATROPIUM BROMIDE AND ALBUTEROL SULFATE 2.5; .5 MG/3ML; MG/3ML
3 SOLUTION RESPIRATORY (INHALATION) EVERY 4 HOURS
Status: DISCONTINUED | OUTPATIENT
Start: 2021-05-23 | End: 2021-05-24 | Stop reason: HOSPADM

## 2021-05-23 RX ORDER — PRAVASTATIN SODIUM 10 MG/1
20 TABLET ORAL DAILY
Status: DISCONTINUED | OUTPATIENT
Start: 2021-05-23 | End: 2021-05-24 | Stop reason: HOSPADM

## 2021-05-23 RX ORDER — FLUTICASONE FUROATE AND VILANTEROL 100; 25 UG/1; UG/1
1 POWDER RESPIRATORY (INHALATION) DAILY
Refills: 11 | Status: DISCONTINUED | OUTPATIENT
Start: 2021-05-23 | End: 2021-05-24 | Stop reason: HOSPADM

## 2021-05-23 RX ORDER — ASPIRIN 81 MG/1
81 TABLET ORAL DAILY
Status: DISCONTINUED | OUTPATIENT
Start: 2021-05-23 | End: 2021-05-24 | Stop reason: HOSPADM

## 2021-05-23 RX ORDER — PREDNISONE 20 MG/1
40 TABLET ORAL DAILY
Status: DISCONTINUED | OUTPATIENT
Start: 2021-05-23 | End: 2021-05-24 | Stop reason: HOSPADM

## 2021-05-23 RX ORDER — POTASSIUM CHLORIDE 20 MEQ/1
20 TABLET, EXTENDED RELEASE ORAL DAILY
Status: DISCONTINUED | OUTPATIENT
Start: 2021-05-23 | End: 2021-05-24 | Stop reason: HOSPADM

## 2021-05-23 RX ORDER — DILTIAZEM HYDROCHLORIDE 180 MG/1
180 CAPSULE, COATED, EXTENDED RELEASE ORAL DAILY
Status: DISCONTINUED | OUTPATIENT
Start: 2021-05-23 | End: 2021-05-24 | Stop reason: HOSPADM

## 2021-05-23 RX ORDER — ALPRAZOLAM 0.25 MG/1
0.25 TABLET ORAL 3 TIMES DAILY PRN
Status: DISCONTINUED | OUTPATIENT
Start: 2021-05-23 | End: 2021-05-24 | Stop reason: HOSPADM

## 2021-05-23 RX ORDER — PROMETHAZINE HYDROCHLORIDE 25 MG/1
25 TABLET ORAL EVERY 6 HOURS PRN
Status: DISCONTINUED | OUTPATIENT
Start: 2021-05-23 | End: 2021-05-24 | Stop reason: HOSPADM

## 2021-05-23 RX ORDER — IBUPROFEN 200 MG
24 TABLET ORAL
Status: DISCONTINUED | OUTPATIENT
Start: 2021-05-23 | End: 2021-05-24 | Stop reason: HOSPADM

## 2021-05-23 RX ORDER — TALC
6 POWDER (GRAM) TOPICAL NIGHTLY PRN
Status: DISCONTINUED | OUTPATIENT
Start: 2021-05-23 | End: 2021-05-24 | Stop reason: HOSPADM

## 2021-05-23 RX ORDER — AMOXICILLIN 250 MG
1 CAPSULE ORAL 2 TIMES DAILY
Status: DISCONTINUED | OUTPATIENT
Start: 2021-05-23 | End: 2021-05-24 | Stop reason: HOSPADM

## 2021-05-23 RX ORDER — SODIUM CHLORIDE 0.9 % (FLUSH) 0.9 %
3 SYRINGE (ML) INJECTION
Status: DISCONTINUED | OUTPATIENT
Start: 2021-05-23 | End: 2021-05-24 | Stop reason: HOSPADM

## 2021-05-23 RX ORDER — GLUCAGON 1 MG
1 KIT INJECTION
Status: DISCONTINUED | OUTPATIENT
Start: 2021-05-23 | End: 2021-05-24 | Stop reason: HOSPADM

## 2021-05-23 RX ADMIN — APIXABAN 5 MG: 5 TABLET, FILM COATED ORAL at 10:05

## 2021-05-23 RX ADMIN — APIXABAN 5 MG: 5 TABLET, FILM COATED ORAL at 08:05

## 2021-05-23 RX ADMIN — IPRATROPIUM BROMIDE AND ALBUTEROL SULFATE 3 ML: .5; 2.5 SOLUTION RESPIRATORY (INHALATION) at 07:05

## 2021-05-23 RX ADMIN — ALBUTEROL SULFATE 5 MG: 2.5 SOLUTION RESPIRATORY (INHALATION) at 12:05

## 2021-05-23 RX ADMIN — FLUTICASONE FUROATE AND VILANTEROL TRIFENATATE 1 PUFF: 100; 25 POWDER RESPIRATORY (INHALATION) at 08:05

## 2021-05-23 RX ADMIN — ASPIRIN 81 MG: 81 TABLET, COATED ORAL at 08:05

## 2021-05-23 RX ADMIN — POTASSIUM CHLORIDE 20 MEQ: 1500 TABLET, EXTENDED RELEASE ORAL at 08:05

## 2021-05-23 RX ADMIN — PRAVASTATIN SODIUM 20 MG: 10 TABLET ORAL at 08:05

## 2021-05-23 RX ADMIN — IPRATROPIUM BROMIDE AND ALBUTEROL SULFATE 3 ML: .5; 2.5 SOLUTION RESPIRATORY (INHALATION) at 09:05

## 2021-05-23 RX ADMIN — FUROSEMIDE 20 MG: 20 TABLET ORAL at 08:05

## 2021-05-23 RX ADMIN — PREDNISONE 40 MG: 20 TABLET ORAL at 08:05

## 2021-05-23 RX ADMIN — DILTIAZEM HYDROCHLORIDE 180 MG: 180 CAPSULE, COATED, EXTENDED RELEASE ORAL at 10:05

## 2021-05-23 RX ADMIN — IPRATROPIUM BROMIDE AND ALBUTEROL SULFATE 3 ML: .5; 2.5 SOLUTION RESPIRATORY (INHALATION) at 01:05

## 2021-05-24 VITALS
TEMPERATURE: 98 F | BODY MASS INDEX: 20.96 KG/M2 | WEIGHT: 104 LBS | HEIGHT: 59 IN | DIASTOLIC BLOOD PRESSURE: 63 MMHG | HEART RATE: 94 BPM | SYSTOLIC BLOOD PRESSURE: 145 MMHG | OXYGEN SATURATION: 93 % | RESPIRATION RATE: 16 BRPM

## 2021-05-24 LAB
ALBUMIN SERPL BCP-MCNC: 2.6 G/DL (ref 3.5–5.2)
ALP SERPL-CCNC: 87 U/L (ref 55–135)
ALT SERPL W/O P-5'-P-CCNC: 19 U/L (ref 10–44)
ANION GAP SERPL CALC-SCNC: 9 MMOL/L (ref 8–16)
AST SERPL-CCNC: 15 U/L (ref 10–40)
BASOPHILS # BLD AUTO: 0.02 K/UL (ref 0–0.2)
BASOPHILS NFR BLD: 0.1 % (ref 0–1.9)
BILIRUB SERPL-MCNC: 0.2 MG/DL (ref 0.1–1)
BUN SERPL-MCNC: 28 MG/DL (ref 8–23)
CALCIUM SERPL-MCNC: 9.2 MG/DL (ref 8.7–10.5)
CHLORIDE SERPL-SCNC: 102 MMOL/L (ref 95–110)
CO2 SERPL-SCNC: 28 MMOL/L (ref 23–29)
CREAT SERPL-MCNC: 0.6 MG/DL (ref 0.5–1.4)
DIFFERENTIAL METHOD: ABNORMAL
EOSINOPHIL # BLD AUTO: 0 K/UL (ref 0–0.5)
EOSINOPHIL NFR BLD: 0 % (ref 0–8)
ERYTHROCYTE [DISTWIDTH] IN BLOOD BY AUTOMATED COUNT: 13.8 % (ref 11.5–14.5)
EST. GFR  (AFRICAN AMERICAN): >60 ML/MIN/1.73 M^2
EST. GFR  (NON AFRICAN AMERICAN): >60 ML/MIN/1.73 M^2
GLUCOSE SERPL-MCNC: 144 MG/DL (ref 70–110)
HCT VFR BLD AUTO: 38.1 % (ref 37–48.5)
HGB BLD-MCNC: 12.3 G/DL (ref 12–16)
IMM GRANULOCYTES # BLD AUTO: 0.14 K/UL (ref 0–0.04)
IMM GRANULOCYTES NFR BLD AUTO: 0.9 % (ref 0–0.5)
LYMPHOCYTES # BLD AUTO: 0.5 K/UL (ref 1–4.8)
LYMPHOCYTES NFR BLD: 3 % (ref 18–48)
MAGNESIUM SERPL-MCNC: 2 MG/DL (ref 1.6–2.6)
MCH RBC QN AUTO: 31.2 PG (ref 27–31)
MCHC RBC AUTO-ENTMCNC: 32.3 G/DL (ref 32–36)
MCV RBC AUTO: 97 FL (ref 82–98)
MONOCYTES # BLD AUTO: 0.7 K/UL (ref 0.3–1)
MONOCYTES NFR BLD: 4.4 % (ref 4–15)
NEUTROPHILS # BLD AUTO: 14.1 K/UL (ref 1.8–7.7)
NEUTROPHILS NFR BLD: 91.6 % (ref 38–73)
NRBC BLD-RTO: 0 /100 WBC
PLATELET # BLD AUTO: 238 K/UL (ref 150–450)
PMV BLD AUTO: 9.8 FL (ref 9.2–12.9)
POTASSIUM SERPL-SCNC: 4.1 MMOL/L (ref 3.5–5.1)
PROT SERPL-MCNC: 5.2 G/DL (ref 6–8.4)
RBC # BLD AUTO: 3.94 M/UL (ref 4–5.4)
SODIUM SERPL-SCNC: 139 MMOL/L (ref 136–145)
WBC # BLD AUTO: 15.37 K/UL (ref 3.9–12.7)

## 2021-05-24 PROCEDURE — 94640 AIRWAY INHALATION TREATMENT: CPT | Mod: 76

## 2021-05-24 PROCEDURE — 85025 COMPLETE CBC W/AUTO DIFF WBC: CPT | Performed by: PHYSICIAN ASSISTANT

## 2021-05-24 PROCEDURE — 94640 AIRWAY INHALATION TREATMENT: CPT

## 2021-05-24 PROCEDURE — 25000003 PHARM REV CODE 250: Performed by: PHYSICIAN ASSISTANT

## 2021-05-24 PROCEDURE — 36415 COLL VENOUS BLD VENIPUNCTURE: CPT | Performed by: PHYSICIAN ASSISTANT

## 2021-05-24 PROCEDURE — 99900035 HC TECH TIME PER 15 MIN (STAT)

## 2021-05-24 PROCEDURE — 25000242 PHARM REV CODE 250 ALT 637 W/ HCPCS: Performed by: PHYSICIAN ASSISTANT

## 2021-05-24 PROCEDURE — G0378 HOSPITAL OBSERVATION PER HR: HCPCS

## 2021-05-24 PROCEDURE — 83735 ASSAY OF MAGNESIUM: CPT | Performed by: PHYSICIAN ASSISTANT

## 2021-05-24 PROCEDURE — 94761 N-INVAS EAR/PLS OXIMETRY MLT: CPT

## 2021-05-24 PROCEDURE — 63600175 PHARM REV CODE 636 W HCPCS: Performed by: PHYSICIAN ASSISTANT

## 2021-05-24 PROCEDURE — 99217 PR OBSERVATION CARE DISCHARGE: ICD-10-PCS | Mod: ,,, | Performed by: PHYSICIAN ASSISTANT

## 2021-05-24 PROCEDURE — 27000221 HC OXYGEN, UP TO 24 HOURS

## 2021-05-24 PROCEDURE — 99217 PR OBSERVATION CARE DISCHARGE: CPT | Mod: ,,, | Performed by: PHYSICIAN ASSISTANT

## 2021-05-24 PROCEDURE — 80053 COMPREHEN METABOLIC PANEL: CPT | Performed by: PHYSICIAN ASSISTANT

## 2021-05-24 RX ORDER — PREDNISONE 20 MG/1
40 TABLET ORAL DAILY
Qty: 6 TABLET | Refills: 0 | Status: SHIPPED | OUTPATIENT
Start: 2021-05-25 | End: 2021-05-28

## 2021-05-24 RX ADMIN — POTASSIUM CHLORIDE 20 MEQ: 1500 TABLET, EXTENDED RELEASE ORAL at 09:05

## 2021-05-24 RX ADMIN — IPRATROPIUM BROMIDE AND ALBUTEROL SULFATE 3 ML: .5; 2.5 SOLUTION RESPIRATORY (INHALATION) at 05:05

## 2021-05-24 RX ADMIN — IPRATROPIUM BROMIDE AND ALBUTEROL SULFATE 3 ML: .5; 2.5 SOLUTION RESPIRATORY (INHALATION) at 07:05

## 2021-05-24 RX ADMIN — ACETAMINOPHEN 650 MG: 325 TABLET ORAL at 09:05

## 2021-05-24 RX ADMIN — FUROSEMIDE 20 MG: 20 TABLET ORAL at 09:05

## 2021-05-24 RX ADMIN — APIXABAN 5 MG: 5 TABLET, FILM COATED ORAL at 09:05

## 2021-05-24 RX ADMIN — DOCUSATE SODIUM 50MG AND SENNOSIDES 8.6MG 1 TABLET: 8.6; 5 TABLET, FILM COATED ORAL at 09:05

## 2021-05-24 RX ADMIN — FLUTICASONE FUROATE AND VILANTEROL TRIFENATATE 1 PUFF: 100; 25 POWDER RESPIRATORY (INHALATION) at 07:05

## 2021-05-24 RX ADMIN — DILTIAZEM HYDROCHLORIDE 180 MG: 180 CAPSULE, COATED, EXTENDED RELEASE ORAL at 09:05

## 2021-05-24 RX ADMIN — ASPIRIN 81 MG: 81 TABLET, COATED ORAL at 09:05

## 2021-05-24 RX ADMIN — PREDNISONE 40 MG: 20 TABLET ORAL at 09:05

## 2021-05-24 RX ADMIN — PRAVASTATIN SODIUM 20 MG: 10 TABLET ORAL at 11:05

## 2021-05-26 DIAGNOSIS — L03.116 CELLULITIS OF LEG, LEFT: Primary | ICD-10-CM

## 2021-06-11 ENCOUNTER — PATIENT MESSAGE (OUTPATIENT)
Dept: PULMONOLOGY | Facility: CLINIC | Age: 76
End: 2021-06-11

## 2021-06-11 DIAGNOSIS — J43.2 CENTRILOBULAR EMPHYSEMA: ICD-10-CM

## 2021-06-13 RX ORDER — BUDESONIDE AND FORMOTEROL FUMARATE DIHYDRATE 160; 4.5 UG/1; UG/1
AEROSOL RESPIRATORY (INHALATION)
Qty: 10.2 G | Refills: 11 | Status: SHIPPED | OUTPATIENT
Start: 2021-06-13 | End: 2022-01-11 | Stop reason: SDUPTHER

## 2021-06-14 ENCOUNTER — PATIENT OUTREACH (OUTPATIENT)
Dept: ADMINISTRATIVE | Facility: HOSPITAL | Age: 76
End: 2021-06-14

## 2021-06-22 ENCOUNTER — TELEPHONE (OUTPATIENT)
Dept: INTERNAL MEDICINE | Facility: CLINIC | Age: 76
End: 2021-06-22

## 2021-06-30 ENCOUNTER — OFFICE VISIT (OUTPATIENT)
Dept: WOUND CARE | Facility: CLINIC | Age: 76
End: 2021-06-30
Payer: MEDICARE

## 2021-06-30 ENCOUNTER — TELEPHONE (OUTPATIENT)
Dept: INTERNAL MEDICINE | Facility: CLINIC | Age: 76
End: 2021-06-30

## 2021-06-30 VITALS
HEIGHT: 59 IN | WEIGHT: 104 LBS | TEMPERATURE: 99 F | BODY MASS INDEX: 20.96 KG/M2 | HEART RATE: 98 BPM | SYSTOLIC BLOOD PRESSURE: 114 MMHG | DIASTOLIC BLOOD PRESSURE: 60 MMHG

## 2021-06-30 DIAGNOSIS — L03.116 CELLULITIS OF LEG, LEFT: Primary | ICD-10-CM

## 2021-06-30 DIAGNOSIS — S81.812A LACERATION OF LEFT LOWER LEG, INITIAL ENCOUNTER: ICD-10-CM

## 2021-06-30 DIAGNOSIS — S81.812D LACERATION OF LEFT LOWER LEG, SUBSEQUENT ENCOUNTER: ICD-10-CM

## 2021-06-30 PROCEDURE — 1159F PR MEDICATION LIST DOCUMENTED IN MEDICAL RECORD: ICD-10-PCS | Mod: S$GLB,,, | Performed by: NURSE PRACTITIONER

## 2021-06-30 PROCEDURE — 1126F PR PAIN SEVERITY QUANTIFIED, NO PAIN PRESENT: ICD-10-PCS | Mod: S$GLB,,, | Performed by: NURSE PRACTITIONER

## 2021-06-30 PROCEDURE — 99999 PR PBB SHADOW E&M-EST. PATIENT-LVL V: ICD-10-PCS | Mod: PBBFAC,,, | Performed by: NURSE PRACTITIONER

## 2021-06-30 PROCEDURE — 99204 PR OFFICE/OUTPT VISIT, NEW, LEVL IV, 45-59 MIN: ICD-10-PCS | Mod: S$GLB,,, | Performed by: NURSE PRACTITIONER

## 2021-06-30 PROCEDURE — 1159F MED LIST DOCD IN RCRD: CPT | Mod: S$GLB,,, | Performed by: NURSE PRACTITIONER

## 2021-06-30 PROCEDURE — 99204 OFFICE O/P NEW MOD 45 MIN: CPT | Mod: S$GLB,,, | Performed by: NURSE PRACTITIONER

## 2021-06-30 PROCEDURE — 99999 PR PBB SHADOW E&M-EST. PATIENT-LVL V: CPT | Mod: PBBFAC,,, | Performed by: NURSE PRACTITIONER

## 2021-06-30 PROCEDURE — 1126F AMNT PAIN NOTED NONE PRSNT: CPT | Mod: S$GLB,,, | Performed by: NURSE PRACTITIONER

## 2021-06-30 PROCEDURE — 1157F ADVNC CARE PLAN IN RCRD: CPT | Mod: S$GLB,,, | Performed by: NURSE PRACTITIONER

## 2021-06-30 PROCEDURE — 1157F PR ADVANCE CARE PLAN OR EQUIV PRESENT IN MEDICAL RECORD: ICD-10-PCS | Mod: S$GLB,,, | Performed by: NURSE PRACTITIONER

## 2021-07-19 ENCOUNTER — EXTERNAL HOME HEALTH (OUTPATIENT)
Dept: HOME HEALTH SERVICES | Facility: HOSPITAL | Age: 76
End: 2021-07-19
Payer: MEDICARE

## 2021-07-21 ENCOUNTER — TELEPHONE (OUTPATIENT)
Dept: INTERNAL MEDICINE | Facility: CLINIC | Age: 76
End: 2021-07-21

## 2021-07-22 ENCOUNTER — EXTERNAL HOME HEALTH (OUTPATIENT)
Dept: HOME HEALTH SERVICES | Facility: HOSPITAL | Age: 76
End: 2021-07-22

## 2021-07-28 ENCOUNTER — HOSPITAL ENCOUNTER (EMERGENCY)
Facility: HOSPITAL | Age: 76
Discharge: HOME | End: 2021-07-29
Attending: EMERGENCY MEDICINE
Payer: MEDICARE

## 2021-07-28 ENCOUNTER — OFFICE VISIT (OUTPATIENT)
Dept: WOUND CARE | Facility: CLINIC | Age: 76
End: 2021-07-28
Payer: MEDICARE

## 2021-07-28 VITALS
WEIGHT: 106 LBS | SYSTOLIC BLOOD PRESSURE: 112 MMHG | BODY MASS INDEX: 21.37 KG/M2 | TEMPERATURE: 99 F | HEART RATE: 94 BPM | HEIGHT: 59 IN | DIASTOLIC BLOOD PRESSURE: 59 MMHG

## 2021-07-28 VITALS
TEMPERATURE: 98 F | OXYGEN SATURATION: 100 % | SYSTOLIC BLOOD PRESSURE: 102 MMHG | DIASTOLIC BLOOD PRESSURE: 54 MMHG | BODY MASS INDEX: 21.37 KG/M2 | WEIGHT: 106 LBS | HEART RATE: 78 BPM | RESPIRATION RATE: 16 BRPM | HEIGHT: 59 IN

## 2021-07-28 DIAGNOSIS — M79.89 RIGHT LEG SWELLING: ICD-10-CM

## 2021-07-28 DIAGNOSIS — R60.0 BILATERAL EDEMA OF LOWER EXTREMITY: ICD-10-CM

## 2021-07-28 DIAGNOSIS — S81.812A LACERATION OF LEFT LOWER LEG, INITIAL ENCOUNTER: Primary | ICD-10-CM

## 2021-07-28 DIAGNOSIS — S80.11XA HEMATOMA OF RIGHT LOWER LEG: Primary | ICD-10-CM

## 2021-07-28 DIAGNOSIS — I87.8 VENOUS STASIS: ICD-10-CM

## 2021-07-28 LAB
ALBUMIN SERPL BCP-MCNC: 3.3 G/DL (ref 3.5–5.2)
ALP SERPL-CCNC: 88 U/L (ref 55–135)
ALT SERPL W/O P-5'-P-CCNC: 19 U/L (ref 10–44)
ANION GAP SERPL CALC-SCNC: 10 MMOL/L (ref 8–16)
AST SERPL-CCNC: 20 U/L (ref 10–40)
BASOPHILS # BLD AUTO: 0.03 K/UL (ref 0–0.2)
BASOPHILS NFR BLD: 0.3 % (ref 0–1.9)
BILIRUB SERPL-MCNC: 0.5 MG/DL (ref 0.1–1)
BUN SERPL-MCNC: 16 MG/DL (ref 8–23)
CALCIUM SERPL-MCNC: 9.4 MG/DL (ref 8.7–10.5)
CHLORIDE SERPL-SCNC: 101 MMOL/L (ref 95–110)
CO2 SERPL-SCNC: 29 MMOL/L (ref 23–29)
CREAT SERPL-MCNC: 0.6 MG/DL (ref 0.5–1.4)
CRP SERPL-MCNC: 3.3 MG/L (ref 0–8.2)
DIFFERENTIAL METHOD: ABNORMAL
EOSINOPHIL # BLD AUTO: 0 K/UL (ref 0–0.5)
EOSINOPHIL NFR BLD: 0 % (ref 0–8)
ERYTHROCYTE [DISTWIDTH] IN BLOOD BY AUTOMATED COUNT: 14.7 % (ref 11.5–14.5)
ERYTHROCYTE [SEDIMENTATION RATE] IN BLOOD BY WESTERGREN METHOD: 18 MM/HR (ref 0–36)
EST. GFR  (AFRICAN AMERICAN): >60 ML/MIN/1.73 M^2
EST. GFR  (NON AFRICAN AMERICAN): >60 ML/MIN/1.73 M^2
GLUCOSE SERPL-MCNC: 126 MG/DL (ref 70–110)
HCT VFR BLD AUTO: 44.7 % (ref 37–48.5)
HGB BLD-MCNC: 14.1 G/DL (ref 12–16)
IMM GRANULOCYTES # BLD AUTO: 0.12 K/UL (ref 0–0.04)
IMM GRANULOCYTES NFR BLD AUTO: 1 % (ref 0–0.5)
LACTATE SERPL-SCNC: 1.6 MMOL/L (ref 0.5–2.2)
LYMPHOCYTES # BLD AUTO: 0.4 K/UL (ref 1–4.8)
LYMPHOCYTES NFR BLD: 3.4 % (ref 18–48)
MCH RBC QN AUTO: 30.9 PG (ref 27–31)
MCHC RBC AUTO-ENTMCNC: 31.5 G/DL (ref 32–36)
MCV RBC AUTO: 98 FL (ref 82–98)
MONOCYTES # BLD AUTO: 0.5 K/UL (ref 0.3–1)
MONOCYTES NFR BLD: 4.1 % (ref 4–15)
NEUTROPHILS # BLD AUTO: 10.9 K/UL (ref 1.8–7.7)
NEUTROPHILS NFR BLD: 91.2 % (ref 38–73)
NRBC BLD-RTO: 0 /100 WBC
PLATELET # BLD AUTO: 216 K/UL (ref 150–450)
PMV BLD AUTO: 9.8 FL (ref 9.2–12.9)
POTASSIUM SERPL-SCNC: 4.2 MMOL/L (ref 3.5–5.1)
PROT SERPL-MCNC: 6 G/DL (ref 6–8.4)
RBC # BLD AUTO: 4.56 M/UL (ref 4–5.4)
SODIUM SERPL-SCNC: 140 MMOL/L (ref 136–145)
WBC # BLD AUTO: 11.9 K/UL (ref 3.9–12.7)

## 2021-07-28 PROCEDURE — 83605 ASSAY OF LACTIC ACID: CPT | Performed by: EMERGENCY MEDICINE

## 2021-07-28 PROCEDURE — 29580 PR STRAPPING UNNA BOOT: ICD-10-PCS | Mod: LT,S$GLB,, | Performed by: NURSE PRACTITIONER

## 2021-07-28 PROCEDURE — 25000003 PHARM REV CODE 250: Performed by: EMERGENCY MEDICINE

## 2021-07-28 PROCEDURE — 10140 I&D HMTMA SEROMA/FLUID COLLJ: CPT

## 2021-07-28 PROCEDURE — 86140 C-REACTIVE PROTEIN: CPT | Performed by: EMERGENCY MEDICINE

## 2021-07-28 PROCEDURE — 99284 EMERGENCY DEPT VISIT MOD MDM: CPT | Mod: ,,, | Performed by: EMERGENCY MEDICINE

## 2021-07-28 PROCEDURE — 3078F DIAST BP <80 MM HG: CPT | Mod: CPTII,S$GLB,, | Performed by: NURSE PRACTITIONER

## 2021-07-28 PROCEDURE — 85652 RBC SED RATE AUTOMATED: CPT | Performed by: EMERGENCY MEDICINE

## 2021-07-28 PROCEDURE — 1126F AMNT PAIN NOTED NONE PRSNT: CPT | Mod: CPTII,S$GLB,, | Performed by: NURSE PRACTITIONER

## 2021-07-28 PROCEDURE — 80053 COMPREHEN METABOLIC PANEL: CPT | Performed by: EMERGENCY MEDICINE

## 2021-07-28 PROCEDURE — 99284 PR EMERGENCY DEPT VISIT,LEVEL IV: ICD-10-PCS | Mod: ,,, | Performed by: EMERGENCY MEDICINE

## 2021-07-28 PROCEDURE — 1157F PR ADVANCE CARE PLAN OR EQUIV PRESENT IN MEDICAL RECORD: ICD-10-PCS | Mod: CPTII,S$GLB,, | Performed by: NURSE PRACTITIONER

## 2021-07-28 PROCEDURE — 85025 COMPLETE CBC W/AUTO DIFF WBC: CPT | Performed by: EMERGENCY MEDICINE

## 2021-07-28 PROCEDURE — 1159F MED LIST DOCD IN RCRD: CPT | Mod: CPTII,S$GLB,, | Performed by: NURSE PRACTITIONER

## 2021-07-28 PROCEDURE — 99283 PR EMERGENCY DEPT VISIT,LEVEL III: ICD-10-PCS | Mod: 25,,, | Performed by: SURGERY

## 2021-07-28 PROCEDURE — 1159F PR MEDICATION LIST DOCUMENTED IN MEDICAL RECORD: ICD-10-PCS | Mod: CPTII,S$GLB,, | Performed by: NURSE PRACTITIONER

## 2021-07-28 PROCEDURE — 99213 OFFICE O/P EST LOW 20 MIN: CPT | Mod: 25,S$GLB,, | Performed by: NURSE PRACTITIONER

## 2021-07-28 PROCEDURE — 99999 PR PBB SHADOW E&M-EST. PATIENT-LVL V: CPT | Mod: PBBFAC,,, | Performed by: NURSE PRACTITIONER

## 2021-07-28 PROCEDURE — 3074F SYST BP LT 130 MM HG: CPT | Mod: CPTII,S$GLB,, | Performed by: NURSE PRACTITIONER

## 2021-07-28 PROCEDURE — 1157F ADVNC CARE PLAN IN RCRD: CPT | Mod: CPTII,S$GLB,, | Performed by: NURSE PRACTITIONER

## 2021-07-28 PROCEDURE — 10140 INCISION AND DRAINAGE: ICD-10-PCS | Mod: ,,, | Performed by: STUDENT IN AN ORGANIZED HEALTH CARE EDUCATION/TRAINING PROGRAM

## 2021-07-28 PROCEDURE — 3074F PR MOST RECENT SYSTOLIC BLOOD PRESSURE < 130 MM HG: ICD-10-PCS | Mod: CPTII,S$GLB,, | Performed by: NURSE PRACTITIONER

## 2021-07-28 PROCEDURE — 99213 PR OFFICE/OUTPT VISIT, EST, LEVL III, 20-29 MIN: ICD-10-PCS | Mod: 25,S$GLB,, | Performed by: NURSE PRACTITIONER

## 2021-07-28 PROCEDURE — 99283 EMERGENCY DEPT VISIT LOW MDM: CPT | Mod: 25,,, | Performed by: SURGERY

## 2021-07-28 PROCEDURE — 99285 EMERGENCY DEPT VISIT HI MDM: CPT

## 2021-07-28 PROCEDURE — 29580 STRAPPING UNNA BOOT: CPT | Mod: LT,S$GLB,, | Performed by: NURSE PRACTITIONER

## 2021-07-28 PROCEDURE — 3078F PR MOST RECENT DIASTOLIC BLOOD PRESSURE < 80 MM HG: ICD-10-PCS | Mod: CPTII,S$GLB,, | Performed by: NURSE PRACTITIONER

## 2021-07-28 PROCEDURE — 10140 I&D HMTMA SEROMA/FLUID COLLJ: CPT | Mod: ,,, | Performed by: STUDENT IN AN ORGANIZED HEALTH CARE EDUCATION/TRAINING PROGRAM

## 2021-07-28 PROCEDURE — 99999 PR PBB SHADOW E&M-EST. PATIENT-LVL V: ICD-10-PCS | Mod: PBBFAC,,, | Performed by: NURSE PRACTITIONER

## 2021-07-28 PROCEDURE — 1126F PR PAIN SEVERITY QUANTIFIED, NO PAIN PRESENT: ICD-10-PCS | Mod: CPTII,S$GLB,, | Performed by: NURSE PRACTITIONER

## 2021-07-28 RX ORDER — LIDOCAINE HYDROCHLORIDE 10 MG/ML
10 INJECTION, SOLUTION EPIDURAL; INFILTRATION; INTRACAUDAL; PERINEURAL
Status: COMPLETED | OUTPATIENT
Start: 2021-07-28 | End: 2021-07-28

## 2021-07-28 RX ORDER — LIDOCAINE HYDROCHLORIDE AND EPINEPHRINE 10; 10 MG/ML; UG/ML
10 INJECTION, SOLUTION INFILTRATION; PERINEURAL ONCE
Status: COMPLETED | OUTPATIENT
Start: 2021-07-28 | End: 2021-07-28

## 2021-07-28 RX ADMIN — LIDOCAINE HYDROCHLORIDE AND EPINEPHRINE 10 ML: 10; 10 INJECTION, SOLUTION INFILTRATION; PERINEURAL at 10:07

## 2021-07-28 RX ADMIN — LIDOCAINE HYDROCHLORIDE 100 MG: 10 INJECTION, SOLUTION EPIDURAL; INFILTRATION; INTRACAUDAL at 09:07

## 2021-07-30 ENCOUNTER — TELEPHONE (OUTPATIENT)
Dept: SURGERY | Facility: CLINIC | Age: 76
End: 2021-07-30

## 2021-08-03 ENCOUNTER — EXTERNAL HOME HEALTH (OUTPATIENT)
Dept: HOME HEALTH SERVICES | Facility: HOSPITAL | Age: 76
End: 2021-08-03

## 2021-08-06 ENCOUNTER — EXTERNAL HOME HEALTH (OUTPATIENT)
Dept: HOME HEALTH SERVICES | Facility: HOSPITAL | Age: 76
End: 2021-08-06

## 2021-08-06 ENCOUNTER — HOSPITAL ENCOUNTER (OUTPATIENT)
Dept: RADIOLOGY | Facility: HOSPITAL | Age: 76
Discharge: HOME | End: 2021-08-06
Attending: STUDENT IN AN ORGANIZED HEALTH CARE EDUCATION/TRAINING PROGRAM
Payer: MEDICARE

## 2021-08-06 DIAGNOSIS — R07.9 ACUTE CHEST PAIN: ICD-10-CM

## 2021-08-06 PROCEDURE — 71275 CT ANGIOGRAPHY CHEST: CPT | Mod: TC

## 2021-08-06 PROCEDURE — 71275 CT ANGIOGRAPHY CHEST: CPT | Mod: 26,,, | Performed by: STUDENT IN AN ORGANIZED HEALTH CARE EDUCATION/TRAINING PROGRAM

## 2021-08-06 PROCEDURE — 71275 CTA CHEST NON CORONARY: ICD-10-PCS | Mod: 26,,, | Performed by: STUDENT IN AN ORGANIZED HEALTH CARE EDUCATION/TRAINING PROGRAM

## 2021-08-06 PROCEDURE — 25500020 PHARM REV CODE 255: Performed by: STUDENT IN AN ORGANIZED HEALTH CARE EDUCATION/TRAINING PROGRAM

## 2021-08-06 RX ADMIN — IOHEXOL 75 ML: 350 INJECTION, SOLUTION INTRAVENOUS at 02:08

## 2021-08-10 ENCOUNTER — OFFICE VISIT (OUTPATIENT)
Dept: SURGERY | Facility: CLINIC | Age: 76
End: 2021-08-10
Payer: MEDICARE

## 2021-08-10 ENCOUNTER — OFFICE VISIT (OUTPATIENT)
Dept: INTERNAL MEDICINE | Facility: CLINIC | Age: 76
End: 2021-08-10
Payer: MEDICARE

## 2021-08-10 VITALS — HEART RATE: 91 BPM | TEMPERATURE: 99 F | DIASTOLIC BLOOD PRESSURE: 55 MMHG | SYSTOLIC BLOOD PRESSURE: 112 MMHG

## 2021-08-10 VITALS
SYSTOLIC BLOOD PRESSURE: 115 MMHG | OXYGEN SATURATION: 95 % | RESPIRATION RATE: 20 BRPM | TEMPERATURE: 98 F | DIASTOLIC BLOOD PRESSURE: 64 MMHG | HEART RATE: 87 BPM | WEIGHT: 106 LBS | BODY MASS INDEX: 21.41 KG/M2

## 2021-08-10 DIAGNOSIS — S80.11XA HEMATOMA OF RIGHT LOWER LEG: Primary | ICD-10-CM

## 2021-08-10 DIAGNOSIS — M81.0 AGE-RELATED OSTEOPOROSIS WITHOUT CURRENT PATHOLOGICAL FRACTURE: ICD-10-CM

## 2021-08-10 DIAGNOSIS — S80.11XA HEMATOMA OF RIGHT LOWER LEG: ICD-10-CM

## 2021-08-10 DIAGNOSIS — Z86.711 HISTORY OF PULMONARY EMBOLISM: ICD-10-CM

## 2021-08-10 DIAGNOSIS — Z00.00 HEALTHCARE MAINTENANCE: Primary | ICD-10-CM

## 2021-08-10 DIAGNOSIS — Z13.820 OSTEOPOROSIS SCREENING: ICD-10-CM

## 2021-08-10 PROCEDURE — 99999 PR PBB SHADOW E&M-EST. PATIENT-LVL V: CPT | Mod: PBBFAC,GC,, | Performed by: STUDENT IN AN ORGANIZED HEALTH CARE EDUCATION/TRAINING PROGRAM

## 2021-08-10 PROCEDURE — 99999 PR PBB SHADOW E&M-EST. PATIENT-LVL III: CPT | Mod: PBBFAC,,, | Performed by: SURGERY

## 2021-08-10 PROCEDURE — 99999 PR PBB SHADOW E&M-EST. PATIENT-LVL V: ICD-10-PCS | Mod: PBBFAC,GC,, | Performed by: STUDENT IN AN ORGANIZED HEALTH CARE EDUCATION/TRAINING PROGRAM

## 2021-08-10 PROCEDURE — 99213 OFFICE O/P EST LOW 20 MIN: CPT | Mod: GC,S$GLB,, | Performed by: STUDENT IN AN ORGANIZED HEALTH CARE EDUCATION/TRAINING PROGRAM

## 2021-08-10 PROCEDURE — 99024 POSTOP FOLLOW-UP VISIT: CPT | Mod: S$GLB,,, | Performed by: SURGERY

## 2021-08-10 PROCEDURE — 1157F PR ADVANCE CARE PLAN OR EQUIV PRESENT IN MEDICAL RECORD: ICD-10-PCS | Mod: CPTII,S$GLB,, | Performed by: SURGERY

## 2021-08-10 PROCEDURE — 1159F MED LIST DOCD IN RCRD: CPT | Mod: CPTII,S$GLB,, | Performed by: SURGERY

## 2021-08-10 PROCEDURE — 99213 PR OFFICE/OUTPT VISIT, EST, LEVL III, 20-29 MIN: ICD-10-PCS | Mod: GC,S$GLB,, | Performed by: STUDENT IN AN ORGANIZED HEALTH CARE EDUCATION/TRAINING PROGRAM

## 2021-08-10 PROCEDURE — 1157F ADVNC CARE PLAN IN RCRD: CPT | Mod: CPTII,S$GLB,, | Performed by: SURGERY

## 2021-08-10 PROCEDURE — 3074F PR MOST RECENT SYSTOLIC BLOOD PRESSURE < 130 MM HG: ICD-10-PCS | Mod: CPTII,S$GLB,, | Performed by: SURGERY

## 2021-08-10 PROCEDURE — 3078F PR MOST RECENT DIASTOLIC BLOOD PRESSURE < 80 MM HG: ICD-10-PCS | Mod: CPTII,S$GLB,, | Performed by: SURGERY

## 2021-08-10 PROCEDURE — 1159F PR MEDICATION LIST DOCUMENTED IN MEDICAL RECORD: ICD-10-PCS | Mod: CPTII,S$GLB,, | Performed by: SURGERY

## 2021-08-10 PROCEDURE — 99999 PR PBB SHADOW E&M-EST. PATIENT-LVL III: ICD-10-PCS | Mod: PBBFAC,,, | Performed by: SURGERY

## 2021-08-10 PROCEDURE — 3074F SYST BP LT 130 MM HG: CPT | Mod: CPTII,S$GLB,, | Performed by: SURGERY

## 2021-08-10 PROCEDURE — 99024 PR POST-OP FOLLOW-UP VISIT: ICD-10-PCS | Mod: S$GLB,,, | Performed by: SURGERY

## 2021-08-10 PROCEDURE — 3078F DIAST BP <80 MM HG: CPT | Mod: CPTII,S$GLB,, | Performed by: SURGERY

## 2021-08-10 PROCEDURE — 1125F AMNT PAIN NOTED PAIN PRSNT: CPT | Mod: CPTII,S$GLB,, | Performed by: SURGERY

## 2021-08-10 PROCEDURE — 1125F PR PAIN SEVERITY QUANTIFIED, PAIN PRESENT: ICD-10-PCS | Mod: CPTII,S$GLB,, | Performed by: SURGERY

## 2021-08-10 RX ORDER — LEVALBUTEROL INHALATION SOLUTION 1.25 MG/3ML
1 SOLUTION RESPIRATORY (INHALATION) EVERY 4 HOURS PRN
Qty: 540 ML | Refills: 3 | Status: SHIPPED | OUTPATIENT
Start: 2021-08-10 | End: 2022-01-11

## 2021-08-14 ENCOUNTER — HOSPITAL ENCOUNTER (INPATIENT)
Facility: OTHER | Age: 76
LOS: 5 days | Discharge: HOSPICE | DRG: 189 | End: 2021-08-19
Attending: EMERGENCY MEDICINE | Admitting: HOSPITALIST
Payer: MEDICARE

## 2021-08-14 DIAGNOSIS — R50.9 ACUTE FEBRILE ILLNESS: ICD-10-CM

## 2021-08-14 DIAGNOSIS — J96.21 ACUTE ON CHRONIC RESPIRATORY FAILURE WITH HYPOXIA AND HYPERCAPNIA: ICD-10-CM

## 2021-08-14 DIAGNOSIS — R41.82 ALTERED MENTAL STATUS, UNSPECIFIED ALTERED MENTAL STATUS TYPE: ICD-10-CM

## 2021-08-14 DIAGNOSIS — R09.02 HYPOXIA: ICD-10-CM

## 2021-08-14 DIAGNOSIS — J96.22 ACUTE ON CHRONIC RESPIRATORY FAILURE WITH HYPERCAPNIA: Primary | ICD-10-CM

## 2021-08-14 DIAGNOSIS — Z51.5 ENCOUNTER FOR PALLIATIVE CARE: ICD-10-CM

## 2021-08-14 DIAGNOSIS — J96.22 ACUTE ON CHRONIC RESPIRATORY FAILURE WITH HYPOXIA AND HYPERCAPNIA: ICD-10-CM

## 2021-08-14 PROBLEM — J96.02 ACUTE RESPIRATORY FAILURE WITH HYPOXIA AND HYPERCARBIA: Status: ACTIVE | Noted: 2021-08-14

## 2021-08-14 PROBLEM — J96.01 ACUTE RESPIRATORY FAILURE WITH HYPOXIA AND HYPERCARBIA: Status: ACTIVE | Noted: 2021-08-14

## 2021-08-14 LAB
ALBUMIN SERPL BCP-MCNC: 3.4 G/DL (ref 3.5–5.2)
ALLENS TEST: ABNORMAL
ALP SERPL-CCNC: 85 U/L (ref 55–135)
ALT SERPL W/O P-5'-P-CCNC: 15 U/L (ref 10–44)
ANION GAP SERPL CALC-SCNC: 15 MMOL/L (ref 8–16)
AST SERPL-CCNC: 21 U/L (ref 10–40)
BASOPHILS # BLD AUTO: 0.02 K/UL (ref 0–0.2)
BASOPHILS NFR BLD: 0.2 % (ref 0–1.9)
BILIRUB SERPL-MCNC: 0.6 MG/DL (ref 0.1–1)
BILIRUB UR QL STRIP: ABNORMAL
BUN SERPL-MCNC: 31 MG/DL (ref 8–23)
CALCIUM SERPL-MCNC: 9.6 MG/DL (ref 8.7–10.5)
CHLORIDE SERPL-SCNC: 95 MMOL/L (ref 95–110)
CLARITY UR: CLEAR
CO2 SERPL-SCNC: 25 MMOL/L (ref 23–29)
COLOR UR: YELLOW
CREAT SERPL-MCNC: 0.8 MG/DL (ref 0.5–1.4)
CTP QC/QA: YES
DELSYS: ABNORMAL
DIFFERENTIAL METHOD: ABNORMAL
EOSINOPHIL # BLD AUTO: 0 K/UL (ref 0–0.5)
EOSINOPHIL NFR BLD: 0.1 % (ref 0–8)
EP: 8
ERYTHROCYTE [DISTWIDTH] IN BLOOD BY AUTOMATED COUNT: 14.8 % (ref 11.5–14.5)
ERYTHROCYTE [SEDIMENTATION RATE] IN BLOOD BY WESTERGREN METHOD: 20 MM/H
EST. GFR  (AFRICAN AMERICAN): >60 ML/MIN/1.73 M^2
EST. GFR  (NON AFRICAN AMERICAN): >60 ML/MIN/1.73 M^2
FIO2: 40
GLUCOSE SERPL-MCNC: 237 MG/DL (ref 70–110)
GLUCOSE UR QL STRIP: NEGATIVE
HCO3 UR-SCNC: 36.9 MMOL/L (ref 24–28)
HCT VFR BLD AUTO: 49.4 % (ref 37–48.5)
HCT VFR BLD CALC: 42 %PCV (ref 36–54)
HGB BLD-MCNC: 14 G/DL
HGB BLD-MCNC: 15.3 G/DL (ref 12–16)
HGB UR QL STRIP: ABNORMAL
IMM GRANULOCYTES # BLD AUTO: 0.15 K/UL (ref 0–0.04)
IMM GRANULOCYTES NFR BLD AUTO: 1.3 % (ref 0–0.5)
IP: 14
KETONES UR QL STRIP: NEGATIVE
LACTATE SERPL-SCNC: 0.7 MMOL/L (ref 0.5–2.2)
LACTATE SERPL-SCNC: 1.8 MMOL/L (ref 0.5–2.2)
LEUKOCYTE ESTERASE UR QL STRIP: NEGATIVE
LYMPHOCYTES # BLD AUTO: 0.5 K/UL (ref 1–4.8)
LYMPHOCYTES NFR BLD: 4.6 % (ref 18–48)
MCH RBC QN AUTO: 30.8 PG (ref 27–31)
MCHC RBC AUTO-ENTMCNC: 31 G/DL (ref 32–36)
MCV RBC AUTO: 99 FL (ref 82–98)
MIN VOL: 7.8
MODE: ABNORMAL
MONOCYTES # BLD AUTO: 0.9 K/UL (ref 0.3–1)
MONOCYTES NFR BLD: 8 % (ref 4–15)
NEUTROPHILS # BLD AUTO: 9.9 K/UL (ref 1.8–7.7)
NEUTROPHILS NFR BLD: 85.8 % (ref 38–73)
NITRITE UR QL STRIP: NEGATIVE
NRBC BLD-RTO: 0 /100 WBC
PCO2 BLDA: 91.1 MMHG (ref 35–45)
PH SMN: 7.22 [PH] (ref 7.35–7.45)
PH UR STRIP: 5 [PH] (ref 5–8)
PLATELET # BLD AUTO: 297 K/UL (ref 150–450)
PMV BLD AUTO: 9.5 FL (ref 9.2–12.9)
PO2 BLDA: 59 MMHG (ref 80–100)
POC BE: 9 MMOL/L
POC IONIZED CALCIUM: 1.29 MMOL/L (ref 1.06–1.42)
POC SATURATED O2: 82 % (ref 95–100)
POC TCO2: 40 MMOL/L (ref 23–27)
POTASSIUM BLD-SCNC: 4.1 MMOL/L (ref 3.5–5.1)
POTASSIUM SERPL-SCNC: 4.7 MMOL/L (ref 3.5–5.1)
PROCALCITONIN SERPL IA-MCNC: 0.13 NG/ML
PROT SERPL-MCNC: 7 G/DL (ref 6–8.4)
PROT UR QL STRIP: ABNORMAL
RBC # BLD AUTO: 4.97 M/UL (ref 4–5.4)
SAMPLE: ABNORMAL
SARS-COV-2 RDRP RESP QL NAA+PROBE: NEGATIVE
SITE: ABNORMAL
SODIUM BLD-SCNC: 135 MMOL/L (ref 136–145)
SODIUM SERPL-SCNC: 135 MMOL/L (ref 136–145)
SP GR UR STRIP: >=1.03 (ref 1–1.03)
SP02: 91
SPONT RATE: 29
URN SPEC COLLECT METH UR: ABNORMAL
UROBILINOGEN UR STRIP-ACNC: 1 EU/DL
WBC # BLD AUTO: 11.51 K/UL (ref 3.9–12.7)

## 2021-08-14 PROCEDURE — 25000242 PHARM REV CODE 250 ALT 637 W/ HCPCS: Performed by: NURSE PRACTITIONER

## 2021-08-14 PROCEDURE — 25000242 PHARM REV CODE 250 ALT 637 W/ HCPCS: Performed by: EMERGENCY MEDICINE

## 2021-08-14 PROCEDURE — 83605 ASSAY OF LACTIC ACID: CPT | Performed by: EMERGENCY MEDICINE

## 2021-08-14 PROCEDURE — 93010 EKG 12-LEAD: ICD-10-PCS | Mod: ,,, | Performed by: INTERNAL MEDICINE

## 2021-08-14 PROCEDURE — 99291 CRITICAL CARE FIRST HOUR: CPT | Mod: 25

## 2021-08-14 PROCEDURE — 12000002 HC ACUTE/MED SURGE SEMI-PRIVATE ROOM

## 2021-08-14 PROCEDURE — 94660 CPAP INITIATION&MGMT: CPT

## 2021-08-14 PROCEDURE — 93005 ELECTROCARDIOGRAM TRACING: CPT

## 2021-08-14 PROCEDURE — 80053 COMPREHEN METABOLIC PANEL: CPT | Performed by: EMERGENCY MEDICINE

## 2021-08-14 PROCEDURE — 99900035 HC TECH TIME PER 15 MIN (STAT)

## 2021-08-14 PROCEDURE — 99223 1ST HOSP IP/OBS HIGH 75: CPT | Mod: ,,, | Performed by: NURSE PRACTITIONER

## 2021-08-14 PROCEDURE — 81003 URINALYSIS AUTO W/O SCOPE: CPT | Performed by: EMERGENCY MEDICINE

## 2021-08-14 PROCEDURE — 94640 AIRWAY INHALATION TREATMENT: CPT

## 2021-08-14 PROCEDURE — 96374 THER/PROPH/DIAG INJ IV PUSH: CPT

## 2021-08-14 PROCEDURE — 99223 PR INITIAL HOSPITAL CARE,LEVL III: ICD-10-PCS | Mod: ,,, | Performed by: NURSE PRACTITIONER

## 2021-08-14 PROCEDURE — 87040 BLOOD CULTURE FOR BACTERIA: CPT | Performed by: EMERGENCY MEDICINE

## 2021-08-14 PROCEDURE — 94644 CONT INHLJ TX 1ST HOUR: CPT

## 2021-08-14 PROCEDURE — 25000003 PHARM REV CODE 250: Performed by: EMERGENCY MEDICINE

## 2021-08-14 PROCEDURE — 63600175 PHARM REV CODE 636 W HCPCS: Performed by: EMERGENCY MEDICINE

## 2021-08-14 PROCEDURE — 93010 ELECTROCARDIOGRAM REPORT: CPT | Mod: ,,, | Performed by: INTERNAL MEDICINE

## 2021-08-14 PROCEDURE — 27000190 HC CPAP FULL FACE MASK W/VALVE

## 2021-08-14 PROCEDURE — 85025 COMPLETE CBC W/AUTO DIFF WBC: CPT | Performed by: EMERGENCY MEDICINE

## 2021-08-14 PROCEDURE — 82803 BLOOD GASES ANY COMBINATION: CPT

## 2021-08-14 PROCEDURE — 96375 TX/PRO/DX INJ NEW DRUG ADDON: CPT

## 2021-08-14 PROCEDURE — U0002 COVID-19 LAB TEST NON-CDC: HCPCS | Performed by: EMERGENCY MEDICINE

## 2021-08-14 PROCEDURE — 83605 ASSAY OF LACTIC ACID: CPT | Mod: 91 | Performed by: EMERGENCY MEDICINE

## 2021-08-14 PROCEDURE — 84145 PROCALCITONIN (PCT): CPT | Performed by: EMERGENCY MEDICINE

## 2021-08-14 PROCEDURE — 63600175 PHARM REV CODE 636 W HCPCS: Performed by: NURSE PRACTITIONER

## 2021-08-14 PROCEDURE — 94761 N-INVAS EAR/PLS OXIMETRY MLT: CPT

## 2021-08-14 PROCEDURE — 36600 WITHDRAWAL OF ARTERIAL BLOOD: CPT

## 2021-08-14 RX ORDER — IPRATROPIUM BROMIDE 0.5 MG/2.5ML
1 SOLUTION RESPIRATORY (INHALATION)
Status: COMPLETED | OUTPATIENT
Start: 2021-08-14 | End: 2021-08-14

## 2021-08-14 RX ORDER — ALBUTEROL SULFATE 2.5 MG/.5ML
15 SOLUTION RESPIRATORY (INHALATION)
Status: COMPLETED | OUTPATIENT
Start: 2021-08-14 | End: 2021-08-14

## 2021-08-14 RX ORDER — DILTIAZEM HYDROCHLORIDE 180 MG/1
180 CAPSULE, COATED, EXTENDED RELEASE ORAL DAILY
Status: DISCONTINUED | OUTPATIENT
Start: 2021-08-15 | End: 2021-08-14

## 2021-08-14 RX ORDER — IPRATROPIUM BROMIDE AND ALBUTEROL SULFATE 2.5; .5 MG/3ML; MG/3ML
3 SOLUTION RESPIRATORY (INHALATION) EVERY 4 HOURS
Status: DISCONTINUED | OUTPATIENT
Start: 2021-08-14 | End: 2021-08-16

## 2021-08-14 RX ORDER — METHYLPREDNISOLONE SOD SUCC 125 MG
80 VIAL (EA) INJECTION EVERY 8 HOURS
Status: DISCONTINUED | OUTPATIENT
Start: 2021-08-14 | End: 2021-08-16

## 2021-08-14 RX ORDER — HEPARIN SODIUM 5000 [USP'U]/ML
5000 INJECTION, SOLUTION INTRAVENOUS; SUBCUTANEOUS EVERY 8 HOURS
Status: DISCONTINUED | OUTPATIENT
Start: 2021-08-14 | End: 2021-08-16

## 2021-08-14 RX ORDER — ASPIRIN 81 MG/1
81 TABLET ORAL DAILY
Status: DISCONTINUED | OUTPATIENT
Start: 2021-08-15 | End: 2021-08-16

## 2021-08-14 RX ORDER — IPRATROPIUM BROMIDE AND ALBUTEROL SULFATE 2.5; .5 MG/3ML; MG/3ML
3 SOLUTION RESPIRATORY (INHALATION)
Status: COMPLETED | OUTPATIENT
Start: 2021-08-14 | End: 2021-08-14

## 2021-08-14 RX ORDER — ACETAMINOPHEN 650 MG/1
650 SUPPOSITORY RECTAL
Status: COMPLETED | OUTPATIENT
Start: 2021-08-14 | End: 2021-08-14

## 2021-08-14 RX ORDER — LEVOFLOXACIN 5 MG/ML
750 INJECTION, SOLUTION INTRAVENOUS
Status: DISCONTINUED | OUTPATIENT
Start: 2021-08-15 | End: 2021-08-19 | Stop reason: HOSPADM

## 2021-08-14 RX ORDER — LEVOFLOXACIN 5 MG/ML
750 INJECTION, SOLUTION INTRAVENOUS ONCE
Status: COMPLETED | OUTPATIENT
Start: 2021-08-14 | End: 2021-08-14

## 2021-08-14 RX ORDER — CHOLECALCIFEROL (VITAMIN D3) 25 MCG
1000 TABLET ORAL DAILY
Status: DISCONTINUED | OUTPATIENT
Start: 2021-08-15 | End: 2021-08-19 | Stop reason: HOSPADM

## 2021-08-14 RX ORDER — FLUTICASONE FUROATE AND VILANTEROL 100; 25 UG/1; UG/1
1 POWDER RESPIRATORY (INHALATION) DAILY
Refills: 11 | Status: DISCONTINUED | OUTPATIENT
Start: 2021-08-15 | End: 2021-08-15

## 2021-08-14 RX ORDER — METHYLPREDNISOLONE SOD SUCC 125 MG
125 VIAL (EA) INJECTION
Status: COMPLETED | OUTPATIENT
Start: 2021-08-14 | End: 2021-08-14

## 2021-08-14 RX ORDER — PRAVASTATIN SODIUM 20 MG/1
20 TABLET ORAL DAILY
Status: DISCONTINUED | OUTPATIENT
Start: 2021-08-15 | End: 2021-08-19 | Stop reason: HOSPADM

## 2021-08-14 RX ORDER — FUROSEMIDE 20 MG/1
20 TABLET ORAL DAILY
Status: DISCONTINUED | OUTPATIENT
Start: 2021-08-15 | End: 2021-08-14

## 2021-08-14 RX ADMIN — IPRATROPIUM BROMIDE AND ALBUTEROL SULFATE 3 ML: .5; 3 SOLUTION RESPIRATORY (INHALATION) at 11:08

## 2021-08-14 RX ADMIN — ALBUTEROL SULFATE 15 MG: 2.5 SOLUTION RESPIRATORY (INHALATION) at 04:08

## 2021-08-14 RX ADMIN — IPRATROPIUM BROMIDE AND ALBUTEROL SULFATE 3 ML: .5; 3 SOLUTION RESPIRATORY (INHALATION) at 09:08

## 2021-08-14 RX ADMIN — LEVOFLOXACIN 750 MG: 750 INJECTION, SOLUTION INTRAVENOUS at 05:08

## 2021-08-14 RX ADMIN — ACETAMINOPHEN 650 MG: 650 SUPPOSITORY RECTAL at 05:08

## 2021-08-14 RX ADMIN — IPRATROPIUM BROMIDE AND ALBUTEROL SULFATE 3 ML: .5; 3 SOLUTION RESPIRATORY (INHALATION) at 04:08

## 2021-08-14 RX ADMIN — METHYLPREDNISOLONE SODIUM SUCCINATE 80 MG: 125 INJECTION, POWDER, FOR SOLUTION INTRAMUSCULAR; INTRAVENOUS at 10:08

## 2021-08-14 RX ADMIN — HEPARIN SODIUM 5000 UNITS: 5000 INJECTION INTRAVENOUS; SUBCUTANEOUS at 10:08

## 2021-08-14 RX ADMIN — METHYLPREDNISOLONE SODIUM SUCCINATE 125 MG: 125 INJECTION, POWDER, FOR SOLUTION INTRAMUSCULAR; INTRAVENOUS at 05:08

## 2021-08-14 RX ADMIN — IPRATROPIUM BROMIDE 1 MG: 0.5 SOLUTION RESPIRATORY (INHALATION) at 04:08

## 2021-08-15 PROBLEM — Z79.01 CURRENT USE OF LONG TERM ANTICOAGULATION: Status: ACTIVE | Noted: 2021-08-15

## 2021-08-15 PROBLEM — I27.82 CHRONIC PULMONARY EMBOLISM: Status: ACTIVE | Noted: 2021-08-15

## 2021-08-15 LAB
ALBUMIN SERPL BCP-MCNC: 2.6 G/DL (ref 3.5–5.2)
ALP SERPL-CCNC: 68 U/L (ref 55–135)
ALT SERPL W/O P-5'-P-CCNC: 17 U/L (ref 10–44)
ANION GAP SERPL CALC-SCNC: 10 MMOL/L (ref 8–16)
AST SERPL-CCNC: 17 U/L (ref 10–40)
BASOPHILS # BLD AUTO: 0.01 K/UL (ref 0–0.2)
BASOPHILS NFR BLD: 0.1 % (ref 0–1.9)
BILIRUB SERPL-MCNC: 0.4 MG/DL (ref 0.1–1)
BUN SERPL-MCNC: 28 MG/DL (ref 8–23)
CALCIUM SERPL-MCNC: 8.6 MG/DL (ref 8.7–10.5)
CHLORIDE SERPL-SCNC: 100 MMOL/L (ref 95–110)
CO2 SERPL-SCNC: 28 MMOL/L (ref 23–29)
CREAT SERPL-MCNC: 0.6 MG/DL (ref 0.5–1.4)
DIFFERENTIAL METHOD: ABNORMAL
EOSINOPHIL # BLD AUTO: 0 K/UL (ref 0–0.5)
EOSINOPHIL NFR BLD: 0 % (ref 0–8)
ERYTHROCYTE [DISTWIDTH] IN BLOOD BY AUTOMATED COUNT: 14.9 % (ref 11.5–14.5)
EST. GFR  (AFRICAN AMERICAN): >60 ML/MIN/1.73 M^2
EST. GFR  (NON AFRICAN AMERICAN): >60 ML/MIN/1.73 M^2
GLUCOSE SERPL-MCNC: 156 MG/DL (ref 70–110)
HCO3 UR-SCNC: 34.5 MMOL/L (ref 24–28)
HCT VFR BLD AUTO: 39 % (ref 37–48.5)
HCT VFR BLD CALC: 36 %PCV (ref 36–54)
HGB BLD-MCNC: 12 G/DL
HGB BLD-MCNC: 12.1 G/DL (ref 12–16)
IMM GRANULOCYTES # BLD AUTO: 0.07 K/UL (ref 0–0.04)
IMM GRANULOCYTES NFR BLD AUTO: 0.8 % (ref 0–0.5)
LYMPHOCYTES # BLD AUTO: 0.2 K/UL (ref 1–4.8)
LYMPHOCYTES NFR BLD: 2.4 % (ref 18–48)
MAGNESIUM SERPL-MCNC: 1.9 MG/DL (ref 1.6–2.6)
MCH RBC QN AUTO: 30.9 PG (ref 27–31)
MCHC RBC AUTO-ENTMCNC: 31 G/DL (ref 32–36)
MCV RBC AUTO: 100 FL (ref 82–98)
MONOCYTES # BLD AUTO: 0.1 K/UL (ref 0.3–1)
MONOCYTES NFR BLD: 1 % (ref 4–15)
NEUTROPHILS # BLD AUTO: 8.9 K/UL (ref 1.8–7.7)
NEUTROPHILS NFR BLD: 95.7 % (ref 38–73)
NRBC BLD-RTO: 0 /100 WBC
PCO2 BLDA: 74.8 MMHG (ref 35–45)
PH SMN: 7.27 [PH] (ref 7.35–7.45)
PHOSPHATE SERPL-MCNC: 3.8 MG/DL (ref 2.7–4.5)
PLATELET # BLD AUTO: 220 K/UL (ref 150–450)
PMV BLD AUTO: 9.3 FL (ref 9.2–12.9)
PO2 BLDA: 67 MMHG (ref 40–60)
POC BE: 8 MMOL/L
POC SATURATED O2: 89 % (ref 95–100)
POC TCO2: 37 MMOL/L (ref 24–29)
POTASSIUM BLD-SCNC: 4.3 MMOL/L (ref 3.5–5.1)
POTASSIUM SERPL-SCNC: 4.4 MMOL/L (ref 3.5–5.1)
PROT SERPL-MCNC: 5.4 G/DL (ref 6–8.4)
RBC # BLD AUTO: 3.92 M/UL (ref 4–5.4)
SAMPLE: ABNORMAL
SODIUM BLD-SCNC: 138 MMOL/L (ref 136–145)
SODIUM SERPL-SCNC: 138 MMOL/L (ref 136–145)
WBC # BLD AUTO: 9.29 K/UL (ref 3.9–12.7)

## 2021-08-15 PROCEDURE — 99900035 HC TECH TIME PER 15 MIN (STAT)

## 2021-08-15 PROCEDURE — 94660 CPAP INITIATION&MGMT: CPT

## 2021-08-15 PROCEDURE — 80053 COMPREHEN METABOLIC PANEL: CPT | Performed by: NURSE PRACTITIONER

## 2021-08-15 PROCEDURE — 84100 ASSAY OF PHOSPHORUS: CPT | Performed by: NURSE PRACTITIONER

## 2021-08-15 PROCEDURE — 25000003 PHARM REV CODE 250: Performed by: NURSE PRACTITIONER

## 2021-08-15 PROCEDURE — 94640 AIRWAY INHALATION TREATMENT: CPT

## 2021-08-15 PROCEDURE — 83735 ASSAY OF MAGNESIUM: CPT | Performed by: NURSE PRACTITIONER

## 2021-08-15 PROCEDURE — 25000242 PHARM REV CODE 250 ALT 637 W/ HCPCS: Performed by: NURSE PRACTITIONER

## 2021-08-15 PROCEDURE — 99233 PR SUBSEQUENT HOSPITAL CARE,LEVL III: ICD-10-PCS | Mod: ,,, | Performed by: HOSPITALIST

## 2021-08-15 PROCEDURE — 63600175 PHARM REV CODE 636 W HCPCS: Performed by: NURSE PRACTITIONER

## 2021-08-15 PROCEDURE — 85025 COMPLETE CBC W/AUTO DIFF WBC: CPT | Performed by: NURSE PRACTITIONER

## 2021-08-15 PROCEDURE — 27000221 HC OXYGEN, UP TO 24 HOURS

## 2021-08-15 PROCEDURE — 94761 N-INVAS EAR/PLS OXIMETRY MLT: CPT

## 2021-08-15 PROCEDURE — 99233 SBSQ HOSP IP/OBS HIGH 50: CPT | Mod: ,,, | Performed by: HOSPITALIST

## 2021-08-15 PROCEDURE — 20000000 HC ICU ROOM

## 2021-08-15 RX ORDER — SODIUM CHLORIDE 0.9 % (FLUSH) 0.9 %
10 SYRINGE (ML) INJECTION EVERY 8 HOURS PRN
Status: DISCONTINUED | OUTPATIENT
Start: 2021-08-15 | End: 2021-08-19 | Stop reason: HOSPADM

## 2021-08-15 RX ORDER — ACETAMINOPHEN 650 MG/1
650 SUPPOSITORY RECTAL EVERY 6 HOURS PRN
Status: DISCONTINUED | OUTPATIENT
Start: 2021-08-15 | End: 2021-08-19 | Stop reason: HOSPADM

## 2021-08-15 RX ORDER — ONDANSETRON 2 MG/ML
8 INJECTION INTRAMUSCULAR; INTRAVENOUS EVERY 8 HOURS PRN
Status: DISCONTINUED | OUTPATIENT
Start: 2021-08-15 | End: 2021-08-19 | Stop reason: HOSPADM

## 2021-08-15 RX ADMIN — IPRATROPIUM BROMIDE AND ALBUTEROL SULFATE 3 ML: .5; 3 SOLUTION RESPIRATORY (INHALATION) at 11:08

## 2021-08-15 RX ADMIN — ASPIRIN 81 MG: 81 TABLET, COATED ORAL at 11:08

## 2021-08-15 RX ADMIN — METHYLPREDNISOLONE SODIUM SUCCINATE 80 MG: 125 INJECTION, POWDER, FOR SOLUTION INTRAMUSCULAR; INTRAVENOUS at 06:08

## 2021-08-15 RX ADMIN — LEVOFLOXACIN 750 MG: 750 INJECTION, SOLUTION INTRAVENOUS at 05:08

## 2021-08-15 RX ADMIN — METHYLPREDNISOLONE SODIUM SUCCINATE 80 MG: 125 INJECTION, POWDER, FOR SOLUTION INTRAMUSCULAR; INTRAVENOUS at 09:08

## 2021-08-15 RX ADMIN — HEPARIN SODIUM 5000 UNITS: 5000 INJECTION INTRAVENOUS; SUBCUTANEOUS at 09:08

## 2021-08-15 RX ADMIN — VITAMIN D, TAB 1000IU (100/BT) 1000 UNITS: 25 TAB at 11:08

## 2021-08-15 RX ADMIN — IPRATROPIUM BROMIDE AND ALBUTEROL SULFATE 3 ML: .5; 3 SOLUTION RESPIRATORY (INHALATION) at 09:08

## 2021-08-15 RX ADMIN — PRAVASTATIN SODIUM 20 MG: 20 TABLET ORAL at 11:08

## 2021-08-15 RX ADMIN — METHYLPREDNISOLONE SODIUM SUCCINATE 80 MG: 125 INJECTION, POWDER, FOR SOLUTION INTRAMUSCULAR; INTRAVENOUS at 02:08

## 2021-08-15 RX ADMIN — HEPARIN SODIUM 5000 UNITS: 5000 INJECTION INTRAVENOUS; SUBCUTANEOUS at 06:08

## 2021-08-15 RX ADMIN — IPRATROPIUM BROMIDE AND ALBUTEROL SULFATE 3 ML: .5; 3 SOLUTION RESPIRATORY (INHALATION) at 03:08

## 2021-08-15 RX ADMIN — IPRATROPIUM BROMIDE AND ALBUTEROL SULFATE 3 ML: .5; 3 SOLUTION RESPIRATORY (INHALATION) at 08:08

## 2021-08-15 RX ADMIN — IPRATROPIUM BROMIDE AND ALBUTEROL SULFATE 3 ML: .5; 3 SOLUTION RESPIRATORY (INHALATION) at 04:08

## 2021-08-15 RX ADMIN — HEPARIN SODIUM 5000 UNITS: 5000 INJECTION INTRAVENOUS; SUBCUTANEOUS at 02:08

## 2021-08-16 PROBLEM — J96.22 ACUTE ON CHRONIC RESPIRATORY FAILURE WITH HYPERCAPNIA: Status: ACTIVE | Noted: 2021-08-16

## 2021-08-16 PROBLEM — Z51.5 ENCOUNTER FOR PALLIATIVE CARE: Status: ACTIVE | Noted: 2021-08-16

## 2021-08-16 PROBLEM — S80.12XA HEMATOMA OF LEFT LOWER LEG: Status: ACTIVE | Noted: 2021-08-16

## 2021-08-16 LAB
ALBUMIN SERPL BCP-MCNC: 2.5 G/DL (ref 3.5–5.2)
ALP SERPL-CCNC: 51 U/L (ref 55–135)
ALT SERPL W/O P-5'-P-CCNC: 15 U/L (ref 10–44)
ANION GAP SERPL CALC-SCNC: 9 MMOL/L (ref 8–16)
AST SERPL-CCNC: 17 U/L (ref 10–40)
BASOPHILS # BLD AUTO: 0.01 K/UL (ref 0–0.2)
BASOPHILS NFR BLD: 0.1 % (ref 0–1.9)
BILIRUB SERPL-MCNC: 0.3 MG/DL (ref 0.1–1)
BUN SERPL-MCNC: 27 MG/DL (ref 8–23)
CALCIUM SERPL-MCNC: 8.9 MG/DL (ref 8.7–10.5)
CHLORIDE SERPL-SCNC: 101 MMOL/L (ref 95–110)
CO2 SERPL-SCNC: 28 MMOL/L (ref 23–29)
CREAT SERPL-MCNC: 0.6 MG/DL (ref 0.5–1.4)
DIFFERENTIAL METHOD: ABNORMAL
EOSINOPHIL # BLD AUTO: 0 K/UL (ref 0–0.5)
EOSINOPHIL NFR BLD: 0 % (ref 0–8)
ERYTHROCYTE [DISTWIDTH] IN BLOOD BY AUTOMATED COUNT: 14.6 % (ref 11.5–14.5)
EST. GFR  (AFRICAN AMERICAN): >60 ML/MIN/1.73 M^2
EST. GFR  (NON AFRICAN AMERICAN): >60 ML/MIN/1.73 M^2
GLUCOSE SERPL-MCNC: 129 MG/DL (ref 70–110)
HCT VFR BLD AUTO: 37.2 % (ref 37–48.5)
HGB BLD-MCNC: 11.7 G/DL (ref 12–16)
IMM GRANULOCYTES # BLD AUTO: 0.05 K/UL (ref 0–0.04)
IMM GRANULOCYTES NFR BLD AUTO: 0.7 % (ref 0–0.5)
LYMPHOCYTES # BLD AUTO: 0.2 K/UL (ref 1–4.8)
LYMPHOCYTES NFR BLD: 2.5 % (ref 18–48)
MAGNESIUM SERPL-MCNC: 2.2 MG/DL (ref 1.6–2.6)
MCH RBC QN AUTO: 31.1 PG (ref 27–31)
MCHC RBC AUTO-ENTMCNC: 31.5 G/DL (ref 32–36)
MCV RBC AUTO: 99 FL (ref 82–98)
MONOCYTES # BLD AUTO: 0.4 K/UL (ref 0.3–1)
MONOCYTES NFR BLD: 4.6 % (ref 4–15)
NEUTROPHILS # BLD AUTO: 7 K/UL (ref 1.8–7.7)
NEUTROPHILS NFR BLD: 92.1 % (ref 38–73)
NRBC BLD-RTO: 0 /100 WBC
PHOSPHATE SERPL-MCNC: 2.8 MG/DL (ref 2.7–4.5)
PLATELET # BLD AUTO: 214 K/UL (ref 150–450)
PMV BLD AUTO: 9.4 FL (ref 9.2–12.9)
POTASSIUM SERPL-SCNC: 4.3 MMOL/L (ref 3.5–5.1)
PROT SERPL-MCNC: 5.1 G/DL (ref 6–8.4)
RBC # BLD AUTO: 3.76 M/UL (ref 4–5.4)
SODIUM SERPL-SCNC: 138 MMOL/L (ref 136–145)
WBC # BLD AUTO: 7.58 K/UL (ref 3.9–12.7)

## 2021-08-16 PROCEDURE — 99223 PR INITIAL HOSPITAL CARE,LEVL III: ICD-10-PCS | Mod: ,,, | Performed by: FAMILY MEDICINE

## 2021-08-16 PROCEDURE — 80053 COMPREHEN METABOLIC PANEL: CPT | Performed by: NURSE PRACTITIONER

## 2021-08-16 PROCEDURE — 36415 COLL VENOUS BLD VENIPUNCTURE: CPT | Performed by: NURSE PRACTITIONER

## 2021-08-16 PROCEDURE — 84100 ASSAY OF PHOSPHORUS: CPT | Performed by: NURSE PRACTITIONER

## 2021-08-16 PROCEDURE — 63600175 PHARM REV CODE 636 W HCPCS: Performed by: HOSPITALIST

## 2021-08-16 PROCEDURE — 25000003 PHARM REV CODE 250: Performed by: HOSPITALIST

## 2021-08-16 PROCEDURE — 25000242 PHARM REV CODE 250 ALT 637 W/ HCPCS: Performed by: HOSPITALIST

## 2021-08-16 PROCEDURE — 27000221 HC OXYGEN, UP TO 24 HOURS

## 2021-08-16 PROCEDURE — 99233 PR SUBSEQUENT HOSPITAL CARE,LEVL III: ICD-10-PCS | Mod: ,,, | Performed by: HOSPITALIST

## 2021-08-16 PROCEDURE — 25000003 PHARM REV CODE 250: Performed by: NURSE PRACTITIONER

## 2021-08-16 PROCEDURE — 63600175 PHARM REV CODE 636 W HCPCS: Performed by: NURSE PRACTITIONER

## 2021-08-16 PROCEDURE — 94761 N-INVAS EAR/PLS OXIMETRY MLT: CPT

## 2021-08-16 PROCEDURE — 99497 PR ADVNCD CARE PLAN 30 MIN: ICD-10-PCS | Mod: 25,,, | Performed by: FAMILY MEDICINE

## 2021-08-16 PROCEDURE — 85025 COMPLETE CBC W/AUTO DIFF WBC: CPT | Performed by: NURSE PRACTITIONER

## 2021-08-16 PROCEDURE — 94640 AIRWAY INHALATION TREATMENT: CPT

## 2021-08-16 PROCEDURE — 99497 ADVNCD CARE PLAN 30 MIN: CPT | Mod: 25,,, | Performed by: FAMILY MEDICINE

## 2021-08-16 PROCEDURE — 99223 1ST HOSP IP/OBS HIGH 75: CPT | Mod: ,,, | Performed by: FAMILY MEDICINE

## 2021-08-16 PROCEDURE — 99233 SBSQ HOSP IP/OBS HIGH 50: CPT | Mod: ,,, | Performed by: HOSPITALIST

## 2021-08-16 PROCEDURE — 25000242 PHARM REV CODE 250 ALT 637 W/ HCPCS: Performed by: NURSE PRACTITIONER

## 2021-08-16 PROCEDURE — 83735 ASSAY OF MAGNESIUM: CPT | Performed by: NURSE PRACTITIONER

## 2021-08-16 PROCEDURE — 11000001 HC ACUTE MED/SURG PRIVATE ROOM

## 2021-08-16 RX ORDER — MORPHINE SULFATE ORAL SOLUTION 10 MG/5ML
5 SOLUTION ORAL EVERY 4 HOURS PRN
Status: DISCONTINUED | OUTPATIENT
Start: 2021-08-16 | End: 2021-08-19 | Stop reason: HOSPADM

## 2021-08-16 RX ORDER — SODIUM CHLORIDE 9 MG/ML
INJECTION, SOLUTION INTRAVENOUS
Status: DISCONTINUED | OUTPATIENT
Start: 2021-08-16 | End: 2021-08-19 | Stop reason: HOSPADM

## 2021-08-16 RX ORDER — IPRATROPIUM BROMIDE AND ALBUTEROL SULFATE 2.5; .5 MG/3ML; MG/3ML
3 SOLUTION RESPIRATORY (INHALATION)
Status: DISCONTINUED | OUTPATIENT
Start: 2021-08-16 | End: 2021-08-19 | Stop reason: HOSPADM

## 2021-08-16 RX ORDER — FLUTICASONE FUROATE AND VILANTEROL 100; 25 UG/1; UG/1
1 POWDER RESPIRATORY (INHALATION) DAILY
Status: DISCONTINUED | OUTPATIENT
Start: 2021-08-16 | End: 2021-08-19 | Stop reason: HOSPADM

## 2021-08-16 RX ORDER — ALPRAZOLAM 0.25 MG/1
0.25 TABLET ORAL 3 TIMES DAILY PRN
Status: DISCONTINUED | OUTPATIENT
Start: 2021-08-16 | End: 2021-08-19 | Stop reason: HOSPADM

## 2021-08-16 RX ORDER — PREDNISONE 20 MG/1
40 TABLET ORAL DAILY
Status: COMPLETED | OUTPATIENT
Start: 2021-08-16 | End: 2021-08-19

## 2021-08-16 RX ADMIN — SODIUM CHLORIDE: 0.9 INJECTION, SOLUTION INTRAVENOUS at 06:08

## 2021-08-16 RX ADMIN — METHYLPREDNISOLONE SODIUM SUCCINATE 80 MG: 125 INJECTION, POWDER, FOR SOLUTION INTRAMUSCULAR; INTRAVENOUS at 05:08

## 2021-08-16 RX ADMIN — IPRATROPIUM BROMIDE AND ALBUTEROL SULFATE 3 ML: .5; 3 SOLUTION RESPIRATORY (INHALATION) at 01:08

## 2021-08-16 RX ADMIN — PREDNISONE 40 MG: 20 TABLET ORAL at 10:08

## 2021-08-16 RX ADMIN — VITAMIN D, TAB 1000IU (100/BT) 1000 UNITS: 25 TAB at 08:08

## 2021-08-16 RX ADMIN — HEPARIN SODIUM 5000 UNITS: 5000 INJECTION INTRAVENOUS; SUBCUTANEOUS at 05:08

## 2021-08-16 RX ADMIN — LEVOFLOXACIN 750 MG: 750 INJECTION, SOLUTION INTRAVENOUS at 06:08

## 2021-08-16 RX ADMIN — ASPIRIN 81 MG: 81 TABLET, COATED ORAL at 08:08

## 2021-08-16 RX ADMIN — PRAVASTATIN SODIUM 20 MG: 20 TABLET ORAL at 08:08

## 2021-08-16 RX ADMIN — IPRATROPIUM BROMIDE AND ALBUTEROL SULFATE 3 ML: .5; 3 SOLUTION RESPIRATORY (INHALATION) at 04:08

## 2021-08-16 RX ADMIN — IPRATROPIUM BROMIDE AND ALBUTEROL SULFATE 3 ML: .5; 3 SOLUTION RESPIRATORY (INHALATION) at 07:08

## 2021-08-16 RX ADMIN — IPRATROPIUM BROMIDE AND ALBUTEROL SULFATE 3 ML: .5; 3 SOLUTION RESPIRATORY (INHALATION) at 08:08

## 2021-08-16 RX ADMIN — FLUTICASONE FUROATE AND VILANTEROL TRIFENATATE 1 PUFF: 100; 25 POWDER RESPIRATORY (INHALATION) at 11:08

## 2021-08-16 RX ADMIN — APIXABAN 2.5 MG: 2.5 TABLET, FILM COATED ORAL at 10:08

## 2021-08-17 ENCOUNTER — TELEPHONE (OUTPATIENT)
Dept: INTERNAL MEDICINE | Facility: CLINIC | Age: 76
End: 2021-08-17

## 2021-08-17 ENCOUNTER — PATIENT OUTREACH (OUTPATIENT)
Dept: ADMINISTRATIVE | Facility: OTHER | Age: 76
End: 2021-08-17

## 2021-08-17 PROCEDURE — 25000242 PHARM REV CODE 250 ALT 637 W/ HCPCS: Performed by: HOSPITALIST

## 2021-08-17 PROCEDURE — 99233 SBSQ HOSP IP/OBS HIGH 50: CPT | Mod: ,,, | Performed by: INTERNAL MEDICINE

## 2021-08-17 PROCEDURE — 25000003 PHARM REV CODE 250: Performed by: HOSPITALIST

## 2021-08-17 PROCEDURE — 99233 PR SUBSEQUENT HOSPITAL CARE,LEVL III: ICD-10-PCS | Mod: ,,, | Performed by: INTERNAL MEDICINE

## 2021-08-17 PROCEDURE — 94761 N-INVAS EAR/PLS OXIMETRY MLT: CPT

## 2021-08-17 PROCEDURE — 94640 AIRWAY INHALATION TREATMENT: CPT

## 2021-08-17 PROCEDURE — 63600175 PHARM REV CODE 636 W HCPCS: Performed by: HOSPITALIST

## 2021-08-17 PROCEDURE — 99900035 HC TECH TIME PER 15 MIN (STAT)

## 2021-08-17 PROCEDURE — 27000221 HC OXYGEN, UP TO 24 HOURS

## 2021-08-17 PROCEDURE — 25000003 PHARM REV CODE 250: Performed by: INTERNAL MEDICINE

## 2021-08-17 PROCEDURE — 11000001 HC ACUTE MED/SURG PRIVATE ROOM

## 2021-08-17 RX ORDER — DILTIAZEM HYDROCHLORIDE 120 MG/1
120 CAPSULE, COATED, EXTENDED RELEASE ORAL DAILY
Status: DISCONTINUED | OUTPATIENT
Start: 2021-08-17 | End: 2021-08-19 | Stop reason: HOSPADM

## 2021-08-17 RX ORDER — PANTOPRAZOLE SODIUM 40 MG/1
40 TABLET, DELAYED RELEASE ORAL DAILY
Status: DISCONTINUED | OUTPATIENT
Start: 2021-08-17 | End: 2021-08-19 | Stop reason: HOSPADM

## 2021-08-17 RX ORDER — FUROSEMIDE 20 MG/1
20 TABLET ORAL DAILY
Status: DISCONTINUED | OUTPATIENT
Start: 2021-08-17 | End: 2021-08-19 | Stop reason: HOSPADM

## 2021-08-17 RX ADMIN — NIFEDIPINE 120 MG: 10 CAPSULE ORAL at 03:08

## 2021-08-17 RX ADMIN — FLUTICASONE FUROATE AND VILANTEROL TRIFENATATE 1 PUFF: 100; 25 POWDER RESPIRATORY (INHALATION) at 08:08

## 2021-08-17 RX ADMIN — PREDNISONE 40 MG: 20 TABLET ORAL at 09:08

## 2021-08-17 RX ADMIN — SODIUM CHLORIDE: 0.9 INJECTION, SOLUTION INTRAVENOUS at 05:08

## 2021-08-17 RX ADMIN — LEVOFLOXACIN 750 MG: 750 INJECTION, SOLUTION INTRAVENOUS at 05:08

## 2021-08-17 RX ADMIN — PRAVASTATIN SODIUM 20 MG: 20 TABLET ORAL at 09:08

## 2021-08-17 RX ADMIN — APIXABAN 5 MG: 2.5 TABLET, FILM COATED ORAL at 08:08

## 2021-08-17 RX ADMIN — IPRATROPIUM BROMIDE AND ALBUTEROL SULFATE 3 ML: .5; 3 SOLUTION RESPIRATORY (INHALATION) at 08:08

## 2021-08-17 RX ADMIN — PANTOPRAZOLE SODIUM 40 MG: 40 TABLET, DELAYED RELEASE ORAL at 03:08

## 2021-08-17 RX ADMIN — FUROSEMIDE 20 MG: 20 TABLET ORAL at 03:08

## 2021-08-17 RX ADMIN — VITAMIN D, TAB 1000IU (100/BT) 1000 UNITS: 25 TAB at 09:08

## 2021-08-18 ENCOUNTER — PATIENT OUTREACH (OUTPATIENT)
Dept: ADMINISTRATIVE | Facility: OTHER | Age: 76
End: 2021-08-18

## 2021-08-18 PROCEDURE — 97535 SELF CARE MNGMENT TRAINING: CPT

## 2021-08-18 PROCEDURE — 25000003 PHARM REV CODE 250: Performed by: INTERNAL MEDICINE

## 2021-08-18 PROCEDURE — 99900035 HC TECH TIME PER 15 MIN (STAT)

## 2021-08-18 PROCEDURE — 97162 PT EVAL MOD COMPLEX 30 MIN: CPT

## 2021-08-18 PROCEDURE — 99233 SBSQ HOSP IP/OBS HIGH 50: CPT | Mod: ,,, | Performed by: INTERNAL MEDICINE

## 2021-08-18 PROCEDURE — 99233 PR SUBSEQUENT HOSPITAL CARE,LEVL III: ICD-10-PCS | Mod: ,,, | Performed by: INTERNAL MEDICINE

## 2021-08-18 PROCEDURE — 11000001 HC ACUTE MED/SURG PRIVATE ROOM

## 2021-08-18 PROCEDURE — 63600175 PHARM REV CODE 636 W HCPCS: Performed by: HOSPITALIST

## 2021-08-18 PROCEDURE — 94761 N-INVAS EAR/PLS OXIMETRY MLT: CPT

## 2021-08-18 PROCEDURE — 97166 OT EVAL MOD COMPLEX 45 MIN: CPT

## 2021-08-18 PROCEDURE — 94640 AIRWAY INHALATION TREATMENT: CPT

## 2021-08-18 PROCEDURE — 97530 THERAPEUTIC ACTIVITIES: CPT

## 2021-08-18 PROCEDURE — 25000242 PHARM REV CODE 250 ALT 637 W/ HCPCS: Performed by: HOSPITALIST

## 2021-08-18 PROCEDURE — 25000003 PHARM REV CODE 250: Performed by: HOSPITALIST

## 2021-08-18 PROCEDURE — 27000221 HC OXYGEN, UP TO 24 HOURS

## 2021-08-18 RX ORDER — PANTOPRAZOLE SODIUM 40 MG/1
40 TABLET, DELAYED RELEASE ORAL DAILY
Qty: 30 TABLET | Refills: 11
Start: 2021-08-19 | End: 2022-02-17

## 2021-08-18 RX ORDER — LEVOFLOXACIN 750 MG/1
750 TABLET ORAL DAILY
Qty: 2 TABLET | Refills: 0 | Status: SHIPPED | OUTPATIENT
Start: 2021-08-18 | End: 2021-09-10

## 2021-08-18 RX ADMIN — FUROSEMIDE 20 MG: 20 TABLET ORAL at 09:08

## 2021-08-18 RX ADMIN — ALPRAZOLAM 0.25 MG: 0.25 TABLET ORAL at 01:08

## 2021-08-18 RX ADMIN — NIFEDIPINE 120 MG: 10 CAPSULE ORAL at 09:08

## 2021-08-18 RX ADMIN — FLUTICASONE FUROATE AND VILANTEROL TRIFENATATE 1 PUFF: 100; 25 POWDER RESPIRATORY (INHALATION) at 07:08

## 2021-08-18 RX ADMIN — PANTOPRAZOLE SODIUM 40 MG: 40 TABLET, DELAYED RELEASE ORAL at 09:08

## 2021-08-18 RX ADMIN — IPRATROPIUM BROMIDE AND ALBUTEROL SULFATE 3 ML: .5; 3 SOLUTION RESPIRATORY (INHALATION) at 08:08

## 2021-08-18 RX ADMIN — APIXABAN 5 MG: 2.5 TABLET, FILM COATED ORAL at 09:08

## 2021-08-18 RX ADMIN — PREDNISONE 40 MG: 20 TABLET ORAL at 09:08

## 2021-08-18 RX ADMIN — PRAVASTATIN SODIUM 20 MG: 20 TABLET ORAL at 09:08

## 2021-08-18 RX ADMIN — APIXABAN 5 MG: 2.5 TABLET, FILM COATED ORAL at 08:08

## 2021-08-18 RX ADMIN — VITAMIN D, TAB 1000IU (100/BT) 1000 UNITS: 25 TAB at 09:08

## 2021-08-18 RX ADMIN — LEVOFLOXACIN 750 MG: 750 INJECTION, SOLUTION INTRAVENOUS at 05:08

## 2021-08-18 RX ADMIN — IPRATROPIUM BROMIDE AND ALBUTEROL SULFATE 3 ML: .5; 3 SOLUTION RESPIRATORY (INHALATION) at 07:08

## 2021-08-19 ENCOUNTER — PATIENT OUTREACH (OUTPATIENT)
Dept: ADMINISTRATIVE | Facility: OTHER | Age: 76
End: 2021-08-19

## 2021-08-19 VITALS
TEMPERATURE: 98 F | BODY MASS INDEX: 21.38 KG/M2 | OXYGEN SATURATION: 95 % | HEART RATE: 100 BPM | WEIGHT: 106.06 LBS | DIASTOLIC BLOOD PRESSURE: 67 MMHG | HEIGHT: 59 IN | SYSTOLIC BLOOD PRESSURE: 143 MMHG | RESPIRATION RATE: 16 BRPM

## 2021-08-19 PROCEDURE — 25000242 PHARM REV CODE 250 ALT 637 W/ HCPCS: Performed by: HOSPITALIST

## 2021-08-19 PROCEDURE — 27000221 HC OXYGEN, UP TO 24 HOURS

## 2021-08-19 PROCEDURE — 63600175 PHARM REV CODE 636 W HCPCS: Performed by: HOSPITALIST

## 2021-08-19 PROCEDURE — 25000003 PHARM REV CODE 250: Performed by: HOSPITALIST

## 2021-08-19 PROCEDURE — 94640 AIRWAY INHALATION TREATMENT: CPT

## 2021-08-19 PROCEDURE — 25000003 PHARM REV CODE 250: Performed by: INTERNAL MEDICINE

## 2021-08-19 PROCEDURE — 99239 HOSP IP/OBS DSCHRG MGMT >30: CPT | Mod: ,,, | Performed by: INTERNAL MEDICINE

## 2021-08-19 PROCEDURE — 99239 PR HOSPITAL DISCHARGE DAY,>30 MIN: ICD-10-PCS | Mod: ,,, | Performed by: INTERNAL MEDICINE

## 2021-08-19 RX ADMIN — FLUTICASONE FUROATE AND VILANTEROL TRIFENATATE 1 PUFF: 100; 25 POWDER RESPIRATORY (INHALATION) at 08:08

## 2021-08-19 RX ADMIN — NIFEDIPINE 120 MG: 10 CAPSULE ORAL at 09:08

## 2021-08-19 RX ADMIN — IPRATROPIUM BROMIDE AND ALBUTEROL SULFATE 3 ML: .5; 3 SOLUTION RESPIRATORY (INHALATION) at 08:08

## 2021-08-19 RX ADMIN — FUROSEMIDE 20 MG: 20 TABLET ORAL at 09:08

## 2021-08-19 RX ADMIN — APIXABAN 5 MG: 2.5 TABLET, FILM COATED ORAL at 09:08

## 2021-08-19 RX ADMIN — PRAVASTATIN SODIUM 20 MG: 20 TABLET ORAL at 09:08

## 2021-08-19 RX ADMIN — VITAMIN D, TAB 1000IU (100/BT) 1000 UNITS: 25 TAB at 09:08

## 2021-08-19 RX ADMIN — PANTOPRAZOLE SODIUM 40 MG: 40 TABLET, DELAYED RELEASE ORAL at 09:08

## 2021-08-19 RX ADMIN — PREDNISONE 40 MG: 20 TABLET ORAL at 09:08

## 2021-08-20 LAB
BACTERIA BLD CULT: NORMAL
BACTERIA BLD CULT: NORMAL

## 2021-08-23 ENCOUNTER — TELEPHONE (OUTPATIENT)
Dept: INTERNAL MEDICINE | Facility: CLINIC | Age: 76
End: 2021-08-23

## 2021-08-23 DIAGNOSIS — S80.11XA HEMATOMA OF RIGHT LOWER LEG: Primary | ICD-10-CM

## 2021-08-26 ENCOUNTER — OFFICE VISIT (OUTPATIENT)
Dept: INTERNAL MEDICINE | Facility: CLINIC | Age: 76
End: 2021-08-26
Payer: MEDICARE

## 2021-08-26 DIAGNOSIS — J44.9 CHRONIC OBSTRUCTIVE PULMONARY DISEASE, UNSPECIFIED COPD TYPE: Primary | ICD-10-CM

## 2021-08-26 DIAGNOSIS — R53.81 PHYSICAL DECONDITIONING: ICD-10-CM

## 2021-08-26 PROCEDURE — 1111F PR DISCHARGE MEDS RECONCILED W/ CURRENT OUTPATIENT MED LIST: ICD-10-PCS | Mod: CPTII,GC,S$GLB, | Performed by: STUDENT IN AN ORGANIZED HEALTH CARE EDUCATION/TRAINING PROGRAM

## 2021-08-26 PROCEDURE — 1111F DSCHRG MED/CURRENT MED MERGE: CPT | Mod: CPTII,GC,S$GLB, | Performed by: STUDENT IN AN ORGANIZED HEALTH CARE EDUCATION/TRAINING PROGRAM

## 2021-08-26 PROCEDURE — 99999 PR PBB SHADOW E&M-EST. PATIENT-LVL IV: CPT | Mod: PBBFAC,GC,, | Performed by: STUDENT IN AN ORGANIZED HEALTH CARE EDUCATION/TRAINING PROGRAM

## 2021-08-26 PROCEDURE — 99999 PR PBB SHADOW E&M-EST. PATIENT-LVL IV: ICD-10-PCS | Mod: PBBFAC,GC,, | Performed by: STUDENT IN AN ORGANIZED HEALTH CARE EDUCATION/TRAINING PROGRAM

## 2021-08-26 PROCEDURE — 99213 OFFICE O/P EST LOW 20 MIN: CPT | Mod: GC,S$GLB,, | Performed by: STUDENT IN AN ORGANIZED HEALTH CARE EDUCATION/TRAINING PROGRAM

## 2021-08-26 PROCEDURE — 99213 PR OFFICE/OUTPT VISIT, EST, LEVL III, 20-29 MIN: ICD-10-PCS | Mod: GC,S$GLB,, | Performed by: STUDENT IN AN ORGANIZED HEALTH CARE EDUCATION/TRAINING PROGRAM

## 2021-08-26 RX ORDER — CAPSAICIN 0 G/G
1 CREAM TOPICAL DAILY PRN
Qty: 42.5 G | Refills: 0 | Status: SHIPPED | OUTPATIENT
Start: 2021-08-26 | End: 2022-02-17

## 2021-08-26 RX ORDER — GABAPENTIN 100 MG/1
100 CAPSULE ORAL NIGHTLY
Qty: 30 CAPSULE | Refills: 2 | Status: SHIPPED | OUTPATIENT
Start: 2021-08-26 | End: 2021-11-18 | Stop reason: SDUPTHER

## 2021-09-08 ENCOUNTER — TELEPHONE (OUTPATIENT)
Dept: INTERNAL MEDICINE | Facility: CLINIC | Age: 76
End: 2021-09-08

## 2021-09-10 ENCOUNTER — TELEPHONE (OUTPATIENT)
Dept: INTERNAL MEDICINE | Facility: CLINIC | Age: 76
End: 2021-09-10

## 2021-09-10 ENCOUNTER — OFFICE VISIT (OUTPATIENT)
Dept: WOUND CARE | Facility: CLINIC | Age: 76
End: 2021-09-10
Payer: MEDICARE

## 2021-09-10 VITALS
TEMPERATURE: 98 F | DIASTOLIC BLOOD PRESSURE: 63 MMHG | SYSTOLIC BLOOD PRESSURE: 142 MMHG | HEIGHT: 59 IN | HEART RATE: 77 BPM | BODY MASS INDEX: 21.37 KG/M2 | WEIGHT: 106 LBS

## 2021-09-10 DIAGNOSIS — S80.11XA HEMATOMA OF RIGHT LOWER LEG: ICD-10-CM

## 2021-09-10 DIAGNOSIS — S81.812A LACERATION OF LEFT LOWER LEG, INITIAL ENCOUNTER: Primary | ICD-10-CM

## 2021-09-10 DIAGNOSIS — R60.0 BILATERAL EDEMA OF LOWER EXTREMITY: ICD-10-CM

## 2021-09-10 DIAGNOSIS — S81.801A TRAUMATIC OPEN WOUND OF RIGHT LOWER LEG, INITIAL ENCOUNTER: ICD-10-CM

## 2021-09-10 DIAGNOSIS — J96.21 ACUTE ON CHRONIC RESPIRATORY FAILURE WITH HYPOXIA: Primary | ICD-10-CM

## 2021-09-10 PROBLEM — Z75.8 DISCHARGE PLANNING ISSUES: Status: RESOLVED | Noted: 2020-08-22 | Resolved: 2021-09-10

## 2021-09-10 PROBLEM — J96.22 ACUTE ON CHRONIC RESPIRATORY FAILURE WITH HYPOXIA AND HYPERCAPNIA: Status: RESOLVED | Noted: 2021-08-14 | Resolved: 2021-09-10

## 2021-09-10 PROBLEM — J96.22 ACUTE ON CHRONIC RESPIRATORY FAILURE WITH HYPERCAPNIA: Status: RESOLVED | Noted: 2021-08-16 | Resolved: 2021-09-10

## 2021-09-10 PROCEDURE — 1101F PR PT FALLS ASSESS DOC 0-1 FALLS W/OUT INJ PAST YR: ICD-10-PCS | Mod: CPTII,S$GLB,, | Performed by: NURSE PRACTITIONER

## 2021-09-10 PROCEDURE — 97597 DBRDMT OPN WND 1ST 20 CM/<: CPT | Mod: S$GLB,,, | Performed by: NURSE PRACTITIONER

## 2021-09-10 PROCEDURE — 1159F MED LIST DOCD IN RCRD: CPT | Mod: CPTII,S$GLB,, | Performed by: NURSE PRACTITIONER

## 2021-09-10 PROCEDURE — 99499 NO LOS: ICD-10-PCS | Mod: S$GLB,,, | Performed by: NURSE PRACTITIONER

## 2021-09-10 PROCEDURE — 97598 DBRDMT OPN WND ADDL 20CM/<: CPT | Mod: S$GLB,,, | Performed by: NURSE PRACTITIONER

## 2021-09-10 PROCEDURE — 3078F PR MOST RECENT DIASTOLIC BLOOD PRESSURE < 80 MM HG: ICD-10-PCS | Mod: CPTII,S$GLB,, | Performed by: NURSE PRACTITIONER

## 2021-09-10 PROCEDURE — 99499 UNLISTED E&M SERVICE: CPT | Mod: S$GLB,,, | Performed by: NURSE PRACTITIONER

## 2021-09-10 PROCEDURE — 97598 DEBRIDEMENT: ICD-10-PCS | Mod: S$GLB,,, | Performed by: NURSE PRACTITIONER

## 2021-09-10 PROCEDURE — 1125F AMNT PAIN NOTED PAIN PRSNT: CPT | Mod: CPTII,S$GLB,, | Performed by: NURSE PRACTITIONER

## 2021-09-10 PROCEDURE — 1157F PR ADVANCE CARE PLAN OR EQUIV PRESENT IN MEDICAL RECORD: ICD-10-PCS | Mod: CPTII,S$GLB,, | Performed by: NURSE PRACTITIONER

## 2021-09-10 PROCEDURE — 99999 PR PBB SHADOW E&M-EST. PATIENT-LVL V: CPT | Mod: PBBFAC,,, | Performed by: NURSE PRACTITIONER

## 2021-09-10 PROCEDURE — 3077F PR MOST RECENT SYSTOLIC BLOOD PRESSURE >= 140 MM HG: ICD-10-PCS | Mod: CPTII,S$GLB,, | Performed by: NURSE PRACTITIONER

## 2021-09-10 PROCEDURE — 3078F DIAST BP <80 MM HG: CPT | Mod: CPTII,S$GLB,, | Performed by: NURSE PRACTITIONER

## 2021-09-10 PROCEDURE — 3288F FALL RISK ASSESSMENT DOCD: CPT | Mod: CPTII,S$GLB,, | Performed by: NURSE PRACTITIONER

## 2021-09-10 PROCEDURE — 1157F ADVNC CARE PLAN IN RCRD: CPT | Mod: CPTII,S$GLB,, | Performed by: NURSE PRACTITIONER

## 2021-09-10 PROCEDURE — 1101F PT FALLS ASSESS-DOCD LE1/YR: CPT | Mod: CPTII,S$GLB,, | Performed by: NURSE PRACTITIONER

## 2021-09-10 PROCEDURE — 97597 DEBRIDEMENT: ICD-10-PCS | Mod: S$GLB,,, | Performed by: NURSE PRACTITIONER

## 2021-09-10 PROCEDURE — 1159F PR MEDICATION LIST DOCUMENTED IN MEDICAL RECORD: ICD-10-PCS | Mod: CPTII,S$GLB,, | Performed by: NURSE PRACTITIONER

## 2021-09-10 PROCEDURE — 3077F SYST BP >= 140 MM HG: CPT | Mod: CPTII,S$GLB,, | Performed by: NURSE PRACTITIONER

## 2021-09-10 PROCEDURE — 1125F PR PAIN SEVERITY QUANTIFIED, PAIN PRESENT: ICD-10-PCS | Mod: CPTII,S$GLB,, | Performed by: NURSE PRACTITIONER

## 2021-09-10 PROCEDURE — 3288F PR FALLS RISK ASSESSMENT DOCUMENTED: ICD-10-PCS | Mod: CPTII,S$GLB,, | Performed by: NURSE PRACTITIONER

## 2021-09-10 PROCEDURE — 99999 PR PBB SHADOW E&M-EST. PATIENT-LVL V: ICD-10-PCS | Mod: PBBFAC,,, | Performed by: NURSE PRACTITIONER

## 2021-09-15 ENCOUNTER — TELEPHONE (OUTPATIENT)
Dept: PRIMARY CARE CLINIC | Facility: CLINIC | Age: 76
End: 2021-09-15

## 2021-09-15 DIAGNOSIS — J43.9 PULMONARY EMPHYSEMA, UNSPECIFIED EMPHYSEMA TYPE: Primary | ICD-10-CM

## 2021-09-16 ENCOUNTER — TELEPHONE (OUTPATIENT)
Dept: WOUND CARE | Facility: CLINIC | Age: 76
End: 2021-09-16

## 2021-09-17 ENCOUNTER — OFFICE VISIT (OUTPATIENT)
Dept: PRIMARY CARE CLINIC | Facility: CLINIC | Age: 76
End: 2021-09-17
Payer: MEDICARE

## 2021-09-17 DIAGNOSIS — J43.9 PULMONARY EMPHYSEMA, UNSPECIFIED EMPHYSEMA TYPE: Primary | ICD-10-CM

## 2021-09-17 PROCEDURE — 1111F DSCHRG MED/CURRENT MED MERGE: CPT | Mod: CPTII,95,, | Performed by: INTERNAL MEDICINE

## 2021-09-17 PROCEDURE — 1157F ADVNC CARE PLAN IN RCRD: CPT | Mod: CPTII,95,, | Performed by: INTERNAL MEDICINE

## 2021-09-17 PROCEDURE — 1157F PR ADVANCE CARE PLAN OR EQUIV PRESENT IN MEDICAL RECORD: ICD-10-PCS | Mod: CPTII,95,, | Performed by: INTERNAL MEDICINE

## 2021-09-17 PROCEDURE — 1111F PR DISCHARGE MEDS RECONCILED W/ CURRENT OUTPATIENT MED LIST: ICD-10-PCS | Mod: CPTII,95,, | Performed by: INTERNAL MEDICINE

## 2021-09-17 PROCEDURE — 99215 PR OFFICE/OUTPT VISIT, EST, LEVL V, 40-54 MIN: ICD-10-PCS | Mod: 95,,, | Performed by: INTERNAL MEDICINE

## 2021-09-17 PROCEDURE — 99215 OFFICE O/P EST HI 40 MIN: CPT | Mod: 95,,, | Performed by: INTERNAL MEDICINE

## 2021-09-24 ENCOUNTER — TELEPHONE (OUTPATIENT)
Dept: PRIMARY CARE CLINIC | Facility: CLINIC | Age: 76
End: 2021-09-24

## 2021-09-28 ENCOUNTER — CARE AT HOME (OUTPATIENT)
Dept: HOME HEALTH SERVICES | Facility: CLINIC | Age: 76
End: 2021-09-28
Payer: MEDICARE

## 2021-09-28 DIAGNOSIS — Z71.89 COUNSELING REGARDING ADVANCE CARE PLANNING AND GOALS OF CARE: ICD-10-CM

## 2021-09-28 DIAGNOSIS — Z51.5 PALLIATIVE CARE ENCOUNTER: Primary | ICD-10-CM

## 2021-09-28 DIAGNOSIS — S81.802A WOUND OF LEFT LOWER EXTREMITY, INITIAL ENCOUNTER: ICD-10-CM

## 2021-09-28 DIAGNOSIS — R53.81 PHYSICAL DEBILITY: ICD-10-CM

## 2021-09-28 DIAGNOSIS — Z99.81 OXYGEN DEPENDENT: ICD-10-CM

## 2021-09-28 DIAGNOSIS — R53.83 FATIGUE, UNSPECIFIED TYPE: ICD-10-CM

## 2021-09-28 DIAGNOSIS — J43.9 PULMONARY EMPHYSEMA, UNSPECIFIED EMPHYSEMA TYPE: ICD-10-CM

## 2021-09-28 DIAGNOSIS — J44.9 CHRONIC OBSTRUCTIVE PULMONARY DISEASE, UNSPECIFIED COPD TYPE: ICD-10-CM

## 2021-09-28 PROCEDURE — 99497 PR ADVNCD CARE PLAN 30 MIN: ICD-10-PCS | Mod: S$GLB,,, | Performed by: NURSE PRACTITIONER

## 2021-09-28 PROCEDURE — 99497 ADVNCD CARE PLAN 30 MIN: CPT | Mod: S$GLB,,, | Performed by: NURSE PRACTITIONER

## 2021-09-28 PROCEDURE — 99350 PR HOME VISIT,ESTAB PATIENT,LEVEL IV: ICD-10-PCS | Mod: S$GLB,,, | Performed by: NURSE PRACTITIONER

## 2021-09-28 PROCEDURE — 99350 HOME/RES VST EST HIGH MDM 60: CPT | Mod: S$GLB,,, | Performed by: NURSE PRACTITIONER

## 2021-09-29 ENCOUNTER — TELEPHONE (OUTPATIENT)
Dept: INTERNAL MEDICINE | Facility: CLINIC | Age: 76
End: 2021-09-29

## 2021-09-30 ENCOUNTER — TELEPHONE (OUTPATIENT)
Dept: ADMINISTRATIVE | Facility: OTHER | Age: 76
End: 2021-09-30

## 2021-10-01 ENCOUNTER — OFFICE VISIT (OUTPATIENT)
Dept: PRIMARY CARE CLINIC | Facility: CLINIC | Age: 76
End: 2021-10-01
Payer: MEDICARE

## 2021-10-01 ENCOUNTER — OFFICE VISIT (OUTPATIENT)
Dept: WOUND CARE | Facility: CLINIC | Age: 76
End: 2021-10-01
Payer: MEDICARE

## 2021-10-01 VITALS
TEMPERATURE: 97 F | DIASTOLIC BLOOD PRESSURE: 61 MMHG | BODY MASS INDEX: 21.37 KG/M2 | SYSTOLIC BLOOD PRESSURE: 137 MMHG | WEIGHT: 106 LBS | HEART RATE: 77 BPM | HEIGHT: 59 IN

## 2021-10-01 DIAGNOSIS — I87.8 VENOUS STASIS: ICD-10-CM

## 2021-10-01 DIAGNOSIS — S81.812A LACERATION OF LEFT LOWER LEG, INITIAL ENCOUNTER: ICD-10-CM

## 2021-10-01 DIAGNOSIS — S81.801A TRAUMATIC OPEN WOUND OF RIGHT LOWER LEG, INITIAL ENCOUNTER: Primary | ICD-10-CM

## 2021-10-01 DIAGNOSIS — J43.9 PULMONARY EMPHYSEMA, UNSPECIFIED EMPHYSEMA TYPE: Primary | ICD-10-CM

## 2021-10-01 DIAGNOSIS — R60.0 BILATERAL EDEMA OF LOWER EXTREMITY: ICD-10-CM

## 2021-10-01 DIAGNOSIS — F41.9 ANXIETY: ICD-10-CM

## 2021-10-01 PROCEDURE — 29580 PR STRAPPING UNNA BOOT: ICD-10-PCS | Mod: 50,S$GLB,, | Performed by: NURSE PRACTITIONER

## 2021-10-01 PROCEDURE — 99999 PR PBB SHADOW E&M-EST. PATIENT-LVL V: ICD-10-PCS | Mod: PBBFAC,,, | Performed by: NURSE PRACTITIONER

## 2021-10-01 PROCEDURE — 99443 PR PHYSICIAN TELEPHONE EVALUATION 21-30 MIN: CPT | Mod: 95,,, | Performed by: INTERNAL MEDICINE

## 2021-10-01 PROCEDURE — 1157F ADVNC CARE PLAN IN RCRD: CPT | Mod: CPTII,95,, | Performed by: INTERNAL MEDICINE

## 2021-10-01 PROCEDURE — 1159F PR MEDICATION LIST DOCUMENTED IN MEDICAL RECORD: ICD-10-PCS | Mod: CPTII,S$GLB,, | Performed by: NURSE PRACTITIONER

## 2021-10-01 PROCEDURE — 99999 PR PBB SHADOW E&M-EST. PATIENT-LVL V: CPT | Mod: PBBFAC,,, | Performed by: NURSE PRACTITIONER

## 2021-10-01 PROCEDURE — 1126F PR PAIN SEVERITY QUANTIFIED, NO PAIN PRESENT: ICD-10-PCS | Mod: CPTII,S$GLB,, | Performed by: NURSE PRACTITIONER

## 2021-10-01 PROCEDURE — 1157F ADVNC CARE PLAN IN RCRD: CPT | Mod: CPTII,S$GLB,, | Performed by: NURSE PRACTITIONER

## 2021-10-01 PROCEDURE — 3078F DIAST BP <80 MM HG: CPT | Mod: CPTII,S$GLB,, | Performed by: NURSE PRACTITIONER

## 2021-10-01 PROCEDURE — 99499 NO LOS: ICD-10-PCS | Mod: S$GLB,,, | Performed by: NURSE PRACTITIONER

## 2021-10-01 PROCEDURE — 29580 STRAPPING UNNA BOOT: CPT | Mod: 50,S$GLB,, | Performed by: NURSE PRACTITIONER

## 2021-10-01 PROCEDURE — 99443 PR PHYSICIAN TELEPHONE EVALUATION 21-30 MIN: ICD-10-PCS | Mod: 95,,, | Performed by: INTERNAL MEDICINE

## 2021-10-01 PROCEDURE — 3075F SYST BP GE 130 - 139MM HG: CPT | Mod: CPTII,S$GLB,, | Performed by: NURSE PRACTITIONER

## 2021-10-01 PROCEDURE — 1126F AMNT PAIN NOTED NONE PRSNT: CPT | Mod: CPTII,S$GLB,, | Performed by: NURSE PRACTITIONER

## 2021-10-01 PROCEDURE — 1157F PR ADVANCE CARE PLAN OR EQUIV PRESENT IN MEDICAL RECORD: ICD-10-PCS | Mod: CPTII,95,, | Performed by: INTERNAL MEDICINE

## 2021-10-01 PROCEDURE — 3078F PR MOST RECENT DIASTOLIC BLOOD PRESSURE < 80 MM HG: ICD-10-PCS | Mod: CPTII,S$GLB,, | Performed by: NURSE PRACTITIONER

## 2021-10-01 PROCEDURE — 1157F PR ADVANCE CARE PLAN OR EQUIV PRESENT IN MEDICAL RECORD: ICD-10-PCS | Mod: CPTII,S$GLB,, | Performed by: NURSE PRACTITIONER

## 2021-10-01 PROCEDURE — 1160F PR REVIEW ALL MEDS BY PRESCRIBER/CLIN PHARMACIST DOCUMENTED: ICD-10-PCS | Mod: CPTII,S$GLB,, | Performed by: NURSE PRACTITIONER

## 2021-10-01 PROCEDURE — 99499 UNLISTED E&M SERVICE: CPT | Mod: S$GLB,,, | Performed by: NURSE PRACTITIONER

## 2021-10-01 PROCEDURE — 3075F PR MOST RECENT SYSTOLIC BLOOD PRESS GE 130-139MM HG: ICD-10-PCS | Mod: CPTII,S$GLB,, | Performed by: NURSE PRACTITIONER

## 2021-10-01 PROCEDURE — 1159F MED LIST DOCD IN RCRD: CPT | Mod: CPTII,S$GLB,, | Performed by: NURSE PRACTITIONER

## 2021-10-01 PROCEDURE — 1160F RVW MEDS BY RX/DR IN RCRD: CPT | Mod: CPTII,S$GLB,, | Performed by: NURSE PRACTITIONER

## 2021-10-01 RX ORDER — ESCITALOPRAM OXALATE 5 MG/1
5 TABLET ORAL DAILY
Qty: 30 TABLET | Refills: 11 | Status: SHIPPED | OUTPATIENT
Start: 2021-10-01 | End: 2022-01-11 | Stop reason: SDUPTHER

## 2021-10-04 ENCOUNTER — TELEPHONE (OUTPATIENT)
Dept: PRIMARY CARE CLINIC | Facility: CLINIC | Age: 76
End: 2021-10-04

## 2021-10-05 ENCOUNTER — TELEPHONE (OUTPATIENT)
Dept: PRIMARY CARE CLINIC | Facility: CLINIC | Age: 76
End: 2021-10-05

## 2021-10-07 ENCOUNTER — TELEPHONE (OUTPATIENT)
Dept: PRIMARY CARE CLINIC | Facility: CLINIC | Age: 76
End: 2021-10-07

## 2021-10-08 ENCOUNTER — IMMUNIZATION (OUTPATIENT)
Dept: INTERNAL MEDICINE | Facility: CLINIC | Age: 76
End: 2021-10-08
Payer: MEDICARE

## 2021-10-08 DIAGNOSIS — Z23 NEED FOR VACCINATION: Primary | ICD-10-CM

## 2021-10-08 PROCEDURE — 0003A COVID-19, MRNA, LNP-S, PF, 30 MCG/0.3 ML DOSE VACCINE: CPT | Mod: CV19,PBBFAC | Performed by: INTERNAL MEDICINE

## 2021-10-08 PROCEDURE — 91300 COVID-19, MRNA, LNP-S, PF, 30 MCG/0.3 ML DOSE VACCINE: CPT | Mod: PBBFAC | Performed by: INTERNAL MEDICINE

## 2021-10-14 VITALS
DIASTOLIC BLOOD PRESSURE: 78 MMHG | HEART RATE: 99 BPM | OXYGEN SATURATION: 93 % | SYSTOLIC BLOOD PRESSURE: 130 MMHG | TEMPERATURE: 97 F | RESPIRATION RATE: 20 BRPM

## 2021-10-28 ENCOUNTER — PATIENT OUTREACH (OUTPATIENT)
Dept: ADMINISTRATIVE | Facility: OTHER | Age: 76
End: 2021-10-28
Payer: MEDICARE

## 2021-10-29 ENCOUNTER — OFFICE VISIT (OUTPATIENT)
Dept: WOUND CARE | Facility: CLINIC | Age: 76
End: 2021-10-29
Payer: MEDICARE

## 2021-10-29 VITALS
HEART RATE: 70 BPM | HEIGHT: 59 IN | DIASTOLIC BLOOD PRESSURE: 62 MMHG | TEMPERATURE: 99 F | WEIGHT: 106.06 LBS | BODY MASS INDEX: 21.38 KG/M2 | SYSTOLIC BLOOD PRESSURE: 141 MMHG

## 2021-10-29 DIAGNOSIS — S80.822A BLISTER OF LEFT LOWER LEG, INITIAL ENCOUNTER: Primary | ICD-10-CM

## 2021-10-29 DIAGNOSIS — R60.0 BILATERAL EDEMA OF LOWER EXTREMITY: ICD-10-CM

## 2021-10-29 DIAGNOSIS — I87.8 VENOUS STASIS: ICD-10-CM

## 2021-10-29 PROBLEM — S81.801A TRAUMATIC OPEN WOUND OF RIGHT LOWER LEG: Status: RESOLVED | Noted: 2021-09-10 | Resolved: 2021-10-29

## 2021-10-29 PROCEDURE — 99499 NO LOS: ICD-10-PCS | Mod: S$GLB,,, | Performed by: NURSE PRACTITIONER

## 2021-10-29 PROCEDURE — 3288F FALL RISK ASSESSMENT DOCD: CPT | Mod: CPTII,S$GLB,, | Performed by: NURSE PRACTITIONER

## 2021-10-29 PROCEDURE — 1160F PR REVIEW ALL MEDS BY PRESCRIBER/CLIN PHARMACIST DOCUMENTED: ICD-10-PCS | Mod: CPTII,S$GLB,, | Performed by: NURSE PRACTITIONER

## 2021-10-29 PROCEDURE — 99999 PR PBB SHADOW E&M-EST. PATIENT-LVL V: ICD-10-PCS | Mod: PBBFAC,,, | Performed by: NURSE PRACTITIONER

## 2021-10-29 PROCEDURE — 1159F MED LIST DOCD IN RCRD: CPT | Mod: CPTII,S$GLB,, | Performed by: NURSE PRACTITIONER

## 2021-10-29 PROCEDURE — 29580 PR STRAPPING UNNA BOOT: ICD-10-PCS | Mod: LT,S$GLB,, | Performed by: NURSE PRACTITIONER

## 2021-10-29 PROCEDURE — 1159F PR MEDICATION LIST DOCUMENTED IN MEDICAL RECORD: ICD-10-PCS | Mod: CPTII,S$GLB,, | Performed by: NURSE PRACTITIONER

## 2021-10-29 PROCEDURE — 3078F DIAST BP <80 MM HG: CPT | Mod: CPTII,S$GLB,, | Performed by: NURSE PRACTITIONER

## 2021-10-29 PROCEDURE — 1157F PR ADVANCE CARE PLAN OR EQUIV PRESENT IN MEDICAL RECORD: ICD-10-PCS | Mod: CPTII,S$GLB,, | Performed by: NURSE PRACTITIONER

## 2021-10-29 PROCEDURE — 1101F PR PT FALLS ASSESS DOC 0-1 FALLS W/OUT INJ PAST YR: ICD-10-PCS | Mod: CPTII,S$GLB,, | Performed by: NURSE PRACTITIONER

## 2021-10-29 PROCEDURE — 3077F SYST BP >= 140 MM HG: CPT | Mod: CPTII,S$GLB,, | Performed by: NURSE PRACTITIONER

## 2021-10-29 PROCEDURE — 1160F RVW MEDS BY RX/DR IN RCRD: CPT | Mod: CPTII,S$GLB,, | Performed by: NURSE PRACTITIONER

## 2021-10-29 PROCEDURE — 3288F PR FALLS RISK ASSESSMENT DOCUMENTED: ICD-10-PCS | Mod: CPTII,S$GLB,, | Performed by: NURSE PRACTITIONER

## 2021-10-29 PROCEDURE — 1126F PR PAIN SEVERITY QUANTIFIED, NO PAIN PRESENT: ICD-10-PCS | Mod: CPTII,S$GLB,, | Performed by: NURSE PRACTITIONER

## 2021-10-29 PROCEDURE — 3078F PR MOST RECENT DIASTOLIC BLOOD PRESSURE < 80 MM HG: ICD-10-PCS | Mod: CPTII,S$GLB,, | Performed by: NURSE PRACTITIONER

## 2021-10-29 PROCEDURE — 1157F ADVNC CARE PLAN IN RCRD: CPT | Mod: CPTII,S$GLB,, | Performed by: NURSE PRACTITIONER

## 2021-10-29 PROCEDURE — 3077F PR MOST RECENT SYSTOLIC BLOOD PRESSURE >= 140 MM HG: ICD-10-PCS | Mod: CPTII,S$GLB,, | Performed by: NURSE PRACTITIONER

## 2021-10-29 PROCEDURE — 99499 UNLISTED E&M SERVICE: CPT | Mod: S$GLB,,, | Performed by: NURSE PRACTITIONER

## 2021-10-29 PROCEDURE — 29580 STRAPPING UNNA BOOT: CPT | Mod: LT,S$GLB,, | Performed by: NURSE PRACTITIONER

## 2021-10-29 PROCEDURE — 1101F PT FALLS ASSESS-DOCD LE1/YR: CPT | Mod: CPTII,S$GLB,, | Performed by: NURSE PRACTITIONER

## 2021-10-29 PROCEDURE — 99999 PR PBB SHADOW E&M-EST. PATIENT-LVL V: CPT | Mod: PBBFAC,,, | Performed by: NURSE PRACTITIONER

## 2021-10-29 PROCEDURE — 1126F AMNT PAIN NOTED NONE PRSNT: CPT | Mod: CPTII,S$GLB,, | Performed by: NURSE PRACTITIONER

## 2021-11-08 ENCOUNTER — EXTERNAL HOME HEALTH (OUTPATIENT)
Dept: HOME HEALTH SERVICES | Facility: HOSPITAL | Age: 76
End: 2021-11-08
Payer: MEDICARE

## 2021-11-08 ENCOUNTER — DOCUMENT SCAN (OUTPATIENT)
Dept: HOME HEALTH SERVICES | Facility: HOSPITAL | Age: 76
End: 2021-11-08
Payer: MEDICARE

## 2021-11-15 ENCOUNTER — TELEPHONE (OUTPATIENT)
Dept: WOUND CARE | Facility: CLINIC | Age: 76
End: 2021-11-15
Payer: MEDICARE

## 2021-11-18 DIAGNOSIS — J43.2 CENTRILOBULAR EMPHYSEMA: ICD-10-CM

## 2021-11-18 RX ORDER — PREDNISONE 10 MG/1
TABLET ORAL
Qty: 60 TABLET | Refills: 6 | Status: ON HOLD | OUTPATIENT
Start: 2021-11-18 | End: 2021-12-03 | Stop reason: SDUPTHER

## 2021-11-19 ENCOUNTER — TELEPHONE (OUTPATIENT)
Dept: WOUND CARE | Facility: CLINIC | Age: 76
End: 2021-11-19
Payer: MEDICARE

## 2021-11-22 ENCOUNTER — PATIENT MESSAGE (OUTPATIENT)
Dept: HEPATOLOGY | Facility: HOSPITAL | Age: 76
End: 2021-11-22
Payer: MEDICARE

## 2021-11-22 RX ORDER — GABAPENTIN 100 MG/1
100 CAPSULE ORAL NIGHTLY
Qty: 30 CAPSULE | Refills: 0 | Status: ON HOLD | OUTPATIENT
Start: 2021-11-22 | End: 2021-12-27

## 2021-11-23 ENCOUNTER — HOSPITAL ENCOUNTER (INPATIENT)
Facility: HOSPITAL | Age: 76
LOS: 13 days | Discharge: NURSING, REHAB OR LONG-TERM CARE | DRG: 189 | End: 2021-12-06
Attending: EMERGENCY MEDICINE | Admitting: INTERNAL MEDICINE
Payer: MEDICARE

## 2021-11-23 DIAGNOSIS — M79.89 LEG SWELLING: ICD-10-CM

## 2021-11-23 DIAGNOSIS — J44.1 COPD EXACERBATION: ICD-10-CM

## 2021-11-23 DIAGNOSIS — J43.2 CENTRILOBULAR EMPHYSEMA: ICD-10-CM

## 2021-11-23 DIAGNOSIS — J96.01 ACUTE RESPIRATORY FAILURE WITH HYPOXIA AND HYPERCARBIA: Primary | ICD-10-CM

## 2021-11-23 DIAGNOSIS — J96.22 ACUTE ON CHRONIC RESPIRATORY FAILURE WITH HYPERCAPNIA: ICD-10-CM

## 2021-11-23 DIAGNOSIS — J96.02 ACUTE RESPIRATORY FAILURE WITH HYPOXIA AND HYPERCARBIA: Primary | ICD-10-CM

## 2021-11-23 DIAGNOSIS — R06.02 SHORTNESS OF BREATH: ICD-10-CM

## 2021-11-23 LAB
ALBUMIN SERPL BCP-MCNC: 3.2 G/DL (ref 3.5–5.2)
ALLENS TEST: ABNORMAL
ALLENS TEST: ABNORMAL
ALP SERPL-CCNC: 187 U/L (ref 55–135)
ALT SERPL W/O P-5'-P-CCNC: 23 U/L (ref 10–44)
ANION GAP SERPL CALC-SCNC: 14 MMOL/L (ref 8–16)
AST SERPL-CCNC: 24 U/L (ref 10–40)
BASOPHILS # BLD AUTO: 0.03 K/UL (ref 0–0.2)
BASOPHILS NFR BLD: 0.2 % (ref 0–1.9)
BILIRUB SERPL-MCNC: 0.6 MG/DL (ref 0.1–1)
BNP SERPL-MCNC: 103 PG/ML (ref 0–99)
BUN SERPL-MCNC: 10 MG/DL (ref 8–23)
BUN SERPL-MCNC: 11 MG/DL (ref 6–30)
CALCIUM SERPL-MCNC: 10.2 MG/DL (ref 8.7–10.5)
CHLORIDE SERPL-SCNC: 90 MMOL/L (ref 95–110)
CHLORIDE SERPL-SCNC: 94 MMOL/L (ref 95–110)
CO2 SERPL-SCNC: 26 MMOL/L (ref 23–29)
CREAT SERPL-MCNC: 0.4 MG/DL (ref 0.5–1.4)
CREAT SERPL-MCNC: 0.5 MG/DL (ref 0.5–1.4)
CTP QC/QA: YES
DIFFERENTIAL METHOD: ABNORMAL
EOSINOPHIL # BLD AUTO: 0 K/UL (ref 0–0.5)
EOSINOPHIL NFR BLD: 0.1 % (ref 0–8)
ERYTHROCYTE [DISTWIDTH] IN BLOOD BY AUTOMATED COUNT: 13.8 % (ref 11.5–14.5)
EST. GFR  (AFRICAN AMERICAN): >60 ML/MIN/1.73 M^2
EST. GFR  (NON AFRICAN AMERICAN): >60 ML/MIN/1.73 M^2
GLUCOSE SERPL-MCNC: 78 MG/DL (ref 70–110)
GLUCOSE SERPL-MCNC: 90 MG/DL (ref 70–110)
HCO3 UR-SCNC: 36.9 MMOL/L (ref 24–28)
HCT VFR BLD AUTO: 42.9 % (ref 37–48.5)
HCT VFR BLD CALC: 39 %PCV (ref 36–54)
HCT VFR BLD CALC: 44 %PCV (ref 36–54)
HGB BLD-MCNC: 13.3 G/DL (ref 12–16)
IMM GRANULOCYTES # BLD AUTO: 0.07 K/UL (ref 0–0.04)
IMM GRANULOCYTES NFR BLD AUTO: 0.5 % (ref 0–0.5)
LACTATE SERPL-SCNC: 1.4 MMOL/L (ref 0.5–2.2)
LYMPHOCYTES # BLD AUTO: 1.1 K/UL (ref 1–4.8)
LYMPHOCYTES NFR BLD: 7.7 % (ref 18–48)
MCH RBC QN AUTO: 28.7 PG (ref 27–31)
MCHC RBC AUTO-ENTMCNC: 31 G/DL (ref 32–36)
MCV RBC AUTO: 93 FL (ref 82–98)
MONOCYTES # BLD AUTO: 1 K/UL (ref 0.3–1)
MONOCYTES NFR BLD: 7 % (ref 4–15)
NEUTROPHILS # BLD AUTO: 12.2 K/UL (ref 1.8–7.7)
NEUTROPHILS NFR BLD: 84.5 % (ref 38–73)
NRBC BLD-RTO: 0 /100 WBC
PCO2 BLDA: 81.4 MMHG (ref 35–45)
PCO2 BLDA: 81.9 MMHG (ref 35–45)
PH SMN: 7.26 [PH] (ref 7.35–7.45)
PH SMN: 7.3 [PH] (ref 7.35–7.45)
PLATELET # BLD AUTO: 346 K/UL (ref 150–450)
PMV BLD AUTO: 9.4 FL (ref 9.2–12.9)
PO2 BLDA: 30 MMHG (ref 50–70)
PO2 BLDA: 33 MMHG (ref 40–60)
POC BE: 10 MMOL/L
POC BE: 14 MMOL/L
POC IONIZED CALCIUM: 1.2 MMOL/L (ref 1.06–1.42)
POC IONIZED CALCIUM: 1.25 MMOL/L (ref 1.06–1.42)
POC SATURATED O2: 44 % (ref 95–100)
POC SATURATED O2: 53 % (ref 95–100)
POC TCO2 (MEASURED): 35 MMOL/L (ref 23–29)
POC TCO2: 39 MMOL/L (ref 23–27)
POTASSIUM BLD-SCNC: 3.5 MMOL/L (ref 3.5–5.1)
POTASSIUM BLD-SCNC: 3.5 MMOL/L (ref 3.5–5.1)
POTASSIUM SERPL-SCNC: 3.9 MMOL/L (ref 3.5–5.1)
PROT SERPL-MCNC: 7 G/DL (ref 6–8.4)
RBC # BLD AUTO: 4.64 M/UL (ref 4–5.4)
SAMPLE: ABNORMAL
SARS-COV-2 RDRP RESP QL NAA+PROBE: NEGATIVE
SITE: ABNORMAL
SITE: ABNORMAL
SODIUM BLD-SCNC: 133 MMOL/L (ref 136–145)
SODIUM BLD-SCNC: 134 MMOL/L (ref 136–145)
SODIUM SERPL-SCNC: 134 MMOL/L (ref 136–145)
TROPONIN I SERPL DL<=0.01 NG/ML-MCNC: <0.006 NG/ML (ref 0–0.03)
WBC # BLD AUTO: 14.47 K/UL (ref 3.9–12.7)

## 2021-11-23 PROCEDURE — 93010 EKG 12-LEAD: ICD-10-PCS | Mod: ,,, | Performed by: INTERNAL MEDICINE

## 2021-11-23 PROCEDURE — 99900035 HC TECH TIME PER 15 MIN (STAT)

## 2021-11-23 PROCEDURE — 96372 THER/PROPH/DIAG INJ SC/IM: CPT | Mod: 59

## 2021-11-23 PROCEDURE — 94640 AIRWAY INHALATION TREATMENT: CPT

## 2021-11-23 PROCEDURE — 25000242 PHARM REV CODE 250 ALT 637 W/ HCPCS: Performed by: STUDENT IN AN ORGANIZED HEALTH CARE EDUCATION/TRAINING PROGRAM

## 2021-11-23 PROCEDURE — 76937 US GUIDE VASCULAR ACCESS: CPT | Mod: 26,,, | Performed by: EMERGENCY MEDICINE

## 2021-11-23 PROCEDURE — 25000003 PHARM REV CODE 250: Performed by: STUDENT IN AN ORGANIZED HEALTH CARE EDUCATION/TRAINING PROGRAM

## 2021-11-23 PROCEDURE — 27000190 HC CPAP FULL FACE MASK W/VALVE

## 2021-11-23 PROCEDURE — 84484 ASSAY OF TROPONIN QUANT: CPT | Performed by: STUDENT IN AN ORGANIZED HEALTH CARE EDUCATION/TRAINING PROGRAM

## 2021-11-23 PROCEDURE — 83605 ASSAY OF LACTIC ACID: CPT | Performed by: STUDENT IN AN ORGANIZED HEALTH CARE EDUCATION/TRAINING PROGRAM

## 2021-11-23 PROCEDURE — 99285 EMERGENCY DEPT VISIT HI MDM: CPT | Mod: 25,CS,, | Performed by: EMERGENCY MEDICINE

## 2021-11-23 PROCEDURE — 94660 CPAP INITIATION&MGMT: CPT

## 2021-11-23 PROCEDURE — 20000000 HC ICU ROOM

## 2021-11-23 PROCEDURE — 36410 PR VENIPUNC NEED PHYS SKILL,DX OR RX: ICD-10-PCS | Mod: ,,, | Performed by: EMERGENCY MEDICINE

## 2021-11-23 PROCEDURE — 96375 TX/PRO/DX INJ NEW DRUG ADDON: CPT

## 2021-11-23 PROCEDURE — 93010 ELECTROCARDIOGRAM REPORT: CPT | Mod: ,,, | Performed by: INTERNAL MEDICINE

## 2021-11-23 PROCEDURE — 99291 CRITICAL CARE FIRST HOUR: CPT | Mod: ,,, | Performed by: INTERNAL MEDICINE

## 2021-11-23 PROCEDURE — 27000221 HC OXYGEN, UP TO 24 HOURS

## 2021-11-23 PROCEDURE — 36410 VNPNXR 3YR/> PHY/QHP DX/THER: CPT | Mod: ,,, | Performed by: EMERGENCY MEDICINE

## 2021-11-23 PROCEDURE — 80053 COMPREHEN METABOLIC PANEL: CPT | Performed by: STUDENT IN AN ORGANIZED HEALTH CARE EDUCATION/TRAINING PROGRAM

## 2021-11-23 PROCEDURE — 85025 COMPLETE CBC W/AUTO DIFF WBC: CPT | Performed by: STUDENT IN AN ORGANIZED HEALTH CARE EDUCATION/TRAINING PROGRAM

## 2021-11-23 PROCEDURE — 25000003 PHARM REV CODE 250

## 2021-11-23 PROCEDURE — 63600175 PHARM REV CODE 636 W HCPCS: Performed by: STUDENT IN AN ORGANIZED HEALTH CARE EDUCATION/TRAINING PROGRAM

## 2021-11-23 PROCEDURE — 99285 PR EMERGENCY DEPT VISIT,LEVEL V: ICD-10-PCS | Mod: 25,CS,, | Performed by: EMERGENCY MEDICINE

## 2021-11-23 PROCEDURE — 99291 PR CRITICAL CARE, E/M 30-74 MINUTES: ICD-10-PCS | Mod: ,,, | Performed by: INTERNAL MEDICINE

## 2021-11-23 PROCEDURE — 96365 THER/PROPH/DIAG IV INF INIT: CPT

## 2021-11-23 PROCEDURE — 80047 BASIC METABLC PNL IONIZED CA: CPT

## 2021-11-23 PROCEDURE — 63600175 PHARM REV CODE 636 W HCPCS

## 2021-11-23 PROCEDURE — 93005 ELECTROCARDIOGRAM TRACING: CPT

## 2021-11-23 PROCEDURE — 99285 EMERGENCY DEPT VISIT HI MDM: CPT | Mod: 25

## 2021-11-23 PROCEDURE — 94761 N-INVAS EAR/PLS OXIMETRY MLT: CPT

## 2021-11-23 PROCEDURE — 63600175 PHARM REV CODE 636 W HCPCS: Performed by: EMERGENCY MEDICINE

## 2021-11-23 PROCEDURE — 87040 BLOOD CULTURE FOR BACTERIA: CPT | Mod: 59 | Performed by: STUDENT IN AN ORGANIZED HEALTH CARE EDUCATION/TRAINING PROGRAM

## 2021-11-23 PROCEDURE — 82803 BLOOD GASES ANY COMBINATION: CPT

## 2021-11-23 PROCEDURE — 76937 PR  US GUIDE, VASCULAR ACCESS: ICD-10-PCS | Mod: 26,,, | Performed by: EMERGENCY MEDICINE

## 2021-11-23 PROCEDURE — U0002 COVID-19 LAB TEST NON-CDC: HCPCS | Performed by: STUDENT IN AN ORGANIZED HEALTH CARE EDUCATION/TRAINING PROGRAM

## 2021-11-23 PROCEDURE — 83880 ASSAY OF NATRIURETIC PEPTIDE: CPT | Performed by: STUDENT IN AN ORGANIZED HEALTH CARE EDUCATION/TRAINING PROGRAM

## 2021-11-23 RX ORDER — LORAZEPAM 2 MG/ML
1 INJECTION INTRAMUSCULAR
Status: COMPLETED | OUTPATIENT
Start: 2021-11-23 | End: 2021-11-23

## 2021-11-23 RX ORDER — DEXMEDETOMIDINE HYDROCHLORIDE 4 UG/ML
0-1.4 INJECTION, SOLUTION INTRAVENOUS CONTINUOUS
Status: DISCONTINUED | OUTPATIENT
Start: 2021-11-23 | End: 2021-11-24

## 2021-11-23 RX ORDER — DEXAMETHASONE SODIUM PHOSPHATE 4 MG/ML
12 INJECTION, SOLUTION INTRAMUSCULAR; INTRAVENOUS
Status: DISCONTINUED | OUTPATIENT
Start: 2021-11-23 | End: 2021-11-23

## 2021-11-23 RX ORDER — KETAMINE HCL IN 0.9 % NACL 50 MG/5 ML
0.3 SYRINGE (ML) INTRAVENOUS ONCE
Status: COMPLETED | OUTPATIENT
Start: 2021-11-23 | End: 2021-11-23

## 2021-11-23 RX ORDER — SODIUM CHLORIDE 0.9 % (FLUSH) 0.9 %
10 SYRINGE (ML) INJECTION
Status: DISCONTINUED | OUTPATIENT
Start: 2021-11-23 | End: 2021-12-06 | Stop reason: HOSPADM

## 2021-11-23 RX ORDER — ONDANSETRON 2 MG/ML
4 INJECTION INTRAMUSCULAR; INTRAVENOUS EVERY 8 HOURS PRN
Status: DISCONTINUED | OUTPATIENT
Start: 2021-11-23 | End: 2021-12-06 | Stop reason: HOSPADM

## 2021-11-23 RX ORDER — ALBUTEROL SULFATE 2.5 MG/.5ML
10 SOLUTION RESPIRATORY (INHALATION)
Status: COMPLETED | OUTPATIENT
Start: 2021-11-23 | End: 2021-11-23

## 2021-11-23 RX ORDER — FLUTICASONE FUROATE AND VILANTEROL 100; 25 UG/1; UG/1
1 POWDER RESPIRATORY (INHALATION) DAILY
Refills: 11 | Status: DISCONTINUED | OUTPATIENT
Start: 2021-11-23 | End: 2021-12-06 | Stop reason: HOSPADM

## 2021-11-23 RX ORDER — EPINEPHRINE 0.3 MG/.3ML
0.3 INJECTION SUBCUTANEOUS
Status: COMPLETED | OUTPATIENT
Start: 2021-11-23 | End: 2021-11-23

## 2021-11-23 RX ORDER — LEVOFLOXACIN 5 MG/ML
500 INJECTION, SOLUTION INTRAVENOUS
Status: DISCONTINUED | OUTPATIENT
Start: 2021-11-23 | End: 2021-11-24

## 2021-11-23 RX ORDER — IPRATROPIUM BROMIDE AND ALBUTEROL SULFATE 2.5; .5 MG/3ML; MG/3ML
3 SOLUTION RESPIRATORY (INHALATION)
Status: DISPENSED | OUTPATIENT
Start: 2021-11-23 | End: 2021-11-23

## 2021-11-23 RX ORDER — KETAMINE HCL IN 0.9 % NACL 50 MG/5 ML
SYRINGE (ML) INTRAVENOUS
Status: DISPENSED
Start: 2021-11-23 | End: 2021-11-23

## 2021-11-23 RX ORDER — ALBUTEROL SULFATE 2.5 MG/.5ML
10 SOLUTION RESPIRATORY (INHALATION) CONTINUOUS
Status: DISCONTINUED | OUTPATIENT
Start: 2021-11-23 | End: 2021-11-23

## 2021-11-23 RX ORDER — DILTIAZEM HYDROCHLORIDE 180 MG/1
180 CAPSULE, COATED, EXTENDED RELEASE ORAL DAILY
Status: DISCONTINUED | OUTPATIENT
Start: 2021-11-23 | End: 2021-12-06 | Stop reason: HOSPADM

## 2021-11-23 RX ORDER — PANTOPRAZOLE SODIUM 40 MG/1
40 TABLET, DELAYED RELEASE ORAL DAILY
Status: DISCONTINUED | OUTPATIENT
Start: 2021-11-23 | End: 2021-12-06 | Stop reason: HOSPADM

## 2021-11-23 RX ORDER — MORPHINE SULFATE 2 MG/ML
2 INJECTION, SOLUTION INTRAMUSCULAR; INTRAVENOUS
Status: COMPLETED | OUTPATIENT
Start: 2021-11-23 | End: 2021-11-23

## 2021-11-23 RX ORDER — LEVOFLOXACIN 5 MG/ML
500 INJECTION, SOLUTION INTRAVENOUS
Status: DISCONTINUED | OUTPATIENT
Start: 2021-11-23 | End: 2021-11-23

## 2021-11-23 RX ORDER — MORPHINE SULFATE 2 MG/ML
2 INJECTION, SOLUTION INTRAMUSCULAR; INTRAVENOUS EVERY 4 HOURS PRN
Status: DISCONTINUED | OUTPATIENT
Start: 2021-11-23 | End: 2021-12-06 | Stop reason: HOSPADM

## 2021-11-23 RX ORDER — MAGNESIUM SULFATE HEPTAHYDRATE 40 MG/ML
2 INJECTION, SOLUTION INTRAVENOUS
Status: COMPLETED | OUTPATIENT
Start: 2021-11-23 | End: 2021-11-23

## 2021-11-23 RX ORDER — ESCITALOPRAM OXALATE 5 MG/1
5 TABLET ORAL DAILY
Status: DISCONTINUED | OUTPATIENT
Start: 2021-11-23 | End: 2021-12-06 | Stop reason: HOSPADM

## 2021-11-23 RX ORDER — METHYLPREDNISOLONE SOD SUCC 125 MG
125 VIAL (EA) INJECTION
Status: COMPLETED | OUTPATIENT
Start: 2021-11-23 | End: 2021-11-23

## 2021-11-23 RX ORDER — ALBUTEROL SULFATE 2.5 MG/.5ML
SOLUTION RESPIRATORY (INHALATION)
Status: DISPENSED
Start: 2021-11-23 | End: 2021-11-23

## 2021-11-23 RX ADMIN — LORAZEPAM 1 MG: 2 INJECTION INTRAMUSCULAR; INTRAVENOUS at 09:11

## 2021-11-23 RX ADMIN — FLUTICASONE FUROATE AND VILANTEROL TRIFENATATE 1 PUFF: 100; 25 POWDER RESPIRATORY (INHALATION) at 01:11

## 2021-11-23 RX ADMIN — EPINEPHRINE 0.3 MG: 0.3 INJECTION INTRAMUSCULAR at 07:11

## 2021-11-23 RX ADMIN — METHYLPREDNISOLONE SODIUM SUCCINATE 125 MG: 125 INJECTION, POWDER, FOR SOLUTION INTRAMUSCULAR; INTRAVENOUS at 07:11

## 2021-11-23 RX ADMIN — DEXMEDETOMIDINE HYDROCHLORIDE 0.2 MCG/KG/HR: 4 INJECTION, SOLUTION INTRAVENOUS at 03:11

## 2021-11-23 RX ADMIN — ALBUTEROL SULFATE 10 MG: 2.5 SOLUTION RESPIRATORY (INHALATION) at 07:11

## 2021-11-23 RX ADMIN — DEXTROSE 125 MG: 50 INJECTION, SOLUTION INTRAVENOUS at 05:11

## 2021-11-23 RX ADMIN — MAGNESIUM SULFATE 2 G: 2 INJECTION INTRAVENOUS at 07:11

## 2021-11-23 RX ADMIN — Medication 15 MG: at 07:11

## 2021-11-23 RX ADMIN — DILTIAZEM HYDROCHLORIDE 180 MG: 180 CAPSULE, COATED, EXTENDED RELEASE ORAL at 01:11

## 2021-11-23 RX ADMIN — LEVOFLOXACIN 500 MG: 500 INJECTION, SOLUTION INTRAVENOUS at 10:11

## 2021-11-23 RX ADMIN — MORPHINE SULFATE 2 MG: 2 INJECTION, SOLUTION INTRAMUSCULAR; INTRAVENOUS at 10:11

## 2021-11-23 RX ADMIN — IPRATROPIUM BROMIDE AND ALBUTEROL SULFATE 3 ML: .5; 3 SOLUTION RESPIRATORY (INHALATION) at 02:11

## 2021-11-23 RX ADMIN — APIXABAN 5 MG: 5 TABLET, FILM COATED ORAL at 01:11

## 2021-11-23 RX ADMIN — IPRATROPIUM BROMIDE AND ALBUTEROL SULFATE 3 ML: .5; 3 SOLUTION RESPIRATORY (INHALATION) at 04:11

## 2021-11-23 RX ADMIN — ESCITALOPRAM OXALATE 5 MG: 5 TABLET, FILM COATED ORAL at 01:11

## 2021-11-23 RX ADMIN — PANTOPRAZOLE SODIUM 40 MG: 40 TABLET, DELAYED RELEASE ORAL at 01:11

## 2021-11-24 LAB
ALBUMIN SERPL BCP-MCNC: 2.5 G/DL (ref 3.5–5.2)
ALLENS TEST: ABNORMAL
ALP SERPL-CCNC: 141 U/L (ref 55–135)
ALT SERPL W/O P-5'-P-CCNC: 14 U/L (ref 10–44)
ANION GAP SERPL CALC-SCNC: 10 MMOL/L (ref 8–16)
ANISOCYTOSIS BLD QL SMEAR: SLIGHT
AST SERPL-CCNC: 13 U/L (ref 10–40)
BASOPHILS # BLD AUTO: 0.01 K/UL (ref 0–0.2)
BASOPHILS NFR BLD: 0.1 % (ref 0–1.9)
BILIRUB SERPL-MCNC: 0.3 MG/DL (ref 0.1–1)
BUN SERPL-MCNC: 20 MG/DL (ref 8–23)
CALCIUM SERPL-MCNC: 8.9 MG/DL (ref 8.7–10.5)
CHLORIDE SERPL-SCNC: 94 MMOL/L (ref 95–110)
CO2 SERPL-SCNC: 29 MMOL/L (ref 23–29)
CREAT SERPL-MCNC: 0.5 MG/DL (ref 0.5–1.4)
DELSYS: ABNORMAL
DIFFERENTIAL METHOD: ABNORMAL
EOSINOPHIL # BLD AUTO: 0 K/UL (ref 0–0.5)
EOSINOPHIL NFR BLD: 0 % (ref 0–8)
EP: 6
ERYTHROCYTE [DISTWIDTH] IN BLOOD BY AUTOMATED COUNT: 13.7 % (ref 11.5–14.5)
ERYTHROCYTE [SEDIMENTATION RATE] IN BLOOD BY WESTERGREN METHOD: 30 MM/H
EST. GFR  (AFRICAN AMERICAN): >60 ML/MIN/1.73 M^2
EST. GFR  (NON AFRICAN AMERICAN): >60 ML/MIN/1.73 M^2
FIO2: 37
GLUCOSE SERPL-MCNC: 142 MG/DL (ref 70–110)
HCO3 UR-SCNC: 39 MMOL/L (ref 24–28)
HCT VFR BLD AUTO: 37.5 % (ref 37–48.5)
HGB BLD-MCNC: 11.3 G/DL (ref 12–16)
HYPOCHROMIA BLD QL SMEAR: ABNORMAL
IMM GRANULOCYTES # BLD AUTO: 0.04 K/UL (ref 0–0.04)
IMM GRANULOCYTES NFR BLD AUTO: 0.5 % (ref 0–0.5)
IP: 18
LYMPHOCYTES # BLD AUTO: 0.2 K/UL (ref 1–4.8)
LYMPHOCYTES NFR BLD: 1.9 % (ref 18–48)
MAGNESIUM SERPL-MCNC: 2.2 MG/DL (ref 1.6–2.6)
MCH RBC QN AUTO: 27.6 PG (ref 27–31)
MCHC RBC AUTO-ENTMCNC: 30.1 G/DL (ref 32–36)
MCV RBC AUTO: 92 FL (ref 82–98)
MIN VOL: 7.9
MODE: ABNORMAL
MONOCYTES # BLD AUTO: 0.4 K/UL (ref 0.3–1)
MONOCYTES NFR BLD: 4.3 % (ref 4–15)
NEUTROPHILS # BLD AUTO: 8 K/UL (ref 1.8–7.7)
NEUTROPHILS NFR BLD: 93.2 % (ref 38–73)
NRBC BLD-RTO: 0 /100 WBC
OVALOCYTES BLD QL SMEAR: ABNORMAL
PCO2 BLDA: 64.9 MMHG (ref 35–45)
PH SMN: 7.39 [PH] (ref 7.35–7.45)
PLATELET # BLD AUTO: 221 K/UL (ref 150–450)
PMV BLD AUTO: 9.9 FL (ref 9.2–12.9)
PO2 BLDA: 28 MMHG (ref 40–60)
POC BE: 14 MMOL/L
POC SATURATED O2: 48 % (ref 95–100)
POC TCO2: 41 MMOL/L (ref 24–29)
POIKILOCYTOSIS BLD QL SMEAR: SLIGHT
POLYCHROMASIA BLD QL SMEAR: ABNORMAL
POTASSIUM SERPL-SCNC: 5 MMOL/L (ref 3.5–5.1)
PROT SERPL-MCNC: 4.9 G/DL (ref 6–8.4)
RBC # BLD AUTO: 4.1 M/UL (ref 4–5.4)
SAMPLE: ABNORMAL
SITE: ABNORMAL
SODIUM SERPL-SCNC: 133 MMOL/L (ref 136–145)
SP02: 98
SPONT RATE: 33
WBC # BLD AUTO: 8.56 K/UL (ref 3.9–12.7)

## 2021-11-24 PROCEDURE — 25000003 PHARM REV CODE 250: Performed by: STUDENT IN AN ORGANIZED HEALTH CARE EDUCATION/TRAINING PROGRAM

## 2021-11-24 PROCEDURE — 94640 AIRWAY INHALATION TREATMENT: CPT

## 2021-11-24 PROCEDURE — 99900035 HC TECH TIME PER 15 MIN (STAT)

## 2021-11-24 PROCEDURE — 85025 COMPLETE CBC W/AUTO DIFF WBC: CPT | Performed by: STUDENT IN AN ORGANIZED HEALTH CARE EDUCATION/TRAINING PROGRAM

## 2021-11-24 PROCEDURE — 25000242 PHARM REV CODE 250 ALT 637 W/ HCPCS: Performed by: STUDENT IN AN ORGANIZED HEALTH CARE EDUCATION/TRAINING PROGRAM

## 2021-11-24 PROCEDURE — 94660 CPAP INITIATION&MGMT: CPT

## 2021-11-24 PROCEDURE — 20600001 HC STEP DOWN PRIVATE ROOM

## 2021-11-24 PROCEDURE — 99233 PR SUBSEQUENT HOSPITAL CARE,LEVL III: ICD-10-PCS | Mod: ,,, | Performed by: INTERNAL MEDICINE

## 2021-11-24 PROCEDURE — 27000221 HC OXYGEN, UP TO 24 HOURS

## 2021-11-24 PROCEDURE — 63600175 PHARM REV CODE 636 W HCPCS

## 2021-11-24 PROCEDURE — 94761 N-INVAS EAR/PLS OXIMETRY MLT: CPT

## 2021-11-24 PROCEDURE — 63600175 PHARM REV CODE 636 W HCPCS: Performed by: STUDENT IN AN ORGANIZED HEALTH CARE EDUCATION/TRAINING PROGRAM

## 2021-11-24 PROCEDURE — 99233 SBSQ HOSP IP/OBS HIGH 50: CPT | Mod: ,,, | Performed by: INTERNAL MEDICINE

## 2021-11-24 PROCEDURE — 25000003 PHARM REV CODE 250

## 2021-11-24 PROCEDURE — 83735 ASSAY OF MAGNESIUM: CPT | Performed by: STUDENT IN AN ORGANIZED HEALTH CARE EDUCATION/TRAINING PROGRAM

## 2021-11-24 PROCEDURE — 80053 COMPREHEN METABOLIC PANEL: CPT | Performed by: STUDENT IN AN ORGANIZED HEALTH CARE EDUCATION/TRAINING PROGRAM

## 2021-11-24 PROCEDURE — 99900031 HC PATIENT EDUCATION (STAT)

## 2021-11-24 PROCEDURE — 82803 BLOOD GASES ANY COMBINATION: CPT

## 2021-11-24 RX ORDER — IPRATROPIUM BROMIDE AND ALBUTEROL SULFATE 2.5; .5 MG/3ML; MG/3ML
3 SOLUTION RESPIRATORY (INHALATION)
Status: DISCONTINUED | OUTPATIENT
Start: 2021-11-24 | End: 2021-11-25

## 2021-11-24 RX ORDER — SULFAMETHOXAZOLE AND TRIMETHOPRIM 800; 160 MG/1; MG/1
1 TABLET ORAL 2 TIMES DAILY
Status: COMPLETED | OUTPATIENT
Start: 2021-11-24 | End: 2021-11-28

## 2021-11-24 RX ORDER — AZITHROMYCIN 250 MG/1
250 TABLET, FILM COATED ORAL DAILY
Status: COMPLETED | OUTPATIENT
Start: 2021-11-25 | End: 2021-11-27

## 2021-11-24 RX ORDER — PRAVASTATIN SODIUM 20 MG/1
20 TABLET ORAL DAILY
Status: DISCONTINUED | OUTPATIENT
Start: 2021-11-24 | End: 2021-12-06 | Stop reason: HOSPADM

## 2021-11-24 RX ORDER — ALPRAZOLAM 0.25 MG/1
0.25 TABLET ORAL 3 TIMES DAILY
Status: DISCONTINUED | OUTPATIENT
Start: 2021-11-24 | End: 2021-12-06 | Stop reason: HOSPADM

## 2021-11-24 RX ORDER — SULFAMETHOXAZOLE AND TRIMETHOPRIM 800; 160 MG/1; MG/1
1 TABLET ORAL 2 TIMES DAILY
Status: DISCONTINUED | OUTPATIENT
Start: 2021-11-24 | End: 2021-11-24

## 2021-11-24 RX ADMIN — LEVOFLOXACIN 500 MG: 500 INJECTION, SOLUTION INTRAVENOUS at 10:11

## 2021-11-24 RX ADMIN — DILTIAZEM HYDROCHLORIDE 180 MG: 180 CAPSULE, COATED, EXTENDED RELEASE ORAL at 01:11

## 2021-11-24 RX ADMIN — IPRATROPIUM BROMIDE AND ALBUTEROL SULFATE 3 ML: 2.5; .5 SOLUTION RESPIRATORY (INHALATION) at 04:11

## 2021-11-24 RX ADMIN — PRAVASTATIN SODIUM 20 MG: 20 TABLET ORAL at 01:11

## 2021-11-24 RX ADMIN — ALPRAZOLAM 0.25 MG: 0.25 TABLET ORAL at 01:11

## 2021-11-24 RX ADMIN — PANTOPRAZOLE SODIUM 40 MG: 40 TABLET, DELAYED RELEASE ORAL at 10:11

## 2021-11-24 RX ADMIN — ESCITALOPRAM OXALATE 5 MG: 5 TABLET, FILM COATED ORAL at 10:11

## 2021-11-24 RX ADMIN — DEXTROSE 125 MG: 50 INJECTION, SOLUTION INTRAVENOUS at 11:11

## 2021-11-24 RX ADMIN — IPRATROPIUM BROMIDE AND ALBUTEROL SULFATE 3 ML: 2.5; .5 SOLUTION RESPIRATORY (INHALATION) at 12:11

## 2021-11-24 RX ADMIN — FLUTICASONE FUROATE AND VILANTEROL TRIFENATATE 1 PUFF: 100; 25 POWDER RESPIRATORY (INHALATION) at 10:11

## 2021-11-24 RX ADMIN — DEXTROSE 125 MG: 50 INJECTION, SOLUTION INTRAVENOUS at 01:11

## 2021-11-24 RX ADMIN — IPRATROPIUM BROMIDE AND ALBUTEROL SULFATE 3 ML: 2.5; .5 SOLUTION RESPIRATORY (INHALATION) at 07:11

## 2021-11-24 RX ADMIN — ALPRAZOLAM 0.25 MG: 0.25 TABLET ORAL at 09:11

## 2021-11-24 RX ADMIN — APIXABAN 5 MG: 5 TABLET, FILM COATED ORAL at 10:11

## 2021-11-24 RX ADMIN — APIXABAN 5 MG: 5 TABLET, FILM COATED ORAL at 09:11

## 2021-11-24 RX ADMIN — DEXTROSE 125 MG: 50 INJECTION, SOLUTION INTRAVENOUS at 06:11

## 2021-11-24 RX ADMIN — SULFAMETHOXAZOLE AND TRIMETHOPRIM 1 TABLET: 800; 160 TABLET ORAL at 01:11

## 2021-11-24 RX ADMIN — SULFAMETHOXAZOLE AND TRIMETHOPRIM 1 TABLET: 800; 160 TABLET ORAL at 09:11

## 2021-11-25 LAB
ALBUMIN SERPL BCP-MCNC: 2.5 G/DL (ref 3.5–5.2)
ALLENS TEST: ABNORMAL
ALP SERPL-CCNC: 132 U/L (ref 55–135)
ALT SERPL W/O P-5'-P-CCNC: 10 U/L (ref 10–44)
ANION GAP SERPL CALC-SCNC: 12 MMOL/L (ref 8–16)
AST SERPL-CCNC: 11 U/L (ref 10–40)
BASOPHILS # BLD AUTO: 0.01 K/UL (ref 0–0.2)
BASOPHILS NFR BLD: 0.1 % (ref 0–1.9)
BILIRUB SERPL-MCNC: 0.3 MG/DL (ref 0.1–1)
BUN SERPL-MCNC: 18 MG/DL (ref 8–23)
CALCIUM SERPL-MCNC: 9.1 MG/DL (ref 8.7–10.5)
CHLORIDE SERPL-SCNC: 92 MMOL/L (ref 95–110)
CO2 SERPL-SCNC: 31 MMOL/L (ref 23–29)
CREAT SERPL-MCNC: 0.6 MG/DL (ref 0.5–1.4)
DELSYS: ABNORMAL
DIFFERENTIAL METHOD: ABNORMAL
EOSINOPHIL # BLD AUTO: 0 K/UL (ref 0–0.5)
EOSINOPHIL NFR BLD: 0 % (ref 0–8)
EP: 5
ERYTHROCYTE [DISTWIDTH] IN BLOOD BY AUTOMATED COUNT: 14 % (ref 11.5–14.5)
ERYTHROCYTE [SEDIMENTATION RATE] IN BLOOD BY WESTERGREN METHOD: 12 MM/H
EST. GFR  (AFRICAN AMERICAN): >60 ML/MIN/1.73 M^2
EST. GFR  (NON AFRICAN AMERICAN): >60 ML/MIN/1.73 M^2
FIO2: 37
GLUCOSE SERPL-MCNC: 115 MG/DL (ref 70–110)
HCO3 UR-SCNC: 35.7 MMOL/L (ref 24–28)
HCT VFR BLD AUTO: 35 % (ref 37–48.5)
HGB BLD-MCNC: 11.3 G/DL (ref 12–16)
IMM GRANULOCYTES # BLD AUTO: 0.06 K/UL (ref 0–0.04)
IMM GRANULOCYTES NFR BLD AUTO: 0.6 % (ref 0–0.5)
IP: 10
LYMPHOCYTES # BLD AUTO: 0.1 K/UL (ref 1–4.8)
LYMPHOCYTES NFR BLD: 1 % (ref 18–48)
MAGNESIUM SERPL-MCNC: 2.2 MG/DL (ref 1.6–2.6)
MCH RBC QN AUTO: 28.5 PG (ref 27–31)
MCHC RBC AUTO-ENTMCNC: 32.3 G/DL (ref 32–36)
MCV RBC AUTO: 88 FL (ref 82–98)
MODE: ABNORMAL
MONOCYTES # BLD AUTO: 0.3 K/UL (ref 0.3–1)
MONOCYTES NFR BLD: 2.6 % (ref 4–15)
NEUTROPHILS # BLD AUTO: 9.8 K/UL (ref 1.8–7.7)
NEUTROPHILS NFR BLD: 95.7 % (ref 38–73)
NRBC BLD-RTO: 0 /100 WBC
PCO2 BLDA: 50.2 MMHG (ref 35–45)
PH SMN: 7.46 [PH] (ref 7.35–7.45)
PLATELET # BLD AUTO: 265 K/UL (ref 150–450)
PMV BLD AUTO: 9.8 FL (ref 9.2–12.9)
PO2 BLDA: 50 MMHG (ref 40–60)
POC BE: 12 MMOL/L
POC SATURATED O2: 86 % (ref 95–100)
POC TCO2: 37 MMOL/L (ref 24–29)
POTASSIUM SERPL-SCNC: 4.5 MMOL/L (ref 3.5–5.1)
PROT SERPL-MCNC: 4.9 G/DL (ref 6–8.4)
RBC # BLD AUTO: 3.96 M/UL (ref 4–5.4)
SAMPLE: ABNORMAL
SITE: ABNORMAL
SODIUM SERPL-SCNC: 135 MMOL/L (ref 136–145)
WBC # BLD AUTO: 10.21 K/UL (ref 3.9–12.7)

## 2021-11-25 PROCEDURE — 85025 COMPLETE CBC W/AUTO DIFF WBC: CPT | Performed by: STUDENT IN AN ORGANIZED HEALTH CARE EDUCATION/TRAINING PROGRAM

## 2021-11-25 PROCEDURE — 94660 CPAP INITIATION&MGMT: CPT

## 2021-11-25 PROCEDURE — 27000221 HC OXYGEN, UP TO 24 HOURS

## 2021-11-25 PROCEDURE — 36415 COLL VENOUS BLD VENIPUNCTURE: CPT | Performed by: STUDENT IN AN ORGANIZED HEALTH CARE EDUCATION/TRAINING PROGRAM

## 2021-11-25 PROCEDURE — 25000242 PHARM REV CODE 250 ALT 637 W/ HCPCS: Performed by: STUDENT IN AN ORGANIZED HEALTH CARE EDUCATION/TRAINING PROGRAM

## 2021-11-25 PROCEDURE — 99900035 HC TECH TIME PER 15 MIN (STAT)

## 2021-11-25 PROCEDURE — 20600001 HC STEP DOWN PRIVATE ROOM

## 2021-11-25 PROCEDURE — 94640 AIRWAY INHALATION TREATMENT: CPT

## 2021-11-25 PROCEDURE — 25000003 PHARM REV CODE 250: Performed by: STUDENT IN AN ORGANIZED HEALTH CARE EDUCATION/TRAINING PROGRAM

## 2021-11-25 PROCEDURE — 80053 COMPREHEN METABOLIC PANEL: CPT | Performed by: STUDENT IN AN ORGANIZED HEALTH CARE EDUCATION/TRAINING PROGRAM

## 2021-11-25 PROCEDURE — 25000242 PHARM REV CODE 250 ALT 637 W/ HCPCS: Performed by: HOSPITALIST

## 2021-11-25 PROCEDURE — 99233 SBSQ HOSP IP/OBS HIGH 50: CPT | Mod: ,,, | Performed by: HOSPITALIST

## 2021-11-25 PROCEDURE — 82803 BLOOD GASES ANY COMBINATION: CPT

## 2021-11-25 PROCEDURE — 63700000 PHARM REV CODE 250 ALT 637 W/O HCPCS: Performed by: STUDENT IN AN ORGANIZED HEALTH CARE EDUCATION/TRAINING PROGRAM

## 2021-11-25 PROCEDURE — 83735 ASSAY OF MAGNESIUM: CPT | Performed by: STUDENT IN AN ORGANIZED HEALTH CARE EDUCATION/TRAINING PROGRAM

## 2021-11-25 PROCEDURE — 25000003 PHARM REV CODE 250

## 2021-11-25 PROCEDURE — 63600175 PHARM REV CODE 636 W HCPCS

## 2021-11-25 PROCEDURE — 99233 PR SUBSEQUENT HOSPITAL CARE,LEVL III: ICD-10-PCS | Mod: ,,, | Performed by: HOSPITALIST

## 2021-11-25 PROCEDURE — 94761 N-INVAS EAR/PLS OXIMETRY MLT: CPT

## 2021-11-25 RX ORDER — LEVALBUTEROL INHALATION SOLUTION 0.63 MG/3ML
0.63 SOLUTION RESPIRATORY (INHALATION)
Status: DISCONTINUED | OUTPATIENT
Start: 2021-11-25 | End: 2021-12-02

## 2021-11-25 RX ORDER — IPRATROPIUM BROMIDE 0.5 MG/2.5ML
0.5 SOLUTION RESPIRATORY (INHALATION)
Status: DISCONTINUED | OUTPATIENT
Start: 2021-11-25 | End: 2021-12-02

## 2021-11-25 RX ORDER — ACETAMINOPHEN 325 MG/1
650 TABLET ORAL EVERY 6 HOURS PRN
Status: DISCONTINUED | OUTPATIENT
Start: 2021-11-25 | End: 2021-12-06 | Stop reason: HOSPADM

## 2021-11-25 RX ORDER — IPRATROPIUM BROMIDE AND ALBUTEROL SULFATE 2.5; .5 MG/3ML; MG/3ML
3 SOLUTION RESPIRATORY (INHALATION) EVERY 4 HOURS PRN
Status: DISCONTINUED | OUTPATIENT
Start: 2021-11-25 | End: 2021-12-06

## 2021-11-25 RX ADMIN — IPRATROPIUM BROMIDE 0.5 MG: 0.5 SOLUTION RESPIRATORY (INHALATION) at 08:11

## 2021-11-25 RX ADMIN — PANTOPRAZOLE SODIUM 40 MG: 40 TABLET, DELAYED RELEASE ORAL at 08:11

## 2021-11-25 RX ADMIN — IPRATROPIUM BROMIDE AND ALBUTEROL SULFATE 3 ML: 2.5; .5 SOLUTION RESPIRATORY (INHALATION) at 11:11

## 2021-11-25 RX ADMIN — AZITHROMYCIN MONOHYDRATE 250 MG: 250 TABLET ORAL at 08:11

## 2021-11-25 RX ADMIN — ESCITALOPRAM OXALATE 5 MG: 5 TABLET, FILM COATED ORAL at 08:11

## 2021-11-25 RX ADMIN — SULFAMETHOXAZOLE AND TRIMETHOPRIM 1 TABLET: 800; 160 TABLET ORAL at 08:11

## 2021-11-25 RX ADMIN — APIXABAN 5 MG: 5 TABLET, FILM COATED ORAL at 08:11

## 2021-11-25 RX ADMIN — ALPRAZOLAM 0.25 MG: 0.25 TABLET ORAL at 08:11

## 2021-11-25 RX ADMIN — PRAVASTATIN SODIUM 20 MG: 20 TABLET ORAL at 08:11

## 2021-11-25 RX ADMIN — FLUTICASONE FUROATE AND VILANTEROL TRIFENATATE 1 PUFF: 100; 25 POWDER RESPIRATORY (INHALATION) at 08:11

## 2021-11-25 RX ADMIN — ALPRAZOLAM 0.25 MG: 0.25 TABLET ORAL at 03:11

## 2021-11-25 RX ADMIN — IPRATROPIUM BROMIDE AND ALBUTEROL SULFATE 3 ML: 2.5; .5 SOLUTION RESPIRATORY (INHALATION) at 03:11

## 2021-11-25 RX ADMIN — DEXTROSE 125 MG: 50 INJECTION, SOLUTION INTRAVENOUS at 08:11

## 2021-11-25 RX ADMIN — IPRATROPIUM BROMIDE AND ALBUTEROL SULFATE 3 ML: 2.5; .5 SOLUTION RESPIRATORY (INHALATION) at 12:11

## 2021-11-25 RX ADMIN — LEVALBUTEROL HYDROCHLORIDE 0.63 MG: 0.63 SOLUTION RESPIRATORY (INHALATION) at 08:11

## 2021-11-25 RX ADMIN — DILTIAZEM HYDROCHLORIDE 180 MG: 180 CAPSULE, COATED, EXTENDED RELEASE ORAL at 08:11

## 2021-11-26 PROBLEM — G62.9 PERIPHERAL NEUROPATHY: Status: ACTIVE | Noted: 2021-11-26

## 2021-11-26 PROBLEM — F41.9 ANXIETY: Status: ACTIVE | Noted: 2021-11-26

## 2021-11-26 LAB
ALBUMIN SERPL BCP-MCNC: 2.5 G/DL (ref 3.5–5.2)
ALP SERPL-CCNC: 117 U/L (ref 55–135)
ALT SERPL W/O P-5'-P-CCNC: 8 U/L (ref 10–44)
ANION GAP SERPL CALC-SCNC: 7 MMOL/L (ref 8–16)
AST SERPL-CCNC: 10 U/L (ref 10–40)
BACTERIA BLD CULT: ABNORMAL
BASOPHILS # BLD AUTO: 0.01 K/UL (ref 0–0.2)
BASOPHILS NFR BLD: 0.1 % (ref 0–1.9)
BILIRUB SERPL-MCNC: 0.2 MG/DL (ref 0.1–1)
BUN SERPL-MCNC: 17 MG/DL (ref 8–23)
CALCIUM SERPL-MCNC: 8.8 MG/DL (ref 8.7–10.5)
CHLORIDE SERPL-SCNC: 95 MMOL/L (ref 95–110)
CO2 SERPL-SCNC: 33 MMOL/L (ref 23–29)
CREAT SERPL-MCNC: 0.5 MG/DL (ref 0.5–1.4)
DIFFERENTIAL METHOD: ABNORMAL
EOSINOPHIL # BLD AUTO: 0 K/UL (ref 0–0.5)
EOSINOPHIL NFR BLD: 0 % (ref 0–8)
ERYTHROCYTE [DISTWIDTH] IN BLOOD BY AUTOMATED COUNT: 13.9 % (ref 11.5–14.5)
EST. GFR  (AFRICAN AMERICAN): >60 ML/MIN/1.73 M^2
EST. GFR  (NON AFRICAN AMERICAN): >60 ML/MIN/1.73 M^2
GLUCOSE SERPL-MCNC: 140 MG/DL (ref 70–110)
HCT VFR BLD AUTO: 36.7 % (ref 37–48.5)
HGB BLD-MCNC: 11.7 G/DL (ref 12–16)
IMM GRANULOCYTES # BLD AUTO: 0.06 K/UL (ref 0–0.04)
IMM GRANULOCYTES NFR BLD AUTO: 0.7 % (ref 0–0.5)
LYMPHOCYTES # BLD AUTO: 0.2 K/UL (ref 1–4.8)
LYMPHOCYTES NFR BLD: 2 % (ref 18–48)
MAGNESIUM SERPL-MCNC: 2.2 MG/DL (ref 1.6–2.6)
MCH RBC QN AUTO: 28.7 PG (ref 27–31)
MCHC RBC AUTO-ENTMCNC: 31.9 G/DL (ref 32–36)
MCV RBC AUTO: 90 FL (ref 82–98)
MONOCYTES # BLD AUTO: 0.3 K/UL (ref 0.3–1)
MONOCYTES NFR BLD: 3.1 % (ref 4–15)
NEUTROPHILS # BLD AUTO: 7.9 K/UL (ref 1.8–7.7)
NEUTROPHILS NFR BLD: 94.1 % (ref 38–73)
NRBC BLD-RTO: 0 /100 WBC
PLATELET # BLD AUTO: 250 K/UL (ref 150–450)
PMV BLD AUTO: 9.5 FL (ref 9.2–12.9)
POTASSIUM SERPL-SCNC: 4.1 MMOL/L (ref 3.5–5.1)
PROT SERPL-MCNC: 5 G/DL (ref 6–8.4)
RBC # BLD AUTO: 4.08 M/UL (ref 4–5.4)
SODIUM SERPL-SCNC: 135 MMOL/L (ref 136–145)
WBC # BLD AUTO: 8.43 K/UL (ref 3.9–12.7)

## 2021-11-26 PROCEDURE — 27000221 HC OXYGEN, UP TO 24 HOURS

## 2021-11-26 PROCEDURE — 25000242 PHARM REV CODE 250 ALT 637 W/ HCPCS: Performed by: HOSPITALIST

## 2021-11-26 PROCEDURE — 94761 N-INVAS EAR/PLS OXIMETRY MLT: CPT

## 2021-11-26 PROCEDURE — 80053 COMPREHEN METABOLIC PANEL: CPT | Performed by: STUDENT IN AN ORGANIZED HEALTH CARE EDUCATION/TRAINING PROGRAM

## 2021-11-26 PROCEDURE — 83735 ASSAY OF MAGNESIUM: CPT | Performed by: STUDENT IN AN ORGANIZED HEALTH CARE EDUCATION/TRAINING PROGRAM

## 2021-11-26 PROCEDURE — 99233 PR SUBSEQUENT HOSPITAL CARE,LEVL III: ICD-10-PCS | Mod: ,,, | Performed by: HOSPITALIST

## 2021-11-26 PROCEDURE — 63700000 PHARM REV CODE 250 ALT 637 W/O HCPCS: Performed by: STUDENT IN AN ORGANIZED HEALTH CARE EDUCATION/TRAINING PROGRAM

## 2021-11-26 PROCEDURE — 63600175 PHARM REV CODE 636 W HCPCS: Performed by: HOSPITALIST

## 2021-11-26 PROCEDURE — 94640 AIRWAY INHALATION TREATMENT: CPT

## 2021-11-26 PROCEDURE — 25000003 PHARM REV CODE 250: Performed by: HOSPITALIST

## 2021-11-26 PROCEDURE — 94660 CPAP INITIATION&MGMT: CPT

## 2021-11-26 PROCEDURE — 99900035 HC TECH TIME PER 15 MIN (STAT)

## 2021-11-26 PROCEDURE — 20600001 HC STEP DOWN PRIVATE ROOM

## 2021-11-26 PROCEDURE — 99233 SBSQ HOSP IP/OBS HIGH 50: CPT | Mod: ,,, | Performed by: HOSPITALIST

## 2021-11-26 PROCEDURE — 36415 COLL VENOUS BLD VENIPUNCTURE: CPT | Performed by: STUDENT IN AN ORGANIZED HEALTH CARE EDUCATION/TRAINING PROGRAM

## 2021-11-26 PROCEDURE — 25000242 PHARM REV CODE 250 ALT 637 W/ HCPCS: Performed by: STUDENT IN AN ORGANIZED HEALTH CARE EDUCATION/TRAINING PROGRAM

## 2021-11-26 PROCEDURE — 85025 COMPLETE CBC W/AUTO DIFF WBC: CPT | Performed by: STUDENT IN AN ORGANIZED HEALTH CARE EDUCATION/TRAINING PROGRAM

## 2021-11-26 PROCEDURE — 25000003 PHARM REV CODE 250: Performed by: STUDENT IN AN ORGANIZED HEALTH CARE EDUCATION/TRAINING PROGRAM

## 2021-11-26 RX ORDER — GABAPENTIN 100 MG/1
100 CAPSULE ORAL NIGHTLY
Status: DISCONTINUED | OUTPATIENT
Start: 2021-11-26 | End: 2021-12-06 | Stop reason: HOSPADM

## 2021-11-26 RX ORDER — PREDNISONE 20 MG/1
40 TABLET ORAL DAILY
Status: COMPLETED | OUTPATIENT
Start: 2021-12-01 | End: 2021-12-05

## 2021-11-26 RX ORDER — PREDNISONE 20 MG/1
60 TABLET ORAL DAILY
Status: COMPLETED | OUTPATIENT
Start: 2021-11-26 | End: 2021-11-30

## 2021-11-26 RX ORDER — PREDNISONE 20 MG/1
20 TABLET ORAL DAILY
Status: DISCONTINUED | OUTPATIENT
Start: 2021-12-06 | End: 2021-12-06 | Stop reason: HOSPADM

## 2021-11-26 RX ADMIN — APIXABAN 5 MG: 5 TABLET, FILM COATED ORAL at 10:11

## 2021-11-26 RX ADMIN — SULFAMETHOXAZOLE AND TRIMETHOPRIM 1 TABLET: 800; 160 TABLET ORAL at 09:11

## 2021-11-26 RX ADMIN — LEVALBUTEROL HYDROCHLORIDE 0.63 MG: 0.63 SOLUTION RESPIRATORY (INHALATION) at 03:11

## 2021-11-26 RX ADMIN — IPRATROPIUM BROMIDE 0.5 MG: 0.5 SOLUTION RESPIRATORY (INHALATION) at 12:11

## 2021-11-26 RX ADMIN — ESCITALOPRAM OXALATE 5 MG: 5 TABLET, FILM COATED ORAL at 09:11

## 2021-11-26 RX ADMIN — ALPRAZOLAM 0.25 MG: 0.25 TABLET ORAL at 09:11

## 2021-11-26 RX ADMIN — DILTIAZEM HYDROCHLORIDE 180 MG: 180 CAPSULE, COATED, EXTENDED RELEASE ORAL at 09:11

## 2021-11-26 RX ADMIN — AZITHROMYCIN MONOHYDRATE 250 MG: 250 TABLET ORAL at 09:11

## 2021-11-26 RX ADMIN — PANTOPRAZOLE SODIUM 40 MG: 40 TABLET, DELAYED RELEASE ORAL at 09:11

## 2021-11-26 RX ADMIN — GABAPENTIN 100 MG: 100 CAPSULE ORAL at 10:11

## 2021-11-26 RX ADMIN — LEVALBUTEROL HYDROCHLORIDE 0.63 MG: 0.63 SOLUTION RESPIRATORY (INHALATION) at 12:11

## 2021-11-26 RX ADMIN — LEVALBUTEROL HYDROCHLORIDE 0.63 MG: 0.63 SOLUTION RESPIRATORY (INHALATION) at 08:11

## 2021-11-26 RX ADMIN — FLUTICASONE FUROATE AND VILANTEROL TRIFENATATE 1 PUFF: 100; 25 POWDER RESPIRATORY (INHALATION) at 08:11

## 2021-11-26 RX ADMIN — ALPRAZOLAM 0.25 MG: 0.25 TABLET ORAL at 10:11

## 2021-11-26 RX ADMIN — PREDNISONE 60 MG: 20 TABLET ORAL at 09:11

## 2021-11-26 RX ADMIN — IPRATROPIUM BROMIDE 0.5 MG: 0.5 SOLUTION RESPIRATORY (INHALATION) at 03:11

## 2021-11-26 RX ADMIN — IPRATROPIUM BROMIDE 0.5 MG: 0.5 SOLUTION RESPIRATORY (INHALATION) at 08:11

## 2021-11-26 RX ADMIN — IPRATROPIUM BROMIDE AND ALBUTEROL SULFATE 3 ML: 2.5; .5 SOLUTION RESPIRATORY (INHALATION) at 08:11

## 2021-11-26 RX ADMIN — ALPRAZOLAM 0.25 MG: 0.25 TABLET ORAL at 02:11

## 2021-11-26 RX ADMIN — APIXABAN 5 MG: 5 TABLET, FILM COATED ORAL at 09:11

## 2021-11-26 RX ADMIN — SULFAMETHOXAZOLE AND TRIMETHOPRIM 1 TABLET: 800; 160 TABLET ORAL at 10:11

## 2021-11-26 RX ADMIN — PRAVASTATIN SODIUM 20 MG: 20 TABLET ORAL at 09:11

## 2021-11-27 LAB
ALBUMIN SERPL BCP-MCNC: 2.6 G/DL (ref 3.5–5.2)
ALP SERPL-CCNC: 105 U/L (ref 55–135)
ALT SERPL W/O P-5'-P-CCNC: 10 U/L (ref 10–44)
ANION GAP SERPL CALC-SCNC: 8 MMOL/L (ref 8–16)
AST SERPL-CCNC: 12 U/L (ref 10–40)
BASOPHILS # BLD AUTO: 0.02 K/UL (ref 0–0.2)
BASOPHILS NFR BLD: 0.2 % (ref 0–1.9)
BILIRUB SERPL-MCNC: 0.2 MG/DL (ref 0.1–1)
BUN SERPL-MCNC: 21 MG/DL (ref 8–23)
CALCIUM SERPL-MCNC: 8.9 MG/DL (ref 8.7–10.5)
CHLORIDE SERPL-SCNC: 96 MMOL/L (ref 95–110)
CO2 SERPL-SCNC: 31 MMOL/L (ref 23–29)
CREAT SERPL-MCNC: 0.5 MG/DL (ref 0.5–1.4)
DIFFERENTIAL METHOD: ABNORMAL
EOSINOPHIL # BLD AUTO: 0 K/UL (ref 0–0.5)
EOSINOPHIL NFR BLD: 0 % (ref 0–8)
ERYTHROCYTE [DISTWIDTH] IN BLOOD BY AUTOMATED COUNT: 13.7 % (ref 11.5–14.5)
EST. GFR  (AFRICAN AMERICAN): >60 ML/MIN/1.73 M^2
EST. GFR  (NON AFRICAN AMERICAN): >60 ML/MIN/1.73 M^2
GLUCOSE SERPL-MCNC: 162 MG/DL (ref 70–110)
HCT VFR BLD AUTO: 38.4 % (ref 37–48.5)
HGB BLD-MCNC: 11.6 G/DL (ref 12–16)
IMM GRANULOCYTES # BLD AUTO: 0.11 K/UL (ref 0–0.04)
IMM GRANULOCYTES NFR BLD AUTO: 1.2 % (ref 0–0.5)
LYMPHOCYTES # BLD AUTO: 0.2 K/UL (ref 1–4.8)
LYMPHOCYTES NFR BLD: 1.8 % (ref 18–48)
MAGNESIUM SERPL-MCNC: 2.1 MG/DL (ref 1.6–2.6)
MCH RBC QN AUTO: 28 PG (ref 27–31)
MCHC RBC AUTO-ENTMCNC: 30.2 G/DL (ref 32–36)
MCV RBC AUTO: 93 FL (ref 82–98)
MONOCYTES # BLD AUTO: 0.4 K/UL (ref 0.3–1)
MONOCYTES NFR BLD: 4.5 % (ref 4–15)
NEUTROPHILS # BLD AUTO: 8.6 K/UL (ref 1.8–7.7)
NEUTROPHILS NFR BLD: 92.3 % (ref 38–73)
NRBC BLD-RTO: 0 /100 WBC
PLATELET # BLD AUTO: 237 K/UL (ref 150–450)
PMV BLD AUTO: 9.8 FL (ref 9.2–12.9)
POTASSIUM SERPL-SCNC: 4.2 MMOL/L (ref 3.5–5.1)
PROT SERPL-MCNC: 4.9 G/DL (ref 6–8.4)
RBC # BLD AUTO: 4.14 M/UL (ref 4–5.4)
SODIUM SERPL-SCNC: 135 MMOL/L (ref 136–145)
WBC # BLD AUTO: 9.29 K/UL (ref 3.9–12.7)

## 2021-11-27 PROCEDURE — 36415 COLL VENOUS BLD VENIPUNCTURE: CPT | Performed by: STUDENT IN AN ORGANIZED HEALTH CARE EDUCATION/TRAINING PROGRAM

## 2021-11-27 PROCEDURE — 99232 SBSQ HOSP IP/OBS MODERATE 35: CPT | Mod: ,,, | Performed by: INTERNAL MEDICINE

## 2021-11-27 PROCEDURE — 83735 ASSAY OF MAGNESIUM: CPT | Performed by: STUDENT IN AN ORGANIZED HEALTH CARE EDUCATION/TRAINING PROGRAM

## 2021-11-27 PROCEDURE — 25000003 PHARM REV CODE 250: Performed by: STUDENT IN AN ORGANIZED HEALTH CARE EDUCATION/TRAINING PROGRAM

## 2021-11-27 PROCEDURE — 94640 AIRWAY INHALATION TREATMENT: CPT

## 2021-11-27 PROCEDURE — 85025 COMPLETE CBC W/AUTO DIFF WBC: CPT | Performed by: STUDENT IN AN ORGANIZED HEALTH CARE EDUCATION/TRAINING PROGRAM

## 2021-11-27 PROCEDURE — 63600175 PHARM REV CODE 636 W HCPCS: Performed by: HOSPITALIST

## 2021-11-27 PROCEDURE — 97530 THERAPEUTIC ACTIVITIES: CPT

## 2021-11-27 PROCEDURE — 20600001 HC STEP DOWN PRIVATE ROOM

## 2021-11-27 PROCEDURE — 97162 PT EVAL MOD COMPLEX 30 MIN: CPT

## 2021-11-27 PROCEDURE — 99232 PR SUBSEQUENT HOSPITAL CARE,LEVL II: ICD-10-PCS | Mod: ,,, | Performed by: INTERNAL MEDICINE

## 2021-11-27 PROCEDURE — 25000242 PHARM REV CODE 250 ALT 637 W/ HCPCS: Performed by: HOSPITALIST

## 2021-11-27 PROCEDURE — 97535 SELF CARE MNGMENT TRAINING: CPT

## 2021-11-27 PROCEDURE — 27000221 HC OXYGEN, UP TO 24 HOURS

## 2021-11-27 PROCEDURE — 25000003 PHARM REV CODE 250: Performed by: HOSPITALIST

## 2021-11-27 PROCEDURE — 80053 COMPREHEN METABOLIC PANEL: CPT | Performed by: STUDENT IN AN ORGANIZED HEALTH CARE EDUCATION/TRAINING PROGRAM

## 2021-11-27 PROCEDURE — 63700000 PHARM REV CODE 250 ALT 637 W/O HCPCS: Performed by: STUDENT IN AN ORGANIZED HEALTH CARE EDUCATION/TRAINING PROGRAM

## 2021-11-27 PROCEDURE — 94660 CPAP INITIATION&MGMT: CPT

## 2021-11-27 PROCEDURE — 99900035 HC TECH TIME PER 15 MIN (STAT)

## 2021-11-27 PROCEDURE — 97165 OT EVAL LOW COMPLEX 30 MIN: CPT

## 2021-11-27 PROCEDURE — 94761 N-INVAS EAR/PLS OXIMETRY MLT: CPT

## 2021-11-27 RX ADMIN — LEVALBUTEROL HYDROCHLORIDE 0.63 MG: 0.63 SOLUTION RESPIRATORY (INHALATION) at 12:11

## 2021-11-27 RX ADMIN — LEVALBUTEROL HYDROCHLORIDE 0.63 MG: 0.63 SOLUTION RESPIRATORY (INHALATION) at 07:11

## 2021-11-27 RX ADMIN — PRAVASTATIN SODIUM 20 MG: 20 TABLET ORAL at 09:11

## 2021-11-27 RX ADMIN — SULFAMETHOXAZOLE AND TRIMETHOPRIM 1 TABLET: 800; 160 TABLET ORAL at 09:11

## 2021-11-27 RX ADMIN — LEVALBUTEROL HYDROCHLORIDE 0.63 MG: 0.63 SOLUTION RESPIRATORY (INHALATION) at 03:11

## 2021-11-27 RX ADMIN — IPRATROPIUM BROMIDE 0.5 MG: 0.5 SOLUTION RESPIRATORY (INHALATION) at 03:11

## 2021-11-27 RX ADMIN — IPRATROPIUM BROMIDE 0.5 MG: 0.5 SOLUTION RESPIRATORY (INHALATION) at 08:11

## 2021-11-27 RX ADMIN — AZITHROMYCIN MONOHYDRATE 250 MG: 250 TABLET ORAL at 09:11

## 2021-11-27 RX ADMIN — IPRATROPIUM BROMIDE 0.5 MG: 0.5 SOLUTION RESPIRATORY (INHALATION) at 07:11

## 2021-11-27 RX ADMIN — FLUTICASONE FUROATE AND VILANTEROL TRIFENATATE 1 PUFF: 100; 25 POWDER RESPIRATORY (INHALATION) at 08:11

## 2021-11-27 RX ADMIN — GABAPENTIN 100 MG: 100 CAPSULE ORAL at 11:11

## 2021-11-27 RX ADMIN — LEVALBUTEROL HYDROCHLORIDE 0.63 MG: 0.63 SOLUTION RESPIRATORY (INHALATION) at 08:11

## 2021-11-27 RX ADMIN — PANTOPRAZOLE SODIUM 40 MG: 40 TABLET, DELAYED RELEASE ORAL at 09:11

## 2021-11-27 RX ADMIN — TIOTROPIUM BROMIDE INHALATION SPRAY 2 PUFF: 3.12 SPRAY, METERED RESPIRATORY (INHALATION) at 08:11

## 2021-11-27 RX ADMIN — APIXABAN 5 MG: 5 TABLET, FILM COATED ORAL at 09:11

## 2021-11-27 RX ADMIN — PREDNISONE 60 MG: 20 TABLET ORAL at 09:11

## 2021-11-27 RX ADMIN — ALPRAZOLAM 0.25 MG: 0.25 TABLET ORAL at 11:11

## 2021-11-27 RX ADMIN — ESCITALOPRAM OXALATE 5 MG: 5 TABLET, FILM COATED ORAL at 09:11

## 2021-11-27 RX ADMIN — DILTIAZEM HYDROCHLORIDE 180 MG: 180 CAPSULE, COATED, EXTENDED RELEASE ORAL at 09:11

## 2021-11-27 RX ADMIN — SULFAMETHOXAZOLE AND TRIMETHOPRIM 1 TABLET: 800; 160 TABLET ORAL at 11:11

## 2021-11-27 RX ADMIN — APIXABAN 5 MG: 5 TABLET, FILM COATED ORAL at 11:11

## 2021-11-27 RX ADMIN — IPRATROPIUM BROMIDE 0.5 MG: 0.5 SOLUTION RESPIRATORY (INHALATION) at 12:11

## 2021-11-27 RX ADMIN — ALPRAZOLAM 0.25 MG: 0.25 TABLET ORAL at 09:11

## 2021-11-28 LAB
ALBUMIN SERPL BCP-MCNC: 2.5 G/DL (ref 3.5–5.2)
ALP SERPL-CCNC: 100 U/L (ref 55–135)
ALT SERPL W/O P-5'-P-CCNC: 20 U/L (ref 10–44)
ANION GAP SERPL CALC-SCNC: 12 MMOL/L (ref 8–16)
AST SERPL-CCNC: 27 U/L (ref 10–40)
BACTERIA BLD CULT: NORMAL
BASOPHILS # BLD AUTO: 0.04 K/UL (ref 0–0.2)
BASOPHILS NFR BLD: 0.4 % (ref 0–1.9)
BILIRUB SERPL-MCNC: 0.1 MG/DL (ref 0.1–1)
BUN SERPL-MCNC: 23 MG/DL (ref 8–23)
CALCIUM SERPL-MCNC: 9.1 MG/DL (ref 8.7–10.5)
CHLORIDE SERPL-SCNC: 100 MMOL/L (ref 95–110)
CO2 SERPL-SCNC: 25 MMOL/L (ref 23–29)
CREAT SERPL-MCNC: 0.5 MG/DL (ref 0.5–1.4)
DIFFERENTIAL METHOD: ABNORMAL
EOSINOPHIL # BLD AUTO: 0 K/UL (ref 0–0.5)
EOSINOPHIL NFR BLD: 0 % (ref 0–8)
ERYTHROCYTE [DISTWIDTH] IN BLOOD BY AUTOMATED COUNT: 13.8 % (ref 11.5–14.5)
EST. GFR  (AFRICAN AMERICAN): >60 ML/MIN/1.73 M^2
EST. GFR  (NON AFRICAN AMERICAN): >60 ML/MIN/1.73 M^2
GLUCOSE SERPL-MCNC: 83 MG/DL (ref 70–110)
HCT VFR BLD AUTO: 38.1 % (ref 37–48.5)
HGB BLD-MCNC: 12 G/DL (ref 12–16)
IMM GRANULOCYTES # BLD AUTO: 0.29 K/UL (ref 0–0.04)
IMM GRANULOCYTES NFR BLD AUTO: 2.6 % (ref 0–0.5)
LYMPHOCYTES # BLD AUTO: 0.4 K/UL (ref 1–4.8)
LYMPHOCYTES NFR BLD: 3.8 % (ref 18–48)
MAGNESIUM SERPL-MCNC: 2.2 MG/DL (ref 1.6–2.6)
MCH RBC QN AUTO: 28.5 PG (ref 27–31)
MCHC RBC AUTO-ENTMCNC: 31.5 G/DL (ref 32–36)
MCV RBC AUTO: 91 FL (ref 82–98)
MONOCYTES # BLD AUTO: 0.6 K/UL (ref 0.3–1)
MONOCYTES NFR BLD: 5.7 % (ref 4–15)
NEUTROPHILS # BLD AUTO: 9.9 K/UL (ref 1.8–7.7)
NEUTROPHILS NFR BLD: 87.5 % (ref 38–73)
NRBC BLD-RTO: 0 /100 WBC
PLATELET # BLD AUTO: 254 K/UL (ref 150–450)
PMV BLD AUTO: 10.3 FL (ref 9.2–12.9)
POCT GLUCOSE: 111 MG/DL (ref 70–110)
POCT GLUCOSE: 311 MG/DL (ref 70–110)
POCT GLUCOSE: >500 MG/DL (ref 70–110)
POCT GLUCOSE: >500 MG/DL (ref 70–110)
POTASSIUM SERPL-SCNC: 5.3 MMOL/L (ref 3.5–5.1)
PROT SERPL-MCNC: 5.3 G/DL (ref 6–8.4)
RBC # BLD AUTO: 4.21 M/UL (ref 4–5.4)
SODIUM SERPL-SCNC: 137 MMOL/L (ref 136–145)
WBC # BLD AUTO: 11.26 K/UL (ref 3.9–12.7)

## 2021-11-28 PROCEDURE — 27000221 HC OXYGEN, UP TO 24 HOURS

## 2021-11-28 PROCEDURE — 27100171 HC OXYGEN HIGH FLOW UP TO 24 HOURS

## 2021-11-28 PROCEDURE — 25000242 PHARM REV CODE 250 ALT 637 W/ HCPCS: Performed by: HOSPITALIST

## 2021-11-28 PROCEDURE — 36415 COLL VENOUS BLD VENIPUNCTURE: CPT | Performed by: STUDENT IN AN ORGANIZED HEALTH CARE EDUCATION/TRAINING PROGRAM

## 2021-11-28 PROCEDURE — 99233 SBSQ HOSP IP/OBS HIGH 50: CPT | Mod: ,,, | Performed by: STUDENT IN AN ORGANIZED HEALTH CARE EDUCATION/TRAINING PROGRAM

## 2021-11-28 PROCEDURE — 25000003 PHARM REV CODE 250: Performed by: HOSPITALIST

## 2021-11-28 PROCEDURE — 99900035 HC TECH TIME PER 15 MIN (STAT)

## 2021-11-28 PROCEDURE — 83735 ASSAY OF MAGNESIUM: CPT | Performed by: STUDENT IN AN ORGANIZED HEALTH CARE EDUCATION/TRAINING PROGRAM

## 2021-11-28 PROCEDURE — 63600175 PHARM REV CODE 636 W HCPCS: Performed by: HOSPITALIST

## 2021-11-28 PROCEDURE — 94640 AIRWAY INHALATION TREATMENT: CPT

## 2021-11-28 PROCEDURE — 25000003 PHARM REV CODE 250: Performed by: STUDENT IN AN ORGANIZED HEALTH CARE EDUCATION/TRAINING PROGRAM

## 2021-11-28 PROCEDURE — 63600175 PHARM REV CODE 636 W HCPCS: Performed by: STUDENT IN AN ORGANIZED HEALTH CARE EDUCATION/TRAINING PROGRAM

## 2021-11-28 PROCEDURE — 80053 COMPREHEN METABOLIC PANEL: CPT | Performed by: STUDENT IN AN ORGANIZED HEALTH CARE EDUCATION/TRAINING PROGRAM

## 2021-11-28 PROCEDURE — 99233 PR SUBSEQUENT HOSPITAL CARE,LEVL III: ICD-10-PCS | Mod: ,,, | Performed by: STUDENT IN AN ORGANIZED HEALTH CARE EDUCATION/TRAINING PROGRAM

## 2021-11-28 PROCEDURE — 20600001 HC STEP DOWN PRIVATE ROOM

## 2021-11-28 PROCEDURE — 85025 COMPLETE CBC W/AUTO DIFF WBC: CPT | Performed by: STUDENT IN AN ORGANIZED HEALTH CARE EDUCATION/TRAINING PROGRAM

## 2021-11-28 PROCEDURE — 94660 CPAP INITIATION&MGMT: CPT

## 2021-11-28 PROCEDURE — 94761 N-INVAS EAR/PLS OXIMETRY MLT: CPT

## 2021-11-28 RX ADMIN — ALPRAZOLAM 0.25 MG: 0.25 TABLET ORAL at 11:11

## 2021-11-28 RX ADMIN — ALPRAZOLAM 0.25 MG: 0.25 TABLET ORAL at 02:11

## 2021-11-28 RX ADMIN — LEVALBUTEROL HYDROCHLORIDE 0.63 MG: 0.63 SOLUTION RESPIRATORY (INHALATION) at 12:11

## 2021-11-28 RX ADMIN — TIOTROPIUM BROMIDE INHALATION SPRAY 2 PUFF: 3.12 SPRAY, METERED RESPIRATORY (INHALATION) at 08:11

## 2021-11-28 RX ADMIN — SULFAMETHOXAZOLE AND TRIMETHOPRIM 1 TABLET: 800; 160 TABLET ORAL at 11:11

## 2021-11-28 RX ADMIN — ALPRAZOLAM 0.25 MG: 0.25 TABLET ORAL at 09:11

## 2021-11-28 RX ADMIN — PANTOPRAZOLE SODIUM 40 MG: 40 TABLET, DELAYED RELEASE ORAL at 09:11

## 2021-11-28 RX ADMIN — INSULIN HUMAN 5 UNITS: 100 INJECTION, SOLUTION PARENTERAL at 11:11

## 2021-11-28 RX ADMIN — FLUTICASONE FUROATE AND VILANTEROL TRIFENATATE 1 PUFF: 100; 25 POWDER RESPIRATORY (INHALATION) at 08:11

## 2021-11-28 RX ADMIN — ESCITALOPRAM OXALATE 5 MG: 5 TABLET, FILM COATED ORAL at 09:11

## 2021-11-28 RX ADMIN — IPRATROPIUM BROMIDE 0.5 MG: 0.5 SOLUTION RESPIRATORY (INHALATION) at 12:11

## 2021-11-28 RX ADMIN — CALCIUM GLUCONATE 1 G: 98 INJECTION, SOLUTION INTRAVENOUS at 11:11

## 2021-11-28 RX ADMIN — APIXABAN 5 MG: 5 TABLET, FILM COATED ORAL at 09:11

## 2021-11-28 RX ADMIN — DEXTROSE MONOHYDRATE 50 G: 25 INJECTION, SOLUTION INTRAVENOUS at 11:11

## 2021-11-28 RX ADMIN — IPRATROPIUM BROMIDE 0.5 MG: 0.5 SOLUTION RESPIRATORY (INHALATION) at 08:11

## 2021-11-28 RX ADMIN — PRAVASTATIN SODIUM 20 MG: 20 TABLET ORAL at 09:11

## 2021-11-28 RX ADMIN — LEVALBUTEROL HYDROCHLORIDE 0.63 MG: 0.63 SOLUTION RESPIRATORY (INHALATION) at 03:11

## 2021-11-28 RX ADMIN — DILTIAZEM HYDROCHLORIDE 180 MG: 180 CAPSULE, COATED, EXTENDED RELEASE ORAL at 09:11

## 2021-11-28 RX ADMIN — GABAPENTIN 100 MG: 100 CAPSULE ORAL at 11:11

## 2021-11-28 RX ADMIN — APIXABAN 5 MG: 5 TABLET, FILM COATED ORAL at 11:11

## 2021-11-28 RX ADMIN — IPRATROPIUM BROMIDE 0.5 MG: 0.5 SOLUTION RESPIRATORY (INHALATION) at 03:11

## 2021-11-28 RX ADMIN — SULFAMETHOXAZOLE AND TRIMETHOPRIM 1 TABLET: 800; 160 TABLET ORAL at 09:11

## 2021-11-28 RX ADMIN — LEVALBUTEROL HYDROCHLORIDE 0.63 MG: 0.63 SOLUTION RESPIRATORY (INHALATION) at 08:11

## 2021-11-28 RX ADMIN — PREDNISONE 60 MG: 20 TABLET ORAL at 09:11

## 2021-11-28 RX ADMIN — SODIUM ZIRCONIUM CYCLOSILICATE 5 G: 5 POWDER, FOR SUSPENSION ORAL at 11:11

## 2021-11-29 LAB
ALBUMIN SERPL BCP-MCNC: 2.5 G/DL (ref 3.5–5.2)
ALP SERPL-CCNC: 97 U/L (ref 55–135)
ALT SERPL W/O P-5'-P-CCNC: 30 U/L (ref 10–44)
ANION GAP SERPL CALC-SCNC: 6 MMOL/L (ref 8–16)
AST SERPL-CCNC: 27 U/L (ref 10–40)
BASOPHILS # BLD AUTO: 0.06 K/UL (ref 0–0.2)
BASOPHILS NFR BLD: 0.5 % (ref 0–1.9)
BILIRUB SERPL-MCNC: 0.2 MG/DL (ref 0.1–1)
BUN SERPL-MCNC: 21 MG/DL (ref 8–23)
CALCIUM SERPL-MCNC: 8.7 MG/DL (ref 8.7–10.5)
CHLORIDE SERPL-SCNC: 99 MMOL/L (ref 95–110)
CO2 SERPL-SCNC: 31 MMOL/L (ref 23–29)
CREAT SERPL-MCNC: 0.5 MG/DL (ref 0.5–1.4)
DIFFERENTIAL METHOD: ABNORMAL
EOSINOPHIL # BLD AUTO: 0 K/UL (ref 0–0.5)
EOSINOPHIL NFR BLD: 0 % (ref 0–8)
ERYTHROCYTE [DISTWIDTH] IN BLOOD BY AUTOMATED COUNT: 13.7 % (ref 11.5–14.5)
EST. GFR  (AFRICAN AMERICAN): >60 ML/MIN/1.73 M^2
EST. GFR  (NON AFRICAN AMERICAN): >60 ML/MIN/1.73 M^2
GLUCOSE SERPL-MCNC: 104 MG/DL (ref 70–110)
HCT VFR BLD AUTO: 38.6 % (ref 37–48.5)
HGB BLD-MCNC: 11.9 G/DL (ref 12–16)
IMM GRANULOCYTES # BLD AUTO: 0.32 K/UL (ref 0–0.04)
IMM GRANULOCYTES NFR BLD AUTO: 2.5 % (ref 0–0.5)
LYMPHOCYTES # BLD AUTO: 0.3 K/UL (ref 1–4.8)
LYMPHOCYTES NFR BLD: 2.6 % (ref 18–48)
MAGNESIUM SERPL-MCNC: 2 MG/DL (ref 1.6–2.6)
MCH RBC QN AUTO: 27.8 PG (ref 27–31)
MCHC RBC AUTO-ENTMCNC: 30.8 G/DL (ref 32–36)
MCV RBC AUTO: 90 FL (ref 82–98)
MONOCYTES # BLD AUTO: 0.6 K/UL (ref 0.3–1)
MONOCYTES NFR BLD: 4.8 % (ref 4–15)
NEUTROPHILS # BLD AUTO: 11.3 K/UL (ref 1.8–7.7)
NEUTROPHILS NFR BLD: 89.6 % (ref 38–73)
NRBC BLD-RTO: 0 /100 WBC
PLATELET # BLD AUTO: 241 K/UL (ref 150–450)
PMV BLD AUTO: 9.4 FL (ref 9.2–12.9)
POCT GLUCOSE: 134 MG/DL (ref 70–110)
POTASSIUM SERPL-SCNC: 4.8 MMOL/L (ref 3.5–5.1)
PROT SERPL-MCNC: 4.6 G/DL (ref 6–8.4)
RBC # BLD AUTO: 4.28 M/UL (ref 4–5.4)
SODIUM SERPL-SCNC: 136 MMOL/L (ref 136–145)
WBC # BLD AUTO: 12.62 K/UL (ref 3.9–12.7)

## 2021-11-29 PROCEDURE — 94640 AIRWAY INHALATION TREATMENT: CPT

## 2021-11-29 PROCEDURE — 80053 COMPREHEN METABOLIC PANEL: CPT | Performed by: STUDENT IN AN ORGANIZED HEALTH CARE EDUCATION/TRAINING PROGRAM

## 2021-11-29 PROCEDURE — 25000003 PHARM REV CODE 250: Performed by: STUDENT IN AN ORGANIZED HEALTH CARE EDUCATION/TRAINING PROGRAM

## 2021-11-29 PROCEDURE — 25000003 PHARM REV CODE 250: Performed by: HOSPITALIST

## 2021-11-29 PROCEDURE — 94761 N-INVAS EAR/PLS OXIMETRY MLT: CPT

## 2021-11-29 PROCEDURE — 97110 THERAPEUTIC EXERCISES: CPT | Mod: CQ

## 2021-11-29 PROCEDURE — 99233 PR SUBSEQUENT HOSPITAL CARE,LEVL III: ICD-10-PCS | Mod: ,,, | Performed by: STUDENT IN AN ORGANIZED HEALTH CARE EDUCATION/TRAINING PROGRAM

## 2021-11-29 PROCEDURE — 27000221 HC OXYGEN, UP TO 24 HOURS

## 2021-11-29 PROCEDURE — 85025 COMPLETE CBC W/AUTO DIFF WBC: CPT | Performed by: STUDENT IN AN ORGANIZED HEALTH CARE EDUCATION/TRAINING PROGRAM

## 2021-11-29 PROCEDURE — 25000242 PHARM REV CODE 250 ALT 637 W/ HCPCS: Performed by: HOSPITALIST

## 2021-11-29 PROCEDURE — 36415 COLL VENOUS BLD VENIPUNCTURE: CPT | Performed by: STUDENT IN AN ORGANIZED HEALTH CARE EDUCATION/TRAINING PROGRAM

## 2021-11-29 PROCEDURE — 97110 THERAPEUTIC EXERCISES: CPT

## 2021-11-29 PROCEDURE — 83735 ASSAY OF MAGNESIUM: CPT | Performed by: STUDENT IN AN ORGANIZED HEALTH CARE EDUCATION/TRAINING PROGRAM

## 2021-11-29 PROCEDURE — 99900035 HC TECH TIME PER 15 MIN (STAT)

## 2021-11-29 PROCEDURE — 99233 SBSQ HOSP IP/OBS HIGH 50: CPT | Mod: ,,, | Performed by: STUDENT IN AN ORGANIZED HEALTH CARE EDUCATION/TRAINING PROGRAM

## 2021-11-29 PROCEDURE — 97530 THERAPEUTIC ACTIVITIES: CPT | Mod: CQ

## 2021-11-29 PROCEDURE — 63600175 PHARM REV CODE 636 W HCPCS: Performed by: HOSPITALIST

## 2021-11-29 PROCEDURE — 94660 CPAP INITIATION&MGMT: CPT

## 2021-11-29 PROCEDURE — 97530 THERAPEUTIC ACTIVITIES: CPT

## 2021-11-29 PROCEDURE — 20600001 HC STEP DOWN PRIVATE ROOM

## 2021-11-29 RX ADMIN — IPRATROPIUM BROMIDE AND ALBUTEROL SULFATE 3 ML: 2.5; .5 SOLUTION RESPIRATORY (INHALATION) at 10:11

## 2021-11-29 RX ADMIN — APIXABAN 5 MG: 5 TABLET, FILM COATED ORAL at 08:11

## 2021-11-29 RX ADMIN — ESCITALOPRAM OXALATE 5 MG: 5 TABLET, FILM COATED ORAL at 09:11

## 2021-11-29 RX ADMIN — LEVALBUTEROL HYDROCHLORIDE 0.63 MG: 0.63 SOLUTION RESPIRATORY (INHALATION) at 12:11

## 2021-11-29 RX ADMIN — IPRATROPIUM BROMIDE 0.5 MG: 0.5 SOLUTION RESPIRATORY (INHALATION) at 04:11

## 2021-11-29 RX ADMIN — ALPRAZOLAM 0.25 MG: 0.25 TABLET ORAL at 02:11

## 2021-11-29 RX ADMIN — GABAPENTIN 100 MG: 100 CAPSULE ORAL at 08:11

## 2021-11-29 RX ADMIN — TIOTROPIUM BROMIDE INHALATION SPRAY 2 PUFF: 3.12 SPRAY, METERED RESPIRATORY (INHALATION) at 09:11

## 2021-11-29 RX ADMIN — LEVALBUTEROL HYDROCHLORIDE 0.63 MG: 0.63 SOLUTION RESPIRATORY (INHALATION) at 04:11

## 2021-11-29 RX ADMIN — PRAVASTATIN SODIUM 20 MG: 20 TABLET ORAL at 09:11

## 2021-11-29 RX ADMIN — IPRATROPIUM BROMIDE 0.5 MG: 0.5 SOLUTION RESPIRATORY (INHALATION) at 09:11

## 2021-11-29 RX ADMIN — LEVALBUTEROL HYDROCHLORIDE 0.63 MG: 0.63 SOLUTION RESPIRATORY (INHALATION) at 09:11

## 2021-11-29 RX ADMIN — FLUTICASONE FUROATE AND VILANTEROL TRIFENATATE 1 PUFF: 100; 25 POWDER RESPIRATORY (INHALATION) at 09:11

## 2021-11-29 RX ADMIN — ALPRAZOLAM 0.25 MG: 0.25 TABLET ORAL at 08:11

## 2021-11-29 RX ADMIN — APIXABAN 5 MG: 5 TABLET, FILM COATED ORAL at 09:11

## 2021-11-29 RX ADMIN — IPRATROPIUM BROMIDE 0.5 MG: 0.5 SOLUTION RESPIRATORY (INHALATION) at 12:11

## 2021-11-29 RX ADMIN — PANTOPRAZOLE SODIUM 40 MG: 40 TABLET, DELAYED RELEASE ORAL at 09:11

## 2021-11-29 RX ADMIN — ALPRAZOLAM 0.25 MG: 0.25 TABLET ORAL at 09:11

## 2021-11-29 RX ADMIN — DILTIAZEM HYDROCHLORIDE 180 MG: 180 CAPSULE, COATED, EXTENDED RELEASE ORAL at 09:11

## 2021-11-29 RX ADMIN — PREDNISONE 60 MG: 20 TABLET ORAL at 09:11

## 2021-11-30 LAB
ALBUMIN SERPL BCP-MCNC: 2.5 G/DL (ref 3.5–5.2)
ALP SERPL-CCNC: 95 U/L (ref 55–135)
ALT SERPL W/O P-5'-P-CCNC: 23 U/L (ref 10–44)
ANION GAP SERPL CALC-SCNC: 9 MMOL/L (ref 8–16)
AST SERPL-CCNC: 22 U/L (ref 10–40)
BASOPHILS # BLD AUTO: 0.06 K/UL (ref 0–0.2)
BASOPHILS NFR BLD: 0.4 % (ref 0–1.9)
BILIRUB SERPL-MCNC: 0.2 MG/DL (ref 0.1–1)
BUN SERPL-MCNC: 16 MG/DL (ref 8–23)
CALCIUM SERPL-MCNC: 8.9 MG/DL (ref 8.7–10.5)
CHLORIDE SERPL-SCNC: 95 MMOL/L (ref 95–110)
CO2 SERPL-SCNC: 29 MMOL/L (ref 23–29)
CREAT SERPL-MCNC: 0.5 MG/DL (ref 0.5–1.4)
DIFFERENTIAL METHOD: ABNORMAL
EOSINOPHIL # BLD AUTO: 0 K/UL (ref 0–0.5)
EOSINOPHIL NFR BLD: 0.1 % (ref 0–8)
ERYTHROCYTE [DISTWIDTH] IN BLOOD BY AUTOMATED COUNT: 13.5 % (ref 11.5–14.5)
EST. GFR  (AFRICAN AMERICAN): >60 ML/MIN/1.73 M^2
EST. GFR  (NON AFRICAN AMERICAN): >60 ML/MIN/1.73 M^2
GLUCOSE SERPL-MCNC: 144 MG/DL (ref 70–110)
HCT VFR BLD AUTO: 41.5 % (ref 37–48.5)
HGB BLD-MCNC: 12.6 G/DL (ref 12–16)
IMM GRANULOCYTES # BLD AUTO: 0.47 K/UL (ref 0–0.04)
IMM GRANULOCYTES NFR BLD AUTO: 3.3 % (ref 0–0.5)
LYMPHOCYTES # BLD AUTO: 0.5 K/UL (ref 1–4.8)
LYMPHOCYTES NFR BLD: 3.2 % (ref 18–48)
MAGNESIUM SERPL-MCNC: 2 MG/DL (ref 1.6–2.6)
MCH RBC QN AUTO: 27.8 PG (ref 27–31)
MCHC RBC AUTO-ENTMCNC: 30.4 G/DL (ref 32–36)
MCV RBC AUTO: 91 FL (ref 82–98)
MONOCYTES # BLD AUTO: 0.6 K/UL (ref 0.3–1)
MONOCYTES NFR BLD: 4.1 % (ref 4–15)
NEUTROPHILS # BLD AUTO: 12.7 K/UL (ref 1.8–7.7)
NEUTROPHILS NFR BLD: 88.9 % (ref 38–73)
NRBC BLD-RTO: 0 /100 WBC
PLATELET # BLD AUTO: 242 K/UL (ref 150–450)
PMV BLD AUTO: 9.9 FL (ref 9.2–12.9)
POCT GLUCOSE: 112 MG/DL (ref 70–110)
POCT GLUCOSE: 241 MG/DL (ref 70–110)
POCT GLUCOSE: 249 MG/DL (ref 70–110)
POTASSIUM SERPL-SCNC: 4.5 MMOL/L (ref 3.5–5.1)
PROT SERPL-MCNC: 5.2 G/DL (ref 6–8.4)
RBC # BLD AUTO: 4.54 M/UL (ref 4–5.4)
SODIUM SERPL-SCNC: 133 MMOL/L (ref 136–145)
WBC # BLD AUTO: 14.3 K/UL (ref 3.9–12.7)

## 2021-11-30 PROCEDURE — 94640 AIRWAY INHALATION TREATMENT: CPT

## 2021-11-30 PROCEDURE — 99900035 HC TECH TIME PER 15 MIN (STAT)

## 2021-11-30 PROCEDURE — 83735 ASSAY OF MAGNESIUM: CPT | Performed by: STUDENT IN AN ORGANIZED HEALTH CARE EDUCATION/TRAINING PROGRAM

## 2021-11-30 PROCEDURE — 27000221 HC OXYGEN, UP TO 24 HOURS

## 2021-11-30 PROCEDURE — 99232 SBSQ HOSP IP/OBS MODERATE 35: CPT | Mod: ,,, | Performed by: INTERNAL MEDICINE

## 2021-11-30 PROCEDURE — 36415 COLL VENOUS BLD VENIPUNCTURE: CPT | Performed by: STUDENT IN AN ORGANIZED HEALTH CARE EDUCATION/TRAINING PROGRAM

## 2021-11-30 PROCEDURE — 20600001 HC STEP DOWN PRIVATE ROOM

## 2021-11-30 PROCEDURE — 94660 CPAP INITIATION&MGMT: CPT

## 2021-11-30 PROCEDURE — 97530 THERAPEUTIC ACTIVITIES: CPT

## 2021-11-30 PROCEDURE — 25000242 PHARM REV CODE 250 ALT 637 W/ HCPCS: Performed by: HOSPITALIST

## 2021-11-30 PROCEDURE — 85025 COMPLETE CBC W/AUTO DIFF WBC: CPT | Performed by: STUDENT IN AN ORGANIZED HEALTH CARE EDUCATION/TRAINING PROGRAM

## 2021-11-30 PROCEDURE — 25000003 PHARM REV CODE 250: Performed by: HOSPITALIST

## 2021-11-30 PROCEDURE — 63600175 PHARM REV CODE 636 W HCPCS: Performed by: HOSPITALIST

## 2021-11-30 PROCEDURE — 99232 PR SUBSEQUENT HOSPITAL CARE,LEVL II: ICD-10-PCS | Mod: ,,, | Performed by: INTERNAL MEDICINE

## 2021-11-30 PROCEDURE — 97535 SELF CARE MNGMENT TRAINING: CPT

## 2021-11-30 PROCEDURE — 97116 GAIT TRAINING THERAPY: CPT | Mod: CQ

## 2021-11-30 PROCEDURE — 80053 COMPREHEN METABOLIC PANEL: CPT | Performed by: STUDENT IN AN ORGANIZED HEALTH CARE EDUCATION/TRAINING PROGRAM

## 2021-11-30 PROCEDURE — 25000003 PHARM REV CODE 250: Performed by: STUDENT IN AN ORGANIZED HEALTH CARE EDUCATION/TRAINING PROGRAM

## 2021-11-30 PROCEDURE — 94761 N-INVAS EAR/PLS OXIMETRY MLT: CPT

## 2021-11-30 PROCEDURE — 97530 THERAPEUTIC ACTIVITIES: CPT | Mod: CQ

## 2021-11-30 RX ADMIN — GABAPENTIN 100 MG: 100 CAPSULE ORAL at 08:11

## 2021-11-30 RX ADMIN — DILTIAZEM HYDROCHLORIDE 180 MG: 180 CAPSULE, COATED, EXTENDED RELEASE ORAL at 09:11

## 2021-11-30 RX ADMIN — LEVALBUTEROL HYDROCHLORIDE 0.63 MG: 0.63 SOLUTION RESPIRATORY (INHALATION) at 03:11

## 2021-11-30 RX ADMIN — LEVALBUTEROL HYDROCHLORIDE 0.63 MG: 0.63 SOLUTION RESPIRATORY (INHALATION) at 07:11

## 2021-11-30 RX ADMIN — PANTOPRAZOLE SODIUM 40 MG: 40 TABLET, DELAYED RELEASE ORAL at 09:11

## 2021-11-30 RX ADMIN — IPRATROPIUM BROMIDE 0.5 MG: 0.5 SOLUTION RESPIRATORY (INHALATION) at 08:11

## 2021-11-30 RX ADMIN — TIOTROPIUM BROMIDE INHALATION SPRAY 2 PUFF: 3.12 SPRAY, METERED RESPIRATORY (INHALATION) at 08:11

## 2021-11-30 RX ADMIN — PREDNISONE 60 MG: 20 TABLET ORAL at 09:11

## 2021-11-30 RX ADMIN — LEVALBUTEROL HYDROCHLORIDE 0.63 MG: 0.63 SOLUTION RESPIRATORY (INHALATION) at 08:11

## 2021-11-30 RX ADMIN — PRAVASTATIN SODIUM 20 MG: 20 TABLET ORAL at 09:11

## 2021-11-30 RX ADMIN — IPRATROPIUM BROMIDE 0.5 MG: 0.5 SOLUTION RESPIRATORY (INHALATION) at 07:11

## 2021-11-30 RX ADMIN — ALPRAZOLAM 0.25 MG: 0.25 TABLET ORAL at 02:11

## 2021-11-30 RX ADMIN — ESCITALOPRAM OXALATE 5 MG: 5 TABLET, FILM COATED ORAL at 09:11

## 2021-11-30 RX ADMIN — IPRATROPIUM BROMIDE 0.5 MG: 0.5 SOLUTION RESPIRATORY (INHALATION) at 03:11

## 2021-11-30 RX ADMIN — LEVALBUTEROL HYDROCHLORIDE 0.63 MG: 0.63 SOLUTION RESPIRATORY (INHALATION) at 11:11

## 2021-11-30 RX ADMIN — FLUTICASONE FUROATE AND VILANTEROL TRIFENATATE 1 PUFF: 100; 25 POWDER RESPIRATORY (INHALATION) at 08:11

## 2021-11-30 RX ADMIN — APIXABAN 5 MG: 5 TABLET, FILM COATED ORAL at 09:11

## 2021-11-30 RX ADMIN — ALPRAZOLAM 0.25 MG: 0.25 TABLET ORAL at 08:11

## 2021-11-30 RX ADMIN — ALPRAZOLAM 0.25 MG: 0.25 TABLET ORAL at 09:11

## 2021-11-30 RX ADMIN — IPRATROPIUM BROMIDE 0.5 MG: 0.5 SOLUTION RESPIRATORY (INHALATION) at 11:11

## 2021-11-30 RX ADMIN — APIXABAN 5 MG: 5 TABLET, FILM COATED ORAL at 08:11

## 2021-12-01 LAB
ALBUMIN SERPL BCP-MCNC: 2.3 G/DL (ref 3.5–5.2)
ALLENS TEST: ABNORMAL
ALP SERPL-CCNC: 87 U/L (ref 55–135)
ALT SERPL W/O P-5'-P-CCNC: 21 U/L (ref 10–44)
ANION GAP SERPL CALC-SCNC: 8 MMOL/L (ref 8–16)
AST SERPL-CCNC: 16 U/L (ref 10–40)
BASOPHILS # BLD AUTO: 0.04 K/UL (ref 0–0.2)
BASOPHILS NFR BLD: 0.3 % (ref 0–1.9)
BILIRUB SERPL-MCNC: 0.2 MG/DL (ref 0.1–1)
BUN SERPL-MCNC: 15 MG/DL (ref 8–23)
CALCIUM SERPL-MCNC: 8.2 MG/DL (ref 8.7–10.5)
CHLORIDE SERPL-SCNC: 97 MMOL/L (ref 95–110)
CO2 SERPL-SCNC: 30 MMOL/L (ref 23–29)
CREAT SERPL-MCNC: 0.4 MG/DL (ref 0.5–1.4)
DELSYS: ABNORMAL
DIFFERENTIAL METHOD: ABNORMAL
EOSINOPHIL # BLD AUTO: 0 K/UL (ref 0–0.5)
EOSINOPHIL NFR BLD: 0 % (ref 0–8)
ERYTHROCYTE [DISTWIDTH] IN BLOOD BY AUTOMATED COUNT: 13.5 % (ref 11.5–14.5)
EST. GFR  (AFRICAN AMERICAN): >60 ML/MIN/1.73 M^2
EST. GFR  (NON AFRICAN AMERICAN): >60 ML/MIN/1.73 M^2
GLUCOSE SERPL-MCNC: 149 MG/DL (ref 70–110)
HCO3 UR-SCNC: 34.2 MMOL/L (ref 24–28)
HCT VFR BLD AUTO: 38.7 % (ref 37–48.5)
HCT VFR BLD CALC: 44 %PCV (ref 36–54)
HGB BLD-MCNC: 12.2 G/DL (ref 12–16)
IMM GRANULOCYTES # BLD AUTO: 0.65 K/UL (ref 0–0.04)
IMM GRANULOCYTES NFR BLD AUTO: 4.4 % (ref 0–0.5)
LYMPHOCYTES # BLD AUTO: 0.4 K/UL (ref 1–4.8)
LYMPHOCYTES NFR BLD: 2.6 % (ref 18–48)
MAGNESIUM SERPL-MCNC: 1.9 MG/DL (ref 1.6–2.6)
MCH RBC QN AUTO: 28.4 PG (ref 27–31)
MCHC RBC AUTO-ENTMCNC: 31.5 G/DL (ref 32–36)
MCV RBC AUTO: 90 FL (ref 82–98)
MONOCYTES # BLD AUTO: 0.5 K/UL (ref 0.3–1)
MONOCYTES NFR BLD: 3.5 % (ref 4–15)
NEUTROPHILS # BLD AUTO: 13.1 K/UL (ref 1.8–7.7)
NEUTROPHILS NFR BLD: 89.2 % (ref 38–73)
NRBC BLD-RTO: 0 /100 WBC
PCO2 BLDA: 56.3 MMHG (ref 35–45)
PH SMN: 7.39 [PH] (ref 7.35–7.45)
PLATELET # BLD AUTO: 252 K/UL (ref 150–450)
PMV BLD AUTO: 9.2 FL (ref 9.2–12.9)
PO2 BLDA: 42 MMHG (ref 40–60)
POC BE: 9 MMOL/L
POC IONIZED CALCIUM: 1.21 MMOL/L (ref 1.06–1.42)
POC PCO2 TEMP: 53.9 MMHG
POC PH TEMP: 7.41
POC PO2 TEMP: 40 MMHG
POC SATURATED O2: 76 % (ref 95–100)
POC TCO2: 36 MMOL/L (ref 24–29)
POC TEMPERATURE: ABNORMAL
POTASSIUM BLD-SCNC: 4.4 MMOL/L (ref 3.5–5.1)
POTASSIUM SERPL-SCNC: 4 MMOL/L (ref 3.5–5.1)
PROT SERPL-MCNC: 4.2 G/DL (ref 6–8.4)
RBC # BLD AUTO: 4.29 M/UL (ref 4–5.4)
SAMPLE: ABNORMAL
SITE: ABNORMAL
SODIUM BLD-SCNC: 135 MMOL/L (ref 136–145)
SODIUM SERPL-SCNC: 135 MMOL/L (ref 136–145)
WBC # BLD AUTO: 14.67 K/UL (ref 3.9–12.7)

## 2021-12-01 PROCEDURE — 84295 ASSAY OF SERUM SODIUM: CPT

## 2021-12-01 PROCEDURE — 85014 HEMATOCRIT: CPT

## 2021-12-01 PROCEDURE — 99232 SBSQ HOSP IP/OBS MODERATE 35: CPT | Mod: ,,, | Performed by: INTERNAL MEDICINE

## 2021-12-01 PROCEDURE — 25000003 PHARM REV CODE 250: Performed by: HOSPITALIST

## 2021-12-01 PROCEDURE — 94640 AIRWAY INHALATION TREATMENT: CPT

## 2021-12-01 PROCEDURE — 82800 BLOOD PH: CPT

## 2021-12-01 PROCEDURE — 99900035 HC TECH TIME PER 15 MIN (STAT)

## 2021-12-01 PROCEDURE — 63600175 PHARM REV CODE 636 W HCPCS: Performed by: HOSPITALIST

## 2021-12-01 PROCEDURE — 27000221 HC OXYGEN, UP TO 24 HOURS

## 2021-12-01 PROCEDURE — 20600001 HC STEP DOWN PRIVATE ROOM

## 2021-12-01 PROCEDURE — 82565 ASSAY OF CREATININE: CPT

## 2021-12-01 PROCEDURE — 85610 PROTHROMBIN TIME: CPT

## 2021-12-01 PROCEDURE — 85025 COMPLETE CBC W/AUTO DIFF WBC: CPT | Performed by: STUDENT IN AN ORGANIZED HEALTH CARE EDUCATION/TRAINING PROGRAM

## 2021-12-01 PROCEDURE — 85347 COAGULATION TIME ACTIVATED: CPT

## 2021-12-01 PROCEDURE — 25000003 PHARM REV CODE 250: Performed by: INTERNAL MEDICINE

## 2021-12-01 PROCEDURE — 25000003 PHARM REV CODE 250: Performed by: STUDENT IN AN ORGANIZED HEALTH CARE EDUCATION/TRAINING PROGRAM

## 2021-12-01 PROCEDURE — 99232 PR SUBSEQUENT HOSPITAL CARE,LEVL II: ICD-10-PCS | Mod: ,,, | Performed by: INTERNAL MEDICINE

## 2021-12-01 PROCEDURE — 84132 ASSAY OF SERUM POTASSIUM: CPT

## 2021-12-01 PROCEDURE — 25000242 PHARM REV CODE 250 ALT 637 W/ HCPCS: Performed by: HOSPITALIST

## 2021-12-01 PROCEDURE — 80053 COMPREHEN METABOLIC PANEL: CPT | Performed by: STUDENT IN AN ORGANIZED HEALTH CARE EDUCATION/TRAINING PROGRAM

## 2021-12-01 PROCEDURE — 94761 N-INVAS EAR/PLS OXIMETRY MLT: CPT

## 2021-12-01 PROCEDURE — 83605 ASSAY OF LACTIC ACID: CPT

## 2021-12-01 PROCEDURE — 83735 ASSAY OF MAGNESIUM: CPT | Performed by: STUDENT IN AN ORGANIZED HEALTH CARE EDUCATION/TRAINING PROGRAM

## 2021-12-01 PROCEDURE — 82330 ASSAY OF CALCIUM: CPT

## 2021-12-01 PROCEDURE — 25000242 PHARM REV CODE 250 ALT 637 W/ HCPCS: Performed by: STUDENT IN AN ORGANIZED HEALTH CARE EDUCATION/TRAINING PROGRAM

## 2021-12-01 PROCEDURE — 97116 GAIT TRAINING THERAPY: CPT | Mod: CQ

## 2021-12-01 PROCEDURE — 82803 BLOOD GASES ANY COMBINATION: CPT

## 2021-12-01 PROCEDURE — 36415 COLL VENOUS BLD VENIPUNCTURE: CPT | Performed by: STUDENT IN AN ORGANIZED HEALTH CARE EDUCATION/TRAINING PROGRAM

## 2021-12-01 RX ORDER — TALC
6 POWDER (GRAM) TOPICAL NIGHTLY PRN
Status: DISCONTINUED | OUTPATIENT
Start: 2021-12-01 | End: 2021-12-06 | Stop reason: HOSPADM

## 2021-12-01 RX ORDER — FUROSEMIDE 20 MG/1
20 TABLET ORAL ONCE
Status: COMPLETED | OUTPATIENT
Start: 2021-12-01 | End: 2021-12-01

## 2021-12-01 RX ADMIN — IPRATROPIUM BROMIDE 0.5 MG: 0.5 SOLUTION RESPIRATORY (INHALATION) at 07:12

## 2021-12-01 RX ADMIN — DILTIAZEM HYDROCHLORIDE 180 MG: 180 CAPSULE, COATED, EXTENDED RELEASE ORAL at 08:12

## 2021-12-01 RX ADMIN — APIXABAN 5 MG: 5 TABLET, FILM COATED ORAL at 08:12

## 2021-12-01 RX ADMIN — FUROSEMIDE 20 MG: 20 TABLET ORAL at 10:12

## 2021-12-01 RX ADMIN — ALPRAZOLAM 0.25 MG: 0.25 TABLET ORAL at 02:12

## 2021-12-01 RX ADMIN — LEVALBUTEROL HYDROCHLORIDE 0.63 MG: 0.63 SOLUTION RESPIRATORY (INHALATION) at 07:12

## 2021-12-01 RX ADMIN — PRAVASTATIN SODIUM 20 MG: 20 TABLET ORAL at 08:12

## 2021-12-01 RX ADMIN — LEVALBUTEROL HYDROCHLORIDE 0.63 MG: 0.63 SOLUTION RESPIRATORY (INHALATION) at 12:12

## 2021-12-01 RX ADMIN — IPRATROPIUM BROMIDE AND ALBUTEROL SULFATE 3 ML: 2.5; .5 SOLUTION RESPIRATORY (INHALATION) at 03:12

## 2021-12-01 RX ADMIN — LEVALBUTEROL HYDROCHLORIDE 0.63 MG: 0.63 SOLUTION RESPIRATORY (INHALATION) at 03:12

## 2021-12-01 RX ADMIN — ESCITALOPRAM OXALATE 5 MG: 5 TABLET, FILM COATED ORAL at 08:12

## 2021-12-01 RX ADMIN — PANTOPRAZOLE SODIUM 40 MG: 40 TABLET, DELAYED RELEASE ORAL at 08:12

## 2021-12-01 RX ADMIN — GABAPENTIN 100 MG: 100 CAPSULE ORAL at 10:12

## 2021-12-01 RX ADMIN — ALPRAZOLAM 0.25 MG: 0.25 TABLET ORAL at 08:12

## 2021-12-01 RX ADMIN — FLUTICASONE FUROATE AND VILANTEROL TRIFENATATE 1 PUFF: 100; 25 POWDER RESPIRATORY (INHALATION) at 08:12

## 2021-12-01 RX ADMIN — IPRATROPIUM BROMIDE 0.5 MG: 0.5 SOLUTION RESPIRATORY (INHALATION) at 12:12

## 2021-12-01 RX ADMIN — TIOTROPIUM BROMIDE INHALATION SPRAY 2 PUFF: 3.12 SPRAY, METERED RESPIRATORY (INHALATION) at 12:12

## 2021-12-01 RX ADMIN — PREDNISONE 40 MG: 20 TABLET ORAL at 08:12

## 2021-12-01 RX ADMIN — Medication 6 MG: at 10:12

## 2021-12-01 RX ADMIN — IPRATROPIUM BROMIDE 0.5 MG: 0.5 SOLUTION RESPIRATORY (INHALATION) at 08:12

## 2021-12-01 RX ADMIN — LEVALBUTEROL HYDROCHLORIDE 0.63 MG: 0.63 SOLUTION RESPIRATORY (INHALATION) at 08:12

## 2021-12-01 RX ADMIN — ALPRAZOLAM 0.25 MG: 0.25 TABLET ORAL at 10:12

## 2021-12-01 RX ADMIN — IPRATROPIUM BROMIDE 0.5 MG: 0.5 SOLUTION RESPIRATORY (INHALATION) at 03:12

## 2021-12-01 RX ADMIN — APIXABAN 5 MG: 5 TABLET, FILM COATED ORAL at 10:12

## 2021-12-02 LAB
ALBUMIN SERPL BCP-MCNC: 2.4 G/DL (ref 3.5–5.2)
ALP SERPL-CCNC: 89 U/L (ref 55–135)
ALT SERPL W/O P-5'-P-CCNC: 20 U/L (ref 10–44)
ANION GAP SERPL CALC-SCNC: 10 MMOL/L (ref 8–16)
AST SERPL-CCNC: 15 U/L (ref 10–40)
BASOPHILS # BLD AUTO: 0.05 K/UL (ref 0–0.2)
BASOPHILS NFR BLD: 0.3 % (ref 0–1.9)
BILIRUB SERPL-MCNC: 0.2 MG/DL (ref 0.1–1)
BUN SERPL-MCNC: 16 MG/DL (ref 8–23)
CALCIUM SERPL-MCNC: 8.5 MG/DL (ref 8.7–10.5)
CHLORIDE SERPL-SCNC: 97 MMOL/L (ref 95–110)
CO2 SERPL-SCNC: 30 MMOL/L (ref 23–29)
CREAT SERPL-MCNC: 0.5 MG/DL (ref 0.5–1.4)
DIFFERENTIAL METHOD: ABNORMAL
EOSINOPHIL # BLD AUTO: 0 K/UL (ref 0–0.5)
EOSINOPHIL NFR BLD: 0.1 % (ref 0–8)
ERYTHROCYTE [DISTWIDTH] IN BLOOD BY AUTOMATED COUNT: 13.5 % (ref 11.5–14.5)
EST. GFR  (AFRICAN AMERICAN): >60 ML/MIN/1.73 M^2
EST. GFR  (NON AFRICAN AMERICAN): >60 ML/MIN/1.73 M^2
GLUCOSE SERPL-MCNC: 183 MG/DL (ref 70–110)
HCT VFR BLD AUTO: 40.6 % (ref 37–48.5)
HGB BLD-MCNC: 12.4 G/DL (ref 12–16)
IMM GRANULOCYTES # BLD AUTO: 0.53 K/UL (ref 0–0.04)
IMM GRANULOCYTES NFR BLD AUTO: 3.2 % (ref 0–0.5)
LYMPHOCYTES # BLD AUTO: 0.5 K/UL (ref 1–4.8)
LYMPHOCYTES NFR BLD: 3 % (ref 18–48)
MAGNESIUM SERPL-MCNC: 1.8 MG/DL (ref 1.6–2.6)
MCH RBC QN AUTO: 28 PG (ref 27–31)
MCHC RBC AUTO-ENTMCNC: 30.5 G/DL (ref 32–36)
MCV RBC AUTO: 92 FL (ref 82–98)
MONOCYTES # BLD AUTO: 0.6 K/UL (ref 0.3–1)
MONOCYTES NFR BLD: 3.7 % (ref 4–15)
NEUTROPHILS # BLD AUTO: 14.9 K/UL (ref 1.8–7.7)
NEUTROPHILS NFR BLD: 89.7 % (ref 38–73)
NRBC BLD-RTO: 0 /100 WBC
PLATELET # BLD AUTO: 247 K/UL (ref 150–450)
PMV BLD AUTO: 9.4 FL (ref 9.2–12.9)
POCT GLUCOSE: 180 MG/DL (ref 70–110)
POCT GLUCOSE: 334 MG/DL (ref 70–110)
POTASSIUM SERPL-SCNC: 3.3 MMOL/L (ref 3.5–5.1)
PROT SERPL-MCNC: 4.6 G/DL (ref 6–8.4)
RBC # BLD AUTO: 4.43 M/UL (ref 4–5.4)
SODIUM SERPL-SCNC: 137 MMOL/L (ref 136–145)
WBC # BLD AUTO: 16.57 K/UL (ref 3.9–12.7)

## 2021-12-02 PROCEDURE — 25000003 PHARM REV CODE 250: Performed by: HOSPITALIST

## 2021-12-02 PROCEDURE — 20600001 HC STEP DOWN PRIVATE ROOM

## 2021-12-02 PROCEDURE — 63600175 PHARM REV CODE 636 W HCPCS: Performed by: HOSPITALIST

## 2021-12-02 PROCEDURE — 99232 PR SUBSEQUENT HOSPITAL CARE,LEVL II: ICD-10-PCS | Mod: ,,, | Performed by: INTERNAL MEDICINE

## 2021-12-02 PROCEDURE — 63600175 PHARM REV CODE 636 W HCPCS: Performed by: INTERNAL MEDICINE

## 2021-12-02 PROCEDURE — 80053 COMPREHEN METABOLIC PANEL: CPT | Performed by: STUDENT IN AN ORGANIZED HEALTH CARE EDUCATION/TRAINING PROGRAM

## 2021-12-02 PROCEDURE — 25000003 PHARM REV CODE 250: Performed by: STUDENT IN AN ORGANIZED HEALTH CARE EDUCATION/TRAINING PROGRAM

## 2021-12-02 PROCEDURE — 25000242 PHARM REV CODE 250 ALT 637 W/ HCPCS: Performed by: HOSPITALIST

## 2021-12-02 PROCEDURE — 97530 THERAPEUTIC ACTIVITIES: CPT

## 2021-12-02 PROCEDURE — 85025 COMPLETE CBC W/AUTO DIFF WBC: CPT | Performed by: STUDENT IN AN ORGANIZED HEALTH CARE EDUCATION/TRAINING PROGRAM

## 2021-12-02 PROCEDURE — 94640 AIRWAY INHALATION TREATMENT: CPT

## 2021-12-02 PROCEDURE — 99232 SBSQ HOSP IP/OBS MODERATE 35: CPT | Mod: ,,, | Performed by: INTERNAL MEDICINE

## 2021-12-02 PROCEDURE — 97535 SELF CARE MNGMENT TRAINING: CPT

## 2021-12-02 PROCEDURE — 99900035 HC TECH TIME PER 15 MIN (STAT)

## 2021-12-02 PROCEDURE — 36415 COLL VENOUS BLD VENIPUNCTURE: CPT | Performed by: STUDENT IN AN ORGANIZED HEALTH CARE EDUCATION/TRAINING PROGRAM

## 2021-12-02 PROCEDURE — 94660 CPAP INITIATION&MGMT: CPT

## 2021-12-02 PROCEDURE — 83735 ASSAY OF MAGNESIUM: CPT | Performed by: STUDENT IN AN ORGANIZED HEALTH CARE EDUCATION/TRAINING PROGRAM

## 2021-12-02 PROCEDURE — 94761 N-INVAS EAR/PLS OXIMETRY MLT: CPT

## 2021-12-02 PROCEDURE — 27100171 HC OXYGEN HIGH FLOW UP TO 24 HOURS

## 2021-12-02 PROCEDURE — 27000221 HC OXYGEN, UP TO 24 HOURS

## 2021-12-02 RX ORDER — POTASSIUM CHLORIDE 20 MEQ/1
40 TABLET, EXTENDED RELEASE ORAL ONCE
Status: COMPLETED | OUTPATIENT
Start: 2021-12-02 | End: 2021-12-02

## 2021-12-02 RX ORDER — FUROSEMIDE 10 MG/ML
40 INJECTION INTRAMUSCULAR; INTRAVENOUS ONCE
Status: COMPLETED | OUTPATIENT
Start: 2021-12-02 | End: 2021-12-02

## 2021-12-02 RX ORDER — FUROSEMIDE 40 MG/1
40 TABLET ORAL DAILY
Status: DISCONTINUED | OUTPATIENT
Start: 2021-12-03 | End: 2021-12-03

## 2021-12-02 RX ORDER — FUROSEMIDE 40 MG/1
40 TABLET ORAL DAILY
Status: DISCONTINUED | OUTPATIENT
Start: 2021-12-02 | End: 2021-12-02

## 2021-12-02 RX ADMIN — TIOTROPIUM BROMIDE INHALATION SPRAY 2 PUFF: 3.12 SPRAY, METERED RESPIRATORY (INHALATION) at 09:12

## 2021-12-02 RX ADMIN — IPRATROPIUM BROMIDE 0.5 MG: 0.5 SOLUTION RESPIRATORY (INHALATION) at 09:12

## 2021-12-02 RX ADMIN — FUROSEMIDE 40 MG: 10 INJECTION, SOLUTION INTRAMUSCULAR; INTRAVENOUS at 12:12

## 2021-12-02 RX ADMIN — POTASSIUM CHLORIDE 40 MEQ: 1500 TABLET, EXTENDED RELEASE ORAL at 08:12

## 2021-12-02 RX ADMIN — PANTOPRAZOLE SODIUM 40 MG: 40 TABLET, DELAYED RELEASE ORAL at 08:12

## 2021-12-02 RX ADMIN — GABAPENTIN 100 MG: 100 CAPSULE ORAL at 09:12

## 2021-12-02 RX ADMIN — PRAVASTATIN SODIUM 20 MG: 20 TABLET ORAL at 08:12

## 2021-12-02 RX ADMIN — DILTIAZEM HYDROCHLORIDE 180 MG: 180 CAPSULE, COATED, EXTENDED RELEASE ORAL at 08:12

## 2021-12-02 RX ADMIN — ESCITALOPRAM OXALATE 5 MG: 5 TABLET, FILM COATED ORAL at 08:12

## 2021-12-02 RX ADMIN — Medication 6 MG: at 09:12

## 2021-12-02 RX ADMIN — FLUTICASONE FUROATE AND VILANTEROL TRIFENATATE 1 PUFF: 100; 25 POWDER RESPIRATORY (INHALATION) at 09:12

## 2021-12-02 RX ADMIN — ALPRAZOLAM 0.25 MG: 0.25 TABLET ORAL at 09:12

## 2021-12-02 RX ADMIN — APIXABAN 5 MG: 5 TABLET, FILM COATED ORAL at 08:12

## 2021-12-02 RX ADMIN — APIXABAN 5 MG: 5 TABLET, FILM COATED ORAL at 09:12

## 2021-12-02 RX ADMIN — LEVALBUTEROL HYDROCHLORIDE 0.63 MG: 0.63 SOLUTION RESPIRATORY (INHALATION) at 09:12

## 2021-12-02 RX ADMIN — PREDNISONE 40 MG: 20 TABLET ORAL at 08:12

## 2021-12-02 RX ADMIN — ALPRAZOLAM 0.25 MG: 0.25 TABLET ORAL at 08:12

## 2021-12-02 RX ADMIN — ALPRAZOLAM 0.25 MG: 0.25 TABLET ORAL at 04:12

## 2021-12-03 PROBLEM — J96.22 ACUTE ON CHRONIC RESPIRATORY FAILURE WITH HYPERCAPNIA: Status: RESOLVED | Noted: 2021-08-16 | Resolved: 2021-12-03

## 2021-12-03 LAB
ALBUMIN SERPL BCP-MCNC: 2.4 G/DL (ref 3.5–5.2)
ALP SERPL-CCNC: 85 U/L (ref 55–135)
ALT SERPL W/O P-5'-P-CCNC: 30 U/L (ref 10–44)
ANION GAP SERPL CALC-SCNC: 6 MMOL/L (ref 8–16)
AST SERPL-CCNC: 29 U/L (ref 10–40)
BASOPHILS # BLD AUTO: 0.05 K/UL (ref 0–0.2)
BASOPHILS NFR BLD: 0.3 % (ref 0–1.9)
BILIRUB SERPL-MCNC: 0.2 MG/DL (ref 0.1–1)
BUN SERPL-MCNC: 19 MG/DL (ref 8–23)
CALCIUM SERPL-MCNC: 8.4 MG/DL (ref 8.7–10.5)
CHLORIDE SERPL-SCNC: 98 MMOL/L (ref 95–110)
CO2 SERPL-SCNC: 33 MMOL/L (ref 23–29)
CREAT SERPL-MCNC: 0.5 MG/DL (ref 0.5–1.4)
DIFFERENTIAL METHOD: ABNORMAL
EOSINOPHIL # BLD AUTO: 0 K/UL (ref 0–0.5)
EOSINOPHIL NFR BLD: 0.1 % (ref 0–8)
ERYTHROCYTE [DISTWIDTH] IN BLOOD BY AUTOMATED COUNT: 13.6 % (ref 11.5–14.5)
EST. GFR  (AFRICAN AMERICAN): >60 ML/MIN/1.73 M^2
EST. GFR  (NON AFRICAN AMERICAN): >60 ML/MIN/1.73 M^2
GLUCOSE SERPL-MCNC: 95 MG/DL (ref 70–110)
HCT VFR BLD AUTO: 38.4 % (ref 37–48.5)
HGB BLD-MCNC: 12.2 G/DL (ref 12–16)
IMM GRANULOCYTES # BLD AUTO: 0.72 K/UL (ref 0–0.04)
IMM GRANULOCYTES NFR BLD AUTO: 4 % (ref 0–0.5)
LYMPHOCYTES # BLD AUTO: 0.6 K/UL (ref 1–4.8)
LYMPHOCYTES NFR BLD: 3.5 % (ref 18–48)
MAGNESIUM SERPL-MCNC: 1.7 MG/DL (ref 1.6–2.6)
MCH RBC QN AUTO: 28.5 PG (ref 27–31)
MCHC RBC AUTO-ENTMCNC: 31.8 G/DL (ref 32–36)
MCV RBC AUTO: 90 FL (ref 82–98)
MONOCYTES # BLD AUTO: 0.6 K/UL (ref 0.3–1)
MONOCYTES NFR BLD: 3.5 % (ref 4–15)
NEUTROPHILS # BLD AUTO: 15.8 K/UL (ref 1.8–7.7)
NEUTROPHILS NFR BLD: 88.6 % (ref 38–73)
NRBC BLD-RTO: 0 /100 WBC
PLATELET # BLD AUTO: 280 K/UL (ref 150–450)
PMV BLD AUTO: 9.3 FL (ref 9.2–12.9)
POTASSIUM SERPL-SCNC: 3.8 MMOL/L (ref 3.5–5.1)
PROT SERPL-MCNC: 4.2 G/DL (ref 6–8.4)
RBC # BLD AUTO: 4.28 M/UL (ref 4–5.4)
SODIUM SERPL-SCNC: 137 MMOL/L (ref 136–145)
WBC # BLD AUTO: 17.83 K/UL (ref 3.9–12.7)

## 2021-12-03 PROCEDURE — 99232 SBSQ HOSP IP/OBS MODERATE 35: CPT | Mod: ,,, | Performed by: INTERNAL MEDICINE

## 2021-12-03 PROCEDURE — 63600175 PHARM REV CODE 636 W HCPCS: Performed by: INTERNAL MEDICINE

## 2021-12-03 PROCEDURE — 85025 COMPLETE CBC W/AUTO DIFF WBC: CPT | Performed by: STUDENT IN AN ORGANIZED HEALTH CARE EDUCATION/TRAINING PROGRAM

## 2021-12-03 PROCEDURE — 94640 AIRWAY INHALATION TREATMENT: CPT

## 2021-12-03 PROCEDURE — 25000003 PHARM REV CODE 250: Performed by: HOSPITALIST

## 2021-12-03 PROCEDURE — 25000003 PHARM REV CODE 250: Performed by: STUDENT IN AN ORGANIZED HEALTH CARE EDUCATION/TRAINING PROGRAM

## 2021-12-03 PROCEDURE — 36415 COLL VENOUS BLD VENIPUNCTURE: CPT | Performed by: STUDENT IN AN ORGANIZED HEALTH CARE EDUCATION/TRAINING PROGRAM

## 2021-12-03 PROCEDURE — 27100171 HC OXYGEN HIGH FLOW UP TO 24 HOURS

## 2021-12-03 PROCEDURE — 99232 PR SUBSEQUENT HOSPITAL CARE,LEVL II: ICD-10-PCS | Mod: ,,, | Performed by: INTERNAL MEDICINE

## 2021-12-03 PROCEDURE — 94761 N-INVAS EAR/PLS OXIMETRY MLT: CPT

## 2021-12-03 PROCEDURE — 94660 CPAP INITIATION&MGMT: CPT

## 2021-12-03 PROCEDURE — 80053 COMPREHEN METABOLIC PANEL: CPT | Performed by: STUDENT IN AN ORGANIZED HEALTH CARE EDUCATION/TRAINING PROGRAM

## 2021-12-03 PROCEDURE — 99900035 HC TECH TIME PER 15 MIN (STAT)

## 2021-12-03 PROCEDURE — 94799 UNLISTED PULMONARY SVC/PX: CPT

## 2021-12-03 PROCEDURE — 83735 ASSAY OF MAGNESIUM: CPT | Performed by: STUDENT IN AN ORGANIZED HEALTH CARE EDUCATION/TRAINING PROGRAM

## 2021-12-03 PROCEDURE — 63600175 PHARM REV CODE 636 W HCPCS: Performed by: HOSPITALIST

## 2021-12-03 PROCEDURE — 25000003 PHARM REV CODE 250: Performed by: INTERNAL MEDICINE

## 2021-12-03 PROCEDURE — 27000221 HC OXYGEN, UP TO 24 HOURS

## 2021-12-03 PROCEDURE — 20600001 HC STEP DOWN PRIVATE ROOM

## 2021-12-03 RX ORDER — FUROSEMIDE 40 MG/1
40 TABLET ORAL DAILY
Qty: 30 TABLET | Refills: 11
Start: 2021-12-04 | End: 2021-12-06

## 2021-12-03 RX ORDER — PREDNISONE 10 MG/1
TABLET ORAL
Qty: 60 TABLET | Refills: 11
Start: 2021-12-04 | End: 2021-12-03 | Stop reason: SDUPTHER

## 2021-12-03 RX ORDER — FUROSEMIDE 40 MG/1
40 TABLET ORAL DAILY
Status: DISCONTINUED | OUTPATIENT
Start: 2021-12-04 | End: 2021-12-03

## 2021-12-03 RX ORDER — FUROSEMIDE 10 MG/ML
40 INJECTION INTRAMUSCULAR; INTRAVENOUS ONCE
Status: COMPLETED | OUTPATIENT
Start: 2021-12-03 | End: 2021-12-03

## 2021-12-03 RX ORDER — FUROSEMIDE 10 MG/ML
40 INJECTION INTRAMUSCULAR; INTRAVENOUS DAILY
Status: DISCONTINUED | OUTPATIENT
Start: 2021-12-04 | End: 2021-12-06

## 2021-12-03 RX ORDER — POLYETHYLENE GLYCOL 3350 17 G/17G
17 POWDER, FOR SOLUTION ORAL DAILY PRN
Qty: 30 EACH | Refills: 1
Start: 2021-12-03

## 2021-12-03 RX ORDER — POLYETHYLENE GLYCOL 3350 17 G/17G
17 POWDER, FOR SOLUTION ORAL 2 TIMES DAILY
Status: DISCONTINUED | OUTPATIENT
Start: 2021-12-03 | End: 2021-12-06 | Stop reason: HOSPADM

## 2021-12-03 RX ORDER — PREDNISONE 20 MG/1
TABLET ORAL
Qty: 30 TABLET | Refills: 11 | Status: ON HOLD
Start: 2021-12-03 | End: 2021-12-27 | Stop reason: HOSPADM

## 2021-12-03 RX ADMIN — ALPRAZOLAM 0.25 MG: 0.25 TABLET ORAL at 09:12

## 2021-12-03 RX ADMIN — Medication 6 MG: at 10:12

## 2021-12-03 RX ADMIN — APIXABAN 5 MG: 5 TABLET, FILM COATED ORAL at 09:12

## 2021-12-03 RX ADMIN — FUROSEMIDE 40 MG: 40 INJECTION, SOLUTION INTRAMUSCULAR; INTRAVENOUS at 09:12

## 2021-12-03 RX ADMIN — PRAVASTATIN SODIUM 20 MG: 20 TABLET ORAL at 09:12

## 2021-12-03 RX ADMIN — POLYETHYLENE GLYCOL 3350 17 G: 17 POWDER, FOR SOLUTION ORAL at 09:12

## 2021-12-03 RX ADMIN — PANTOPRAZOLE SODIUM 40 MG: 40 TABLET, DELAYED RELEASE ORAL at 09:12

## 2021-12-03 RX ADMIN — PREDNISONE 40 MG: 20 TABLET ORAL at 09:12

## 2021-12-03 RX ADMIN — DILTIAZEM HYDROCHLORIDE 180 MG: 180 CAPSULE, COATED, EXTENDED RELEASE ORAL at 09:12

## 2021-12-03 RX ADMIN — FLUTICASONE FUROATE AND VILANTEROL TRIFENATATE 1 PUFF: 100; 25 POWDER RESPIRATORY (INHALATION) at 09:12

## 2021-12-03 RX ADMIN — ALPRAZOLAM 0.25 MG: 0.25 TABLET ORAL at 04:12

## 2021-12-03 RX ADMIN — GABAPENTIN 100 MG: 100 CAPSULE ORAL at 09:12

## 2021-12-03 RX ADMIN — TIOTROPIUM BROMIDE INHALATION SPRAY 2 PUFF: 3.12 SPRAY, METERED RESPIRATORY (INHALATION) at 09:12

## 2021-12-03 RX ADMIN — ESCITALOPRAM OXALATE 5 MG: 5 TABLET, FILM COATED ORAL at 09:12

## 2021-12-04 LAB
ALBUMIN SERPL BCP-MCNC: 2.7 G/DL (ref 3.5–5.2)
ALP SERPL-CCNC: 99 U/L (ref 55–135)
ALT SERPL W/O P-5'-P-CCNC: 30 U/L (ref 10–44)
ANION GAP SERPL CALC-SCNC: 8 MMOL/L (ref 8–16)
AST SERPL-CCNC: 24 U/L (ref 10–40)
BASOPHILS # BLD AUTO: 0.08 K/UL (ref 0–0.2)
BASOPHILS NFR BLD: 0.5 % (ref 0–1.9)
BILIRUB SERPL-MCNC: 0.4 MG/DL (ref 0.1–1)
BUN SERPL-MCNC: 24 MG/DL (ref 8–23)
CALCIUM SERPL-MCNC: 8.9 MG/DL (ref 8.7–10.5)
CHLORIDE SERPL-SCNC: 92 MMOL/L (ref 95–110)
CO2 SERPL-SCNC: 37 MMOL/L (ref 23–29)
CREAT SERPL-MCNC: 0.6 MG/DL (ref 0.5–1.4)
DIFFERENTIAL METHOD: ABNORMAL
EOSINOPHIL # BLD AUTO: 0 K/UL (ref 0–0.5)
EOSINOPHIL NFR BLD: 0.1 % (ref 0–8)
ERYTHROCYTE [DISTWIDTH] IN BLOOD BY AUTOMATED COUNT: 13.8 % (ref 11.5–14.5)
EST. GFR  (AFRICAN AMERICAN): >60 ML/MIN/1.73 M^2
EST. GFR  (NON AFRICAN AMERICAN): >60 ML/MIN/1.73 M^2
GLUCOSE SERPL-MCNC: 104 MG/DL (ref 70–110)
HCT VFR BLD AUTO: 44.5 % (ref 37–48.5)
HGB BLD-MCNC: 13.8 G/DL (ref 12–16)
IMM GRANULOCYTES # BLD AUTO: 0.51 K/UL (ref 0–0.04)
IMM GRANULOCYTES NFR BLD AUTO: 3.1 % (ref 0–0.5)
LYMPHOCYTES # BLD AUTO: 0.6 K/UL (ref 1–4.8)
LYMPHOCYTES NFR BLD: 3.5 % (ref 18–48)
MAGNESIUM SERPL-MCNC: 1.8 MG/DL (ref 1.6–2.6)
MCH RBC QN AUTO: 28.2 PG (ref 27–31)
MCHC RBC AUTO-ENTMCNC: 31 G/DL (ref 32–36)
MCV RBC AUTO: 91 FL (ref 82–98)
MONOCYTES # BLD AUTO: 0.7 K/UL (ref 0.3–1)
MONOCYTES NFR BLD: 4.1 % (ref 4–15)
NEUTROPHILS # BLD AUTO: 14.5 K/UL (ref 1.8–7.7)
NEUTROPHILS NFR BLD: 88.7 % (ref 38–73)
NRBC BLD-RTO: 0 /100 WBC
PLATELET # BLD AUTO: 309 K/UL (ref 150–450)
PMV BLD AUTO: 9.4 FL (ref 9.2–12.9)
POCT GLUCOSE: 171 MG/DL (ref 70–110)
POCT GLUCOSE: 392 MG/DL (ref 70–110)
POTASSIUM SERPL-SCNC: 3.9 MMOL/L (ref 3.5–5.1)
PROT SERPL-MCNC: 5.1 G/DL (ref 6–8.4)
RBC # BLD AUTO: 4.89 M/UL (ref 4–5.4)
SODIUM SERPL-SCNC: 137 MMOL/L (ref 136–145)
WBC # BLD AUTO: 16.37 K/UL (ref 3.9–12.7)

## 2021-12-04 PROCEDURE — 25000003 PHARM REV CODE 250: Performed by: HOSPITALIST

## 2021-12-04 PROCEDURE — 94660 CPAP INITIATION&MGMT: CPT

## 2021-12-04 PROCEDURE — 99232 PR SUBSEQUENT HOSPITAL CARE,LEVL II: ICD-10-PCS | Mod: ,,, | Performed by: INTERNAL MEDICINE

## 2021-12-04 PROCEDURE — 20600001 HC STEP DOWN PRIVATE ROOM

## 2021-12-04 PROCEDURE — 36415 COLL VENOUS BLD VENIPUNCTURE: CPT | Performed by: STUDENT IN AN ORGANIZED HEALTH CARE EDUCATION/TRAINING PROGRAM

## 2021-12-04 PROCEDURE — 85025 COMPLETE CBC W/AUTO DIFF WBC: CPT | Performed by: STUDENT IN AN ORGANIZED HEALTH CARE EDUCATION/TRAINING PROGRAM

## 2021-12-04 PROCEDURE — 94761 N-INVAS EAR/PLS OXIMETRY MLT: CPT

## 2021-12-04 PROCEDURE — 99900035 HC TECH TIME PER 15 MIN (STAT)

## 2021-12-04 PROCEDURE — 99232 SBSQ HOSP IP/OBS MODERATE 35: CPT | Mod: ,,, | Performed by: INTERNAL MEDICINE

## 2021-12-04 PROCEDURE — 63600175 PHARM REV CODE 636 W HCPCS: Performed by: INTERNAL MEDICINE

## 2021-12-04 PROCEDURE — 25000003 PHARM REV CODE 250: Performed by: INTERNAL MEDICINE

## 2021-12-04 PROCEDURE — 63600175 PHARM REV CODE 636 W HCPCS: Performed by: HOSPITALIST

## 2021-12-04 PROCEDURE — 83735 ASSAY OF MAGNESIUM: CPT | Performed by: STUDENT IN AN ORGANIZED HEALTH CARE EDUCATION/TRAINING PROGRAM

## 2021-12-04 PROCEDURE — 25000003 PHARM REV CODE 250: Performed by: STUDENT IN AN ORGANIZED HEALTH CARE EDUCATION/TRAINING PROGRAM

## 2021-12-04 PROCEDURE — 80053 COMPREHEN METABOLIC PANEL: CPT | Performed by: STUDENT IN AN ORGANIZED HEALTH CARE EDUCATION/TRAINING PROGRAM

## 2021-12-04 PROCEDURE — 97530 THERAPEUTIC ACTIVITIES: CPT | Mod: CQ

## 2021-12-04 PROCEDURE — 97116 GAIT TRAINING THERAPY: CPT | Mod: CQ

## 2021-12-04 RX ORDER — INSULIN ASPART 100 [IU]/ML
0-5 INJECTION, SOLUTION INTRAVENOUS; SUBCUTANEOUS
Status: DISCONTINUED | OUTPATIENT
Start: 2021-12-04 | End: 2021-12-06 | Stop reason: HOSPADM

## 2021-12-04 RX ADMIN — Medication 6 MG: at 09:12

## 2021-12-04 RX ADMIN — PANTOPRAZOLE SODIUM 40 MG: 40 TABLET, DELAYED RELEASE ORAL at 08:12

## 2021-12-04 RX ADMIN — APIXABAN 5 MG: 5 TABLET, FILM COATED ORAL at 09:12

## 2021-12-04 RX ADMIN — POLYETHYLENE GLYCOL 3350 17 G: 17 POWDER, FOR SOLUTION ORAL at 09:12

## 2021-12-04 RX ADMIN — PREDNISONE 40 MG: 20 TABLET ORAL at 08:12

## 2021-12-04 RX ADMIN — PRAVASTATIN SODIUM 20 MG: 20 TABLET ORAL at 08:12

## 2021-12-04 RX ADMIN — DILTIAZEM HYDROCHLORIDE 180 MG: 180 CAPSULE, COATED, EXTENDED RELEASE ORAL at 08:12

## 2021-12-04 RX ADMIN — ALPRAZOLAM 0.25 MG: 0.25 TABLET ORAL at 08:12

## 2021-12-04 RX ADMIN — FUROSEMIDE 40 MG: 10 INJECTION, SOLUTION INTRAMUSCULAR; INTRAVENOUS at 08:12

## 2021-12-04 RX ADMIN — ALPRAZOLAM 0.25 MG: 0.25 TABLET ORAL at 09:12

## 2021-12-04 RX ADMIN — APIXABAN 5 MG: 5 TABLET, FILM COATED ORAL at 08:12

## 2021-12-04 RX ADMIN — INSULIN ASPART 5 UNITS: 100 INJECTION, SOLUTION INTRAVENOUS; SUBCUTANEOUS at 06:12

## 2021-12-04 RX ADMIN — FLUTICASONE FUROATE AND VILANTEROL TRIFENATATE 1 PUFF: 100; 25 POWDER RESPIRATORY (INHALATION) at 08:12

## 2021-12-04 RX ADMIN — ESCITALOPRAM OXALATE 5 MG: 5 TABLET, FILM COATED ORAL at 08:12

## 2021-12-04 RX ADMIN — GABAPENTIN 100 MG: 100 CAPSULE ORAL at 09:12

## 2021-12-04 RX ADMIN — ALPRAZOLAM 0.25 MG: 0.25 TABLET ORAL at 03:12

## 2021-12-05 LAB
ALBUMIN SERPL BCP-MCNC: 2.5 G/DL (ref 3.5–5.2)
ALP SERPL-CCNC: 81 U/L (ref 55–135)
ALT SERPL W/O P-5'-P-CCNC: 25 U/L (ref 10–44)
ANION GAP SERPL CALC-SCNC: 10 MMOL/L (ref 8–16)
ASCENDING AORTA: 3.04 CM
AST SERPL-CCNC: 17 U/L (ref 10–40)
AV INDEX (PROSTH): 0.88
AV MEAN GRADIENT: 2 MMHG
AV PEAK GRADIENT: 3 MMHG
AV VALVE AREA: 2.6 CM2
AV VELOCITY RATIO: 0.72
BASOPHILS # BLD AUTO: 0.07 K/UL (ref 0–0.2)
BASOPHILS NFR BLD: 0.4 % (ref 0–1.9)
BILIRUB SERPL-MCNC: 0.3 MG/DL (ref 0.1–1)
BSA FOR ECHO PROCEDURE: 1.45 M2
BUN SERPL-MCNC: 25 MG/DL (ref 8–23)
CALCIUM SERPL-MCNC: 8.8 MG/DL (ref 8.7–10.5)
CHLORIDE SERPL-SCNC: 94 MMOL/L (ref 95–110)
CO2 SERPL-SCNC: 33 MMOL/L (ref 23–29)
CREAT SERPL-MCNC: 0.5 MG/DL (ref 0.5–1.4)
CV ECHO LV RWT: 0.47 CM
DIFFERENTIAL METHOD: ABNORMAL
DOP CALC AO PEAK VEL: 0.9 M/S
DOP CALC AO VTI: 16.54 CM
DOP CALC LVOT AREA: 3 CM2
DOP CALC LVOT DIAMETER: 1.94 CM
DOP CALC LVOT PEAK VEL: 0.65 M/S
DOP CALC LVOT STROKE VOLUME: 43.02 CM3
DOP CALCLVOT PEAK VEL VTI: 14.56 CM
E WAVE DECELERATION TIME: 278.86 MSEC
E/A RATIO: 0.62
E/E' RATIO: 10 M/S
ECHO LV POSTERIOR WALL: 0.88 CM (ref 0.6–1.1)
EJECTION FRACTION: 65 %
EOSINOPHIL # BLD AUTO: 0 K/UL (ref 0–0.5)
EOSINOPHIL NFR BLD: 0.1 % (ref 0–8)
ERYTHROCYTE [DISTWIDTH] IN BLOOD BY AUTOMATED COUNT: 13.7 % (ref 11.5–14.5)
EST. GFR  (AFRICAN AMERICAN): >60 ML/MIN/1.73 M^2
EST. GFR  (NON AFRICAN AMERICAN): >60 ML/MIN/1.73 M^2
FRACTIONAL SHORTENING: 42 % (ref 28–44)
GLUCOSE SERPL-MCNC: 113 MG/DL (ref 70–110)
HCT VFR BLD AUTO: 40.1 % (ref 37–48.5)
HGB BLD-MCNC: 12.4 G/DL (ref 12–16)
IMM GRANULOCYTES # BLD AUTO: 0.53 K/UL (ref 0–0.04)
IMM GRANULOCYTES NFR BLD AUTO: 3.1 % (ref 0–0.5)
INTERVENTRICULAR SEPTUM: 0.86 CM (ref 0.6–1.1)
IVRT: 119.89 MSEC
LA MAJOR: 4.93 CM
LA MINOR: 4.81 CM
LA WIDTH: 3.03 CM
LEFT ATRIUM SIZE: 2.87 CM
LEFT ATRIUM VOLUME INDEX MOD: 16.1 ML/M2
LEFT ATRIUM VOLUME INDEX: 24.8 ML/M2
LEFT ATRIUM VOLUME MOD: 23.32 CM3
LEFT ATRIUM VOLUME: 35.99 CM3
LEFT INTERNAL DIMENSION IN SYSTOLE: 2.19 CM (ref 2.1–4)
LEFT VENTRICLE DIASTOLIC VOLUME INDEX: 41.43 ML/M2
LEFT VENTRICLE DIASTOLIC VOLUME: 60.08 ML
LEFT VENTRICLE MASS INDEX: 65 G/M2
LEFT VENTRICLE SYSTOLIC VOLUME INDEX: 11 ML/M2
LEFT VENTRICLE SYSTOLIC VOLUME: 15.94 ML
LEFT VENTRICULAR INTERNAL DIMENSION IN DIASTOLE: 3.75 CM (ref 3.5–6)
LEFT VENTRICULAR MASS: 94.4 G
LV LATERAL E/E' RATIO: 8.33 M/S
LV SEPTAL E/E' RATIO: 12.5 M/S
LYMPHOCYTES # BLD AUTO: 0.6 K/UL (ref 1–4.8)
LYMPHOCYTES NFR BLD: 3.4 % (ref 18–48)
MAGNESIUM SERPL-MCNC: 1.8 MG/DL (ref 1.6–2.6)
MCH RBC QN AUTO: 28.2 PG (ref 27–31)
MCHC RBC AUTO-ENTMCNC: 30.9 G/DL (ref 32–36)
MCV RBC AUTO: 91 FL (ref 82–98)
MONOCYTES # BLD AUTO: 0.9 K/UL (ref 0.3–1)
MONOCYTES NFR BLD: 5.1 % (ref 4–15)
MV PEAK A VEL: 0.81 M/S
MV PEAK E VEL: 0.5 M/S
MV STENOSIS PRESSURE HALF TIME: 80.87 MS
MV VALVE AREA P 1/2 METHOD: 2.72 CM2
NEUTROPHILS # BLD AUTO: 14.8 K/UL (ref 1.8–7.7)
NEUTROPHILS NFR BLD: 87.9 % (ref 38–73)
NRBC BLD-RTO: 0 /100 WBC
PISA TR MAX VEL: 2.6 M/S
PLATELET # BLD AUTO: 300 K/UL (ref 150–450)
PMV BLD AUTO: 9.5 FL (ref 9.2–12.9)
POCT GLUCOSE: 136 MG/DL (ref 70–110)
POCT GLUCOSE: 372 MG/DL (ref 70–110)
POTASSIUM SERPL-SCNC: 3.6 MMOL/L (ref 3.5–5.1)
PROT SERPL-MCNC: 4.7 G/DL (ref 6–8.4)
RA MAJOR: 4.05 CM
RA WIDTH: 2.59 CM
RBC # BLD AUTO: 4.39 M/UL (ref 4–5.4)
RIGHT VENTRICULAR END-DIASTOLIC DIMENSION: 2.51 CM
RV TISSUE DOPPLER FREE WALL SYSTOLIC VELOCITY 1 (APICAL 4 CHAMBER VIEW): 14.3 CM/S
SINUS: 3.12 CM
SODIUM SERPL-SCNC: 137 MMOL/L (ref 136–145)
STJ: 2.4 CM
TDI LATERAL: 0.06 M/S
TDI SEPTAL: 0.04 M/S
TDI: 0.05 M/S
TR MAX PG: 27 MMHG
TRICUSPID ANNULAR PLANE SYSTOLIC EXCURSION: 1.93 CM
WBC # BLD AUTO: 16.9 K/UL (ref 3.9–12.7)

## 2021-12-05 PROCEDURE — 80053 COMPREHEN METABOLIC PANEL: CPT | Performed by: STUDENT IN AN ORGANIZED HEALTH CARE EDUCATION/TRAINING PROGRAM

## 2021-12-05 PROCEDURE — 25000003 PHARM REV CODE 250: Performed by: STUDENT IN AN ORGANIZED HEALTH CARE EDUCATION/TRAINING PROGRAM

## 2021-12-05 PROCEDURE — 20600001 HC STEP DOWN PRIVATE ROOM

## 2021-12-05 PROCEDURE — 94640 AIRWAY INHALATION TREATMENT: CPT

## 2021-12-05 PROCEDURE — 27000221 HC OXYGEN, UP TO 24 HOURS

## 2021-12-05 PROCEDURE — 94761 N-INVAS EAR/PLS OXIMETRY MLT: CPT

## 2021-12-05 PROCEDURE — 83735 ASSAY OF MAGNESIUM: CPT | Performed by: STUDENT IN AN ORGANIZED HEALTH CARE EDUCATION/TRAINING PROGRAM

## 2021-12-05 PROCEDURE — 63600175 PHARM REV CODE 636 W HCPCS: Performed by: INTERNAL MEDICINE

## 2021-12-05 PROCEDURE — 99232 PR SUBSEQUENT HOSPITAL CARE,LEVL II: ICD-10-PCS | Mod: ,,, | Performed by: INTERNAL MEDICINE

## 2021-12-05 PROCEDURE — 85025 COMPLETE CBC W/AUTO DIFF WBC: CPT | Performed by: STUDENT IN AN ORGANIZED HEALTH CARE EDUCATION/TRAINING PROGRAM

## 2021-12-05 PROCEDURE — 25000003 PHARM REV CODE 250: Performed by: INTERNAL MEDICINE

## 2021-12-05 PROCEDURE — 99232 SBSQ HOSP IP/OBS MODERATE 35: CPT | Mod: ,,, | Performed by: INTERNAL MEDICINE

## 2021-12-05 PROCEDURE — 99900035 HC TECH TIME PER 15 MIN (STAT)

## 2021-12-05 PROCEDURE — 25000003 PHARM REV CODE 250: Performed by: HOSPITALIST

## 2021-12-05 PROCEDURE — 63600175 PHARM REV CODE 636 W HCPCS: Performed by: HOSPITALIST

## 2021-12-05 PROCEDURE — 36415 COLL VENOUS BLD VENIPUNCTURE: CPT | Performed by: STUDENT IN AN ORGANIZED HEALTH CARE EDUCATION/TRAINING PROGRAM

## 2021-12-05 RX ADMIN — ALPRAZOLAM 0.25 MG: 0.25 TABLET ORAL at 09:12

## 2021-12-05 RX ADMIN — ALPRAZOLAM 0.25 MG: 0.25 TABLET ORAL at 02:12

## 2021-12-05 RX ADMIN — POLYETHYLENE GLYCOL 3350 17 G: 17 POWDER, FOR SOLUTION ORAL at 09:12

## 2021-12-05 RX ADMIN — TIOTROPIUM BROMIDE INHALATION SPRAY 2 PUFF: 3.12 SPRAY, METERED RESPIRATORY (INHALATION) at 08:12

## 2021-12-05 RX ADMIN — APIXABAN 5 MG: 5 TABLET, FILM COATED ORAL at 09:12

## 2021-12-05 RX ADMIN — DILTIAZEM HYDROCHLORIDE 180 MG: 180 CAPSULE, COATED, EXTENDED RELEASE ORAL at 09:12

## 2021-12-05 RX ADMIN — FUROSEMIDE 40 MG: 10 INJECTION, SOLUTION INTRAMUSCULAR; INTRAVENOUS at 09:12

## 2021-12-05 RX ADMIN — GABAPENTIN 100 MG: 100 CAPSULE ORAL at 09:12

## 2021-12-05 RX ADMIN — PANTOPRAZOLE SODIUM 40 MG: 40 TABLET, DELAYED RELEASE ORAL at 09:12

## 2021-12-05 RX ADMIN — PREDNISONE 40 MG: 20 TABLET ORAL at 09:12

## 2021-12-05 RX ADMIN — FLUTICASONE FUROATE AND VILANTEROL TRIFENATATE 1 PUFF: 100; 25 POWDER RESPIRATORY (INHALATION) at 08:12

## 2021-12-05 RX ADMIN — PRAVASTATIN SODIUM 20 MG: 20 TABLET ORAL at 09:12

## 2021-12-05 RX ADMIN — ESCITALOPRAM OXALATE 5 MG: 5 TABLET, FILM COATED ORAL at 09:12

## 2021-12-06 ENCOUNTER — HOSPITAL ENCOUNTER (INPATIENT)
Facility: HOSPITAL | Age: 76
LOS: 22 days | Discharge: HOME HEALTH | DRG: 190 | End: 2021-12-28
Attending: HOSPITALIST | Admitting: INTERNAL MEDICINE
Payer: MEDICARE

## 2021-12-06 VITALS
BODY MASS INDEX: 21.6 KG/M2 | HEIGHT: 60 IN | OXYGEN SATURATION: 91 % | DIASTOLIC BLOOD PRESSURE: 61 MMHG | RESPIRATION RATE: 18 BRPM | HEART RATE: 70 BPM | SYSTOLIC BLOOD PRESSURE: 127 MMHG | TEMPERATURE: 98 F | WEIGHT: 110 LBS

## 2021-12-06 DIAGNOSIS — J44.9 CHRONIC OBSTRUCTIVE PULMONARY DISEASE, UNSPECIFIED COPD TYPE: Primary | ICD-10-CM

## 2021-12-06 DIAGNOSIS — J96.01 ACUTE RESPIRATORY FAILURE WITH HYPOXIA AND HYPERCARBIA: ICD-10-CM

## 2021-12-06 DIAGNOSIS — J96.22 ACUTE ON CHRONIC RESPIRATORY FAILURE WITH HYPERCAPNIA: ICD-10-CM

## 2021-12-06 DIAGNOSIS — J96.02 ACUTE RESPIRATORY FAILURE WITH HYPOXIA AND HYPERCARBIA: ICD-10-CM

## 2021-12-06 PROBLEM — R73.9 STEROID-INDUCED HYPERGLYCEMIA: Status: ACTIVE | Noted: 2021-12-06

## 2021-12-06 PROBLEM — T38.0X5A STEROID-INDUCED HYPERGLYCEMIA: Status: ACTIVE | Noted: 2021-12-06

## 2021-12-06 LAB
ALBUMIN SERPL BCP-MCNC: 2.6 G/DL (ref 3.5–5.2)
ALP SERPL-CCNC: 86 U/L (ref 55–135)
ALT SERPL W/O P-5'-P-CCNC: 25 U/L (ref 10–44)
ANION GAP SERPL CALC-SCNC: 11 MMOL/L (ref 8–16)
AST SERPL-CCNC: 23 U/L (ref 10–40)
BASOPHILS # BLD AUTO: 0.09 K/UL (ref 0–0.2)
BASOPHILS NFR BLD: 0.6 % (ref 0–1.9)
BILIRUB SERPL-MCNC: 0.3 MG/DL (ref 0.1–1)
BUN SERPL-MCNC: 25 MG/DL (ref 8–23)
CALCIUM SERPL-MCNC: 8.8 MG/DL (ref 8.7–10.5)
CHLORIDE SERPL-SCNC: 98 MMOL/L (ref 95–110)
CO2 SERPL-SCNC: 30 MMOL/L (ref 23–29)
CREAT SERPL-MCNC: 0.5 MG/DL (ref 0.5–1.4)
DIFFERENTIAL METHOD: ABNORMAL
EOSINOPHIL # BLD AUTO: 0 K/UL (ref 0–0.5)
EOSINOPHIL NFR BLD: 0 % (ref 0–8)
ERYTHROCYTE [DISTWIDTH] IN BLOOD BY AUTOMATED COUNT: 13.9 % (ref 11.5–14.5)
EST. GFR  (AFRICAN AMERICAN): >60 ML/MIN/1.73 M^2
EST. GFR  (NON AFRICAN AMERICAN): >60 ML/MIN/1.73 M^2
GLUCOSE SERPL-MCNC: 157 MG/DL (ref 70–110)
HCT VFR BLD AUTO: 41.6 % (ref 37–48.5)
HGB BLD-MCNC: 12.2 G/DL (ref 12–16)
IMM GRANULOCYTES # BLD AUTO: 0.34 K/UL (ref 0–0.04)
IMM GRANULOCYTES NFR BLD AUTO: 2.3 % (ref 0–0.5)
LYMPHOCYTES # BLD AUTO: 0.4 K/UL (ref 1–4.8)
LYMPHOCYTES NFR BLD: 2.5 % (ref 18–48)
MAGNESIUM SERPL-MCNC: 1.8 MG/DL (ref 1.6–2.6)
MCH RBC QN AUTO: 28.2 PG (ref 27–31)
MCHC RBC AUTO-ENTMCNC: 29.3 G/DL (ref 32–36)
MCV RBC AUTO: 96 FL (ref 82–98)
MONOCYTES # BLD AUTO: 0.6 K/UL (ref 0.3–1)
MONOCYTES NFR BLD: 4.3 % (ref 4–15)
NEUTROPHILS # BLD AUTO: 13.1 K/UL (ref 1.8–7.7)
NEUTROPHILS NFR BLD: 90.3 % (ref 38–73)
NRBC BLD-RTO: 0 /100 WBC
PLATELET # BLD AUTO: 260 K/UL (ref 150–450)
PMV BLD AUTO: 10.2 FL (ref 9.2–12.9)
POCT GLUCOSE: 212 MG/DL (ref 70–110)
POTASSIUM SERPL-SCNC: 3.7 MMOL/L (ref 3.5–5.1)
PROT SERPL-MCNC: 4.6 G/DL (ref 6–8.4)
RBC # BLD AUTO: 4.33 M/UL (ref 4–5.4)
SODIUM SERPL-SCNC: 139 MMOL/L (ref 136–145)
WBC # BLD AUTO: 14.56 K/UL (ref 3.9–12.7)

## 2021-12-06 PROCEDURE — 11000004 HC SNF PRIVATE

## 2021-12-06 PROCEDURE — 25000003 PHARM REV CODE 250: Performed by: STUDENT IN AN ORGANIZED HEALTH CARE EDUCATION/TRAINING PROGRAM

## 2021-12-06 PROCEDURE — 99239 HOSP IP/OBS DSCHRG MGMT >30: CPT | Mod: ,,, | Performed by: INTERNAL MEDICINE

## 2021-12-06 PROCEDURE — 94660 CPAP INITIATION&MGMT: CPT

## 2021-12-06 PROCEDURE — 25000003 PHARM REV CODE 250: Performed by: HOSPITALIST

## 2021-12-06 PROCEDURE — 80053 COMPREHEN METABOLIC PANEL: CPT | Performed by: STUDENT IN AN ORGANIZED HEALTH CARE EDUCATION/TRAINING PROGRAM

## 2021-12-06 PROCEDURE — 25000003 PHARM REV CODE 250: Performed by: INTERNAL MEDICINE

## 2021-12-06 PROCEDURE — 94640 AIRWAY INHALATION TREATMENT: CPT

## 2021-12-06 PROCEDURE — 36415 COLL VENOUS BLD VENIPUNCTURE: CPT | Performed by: STUDENT IN AN ORGANIZED HEALTH CARE EDUCATION/TRAINING PROGRAM

## 2021-12-06 PROCEDURE — 63600175 PHARM REV CODE 636 W HCPCS: Performed by: HOSPITALIST

## 2021-12-06 PROCEDURE — 83735 ASSAY OF MAGNESIUM: CPT | Performed by: STUDENT IN AN ORGANIZED HEALTH CARE EDUCATION/TRAINING PROGRAM

## 2021-12-06 PROCEDURE — 99900035 HC TECH TIME PER 15 MIN (STAT)

## 2021-12-06 PROCEDURE — 85025 COMPLETE CBC W/AUTO DIFF WBC: CPT | Performed by: STUDENT IN AN ORGANIZED HEALTH CARE EDUCATION/TRAINING PROGRAM

## 2021-12-06 PROCEDURE — 99239 PR HOSPITAL DISCHARGE DAY,>30 MIN: ICD-10-PCS | Mod: ,,, | Performed by: INTERNAL MEDICINE

## 2021-12-06 PROCEDURE — 27000221 HC OXYGEN, UP TO 24 HOURS

## 2021-12-06 RX ORDER — TALC
6 POWDER (GRAM) TOPICAL NIGHTLY PRN
Status: CANCELLED | OUTPATIENT
Start: 2021-12-06

## 2021-12-06 RX ORDER — CALCIUM CARBONATE 200(500)MG
500 TABLET,CHEWABLE ORAL 2 TIMES DAILY PRN
Status: CANCELLED | OUTPATIENT
Start: 2021-12-06

## 2021-12-06 RX ORDER — CALCIUM CARBONATE 200(500)MG
500 TABLET,CHEWABLE ORAL 2 TIMES DAILY PRN
Status: DISCONTINUED | OUTPATIENT
Start: 2021-12-06 | End: 2021-12-28 | Stop reason: HOSPADM

## 2021-12-06 RX ORDER — TALC
6 POWDER (GRAM) TOPICAL NIGHTLY PRN
Status: DISCONTINUED | OUTPATIENT
Start: 2021-12-06 | End: 2021-12-28 | Stop reason: HOSPADM

## 2021-12-06 RX ORDER — POLYETHYLENE GLYCOL 3350 17 G/17G
17 POWDER, FOR SOLUTION ORAL 2 TIMES DAILY
Status: CANCELLED | OUTPATIENT
Start: 2021-12-06

## 2021-12-06 RX ORDER — PANTOPRAZOLE SODIUM 40 MG/1
40 TABLET, DELAYED RELEASE ORAL DAILY
Status: DISCONTINUED | OUTPATIENT
Start: 2021-12-07 | End: 2021-12-28 | Stop reason: HOSPADM

## 2021-12-06 RX ORDER — ALPRAZOLAM 0.25 MG/1
0.25 TABLET ORAL 3 TIMES DAILY
Status: DISCONTINUED | OUTPATIENT
Start: 2021-12-07 | End: 2021-12-28 | Stop reason: HOSPADM

## 2021-12-06 RX ORDER — PANTOPRAZOLE SODIUM 40 MG/1
40 TABLET, DELAYED RELEASE ORAL DAILY
Status: CANCELLED | OUTPATIENT
Start: 2021-12-07

## 2021-12-06 RX ORDER — FUROSEMIDE 40 MG/1
40 TABLET ORAL DAILY
Status: DISCONTINUED | OUTPATIENT
Start: 2021-12-06 | End: 2021-12-06 | Stop reason: HOSPADM

## 2021-12-06 RX ORDER — FUROSEMIDE 40 MG/1
40 TABLET ORAL DAILY
Qty: 30 TABLET | Refills: 11 | Status: ON HOLD
Start: 2021-12-06 | End: 2021-12-27 | Stop reason: HOSPADM

## 2021-12-06 RX ORDER — ALPRAZOLAM 0.25 MG/1
0.25 TABLET ORAL 3 TIMES DAILY
Status: CANCELLED | OUTPATIENT
Start: 2021-12-06

## 2021-12-06 RX ORDER — PREDNISONE 20 MG/1
20 TABLET ORAL DAILY
Status: DISCONTINUED | OUTPATIENT
Start: 2021-12-07 | End: 2021-12-21

## 2021-12-06 RX ORDER — FLUTICASONE FUROATE AND VILANTEROL 100; 25 UG/1; UG/1
1 POWDER RESPIRATORY (INHALATION) DAILY
Status: CANCELLED | OUTPATIENT
Start: 2021-12-07

## 2021-12-06 RX ORDER — ONDANSETRON 2 MG/ML
4 INJECTION INTRAMUSCULAR; INTRAVENOUS EVERY 8 HOURS PRN
Status: CANCELLED | OUTPATIENT
Start: 2021-12-06

## 2021-12-06 RX ORDER — METFORMIN HYDROCHLORIDE 500 MG/1
500 TABLET ORAL 2 TIMES DAILY WITH MEALS
Qty: 180 TABLET | Refills: 3 | Status: ON HOLD
Start: 2021-12-06 | End: 2021-12-27 | Stop reason: SDUPTHER

## 2021-12-06 RX ORDER — PRAVASTATIN SODIUM 20 MG/1
20 TABLET ORAL DAILY
Status: DISCONTINUED | OUTPATIENT
Start: 2021-12-07 | End: 2021-12-28 | Stop reason: HOSPADM

## 2021-12-06 RX ORDER — ESCITALOPRAM OXALATE 5 MG/1
5 TABLET ORAL DAILY
Status: CANCELLED | OUTPATIENT
Start: 2021-12-07

## 2021-12-06 RX ORDER — FUROSEMIDE 40 MG/1
40 TABLET ORAL DAILY
Status: CANCELLED | OUTPATIENT
Start: 2021-12-07

## 2021-12-06 RX ORDER — ONDANSETRON 2 MG/ML
4 INJECTION INTRAMUSCULAR; INTRAVENOUS EVERY 8 HOURS PRN
Status: DISCONTINUED | OUTPATIENT
Start: 2021-12-06 | End: 2021-12-08

## 2021-12-06 RX ORDER — SODIUM CHLORIDE 0.9 % (FLUSH) 0.9 %
10 SYRINGE (ML) INJECTION
Status: CANCELLED | OUTPATIENT
Start: 2021-12-06

## 2021-12-06 RX ORDER — AMOXICILLIN 250 MG
1 CAPSULE ORAL 2 TIMES DAILY
Status: DISCONTINUED | OUTPATIENT
Start: 2021-12-06 | End: 2021-12-08

## 2021-12-06 RX ORDER — ACETAMINOPHEN 325 MG/1
650 TABLET ORAL EVERY 6 HOURS PRN
Status: CANCELLED | OUTPATIENT
Start: 2021-12-06

## 2021-12-06 RX ORDER — INSULIN ASPART 100 [IU]/ML
0-5 INJECTION, SOLUTION INTRAVENOUS; SUBCUTANEOUS
Status: CANCELLED | OUTPATIENT
Start: 2021-12-06

## 2021-12-06 RX ORDER — FLUTICASONE FUROATE AND VILANTEROL 100; 25 UG/1; UG/1
1 POWDER RESPIRATORY (INHALATION) DAILY
Status: DISCONTINUED | OUTPATIENT
Start: 2021-12-07 | End: 2021-12-28 | Stop reason: HOSPADM

## 2021-12-06 RX ORDER — AMOXICILLIN 250 MG
1 CAPSULE ORAL 2 TIMES DAILY
Status: CANCELLED | OUTPATIENT
Start: 2021-12-06

## 2021-12-06 RX ORDER — GABAPENTIN 100 MG/1
100 CAPSULE ORAL NIGHTLY
Status: CANCELLED | OUTPATIENT
Start: 2021-12-06

## 2021-12-06 RX ORDER — POLYETHYLENE GLYCOL 3350 17 G/17G
17 POWDER, FOR SOLUTION ORAL 2 TIMES DAILY
Status: DISCONTINUED | OUTPATIENT
Start: 2021-12-07 | End: 2021-12-08

## 2021-12-06 RX ORDER — DILTIAZEM HYDROCHLORIDE 180 MG/1
180 CAPSULE, COATED, EXTENDED RELEASE ORAL DAILY
Status: CANCELLED | OUTPATIENT
Start: 2021-12-07

## 2021-12-06 RX ORDER — ACETAMINOPHEN 325 MG/1
650 TABLET ORAL EVERY 6 HOURS PRN
Status: DISCONTINUED | OUTPATIENT
Start: 2021-12-06 | End: 2021-12-28 | Stop reason: HOSPADM

## 2021-12-06 RX ORDER — INSULIN ASPART 100 [IU]/ML
0-5 INJECTION, SOLUTION INTRAVENOUS; SUBCUTANEOUS
Status: DISCONTINUED | OUTPATIENT
Start: 2021-12-06 | End: 2021-12-28 | Stop reason: HOSPADM

## 2021-12-06 RX ORDER — ESCITALOPRAM OXALATE 5 MG/1
5 TABLET ORAL DAILY
Status: DISCONTINUED | OUTPATIENT
Start: 2021-12-07 | End: 2021-12-28 | Stop reason: HOSPADM

## 2021-12-06 RX ORDER — GABAPENTIN 100 MG/1
100 CAPSULE ORAL NIGHTLY
Status: DISCONTINUED | OUTPATIENT
Start: 2021-12-07 | End: 2021-12-28 | Stop reason: HOSPADM

## 2021-12-06 RX ORDER — DILTIAZEM HYDROCHLORIDE 180 MG/1
180 CAPSULE, COATED, EXTENDED RELEASE ORAL DAILY
Status: DISCONTINUED | OUTPATIENT
Start: 2021-12-07 | End: 2021-12-28 | Stop reason: HOSPADM

## 2021-12-06 RX ORDER — FUROSEMIDE 40 MG/1
40 TABLET ORAL DAILY
Status: DISCONTINUED | OUTPATIENT
Start: 2021-12-07 | End: 2021-12-13

## 2021-12-06 RX ORDER — PRAVASTATIN SODIUM 20 MG/1
20 TABLET ORAL DAILY
Status: CANCELLED | OUTPATIENT
Start: 2021-12-07

## 2021-12-06 RX ORDER — PREDNISONE 20 MG/1
20 TABLET ORAL DAILY
Status: CANCELLED | OUTPATIENT
Start: 2021-12-07

## 2021-12-06 RX ADMIN — FUROSEMIDE 40 MG: 40 TABLET ORAL at 08:12

## 2021-12-06 RX ADMIN — FLUTICASONE FUROATE AND VILANTEROL TRIFENATATE 1 PUFF: 100; 25 POWDER RESPIRATORY (INHALATION) at 09:12

## 2021-12-06 RX ADMIN — TIOTROPIUM BROMIDE INHALATION SPRAY 2 PUFF: 3.12 SPRAY, METERED RESPIRATORY (INHALATION) at 08:12

## 2021-12-06 RX ADMIN — APIXABAN 5 MG: 5 TABLET, FILM COATED ORAL at 08:12

## 2021-12-06 RX ADMIN — PREDNISONE 20 MG: 20 TABLET ORAL at 08:12

## 2021-12-06 RX ADMIN — ALPRAZOLAM 0.25 MG: 0.25 TABLET ORAL at 08:12

## 2021-12-06 RX ADMIN — DILTIAZEM HYDROCHLORIDE 180 MG: 180 CAPSULE, COATED, EXTENDED RELEASE ORAL at 08:12

## 2021-12-06 RX ADMIN — ALPRAZOLAM 0.25 MG: 0.25 TABLET ORAL at 09:12

## 2021-12-06 RX ADMIN — APIXABAN 5 MG: 5 TABLET, FILM COATED ORAL at 09:12

## 2021-12-06 RX ADMIN — ESCITALOPRAM OXALATE 5 MG: 5 TABLET, FILM COATED ORAL at 08:12

## 2021-12-06 RX ADMIN — POLYETHYLENE GLYCOL 3350 17 G: 17 POWDER, FOR SOLUTION ORAL at 09:12

## 2021-12-06 RX ADMIN — PANTOPRAZOLE SODIUM 40 MG: 40 TABLET, DELAYED RELEASE ORAL at 08:12

## 2021-12-06 RX ADMIN — Medication 6 MG: at 12:12

## 2021-12-06 RX ADMIN — ALPRAZOLAM 0.25 MG: 0.25 TABLET ORAL at 03:12

## 2021-12-06 RX ADMIN — MELATONIN TAB 3 MG 6 MG: 3 TAB at 11:12

## 2021-12-06 RX ADMIN — PRAVASTATIN SODIUM 20 MG: 20 TABLET ORAL at 08:12

## 2021-12-06 RX ADMIN — POLYETHYLENE GLYCOL 3350 17 G: 17 POWDER, FOR SOLUTION ORAL at 08:12

## 2021-12-06 RX ADMIN — GABAPENTIN 100 MG: 100 CAPSULE ORAL at 09:12

## 2021-12-07 LAB
POCT GLUCOSE: 140 MG/DL (ref 70–110)
POCT GLUCOSE: 204 MG/DL (ref 70–110)
POCT GLUCOSE: 81 MG/DL (ref 70–110)

## 2021-12-07 PROCEDURE — 97162 PT EVAL MOD COMPLEX 30 MIN: CPT

## 2021-12-07 PROCEDURE — 97535 SELF CARE MNGMENT TRAINING: CPT

## 2021-12-07 PROCEDURE — 63600175 PHARM REV CODE 636 W HCPCS: Performed by: INTERNAL MEDICINE

## 2021-12-07 PROCEDURE — 97116 GAIT TRAINING THERAPY: CPT

## 2021-12-07 PROCEDURE — 11000004 HC SNF PRIVATE

## 2021-12-07 PROCEDURE — 27000190 HC CPAP FULL FACE MASK W/VALVE

## 2021-12-07 PROCEDURE — 94660 CPAP INITIATION&MGMT: CPT

## 2021-12-07 PROCEDURE — 25000242 PHARM REV CODE 250 ALT 637 W/ HCPCS: Performed by: INTERNAL MEDICINE

## 2021-12-07 PROCEDURE — 97530 THERAPEUTIC ACTIVITIES: CPT

## 2021-12-07 PROCEDURE — 99900035 HC TECH TIME PER 15 MIN (STAT)

## 2021-12-07 PROCEDURE — C9399 UNCLASSIFIED DRUGS OR BIOLOG: HCPCS | Performed by: INTERNAL MEDICINE

## 2021-12-07 PROCEDURE — 97165 OT EVAL LOW COMPLEX 30 MIN: CPT

## 2021-12-07 PROCEDURE — 25000003 PHARM REV CODE 250: Performed by: INTERNAL MEDICINE

## 2021-12-07 RX ADMIN — FLUTICASONE FUROATE AND VILANTEROL TRIFENATATE 1 PUFF: 100; 25 POWDER RESPIRATORY (INHALATION) at 09:12

## 2021-12-07 RX ADMIN — SENNOSIDES AND DOCUSATE SODIUM 1 TABLET: 50; 8.6 TABLET ORAL at 10:12

## 2021-12-07 RX ADMIN — INSULIN ASPART 1 UNITS: 100 INJECTION, SOLUTION INTRAVENOUS; SUBCUTANEOUS at 09:12

## 2021-12-07 RX ADMIN — ESCITALOPRAM 5 MG: 5 TABLET, FILM COATED ORAL at 09:12

## 2021-12-07 RX ADMIN — TIOTROPIUM BROMIDE INHALATION SPRAY 2 PUFF: 3.12 SPRAY, METERED RESPIRATORY (INHALATION) at 09:12

## 2021-12-07 RX ADMIN — PREDNISONE 20 MG: 20 TABLET ORAL at 09:12

## 2021-12-07 RX ADMIN — DILTIAZEM HYDROCHLORIDE 180 MG: 180 CAPSULE, COATED, EXTENDED RELEASE ORAL at 09:12

## 2021-12-07 RX ADMIN — PANTOPRAZOLE SODIUM 40 MG: 40 TABLET, DELAYED RELEASE ORAL at 09:12

## 2021-12-07 RX ADMIN — APIXABAN 5 MG: 2.5 TABLET, FILM COATED ORAL at 09:12

## 2021-12-07 RX ADMIN — INSULIN DETEMIR 10 UNITS: 100 INJECTION, SOLUTION SUBCUTANEOUS at 09:12

## 2021-12-07 RX ADMIN — PRAVASTATIN SODIUM 20 MG: 20 TABLET ORAL at 09:12

## 2021-12-07 RX ADMIN — APIXABAN 5 MG: 2.5 TABLET, FILM COATED ORAL at 10:12

## 2021-12-07 RX ADMIN — ALPRAZOLAM 0.25 MG: 0.25 TABLET ORAL at 09:12

## 2021-12-07 RX ADMIN — FUROSEMIDE 40 MG: 40 TABLET ORAL at 09:12

## 2021-12-07 RX ADMIN — ALPRAZOLAM 0.25 MG: 0.25 TABLET ORAL at 03:12

## 2021-12-07 RX ADMIN — ALPRAZOLAM 0.25 MG: 0.25 TABLET ORAL at 10:12

## 2021-12-07 RX ADMIN — GABAPENTIN 100 MG: 100 CAPSULE ORAL at 10:12

## 2021-12-07 NOTE — PLAN OF CARE
Problem: Physical Therapy Goal  Goal: Physical Therapy Goal  Description: Goals to be met by: 12/28/21    Patient will increase functional independence with mobility by performing:    . Supine to sit with Set-up [place]  . Sit to supine with Set-up [place]  . Rolling to Left and Right with Set-up Assistance.  . Sit to stand transfer with Stand-by Assistance  . Bed to chair transfer with Stand-by Assistance using Rolling Walker  . Gait  x 50 feet with Contact Guard Assistance using Rolling Walker.   . Wheelchair propulsion x50 feet with Minimal Assistance using bilateral uppper extremities  . Ascend/Descend 4 inch curb step with Minimal Assistance using Rolling Walker.    Outcome: Ongoing, Progressing

## 2021-12-07 NOTE — PROGRESS NOTES
[facility]                                  Skilled Nursing Facility                   Progress Note     Admit Date: 12/6/2021  MARK ANTHONY TBD  Principal Problem:  COPD with acute exacerbation   HPI obtained from patient interview and chart review     Chief Complaint: Establish Care/ Initial Visit     HPI:   Ms. Mari is a 76 year old female with PMHx of end-stage COPD, PE (on eliquis), HTN and HLD who presents to SNF following hospitalization for COPD exacerbation.  Admission to SNF for secondary weakness and debility.     Patient originally presented to [facility] ED on 11/23 with worsening SOB. History obtained from daughter and EMS records given patient's severe respiratory status. Of note, patient's daughter is a respiratory therapist w/ [facility]. Daughter states patient has been increasingly SOB for approximately 1 week. She is on continuous 2L O2 at home and has required more SpO2 to maintain SpO2 88-92%. Baseline function is limited by COPD but patient is able to ambulate w/ walker and requires an AID throughout the day.  On 11/22, patient became severely short of breath and was not able to talk in full sentances. Also endorsed increased sputum production and decreased PO intake. Patient is on long term steroids and takes prednisone 15mg daily. In the ED, patient hypertensive and tachycardic w/ SpO2 89% on CPAP. On examination, patient in respiratory distress w/ accessory muscle use and pursed lips. Decreased breath sounds in all lung fields w/ expiratory wheezes. Labs significant for WBC 14.47, pH 7.3, pCO2 81.9 and pO2 33. CXR w/ emphysematous changes. Patient received 1 x dose of Solumedrol 125mg in the ED. MICU consulted for increasing BiPAP requirements. Patient admitted to ICU for further evaluation. Patient admitted for COPD exacerbation and treated w/ duonebs, levoquin, IV Solu-Medrol and put on continuous BiPAP. Patient now w/ improving  respiratory status - hypercapnia improved on VBG. Now w/ SpO2 88-92% on 5L NC. Will order BiPAP 4 hours on/off and QHS. Of note, patient w/ R LE cellulitis. Given hx of MRSA, will start Bactrim. Persistent BLE edema and CXR showed pulmonary edema so started on IV lasix 40 mg daily. TTE showed EF 65% and normal LV diastolic function. Transitioned to oral lasix 40 mg daily prior to discharge.      Today, patient reports dyspnea on exertion that is slightly beyond her baseline, her breathing at rest is at her baseline.  She is currently utilizing 3 L nasal cannula with SpO2 of 93-95%.  Patient states she is now having normal bowel movements.  Patient continues to have lower extremity edema that is improving.    Patient will be treated at [facility] with PT and OT to improve functional status and ability to perform ADLs.     Past Medical History: Patient has a past medical history of Actinic keratosis, Arthritis, Cataract, Cellulitis of ankle, Colon polyps, COPD (chronic obstructive pulmonary disease), Family history of colon cancer, History of Clostridium difficile infection (7/3/2012), Hyperlipidemia, Hypertension, Lung nodules, and Sinus tachycardia.    Past Surgical History: Patient has a past surgical history that includes Appendectomy; Eye surgery; Tonsillectomy;  section; Cataract extraction (Bilateral, ); and Colonoscopy (N/A, 10/10/2017).    Social History: Patient reports that she quit smoking about 26 years ago. Her smoking use included cigarettes. She has a 39.00 pack-year smoking history. She has never used smokeless tobacco. She reports current alcohol use of about 2.0 standard drinks of alcohol per week. She reports that she does not use drugs.    Family History: family history includes Blindness in her paternal grandmother; Breast cancer in her sister; COPD in her father; Cancer in her sister and son; Cancer (age of onset: 79) in her mother; Cataracts in her mother and sister; Diabetes in  her father and paternal grandmother; Glaucoma in her mother; Heart disease in her father, mother, and sister; Hyperlipidemia in her father, mother, and sister; Hypertension in her father, mother, and sister; Skin cancer in her mother and sister; Thyroid disease in her daughter and mother.    Allergies: Patient is allergic to bactrim [sulfamethoxazole-trimethoprim], codeine, keflex [cephalexin], ceftriaxone, clindamycin hcl, doxycycline, and vancomycin analogues.    ROS  Constitutional: Negative for fever   Eyes: Negative for blurred vision, double vision   Respiratory:  +mild intermittent dry cough, TRAVIS  Cardiovascular: Negative for chest pain, palpitations.  + leg swelling.   Gastrointestinal: Negative for abdominal pain, constipation, diarrhea, nausea, vomiting.   Genitourinary: Negative for dysuria, frequency   Musculoskeletal:  + generalized weakness. Negative for back pain and myalgias.   Skin: Negative for itching and rash.   Neurological: Negative for dizziness, headaches.   Psychiatric/Behavioral: Negative for depression. The patient is not nervous/anxious.      24 hour Vital Sign Range   Temp:  [98 °F (36.7 °C)-98.3 °F (36.8 °C)]   Pulse:  [53-76]   Resp:  [17-19]   BP: (120-135)/(53-63)   SpO2:  [91 %-97 %]     PEx  Constitutional: Patient appears debilitated.  No distress noted  HENT:   Head: Normocephalic and atraumatic.   Eyes: Pupils are equal, round  Neck: Normal range of motion. Neck supple.   Cardiovascular: Normal rate, regular rhythm and normal heart sounds.    Pulmonary/Chest: Effort normal and breath sounds are clear  Abdominal: Soft. Bowel sounds are normal.   Musculoskeletal: Normal range of motion.   Neurological: Alert and oriented to person, place, and time.   Psychiatric: Normal mood and affect. Behavior is normal.   Skin: Skin is warm and dry. Full skin assessment completed. Right lower lateral leg- stable scab over wound bed    WOUND  Date identified - POA- 12/6/2021  Location:   Sacrum  Type:  Ruptured blister  Stage (if pressure):  States to  Appearance:  Ruptured blister  Wound length: 1cm   Wound width: 1cm  Wound depth: 0.2cm   Undermining/tunneling: No   Drainage:  None  Odor: none   Edges: Irregular edges, rolled  Periwound: normal skin tone   Progress:  Initial assessment    WOUND  Date identified - POA- 12/6/2021  Location:  Right heel  Type:  Unstageable pressure injury  Appearance: tan moist slough   Wound length: 1cm   Wound width: 1cm  Wound depth: 0.1cm   Undermining/tunneling: No   Drainage:  None  Odor: none   Edges: Irregular edges,  Periwound: normal skin tone   Progress:  Initial assessment    Information obtained from another source:  measurements obtained from wound care RN       No results for input(s): GLUCOSE, NA, K, CL, CO2, BUN, CREATININE, MG in the last 24 hours.    Invalid input(s):  CALCIUM    No results for input(s): WBC, RBC, HGB, HCT, PLT, MCV, MCH, MCHC in the last 24 hours.      Assessment and Plan:      Acute on chronic respiratory failure with hypercapnia- improving   COPD with acute exacerbation  Pulmonary edema  - chronic home oxygen at 2 LPM.   - Transition IV solumedrol to prednisone 60 mg daily with taper over the next 2 weeks back to home dose of 15 mg daily.   - completed 5 day course of azithromycin 250 mg  - continue home inhalesr.   - currently using 3 L nasal cannula, wean to home dose of 2 L  - continue Lasix 40 mg daily ( takes 20 mg at home)   - initiated PRN levalbuterol nebulizer treatments    Long term current use of systemic steroids  - Takes prednisone 15 mg daily for end-stage COPD  - taper back 15mg as status improves. started on prednisone 60 mg --> 40 mg daily --> now currently receiving prednisone 20 mg daily     Peripheral neuropathy  - continue home gabapentin 100 mg qHS.      Generalized anxiety disorder  - Continue home alprazolam 0.25 mg TID, escitalopram 5 mg daily.      Encounter for palliative care  - patient is being  followed by palliative care in an outpatient setting. Patient is DNR/DNI.   - as per previous notes, patient was enrolled in inpatient hospice and then revoked it.   - Patient's daughter is amendable to hospice if patient does not improve on current treatment.      Acute pulmonary embolism  - Continue apixaban 5 mg BID.      Hyperglycemia  - due to steroid use  - HA1c (5/2021): 7.7  - determir 10 units nightly while inpatient and LDSSI  - discharge on metformin     Cellulitis of right lower extremity  - Continue bactrim DS BID to complete a week.   - Continue to monitor response. Area is improving.      B/l lower extremity edema  - CXR showed bibasilar edema (R>L)  - resumed home lasix --> IV lasix 40 mg daily   - strict I/Os   - prior TTE in 5/2021 normal   - TTE showed EF 65% and normal LV diastolic function      Hyperlipidemia  - Continue home pravastatin 20 mg daily    Essential hypertension  - Continue diltiazem 180 mg daily.    Debility   - Continue with PT/OT for gait training and strengthening and restoration of ADL's   - Encourage mobility, OOB in chair, and early ambulation as appropriate  - Fall precautions   - Monitor for bowel and bladder dysfunction  - Monitor for and prevent skin breakdown and pressure ulcers  - Continue DVT prophylaxis with  apixaban 5 mg BID         Anticipate disposition:  Home with home health      Follow-up needed during SNF stay-    Follow-up needed after discharge from SNF: PCP, pulmonology    No future appointments.      I certify that SNF services are required to be given on an inpatient basis because Leyla Mari needs for skilled nursing care and/or skilled rehabilitation are required on a daily basis and such services can only practically be provided in a skilled nursing facility setting and are for an ongoing condition for which she received inpatient care in the hospital.     Total time of the visit 115 minutes [id number]  Non physical exam/ non charting time: 70  minutes   Description of non physical exam/non charting time: counseling patient on clinical conditions and therapies provided regarding COPD exacerbation, PRN nebulizer treatments, home inhalers, double dose of Lasix to be tapered down to home dose as edema improved, higher prednisone dose to be tapered down to home dose as condition improves, importance of follow-up appointments and the beginning of discharge planning.  Extensive chart review completed including all consultation notes.  All pertinent laboratory and radiographical images reviewed.        Emerald Clark NP  Department of Hospital Medicine   [facility]     DOS: 12/7/2021       Patient note was created using MModal Dictation.  Any errors in syntax or even information may not have been identified and edited on initial review prior to signing this note.

## 2021-12-07 NOTE — PLAN OF CARE
Problem: Adult Inpatient Plan of Care  Goal: Plan of Care Review  Outcome: Ongoing, Progressing  Goal: Patient-Specific Goal (Individualization)  Outcome: Ongoing, Progressing  Goal: Absence of Hospital-Acquired Illness or Injury  Outcome: Ongoing, Progressing  Goal: Optimal Comfort and Wellbeing  Outcome: Ongoing, Progressing  Goal: Readiness for Transition of Care  Outcome: Ongoing, Progressing  Goal: Rounds/Family Conference  Outcome: Ongoing, Progressing     Problem: Fluid Imbalance (Pneumonia)  Goal: Fluid Balance  Outcome: Ongoing, Progressing     Problem: Infection (Pneumonia)  Goal: Resolution of Infection Signs/Symptoms  Outcome: Ongoing, Progressing     Problem: Respiratory Compromise (Pneumonia)  Goal: Effective Oxygenation and Ventilation  Outcome: Ongoing, Progressing     Problem: Wound  Goal: Optimal Wound Healing  Outcome: Ongoing, Progressing     Problem: Skin Injury Risk Increased  Goal: Skin Health and Integrity  Outcome: Ongoing, Progressing

## 2021-12-07 NOTE — PT/OT/SLP EVAL
Occupational Therapy   Evaluation / Treatment    Name: Leyla Mari  MRN: [id number]  Admit Date: 12/6/2021  Admitting Diagnosis:  COPD with acute exacerbation   Recent Surgeries: N/A    General Precautions: Standard, fall,respiratory   Orthopedic Precautions:N/A   Braces: N/A     Recommendations:     Discharge Recommendations: home health OT  Level of Assistance Recommended: 24 hours light assistance  Discharge Equipment Recommendations:   (TBD)  Barriers to discharge:  None    Assessment:     Leyla Mari is a 76 y.o. female with a medical diagnosis of COPD with acute exacerbation   She remains limited in performance of self-care , functional mobility and ADLs and currently not performing tasks at PLOF . Currently presenting with performance deficits including weakness,impaired endurance,impaired self care skills,impaired functional mobilty,gait instability,impaired balance,decreased upper extremity function,decreased lower extremity function,pain,decreased ROM,impaired cardiopulmonary response to activity,decreased coordination,edema.      Rehab Potential is good    Activity Tolerance: Good    Plan:     Patient to be seen 6 x/week to address the above listed problems via self-care/home management,therapeutic activities,therapeutic exercises  · Plan of Care Expires: 01/07/22  · Plan of Care Reviewed with: patient    Subjective     Chief Complaint: Pt reporting difficulty going from sitting to standing.  Patient/Family Comments/goals: Pt wants to return to PLOF.    Occupational Profile:    Living Environment: Pt lives with her son in a single story home with 5 DANIS with (B) hand rails but can't reach both at the same time. Pt has a walk in shower and a BSC over he toilet to raise the seat height.  Pt's son in law bumps Pt up and down steps in her W/C.  Previous level of function: Pt was receiving assist from her son and daughter  With cooking  But was receiving assist from aides Mon to Thurs from 7 AM to 5 PM.    Equipment Used at Home:   Lift chair, W/C ( old ) hospital bed, BSC and RW.  Assistance upon Discharge: Family to assist post D/C    Pain/Comfort:  · Pain Rating 1: 0/10  · Pain Rating Post-Intervention 1: 0/10    Patients cultural, spiritual, Orthodox conflicts given the current situation: no    Objective:     Communicated with: nurse prior to session.  Patient found up in chair with oxygen upon OT entry to room.    Occupational Performance:     Eating   Assistance Needed Set-up / clean-up   Physical Assistance Level No physical assistance   CARE Score - Eating 5   Oral Hygiene   Assistance Needed Set-up / clean-up   Physical Assistance Level No physical assistance  (seated sinkside)   CARE Score - Oral Hygiene 5   Toileting Hygiene   Assistance Needed Physical assistance   Physical Assistance Level 51%-75%  (from [facility] over toilet.)   CARE Score - Toileting Hygiene 2   Shower/Bathe Self   Assistance Needed Physical assistance   Physical Assistance Level 51%-75%   CARE Score - Shower/Bathe Self 2   Upper Body Dressing   Assistance Needed Physical assistance   Physical Assistance Level 25% or less  (donning pullover shirt.)   CARE Score - Upper Body Dressing 3   Lower Body Dressing   Assistance Needed Physical assistance   Physical Assistance Level 51%-75%  (donning pants and socks.)   CARE Score - Lower Body Dressing 2   Putting On/Taking Off Footwear   Assistance Needed Physical assistance   Physical Assistance Level 76% or more   CARE Score - Putting On/Taking Off Footwear 2   Roll Left and Right   Assistance Needed Incidental touching   Physical Assistance Level 25% or less   CARE Score - Roll Left and Right 3   Sit to Lying   Assistance Needed Physical assistance   Physical Assistance Level 26%-50%   CARE Score - Sit to Lying 3   Lying to Sitting on Side of Bed   Assistance Needed Physical assistance   Physical Assistance Level 26%-50%   CARE Score - Lying to Sitting on Side of Bed 3   Sit to Stand    Assistance Needed Physical assistance   Physical Assistance Level 76% or more   CARE Score - Sit to Stand 2   Chair/Bed-to-Chair Transfer   Assistance Needed Physical assistance   Physical Assistance Level 25% or less   CARE Score - Chair/Bed-to-Chair Transfer 3   Toilet Transfer   Assistance Needed Physical assistance   Physical Assistance Level 25% or less   CARE Score - Toilet Transfer 3       Cognitive/Visual Perceptual:  Cognitive/Psychosocial Skills:     -       Oriented to: Person, Place, Time and Situation   -       Follows Commands/attention:Follows multistep  commands  -       Communication: clear/fluent  -       Memory: No Deficits noted  -       Safety awareness/insight to disability: intact   -       Mood/Affect/Coping skills/emotional control: Appropriate to situation  Visual/Perceptual:      -Intact .    Physical Exam:  Balance: -  Pt demonstrated Good  functional sitting balance as noted when performing self care tasks. Fair Minus  functional standing with RW and  CGA required for safety.    Postural examination/scapula alignment:    -       Rounded shoulders  Skin integrity: Visible skin intact  Edema:  Mild (B) LEs  Sensation:    -       Intact  Motor Planning: -       WFLs  Dominant hand: -       Right  Upper Extremity Range of Motion:     -       Right Upper Extremity: WFL except Shld Flexion and Abduction with both limited to 0-100 degs AROM and 0-110 degs A/AROM  -       Left Upper Extremity: WFL except Shld Flexion and Abduction with both limited to 0-100 degs AROM and 0-110 degs A/AROM  Upper Extremity Strength:    -       Right Upper Extremity: Grossly 4-/5 throughout.  -       Left Upper Extremity: Grossly 4-/5 throughout.   Strength:    -       Right Upper Extremity: WFL  -       Left Upper Extremity: WFL  Fine Motor Coordination:    -    Grossly Intact    AMPAC 6 Click ADL:  AMPAC Total Score: 16    Treatment & Education:  Pt edu on role of OT, POC, safety when performing self care  tasks , benefit of performing OOB activity, and safety when performing functional transfers and mobility.  - White board updated  - Self care tasks completed-- as noted above   Education:    Patient left up in chair with call button in reach    GOALS:   Multidisciplinary Problems     Occupational Therapy Goals        Problem: Occupational Therapy Goal    Goal Priority Disciplines Outcome Interventions   Occupational Therapy Goal     OT, PT/OT Ongoing, Progressing    Description: Goals to be met by: 21     Patient will increase functional independence with ADLs by performing:    UE Dressing with Modified [place].  LE Dressing with Minimal Assistance.  Grooming while seated at sink with Modified [place].  Toileting from toilet or BSC with Minimal Assistance for hygiene and clothing management.   Bathing from  sitting at sink with Stand-by Assistance.  Rolling to Bilateral with Modified [place].   Supine to sit with Modified [place].  Toilet transfer to toilet or BSC with Stand-by Assistance with RW .  Stand Pivot transfer with Stand-by Assistance with RW .  Upper extremity exercise with assistance as needed.  Caregiver will be educated on level of assist required to safely perform self care tasks and functional transfers..                      History:     Past Medical History:   Diagnosis Date    Actinic keratosis     Arthritis     Cataract     Cellulitis of ankle     Colon polyps     COPD (chronic obstructive pulmonary disease)     home O2 (2L/min)     Family history of colon cancer     History of Clostridium difficile infection 7/3/2012    Hyperlipidemia     Hypertension     Lung nodules     Sinus tachycardia        Past Surgical History:   Procedure Laterality Date    APPENDECTOMY      CATARACT EXTRACTION Bilateral      SECTION      x2    COLONOSCOPY N/A 10/10/2017    Procedure: COLONOSCOPY;  Surgeon: Omid Lino MD;  Location: [facility] ([address] [place] [address]);  Service:  Endoscopy;  Laterality: N/A;  pt unable to be checked in with previous order    EYE SURGERY      TONSILLECTOMY         Time Tracking:     OT Date of Treatment: 12/07/21  OT Start Time: 1115  OT Stop Time: 1210  OT Total Time (min): 55 min    Billable Minutes:Evaluation 15   Self Care/Home Management 40    12/7/2021

## 2021-12-07 NOTE — PT/OT/SLP EVAL
Physical Therapy Evaluation    Patient Name:  Leyla Mari   MRN:  [id number]  Admit Date: 12/6/2021  Admitting Diagnosis: COPD with acute exacerbation  Recent Surgeries: N/A    General Precautions: Standard, fall,respiratory   Orthopedic Precautions:N/A   Braces: N/A     Recommendations:     Discharge Recommendations:  home health PT   Level of Assistance Recommended: 24 hours light assistance  Discharge Equipment Recommendations:  (TBD)   Barriers to discharge: None    Assessment:     Leyla Mari is a 76 y.o. female admitted with a medical diagnosis of COPD with acute exacerbation .  Performance deficits affecting function  weakness,impaired endurance,impaired self care skills,impaired functional mobilty,gait instability,impaired balance,decreased upper extremity function,decreased lower extremity function,edema,impaired skin,impaired cardiopulmonary response to activity . Pt mainly requires SBA/(S) for ambulation and transfers PTA and now requires MaxA/Oswald. Pt will therefore benefit from continued snf rehab to help pt. Meet her POC goals prior to d/c home.    Rehab Potential is good     Activity Tolerance: Good    Plan:     Patient to be seen 5 x/week to address the above listed problems via gait training,therapeutic activities,therapeutic exercises,therapeutic groups,neuromuscular re-education,wheelchair management/training     Plan of Care Expires: 01/06/22  Plan of Care Reviewed with: patient    Subjective     Chief Complaint: none noted by pt.  Patient/Family Comments/goals: none noted by pt.  Pain/Comfort:  · Pain Rating 1: 0/10  · Pain Rating Post-Intervention 1: 0/10    Living Environment:   Pt lives with son in a 1 story home with 6 DANIS with B UE rails (too wide to hold both); 1 step in home to living room  Prior to admission, patients level of function was required assist for gait and ADLs with RW. Pt has a sitter 4 days/week while her son is at work and her daughter takes off on Fridays to be able  to assist. Pt. Was not negotiating stairs at home and instead getting bumped up and down in w/c  Equipment used at home: bedside commode,hospital bed,walker, rolling,wheelchair, pt uses the BSC over the toilet, lift chair.   Upon discharge, patient will have assistance from family and sitter.   DME owned (not currently used): none.     Patients cultural, spiritual, Baptism conflicts given the current situation: no    Objective:     Communicated with pt. prior to session.  Patient found supine with oxygen  upon PT entry to room.    Exams:  · Cognitive Exam:  Patient is oriented to Person, Place, Time and Situation  · Gross Motor Coordination:  WFL  · Sensation:    · -       Intact  · Skin Integrity/Edema:      · -       Skin integrity: Bruising of B U/Es and L/Es  · -       Edema: Mild B L/E's  · RLE ROM: WFL  · RLE Strength: grossly 3/5  · LLE ROM: WFL  · LLE Strength: grossly 3/5    Functional Mobility:     12/07/21 0800   Prior Functioning: Everyday Activities   Self Care Needed some help   Indoor Mobility (Ambulation) Needed some help   Stairs Dependent   Functional Cognition Independent   Prior Device Use Walker   Toileting Hygiene   Physical Assistance Level 51%-75%   Comment needed assistance for adjusting brief   CARE Score - Toileting Hygiene 2   Roll Left and Right   Physical Assistance Level 25% or less   Comment used rail because pt has a hospital bed at home   CARE Score - Roll Left and Right 3   Sit to Lying   Physical Assistance Level 26%-50%   Comment used rail because pt has a hospital bed at home   CARE Score - Sit to Lying 3   Lying to Sitting on Side of Bed   Physical Assistance Level 26%-50%   Comment used rail because pt has a hospital bed at home   CARE Score - Lying to Sitting on Side of Bed 3   Sit to Stand   Physical Assistance Level 76% or more   Comment Max A from toilet and w/c;  Oswald from bed with RW   CARE Score - Sit to Stand 2   Sit to Stand Discharge Goal   Discharge Goal 4    Chair/Bed-to-Chair Transfer   Physical Assistance Level 25% or less   Comment with RW x 2trials   CARE Score - Chair/Bed-to-Chair Transfer 3   Toilet Transfer   Physical Assistance Level 76% or more   Comment from regular toilet seat   CARE Score - Toilet Transfer 2   Car Transfer   Reason if not Attempted Environmental limitations   CARE Score - Car Transfer 10   Walk 10 Feet   Physical Assistance Level 25% or less   Comment with RW, pt with posterior lean x 2trials   CARE Score - Walk 10 Feet 3   Walk 50 Feet with Two Turns   Comment pt became SOB and fatigued at 20ft x 2 trials   Reason if not Attempted Safety concerns   CARE Score - Walk 50 Feet with Two Turns 88   Walk 150 Feet   Reason if not Attempted Safety concerns   CARE Score - Walk 150 Feet 88   Walking 10 Feet on Uneven Surfaces   Reason if not Attempted Safety concerns   CARE Score - Walking 10 Feet on Uneven Surfaces 88   1 Step (Curb)   Reason if not Attempted Safety concerns   CARE Score - 1 Step (Curb) 88   4 Steps   Reason if not Attempted Safety concerns   CARE Score - 4 Steps 88   12 Steps   Reason if not Attempted Safety concerns   CARE Score - 12 Steps 88   Picking Up Object   Reason if not Attempted Safety concerns   CARE Score - Picking Up Object 88   Uses a Wheelchair/Scooter?   Uses a Wheelchair/Scooter? Yes   Wheel 50 Feet with Two Turns   Physical Assistance Level 51%-75%   CARE Score - Wheel 50 Feet with Two Turns 2   Type of Wheelchair/Scooter Manual   Wheel 150 Feet   Comment pt. became fatigued after 50ft   Reason if not Attempted Safety concerns   CARE Score - Wheel 150 Feet 88   Type of Wheelchair/Scooter Manual       Education:   Patient provided with daily orientation and goals of this PT session.   Patient educated to transfer to bedside chair/bedside commode/bathroom with RN/PCT present.    Patient educated on Fall risk and plan of care by explanation.    Patient Verbalized Understanding.   Time provided for therapeutic  counseling and discussion of current health disposition. All questions answered to satisfaction, within scope of PT practice; voiced no other concerns. White board updated in patient's room, RN notified of session.      AM-PAC 6 CLICK MOBILITY  Total Score:11     Patient left up in chair with call button in reach.    GOALS:   Multidisciplinary Problems     Physical Therapy Goals        Problem: Physical Therapy Goal    Goal Priority Disciplines Outcome Goal Variances Interventions   Physical Therapy Goal     PT, PT/OT Ongoing, Progressing     Description: Goals to be met by: 21    Patient will increase functional independence with mobility by performing:    . Supine to sit with Set-up [place]  . Sit to supine with Set-up [place]  . Rolling to Left and Right with Set-up Assistance.  . Sit to stand transfer with Stand-by Assistance  . Bed to chair transfer with Stand-by Assistance using Rolling Walker  . Gait  x 50 feet with Contact Guard Assistance using Rolling Walker.   . Wheelchair propulsion x50 feet with Minimal Assistance using bilateral uppper extremities  . Ascend/Descend 4 inch curb step with Minimal Assistance using Rolling Walker.                     History:     Past Medical History:   Diagnosis Date    Actinic keratosis     Arthritis     Cataract     Cellulitis of ankle     Colon polyps     COPD (chronic obstructive pulmonary disease)     home O2 (2L/min)     Family history of colon cancer     History of Clostridium difficile infection 7/3/2012    Hyperlipidemia     Hypertension     Lung nodules     Sinus tachycardia        Past Surgical History:   Procedure Laterality Date    APPENDECTOMY      CATARACT EXTRACTION Bilateral      SECTION      x2    COLONOSCOPY N/A 10/10/2017    Procedure: COLONOSCOPY;  Surgeon: Omid Lino MD;  Location: [address]);  Service: Endoscopy;  Laterality: N/A;  pt unable to be checked in with previous order    EYE SURGERY       TONSILLECTOMY         Time Tracking:     PT Received On: 12/07/21  PT Start Time: 0810     PT Stop Time: 0904  PT Total Time (min): 54 min     Billable Minutes: Evaluation 30, Gait Training 10 and Therapeutic Activity 14mins      12/07/2021

## 2021-12-07 NOTE — PLAN OF CARE
Problem: Occupational Therapy Goal  Goal: Occupational Therapy Goal  Description: Goals to be met by: 12/28/21     Patient will increase functional independence with ADLs by performing:    UE Dressing with Modified [place].  LE Dressing with Minimal Assistance.  Grooming while seated at sink with Modified [place].  Toileting from toilet or BSC with Minimal Assistance for hygiene and clothing management.   Bathing from  sitting at sink with Stand-by Assistance.  Rolling to Bilateral with Modified [place].   Supine to sit with Modified [place].  Toilet transfer to toilet or BSC with Stand-by Assistance with RW .  Stand Pivot transfer with Stand-by Assistance with RW .  Upper extremity exercise with assistance as needed.  Caregiver will be educated on level of assist required to safely perform self care tasks and functional transfers..     Outcome: Ongoing, Progressing

## 2021-12-07 NOTE — CONSULTS
[facility] - Skilled Nursing  Wound Care    Patient Name:  Leyla Mari   MRN:  [id number]  Date: 2021  Diagnosis: COPD with acute exacerbation  Wound care consulted for coccyx, LLE, and right heel by RN   History:     Past Medical History:   Diagnosis Date    Actinic keratosis     Arthritis     Cataract     Cellulitis of ankle     Colon polyps     COPD (chronic obstructive pulmonary disease)     home O2 (2L/min)     Family history of colon cancer     History of Clostridium difficile infection 7/3/2012    Hyperlipidemia     Hypertension     Lung nodules     Sinus tachycardia        Social History     Socioeconomic History    Marital status:    Occupational History    Occupation: retired   Tobacco Use    Smoking status: Former Smoker     Packs/day: 1.00     Years: 39.00     Pack years: 39.00     Types: Cigarettes     Quit date: 1995     Years since quittin.1    Smokeless tobacco: Never Used   Substance and Sexual Activity    Alcohol use: Yes     Alcohol/week: 2.0 standard drinks     Types: 2 Glasses of wine per week     Comment: rarely has a glass of wine- not daily    Drug use: No    Sexual activity: Never   Other Topics Concern    Are you pregnant or think you may be? No    Breast-feeding No     Social Determinants of Health     Financial Resource Strain: Low Risk     Difficulty of Paying Living Expenses: Not hard at all   Food Insecurity: No Food Insecurity    Worried About Running Out of Food in the Last Year: Never true    Ran Out of Food in the Last Year: Never true   Transportation Needs: No Transportation Needs    Lack of Transportation (Medical): No    Lack of Transportation (Non-Medical): No   Physical Activity: Insufficiently Active    Days of Exercise per Week: 7 days    Minutes of Exercise per Session: 10 min   Stress: Stress Concern Present    Feeling of Stress : To some extent   Social Connections: Socially Isolated    Frequency of Communication with  Friends and Family: More than three times a week    Frequency of Social Gatherings with Friends and Family: Twice a week    Attends Taoist Services: Never    Active Member of Clubs or Organizations: No    Attends Club or Organization Meetings: Never    Marital Status:    Housing Stability: Low Risk     Unable to Pay for Housing in the Last Year: No    Number of Places Lived in the Last Year: 1    Unstable Housing in the Last Year: No       Precautions:     Allergies as of 12/06/2021 - Reviewed 11/23/2021   Allergen Reaction Noted    Bactrim [sulfamethoxazole-trimethoprim] Nausea Only 07/02/2012    Codeine Itching 07/02/2012    Keflex [cephalexin] Other (See Comments) 07/12/2012    Ceftriaxone Rash 08/12/2014    Clindamycin hcl Rash 10/05/2016    Doxycycline Rash 05/08/2017    Vancomycin analogues Rash 08/12/2014       [facility] Assessment Details/Treatment     Ms. Mari is lying on her left side. 'I try to stay off my back side. I move often.'  The sacrum has a ruptured blister- stage 2 POA- with friction.  She is continent of urine/bowels.  Triad applied over sacrum then covered with a sacral foam for comfort.   The right heel has an unstageable pressure injury-POA- tan moist slough covering the wound bed, irregular wound edges, slight redness to wound edges, no erythema. The wound bed was cleansed with sterile normal saline, triad applied to the wound bed then covered with a heel foam dressing.  The LLE has a small-moderate amount of clear weeping from mostly the lower posterior calf.  No open wounds observed. The leg was cleansed/Sween 24 lotion applied to the skin. An ABD dressing was applied to the calf area, secured with kerlix from toes to knees then secured with a tubigrip F for light compression. The anterior foot had slight edema- 1+  The RLE skin is intact with scar tissue to the upper lateral shin.  The right foot has 1+ edema, no weeping observed, no open wounds. The RLE was cleansed  and Sween 24 lotion was applied to the skin. Tubigrip E applied from toes to knees for light compression.     Plan:  Sacrum- Triad ointment BID/prn- may use sacral foam for comfort  Right heel- Triad ointment every Tues/Thurs/Sat then cover with a foam dressing  BLE legs- cleanse skin, apply Sween 24 lotion, LLE- ABD to calf area, wrap with kerlix from Toes to knees then secure with tubigrip F   RLE-apply tubigrip E for light compression every Tues/Thurs/Saturday  Waffle overlay and chair cushion ordered, heel protectors at bedside.   No diapers when in bed -- only for therapy please    Nursing to continue care, pressure prevention measures  Wound care will follow- up prn.  Recommendations made to primary team per written report for above plan . Orders placed.   Sacrum- stage 2 with friction- 1cm L x 1cm W x 0.2 cm D    Right heel unstageable- 1 cm L x 1 cm W     Left lower leg anterior    Left lower leg medial     Right lower lateral leg- stable scab over wound bed            12/07/2021

## 2021-12-07 NOTE — NURSING
" [facility], she was brought to us via stretcher, and transferred to bed in Room 333. Upon arrival pt had no complaints of pain or discomfort.    During head to toe assessment discovered shearing to coccyx area, small 1"x1" in size, very shallow, I cleansed the area with saline and clean gauze, wound bed light pink and intact. After cleansing the area applied triad and sacral foam dressing.  Noticed two very small blisters in the same area that had not become sheered, but are now also protected by the foam dressing.    Once socks and protective foam dressing were removed from heels there was a would on the medial right heel. Wound was wet, there was purulent drainage, and malodorous smell. Cleansed the area with saline and clean gauze, after cleansing wound the area surrounding the actual wound is red and blanchable, the wound itself has jagged edges, is round and probably 1' in diameter. The bed of the wound is covered in slough and was left intact. Applied triad and heel foam dressing. Findings reported to CN and charted in flowsheets        "

## 2021-12-07 NOTE — PLAN OF CARE
[facility] - Skilled Nursing  Initial Discharge Assessment       SW met with patient in room for Discharge Planning Assessment. Patient was able to answer all questions. Pt lives with son Pollo. Pt states s/he was previously not completing her own ADLs, her children are providing her care. Pt was using a wheelchair or walker for ambulation. Patient will have transportation assistance at discharge from her son or daughter. SW is following this Pt for DC planning needs. There are no identified needs at this time.     [place] [facility] [place] Management Department  [facility]  [email or web address]        Primary Care Provider: Lizbeth Dillard MD    Admission Diagnosis: Acute respiratory failure with hypoxia and hypercarbia [J96.01, J96.02]    Admission Date: 12/6/2021  Expected Discharge Date:     Discharge Barriers Identified: None    Payor: [employer] MEDICARE / Plan: [employer] 65 / Product Type: Medicare Advantage /     Extended Emergency Contact Information  Primary Emergency Contact: Laney Martinez  Address: [address] [place] [address] [place] [address] [place]  Mobile Phone: [phone]  Relation: Daughter    Discharge Plan A: Home with family,Home  Discharge Plan B: Home with family,Home Health      [facility]  [address] [place] [address]  Phone: [phone] Fax: [phone]    [facility]/pharmacy #[id number] - [place], [place] - [address]  [address]  [facility] [address]  Phone: [phone] Fax: [phone]    [facility] [address] [address]  Phone: [phone] Fax: [phone]      Initial Assessment (most recent)     Adult Discharge Assessment - 12/07/21 1542        Discharge Assessment    Assessment Type Discharge Planning Assessment     Confirmed/corrected address, phone number and  insurance Yes     Confirmed Demographics Correct on Facesheet     Source of Information patient     Communicated MARK ANTHONY with patient/caregiver No     Lives With child(ernesto), adult     [place] [facility] [place]     Do you expect to return to your current living situation? Yes     Do you have help at home or someone to help you manage your care at home? Yes     Who are your caregiver(s) and their phone number(s)? Laney Martinez [phone]     Prior to hospitilization cognitive status: Alert/Oriented     Current cognitive status: Alert/Oriented     Walking or Climbing Stairs Difficulty ambulation difficulty, requires equipment     Dressing/Bathing Difficulty bathing difficulty, requires equipment     Home Accessibility stairs to enter home     Number of Stairs, Main Entrance five     Surface of Stairs, Main Entrance concrete     Equipment Currently Used at Home walker, rolling;wheelchair;bedside commode;hospital bed;lift device     Readmission within 30 days? No     Patient currently being followed by outpatient case management? No     Do you currently have service(s) that help you manage your care at home? No     Is the pt/caregiver preference to resume services with current agency No     Do you take prescription medications? Yes     Do you have prescription coverage? Yes     Do you have any problems affording any of your prescribed medications? No     Is the patient taking medications as prescribed? yes     Who is going to help you get home at discharge? Laney Martinez [phone]     How do you get to doctors appointments? family or friend will provide     Are you on dialysis? No     Do you take coumadin? No     Discharge Plan A Home with family;Home     Discharge Plan B Home with family;Home Health     DME Needed Upon Discharge  other (see comments)   TBD    Discharge Plan discussed with: Patient     Discharge Barriers Identified None        Relationship/Environment    Name(s) of Who Lives With Patient Pollo Mari  son

## 2021-12-08 LAB
POCT GLUCOSE: 103 MG/DL (ref 70–110)
POCT GLUCOSE: 247 MG/DL (ref 70–110)
POCT GLUCOSE: 262 MG/DL (ref 70–110)
POCT GLUCOSE: 73 MG/DL (ref 70–110)

## 2021-12-08 PROCEDURE — 25000003 PHARM REV CODE 250: Performed by: INTERNAL MEDICINE

## 2021-12-08 PROCEDURE — 63600175 PHARM REV CODE 636 W HCPCS: Performed by: INTERNAL MEDICINE

## 2021-12-08 PROCEDURE — 94761 N-INVAS EAR/PLS OXIMETRY MLT: CPT

## 2021-12-08 PROCEDURE — 97110 THERAPEUTIC EXERCISES: CPT | Mod: CO

## 2021-12-08 PROCEDURE — 97535 SELF CARE MNGMENT TRAINING: CPT | Mod: CO

## 2021-12-08 PROCEDURE — 99306 1ST NF CARE HIGH MDM 50: CPT | Mod: ,,, | Performed by: HOSPITALIST

## 2021-12-08 PROCEDURE — 97110 THERAPEUTIC EXERCISES: CPT | Mod: CQ

## 2021-12-08 PROCEDURE — 99306 PR NURSING FACILITY CARE, INIT, HIGH SEVERITY: ICD-10-PCS | Mod: ,,, | Performed by: HOSPITALIST

## 2021-12-08 PROCEDURE — 27000190 HC CPAP FULL FACE MASK W/VALVE

## 2021-12-08 PROCEDURE — 97530 THERAPEUTIC ACTIVITIES: CPT | Mod: CO

## 2021-12-08 PROCEDURE — 97116 GAIT TRAINING THERAPY: CPT | Mod: CQ

## 2021-12-08 PROCEDURE — 11000004 HC SNF PRIVATE

## 2021-12-08 PROCEDURE — 27000221 HC OXYGEN, UP TO 24 HOURS

## 2021-12-08 PROCEDURE — 94660 CPAP INITIATION&MGMT: CPT

## 2021-12-08 RX ORDER — POLYETHYLENE GLYCOL 3350 17 G/17G
17 POWDER, FOR SOLUTION ORAL 2 TIMES DAILY PRN
Status: DISCONTINUED | OUTPATIENT
Start: 2021-12-08 | End: 2021-12-28 | Stop reason: HOSPADM

## 2021-12-08 RX ORDER — LEVALBUTEROL INHALATION SOLUTION 0.63 MG/3ML
0.63 SOLUTION RESPIRATORY (INHALATION) EVERY 6 HOURS PRN
Status: DISCONTINUED | OUTPATIENT
Start: 2021-12-08 | End: 2021-12-28 | Stop reason: HOSPADM

## 2021-12-08 RX ORDER — DOCUSATE SODIUM 100 MG/1
100 CAPSULE, LIQUID FILLED ORAL DAILY
Status: DISCONTINUED | OUTPATIENT
Start: 2021-12-09 | End: 2021-12-28 | Stop reason: HOSPADM

## 2021-12-08 RX ORDER — ONDANSETRON 4 MG/1
4 TABLET, ORALLY DISINTEGRATING ORAL EVERY 8 HOURS PRN
Status: DISCONTINUED | OUTPATIENT
Start: 2021-12-08 | End: 2021-12-28 | Stop reason: HOSPADM

## 2021-12-08 RX ADMIN — FUROSEMIDE 40 MG: 40 TABLET ORAL at 08:12

## 2021-12-08 RX ADMIN — FLUTICASONE FUROATE AND VILANTEROL TRIFENATATE 1 PUFF: 100; 25 POWDER RESPIRATORY (INHALATION) at 09:12

## 2021-12-08 RX ADMIN — APIXABAN 5 MG: 2.5 TABLET, FILM COATED ORAL at 09:12

## 2021-12-08 RX ADMIN — ALPRAZOLAM 0.25 MG: 0.25 TABLET ORAL at 09:12

## 2021-12-08 RX ADMIN — GABAPENTIN 100 MG: 100 CAPSULE ORAL at 09:12

## 2021-12-08 RX ADMIN — ALPRAZOLAM 0.25 MG: 0.25 TABLET ORAL at 02:12

## 2021-12-08 RX ADMIN — INSULIN ASPART 3 UNITS: 100 INJECTION, SOLUTION INTRAVENOUS; SUBCUTANEOUS at 05:12

## 2021-12-08 RX ADMIN — PREDNISONE 20 MG: 20 TABLET ORAL at 08:12

## 2021-12-08 RX ADMIN — SENNOSIDES AND DOCUSATE SODIUM 1 TABLET: 50; 8.6 TABLET ORAL at 08:12

## 2021-12-08 RX ADMIN — PRAVASTATIN SODIUM 20 MG: 20 TABLET ORAL at 08:12

## 2021-12-08 RX ADMIN — DILTIAZEM HYDROCHLORIDE 180 MG: 180 CAPSULE, COATED, EXTENDED RELEASE ORAL at 08:12

## 2021-12-08 RX ADMIN — APIXABAN 5 MG: 2.5 TABLET, FILM COATED ORAL at 08:12

## 2021-12-08 RX ADMIN — MELATONIN TAB 3 MG 6 MG: 3 TAB at 09:12

## 2021-12-08 RX ADMIN — ESCITALOPRAM 5 MG: 5 TABLET, FILM COATED ORAL at 08:12

## 2021-12-08 RX ADMIN — PANTOPRAZOLE SODIUM 40 MG: 40 TABLET, DELAYED RELEASE ORAL at 08:12

## 2021-12-08 RX ADMIN — ALPRAZOLAM 0.25 MG: 0.25 TABLET ORAL at 08:12

## 2021-12-08 RX ADMIN — POLYETHYLENE GLYCOL 3350 17 G: 17 POWDER, FOR SOLUTION ORAL at 08:12

## 2021-12-08 RX ADMIN — TIOTROPIUM BROMIDE INHALATION SPRAY 2 PUFF: 3.12 SPRAY, METERED RESPIRATORY (INHALATION) at 08:12

## 2021-12-08 NOTE — CLINICAL REVIEW
Clinical Pharmacy Chart Review Note      Admit Date: 12/6/2021   LOS: 2 days       Leyla Mari is a 76 y.o. female admitted to SNF for PT/OT after hospitalization for COPD with acute exacerbation.    Active Hospital Problems    Diagnosis  POA    *COPD with acute exacerbation [J44.1]  Yes    Steroid-induced hyperglycemia [R73.9, T38.0X5A]  Yes    Anxiety [F41.9]  Yes    Peripheral neuropathy [G62.9]  Yes    Acute on chronic respiratory failure with hypercapnia [J96.22]  Yes    History of pulmonary embolism [Z86.711]  Yes    Long term current use of systemic steroids [Z79.52]  Not Applicable     Chronic    Panlobular emphysema [J43.1]  Yes     Chronic    DNR (do not resuscitate) discussion [Z71.89]  Not Applicable     Dx updated per 2019 IMO Load      Hyperlipidemia [E78.5]  Yes     Chronic    Essential hypertension [I10]  Yes     Chronic      Resolved Hospital Problems   No resolved problems to display.     Review of patient's allergies indicates:   Allergen Reactions    Bactrim [sulfamethoxazole-trimethoprim] Nausea Only    Codeine Itching    Keflex [cephalexin] Other (See Comments)     Rhinorrhea, nausea. Tolerated Ceftriaxone June 2014    Ceftriaxone Rash     Morbilliform rash after receiving IV ceftriaxone    Clindamycin hcl Rash    Doxycycline Rash    Vancomycin analogues Rash     Morbilliform rash after receiving IV vancomycin     Patient Active Problem List    Diagnosis Date Noted    Steroid-induced hyperglycemia 12/06/2021    Anxiety 11/26/2021    Peripheral neuropathy 11/26/2021    Blister of left lower leg 10/29/2021    Acute on chronic respiratory failure with hypercapnia 08/16/2021    Encounter for palliative care 08/16/2021    Chronic pulmonary embolism 08/15/2021    Current use of long term anticoagulation 08/15/2021    History of pulmonary embolism 08/10/2021    Right leg swelling     Laceration of lower leg, left 06/30/2021    Acute pulmonary embolism 05/01/2021     Hypercalcemia 05/01/2021    Mixed acid base balance disorder 05/01/2021    Easy bruising 05/01/2021    Pneumonia of right lower lobe due to infectious organism 08/22/2020    Long term current use of systemic steroids 08/22/2020    Shortness of breath 05/08/2020    Hyponatremia 03/10/2020    History of colonic polyps 10/10/2017    Family history of colon cancer 10/10/2017    Bilateral edema of lower extremity 05/15/2017    Venous stasis 05/15/2017    Panlobular emphysema     Centrilobular emphysema     DNR (do not resuscitate) discussion 06/24/2015    Sinus tachycardia 12/05/2014    COPD with acute exacerbation 12/03/2014    Unspecified disorder of skin and subcutaneous tissue 08/21/2014    Urticaria due to drug allergy 08/12/2014    Morbilliform rash 06/20/2014    Essential hypertension     Hyperlipidemia     Colon polyps     Lung nodules     History of Clostridium difficile infection 07/03/2012    Chronic obstructive pulmonary disease 07/03/2012       Scheduled Meds:    ALPRAZolam  0.25 mg Oral TID    apixaban  5 mg Oral BID    diltiaZEM  180 mg Oral Daily    EScitalopram oxalate  5 mg Oral Daily    fluticasone furoate-vilanteroL  1 puff Inhalation Daily    furosemide  40 mg Oral Daily    gabapentin  100 mg Oral QHS    insulin detemir U-100  10 Units Subcutaneous QHS    pantoprazole  40 mg Oral Daily    polyethylene glycol  17 g Oral BID    pravastatin  20 mg Oral Daily    predniSONE  20 mg Oral Daily    senna-docusate 8.6-50 mg  1 tablet Oral BID    tiotropium bromide  2 puff Inhalation Daily     Continuous Infusions:   PRN Meds: acetaminophen, calcium carbonate, insulin aspart U-100, melatonin, ondansetron    OBJECTIVE:     Vital Signs (Last 24H)  Temp:  [97.4 °F (36.3 °C)-98 °F (36.7 °C)]   Pulse:  [68-78]   Resp:  [17-20]   BP: (118-121)/(54-55)   SpO2:  [91 %-95 %]     Laboratory:  CBC:   Recent Labs   Lab 12/04/21  0554 12/05/21  0310 12/06/21  0403   WBC 16.37* 16.90*  14.56*   RBC 4.89 4.39 4.33   HGB 13.8 12.4 12.2   HCT 44.5 40.1 41.6    300 260   MCV 91 91 96   MCH 28.2 28.2 28.2   MCHC 31.0* 30.9* 29.3*     BMP:   Recent Labs   Lab 12/04/21 0555 12/05/21 0310 12/06/21  0403    113* 157*    137 139   K 3.9 3.6 3.7   CL 92* 94* 98   CO2 37* 33* 30*   BUN 24* 25* 25*   CREATININE 0.6 0.5 0.5   CALCIUM 8.9 8.8 8.8   MG 1.8 1.8 1.8     CMP:   Recent Labs   Lab 12/04/21 0555 12/04/21 0555 12/05/21 0310 12/05/21 0310 12/06/21  0403     --  113*  --  157*   CALCIUM 8.9   < > 8.8   < > 8.8   ALBUMIN 2.7*  --  2.5*  --  2.6*   PROT 5.1*  --  4.7*  --  4.6*     --  137  --  139   K 3.9  --  3.6  --  3.7   CO2 37*  --  33*  --  30*   CL 92*  --  94*  --  98   BUN 24*  --  25*  --  25*   CREATININE 0.6  --  0.5  --  0.5   ALKPHOS 99  --  81  --  86   ALT 30  --  25  --  25   AST 24  --  17  --  23   BILITOT 0.4  --  0.3  --  0.3    < > = values in this interval not displayed.     LFTs:   Recent Labs   Lab 12/04/21 0555 12/05/21 0310 12/06/21  0403   ALT 30 25 25   AST 24 17 23   ALKPHOS 99 81 86   BILITOT 0.4 0.3 0.3   PROT 5.1* 4.7* 4.6*   ALBUMIN 2.7* 2.5* 2.6*     Lab Results   Component Value Date    HGBA1C 7.7 (H) 05/23/2021         ASSESSMENT/PLAN:     I have reviewed the medications in compliance with CMS Regulation F756 of the [place]. Based on information gathered, the following items may need to be addressed:    **According to PMH and home medication list, patient takes the following medications at home. These medications are not currently ordered at [facility]:  · Budesonide-formoterol 160-4.5 mcg (non-formulary, currently taking Breo)  · Metformin 500 mg twice daily  · Umeclidinium 62.5 mcg/actuation (non-formulary, currently taking Spiriva)    **Patient taking escitalopram (home med) for anxiety. A gradual dose reduction is not recommended at this time. Patient to follow-up with prescribing MD as outpatient.  Monitor: mental status for  depression, suicide ideation, anxiety, serotonin syndrome, hyponatremia, dizziness, drowsiness    **Patient taking alprazolam 0.25 mg three times daily for anxiety, ordered prn at home. Consider changing to as needed and monitor use and associated side effects.  Monitor: mental status for anxiety, dizziness, drowsiness, somnolence.      Medications reviewed by PharmD, please re-consult if needed.      Unique Verde, Pharm. D.  Clinical Pharmacist  [facility]

## 2021-12-08 NOTE — H&P
"Hospital Medicine  Skilled Nursing Facility   History and Physical Exam      Date of Service: 12/08/2021      Patient Name: Leyla Mari  MRN: [id number]  Admission Date: 12/6/2021  Attending Physician: Tayo Hooker MD  Primary Care Provider: Lizbeth Dillard MD  Code Status: DNR (Do Not Resuscitate)      Principal problem:  COPD with acute exacerbation      Chief Complaint:   Chief Complaint   Patient presents with    COPD     Admitted to OS for rehabilitation following hospital stay for acute exacerbation of COPD and acute on chronic hypoxemic respiratory failure.           HPI:   "Ms. Mari is a 76 year old female with PMHx of end-stage COPD, PE (on eliquis), HTN and HLD who presents to SNF following hospitalization for COPD exacerbation.  Admission to SNF for secondary weakness and debility.      Patient originally presented to [facility] ED on 11/23 with worsening SOB. History obtained from daughter and EMS records given patient's severe respiratory status. Of note, patient's daughter is a respiratory therapist w/ [employer]. Daughter states patient has been increasingly SOB for approximately 1 week. She is on continuous 2L O2 at home and has required more SpO2 to maintain SpO2 88-92%. Baseline function is limited by COPD but patient is able to ambulate w/ walker and requires an AID throughout the day.  On 11/22, patient became severely short of breath and was not able to talk in full sentances. Also endorsed increased sputum production and decreased PO intake. Patient is on long term steroids and takes prednisone 15mg daily. In the ED, patient hypertensive and tachycardic w/ SpO2 89% on CPAP. On examination, patient in respiratory distress w/ accessory muscle use and pursed lips. Decreased breath sounds in all lung fields w/ expiratory wheezes. Labs significant for WBC 14.47, pH 7.3, pCO2 81.9 and pO2 33. CXR w/ emphysematous changes. Patient received 1 x dose of Solumedrol 125mg in the ED. MICU consulted " "for increasing BiPAP requirements. Patient admitted to ICU for further evaluation. Patient admitted for COPD exacerbation and treated w/ duonebs, levoquin, IV Solu-Medrol and put on continuous BiPAP. Patient now w/ improving respiratory status - hypercapnia improved on VBG. Now w/ SpO2 88-92% on 5L NC. Will order BiPAP 4 hours on/off and QHS. Of note, patient w/ R LE cellulitis. Given hx of MRSA, will start Bactrim. Persistent BLE edema and CXR showed pulmonary edema so started on IV lasix 40 mg daily. TTE showed EF 65% and normal LV diastolic function. Transitioned to oral lasix 40 mg daily prior to discharge.       Today, patient reports dyspnea on exertion that is slightly beyond her baseline, her breathing at rest is at her baseline.  She is currently utilizing 3 L nasal cannula with SpO2 of 93-95%.  Patient states she is now having normal bowel movements.  Patient continues to have lower extremity edema that is improving." per Emerald Clark NP on 21.     Ms. Mari is feeling better overall. She is still having shortness of breath with exertion. She is on her home oxygen flow rate. Still with occasional coughing but generally non-productive.     Patient admitted with skilled services with PT and OT to improve functional status and ability to perform ADLs.       Past Medical History:   Past Medical History:   Diagnosis Date    Actinic keratosis     Arthritis     Cataract     Cellulitis of ankle     Colon polyps     COPD (chronic obstructive pulmonary disease)     home O2 (2L/min)     Family history of colon cancer     History of Clostridium difficile infection 7/3/2012    Hyperlipidemia     Hypertension     Lung nodules     Sinus tachycardia        Past Surgical History:   Past Surgical History:   Procedure Laterality Date    APPENDECTOMY      CATARACT EXTRACTION Bilateral      SECTION      x2    COLONOSCOPY N/A 10/10/2017    Procedure: COLONOSCOPY;  Surgeon: Omid Lino MD;  " Location: [facility] ([address] [place] [address]);  Service: Endoscopy;  Laterality: N/A;  pt unable to be checked in with previous order    EYE SURGERY      TONSILLECTOMY         Social History:   Tobacco Use    Smoking status: Former Smoker     Packs/day: 1.00     Years: 39.00     Pack years: 39.00     Types: Cigarettes     Quit date: 1995     Years since quittin.1    Smokeless tobacco: Never Used   Substance and Sexual Activity    Alcohol use: Yes     Alcohol/week: 2.0 standard drinks     Types: 2 Glasses of wine per week     Comment: rarely has a glass of wine- not daily    Drug use: No    Sexual activity: Never       Family History:   Family History     Problem Relation (Age of Onset)    Blindness Paternal Grandmother    Breast cancer Sister    COPD Father    Cancer Mother (79), Sister, Son    Cataracts Mother, Sister    Diabetes Father, Paternal Grandmother    Glaucoma Mother    Heart disease Mother, Father, Sister    Hyperlipidemia Mother, Father, Sister    Hypertension Mother, Father, Sister    Skin cancer Mother, Sister    Thyroid disease Mother, Daughter          No current facility-administered medications on file prior to encounter.     Current Outpatient Medications on File Prior to Encounter   Medication Sig    albuterol (PROVENTIL/VENTOLIN HFA) 90 mcg/actuation inhaler INHALE 2 PUFFS INTO LUNGS EVERY 6 HOURS AS NEEDED FOR WHEEZING OR SHORTNESS OF BREATH    ALPRAZolam (XANAX) 0.25 MG tablet Take 1 tablet (0.25 mg total) by mouth 3 (three) times daily as needed for Anxiety.    apixaban (ELIQUIS) 5 mg Tab Take 1 tablet (5 mg total) by mouth 2 (two) times daily.    budesonide-formoterol 160-4.5 mcg (SYMBICORT) 160-4.5 mcg/actuation HFAA INHALE 2 PUFFS INTO THE LUNGS EVERY 12 HOURS    capsaicin 0.1 % Crea Apply 1 drop topically daily as needed (foot pain).    cholecalciferol, vitamin D3, 10 mcg (400 unit) Cap Take 1,000 Units by mouth once daily.    diltiaZEM (CARDIZEM CD) 180 MG 24 hr capsule TAKE 1  CAPSULE (180 MG TOTAL) BY MOUTH ONCE DAILY.    EScitalopram oxalate (LEXAPRO) 5 MG Tab Take 1 tablet (5 mg total) by mouth once daily.    furosemide (LASIX) 40 MG tablet Take 1 tablet (40 mg total) by mouth once daily.    gabapentin (NEURONTIN) 100 MG capsule Take 1 capsule (100 mg total) by mouth every evening.    ipratropium (ATROVENT) 0.02 % nebulizer solution Take 2.5 mLs (500 mcg total) by nebulization 4 (four) times daily. Rescue    levalbuterol (XOPENEX) 1.25 mg/3 mL nebulizer solution Take 1 vial (3 mLs, 1.25 mg total) by nebulization every 4 (four) hours as needed for Wheezing. Rescue    metFORMIN (GLUCOPHAGE) 500 MG tablet Take 1 tablet (500 mg total) by mouth 2 (two) times daily with meals.    pantoprazole (PROTONIX) 40 MG tablet Take 1 tablet (40 mg total) by mouth once daily.    polyethylene glycol (GLYCOLAX) 17 gram PwPk Take 17 g by mouth daily as needed (constipation).    pravastatin (PRAVACHOL) 20 MG tablet TAKE 1 TABLET BY MOUTH EVERY DAY    predniSONE (DELTASONE) 20 MG tablet Take 2 tablets (40 mg total) by mouth every morning for 2 days, THEN 1 tablet (20 mg total) every morning.    umeclidinium (INCRUSE ELLIPTA) 62.5 mcg/actuation inhalation capsule INHALE 1 PUFF BY MOUTH DAILY    [DISCONTINUED] budesonide-formoterol 160-4.5 mcg (SYMBICORT) 160-4.5 mcg/actuation HFAA INHALE 2 PUFFS INTO THE LUNGS EVERY 12 HOURS       Allergies:   Review of patient's allergies indicates:   Allergen Reactions    Bactrim [sulfamethoxazole-trimethoprim] Nausea Only    Codeine Itching    Keflex [cephalexin] Other (See Comments)     Rhinorrhea, nausea. Tolerated Ceftriaxone June 2014    Ceftriaxone Rash     Morbilliform rash after receiving IV ceftriaxone    Clindamycin hcl Rash    Doxycycline Rash    Vancomycin analogues Rash     Morbilliform rash after receiving IV vancomycin       ROS:  Review of Systems   Constitutional: Negative for appetite change, chills and fever.   HENT: Negative for  congestion and sore throat.    Eyes: Negative for redness and visual disturbance.   Respiratory: Positive for cough. Negative for shortness of breath (with exertion).    Cardiovascular: Negative for chest pain and palpitations.   Gastrointestinal: Negative for abdominal pain, nausea and vomiting.   Genitourinary: Negative for difficulty urinating and dysuria.   Musculoskeletal: Negative for arthralgias and back pain.   Skin: Negative for pallor and rash.   Allergic/Immunologic: Negative for environmental allergies and food allergies.   Neurological: Positive for weakness. Negative for dizziness and light-headedness.   Hematological: Bruises/bleeds easily.   Psychiatric/Behavioral: Negative for sleep disturbance. The patient is not nervous/anxious.          Objective:  Temp:  [97.4 °F (36.3 °C)-98 °F (36.7 °C)]   Pulse:  [68-78]   Resp:  [17-20]   BP: (118-121)/(54-55)   SpO2:  [91 %-95 %]     Body mass index is 21.18 kg/m².      Physical Exam  Vitals and nursing note reviewed.   Constitutional:       General: She is not in acute distress.     Appearance: She is well-developed.      Interventions: Nasal cannula in place.   HENT:      Head: Normocephalic and atraumatic.      Right Ear: External ear normal.      Left Ear: External ear normal.      Nose: No nasal deformity or mucosal edema.      Mouth/Throat:      Mouth: Mucous membranes are moist.      Dentition: Abnormal dentition.      Pharynx: Uvula midline. No oropharyngeal exudate.   Eyes:      General: No scleral icterus.     Conjunctiva/sclera: Conjunctivae normal.      Pupils: Pupils are equal, round, and reactive to light.   Neck:      Trachea: Phonation normal.   Cardiovascular:      Rate and Rhythm: Normal rate and regular rhythm.      Heart sounds: S1 normal and S2 normal. No murmur heard.      Pulmonary:      Effort: Pulmonary effort is normal. No respiratory distress.      Breath sounds: Decreased breath sounds and rhonchi present. No wheezing or rales.    Chest:   Breasts:      Right: No supraclavicular adenopathy.      Left: No supraclavicular adenopathy.       Abdominal:      General: Bowel sounds are normal. There is no distension.      Palpations: Abdomen is soft.      Tenderness: There is no abdominal tenderness. There is no guarding or rebound.   Musculoskeletal:         General: No tenderness.      Cervical back: Neck supple. No muscular tenderness.      Right lower le+ Pitting Edema present.      Left lower le+ Pitting Edema present.   Lymphadenopathy:      Cervical:      Right cervical: No superficial cervical adenopathy.     Left cervical: No superficial cervical adenopathy.      Upper Body:      Right upper body: No supraclavicular adenopathy.      Left upper body: No supraclavicular adenopathy.   Skin:     General: Skin is warm and dry.      Findings: No bruising or rash.   Neurological:      Mental Status: She is alert and oriented to person, place, and time.      Motor: No tremor or seizure activity.   Psychiatric:         Mood and Affect: Mood normal.         Behavior: Behavior normal. Behavior is cooperative.         Thought Content: Thought content normal.         Significant Labs:   CBC:  Recent Labs   Lab 21  0403   WBC 14.56*   HGB 12.2   HCT 41.6      MCV 96     BMP:  Recent Labs   Lab 21  0403   *      K 3.7   CL 98   CO2 30*   BUN 25*   CREATININE 0.5   CALCIUM 8.8   MG 1.8     LFTs:  Recent Labs   Lab 21  0403   ALT 25   AST 23   ALKPHOS 86   BILITOT 0.3   PROT 4.6*   ALBUMIN 2.6*       Significant Imaging: I have reviewed all pertinent imaging results/findings completed during prior hospitalization.      Assessment and Plan:  Active Diagnoses:    Diagnosis Date Noted POA    PRINCIPAL PROBLEM:  COPD with acute exacerbation [J44.1] 2014 Yes    Steroid-induced hyperglycemia [R73.9, T38.0X5A] 2021 Yes    Anxiety [F41.9] 2021 Yes    Peripheral neuropathy [G62.9] 2021 Yes     Acute on chronic respiratory failure with hypercapnia [J96.22] 08/16/2021 Yes    History of pulmonary embolism [Z86.711] 08/10/2021 Yes    Long term current use of systemic steroids [Z79.52] 08/22/2020 Not Applicable     Chronic    Panlobular emphysema [J43.1]  Yes     Chronic    DNR (do not resuscitate) discussion [Z71.89] 06/24/2015 Not Applicable    Hyperlipidemia [E78.5]  Yes     Chronic    Essential hypertension [I10]  Yes     Chronic      Problems Resolved During this Admission:         Acute on chronic respiratory failure with hypercapnia- improving   COPD with acute exacerbation  Pulmonary edema  - chronic home oxygen at 2 LPM.   - Transition IV solumedrol to prednisone 60 mg daily with taper over the next 2 weeks back to home dose of 15 mg daily.   - completed 5 day course of azithromycin 250 mg  - continue home inhaler.   - currently using 3 L nasal cannula, wean to home dose of 2 L  - continue Lasix 40 mg daily ( takes 20 mg at home)   - Continue PRN levalbuterol nebulizer treatments     Long term current use of systemic steroids  - Takes prednisone 15 mg daily for end-stage COPD  - taper back 15mg as status improves. started on prednisone 60 mg --> 40 mg daily --> now currently receiving prednisone 20 mg daily      Peripheral neuropathy  - continue home gabapentin 100 mg qHS.      Generalized anxiety disorder  - Continue home alprazolam 0.25 mg TID, escitalopram 5 mg daily.      Encounter for palliative care  - patient is being followed by palliative care in an outpatient setting. Patient is DNR/DNI.   - as per previous notes, patient was enrolled in inpatient hospice and then revoked it.   - Patient's daughter is amendable to hospice if patient does not improve on current treatment.      Acute pulmonary embolism  - Continue apixaban 5 mg BID.      Hyperglycemia  - due to steroid use  - HA1c (5/2021): 7.7  - Continue insulin determir 10 units nightly while inpatient with low dose SSI PRN. -  discharge on metformin      Cellulitis of right lower extremity  - Continue bactrim DS BID to complete a week.   - Continue to monitor response. Area is improving.      B/l lower extremity edema  - CXR showed bibasilar edema (R>L)  - resumed home lasix --> IV lasix 40 mg daily   - strict I/Os   - prior TTE in 5/2021 normal   - TTE showed EF 65% and normal LV diastolic function      Hyperlipidemia  - Continue home pravastatin 20 mg daily     Essential hypertension  - Continue diltiazem 180 mg daily.     Debility   - Continue with PT/OT for gait training and strengthening and restoration of ADL's   - Encourage mobility, OOB in chair, and early ambulation as appropriate  - Fall precautions   - Monitor for bowel and bladder dysfunction  - Monitor for and prevent skin breakdown and pressure ulcers  - Continue DVT prophylaxis with  apixaban 5 mg BID         Anticipated Disposition:  Home with home health      No future appointments.    I certify that SNF services are required to be given on an inpatient basis because Leyla Mari needs for skilled nursing care and/or skilled rehabilitation are required on a daily basis and such services can only practically be provided in a skilled nursing facility setting and are for an ongoing condition for which she received inpatient care in the hospital.       Tayo Hooker MD  Department of Hospital Medicine   [facility] - Skilled Nursing

## 2021-12-08 NOTE — PT/OT/SLP PROGRESS
"Physical Therapy Treatment    Patient Name:  Leyla Mari   MRN:  [id number]  Admit Date: 12/6/2021  Admitting Diagnosis: COPD with acute exacerbation  Recent Surgeries:     General Precautions: Standard, respiratory,fall   Orthopedic Precautions:N/A   Braces:       Recommendations:     Discharge Recommendations:  home health PT   Level of Assistance Recommended at Discharge: 24 hours significant assistance  Discharge Equipment Recommendations:  (TBD)   Barriers to discharge: None    Assessment:     Leyla Mari is a 76 y.o. female admitted with a medical diagnosis of COPD with acute exacerbation . Patient is pleasant and agreeable to therapy and is motivated to get home. Patient tolerated therapy fairly well today using 3L of supplemental oxygen during session and will cont to benefit from skilled PT services to address deficits listed below .      Performance deficits affecting function:  weakness,impaired endurance,impaired self care skills,impaired functional mobilty,gait instability,impaired balance,decreased upper extremity function,decreased lower extremity function,edema,impaired skin,impaired cardiopulmonary response to activity .    Rehab Potential is good    Activity Tolerance: Fair    Plan:     Patient to be seen 5 x/week to address the above listed problems via gait training,therapeutic activities,therapeutic exercises,therapeutic groups,neuromuscular re-education,wheelchair management/training    · Plan of Care Expires: 01/06/22  · Plan of Care Reviewed with: patient    Subjective     "I've been up since about 7:00 am  In the recliner, I like to eat my meals sitting up and my heel pain has gotten better since getting OOB," pt stated.    Pain/Comfort:  · Pain Rating 1: 4/10  · Location - Side 1: Right  · Location - Orientation 1: generalized  · Location 1: heel  · Pain Addressed 1: Reposition,Distraction  · Pain Rating Post-Intervention 1: 4/10    Patient's cultural, spiritual, Scientologist conflicts " given the current situation:  · no    Objective:      Patient found up in chair with oxygen upon PT entry to room.     Therapeutic Activities and Exercises:   Pt performed seated Ex's for B LE's AAROM for R hip flex, AROM L hip flex and Knee ext, and ankle pumps using 1# ankle weights 2x10 reps/ each to promote increased muscular strength and endurance.  Transfer trg for sit to stand, gait tasks using parallel bars    Functional Mobility:  · Transfers:     · Sit to Stand:  moderate assistance with parallel bars  · Gait: gait trg inside parallel bars today (~ 12') x 2 trials with MIN Assist    AM-PAC 6 CLICK MOBILITY       Patient left up in chair with all lines intact and in rehab gym for OT session. .    GOALS:   Multidisciplinary Problems     Physical Therapy Goals        Problem: Physical Therapy Goal    Goal Priority Disciplines Outcome Goal Variances Interventions   Physical Therapy Goal     PT, PT/OT Ongoing, Progressing     Description: Goals to be met by: 12/28/21    Patient will increase functional independence with mobility by performing:    . Supine to sit with Set-up [place]  . Sit to supine with Set-up [place]  . Rolling to Left and Right with Set-up Assistance.  . Sit to stand transfer with Stand-by Assistance  . Bed to chair transfer with Stand-by Assistance using Rolling Walker  . Gait  x 50 feet with Contact Guard Assistance using Rolling Walker.   . Wheelchair propulsion x50 feet with Minimal Assistance using bilateral uppper extremities  . Ascend/Descend 4 inch curb step with Minimal Assistance using Rolling Walker.                     Time Tracking:     PT Received On: 12/08/21  PT Total Time (min):       Billable Minutes: Gait Training 10 and Therapeutic Exercise 22    Treatment Type: Treatment  PT/PTA: PTA     PTA Visit Number: 1     12/08/2021

## 2021-12-08 NOTE — PLAN OF CARE
Problem: Adult Inpatient Plan of Care  Goal: Plan of Care Review  Outcome: Ongoing, Progressing  Goal: Patient-Specific Goal (Individualization)  Outcome: Ongoing, Progressing  Goal: Absence of Hospital-Acquired Illness or Injury  Outcome: Ongoing, Progressing  Goal: Optimal Comfort and Wellbeing  Outcome: Ongoing, Progressing  Goal: Readiness for Transition of Care  Outcome: Ongoing, Progressing  Goal: Rounds/Family Conference  Outcome: Ongoing, Progressing     Problem: Fluid Imbalance (Pneumonia)  Goal: Fluid Balance  Outcome: Ongoing, Progressing     Problem: Respiratory Compromise (Pneumonia)  Goal: Effective Oxygenation and Ventilation  Outcome: Ongoing, Progressing     Problem: Wound  Goal: Optimal Wound Healing  Outcome: Ongoing, Progressing     Problem: Skin Injury Risk Increased  Goal: Skin Health and Integrity  Outcome: Ongoing, Progressing     Problem: Fall Injury Risk  Goal: Absence of Fall and Fall-Related Injury  Outcome: Ongoing, Progressing

## 2021-12-08 NOTE — PLAN OF CARE
Problem: Adult Inpatient Plan of Care  Goal: Plan of Care Review  Outcome: Ongoing, Progressing  Goal: Patient-Specific Goal (Individualization)  Outcome: Ongoing, Progressing  Goal: Absence of Hospital-Acquired Illness or Injury  Outcome: Ongoing, Progressing  Goal: Optimal Comfort and Wellbeing  Outcome: Ongoing, Progressing  Goal: Readiness for Transition of Care  Outcome: Ongoing, Progressing  Goal: Rounds/Family Conference  Outcome: Ongoing, Progressing     Problem: Fluid Imbalance (Pneumonia)  Goal: Fluid Balance  Outcome: Ongoing, Progressing     Problem: Infection (Pneumonia)  Goal: Resolution of Infection Signs/Symptoms  Outcome: Ongoing, Progressing     Problem: Respiratory Compromise (Pneumonia)  Goal: Effective Oxygenation and Ventilation  Outcome: Ongoing, Progressing     Problem: Wound  Goal: Optimal Wound Healing  Outcome: Ongoing, Progressing     Problem: Skin Injury Risk Increased  Goal: Skin Health and Integrity  Outcome: Ongoing, Progressing     Problem: Fall Injury Risk  Goal: Absence of Fall and Fall-Related Injury  Outcome: Ongoing, Progressing

## 2021-12-08 NOTE — PT/OT/SLP PROGRESS
"Occupational Therapy   Treatment    Name: Leyla Mari  MRN: [id number]  Admit Date: 12/6/2021  Admitting Diagnosis:  COPD with acute exacerbation    General Precautions: Standard, respiratory,fall   Orthopedic Precautions:N/A   Braces:       Recommendations:     Discharge Recommendations: home health OT  Level of Assistance Recommended at Discharge: 24 hours light assistance for ADL's and homemaking tasks  Discharge Equipment Recommendations:   (TBD)  Barriers to discharge:  None    Assessment:     Leyla Mari is a 76 y.o. female with a medical diagnosis of COPD with acute exacerbation .  She presents with performance deficits affecting function are  weakness,impaired endurance,impaired self care skills,impaired functional mobility,gait instability,impaired balance,decreased upper extremity function,decreased lower extremity function,pain,decreased ROM,impaired cardiopulmonary response to activity,decreased coordination,edema. Pt agreed to participate in tx session on today. Pt required rest breaks throughout session due to fatigue. Pt will continue to benefit from skilled OT to improve towards goals.   Rehab Potential is good    Activity tolerance:  Good    Plan:     Patient to be seen 6 x/week to address the above listed problems via self-care/home management,therapeutic activities,therapeutic exercises    · Plan of Care Expires: 01/07/22  · Plan of Care Reviewed with: patient    Subjective     Communicated with: Nsjer prior to session . "Hello"    Pain/Comfort:  Pain Rating 1: 4/10  Location - Side 1: Right  Location - Orientation 1: generalized  Location 1: foot  Pain Addressed 1: Reposition,Distraction  Pain Rating Post-Intervention 1: 4/10    Patient's cultural, spiritual, Sikhism conflicts given the current situation:  no    Objective:     Patient found up in chair with oxygen (with PTA in gym) upon OT entry to room.      Functional Mobility/Transfers:  · Patient completed Sit <> Stand Transfer with " minimum assistance  with  rolling walker   · Functional Mobility:     Activities of Daily Living:  · Lower Body Dressing: stand by assistance to perform threading RLE into pants leg. LBD deffered due to fatuige 02% 87% and performed purse lip breathing    AMPAC 6 Click ADL: 16    OT Exercises: UE Ergometer . 10 minutes UE exercises performed to increase functional endurance and strength in order increase independence when performing functional activities .       Treatment & Education:  Pt. With standing act on this day with task. Pt. With CGA/SBA for balance aspects with task at raised counter. Pt performed visual perception task with matching cards tolerating 8 min 18 sec's with standing bal ,weight shifting and crossing mid line. Pt required rest breaks to perform purse lip breathing. Pt 02 93% and 97% for seated recovery.    the patient was educated on energy conservation/purse lip breath to increase safety when performing functional activities    · Pt completed ADLs and func mobility activities for tx session as noted above    · Pt educated on role of OT and POC    Patient left up in chair with call button in reachEducation:      GOALS:   Multidisciplinary Problems     Occupational Therapy Goals        Problem: Occupational Therapy Goal    Goal Priority Disciplines Outcome Interventions   Occupational Therapy Goal     OT, PT/OT Ongoing, Progressing    Description: Goals to be met by: 12/28/21     Patient will increase functional independence with ADLs by performing:    UE Dressing with Modified [place].  LE Dressing with Minimal Assistance.  Grooming while seated at sink with Modified [place].  Toileting from toilet or BSC with Minimal Assistance for hygiene and clothing management.   Bathing from  sitting at sink with Stand-by Assistance.  Rolling to Bilateral with Modified [place].   Supine to sit with Modified [place].  Toilet transfer to toilet or BSC with Stand-by Assistance with RW  .  Stand Pivot transfer with Stand-by Assistance with RW .  Upper extremity exercise with assistance as needed.  Caregiver will be educated on level of assist required to safely perform self care tasks and functional transfers..                      Time Tracking:     OT Date of Treatment: OT Date of Treatment: 12/08/21  OT Total Time (min): Total Time (min): 46 min    Billable Minutes:Self Care/Home Management 21  Therapeutic Activity 10  Therapeutic Exercise 15    12/8/2021   A client care conference was performed between the PAMELAR and RAY, prior to treatment to discuss the patient's status, treatment plan and established goals.

## 2021-12-09 ENCOUNTER — TELEPHONE (OUTPATIENT)
Dept: WOUND CARE | Facility: CLINIC | Age: 76
End: 2021-12-09
Payer: MEDICARE

## 2021-12-09 LAB
ANION GAP SERPL CALC-SCNC: 11 MMOL/L (ref 8–16)
BASOPHILS # BLD AUTO: 0.02 K/UL (ref 0–0.2)
BASOPHILS NFR BLD: 0.2 % (ref 0–1.9)
BUN SERPL-MCNC: 16 MG/DL (ref 8–23)
CALCIUM SERPL-MCNC: 8.6 MG/DL (ref 8.7–10.5)
CHLORIDE SERPL-SCNC: 98 MMOL/L (ref 95–110)
CO2 SERPL-SCNC: 31 MMOL/L (ref 23–29)
CREAT SERPL-MCNC: 0.4 MG/DL (ref 0.5–1.4)
DIFFERENTIAL METHOD: ABNORMAL
EOSINOPHIL # BLD AUTO: 0 K/UL (ref 0–0.5)
EOSINOPHIL NFR BLD: 0.2 % (ref 0–8)
ERYTHROCYTE [DISTWIDTH] IN BLOOD BY AUTOMATED COUNT: 14.2 % (ref 11.5–14.5)
EST. GFR  (AFRICAN AMERICAN): >60 ML/MIN/1.73 M^2
EST. GFR  (NON AFRICAN AMERICAN): >60 ML/MIN/1.73 M^2
GLUCOSE SERPL-MCNC: 74 MG/DL (ref 70–110)
HCT VFR BLD AUTO: 35.8 % (ref 37–48.5)
HGB BLD-MCNC: 10.9 G/DL (ref 12–16)
IMM GRANULOCYTES # BLD AUTO: 0.14 K/UL (ref 0–0.04)
IMM GRANULOCYTES NFR BLD AUTO: 1.3 % (ref 0–0.5)
LYMPHOCYTES # BLD AUTO: 0.5 K/UL (ref 1–4.8)
LYMPHOCYTES NFR BLD: 4.9 % (ref 18–48)
MAGNESIUM SERPL-MCNC: 1.8 MG/DL (ref 1.6–2.6)
MCH RBC QN AUTO: 27.9 PG (ref 27–31)
MCHC RBC AUTO-ENTMCNC: 30.4 G/DL (ref 32–36)
MCV RBC AUTO: 92 FL (ref 82–98)
MONOCYTES # BLD AUTO: 0.7 K/UL (ref 0.3–1)
MONOCYTES NFR BLD: 6.3 % (ref 4–15)
NEUTROPHILS # BLD AUTO: 9.1 K/UL (ref 1.8–7.7)
NEUTROPHILS NFR BLD: 87.1 % (ref 38–73)
NRBC BLD-RTO: 0 /100 WBC
PHOSPHATE SERPL-MCNC: 3 MG/DL (ref 2.7–4.5)
PLATELET # BLD AUTO: 228 K/UL (ref 150–450)
PMV BLD AUTO: 10.2 FL (ref 9.2–12.9)
POCT GLUCOSE: 142 MG/DL (ref 70–110)
POCT GLUCOSE: 191 MG/DL (ref 70–110)
POCT GLUCOSE: 224 MG/DL (ref 70–110)
POCT GLUCOSE: 72 MG/DL (ref 70–110)
POTASSIUM SERPL-SCNC: 3.3 MMOL/L (ref 3.5–5.1)
RBC # BLD AUTO: 3.9 M/UL (ref 4–5.4)
SODIUM SERPL-SCNC: 140 MMOL/L (ref 136–145)
WBC # BLD AUTO: 10.4 K/UL (ref 3.9–12.7)

## 2021-12-09 PROCEDURE — 27000221 HC OXYGEN, UP TO 24 HOURS

## 2021-12-09 PROCEDURE — 97530 THERAPEUTIC ACTIVITIES: CPT | Mod: CQ

## 2021-12-09 PROCEDURE — 84100 ASSAY OF PHOSPHORUS: CPT | Performed by: INTERNAL MEDICINE

## 2021-12-09 PROCEDURE — 94761 N-INVAS EAR/PLS OXIMETRY MLT: CPT

## 2021-12-09 PROCEDURE — 83735 ASSAY OF MAGNESIUM: CPT | Performed by: INTERNAL MEDICINE

## 2021-12-09 PROCEDURE — 25000003 PHARM REV CODE 250: Performed by: INTERNAL MEDICINE

## 2021-12-09 PROCEDURE — 25000242 PHARM REV CODE 250 ALT 637 W/ HCPCS: Performed by: NURSE PRACTITIONER

## 2021-12-09 PROCEDURE — 63600175 PHARM REV CODE 636 W HCPCS: Performed by: INTERNAL MEDICINE

## 2021-12-09 PROCEDURE — 36415 COLL VENOUS BLD VENIPUNCTURE: CPT | Performed by: INTERNAL MEDICINE

## 2021-12-09 PROCEDURE — 99900035 HC TECH TIME PER 15 MIN (STAT)

## 2021-12-09 PROCEDURE — 11000004 HC SNF PRIVATE

## 2021-12-09 PROCEDURE — 97530 THERAPEUTIC ACTIVITIES: CPT | Mod: CO

## 2021-12-09 PROCEDURE — 25000003 PHARM REV CODE 250: Performed by: NURSE PRACTITIONER

## 2021-12-09 PROCEDURE — 85025 COMPLETE CBC W/AUTO DIFF WBC: CPT | Performed by: INTERNAL MEDICINE

## 2021-12-09 PROCEDURE — 97110 THERAPEUTIC EXERCISES: CPT | Mod: CO

## 2021-12-09 PROCEDURE — 80048 BASIC METABOLIC PNL TOTAL CA: CPT | Performed by: INTERNAL MEDICINE

## 2021-12-09 RX ORDER — LANOLIN ALCOHOL/MO/W.PET/CERES
400 CREAM (GRAM) TOPICAL 2 TIMES DAILY
Status: COMPLETED | OUTPATIENT
Start: 2021-12-09 | End: 2021-12-10

## 2021-12-09 RX ORDER — POTASSIUM CHLORIDE 750 MG/1
30 CAPSULE, EXTENDED RELEASE ORAL EVERY 4 HOURS
Status: DISPENSED | OUTPATIENT
Start: 2021-12-09 | End: 2021-12-10

## 2021-12-09 RX ADMIN — DILTIAZEM HYDROCHLORIDE 180 MG: 180 CAPSULE, COATED, EXTENDED RELEASE ORAL at 08:12

## 2021-12-09 RX ADMIN — INSULIN DETEMIR 10 UNITS: 100 INJECTION, SOLUTION SUBCUTANEOUS at 09:12

## 2021-12-09 RX ADMIN — ALPRAZOLAM 0.25 MG: 0.25 TABLET ORAL at 08:12

## 2021-12-09 RX ADMIN — POTASSIUM CHLORIDE 30 MEQ: 10 CAPSULE, COATED, EXTENDED RELEASE ORAL at 02:12

## 2021-12-09 RX ADMIN — PANTOPRAZOLE SODIUM 40 MG: 40 TABLET, DELAYED RELEASE ORAL at 08:12

## 2021-12-09 RX ADMIN — FUROSEMIDE 40 MG: 40 TABLET ORAL at 08:12

## 2021-12-09 RX ADMIN — GABAPENTIN 100 MG: 100 CAPSULE ORAL at 08:12

## 2021-12-09 RX ADMIN — Medication 400 MG: at 02:12

## 2021-12-09 RX ADMIN — APIXABAN 5 MG: 2.5 TABLET, FILM COATED ORAL at 08:12

## 2021-12-09 RX ADMIN — MELATONIN TAB 3 MG 6 MG: 3 TAB at 09:12

## 2021-12-09 RX ADMIN — ESCITALOPRAM 5 MG: 5 TABLET, FILM COATED ORAL at 08:12

## 2021-12-09 RX ADMIN — POTASSIUM CHLORIDE 30 MEQ: 10 CAPSULE, COATED, EXTENDED RELEASE ORAL at 05:12

## 2021-12-09 RX ADMIN — ALPRAZOLAM 0.25 MG: 0.25 TABLET ORAL at 02:12

## 2021-12-09 RX ADMIN — PREDNISONE 20 MG: 20 TABLET ORAL at 08:12

## 2021-12-09 RX ADMIN — TIOTROPIUM BROMIDE INHALATION SPRAY 2 PUFF: 3.12 SPRAY, METERED RESPIRATORY (INHALATION) at 08:12

## 2021-12-09 RX ADMIN — PSYLLIUM HUSK 1 PACKET: 3.4 POWDER ORAL at 08:12

## 2021-12-09 RX ADMIN — INSULIN ASPART 2 UNITS: 100 INJECTION, SOLUTION INTRAVENOUS; SUBCUTANEOUS at 05:12

## 2021-12-09 RX ADMIN — ALPRAZOLAM 0.25 MG: 0.25 TABLET ORAL at 09:12

## 2021-12-09 RX ADMIN — FLUTICASONE FUROATE AND VILANTEROL TRIFENATATE 1 PUFF: 100; 25 POWDER RESPIRATORY (INHALATION) at 08:12

## 2021-12-09 RX ADMIN — PRAVASTATIN SODIUM 20 MG: 20 TABLET ORAL at 08:12

## 2021-12-09 RX ADMIN — Medication 400 MG: at 08:12

## 2021-12-09 NOTE — PROGRESS NOTES
[facility]                                  Skilled Nursing Facility                   Progress Note     Admit Date: 2021  MARK ANTHONY TBD  Principal Problem:  COPD with acute exacerbation   HPI obtained from patient interview and chart review     Chief Complaint: Re-evaluation of medical treatment and therapy status: Lab review, hypokalemia, hypomagnesemia    HPI:   Ms. Mari is a 76 year old female with PMHx of end-stage COPD, PE (on eliquis), HTN and HLD who presents to SNF following hospitalization for COPD exacerbation.  Admission to SNF for secondary weakness and debility.     Interval history: All of today's labs reviewed and are listed below.  K 3.3, Mag 1.8.  24 hr vital sign ranges listed below.  24 hour blood glucose range is .  Patient states her respiratory status has improved from .  Patient states she is breathing better.  She does still think that her dyspnea on exertion is slightly beyond her baseline.  Patient denies  abdominal discomfort, nausea, or vomiting.  Patient reports an adequate appetite.  Patient denies dysuria.  Patient reports having regular bowel movements.  Patient progessing with PT/OT- Sit to Stand:  moderate assistance with parallel bars  Gait: gait trg inside parallel bars today (~ 12') x 2 trials with MIN Assist. Continuing to follow and treat all acute and chronic conditions.    Past Medical History: Patient has a past medical history of Actinic keratosis, Arthritis, Cataract, Cellulitis of ankle, Colon polyps, COPD (chronic obstructive pulmonary disease), Family history of colon cancer, History of Clostridium difficile infection (7/3/2012), Hyperlipidemia, Hypertension, Lung nodules, and Sinus tachycardia.    Past Surgical History: Patient has a past surgical history that includes Appendectomy; Eye surgery; Tonsillectomy;  section; Cataract extraction (Bilateral, ); and Colonoscopy  [place] [facility]  [address] [place] [address]  [phone]      July 16, 2019    Maddy Ortiz  [address]   [facility] [id number]      Dear Maddy,    I am a clinic care coordinator- that works with PHILL Luo CNP at the [facility].  Thank you so much for talking with me today on the phone.  I am grateful you shared all of your community support providers with me, that way I can best provide assistance to you & not duplicate services.     As we discussed, I to provide you with my contact information so that you can call me with questions or concerns about your health care. Below is a description of clinic care coordination and how I can further assist you.     The clinic care coordinator is a registered nurse and/or  who understand the health care system. The goal of clinic care coordination is to help you manage your health and improve access to the [facility] system in the most efficient manner. The registered nurse can assist you in meeting your health care goals by providing education, coordinating services, and strengthening the communication among your providers. The  can assist you with financial, behavioral, psychosocial, chemical dependency, counseling, and/or psychiatric resources.    Please feel free to contact me at [phone], with any questions or concerns. We at [facility] are focused on providing you with the highest-quality healthcare experience possible and that all starts with you.     Sincerely,     Sridevi Lincoln    Enclosed: I have enclosed a copy of the Complex Care Plan. This has helpful information and goals that we have talked about. Please keep this in an easy to access place to use as needed.   (N/A, 10/10/2017).    Social History: Patient reports that she quit smoking about 26 years ago. Her smoking use included cigarettes. She has a 39.00 pack-year smoking history. She has never used smokeless tobacco. She reports current alcohol use of about 2.0 standard drinks of alcohol per week. She reports that she does not use drugs.    Family History: family history includes Blindness in her paternal grandmother; Breast cancer in her sister; COPD in her father; Cancer in her sister and son; Cancer (age of onset: 79) in her mother; Cataracts in her mother and sister; Diabetes in her father and paternal grandmother; Glaucoma in her mother; Heart disease in her father, mother, and sister; Hyperlipidemia in her father, mother, and sister; Hypertension in her father, mother, and sister; Skin cancer in her mother and sister; Thyroid disease in her daughter and mother.    Allergies: Patient is allergic to bactrim [sulfamethoxazole-trimethoprim], codeine, keflex [cephalexin], ceftriaxone, clindamycin hcl, doxycycline, and vancomycin analogues.    ROS  Constitutional: Negative for fever   Eyes: Negative for blurred vision, double vision   Respiratory:  +mild intermittent dry cough, TRAVIS  Cardiovascular: Negative for chest pain, palpitations.  + leg swelling.   Gastrointestinal: Negative for abdominal pain, constipation, diarrhea, nausea, vomiting.   Genitourinary: Negative for dysuria, frequency   Musculoskeletal:  + generalized weakness. Negative for back pain and myalgias.   Skin: Negative for itching and rash.   Neurological: Negative for dizziness, headaches.   Psychiatric/Behavioral: Negative for depression. The patient is not nervous/anxious.      24 hour Vital Sign Range   Temp:  [96.8 °F (36 °C)-97.4 °F (36.3 °C)]   Pulse:  [58-76]   Resp:  [16-18]   BP: (138-147)/(64-68)   SpO2:  [93 %-97 %]     PEx  Constitutional: Patient appears debilitated.  No distress noted  HENT:   Head: Normocephalic and atraumatic.   Eyes:  Pupils are equal, round  Neck: Normal range of motion. Neck supple.   Cardiovascular: Normal rate, regular rhythm and normal heart sounds.    Pulmonary/Chest: Effort normal and breath sounds are clear  Abdominal: Soft. Bowel sounds are normal.   Musculoskeletal: Normal range of motion.   Neurological: Alert and oriented to person, place, and time.   Psychiatric: Normal mood and affect. Behavior is normal.   Skin: Skin is warm and dry.  Right lower lateral leg- stable scab over wound bed    Wound that you did not assess today:  Wound location(s)/type(s):  Right heel, sacrum  Not visualized today. No signs/symptoms of infection or wound-related changes reported.         Recent Labs   Lab 12/09/21  0509      K 3.3*   CL 98   CO2 31*   BUN 16   CREATININE 0.4*   MG 1.8       Recent Labs   Lab 12/09/21  0509   WBC 10.40   RBC 3.90*   HGB 10.9*   HCT 35.8*      MCV 92   MCH 27.9   MCHC 30.4*         Assessment and Plan:    Hypokalemia  Hypomagnesemia  - initiated magnesium oxide 400 mg BID x2 days, potassium 30 mEq q4h x3 doses    Acute on chronic respiratory failure with hypercapnia- improving   COPD with acute exacerbation  Pulmonary edema  - chronic home oxygen at 2 LPM.   - Transition IV solumedrol to prednisone 60 mg daily with taper over the next 2 weeks back to home dose of 15 mg daily.   - completed 5 day course of azithromycin 250 mg  - continue home inhalesr.   - currently using 3 L nasal cannula, wean to home dose of 2 L  - continue Lasix 40 mg daily ( takes 20 mg at home)   - continue PRN levalbuterol nebulizer treatments  - 12/9 patient feels symptom improvement, continues at 3 L needs to be weaned down to 2 L    Long term current use of systemic steroids  - Takes prednisone 15 mg daily for end-stage COPD  - taper back 15mg as status improves. started on prednisone 60 mg --> 40 mg daily --> now currently receiving prednisone 20 mg daily     Peripheral neuropathy  - continue home gabapentin 100 mg  qHS.      Generalized anxiety disorder  - Continue home alprazolam 0.25 mg TID, escitalopram 5 mg daily.      Encounter for palliative care  - patient is being followed by palliative care in an outpatient setting. Patient is DNR/DNI.   - as per previous notes, patient was enrolled in inpatient hospice and then revoked it.   - Patient's daughter is amendable to hospice if patient does not improve on current treatment.      Acute pulmonary embolism  - Continue apixaban 5 mg BID.      Hyperglycemia  - due to steroid use  - HA1c (5/2021): 7.7  - determir 10 units nightly while inpatient and LDSSI  - discharge on metformin     Cellulitis of right lower extremity  - Continue bactrim DS BID to complete a week.   - Continue to monitor response. Area is improving.      B/l lower extremity edema  - CXR showed bibasilar edema (R>L)  - resumed home lasix --> IV lasix 40 mg daily   - strict I/Os   - prior TTE in 5/2021 normal   - TTE showed EF 65% and normal LV diastolic function      Hyperlipidemia  - Continue home pravastatin 20 mg daily    Essential hypertension  - Continue diltiazem 180 mg daily.    Debility   - Continue with PT/OT for gait training and strengthening and restoration of ADL's   - Encourage mobility, OOB in chair, and early ambulation as appropriate  - Fall precautions   - Monitor for bowel and bladder dysfunction  - Monitor for and prevent skin breakdown and pressure ulcers  - Continue DVT prophylaxis with  apixaban 5 mg BID         Anticipate disposition:  Home with home health      Follow-up needed during SNF stay-    Follow-up needed after discharge from SNF: PCP, pulmonology    No future appointments.        Emerald Clark NP  Department of Hospital Medicine   [facility]     DOS: 12/9/2021       Patient note was created using MModal Dictation.  Any errors in syntax or even information may not have been identified and edited on initial review prior to signing this  note.

## 2021-12-09 NOTE — PLAN OF CARE
Problem: Adult Inpatient Plan of Care  Goal: Plan of Care Review  Outcome: Ongoing, Progressing   Recommendations     1. Continue regular diet.  Goals: 1. Pt's intake meals >75% by RD follow up.  Nutrition Goal Status: new  Communication of RD Recs:  (POC)     Assessment and Plan     Nutrition Problem  Increased nutrient needs     Related to (etiology):   protein     Signs and Symptoms (as evidenced by):   Pt noted with mild muscle wasting     Interventions(treatment strategy):  Collaboration of care with providers.  General healthy diet.     Nutrition Diagnosis Status:   New

## 2021-12-09 NOTE — PT/OT/SLP PROGRESS
"Occupational Therapy   Treatment    Name: Leyla Mari  MRN: [id number]  Admit Date: 12/6/2021  Admitting Diagnosis:  COPD with acute exacerbation    General Precautions: Standard, fall,respiratory   Orthopedic Precautions:N/A   Braces:       Recommendations:     Discharge Recommendations: home health OT  Level of Assistance Recommended at Discharge: 24 hours light assistance for ADL's and homemaking tasks  Discharge Equipment Recommendations:   (TBD)  Barriers to discharge:  None    Assessment:     Leyla Mari is a 76 y.o. female with a medical diagnosis of COPD with acute exacerbation .  She presents with performance deficits affecting function are weakness,impaired endurance,impaired self care skills,impaired functional mobility,gait instability,impaired balance,decreased upper extremity function,decreased lower extremity function,pain,decreased ROM,impaired cardiopulmonary response to activity,decreased coordination,edema. Pt agreed to participate in tx session on today. Pt required rest breaks throughout session due to fatigue. Pt FRENCH davila paused session to give med's. Pt o2 s98-99% after UBE. Pt attempt x2 to perform sit to stand from WC with Max A. Pt unable to fully stand due to feeling to fatigue. Pt will continue to benefit from skilled OT to improve towards goals.   Rehab Potential is good    Activity tolerance:  Good    Plan:     Patient to be seen 6 x/week to address the above listed problems via self-care/home management,therapeutic activities,therapeutic exercises    · Plan of Care Expires: 01/07/22  · Plan of Care Reviewed with: patient    Subjective     Communicated with: Otilia prior to session ."I told the PTA I needed to put my pants on for OT"    Pain/Comfort:  Pain Rating 1: 0/10  Pain Rating Post-Intervention 1: 0/10    Patient's cultural, spiritual, Christian conflicts given the current situation:  no    Objective:     Patient found up in chair with oxygen (3L) upon OT entry to " room.    Functional Mobility/Transfers:  · Patient attempted to perform Sit <> Stand Transfer x2 attempts  with max assistance with  rolling walker.Pt O2 dropped to 85% after attempt . Pt O2 99% after recovery.  · Functional Mobility: Not tested     Activities of Daily Living:  · Lower Body Dressing: supervision to perform threading BLE into pants legs. Pt required Mod A to stand and CGA to perform donnng pants over hips in standing Per PTA Pattie     AMPA 6 Click ADL: 16    OT Exercises: UE Ergometer . 15 minutes UE exercises performed to increase functional endurance and strength in order increase independence when performing functional activities .Pt required rest break.     Treatment & Education:    · Pt completed ADLs and func mobility activities for tx session as noted above    · Pt educated on role of OT and POC    Patient left up in chair with call button in reachEducation:      GOALS:   Multidisciplinary Problems     Occupational Therapy Goals        Problem: Occupational Therapy Goal    Goal Priority Disciplines Outcome Interventions   Occupational Therapy Goal     OT, PT/OT Ongoing, Progressing    Description: Goals to be met by: 12/28/21     Patient will increase functional independence with ADLs by performing:    UE Dressing with Modified [place].  LE Dressing with Minimal Assistance.  Grooming while seated at sink with Modified [place].  Toileting from toilet or BSC with Minimal Assistance for hygiene and clothing management.   Bathing from  sitting at sink with Stand-by Assistance.  Rolling to Bilateral with Modified [place].   Supine to sit with Modified [place].  Toilet transfer to toilet or BSC with Stand-by Assistance with RW .  Stand Pivot transfer with Stand-by Assistance with RW .  Upper extremity exercise with assistance as needed.  Caregiver will be educated on level of assist required to safely perform self care tasks and functional transfers..                      Time  Tracking:     OT Date of Treatment: OT Date of Treatment: 12/09/21  OT Total Time (min): Total Time (min): 45 min    Billable Minutes:Therapeutic Activity .20  Therapeutic Exercise .25  12/9/2021

## 2021-12-09 NOTE — PT/OT/SLP PROGRESS
"Physical Therapy Treatment    Patient Name:  Leyla Mari   MRN:  [id number]  Admit Date: 12/6/2021  Admitting Diagnosis: COPD with acute exacerbation  Recent Surgeries:     General Precautions: Standard, fall,respiratory   Orthopedic Precautions:N/A   Braces:       Recommendations:     Discharge Recommendations:  home health PT   Level of Assistance Recommended at Discharge: 24 hours significant assistance  Discharge Equipment Recommendations:  (TBD)   Barriers to discharge: None    Assessment:     Leyla Mari is a 76 y.o. female admitted with a medical diagnosis of COPD with acute exacerbation .  Patient tolerated therapy fairly well today, main barrier is COPD and SOB requiring frequent rest breaks.  Patient will benefit from further skilled services to meet POC goals and return to PLOF.      Performance deficits affecting function:  weakness,impaired endurance,impaired self care skills,impaired functional mobilty,gait instability,impaired balance,decreased upper extremity function,decreased lower extremity function,edema,impaired skin,impaired cardiopulmonary response to activity .    Rehab Potential is fair    Activity Tolerance: Fair    Plan:     Patient to be seen 5 x/week to address the above listed problems via gait training,therapeutic activities,therapeutic exercises,therapeutic groups,neuromuscular re-education,wheelchair management/training    · Plan of Care Expires: 01/06/22  · Plan of Care Reviewed with: patient    Subjective     "I want to get up OOB and use the bathroom," pt stated.    Pain/Comfort:  · Pain Rating 1: 0/10  · Pain Rating Post-Intervention 1: 0/10    Patient's cultural, spiritual, Synagogue conflicts given the current situation:  · no    Objective:     Communicated with OT prior to session.  Patient found supine with oxygen upon PT entry to room.     Therapeutic Activities and Exercises:   Pt participated in bed mobility, transfer training, toilet transfers    Functional " Mobility:  · Bed Mobility:     · Rolling Left:  contact guard assistance using bedrails  · Supine to Sit: contact guard assistance using bedrails   · Transfers:     · Sit to Stand:  Mod- MAX Assist with rolling walker and grab bars in bathroom  · Bed to Chair: moderate assistance with  hand-held assist  using  Stand Pivot  · Toilet Transfer: maximal assistance with  Grab bars  using  Stand Pivot  · Wheelchair Propulsion:  Pt propelled Standard wheelchair x 10 feet on Level tile within bedroom with  Bilateral upper extremity and Bilateral lower extremity with Stand-by Assistance.     AM-PAC 6 CLICK MOBILITY  12    Patient left up in chair with all lines intact and call button in reach.    GOALS:   Multidisciplinary Problems     Physical Therapy Goals        Problem: Physical Therapy Goal    Goal Priority Disciplines Outcome Goal Variances Interventions   Physical Therapy Goal     PT, PT/OT Ongoing, Progressing     Description: Goals to be met by: 12/28/21    Patient will increase functional independence with mobility by performing:    . Supine to sit with Set-up [place]  . Sit to supine with Set-up [place]  . Rolling to Left and Right with Set-up Assistance.  . Sit to stand transfer with Stand-by Assistance  . Bed to chair transfer with Stand-by Assistance using Rolling Walker  . Gait  x 50 feet with Contact Guard Assistance using Rolling Walker.   . Wheelchair propulsion x50 feet with Minimal Assistance using bilateral uppper extremities  . Ascend/Descend 4 inch curb step with Minimal Assistance using Rolling Walker.                     Time Tracking:     PT Received On: 12/09/21  PT Total Time (min):   40    Billable Minutes: Therapeutic Activity 40    Treatment Type: Treatment  PT/PTA: PTA     PTA Visit Number: 2     12/09/2021

## 2021-12-09 NOTE — CONSULTS
[facility] - Skilled Nursing  Adult Nutrition  Consult Note    SUMMARY     Recommendations    1. Continue regular diet.  Goals: 1. Pt's intake meals >75% by RD follow up.  Nutrition Goal Status: new  Communication of RD Recs:  (POC)    Assessment and Plan    Nutrition Problem  Increased nutrient needs    Related to (etiology):   protein    Signs and Symptoms (as evidenced by):   Pt noted with mild muscle wasting    Interventions(treatment strategy):  Collaboration of care with providers.  General healthy diet.    Nutrition Diagnosis Status:   New       Malnutrition Assessment                 Orbital Region (Subcutaneous Fat Loss): well nourished  Upper Arm Region (Subcutaneous Fat Loss): mild depletion  Thoracic and Lumbar Region: well nourished   [place] Region (Muscle Loss): well nourished  Clavicle Bone Region (Muscle Loss): well nourished  Clavicle and Acromion Bone Region (Muscle Loss): well nourished  Scapular Bone Region (Muscle Loss): mild depletion  Dorsal Hand (Muscle Loss): mild depletion  Patellar Region (Muscle Loss): well nourished  Anterior Thigh Region (Muscle Loss): well nourished  Posterior Calf Region (Muscle Loss): well nourished (weeping noted in both legs)   Edema (Fluid Accumulation): 2-->mild (calves)             Reason for Assessment    Reason For Assessment: consult  Diagnosis: pulmonary disease (COPD with acute exacerbation)  Relevant Medical History: COPD, HTN, HLD  General Information Comments: Pt with poor intake first 3 days of hospital stay, then intake improved and has been % x 2 days. -110 lbs x 7 months, wt appears stable. Completed NFPE today: pt has some mild muscle wasting, some of which is most likely age-related. Pt also noted with weeping calves and she tends to hold onto fluid.  Nutrition Discharge Planning: General healthy diet with emphasis on adequate carbohydrate intake.    Nutrition Risk Screen    Nutrition Risk Screen: no indicators  present    Nutrition/Diet History    Patient Reported Diet/Restrictions/Preferences: general  Spiritual, Cultural Beliefs, Moravian Practices, Values that Affect Care: no    Anthropometrics    Temp: 97.4 °F (36.3 °C)  Height: 5' (152.4 cm)  Height (inches): 60 in  Weight Method: Bed Scale  Weight: 49.2 kg (108 lb 7.5 oz)  Weight (lb): 108.47 lb  Ideal Body Weight (IBW), Female: 100 lb  % Ideal Body Weight, Female (lb): 108.47 %  BMI (Calculated): 21.2       Lab/Procedures/Meds    Pertinent Labs Reviewed: reviewed  Pertinent Labs Comments: K 3.3, C)2 31, A1c 7.7 (5/23/21)  Pertinent Medications Reviewed: reviewed  Pertinent Medications Comments: lasix, gabapentin, detemir, magnesium oxide, protonix, prednisone, psyllium husk    Estimated/Assessed Needs    Weight Used For Calorie Calculations: 49.2 kg (108 lb 7.5 oz)  Energy Calorie Requirements (kcal): 1238 kcal  Energy Need Method: Kcal/kg (25 kcal/kg)  Protein Requirements: 49-59g  Weight Used For Protein Calculations: 49.2 kg (108 lb 7.5 oz)        RDA Method (mL): 1238         Nutrition Prescription Ordered    Current Diet Order: Regular    Evaluation of Received Nutrient/Fluid Intake    Comments: last BM 12/08  % Intake of Estimated Energy Needs: 75 - 100 %  % Meal Intake: 75 - 100 %    Nutrition Risk    Level of Risk/Frequency of Follow-up: low       Monitor and Evaluation    Food and Nutrient Intake: energy intake,food and beverage intake  Food and Nutrient Adminstration: diet order  Anthropometric Measurements: weight,weight change  Biochemical Data, Medical Tests and Procedures: electrolyte and renal panel,gastrointestinal profile,glucose/endocrine profile,inflammatory profile,lipid profile  Nutrition-Focused Physical Findings: overall appearance,extremities, muscles and bones,head and eyes,skin       Nutrition Follow-Up    RD Follow-up?: Yes

## 2021-12-09 NOTE — PLAN OF CARE
Problem: Adult Inpatient Plan of Care  Goal: Optimal Comfort and Wellbeing  Outcome: Ongoing, Progressing     Problem: Infection (Pneumonia)  Goal: Resolution of Infection Signs/Symptoms  Outcome: Ongoing, Progressing     Problem: Respiratory Compromise (Pneumonia)  Goal: Effective Oxygenation and Ventilation  Outcome: Ongoing, Progressing     Problem: Wound  Goal: Optimal Wound Healing  Outcome: Ongoing, Progressing

## 2021-12-10 LAB
POCT GLUCOSE: 155 MG/DL (ref 70–110)
POCT GLUCOSE: 188 MG/DL (ref 70–110)
POCT GLUCOSE: 245 MG/DL (ref 70–110)

## 2021-12-10 PROCEDURE — 25000003 PHARM REV CODE 250: Performed by: NURSE PRACTITIONER

## 2021-12-10 PROCEDURE — 94761 N-INVAS EAR/PLS OXIMETRY MLT: CPT

## 2021-12-10 PROCEDURE — 94660 CPAP INITIATION&MGMT: CPT

## 2021-12-10 PROCEDURE — 25000242 PHARM REV CODE 250 ALT 637 W/ HCPCS: Performed by: NURSE PRACTITIONER

## 2021-12-10 PROCEDURE — 25000003 PHARM REV CODE 250: Performed by: INTERNAL MEDICINE

## 2021-12-10 PROCEDURE — 97110 THERAPEUTIC EXERCISES: CPT | Mod: CQ

## 2021-12-10 PROCEDURE — 99900035 HC TECH TIME PER 15 MIN (STAT)

## 2021-12-10 PROCEDURE — 11000004 HC SNF PRIVATE

## 2021-12-10 PROCEDURE — 63600175 PHARM REV CODE 636 W HCPCS: Performed by: INTERNAL MEDICINE

## 2021-12-10 PROCEDURE — 97535 SELF CARE MNGMENT TRAINING: CPT | Mod: CO

## 2021-12-10 PROCEDURE — 97530 THERAPEUTIC ACTIVITIES: CPT | Mod: CQ

## 2021-12-10 PROCEDURE — 27000221 HC OXYGEN, UP TO 24 HOURS

## 2021-12-10 RX ADMIN — DOCUSATE SODIUM 100 MG: 100 CAPSULE, LIQUID FILLED ORAL at 08:12

## 2021-12-10 RX ADMIN — FUROSEMIDE 40 MG: 40 TABLET ORAL at 08:12

## 2021-12-10 RX ADMIN — APIXABAN 5 MG: 2.5 TABLET, FILM COATED ORAL at 08:12

## 2021-12-10 RX ADMIN — PANTOPRAZOLE SODIUM 40 MG: 40 TABLET, DELAYED RELEASE ORAL at 08:12

## 2021-12-10 RX ADMIN — ALPRAZOLAM 0.25 MG: 0.25 TABLET ORAL at 08:12

## 2021-12-10 RX ADMIN — INSULIN DETEMIR 10 UNITS: 100 INJECTION, SOLUTION SUBCUTANEOUS at 10:12

## 2021-12-10 RX ADMIN — MELATONIN TAB 3 MG 6 MG: 3 TAB at 10:12

## 2021-12-10 RX ADMIN — FLUTICASONE FUROATE AND VILANTEROL TRIFENATATE 1 PUFF: 100; 25 POWDER RESPIRATORY (INHALATION) at 08:12

## 2021-12-10 RX ADMIN — PSYLLIUM HUSK 1 PACKET: 3.4 POWDER ORAL at 08:12

## 2021-12-10 RX ADMIN — PRAVASTATIN SODIUM 20 MG: 20 TABLET ORAL at 08:12

## 2021-12-10 RX ADMIN — ALPRAZOLAM 0.25 MG: 0.25 TABLET ORAL at 10:12

## 2021-12-10 RX ADMIN — ESCITALOPRAM 5 MG: 5 TABLET, FILM COATED ORAL at 08:12

## 2021-12-10 RX ADMIN — GABAPENTIN 100 MG: 100 CAPSULE ORAL at 10:12

## 2021-12-10 RX ADMIN — Medication 400 MG: at 10:12

## 2021-12-10 RX ADMIN — PREDNISONE 20 MG: 20 TABLET ORAL at 08:12

## 2021-12-10 RX ADMIN — TIOTROPIUM BROMIDE INHALATION SPRAY 2 PUFF: 3.12 SPRAY, METERED RESPIRATORY (INHALATION) at 08:12

## 2021-12-10 RX ADMIN — Medication 400 MG: at 08:12

## 2021-12-10 RX ADMIN — APIXABAN 5 MG: 2.5 TABLET, FILM COATED ORAL at 10:12

## 2021-12-10 RX ADMIN — DILTIAZEM HYDROCHLORIDE 180 MG: 180 CAPSULE, COATED, EXTENDED RELEASE ORAL at 08:12

## 2021-12-10 RX ADMIN — ALPRAZOLAM 0.25 MG: 0.25 TABLET ORAL at 03:12

## 2021-12-10 NOTE — PT/OT/SLP PROGRESS
"Physical Therapy Treatment    Patient Name:  Leyla Mari   MRN:  [id number]  Admit Date: 12/6/2021  Admitting Diagnosis: COPD with acute exacerbation  Recent Surgeries:     General Precautions: Standard, fall,respiratory   Orthopedic Precautions:N/A   Braces:       Recommendations:     Discharge Recommendations:  home health PT   Level of Assistance Recommended at Discharge: 24 hours light assistance  Discharge Equipment Recommendations:  (TBD)   Barriers to discharge: None    Assessment:     Leyla Mari is a 76 y.o. female admitted with a medical diagnosis of COPD with acute exacerbation .  Patient progressing slow and steady and will continue to benefit from skilled PT services to improve highest level of [place] with Gait, transfers and all functional mobility.      Performance deficits affecting function:  weakness,impaired endurance,impaired self care skills,impaired functional mobilty,gait instability,impaired balance,decreased upper extremity function,decreased lower extremity function,edema,impaired skin,impaired cardiopulmonary response to activity .    Rehab Potential is fair    Activity Tolerance: Fair    Plan:     Patient to be seen 5 x/week to address the above listed problems via gait training,therapeutic activities,therapeutic exercises,therapeutic groups,neuromuscular re-education,wheelchair management/training    · Plan of Care Expires: 01/06/22  · Plan of Care Reviewed with: patient    Subjective     "I feel pretty good this morning, my legs dont hurt,".        Pain/Comfort:  · Pain Rating 1: 0/10  · Pain Rating Post-Intervention 1: 0/10    Patient's cultural, spiritual, Gnosticism conflicts given the current situation:  · no    Objective:       Patient found with bed in chair position with oxygen upon PT entry to room.     Therapeutic Activities and Exercises:   Patient performed mini elliptical for B LE's x 10 minutes to build muscle strength and endurance.      Functional " Mobility:  · Transfers:     · Sit to Stand:  moderate assistance with rolling walker and MOD A with parallel bars  · Bed to Chair: moderate assistance with  rolling walker  using  Stand Pivot  · Gait: Gait trg using parallel bars for ~ 10 feet with MIN A    AM-PAC 6 CLICK MOBILITY  13    Patient left up in chair with all lines intact and call button in reach.    GOALS:   Multidisciplinary Problems     Physical Therapy Goals        Problem: Physical Therapy Goal    Goal Priority Disciplines Outcome Goal Variances Interventions   Physical Therapy Goal     PT, PT/OT Ongoing, Progressing     Description: Goals to be met by: 12/28/21    Patient will increase functional independence with mobility by performing:    . Supine to sit with Set-up [place]  . Sit to supine with Set-up [place]  . Rolling to Left and Right with Set-up Assistance.  . Sit to stand transfer with Stand-by Assistance  . Bed to chair transfer with Stand-by Assistance using Rolling Walker  . Gait  x 50 feet with Contact Guard Assistance using Rolling Walker.   . Wheelchair propulsion x50 feet with Minimal Assistance using bilateral uppper extremities  . Ascend/Descend 4 inch curb step with Minimal Assistance using Rolling Walker.                     Time Tracking:     PT Received On: 12/10/21  PT Total Time (min):   38    Billable Minutes: Therapeutic Activity 12 and Therapeutic Exercise 26    Treatment Type: Treatment  PT/PTA: PTA     PTA Visit Number: 3     12/10/2021

## 2021-12-10 NOTE — PT/OT/SLP PROGRESS
"Occupational Therapy   Treatment    Name: Leyla Mari  MRN: [id number]  Admit Date: 12/6/2021  Admitting Diagnosis:  COPD with acute exacerbation    General Precautions: Standard, fall,respiratory   Orthopedic Precautions:N/A   Braces:       Recommendations:     Discharge Recommendations: home health OT  Level of Assistance Recommended at Discharge: 24 hours light assistance for ADL's and homemaking tasks  Discharge Equipment Recommendations:   (TBD)  Barriers to discharge:  None    Assessment:     Leyla Mari is a 76 y.o. female with a medical diagnosis of COPD with acute exacerbation .  She presents with performance deficits affecting function are weakness,impaired endurance,impaired self care skills,impaired functional mobility,gait instability,impaired balance,decreased upper extremity function,decreased lower extremity function,pain,decreased ROM,impaired cardiopulmonary response to activity,decreased coordination,edema. Pt agreed to participate in tx session on today. Pt required rest break in between all task on today. Pt O2 85% after all task. Pt able to recover to 95% O2 at rest. Pt required CGA sit to stand from bed and Min A with sit to stand from raised toilet. Pt will continue to benefit from skilled OT to improve towards goals.    Rehab Potential is good    Activity tolerance:  Good    Plan:     Patient to be seen 6 x/week to address the above listed problems via self-care/home management,therapeutic activities,therapeutic exercises    · Plan of Care Expires: 01/07/22  · Plan of Care Reviewed with: patient    Subjective     Communicated with: Otilia prior to session. "Can we walk from the bed to bathroom because I have a hard time getting up from chair" .    Pain/Comfort:  Pain Rating 1: 0/10  Pain Rating Post-Intervention 1: 0/10    Patient's cultural, spiritual, Episcopalian conflicts given the current situation:  no    Objective:     Patient found supine with oxygen upon OT entry to room.    Bed " Mobility:    · Patient completed Rolling/Turning to Left with  stand by assistance  · Patient completed Rolling/Turning to Right with stand by assistance  · Patient completed Scooting/Bridging with stand by assistance  · Patient completed Supine to Sit with stand by assistance     Functional Mobility/Transfers:  · Patient completed Sit <> Stand Transfer with contact guard assistance  with  rolling walker   · Patient completed Bed <> Chair Transfer using Step Transfer technique with contact guard assistance with rolling walker  · Patient completed Toilet Transfer Step Transfer technique with minimum assistance with  rolling walker     Functional Mobility: Pt fxl ambulated from bed to toilet with CGA/RW.  Activities of Daily Living:  · Grooming: supervision to perform seated at sink.  · Upper Body Dressing: stand by assistance to perform doffing/donning pullover shirt seated   · Lower Body Dressing: stand by assistance to perform threading BLE into pants legs. Pt perform donning pants over hips with CGA/RW. Pt perform donning BLE sock using hip kit with SBA  · Toileting: stand by assistance to perform hygiene and clothing management \    [facility] 6 Click ADL: 16    Treatment & Education:    · Pt completed ADLs and func mobility activities for tx session as noted above    · Pt educated on role of OT and POC    Patient left up in chair with call button in reachEducation:      GOALS:   Multidisciplinary Problems     Occupational Therapy Goals        Problem: Occupational Therapy Goal    Goal Priority Disciplines Outcome Interventions   Occupational Therapy Goal     OT, PT/OT Ongoing, Progressing    Description: Goals to be met by: 12/28/21     Patient will increase functional independence with ADLs by performing:    UE Dressing with Modified [place].  LE Dressing with Minimal Assistance.  Grooming while seated at sink with Modified [place].  Toileting from toilet or BSC with Minimal Assistance for hygiene and  clothing management.   Bathing from  sitting at sink with Stand-by Assistance.  Rolling to Bilateral with Modified [place].   Supine to sit with Modified [place].  Toilet transfer to toilet or BSC with Stand-by Assistance with RW .  Stand Pivot transfer with Stand-by Assistance with RW .  Upper extremity exercise with assistance as needed.  Caregiver will be educated on level of assist required to safely perform self care tasks and functional transfers..                      Time Tracking:     OT Date of Treatment: OT Date of Treatment: 12/10/21  OT Total Time (min): Total Time (min): 47 min    Billable Minutes:Self Care/Home Management 47    12/10/2021

## 2021-12-10 NOTE — PLAN OF CARE
Problem: Adult Inpatient Plan of Care  Goal: Plan of Care Review  Outcome: Ongoing, Progressing  Goal: Absence of Hospital-Acquired Illness or Injury  Outcome: Ongoing, Progressing   Problem: Wound  Goal: Optimal Wound Healing  Outcome: Ongoing, Progressing   Problem: Skin Injury Risk Increased  Goal: Skin Health and Integrity  Outcome: Ongoing, Progressing   Problem: Fall Injury Risk  Goal: Absence of Fall and Fall-Related Injury  Outcome: Ongoing, Progressing   POC reviewed with pt who verbalized understanding. AAOx4. VSS on 3L NC. Remains free of falls and injury. No complaints of pain or nausea. BLE dressings intact; moon boots in place. BG monitored at bedtime with no coverage needed per MAR. Resting between care. No acute events. No distress noted. Call light in reach and rounds made for safety. Will continue to monitor.

## 2021-12-11 LAB
POCT GLUCOSE: 121 MG/DL (ref 70–110)
POCT GLUCOSE: 52 MG/DL (ref 70–110)
POCT GLUCOSE: 68 MG/DL (ref 70–110)
POCT GLUCOSE: 77 MG/DL (ref 70–110)

## 2021-12-11 PROCEDURE — 94761 N-INVAS EAR/PLS OXIMETRY MLT: CPT

## 2021-12-11 PROCEDURE — 25000003 PHARM REV CODE 250: Performed by: NURSE PRACTITIONER

## 2021-12-11 PROCEDURE — 27000221 HC OXYGEN, UP TO 24 HOURS

## 2021-12-11 PROCEDURE — 97110 THERAPEUTIC EXERCISES: CPT | Mod: CQ

## 2021-12-11 PROCEDURE — 97116 GAIT TRAINING THERAPY: CPT | Mod: CQ

## 2021-12-11 PROCEDURE — 63600175 PHARM REV CODE 636 W HCPCS: Performed by: INTERNAL MEDICINE

## 2021-12-11 PROCEDURE — 25000003 PHARM REV CODE 250: Performed by: INTERNAL MEDICINE

## 2021-12-11 PROCEDURE — 11000004 HC SNF PRIVATE

## 2021-12-11 PROCEDURE — 25000242 PHARM REV CODE 250 ALT 637 W/ HCPCS: Performed by: NURSE PRACTITIONER

## 2021-12-11 PROCEDURE — 99900035 HC TECH TIME PER 15 MIN (STAT)

## 2021-12-11 PROCEDURE — 97530 THERAPEUTIC ACTIVITIES: CPT | Mod: CQ

## 2021-12-11 RX ADMIN — PSYLLIUM HUSK 1 PACKET: 3.4 POWDER ORAL at 08:12

## 2021-12-11 RX ADMIN — ALPRAZOLAM 0.25 MG: 0.25 TABLET ORAL at 08:12

## 2021-12-11 RX ADMIN — PREDNISONE 20 MG: 20 TABLET ORAL at 09:12

## 2021-12-11 RX ADMIN — GABAPENTIN 100 MG: 100 CAPSULE ORAL at 09:12

## 2021-12-11 RX ADMIN — INSULIN DETEMIR 10 UNITS: 100 INJECTION, SOLUTION SUBCUTANEOUS at 09:12

## 2021-12-11 RX ADMIN — ALPRAZOLAM 0.25 MG: 0.25 TABLET ORAL at 09:12

## 2021-12-11 RX ADMIN — DOCUSATE SODIUM 100 MG: 100 CAPSULE, LIQUID FILLED ORAL at 08:12

## 2021-12-11 RX ADMIN — ESCITALOPRAM 5 MG: 5 TABLET, FILM COATED ORAL at 08:12

## 2021-12-11 RX ADMIN — FUROSEMIDE 40 MG: 40 TABLET ORAL at 08:12

## 2021-12-11 RX ADMIN — MELATONIN TAB 3 MG 6 MG: 3 TAB at 09:12

## 2021-12-11 RX ADMIN — DILTIAZEM HYDROCHLORIDE 180 MG: 180 CAPSULE, COATED, EXTENDED RELEASE ORAL at 08:12

## 2021-12-11 RX ADMIN — TIOTROPIUM BROMIDE INHALATION SPRAY 2 PUFF: 3.12 SPRAY, METERED RESPIRATORY (INHALATION) at 08:12

## 2021-12-11 RX ADMIN — PANTOPRAZOLE SODIUM 40 MG: 40 TABLET, DELAYED RELEASE ORAL at 08:12

## 2021-12-11 RX ADMIN — FLUTICASONE FUROATE AND VILANTEROL TRIFENATATE 1 PUFF: 100; 25 POWDER RESPIRATORY (INHALATION) at 08:12

## 2021-12-11 RX ADMIN — ALPRAZOLAM 0.25 MG: 0.25 TABLET ORAL at 02:12

## 2021-12-11 RX ADMIN — APIXABAN 5 MG: 2.5 TABLET, FILM COATED ORAL at 08:12

## 2021-12-11 RX ADMIN — APIXABAN 5 MG: 2.5 TABLET, FILM COATED ORAL at 09:12

## 2021-12-11 RX ADMIN — PRAVASTATIN SODIUM 20 MG: 20 TABLET ORAL at 08:12

## 2021-12-11 NOTE — PT/OT/SLP PROGRESS
"Physical Therapy Treatment    Patient Name:  Leyla Mari   MRN:  [id number]  Admit Date: 12/6/2021  Admitting Diagnosis: COPD with acute exacerbation  Recent Surgeries:     General Precautions: Standard, fall,respiratory   Orthopedic Precautions:N/A   Braces:       Recommendations:     Discharge Recommendations:  home health PT   Level of Assistance Recommended at Discharge: 24 hours light assistance  Discharge Equipment Recommendations:  (TBD)   Barriers to discharge: None    Assessment:     Leyla Mari is a 76 y.o. female admitted with a medical diagnosis of COPD with acute exacerbation . Pt tolerated well, pt would continue to benefit from skilled PT services to improve overall functional mobility, strength and endurance.  .      Performance deficits affecting function:  weakness,impaired endurance,impaired self care skills,impaired functional mobilty,gait instability,impaired balance,decreased upper extremity function,decreased lower extremity function,edema,impaired skin,impaired cardiopulmonary response to activity .    Rehab Potential is good    Activity Tolerance: Fair    Plan:     Patient to be seen 5 x/week to address the above listed problems via gait training,therapeutic activities,therapeutic exercises,therapeutic groups,neuromuscular re-education,wheelchair management/training    · Plan of Care Expires: 01/06/22  · Plan of Care Reviewed with: patient    Subjective     "if I can stand, I can walk" agreeable to therapy.     Pain/Comfort:  · Pain Rating 1: 0/10  · Pain Rating Post-Intervention 1: 0/10    Patient's cultural, spiritual, Pentecostal conflicts given the current situation:  · no    Objective:       Patient found  with oxygen (3L) upon PT entry to room.     Therapeutic Activities and Exercises: 2x10 reps AP,GS,LAQ,hip flex mini elliptical x 10 min  02 sats 95-98% HR 70s    Functional Mobility:  · Transfers:     · Sit to Stand:  moderate assistance with rolling walker and vcs/demo for tech, ed " on trfs mechanics  · Gait: amb with RW min/CGA wc/02 in tow ~ 37 ft and 36 ft seated rest break    AM-PAC 6 CLICK MOBILITY  14    Patient left up in chair with all lines intact, call button in reach and belonging sin reach.    GOALS:   Multidisciplinary Problems     Physical Therapy Goals        Problem: Physical Therapy Goal    Goal Priority Disciplines Outcome Goal Variances Interventions   Physical Therapy Goal     PT, PT/OT Ongoing, Progressing     Description: Goals to be met by: 12/28/21    Patient will increase functional independence with mobility by performing:    . Supine to sit with Set-up [place]  . Sit to supine with Set-up [place]  . Rolling to Left and Right with Set-up Assistance.  . Sit to stand transfer with Stand-by Assistance  . Bed to chair transfer with Stand-by Assistance using Rolling Walker  . Gait  x 50 feet with Contact Guard Assistance using Rolling Walker.   . Wheelchair propulsion x50 feet with Minimal Assistance using bilateral uppper extremities  . Ascend/Descend 4 inch curb step with Minimal Assistance using Rolling Walker.                     Time Tracking:     PT Received On: 12/11/21  PT Total Time (min):       Billable Minutes: Gait Training 15, Therapeutic Activity 10 and Therapeutic Exercise 20    Treatment Type: Treatment  PT/PTA: PTA     PTA Visit Number: 4     12/11/2021

## 2021-12-11 NOTE — PROGRESS NOTES
Informed per pct that patient has a new skin tear to left knee.pt has very fragile skin and skin tear was moist , L shape open tear present. Pct verbalize in front of patient and charge nurse that while assisting patient to toilet , pt's left knee bump the toilet paper plastic container and new skin tear noted to lt knee. Now patient back in bed. Lt knee skin tear cleaned with sterile normal saline, no active bleeding. Covered with foam dressing.

## 2021-12-11 NOTE — PLAN OF CARE
Problem: Adult Inpatient Plan of Care  Goal: Plan of Care Review  Outcome: Ongoing, Progressing  Goal: Patient-Specific Goal (Individualization)  Outcome: Ongoing, Progressing  Goal: Readiness for Transition of Care  Outcome: Ongoing, Progressing     Problem: Infection (Pneumonia)  Goal: Resolution of Infection Signs/Symptoms  Outcome: Ongoing, Progressing     Problem: Respiratory Compromise (Pneumonia)  Goal: Effective Oxygenation and Ventilation  Outcome: Ongoing, Progressing     Problem: Fall Injury Risk  Goal: Absence of Fall and Fall-Related Injury  Outcome: Ongoing, Progressing

## 2021-12-12 LAB
POCT GLUCOSE: 129 MG/DL (ref 70–110)
POCT GLUCOSE: 161 MG/DL (ref 70–110)
POCT GLUCOSE: 223 MG/DL (ref 70–110)
POCT GLUCOSE: 45 MG/DL (ref 70–110)

## 2021-12-12 PROCEDURE — 27100092 HC HIGH FLOW DELIVERY CANNULA

## 2021-12-12 PROCEDURE — 97110 THERAPEUTIC EXERCISES: CPT

## 2021-12-12 PROCEDURE — 25000003 PHARM REV CODE 250: Performed by: NURSE PRACTITIONER

## 2021-12-12 PROCEDURE — 99900035 HC TECH TIME PER 15 MIN (STAT)

## 2021-12-12 PROCEDURE — 63600175 PHARM REV CODE 636 W HCPCS: Performed by: INTERNAL MEDICINE

## 2021-12-12 PROCEDURE — 25000003 PHARM REV CODE 250: Performed by: INTERNAL MEDICINE

## 2021-12-12 PROCEDURE — 27000221 HC OXYGEN, UP TO 24 HOURS

## 2021-12-12 PROCEDURE — 97535 SELF CARE MNGMENT TRAINING: CPT

## 2021-12-12 PROCEDURE — 94761 N-INVAS EAR/PLS OXIMETRY MLT: CPT

## 2021-12-12 PROCEDURE — 11000004 HC SNF PRIVATE

## 2021-12-12 RX ADMIN — ALPRAZOLAM 0.25 MG: 0.25 TABLET ORAL at 09:12

## 2021-12-12 RX ADMIN — MELATONIN TAB 3 MG 6 MG: 3 TAB at 09:12

## 2021-12-12 RX ADMIN — APIXABAN 5 MG: 2.5 TABLET, FILM COATED ORAL at 09:12

## 2021-12-12 RX ADMIN — DOCUSATE SODIUM 100 MG: 100 CAPSULE, LIQUID FILLED ORAL at 09:12

## 2021-12-12 RX ADMIN — ALPRAZOLAM 0.25 MG: 0.25 TABLET ORAL at 02:12

## 2021-12-12 RX ADMIN — DILTIAZEM HYDROCHLORIDE 180 MG: 180 CAPSULE, COATED, EXTENDED RELEASE ORAL at 09:12

## 2021-12-12 RX ADMIN — PREDNISONE 20 MG: 20 TABLET ORAL at 09:12

## 2021-12-12 RX ADMIN — INSULIN ASPART 1 UNITS: 100 INJECTION, SOLUTION INTRAVENOUS; SUBCUTANEOUS at 09:12

## 2021-12-12 RX ADMIN — TIOTROPIUM BROMIDE INHALATION SPRAY 2 PUFF: 3.12 SPRAY, METERED RESPIRATORY (INHALATION) at 09:12

## 2021-12-12 RX ADMIN — GABAPENTIN 100 MG: 100 CAPSULE ORAL at 09:12

## 2021-12-12 RX ADMIN — FLUTICASONE FUROATE AND VILANTEROL TRIFENATATE 1 PUFF: 100; 25 POWDER RESPIRATORY (INHALATION) at 09:12

## 2021-12-12 RX ADMIN — ESCITALOPRAM 5 MG: 5 TABLET, FILM COATED ORAL at 09:12

## 2021-12-12 RX ADMIN — PANTOPRAZOLE SODIUM 40 MG: 40 TABLET, DELAYED RELEASE ORAL at 09:12

## 2021-12-12 RX ADMIN — PRAVASTATIN SODIUM 20 MG: 20 TABLET ORAL at 09:12

## 2021-12-12 RX ADMIN — INSULIN DETEMIR 10 UNITS: 100 INJECTION, SOLUTION SUBCUTANEOUS at 09:12

## 2021-12-12 RX ADMIN — FUROSEMIDE 40 MG: 40 TABLET ORAL at 09:12

## 2021-12-12 NOTE — NURSING
PT refusing Bipap, wants to just use current O2 therapy of 3L via nasal canula. Checked on pt throughout the night during frequency checks and recorded O2 saturation each time. PT maintained oxygen saturation around 95% each time the lowest falling to 90% and the highest being 96%. Should be noted that PT was mouth breathing with nasal canula in place each time O2 sat was recorded. PT showed no S+S of pain or distress CN notified of results, will continue to monitor.

## 2021-12-12 NOTE — PLAN OF CARE
Problem: Occupational Therapy Goal  Goal: Occupational Therapy Goal  Description: Goals to be met by: 12/28/21     Patient will increase functional independence with ADLs by performing:    UE Dressing with Modified [place].  LE Dressing with Minimal Assistance.  Grooming while seated at sink with Modified [place].  Toileting from toilet or BSC with Minimal Assistance for hygiene and clothing management.   Bathing from  sitting at sink with Stand-by Assistance.  Rolling to Bilateral with Modified [place].   Supine to sit with Modified [place].  Toilet transfer to toilet or BSC with Stand-by Assistance with RW .  Stand Pivot transfer with Stand-by Assistance with RW .  Upper extremity exercise with assistance as needed.  Caregiver will be educated on level of assist required to safely perform self care tasks and functional transfers..     Outcome: Ongoing, Progressing

## 2021-12-12 NOTE — PT/OT/SLP PROGRESS
Occupational Therapy   Treatment    Name: Leyla Mari  MRN: [id number]  Admit Date: 12/6/2021  Admitting Diagnosis:  COPD with acute exacerbation    General Precautions: Standard, fall,respiratory   Orthopedic Precautions:N/A   Braces: N/A     Recommendations:     Discharge Recommendations: home health OT  Level of Assistance Recommended at Discharge: 24 hours physical assistance for all ADL's and home management tasks  Discharge Equipment Recommendations:   (TBD)  Barriers to discharge:  None    Assessment:     Leyla Mari is a 76 y.o. female with a medical diagnosis of COPD with acute exacerbation .  She remains limited in performance of self-care , functional mobility and ADLs and currently not performing tasks at OF . Currently presenting with performance deficits including  weakness,impaired endurance,impaired self care skills,impaired functional mobilty,gait instability,impaired balance,decreased upper extremity function,decreased lower extremity function,decreased safety awareness,decreased coordination,decreased ROM,impaired skin,edema,impaired cardiopulmonary response to activity.  Pt tolerated Tx without incident and is making slow progress with self care tasks, functional mobility and functional transfers .  She would continue to benefit from OT intervention to further her functional (I)ce and safety.    Rehab Potential is good    Activity tolerance:  Good    Plan:     Patient to be seen 6 x/week to address the above listed problems via self-care/home management,therapeutic activities,therapeutic exercises    · Plan of Care Expires: 01/07/22  · Plan of Care Reviewed with: patient    Subjective     Communicated with: nurse prior to session.     Pain/Comfort:  Pain Rating 1: 0/10  Pain Rating Post-Intervention 1: 0/10    Patient's cultural, spiritual, Mosque conflicts given the current situation:  no    Objective:     Patient found up in chair with oxygen upon OT entry to room.    Bed Mobility:     · Pt seated in W/C  at onset of therapy session.    Functional Mobility/Transfers:  · Patient completed Sit <> Stand Transfer with maximal assistance  with  rolling walker         Sit to stand performed 3 trials to RW.  · Patient completed Bed <> Chair Transfer using Stand Pivot technique with maximal assistance with rolling walker with Max (A) for sit to stand transition      Activities of Daily Living:  · Lower Body Dressing: moderate assistance with Pt using hip kit to doff and then don pants and socks. Increased time required to complete LBD with increased V/Cs to properly use dressing stick and sock aide.    [facility] 6 Click ADL: 16    OT Exercises: UE Ergometer performed 10 minutes on UBE with Min resistance.  UE exercises performed to increase functional endurance and strength in order increase independence when performing self care tasks, functional ambulation, W/C propulsion, and functional standing activities .    Treatment & Education:  Pt edu on role of OT, POC, safety when performing self care tasks , benefit of performing OOB activity, and safety when performing functional transfers and mobility.  - White board updated  - Self care tasks completed-- as noted above       Patient left up in chair with all lines intact and call button in reachEducation:      GOALS:   Multidisciplinary Problems     Occupational Therapy Goals        Problem: Occupational Therapy Goal    Goal Priority Disciplines Outcome Interventions   Occupational Therapy Goal     OT, PT/OT Ongoing, Progressing    Description: Goals to be met by: 12/28/21     Patient will increase functional independence with ADLs by performing:    UE Dressing with Modified [place].  LE Dressing with Minimal Assistance.  Grooming while seated at sink with Modified [place].  Toileting from toilet or BSC with Minimal Assistance for hygiene and clothing management.   Bathing from  sitting at sink with Stand-by Assistance.  Rolling to Bilateral with  Modified [place].   Supine to sit with Modified [place].  Toilet transfer to toilet or BSC with Stand-by Assistance with RW .  Stand Pivot transfer with Stand-by Assistance with RW .  Upper extremity exercise with assistance as needed.  Caregiver will be educated on level of assist required to safely perform self care tasks and functional transfers..                      Time Tracking:     OT Date of Treatment: OT Date of Treatment: 12/12/21  OT Total Time (min): Total Time (min): 40 min    Billable Minutes:Self Care/Home Management 30  Therapeutic Exercise 10    12/12/2021

## 2021-12-13 LAB
ANION GAP SERPL CALC-SCNC: 10 MMOL/L (ref 8–16)
BASOPHILS # BLD AUTO: 0.01 K/UL (ref 0–0.2)
BASOPHILS NFR BLD: 0.1 % (ref 0–1.9)
BUN SERPL-MCNC: 12 MG/DL (ref 8–23)
CALCIUM SERPL-MCNC: 8.9 MG/DL (ref 8.7–10.5)
CHLORIDE SERPL-SCNC: 93 MMOL/L (ref 95–110)
CO2 SERPL-SCNC: 34 MMOL/L (ref 23–29)
CREAT SERPL-MCNC: 0.4 MG/DL (ref 0.5–1.4)
DIFFERENTIAL METHOD: ABNORMAL
EOSINOPHIL # BLD AUTO: 0 K/UL (ref 0–0.5)
EOSINOPHIL NFR BLD: 0.4 % (ref 0–8)
ERYTHROCYTE [DISTWIDTH] IN BLOOD BY AUTOMATED COUNT: 14.6 % (ref 11.5–14.5)
EST. GFR  (AFRICAN AMERICAN): >60 ML/MIN/1.73 M^2
EST. GFR  (NON AFRICAN AMERICAN): >60 ML/MIN/1.73 M^2
GLUCOSE SERPL-MCNC: 28 MG/DL (ref 70–110)
HCT VFR BLD AUTO: 38.7 % (ref 37–48.5)
HGB BLD-MCNC: 11.8 G/DL (ref 12–16)
IMM GRANULOCYTES # BLD AUTO: 0.04 K/UL (ref 0–0.04)
IMM GRANULOCYTES NFR BLD AUTO: 0.5 % (ref 0–0.5)
LYMPHOCYTES # BLD AUTO: 0.5 K/UL (ref 1–4.8)
LYMPHOCYTES NFR BLD: 5.9 % (ref 18–48)
MAGNESIUM SERPL-MCNC: 2 MG/DL (ref 1.6–2.6)
MCH RBC QN AUTO: 28.1 PG (ref 27–31)
MCHC RBC AUTO-ENTMCNC: 30.5 G/DL (ref 32–36)
MCV RBC AUTO: 92 FL (ref 82–98)
MONOCYTES # BLD AUTO: 0.4 K/UL (ref 0.3–1)
MONOCYTES NFR BLD: 5.3 % (ref 4–15)
NEUTROPHILS # BLD AUTO: 7.3 K/UL (ref 1.8–7.7)
NEUTROPHILS NFR BLD: 87.8 % (ref 38–73)
NRBC BLD-RTO: 0 /100 WBC
PHOSPHATE SERPL-MCNC: 3.1 MG/DL (ref 2.7–4.5)
PLATELET # BLD AUTO: 173 K/UL (ref 150–450)
PMV BLD AUTO: 10.2 FL (ref 9.2–12.9)
POCT GLUCOSE: 161 MG/DL (ref 70–110)
POCT GLUCOSE: 230 MG/DL (ref 70–110)
POCT GLUCOSE: 327 MG/DL (ref 70–110)
POCT GLUCOSE: 37 MG/DL (ref 70–110)
POCT GLUCOSE: 99 MG/DL (ref 70–110)
POTASSIUM SERPL-SCNC: 3.4 MMOL/L (ref 3.5–5.1)
RBC # BLD AUTO: 4.2 M/UL (ref 4–5.4)
SODIUM SERPL-SCNC: 137 MMOL/L (ref 136–145)
WBC # BLD AUTO: 8.35 K/UL (ref 3.9–12.7)

## 2021-12-13 PROCEDURE — 25000003 PHARM REV CODE 250: Performed by: INTERNAL MEDICINE

## 2021-12-13 PROCEDURE — 97116 GAIT TRAINING THERAPY: CPT | Mod: CQ

## 2021-12-13 PROCEDURE — 36415 COLL VENOUS BLD VENIPUNCTURE: CPT | Performed by: INTERNAL MEDICINE

## 2021-12-13 PROCEDURE — 80048 BASIC METABOLIC PNL TOTAL CA: CPT | Performed by: INTERNAL MEDICINE

## 2021-12-13 PROCEDURE — 83735 ASSAY OF MAGNESIUM: CPT | Performed by: INTERNAL MEDICINE

## 2021-12-13 PROCEDURE — 97110 THERAPEUTIC EXERCISES: CPT | Mod: CQ

## 2021-12-13 PROCEDURE — 25000003 PHARM REV CODE 250: Performed by: NURSE PRACTITIONER

## 2021-12-13 PROCEDURE — 11000004 HC SNF PRIVATE

## 2021-12-13 PROCEDURE — 25000003 PHARM REV CODE 250

## 2021-12-13 PROCEDURE — 97530 THERAPEUTIC ACTIVITIES: CPT | Mod: CQ

## 2021-12-13 PROCEDURE — 27000221 HC OXYGEN, UP TO 24 HOURS

## 2021-12-13 PROCEDURE — 85025 COMPLETE CBC W/AUTO DIFF WBC: CPT | Performed by: INTERNAL MEDICINE

## 2021-12-13 PROCEDURE — 63600175 PHARM REV CODE 636 W HCPCS: Performed by: INTERNAL MEDICINE

## 2021-12-13 PROCEDURE — 99900035 HC TECH TIME PER 15 MIN (STAT)

## 2021-12-13 PROCEDURE — 84100 ASSAY OF PHOSPHORUS: CPT | Performed by: INTERNAL MEDICINE

## 2021-12-13 PROCEDURE — 25000242 PHARM REV CODE 250 ALT 637 W/ HCPCS: Performed by: NURSE PRACTITIONER

## 2021-12-13 PROCEDURE — 97110 THERAPEUTIC EXERCISES: CPT | Mod: CO

## 2021-12-13 PROCEDURE — 94761 N-INVAS EAR/PLS OXIMETRY MLT: CPT

## 2021-12-13 RX ORDER — IBUPROFEN 200 MG
16 TABLET ORAL
Status: DISCONTINUED | OUTPATIENT
Start: 2021-12-13 | End: 2021-12-28 | Stop reason: HOSPADM

## 2021-12-13 RX ORDER — POTASSIUM CHLORIDE 20 MEQ/1
40 TABLET, EXTENDED RELEASE ORAL 2 TIMES DAILY
Status: COMPLETED | OUTPATIENT
Start: 2021-12-13 | End: 2021-12-13

## 2021-12-13 RX ORDER — IBUPROFEN 200 MG
TABLET ORAL
Status: COMPLETED
Start: 2021-12-13 | End: 2021-12-13

## 2021-12-13 RX ORDER — GLUCAGON 1 MG
1 KIT INJECTION
Status: DISCONTINUED | OUTPATIENT
Start: 2021-12-13 | End: 2021-12-28 | Stop reason: HOSPADM

## 2021-12-13 RX ORDER — FUROSEMIDE 20 MG/1
20 TABLET ORAL DAILY
Status: DISCONTINUED | OUTPATIENT
Start: 2021-12-14 | End: 2021-12-21

## 2021-12-13 RX ORDER — IBUPROFEN 200 MG
24 TABLET ORAL
Status: DISCONTINUED | OUTPATIENT
Start: 2021-12-13 | End: 2021-12-28 | Stop reason: HOSPADM

## 2021-12-13 RX ADMIN — APIXABAN 5 MG: 2.5 TABLET, FILM COATED ORAL at 09:12

## 2021-12-13 RX ADMIN — GABAPENTIN 100 MG: 100 CAPSULE ORAL at 09:12

## 2021-12-13 RX ADMIN — FUROSEMIDE 40 MG: 40 TABLET ORAL at 09:12

## 2021-12-13 RX ADMIN — PREDNISONE 20 MG: 20 TABLET ORAL at 09:12

## 2021-12-13 RX ADMIN — PANTOPRAZOLE SODIUM 40 MG: 40 TABLET, DELAYED RELEASE ORAL at 09:12

## 2021-12-13 RX ADMIN — DILTIAZEM HYDROCHLORIDE 180 MG: 180 CAPSULE, COATED, EXTENDED RELEASE ORAL at 09:12

## 2021-12-13 RX ADMIN — PRAVASTATIN SODIUM 20 MG: 20 TABLET ORAL at 09:12

## 2021-12-13 RX ADMIN — ESCITALOPRAM 5 MG: 5 TABLET, FILM COATED ORAL at 09:12

## 2021-12-13 RX ADMIN — INSULIN ASPART 1 UNITS: 100 INJECTION, SOLUTION INTRAVENOUS; SUBCUTANEOUS at 09:12

## 2021-12-13 RX ADMIN — ALPRAZOLAM 0.25 MG: 0.25 TABLET ORAL at 09:12

## 2021-12-13 RX ADMIN — POTASSIUM CHLORIDE 40 MEQ: 1500 TABLET, EXTENDED RELEASE ORAL at 10:12

## 2021-12-13 RX ADMIN — MELATONIN TAB 3 MG 3 MG: 3 TAB at 09:12

## 2021-12-13 RX ADMIN — POTASSIUM CHLORIDE 40 MEQ: 1500 TABLET, EXTENDED RELEASE ORAL at 09:12

## 2021-12-13 RX ADMIN — Medication: at 07:12

## 2021-12-13 RX ADMIN — FLUTICASONE FUROATE AND VILANTEROL TRIFENATATE 1 PUFF: 100; 25 POWDER RESPIRATORY (INHALATION) at 09:12

## 2021-12-13 RX ADMIN — TIOTROPIUM BROMIDE INHALATION SPRAY 2 PUFF: 3.12 SPRAY, METERED RESPIRATORY (INHALATION) at 09:12

## 2021-12-13 RX ADMIN — ALPRAZOLAM 0.25 MG: 0.25 TABLET ORAL at 03:12

## 2021-12-13 RX ADMIN — PSYLLIUM HUSK 1 PACKET: 3.4 POWDER ORAL at 09:12

## 2021-12-13 RX ADMIN — DOCUSATE SODIUM 100 MG: 100 CAPSULE, LIQUID FILLED ORAL at 09:12

## 2021-12-13 NOTE — PROGRESS NOTES
[facility]                                  Skilled Nursing Facility                   Progress Note     Admit Date: 12/6/2021  MARK ANTHONY TBD  Principal Problem:  COPD with acute exacerbation   HPI obtained from patient interview and chart review     Chief Complaint: Re-evaluation of medical treatment and therapy status: Lab review, hypokalemia    HPI:   Ms. Mari is a 76 year old female with PMHx of end-stage COPD, PE (on eliquis), HTN and HLD who presents to SNF following hospitalization for COPD exacerbation.  Admission to SNF for secondary weakness and debility.     Interval history: All of today's labs reviewed and are listed below.  K 3.4, bicarb increased to 34, glucose noted to be 28 on AM labs.  Glucose recheck after glucose tablets and acceptable limit, patient remained asymptomatic.  24 hr vital sign ranges listed below.  Patient states that her respiratory status is about the same as last week, at rest her breathing is at her baseline, with activity she is slightly more short of breath.  Patient denies shortness of breath, abdominal discomfort, nausea, or vomiting.  Patient reports an adequate appetite.  Patient denies dysuria.  Patient reports having regular bowel movements.  Patient progessing with PT/OT-Gait: Gait trg using RW on even surface x 66' with MIN A, gait trg over unstable surface with RW ~ 10'x2 with MIN A, slow juan but no LOB noted. Continuing to follow and treat all acute and chronic conditions.    Past Medical History: Patient has a past medical history of Actinic keratosis, Arthritis, Cataract, Cellulitis of ankle, Colon polyps, COPD (chronic obstructive pulmonary disease), Family history of colon cancer, History of Clostridium difficile infection (7/3/2012), Hyperlipidemia, Hypertension, Lung nodules, and Sinus tachycardia.    Past Surgical History: Patient has a past surgical history that includes Appendectomy;  Eye surgery; Tonsillectomy;  section; Cataract extraction (Bilateral, ); and Colonoscopy (N/A, 10/10/2017).    Social History: Patient reports that she quit smoking about 26 years ago. Her smoking use included cigarettes. She has a 39.00 pack-year smoking history. She has never used smokeless tobacco. She reports current alcohol use of about 2.0 standard drinks of alcohol per week. She reports that she does not use drugs.    Family History: family history includes Blindness in her paternal grandmother; Breast cancer in her sister; COPD in her father; Cancer in her sister and son; Cancer (age of onset: 79) in her mother; Cataracts in her mother and sister; Diabetes in her father and paternal grandmother; Glaucoma in her mother; Heart disease in her father, mother, and sister; Hyperlipidemia in her father, mother, and sister; Hypertension in her father, mother, and sister; Skin cancer in her mother and sister; Thyroid disease in her daughter and mother.    Allergies: Patient is allergic to bactrim [sulfamethoxazole-trimethoprim], codeine, keflex [cephalexin], ceftriaxone, clindamycin hcl, doxycycline, and vancomycin analogues.    ROS  Constitutional: Negative for fever   Eyes: Negative for blurred vision, double vision   Respiratory:  +mild intermittent dry cough, TRAVIS  Cardiovascular: Negative for chest pain, palpitations.  + leg swelling.   Gastrointestinal: Negative for abdominal pain, constipation, diarrhea, nausea, vomiting.   Genitourinary: Negative for dysuria, frequency   Musculoskeletal:  + generalized weakness. Negative for back pain and myalgias.   Skin: Negative for itching and rash.   Neurological: Negative for dizziness, headaches.   Psychiatric/Behavioral: Negative for depression. The patient is not nervous/anxious.      24 hour Vital Sign Range   Temp:  [97.5 °F (36.4 °C)-97.8 °F (36.6 °C)]   Pulse:  [73-89]   Resp:  [18]   BP: (122-141)/(57-58)   SpO2:  [92 %-97 %]     PEx  Constitutional:  Patient appears debilitated.  No distress noted  HENT:   Head: Normocephalic and atraumatic.   Eyes: Pupils are equal, round  Neck: Normal range of motion. Neck supple.   Cardiovascular: Normal rate, regular rhythm and normal heart sounds.    Pulmonary/Chest: Effort normal and breath sounds are clear  Abdominal: Soft. Bowel sounds are normal.   Musculoskeletal: Normal range of motion.   Neurological: Alert and oriented to person, place, and time.   Psychiatric: Normal mood and affect. Behavior is normal.   Skin: Skin is warm and dry.  Right lower lateral leg- stable scab over wound bed    Wound that you did not assess today:  Wound location(s)/type(s):  Right heel, sacrum  Not visualized today. No signs/symptoms of infection or wound-related changes reported.       Recent Labs   Lab 12/13/21  0530      K 3.4*   CL 93*   CO2 34*   BUN 12   CREATININE 0.4*   MG 2.0       Recent Labs   Lab 12/13/21  0530   WBC 8.35   RBC 4.20   HGB 11.8*   HCT 38.7      MCV 92   MCH 28.1   MCHC 30.5*         Assessment and Plan:    Hypoglycemia  - patient originally admitted with hyperglycemia due to steroid use  - HA1c (5/2021): 7.7  - admitted on determir 10 units nightly while inpatient and LDSSI  - discharge recommendations per endocrine are for metformin   - 12/13 discontinuing detemir for now, can continue on low-dose sliding scale insulin PRN, do not think patient will need metformin at discharge, she will need to follow-up with Endocrine after discharge from SNF    Hypokalemia  - initiated potassium 40 mEq q4h x2 doses    Acute on chronic respiratory failure with hypercapnia- improving   COPD with acute exacerbation  Pulmonary edema  - chronic home oxygen at 2 LPM.   - Transition IV solumedrol to prednisone 60 mg daily with taper over the next 2 weeks back to home dose of 15 mg daily.   - completed 5 day course of azithromycin 250 mg  - continue home inhalesr.   - currently using 3 L nasal cannula, wean to home  dose of 2 L  - continue Lasix 40 mg daily ( takes 20 mg at home and every other day)   - continue PRN levalbuterol nebulizer treatments  - 12/13 reduce Lasix to 20 mg daily, to begin weaning back to home dose patient feels symptom improvement, continues at 3 L needs to be weaned down to 2 L    Long term current use of systemic steroids  - Takes prednisone 15 mg daily for end-stage COPD  -  taper back 15mg as status improves. started on prednisone 60 mg --> 40 mg daily --> now currently receiving prednisone 20 mg daily   - 12/13 will continue 20 mg daily for now    Peripheral neuropathy  - continue home gabapentin 100 mg qHS.      Generalized anxiety disorder  - Continue home alprazolam 0.25 mg TID, escitalopram 5 mg daily.      Encounter for palliative care  - patient is being followed by palliative care in an outpatient setting. Patient is DNR/DNI.   - as per previous notes, patient was enrolled in inpatient hospice and then revoked it.   - Patient's daughter is amendable to hospice if patient does not improve on current treatment.      Acute pulmonary embolism  - Continue apixaban 5 mg BID.     Cellulitis of right lower extremity  - Continue bactrim DS BID to complete a week.   - Continue to monitor response. Area is improving.      B/l lower extremity edema  - CXR showed bibasilar edema (R>L)  - resumed home lasix --> IV lasix 40 mg daily   - strict I/Os   - prior TTE in 5/2021 normal   - TTE showed EF 65% and normal LV diastolic function      Hyperlipidemia  - Continue home pravastatin 20 mg daily    Essential hypertension  - Continue diltiazem 180 mg daily.    Debility   - Continue with PT/OT for gait training and strengthening and restoration of ADL's   - Encourage mobility, OOB in chair, and early ambulation as appropriate  - Fall precautions   - Monitor for bowel and bladder dysfunction  - Monitor for and prevent skin breakdown and pressure ulcers  - Continue DVT prophylaxis with  apixaban 5 mg  BID         Anticipate disposition:  Home with home health      Follow-up needed during SNF stay-    Follow-up needed after discharge from SNF: PCP, pulmonology    No future appointments.        Emerald Clark NP  Department of Hospital Medicine   [facility]     DOS: 12/13/2021       Patient note was created using MModal Dictation.  Any errors in syntax or even information may not have been identified and edited on initial review prior to signing this note.

## 2021-12-13 NOTE — PT/OT/SLP PROGRESS
"Physical Therapy Treatment    Patient Name:  Leyla Mari   MRN:  [id number]  Admit Date: 12/6/2021  Admitting Diagnosis: COPD with acute exacerbation  Recent Surgeries:     General Precautions: Standard, fall   Orthopedic Precautions:N/A   Braces:       Recommendations:     Discharge Recommendations:  home health PT   Level of Assistance Recommended at Discharge: 24 hours significant assistance  Discharge Equipment Recommendations:  (TBD)   Barriers to discharge: None    Assessment:     Leyla Mari is a 76 y.o. female admitted with a medical diagnosis of COPD with acute exacerbation. Patient has significant edema in B LE's despite compression wraps put in place by nursing. Patient tolerated physical therapy well today showing improved functional mobility and gait tolerance and will continue to benefit from skilled PT services to improved deficits listed below.  Patient educated to elevate feet when she's resting .      Performance deficits affecting function:  weakness,impaired endurance,impaired self care skills,impaired functional mobilty,gait instability,impaired balance,decreased upper extremity function,decreased lower extremity function,edema,impaired skin,impaired cardiopulmonary response to activity .    Rehab Potential is fair    Activity Tolerance: Fair    Plan:     Patient to be seen 5 x/week to address the above listed problems via gait training,therapeutic activities,therapeutic exercises,therapeutic groups,neuromuscular re-education,wheelchair management/training    · Plan of Care Expires: 01/06/22  · Plan of Care Reviewed with: patient    Subjective     "I'm ready for physical therapy," pt stated and is agreeable to therapy session.     Pain/Comfort:  · Pain Rating 1: 0/10  · Pain Rating Post-Intervention 1: 0/10    Patient's cultural, spiritual, Quaker conflicts given the current situation:  · no    Objective:     Communicated with pt prior to session.  Patient found up in chair with oxygen " upon PT entry to room.     Therapeutic Activities and Exercises:   Patient performed mini elliptical for B LE's x 10 minutes to build muscle strength and endurance.  WC Mobility, transfer training from multiple surfaces, bed mobility, gait training completed during today's session.    Functional Mobility:  · Bed Mobility:     · Bridging: supervision  · Supine to Sit: supervision  · Sit to Supine: supervision  · Transfers:     · Sit to Stand:  moderate assistance with rolling walker  · Chair to mat: contact guard assistance with  rolling walker  using  Stand Pivot  · Gait: Gait trg using RW on even surface x 66' with MIN A, gait trg over unstable surface with RW ~ 10'x2 with MIN A, slow juan but no LOB noted  · Wheelchair Propulsion:  Pt propelled Standard wheelchair x 62 feet on Level tile with  Bilateral upper extremity with Stand-by Assistance.     AM-PAC 6 CLICK MOBILITY       Patient left up in chair with call button in reach and legs elevated.    GOALS:   Multidisciplinary Problems     Physical Therapy Goals        Problem: Physical Therapy Goal    Goal Priority Disciplines Outcome Goal Variances Interventions   Physical Therapy Goal     PT, PT/OT Ongoing, Progressing     Description: Goals to be met by: 12/28/21    Patient will increase functional independence with mobility by performing:    . Supine to sit with Set-up [place]  . Sit to supine with Set-up [place]  . Rolling to Left and Right with Set-up Assistance.  . Sit to stand transfer with Stand-by Assistance  . Bed to chair transfer with Stand-by Assistance using Rolling Walker  . Gait  x 50 feet with Contact Guard Assistance using Rolling Walker.   . Wheelchair propulsion x50 feet with Minimal Assistance using bilateral uppper extremities  . Ascend/Descend 4 inch curb step with Minimal Assistance using Rolling Walker.                     Time Tracking:     PT Received On: 12/13/21  PT Total Time (min):   44    Billable Minutes: Gait  Training 16, Therapeutic Activity 17 and Therapeutic Exercise 11    Treatment Type: Treatment  PT/PTA: PTA     PTA Visit Number: 5     12/13/2021

## 2021-12-13 NOTE — PT/OT/SLP PROGRESS
"Occupational Therapy   Treatment    Name: Leyla Mari  MRN: [id number]  Admit Date: 12/6/2021  Admitting Diagnosis:  COPD with acute exacerbation    General Precautions: Standard, fall,respiratory   Orthopedic Precautions:N/A   Braces:       Recommendations:     Discharge Recommendations: home health OT  Level of Assistance Recommended at Discharge: 24 hours physical assistance for all ADL's and home management tasks  Discharge Equipment Recommendations:   (TBD)  Barriers to discharge:  None    Assessment:     Leyla Mari is a 76 y.o. female with a medical diagnosis of COPD with acute exacerbation . Performance deficits affecting function areweakness,impaired endurance,impaired self care skills,impaired functional mobilty,gait instability,impaired balance,decreased upper extremity function,decreased lower extremity function,decreased safety awareness,decreased coordination,decreased ROM,impaired skin,edema,impaired cardiopulmonary response to activity. Pt. participated well with session on this day. Pt is progressing well with session on this day still continues to requires cues with aspects of safety . Pt. Will continue to benefit from continued OT to progress towards goals    Rehab Potential is good    Activity tolerance:  Good    Plan:     Patient to be seen 6 x/week to address the above listed problems via self-care/home management,therapeutic activities,therapeutic exercises    · Plan of Care Expires: 01/07/22  · Plan of Care Reviewed with: patient    Subjective     Communicated with: bernie prior to session. "Ill come before lunch"    Pain/Comfort:  · Pain Rating 1: 0/10  · Pain Rating Post-Intervention 1: 0/10    Patient's cultural, spiritual, Confucianism conflicts given the current situation:  · no    Objective:     Patient found up in chair with O2 upon OT entry to room.    Functional Mobility/Transfers:  · Patient completed Sit <> Stand Transfer with moderate assistance  with  rolling walker   · Functional " Mobility: Pt. Able to propel self in w/c with good use of BUEs noted    Activities of Daily Living:  · Not tested    [facility] 6 Click ADL: 16    OT Exercises: UE Ergometer 10 mins with min resistance    Treatment & Education:  Pt. With standing act on this day with task. Pt. With CGA/SBA for balance aspects with task with AD at raised counter Pt with visual perception task with discrimination of various shapes and sizes x 4 min with standing bal and min cues throught out. Pt. Not able to complete finish task but seated rest break required.    Pt. With 1# dowel activity with 2x20 reps with  shd flex, bicep curls horz adb/add and forward flex motion to increase BUE ROM and strength,.     Pt. With standing and therex performed to increase ROM, endurance selfcare task and fxl mobility for independence     Patient left up in chair with all lines intact and call button in reachEducation:      GOALS:   Multidisciplinary Problems     Occupational Therapy Goals        Problem: Occupational Therapy Goal    Goal Priority Disciplines Outcome Interventions   Occupational Therapy Goal     OT, PT/OT Ongoing, Progressing    Description: Goals to be met by: 12/28/21     Patient will increase functional independence with ADLs by performing:    UE Dressing with Modified [place].  LE Dressing with Minimal Assistance.  Grooming while seated at sink with Modified [place].  Toileting from toilet or BSC with Minimal Assistance for hygiene and clothing management.   Bathing from  sitting at sink with Stand-by Assistance.  Rolling to Bilateral with Modified [place].   Supine to sit with Modified [place].  Toilet transfer to toilet or BSC with Stand-by Assistance with RW .  Stand Pivot transfer with Stand-by Assistance with RW .  Upper extremity exercise with assistance as needed.  Caregiver will be educated on level of assist required to safely perform self care tasks and functional transfers..                      Time  Tracking:     OT Date of Treatment: OT Date of Treatment: 12/13/21  OT Total Time (min): Total Time (min): 38 min    Billable Minutes:Therapeutic Exercise 38    12/13/2021   OT and RAY have discussed the above patients goals and status in collaboration with Plan of Care.

## 2021-12-14 LAB
POCT GLUCOSE: 146 MG/DL (ref 70–110)
POCT GLUCOSE: 232 MG/DL (ref 70–110)
POCT GLUCOSE: 237 MG/DL (ref 70–110)
POCT GLUCOSE: 99 MG/DL (ref 70–110)

## 2021-12-14 PROCEDURE — 94761 N-INVAS EAR/PLS OXIMETRY MLT: CPT

## 2021-12-14 PROCEDURE — 27000221 HC OXYGEN, UP TO 24 HOURS

## 2021-12-14 PROCEDURE — 97535 SELF CARE MNGMENT TRAINING: CPT | Mod: CO

## 2021-12-14 PROCEDURE — 97530 THERAPEUTIC ACTIVITIES: CPT

## 2021-12-14 PROCEDURE — 99900035 HC TECH TIME PER 15 MIN (STAT)

## 2021-12-14 PROCEDURE — 11000004 HC SNF PRIVATE

## 2021-12-14 PROCEDURE — 25000003 PHARM REV CODE 250: Performed by: NURSE PRACTITIONER

## 2021-12-14 PROCEDURE — 97116 GAIT TRAINING THERAPY: CPT

## 2021-12-14 PROCEDURE — 25000003 PHARM REV CODE 250: Performed by: INTERNAL MEDICINE

## 2021-12-14 PROCEDURE — 97110 THERAPEUTIC EXERCISES: CPT

## 2021-12-14 PROCEDURE — 25000242 PHARM REV CODE 250 ALT 637 W/ HCPCS: Performed by: NURSE PRACTITIONER

## 2021-12-14 PROCEDURE — 63600175 PHARM REV CODE 636 W HCPCS: Performed by: INTERNAL MEDICINE

## 2021-12-14 RX ADMIN — ALPRAZOLAM 0.25 MG: 0.25 TABLET ORAL at 09:12

## 2021-12-14 RX ADMIN — FUROSEMIDE 20 MG: 20 TABLET ORAL at 09:12

## 2021-12-14 RX ADMIN — INSULIN ASPART 1 UNITS: 100 INJECTION, SOLUTION INTRAVENOUS; SUBCUTANEOUS at 10:12

## 2021-12-14 RX ADMIN — DOCUSATE SODIUM 100 MG: 100 CAPSULE, LIQUID FILLED ORAL at 09:12

## 2021-12-14 RX ADMIN — PANTOPRAZOLE SODIUM 40 MG: 40 TABLET, DELAYED RELEASE ORAL at 09:12

## 2021-12-14 RX ADMIN — FLUTICASONE FUROATE AND VILANTEROL TRIFENATATE 1 PUFF: 100; 25 POWDER RESPIRATORY (INHALATION) at 09:12

## 2021-12-14 RX ADMIN — ALPRAZOLAM 0.25 MG: 0.25 TABLET ORAL at 03:12

## 2021-12-14 RX ADMIN — ESCITALOPRAM 5 MG: 5 TABLET, FILM COATED ORAL at 09:12

## 2021-12-14 RX ADMIN — APIXABAN 5 MG: 2.5 TABLET, FILM COATED ORAL at 09:12

## 2021-12-14 RX ADMIN — ALPRAZOLAM 0.25 MG: 0.25 TABLET ORAL at 10:12

## 2021-12-14 RX ADMIN — PSYLLIUM HUSK 1 PACKET: 3.4 POWDER ORAL at 09:12

## 2021-12-14 RX ADMIN — TIOTROPIUM BROMIDE INHALATION SPRAY 2 PUFF: 3.12 SPRAY, METERED RESPIRATORY (INHALATION) at 09:12

## 2021-12-14 RX ADMIN — GABAPENTIN 100 MG: 100 CAPSULE ORAL at 10:12

## 2021-12-14 RX ADMIN — APIXABAN 5 MG: 2.5 TABLET, FILM COATED ORAL at 10:12

## 2021-12-14 RX ADMIN — INSULIN ASPART 2 UNITS: 100 INJECTION, SOLUTION INTRAVENOUS; SUBCUTANEOUS at 05:12

## 2021-12-14 RX ADMIN — PRAVASTATIN SODIUM 20 MG: 20 TABLET ORAL at 09:12

## 2021-12-14 RX ADMIN — DILTIAZEM HYDROCHLORIDE 180 MG: 180 CAPSULE, COATED, EXTENDED RELEASE ORAL at 09:12

## 2021-12-14 RX ADMIN — PREDNISONE 20 MG: 20 TABLET ORAL at 09:12

## 2021-12-14 NOTE — PT/OT/SLP PROGRESS
Physical Therapy Treatment    Patient Name:  Leyla Mari   MRN:  [id number]  Admit Date: 12/6/2021  Admitting Diagnosis: COPD with acute exacerbation  Recent Surgeries: N/A    General Precautions: Standard, fall   Orthopedic Precautions:N/A   Braces: N/A     Recommendations:     Discharge Recommendations:  home health PT   Level of Assistance Recommended at Discharge: 24 hours light assistance  Discharge Equipment Recommendations:  (TBD)   Barriers to discharge: None    Assessment:     Leyla Mari is a 76 y.o. female admitted with a medical diagnosis of COPD with acute exacerbation . Pt is very motivated to participate in PT and was able to ambulate 96ft +116ft with a RW today. Pt's endurance continues to  improve but she still needs Max/Mod A for sit<>stand transfers from different surfaces d.t B L/E weakness and swelling.      Performance deficits affecting function:  weakness,impaired endurance,impaired self care skills,impaired functional mobilty,gait instability,impaired balance,decreased upper extremity function,decreased lower extremity function,impaired skin,edema,impaired cardiopulmonary response to activity .    Rehab Potential is good    Activity Tolerance: Good    Plan:     Patient to be seen 6 x/week to address the above listed problems via gait training,therapeutic activities,therapeutic exercises,therapeutic groups,neuromuscular re-education,wheelchair management/training    · Plan of Care Expires: 01/06/22  · Plan of Care Reviewed with: patient    Subjective     Pt. Agreeable to work with PT.     Pain/Comfort:  · Pain Rating 1: 0/10  · Pain Rating Post-Intervention 1: 0/10    Patient's cultural, spiritual, Anabaptist conflicts given the current situation:  · no    Objective:     Communicated with pt. prior to session.  Patient found up in chair with oxygen upon PT entry to room.     Therapeutic Activities and Exercises: Mini-eliptical x 12mins to improve B L/E MMT, endurance and circulation, with  the intent of also decreasing B l/e swelling.    Functional Mobility:  · Transfers:     · Sit to Stand from w/c x 2 trials:  moderate assistance and maximal assistance with rolling walker  · Gait: 96ft +116ft with RW and CGA with PT following closely with w/c and portable O2 attached to the w/c. pt required lengthy seated rest breaks to recover from fatigue and SOB.    AM-PAC 6 CLICK MOBILITY  14    Patient left up in chair with all lines intact and call button in reach.    GOALS:   Multidisciplinary Problems     Physical Therapy Goals        Problem: Physical Therapy Goal    Goal Priority Disciplines Outcome Goal Variances Interventions   Physical Therapy Goal     PT, PT/OT Ongoing, Progressing     Description: Goals to be met by: 12/28/21    Patient will increase functional independence with mobility by performing:    . Supine to sit with Set-up [place]  . Sit to supine with Set-up [place]  . Rolling to Left and Right with Set-up Assistance.  . Sit to stand transfer with Stand-by Assistance- not met  . Bed to chair transfer with Stand-by Assistance using Rolling Walker  . Gait  x 50 feet with Contact Guard Assistance using Rolling Walker. -met  Updated 12/14/2021Gait  x 130 feet with Contact Guard Assistance using Rolling Walker  . Wheelchair propulsion x50 feet with Minimal Assistance using bilateral uppper extremities  . Ascend/Descend 4 inch curb step with Minimal Assistance using Rolling Walker.                     Time Tracking:     PT Received On: 12/14/21  PT Total Time (min):     Billable Minutes: Gait Training 25mins and Therapeutic Exercise 17mins    Treatment Type: Treatment  PT/PTA: PT     PTA Visit Number: 0     12/14/2021

## 2021-12-14 NOTE — PROGRESS NOTES
[facility] - Skilled Nursing  Wound Care    Patient Name:  Leyla Mari   MRN:  [id number]  Date: 2021  Diagnosis: COPD with acute exacerbation  Wound care follow-up  History:     Past Medical History:   Diagnosis Date    Actinic keratosis     Arthritis     Cataract     Cellulitis of ankle     Colon polyps     COPD (chronic obstructive pulmonary disease)     home O2 (2L/min)     Family history of colon cancer     History of Clostridium difficile infection 7/3/2012    Hyperlipidemia     Hypertension     Lung nodules     Sinus tachycardia        Social History     Socioeconomic History    Marital status:    Occupational History    Occupation: retired   Tobacco Use    Smoking status: Former Smoker     Packs/day: 1.00     Years: 39.00     Pack years: 39.00     Types: Cigarettes     Quit date: 1995     Years since quittin.1    Smokeless tobacco: Never Used   Substance and Sexual Activity    Alcohol use: Yes     Alcohol/week: 2.0 standard drinks     Types: 2 Glasses of wine per week     Comment: rarely has a glass of wine- not daily    Drug use: No    Sexual activity: Never   Other Topics Concern    Are you pregnant or think you may be? No    Breast-feeding No     Social Determinants of Health     Financial Resource Strain: Low Risk     Difficulty of Paying Living Expenses: Not hard at all   Food Insecurity: No Food Insecurity    Worried About Running Out of Food in the Last Year: Never true    Ran Out of Food in the Last Year: Never true   Transportation Needs: No Transportation Needs    Lack of Transportation (Medical): No    Lack of Transportation (Non-Medical): No   Physical Activity: Insufficiently Active    Days of Exercise per Week: 7 days    Minutes of Exercise per Session: 10 min   Stress: Stress Concern Present    Feeling of Stress : To some extent   Social Connections: Socially Isolated    Frequency of Communication with Friends and Family: More than three  times a week    Frequency of Social Gatherings with Friends and Family: Twice a week    Attends Confucianism Services: Never    Active Member of Clubs or Organizations: No    Attends Club or Organization Meetings: Never    Marital Status:    Housing Stability: Low Risk     Unable to Pay for Housing in the Last Year: No    Number of Places Lived in the Last Year: 1    Unstable Housing in the Last Year: No       Precautions:     Allergies as of 12/06/2021 - Reviewed 11/23/2021   Allergen Reaction Noted    Bactrim [sulfamethoxazole-trimethoprim] Nausea Only 07/02/2012    Codeine Itching 07/02/2012    Keflex [cephalexin] Other (See Comments) 07/12/2012    Ceftriaxone Rash 08/12/2014    Clindamycin hcl Rash 10/05/2016    Doxycycline Rash 05/08/2017    Vancomycin analogues Rash 08/12/2014       [facility] Assessment Details/Treatment     Ms. Mari is lying in bed, therapist in room.   The sacral stage 2 is resolved, skin intact- pink.   The right heel slough was removed when cleansing, stage 3 pressure injury POA, pink/red moist tissue with scant amount of tan moist slough on wound edges. The mikey-wound is pink.    The BLE were cleansed with cleansing cloths, Sween24 lotion applied to skin.  The bilateral feet are swollen, the lower leg skin is intact, no weeping. Tubigrip stocking reapplied.   The left knee dressing was partially off and removed. The skin cleansed with sterile normal saline, bleeding controlled, steri-strips applied to secure wound edges together, foam dressing placed over wound. The left knee has 2 skin tears with ecchymotic skin changes to the mikey-wound. No erythema.     Plan:  Sacrum- continue Triad ointment BID as preventive measure  Right heel- continue Triad ointment T/T/S then cover with foam dressing  BLE- continue to cleanse skin with cleansing cloths, Sween 24 lotion to skin, Tubigrip from Toes to knee every T/T/S  Right knee-heel  foam dressing in place over steri-strip skin  tears- change weekly- Tues until resolved.     Nursing to continue care, pressure prevention measures  Wound care will follow-up prn  Recommendations made to primary team per written report for above plan . Orders placed.   Right buttock    Right heel stage 3  1 cm L x 2 cm W x 0.3 cm D    Left calf    Left shin     left knee skin tear- 4 cm L X 3 cm W          12/14/2021

## 2021-12-14 NOTE — PT/OT/SLP PROGRESS
"Occupational Therapy   Treatment    Name: Leyla Mari  MRN: [id number]  Admit Date: 12/6/2021  Admitting Diagnosis:  COPD with acute exacerbation    General Precautions: Standard, fall   Orthopedic Precautions:N/A   Braces:       Recommendations:     Discharge Recommendations: home health OT  Level of Assistance Recommended at Discharge: 24 hours physical assistance for all ADL's and home management tasks  Discharge Equipment Recommendations:   (TBD)  Barriers to discharge:  None    Assessment:     Leyla Mari is a 76 y.o. female with a medical diagnosis of COPD with acute exacerbation .  She presents with performance deficits affecting function are weakness,impaired endurance,impaired self care skills,impaired functional mobility,gait instability,impaired balance,decreased upper extremity function,decreased lower extremity function,decreased safety awareness,decreased coordination,decreased ROM,impaired skin,edema,impaired cardiopulmonary response to activity. Pt. participated well with session on this day. Pt session pause due to wound care. Pt left on toilet stating she will be a minute , PCT notified and Pt educated on pulling red cord when ready. Pt will continue to benefit from skilled OT to improve towards goals    Rehab Potential is good    Activity tolerance:  Good    Plan:     Patient to be seen 6 x/week to address the above listed problems via self-care/home management,therapeutic activities,therapeutic exercises    · Plan of Care Expires: 01/07/22  · Plan of Care Reviewed with: patient    Subjective     Communicated with: Otilia prior to session ." Can I take a bath"    Pain/Comfort:  · Pain Rating 1: 0/10  · Pain Rating Post-Intervention 1: 0/10    Patient's cultural, spiritual, Advent conflicts given the current situation:  · no    Objective:     Patient found supine with oxygen upon OT entry to room.    Bed Mobility:    · Patient completed Rolling/Turning to Left with  stand by " assistance  · Patient completed Rolling/Turning to Right with stand by assistance  · Patient completed Scooting/Bridging with stand by assistance  · Patient completed Supine to Sit with stand by assistance     Functional Mobility/Transfers:  · Patient completed Sit <> Stand Transfer with contact guard assistance  with  rolling walker   · Patient completed Bed <> Chair Transfer using Step Transfer technique with stand by assistance with rolling walker  · Patient completed Toilet Transfer Step Transfer technique with moderate assistance with  grab bars. Pt perform toilet transfer from WC.  · Functional Mobility: Pt perform ~6 steps from bed to WC with CGA/RW    Activities of Daily Living:  · Grooming: supervision to perform oral care seated at sink  · Bathing: stand by assistance to perform sponge bath UE seated at sink and LE seated on raised toilet  · Upper Body Dressing: supervision to perform doffing/donning pull over shirt    AMPAC 6 Click ADL: 16    Treatment & Education:    · Pt completed ADLs and func mobility activities for tx session as noted above    · Pt educated on role of OT and POC    Patient left on toilet with PCT notifiedEducation:      GOALS:   Multidisciplinary Problems     Occupational Therapy Goals        Problem: Occupational Therapy Goal    Goal Priority Disciplines Outcome Interventions   Occupational Therapy Goal     OT, PT/OT Ongoing, Progressing    Description: Goals to be met by: 12/28/21     Patient will increase functional independence with ADLs by performing:    UE Dressing with Modified [place].  LE Dressing with Minimal Assistance.  Grooming while seated at sink with Modified [place].  Toileting from toilet or BSC with Minimal Assistance for hygiene and clothing management.   Bathing from  sitting at sink with Stand-by Assistance.  Rolling to Bilateral with Modified [place].   Supine to sit with Modified [place].  Toilet transfer to toilet or BSC with Stand-by  Assistance with RW .  Stand Pivot transfer with Stand-by Assistance with RW .  Upper extremity exercise with assistance as needed.  Caregiver will be educated on level of assist required to safely perform self care tasks and functional transfers..                      Time Tracking:     OT Date of Treatment: OT Date of Treatment: 12/14/21  OT Total Time (min): Total Time (min): 47 min    Billable Minutes:Self Care/Home Management 48    12/14/2021

## 2021-12-14 NOTE — PLAN OF CARE
Problem: Physical Therapy Goal  Goal: Physical Therapy Goal  Description: Goals to be met by: 12/28/21    Patient will increase functional independence with mobility by performing:    . Supine to sit with Set-up [place]  . Sit to supine with Set-up [place]  . Rolling to Left and Right with Set-up Assistance.  . Sit to stand transfer with Stand-by Assistance- not met  . Bed to chair transfer with Stand-by Assistance using Rolling Walker  . Gait  x 50 feet with Contact Guard Assistance using Rolling Walker. -met  Updated 12/14/2021Gait  x 130 feet with Contact Guard Assistance using Rolling Walker  . Wheelchair propulsion x50 feet with Minimal Assistance using bilateral uppper extremities  . Ascend/Descend 4 inch curb step with Minimal Assistance using Rolling Walker.    Outcome: Ongoing, Progressing

## 2021-12-15 LAB
POCT GLUCOSE: 171 MG/DL (ref 70–110)
POCT GLUCOSE: 246 MG/DL (ref 70–110)
POCT GLUCOSE: 75 MG/DL (ref 70–110)

## 2021-12-15 PROCEDURE — 63600175 PHARM REV CODE 636 W HCPCS: Performed by: INTERNAL MEDICINE

## 2021-12-15 PROCEDURE — 97530 THERAPEUTIC ACTIVITIES: CPT | Mod: CQ

## 2021-12-15 PROCEDURE — 97116 GAIT TRAINING THERAPY: CPT | Mod: CQ

## 2021-12-15 PROCEDURE — 25000003 PHARM REV CODE 250: Performed by: INTERNAL MEDICINE

## 2021-12-15 PROCEDURE — 25000242 PHARM REV CODE 250 ALT 637 W/ HCPCS: Performed by: INTERNAL MEDICINE

## 2021-12-15 PROCEDURE — 97535 SELF CARE MNGMENT TRAINING: CPT | Mod: CO

## 2021-12-15 PROCEDURE — 97110 THERAPEUTIC EXERCISES: CPT | Mod: CO

## 2021-12-15 PROCEDURE — 97110 THERAPEUTIC EXERCISES: CPT | Mod: CQ

## 2021-12-15 PROCEDURE — 99900035 HC TECH TIME PER 15 MIN (STAT)

## 2021-12-15 PROCEDURE — 25000242 PHARM REV CODE 250 ALT 637 W/ HCPCS: Performed by: NURSE PRACTITIONER

## 2021-12-15 PROCEDURE — 25000003 PHARM REV CODE 250: Performed by: NURSE PRACTITIONER

## 2021-12-15 PROCEDURE — 11000004 HC SNF PRIVATE

## 2021-12-15 PROCEDURE — 94761 N-INVAS EAR/PLS OXIMETRY MLT: CPT

## 2021-12-15 PROCEDURE — 27000221 HC OXYGEN, UP TO 24 HOURS

## 2021-12-15 RX ADMIN — ALPRAZOLAM 0.25 MG: 0.25 TABLET ORAL at 03:12

## 2021-12-15 RX ADMIN — DILTIAZEM HYDROCHLORIDE 180 MG: 180 CAPSULE, COATED, EXTENDED RELEASE ORAL at 08:12

## 2021-12-15 RX ADMIN — FUROSEMIDE 20 MG: 20 TABLET ORAL at 08:12

## 2021-12-15 RX ADMIN — ESCITALOPRAM 5 MG: 5 TABLET, FILM COATED ORAL at 08:12

## 2021-12-15 RX ADMIN — DOCUSATE SODIUM 100 MG: 100 CAPSULE, LIQUID FILLED ORAL at 08:12

## 2021-12-15 RX ADMIN — APIXABAN 5 MG: 2.5 TABLET, FILM COATED ORAL at 10:12

## 2021-12-15 RX ADMIN — PSYLLIUM HUSK 1 PACKET: 3.4 POWDER ORAL at 08:12

## 2021-12-15 RX ADMIN — TIOTROPIUM BROMIDE INHALATION SPRAY 2 PUFF: 3.12 SPRAY, METERED RESPIRATORY (INHALATION) at 08:12

## 2021-12-15 RX ADMIN — ALPRAZOLAM 0.25 MG: 0.25 TABLET ORAL at 08:12

## 2021-12-15 RX ADMIN — PANTOPRAZOLE SODIUM 40 MG: 40 TABLET, DELAYED RELEASE ORAL at 08:12

## 2021-12-15 RX ADMIN — ALPRAZOLAM 0.25 MG: 0.25 TABLET ORAL at 10:12

## 2021-12-15 RX ADMIN — APIXABAN 5 MG: 2.5 TABLET, FILM COATED ORAL at 08:12

## 2021-12-15 RX ADMIN — PRAVASTATIN SODIUM 20 MG: 20 TABLET ORAL at 08:12

## 2021-12-15 RX ADMIN — PREDNISONE 20 MG: 20 TABLET ORAL at 08:12

## 2021-12-15 RX ADMIN — GABAPENTIN 100 MG: 100 CAPSULE ORAL at 10:12

## 2021-12-15 RX ADMIN — FLUTICASONE FUROATE AND VILANTEROL TRIFENATATE 1 PUFF: 100; 25 POWDER RESPIRATORY (INHALATION) at 08:12

## 2021-12-15 NOTE — PT/OT/SLP PROGRESS
"Physical Therapy Treatment    Patient Name:  Leyla Mari   MRN:  [id number]  Admit Date: 12/6/2021  Admitting Diagnosis: COPD with acute exacerbation  Recent Surgeries:     General Precautions: Standard, fall   Orthopedic Precautions:N/A   Braces:       Recommendations:     Discharge Recommendations:  home health PT   Level of Assistance Recommended at Discharge: 24 hours light assistance  Discharge Equipment Recommendations:  (TBD)   Barriers to discharge: None    Assessment:     Leyla Mari is a 76 y.o. female admitted with a medical diagnosis of COPD with acute exacerbation . Pt tolerated well, pt is very pleasant, demo good effort, motivated, pt would continue to benefit from skilled PT services to improve overall functional mobility, strength and endurance.  .      Performance deficits affecting function:  weakness,impaired endurance,impaired self care skills,impaired functional mobilty,gait instability,impaired balance,decreased upper extremity function,decreased lower extremity function,impaired skin,edema,impaired cardiopulmonary response to activity .    Rehab Potential is good    Activity Tolerance: Fair    Plan:     Patient to be seen 6 x/week to address the above listed problems via gait training,therapeutic activities,therapeutic exercises,therapeutic groups,neuromuscular re-education,wheelchair management/training    · Plan of Care Expires: 01/06/22  · Plan of Care Reviewed with: patient    Subjective     "I'm good" agreeable to therapy.     Pain/Comfort:  · Pain Rating 1: 0/10  · Pain Rating Post-Intervention 1: 0/10    Patient's cultural, spiritual, Yazidi conflicts given the current situation:  · no    Objective:       Patient found  with oxygen (3 L) upon PT entry to room.     Therapeutic Activities and Exercises: mini elliptical x 15 min    Functional Mobility:  · Transfers:     · Sit to Stand:  moderate assistance with rolling walker and vcs for tech/positioning/cushion in chair using " momentum  · Gait: amb with RW 02/wc in tow CGA ~ 160 ft no LOB slow fair juan vcs for rest breaks as needed and focus on breathing  · Wheelchair Propulsion:  ~40 ft with BUE SBA/occ CGA for guidance    AM-PAC 6 CLICK MOBILITY  14    Patient left up in chair with all lines intact, call button in reach and belonging sin reach.    GOALS:   Multidisciplinary Problems     Physical Therapy Goals        Problem: Physical Therapy Goal    Goal Priority Disciplines Outcome Goal Variances Interventions   Physical Therapy Goal     PT, PT/OT Ongoing, Progressing     Description: Goals to be met by: 12/28/21    Patient will increase functional independence with mobility by performing:    . Supine to sit with Set-up [place]  . Sit to supine with Set-up [place]  . Rolling to Left and Right with Set-up Assistance.  . Sit to stand transfer with Stand-by Assistance- not met  . Bed to chair transfer with Stand-by Assistance using Rolling Walker  . Gait  x 50 feet with Contact Guard Assistance using Rolling Walker. -met  Updated 12/14/2021Gait  x 130 feet with Contact Guard Assistance using Rolling Walker  . Wheelchair propulsion x50 feet with Minimal Assistance using bilateral uppper extremities  . Ascend/Descend 4 inch curb step with Minimal Assistance using Rolling Walker.                     Time Tracking:     PT Received On: 12/15/21  PT Total Time (min):       Billable Minutes: Gait Training 13, Therapeutic Activity 12 and Therapeutic Exercise 15    Treatment Type: Treatment  PT/PTA: PTA     PTA Visit Number: 1     12/15/2021

## 2021-12-15 NOTE — PT/OT/SLP PROGRESS
"Occupational Therapy   Treatment    Name: Leyla Mari  MRN: [id number]  Admit Date: 12/6/2021  Admitting Diagnosis:  COPD with acute exacerbation    General Precautions: Standard, fall   Orthopedic Precautions:N/A   Braces:       Recommendations:     Discharge Recommendations: home health OT  Level of Assistance Recommended at Discharge: 24 hours physical assistance for all ADL's and home management tasks  Discharge Equipment Recommendations:   (TBD)  Barriers to discharge:  None    Assessment:     Leyla Mari is a 76 y.o. female with a medical diagnosis of COPD with acute exacerbation .   Performance deficits affecting function are weakness,impaired endurance,impaired self care skills,impaired functional mobilty,gait instability,impaired balance,decreased upper extremity function,decreased lower extremity function,decreased safety awareness,decreased coordination,decreased ROM,impaired skin,edema,impaired cardiopulmonary response to activity. Pt. participated well with session on this day. Pt is progressing well with session on this day still continues to requires cues with aspects of safety . Pt. Will continue to benefit from continued OT to progress towards goals    Rehab Potential is good    Activity tolerance:  Good    Plan:     Patient to be seen 6 x/week to address the above listed problems via self-care/home management,therapeutic activities,therapeutic exercises    · Plan of Care Expires: 01/07/22  · Plan of Care Reviewed with: patient    Subjective     Communicated with: bernie prior to session. "Goodmorning, I need to potty"    Pain/Comfort:  · Pain Rating 1: 0/10  · Pain Rating Post-Intervention 1: 0/10    Patient's cultural, spiritual, Restorationism conflicts given the current situation:  · no    Objective:     Patient found HOB elevated with oxygen upon OT entry to room.    Bed Mobility:    · Patient completed Rolling/Turning to Right with stand by assistance  · Patient completed Scooting/Bridging with " stand by assistance  · Patient completed Supine to Sit with contact guard assistance     Functional Mobility/Transfers:  · Patient completed Sit <> Stand Transfer with moderate assistance  with  rolling walker   · Patient completed Bed <> Chair Transfer using Step Transfer technique with minimum assistance with rolling walker  · Patient completed Toilet Transfer Step Transfer technique with minimum assistance with  rolling walker  · Functional Mobility: Pt. With fxl mobility throughout room and bathroom with RW and CGA. No LOB noted    Activities of Daily Living:  · Feeding:  modified independence with breakfast  · Grooming: supervision with grooming aspects while seated  · Bathing: minimum assistance with cleaning of [place] areas instance   · Upper Body Dressing: supervision to doff/bo pullober shirt  · Lower Body Dressing: minimum assistance to thread pants through RLE and manage over hips instance  · Toileting: minimum assistance with hygiene and clothing management     [facility] 6 Click ADL: 18    Treatment & Education:  Pt. With standing act on this day with task. Pt. With CGA/SBA for balance aspects with task with AD at raised counter Pt with fine motor task of decorating garrison tree x 5 min with standing bal and min cues throught out. Pt. able to complete finish task with no seated rest break required.    Pt. With standing and therex performed to increase ROM, endurance selfcare task and fxl mobility for independence     Patient left up in chair with all lines intact and call button in reachEducation:      GOALS:   Multidisciplinary Problems     Occupational Therapy Goals        Problem: Occupational Therapy Goal    Goal Priority Disciplines Outcome Interventions   Occupational Therapy Goal     OT, PT/OT Ongoing, Progressing    Description: Goals to be met by: 12/28/21     Patient will increase functional independence with ADLs by performing:    UE Dressing with Modified [place].  LE Dressing with Minimal  Assistance.  Grooming while seated at sink with Modified [place].  Toileting from toilet or BSC with Minimal Assistance for hygiene and clothing management.   Bathing from  sitting at sink with Stand-by Assistance.  Rolling to Bilateral with Modified [place].   Supine to sit with Modified [place].  Toilet transfer to toilet or BSC with Stand-by Assistance with RW .  Stand Pivot transfer with Stand-by Assistance with RW .  Upper extremity exercise with assistance as needed.  Caregiver will be educated on level of assist required to safely perform self care tasks and functional transfers..                      Time Tracking:     OT Date of Treatment: OT Date of Treatment: 12/15/21  OT Total Time (min): Total Time (min): 39 min    Billable Minutes:Self Care/Home Management 29  Therapeutic Exercise 10    12/15/2021   OT and RAY have discussed the above patients goals and status in collaboration with Plan of Care.

## 2021-12-16 LAB
ANION GAP SERPL CALC-SCNC: 9 MMOL/L (ref 8–16)
BASOPHILS # BLD AUTO: 0.01 K/UL (ref 0–0.2)
BASOPHILS NFR BLD: 0.2 % (ref 0–1.9)
BUN SERPL-MCNC: 13 MG/DL (ref 8–23)
CALCIUM SERPL-MCNC: 8.7 MG/DL (ref 8.7–10.5)
CHLORIDE SERPL-SCNC: 93 MMOL/L (ref 95–110)
CO2 SERPL-SCNC: 34 MMOL/L (ref 23–29)
CREAT SERPL-MCNC: 0.4 MG/DL (ref 0.5–1.4)
DIFFERENTIAL METHOD: ABNORMAL
EOSINOPHIL # BLD AUTO: 0 K/UL (ref 0–0.5)
EOSINOPHIL NFR BLD: 0.5 % (ref 0–8)
ERYTHROCYTE [DISTWIDTH] IN BLOOD BY AUTOMATED COUNT: 14.5 % (ref 11.5–14.5)
EST. GFR  (AFRICAN AMERICAN): >60 ML/MIN/1.73 M^2
EST. GFR  (NON AFRICAN AMERICAN): >60 ML/MIN/1.73 M^2
GLUCOSE SERPL-MCNC: 72 MG/DL (ref 70–110)
HCT VFR BLD AUTO: 34.1 % (ref 37–48.5)
HGB BLD-MCNC: 10.6 G/DL (ref 12–16)
IMM GRANULOCYTES # BLD AUTO: 0.07 K/UL (ref 0–0.04)
IMM GRANULOCYTES NFR BLD AUTO: 1.2 % (ref 0–0.5)
LYMPHOCYTES # BLD AUTO: 0.6 K/UL (ref 1–4.8)
LYMPHOCYTES NFR BLD: 9.7 % (ref 18–48)
MAGNESIUM SERPL-MCNC: 1.9 MG/DL (ref 1.6–2.6)
MCH RBC QN AUTO: 28.5 PG (ref 27–31)
MCHC RBC AUTO-ENTMCNC: 31.1 G/DL (ref 32–36)
MCV RBC AUTO: 92 FL (ref 82–98)
MONOCYTES # BLD AUTO: 0.5 K/UL (ref 0.3–1)
MONOCYTES NFR BLD: 9 % (ref 4–15)
NEUTROPHILS # BLD AUTO: 4.6 K/UL (ref 1.8–7.7)
NEUTROPHILS NFR BLD: 79.4 % (ref 38–73)
NRBC BLD-RTO: 0 /100 WBC
PHOSPHATE SERPL-MCNC: 2.9 MG/DL (ref 2.7–4.5)
PLATELET # BLD AUTO: 183 K/UL (ref 150–450)
PMV BLD AUTO: 10.2 FL (ref 9.2–12.9)
POCT GLUCOSE: 171 MG/DL (ref 70–110)
POCT GLUCOSE: 179 MG/DL (ref 70–110)
POCT GLUCOSE: 186 MG/DL (ref 70–110)
POCT GLUCOSE: 225 MG/DL (ref 70–110)
POCT GLUCOSE: 75 MG/DL (ref 70–110)
POTASSIUM SERPL-SCNC: 3.4 MMOL/L (ref 3.5–5.1)
RBC # BLD AUTO: 3.72 M/UL (ref 4–5.4)
SODIUM SERPL-SCNC: 136 MMOL/L (ref 136–145)
WBC # BLD AUTO: 5.8 K/UL (ref 3.9–12.7)

## 2021-12-16 PROCEDURE — 36415 COLL VENOUS BLD VENIPUNCTURE: CPT | Performed by: INTERNAL MEDICINE

## 2021-12-16 PROCEDURE — 94761 N-INVAS EAR/PLS OXIMETRY MLT: CPT

## 2021-12-16 PROCEDURE — 63600175 PHARM REV CODE 636 W HCPCS: Performed by: INTERNAL MEDICINE

## 2021-12-16 PROCEDURE — 83735 ASSAY OF MAGNESIUM: CPT | Performed by: INTERNAL MEDICINE

## 2021-12-16 PROCEDURE — 97110 THERAPEUTIC EXERCISES: CPT | Mod: CO

## 2021-12-16 PROCEDURE — 11000004 HC SNF PRIVATE

## 2021-12-16 PROCEDURE — 27000221 HC OXYGEN, UP TO 24 HOURS

## 2021-12-16 PROCEDURE — 85025 COMPLETE CBC W/AUTO DIFF WBC: CPT | Performed by: INTERNAL MEDICINE

## 2021-12-16 PROCEDURE — 97110 THERAPEUTIC EXERCISES: CPT | Mod: CQ

## 2021-12-16 PROCEDURE — 84100 ASSAY OF PHOSPHORUS: CPT | Performed by: INTERNAL MEDICINE

## 2021-12-16 PROCEDURE — 99900035 HC TECH TIME PER 15 MIN (STAT)

## 2021-12-16 PROCEDURE — 80048 BASIC METABOLIC PNL TOTAL CA: CPT | Performed by: INTERNAL MEDICINE

## 2021-12-16 PROCEDURE — 97530 THERAPEUTIC ACTIVITIES: CPT | Mod: CO

## 2021-12-16 PROCEDURE — 97530 THERAPEUTIC ACTIVITIES: CPT | Mod: CQ

## 2021-12-16 PROCEDURE — 25000242 PHARM REV CODE 250 ALT 637 W/ HCPCS: Performed by: NURSE PRACTITIONER

## 2021-12-16 PROCEDURE — 25000003 PHARM REV CODE 250: Performed by: INTERNAL MEDICINE

## 2021-12-16 PROCEDURE — 25000003 PHARM REV CODE 250: Performed by: NURSE PRACTITIONER

## 2021-12-16 PROCEDURE — 97535 SELF CARE MNGMENT TRAINING: CPT | Mod: CO

## 2021-12-16 RX ORDER — POTASSIUM CHLORIDE 20 MEQ/1
40 TABLET, EXTENDED RELEASE ORAL 2 TIMES DAILY
Status: COMPLETED | OUTPATIENT
Start: 2021-12-16 | End: 2021-12-16

## 2021-12-16 RX ORDER — POTASSIUM CHLORIDE 750 MG/1
10 CAPSULE, EXTENDED RELEASE ORAL DAILY
Status: DISCONTINUED | OUTPATIENT
Start: 2021-12-17 | End: 2021-12-21

## 2021-12-16 RX ADMIN — FUROSEMIDE 20 MG: 20 TABLET ORAL at 09:12

## 2021-12-16 RX ADMIN — APIXABAN 5 MG: 2.5 TABLET, FILM COATED ORAL at 08:12

## 2021-12-16 RX ADMIN — ESCITALOPRAM 5 MG: 5 TABLET, FILM COATED ORAL at 09:12

## 2021-12-16 RX ADMIN — TIOTROPIUM BROMIDE INHALATION SPRAY 2 PUFF: 3.12 SPRAY, METERED RESPIRATORY (INHALATION) at 09:12

## 2021-12-16 RX ADMIN — DILTIAZEM HYDROCHLORIDE 180 MG: 180 CAPSULE, COATED, EXTENDED RELEASE ORAL at 09:12

## 2021-12-16 RX ADMIN — DOCUSATE SODIUM 100 MG: 100 CAPSULE, LIQUID FILLED ORAL at 09:12

## 2021-12-16 RX ADMIN — POTASSIUM CHLORIDE 40 MEQ: 1500 TABLET, EXTENDED RELEASE ORAL at 02:12

## 2021-12-16 RX ADMIN — PRAVASTATIN SODIUM 20 MG: 20 TABLET ORAL at 09:12

## 2021-12-16 RX ADMIN — GABAPENTIN 100 MG: 100 CAPSULE ORAL at 08:12

## 2021-12-16 RX ADMIN — ALPRAZOLAM 0.25 MG: 0.25 TABLET ORAL at 08:12

## 2021-12-16 RX ADMIN — PREDNISONE 20 MG: 20 TABLET ORAL at 09:12

## 2021-12-16 RX ADMIN — ALPRAZOLAM 0.25 MG: 0.25 TABLET ORAL at 04:12

## 2021-12-16 RX ADMIN — POTASSIUM CHLORIDE 40 MEQ: 1500 TABLET, EXTENDED RELEASE ORAL at 08:12

## 2021-12-16 RX ADMIN — FLUTICASONE FUROATE AND VILANTEROL TRIFENATATE 1 PUFF: 100; 25 POWDER RESPIRATORY (INHALATION) at 09:12

## 2021-12-16 RX ADMIN — PSYLLIUM HUSK 1 PACKET: 3.4 POWDER ORAL at 09:12

## 2021-12-16 RX ADMIN — PANTOPRAZOLE SODIUM 40 MG: 40 TABLET, DELAYED RELEASE ORAL at 09:12

## 2021-12-16 RX ADMIN — APIXABAN 5 MG: 2.5 TABLET, FILM COATED ORAL at 09:12

## 2021-12-16 NOTE — PT/OT/SLP PROGRESS
Physical Therapy Treatment    Patient Name:  Leyla Mari   MRN:  [id number]  Admit Date: 12/6/2021  Admitting Diagnosis: COPD with acute exacerbation  Recent Surgeries:     General Precautions: Standard, fall   Orthopedic Precautions:N/A   Braces:       Recommendations:     Discharge Recommendations:  home health PT   Level of Assistance Recommended at Discharge: 24 hours light assistance  Discharge Equipment Recommendations:  (TBD)   Barriers to discharge: None    Assessment:     Leyla Mari is a 76 y.o. female admitted with a medical diagnosis of COPD with acute exacerbation .  Patient found in   WC with feet on legrests and significant edema in B feet, pt educated to sit in recliner with feet elevated in between therapy sessions, pt verbalized understanding.  Patient progressing well and will continue to benefit from skilled PT services to improve highest level of [place] with Gait, transfers and all functional mobility.    Performance deficits affecting function:  weakness,impaired endurance,impaired self care skills,impaired functional mobilty,gait instability,impaired fine motor,impaired skin,edema,impaired cardiopulmonary response to activity .    Rehab Potential is fair    Activity Tolerance: Fair    Plan:     Patient to be seen 6 x/week to address the above listed problems via gait training,therapeutic activities,therapeutic exercises,therapeutic groups,neuromuscular re-education,wheelchair management/training    · Plan of Care Expires: 01/06/22  · Plan of Care Reviewed with: patient    Subjective         Pain/Comfort:  · Pain Rating 1: 0/10  · Pain Rating Post-Intervention 1: 0/10    Patient's cultural, spiritual, Rastafarian conflicts given the current situation:  · no    Objective:     Communicated with pt prior to session.  Patient found up in chair with oxygen upon PT entry to room.     Therapeutic Activities and Exercises:  Transfer trg for sit to stand, stand pivot transfers using RW and  adjustment of WC leg rests to assist with decreased swelling.    Functional Mobility:  · Transfers:     · Sit to Stand:  moderate assistance with rolling walker from WC, MIN Assist from EOB ( higher surface)   · Chair to mat: minimum assistance with  rolling walker  using  Stand Pivot    AM-PAC 6 CLICK MOBILITY  14    Patient left up in chair with all lines intact and call button in reach.    GOALS:   Multidisciplinary Problems     Physical Therapy Goals        Problem: Physical Therapy Goal    Goal Priority Disciplines Outcome Goal Variances Interventions   Physical Therapy Goal     PT, PT/OT Ongoing, Progressing     Description: Goals to be met by: 12/28/21    Patient will increase functional independence with mobility by performing:    . Supine to sit with Set-up [place]  . Sit to supine with Set-up [place]  . Rolling to Left and Right with Set-up Assistance.  . Sit to stand transfer with Stand-by Assistance- not met  . Bed to chair transfer with Stand-by Assistance using Rolling Walker  . Gait  x 50 feet with Contact Guard Assistance using Rolling Walker. -met  Updated 12/14/2021Gait  x 130 feet with Contact Guard Assistance using Rolling Walker  . Wheelchair propulsion x50 feet with Minimal Assistance using bilateral uppper extremities  . Ascend/Descend 4 inch curb step with Minimal Assistance using Rolling Walker.                     Time Tracking:     PT Received On: 12/16/21  PT Total Time (min):   32    Billable Minutes: Therapeutic Activity 20 and Therapeutic Exercise 12    Treatment Type: Treatment  PT/PTA: PTA     PTA Visit Number: 2     12/16/2021

## 2021-12-16 NOTE — PLAN OF CARE
Problem: Occupational Therapy Goal  Goal: Occupational Therapy Goal  Description: Goals to be met by: 12/28/21     Patient will increase functional independence with ADLs by performing:    UE Dressing with Modified [place].  LE Dressing with Minimal Assistance.  Grooming while seated at sink with Modified [place].  Toileting from toilet or BSC with Minimal Assistance for hygiene and clothing management.   Bathing from  sitting at sink with Stand-by Assistance.  Rolling to Bilateral with Modified [place].   Supine to sit with Modified [place].  Toilet transfer to toilet or BSC with Stand-by Assistance with RW .  Stand Pivot transfer with Stand-by Assistance with RW .  Upper extremity exercise with assistance as needed.  Caregiver will be educated on level of assist required to safely perform self care tasks and functional transfers..     Outcome: Ongoing, Progressing

## 2021-12-16 NOTE — PT/OT/SLP PROGRESS
Occupational Therapy   Treatment    Name: Leyla Mari  MRN: [id number]  Admit Date: 12/6/2021  Admitting Diagnosis:  COPD with acute exacerbation    General Precautions: Standard, fall   Orthopedic Precautions:N/A   Braces:       Recommendations:     Discharge Recommendations: home health OT  Level of Assistance Recommended at Discharge: 24 hours physical assistance for all ADL's and home management tasks  Discharge Equipment Recommendations:   (TBD)  Barriers to discharge:  None    Assessment:     Leyla Mari is a 76 y.o. female with a medical diagnosis of COPD with acute exacerbation .  She presents with performance deficits affecting function are weakness,impaired endurance,impaired self care skills,impaired functional mobility,gait instability,impaired balance,decreased upper extremity function,decreased lower extremity function,decreased safety awareness,decreased coordination,decreased ROM,impaired skin,edema,impaired cardiopulmonary response to activity. Pt. participated well with session on this day. Pt required x 3 attempts to perform sit to stand with Mod A. Pt is progressing well with session on this day still continues to requires cues with aspects of safety . Pt. Will continue to benefit from continued OT to progress towards goals.    Rehab Potential is good    Activity tolerance:  Fair    Plan:     Patient to be seen 6 x/week to address the above listed problems via self-care/home management,therapeutic activities,therapeutic exercises    · Plan of Care Expires: 01/07/22  · Plan of Care Reviewed with: patient    Subjective     Communicated with: Otilia prior to session.    Pain/Comfort:  Pain Rating 1: 0/10  Pain Rating Post-Intervention 1: 0/10    Patient's cultural, spiritual, Jewish conflicts given the current situation:  no    Objective:     Patient found up in chair with oxygen upon OT entry to room.    Functional Mobility/Transfers:  · Patient completed Sit <> Stand Transfer with minimum  assistance  with  no assistive device Pt x3 attempts with Mod A/RW.   · Functional Mobility: Not Tested    [facility] 6 Click ADL: 18    OT Exercises: UE Ergometer . 15 minutes UE exercises performed to increase functional endurance and strength in order increase independence when performing functional activities .       Treatment & Education:  Pt. With standing act on this day with task. Pt. With CGA/SBA for balance aspects with task at raised counter. Pt performed visual perception task with placing clothing pins on totum pole tolerating 5 min with standing bal ,weight shifting and crossing mid line.    · Pt completed ADLs and func mobility activities for tx session as noted above    · Pt educated on role of OT and POC    Patient left up in chair with call button in reachEducation:      GOALS:   Multidisciplinary Problems     Occupational Therapy Goals        Problem: Occupational Therapy Goal    Goal Priority Disciplines Outcome Interventions   Occupational Therapy Goal     OT, PT/OT Ongoing, Progressing    Description: Goals to be met by: 12/28/21     Patient will increase functional independence with ADLs by performing:    UE Dressing with Modified [place].  LE Dressing with Minimal Assistance.  Grooming while seated at sink with Modified [place].  Toileting from toilet or BSC with Minimal Assistance for hygiene and clothing management.   Bathing from  sitting at sink with Stand-by Assistance.  Rolling to Bilateral with Modified [place].   Supine to sit with Modified [place].  Toilet transfer to toilet or BSC with Stand-by Assistance with RW .  Stand Pivot transfer with Stand-by Assistance with RW .  Upper extremity exercise with assistance as needed.  Caregiver will be educated on level of assist required to safely perform self care tasks and functional transfers..                      Time Tracking:     OT Date of Treatment: OT Date of Treatment: 12/16/21  OT Total Time (min): Total Time (min):  48 min    Billable Minutes:Therapeutic Activity 13  Therapeutic Exercise 15  Self care/home management : 20    12/16/2021

## 2021-12-16 NOTE — PROGRESS NOTES
[facility]                                  Skilled Nursing Facility                   Progress Note     Admit Date: 12/6/2021  MARK ANTHONY TBD  Principal Problem:  COPD with acute exacerbation   HPI obtained from patient interview and chart review     Chief Complaint: Re-evaluation of medical treatment and therapy status: Lab review, hypokalemia    HPI:   Ms. Mari is a 76 year old female with PMHx of end-stage COPD, PE (on eliquis), HTN and HLD who presents to SNF following hospitalization for COPD exacerbation.  Admission to SNF for secondary weakness and debility.     Interval history: All of today's labs reviewed and are listed below. K 3.4.  24 hr vital sign ranges listed below.  Patient states that her breathing is still slightly beyond her baseline only with activity if she experiencing this increase in dyspnea on exertion.  At rest she states her breathing is at her baseline.  She wishes to still remain on prednisone 20 mg daily at this time.  We discussed that if she would like to see if her respiratory status changes back to her baseline with prednisone 15 that we should change it next Monday.  Patient denies abdominal discomfort, nausea, or vomiting.  Patient reports an adequate appetite.  Patient denies dysuria.  Patient reports having regular bowel movements.  Patient progessing with PT/OT- Gait: amb with RW 02/wc in tow CGA ~ 160 ft no LOB slow fair juan vcs for rest breaks as needed and focus on breathing. Continuing to follow and treat all acute and chronic conditions.    Past Medical History: Patient has a past medical history of Actinic keratosis, Arthritis, Cataract, Cellulitis of ankle, Colon polyps, COPD (chronic obstructive pulmonary disease), Family history of colon cancer, History of Clostridium difficile infection (7/3/2012), Hyperlipidemia, Hypertension, Lung nodules, and Sinus tachycardia.    Past Surgical History:  Patient has a past surgical history that includes Appendectomy; Eye surgery; Tonsillectomy;  section; Cataract extraction (Bilateral, ); and Colonoscopy (N/A, 10/10/2017).    Social History: Patient reports that she quit smoking about 26 years ago. Her smoking use included cigarettes. She has a 39.00 pack-year smoking history. She has never used smokeless tobacco. She reports current alcohol use of about 2.0 standard drinks of alcohol per week. She reports that she does not use drugs.    Family History: family history includes Blindness in her paternal grandmother; Breast cancer in her sister; COPD in her father; Cancer in her sister and son; Cancer (age of onset: 79) in her mother; Cataracts in her mother and sister; Diabetes in her father and paternal grandmother; Glaucoma in her mother; Heart disease in her father, mother, and sister; Hyperlipidemia in her father, mother, and sister; Hypertension in her father, mother, and sister; Skin cancer in her mother and sister; Thyroid disease in her daughter and mother.    Allergies: Patient is allergic to bactrim [sulfamethoxazole-trimethoprim], codeine, keflex [cephalexin], ceftriaxone, clindamycin hcl, doxycycline, and vancomycin analogues.    ROS  Constitutional: Negative for fever   Eyes: Negative for blurred vision, double vision   Respiratory:  +mild intermittent dry cough, TRAVIS  Cardiovascular: Negative for chest pain, palpitations.  + leg swelling.   Gastrointestinal: Negative for abdominal pain, constipation, diarrhea, nausea, vomiting.   Genitourinary: Negative for dysuria, frequency   Musculoskeletal:  + generalized weakness. Negative for back pain and myalgias.   Skin: Negative for itching and rash.   Neurological: Negative for dizziness, headaches.   Psychiatric/Behavioral: Negative for depression. The patient is not nervous/anxious.      24 hour Vital Sign Range   Temp:  [98.2 °F (36.8 °C)]   Pulse:  [69-90]   Resp:  [18-19]   BP:  (136-140)/(60-69)   SpO2:  [96 %-98 %]     PEx  Constitutional: Patient appears debilitated.  No distress noted  HENT:   Head: Normocephalic and atraumatic.   Eyes: Pupils are equal, round  Neck: Normal range of motion. Neck supple.   Cardiovascular: Normal rate, regular rhythm and normal heart sounds.    Pulmonary/Chest: Effort normal and breath sounds are clear  Abdominal: Soft. Bowel sounds are normal.   Musculoskeletal: Normal range of motion.   Neurological: Alert and oriented to person, place, and time.   Psychiatric: Normal mood and affect. Behavior is normal.   Skin: Skin is warm and dry.       WOUND  Date identified - POA- 12/6/2021  Location:  Sacrum- now resolved    WOUND  Date identified - POA- 12/6/2021  Location:  Left knee  Type:  Skin tear  Appearance:  Ecchymotic, red, moist  Wound measurements: 4 cm L X 3 cm W x 0.1cm D   Drainage:  Skin to might serosanguineous  Odor: none   Edges: Irregular edges  Periwound: normal skin tone     WOUND  Date identified - POA- 12/6/2021  Location:  Right heel  Type:  Unstageable pressure injury  Appearance: tan moist slough   Wound length: 1cm   Wound width: 2cm  Wound depth: 0.3cm   Undermining/tunneling: No   Drainage:  None  Odor: none   Edges: Irregular edges,  Periwound: normal skin tone   Progress:  Progressing     Information obtained from another source:  measurements obtained from wound care RN     Recent Labs   Lab 12/16/21  0450      K 3.4*   CL 93*   CO2 34*   BUN 13   CREATININE 0.4*   MG 1.9       Recent Labs   Lab 12/16/21  0450   WBC 5.80   RBC 3.72*   HGB 10.6*   HCT 34.1*      MCV 92   MCH 28.5   MCHC 31.1*         Assessment and Plan:    Hypokalemia  - initiated potassium 40 mEq q4h x2 doses    Hypoglycemia  - patient originally admitted with hyperglycemia due to steroid use  - HA1c (5/2021): 7.7  - admitted on determir 10 units nightly while inpatient and LDSSI  - discharge recommendations per endocrine are for metformin   - 12/13  discontinuing detemir for now, can continue on low-dose sliding scale insulin PRN, do not think patient will need metformin at discharge, she will need to follow-up with Endocrine after discharge from SNF  - 12/16 24 hour blood glucose range is .  Patient being treated with low-dose sliding scale insulin, I do not feel that metformin is appropriate this time    Acute on chronic respiratory failure with hypercapnia- improving   COPD with acute exacerbation  Pulmonary edema  - chronic home oxygen at 2 LPM.   - Transition IV solumedrol to prednisone 60 mg daily with taper over the next 2 weeks back to home dose of 15 mg daily.   - completed 5 day course of azithromycin 250 mg  - continue home inhalesr.   - currently using 3 L nasal cannula, wean to home dose of 2 L  - continue Lasix 40 mg daily ( takes 20 mg at home and every other day)   - continue PRN levalbuterol nebulizer treatments  - 12/13 reduce Lasix to 20 mg daily, to begin weaning back to home dose patient feels symptom improvement, continues at 3 L needs to be weaned down to 2 L    Long term current use of systemic steroids  - Takes prednisone 15 mg daily for end-stage COPD  -  taper back 15mg as status improves. started on prednisone 60 mg --> 40 mg daily --> now currently receiving prednisone 20 mg daily   - 12/16 will continue 20 mg daily for now    Peripheral neuropathy  - continue home gabapentin 100 mg qHS.      Generalized anxiety disorder  - Continue home alprazolam 0.25 mg TID, escitalopram 5 mg daily.      Encounter for palliative care  - patient is being followed by palliative care in an outpatient setting. Patient is DNR/DNI.   - as per previous notes, patient was enrolled in inpatient hospice and then revoked it.   - Patient's daughter is amendable to hospice if patient does not improve on current treatment.      Acute pulmonary embolism  - Continue apixaban 5 mg BID.     Cellulitis of right lower extremity  - Continue bactrim DS BID to  complete a week.   - Continue to monitor response. Area is improving.      B/l lower extremity edema  - CXR showed bibasilar edema (R>L)  - resumed home lasix --> IV lasix 40 mg daily   - strict I/Os   - prior TTE in 5/2021 normal   - TTE showed EF 65% and normal LV diastolic function      Hyperlipidemia  - Continue home pravastatin 20 mg daily    Essential hypertension  - Continue diltiazem 180 mg daily.    Debility   - Continue with PT/OT for gait training and strengthening and restoration of ADL's   - Encourage mobility, OOB in chair, and early ambulation as appropriate  - Fall precautions   - Monitor for bowel and bladder dysfunction  - Monitor for and prevent skin breakdown and pressure ulcers  - Continue DVT prophylaxis with  apixaban 5 mg BID         Anticipate disposition:  Home with home health      Follow-up needed during SNF stay-    Follow-up needed after discharge from SNF: PCP, pulmonology    No future appointments.        Emerald Clark NP  Department of Hospital Medicine   [facility]     DOS: 12/16/2021       Patient note was created using MModal Dictation.  Any errors in syntax or even information may not have been identified and edited on initial review prior to signing this note.

## 2021-12-17 LAB
POCT GLUCOSE: 112 MG/DL (ref 70–110)
POCT GLUCOSE: 212 MG/DL (ref 70–110)
POCT GLUCOSE: 300 MG/DL (ref 70–110)
POCT GLUCOSE: 75 MG/DL (ref 70–110)

## 2021-12-17 PROCEDURE — 97530 THERAPEUTIC ACTIVITIES: CPT

## 2021-12-17 PROCEDURE — 25000003 PHARM REV CODE 250: Performed by: INTERNAL MEDICINE

## 2021-12-17 PROCEDURE — 27000221 HC OXYGEN, UP TO 24 HOURS

## 2021-12-17 PROCEDURE — 97116 GAIT TRAINING THERAPY: CPT | Mod: CQ

## 2021-12-17 PROCEDURE — 99900035 HC TECH TIME PER 15 MIN (STAT)

## 2021-12-17 PROCEDURE — 97110 THERAPEUTIC EXERCISES: CPT

## 2021-12-17 PROCEDURE — 25000003 PHARM REV CODE 250: Performed by: NURSE PRACTITIONER

## 2021-12-17 PROCEDURE — 63600175 PHARM REV CODE 636 W HCPCS: Performed by: INTERNAL MEDICINE

## 2021-12-17 PROCEDURE — 25000242 PHARM REV CODE 250 ALT 637 W/ HCPCS: Performed by: NURSE PRACTITIONER

## 2021-12-17 PROCEDURE — 94761 N-INVAS EAR/PLS OXIMETRY MLT: CPT

## 2021-12-17 PROCEDURE — 97110 THERAPEUTIC EXERCISES: CPT | Mod: CQ

## 2021-12-17 PROCEDURE — 97530 THERAPEUTIC ACTIVITIES: CPT | Mod: CQ

## 2021-12-17 PROCEDURE — 11000004 HC SNF PRIVATE

## 2021-12-17 RX ADMIN — PSYLLIUM HUSK 1 PACKET: 3.4 POWDER ORAL at 08:12

## 2021-12-17 RX ADMIN — DOCUSATE SODIUM 100 MG: 100 CAPSULE, LIQUID FILLED ORAL at 08:12

## 2021-12-17 RX ADMIN — PRAVASTATIN SODIUM 20 MG: 20 TABLET ORAL at 08:12

## 2021-12-17 RX ADMIN — FLUTICASONE FUROATE AND VILANTEROL TRIFENATATE 1 PUFF: 100; 25 POWDER RESPIRATORY (INHALATION) at 08:12

## 2021-12-17 RX ADMIN — TIOTROPIUM BROMIDE INHALATION SPRAY 2 PUFF: 3.12 SPRAY, METERED RESPIRATORY (INHALATION) at 08:12

## 2021-12-17 RX ADMIN — ALPRAZOLAM 0.25 MG: 0.25 TABLET ORAL at 09:12

## 2021-12-17 RX ADMIN — GABAPENTIN 100 MG: 100 CAPSULE ORAL at 09:12

## 2021-12-17 RX ADMIN — DILTIAZEM HYDROCHLORIDE 180 MG: 180 CAPSULE, COATED, EXTENDED RELEASE ORAL at 08:12

## 2021-12-17 RX ADMIN — FUROSEMIDE 20 MG: 20 TABLET ORAL at 08:12

## 2021-12-17 RX ADMIN — ESCITALOPRAM 5 MG: 5 TABLET, FILM COATED ORAL at 08:12

## 2021-12-17 RX ADMIN — APIXABAN 5 MG: 2.5 TABLET, FILM COATED ORAL at 08:12

## 2021-12-17 RX ADMIN — ALPRAZOLAM 0.25 MG: 0.25 TABLET ORAL at 02:12

## 2021-12-17 RX ADMIN — INSULIN ASPART 1 UNITS: 100 INJECTION, SOLUTION INTRAVENOUS; SUBCUTANEOUS at 09:12

## 2021-12-17 RX ADMIN — ALPRAZOLAM 0.25 MG: 0.25 TABLET ORAL at 08:12

## 2021-12-17 RX ADMIN — POTASSIUM CHLORIDE 10 MEQ: 10 CAPSULE, COATED, EXTENDED RELEASE ORAL at 08:12

## 2021-12-17 RX ADMIN — APIXABAN 5 MG: 2.5 TABLET, FILM COATED ORAL at 09:12

## 2021-12-17 RX ADMIN — PANTOPRAZOLE SODIUM 40 MG: 40 TABLET, DELAYED RELEASE ORAL at 08:12

## 2021-12-17 RX ADMIN — PREDNISONE 20 MG: 20 TABLET ORAL at 08:12

## 2021-12-17 NOTE — PLAN OF CARE
Problem: Occupational Therapy Goal  Goal: Occupational Therapy Goal  Description: Goals to be met by: 12/28/21     Patient will increase functional independence with ADLs by performing:    UE Dressing with Modified [place].  LE Dressing with Minimal Assistance.  Grooming while seated at sink with Modified [place].  Toileting from toilet or BSC with Minimal Assistance for hygiene and clothing management.   Bathing from  sitting at sink with Stand-by Assistance.  Rolling to Bilateral with Modified [place].   Supine to sit with Modified [place].  Toilet transfer to toilet or BSC with Stand-by Assistance with RW .  Stand Pivot transfer with Stand-by Assistance with RW .  Upper extremity exercise with assistance as needed.  Caregiver will be educated on level of assist required to safely perform self care tasks and functional transfers..     Outcome: Ongoing, Progressing

## 2021-12-17 NOTE — PT/OT/SLP PROGRESS
Physical Therapy Treatment    Patient Name:  Leyla Mari   MRN:  [id number]  Admit Date: 12/6/2021  Admitting Diagnosis: COPD with acute exacerbation  Recent Surgeries:     General Precautions: Standard, fall   Orthopedic Precautions:N/A   Braces:       Recommendations:     Discharge Recommendations:  home health PT   Level of Assistance Recommended at Discharge: 24 hours light assistance  Discharge Equipment Recommendations:  (TBD)   Barriers to discharge: None    Assessment:     Leyla Mari is a 76 y.o. female admitted with a medical diagnosis of COPD with acute exacerbation .  Patient tolerated physical therapy well today showing improved functional mobility and gait tolerance and will continue to benefit from skilled PT services to improved deficits listed below.      Performance deficits affecting function:  weakness,impaired endurance,impaired self care skills,impaired functional mobilty,gait instability,impaired fine motor,impaired skin,edema,impaired cardiopulmonary response to activity .    Rehab Potential is fair    Activity Tolerance: Fair    Plan:     Patient to be seen 6 x/week to address the above listed problems via gait training,therapeutic activities,therapeutic exercises,therapeutic groups,neuromuscular re-education,wheelchair management/training    · Plan of Care Expires: 01/06/22  · Plan of Care Reviewed with: patient    Subjective     Patient has nothing new to report and is agreeable to therapy this afternoon.     Pain/Comfort:  · Pain Rating 1: 0/10  · Pain Rating Post-Intervention 1: 0/10    Patient's cultural, spiritual, Muslim conflicts given the current situation:  · no    Objective:     Communicated with pt prior to session.  Patient found up in chair with oxygen upon PT entry to room.     Therapeutic Activities and Exercises:   Patient performed mini elliptical for B LE's x 12 minutes to build muscle strength and cardiovascularl endurance.  Transfer training for sit to stand,  multiple trials, Stand pivot transfers and Gait trg completed today.     Functional Mobility:  · Transfers:     · Sit to Stand: from WC with moderate assistance using rolling walker, sit to stand from EOB with MIN Assist and use of RW  · WheelChair to Recliner: minimum assistance with  rolling walker  using  Stand Pivot  · Gait: Gait trg using RW on even surfaces with CGA x 80', then another 100' with a seated rest break due to SOB.    AM-PAC 6 CLICK MOBILITY       Patient left up in chair with all lines intact, call button in reach and legs elevated on recliner..    GOALS:   Multidisciplinary Problems     Physical Therapy Goals        Problem: Physical Therapy Goal    Goal Priority Disciplines Outcome Goal Variances Interventions   Physical Therapy Goal     PT, PT/OT Ongoing, Progressing     Description: Goals to be met by: 12/28/21    Patient will increase functional independence with mobility by performing:    . Supine to sit with Set-up [place]  . Sit to supine with Set-up [place]  . Rolling to Left and Right with Set-up Assistance.  . Sit to stand transfer with Stand-by Assistance- not met  . Bed to chair transfer with Stand-by Assistance using Rolling Walker  . Gait  x 50 feet with Contact Guard Assistance using Rolling Walker. -met  Updated 12/14/2021Gait  x 130 feet with Contact Guard Assistance using Rolling Walker  . Wheelchair propulsion x50 feet with Minimal Assistance using bilateral uppper extremities  . Ascend/Descend 4 inch curb step with Minimal Assistance using Rolling Walker.                     Time Tracking:     PT Received On: 12/17/21  PT Total Time (min):   45    Billable Minutes: Gait Training 15, Therapeutic Activity 17 and Therapeutic Exercise 13    Treatment Type: Treatment  PT/PTA: PTA     PTA Visit Number: 3     12/17/2021

## 2021-12-17 NOTE — PROGRESS NOTES
[facility] - Skilled Nursing  Adult Nutrition  Progress Note    SUMMARY   Recommendations  Continue regular diet, RD following, encourage HS snack   Goals: 1. Pt's intake meals >75% by RD follow up.  Nutrition Goal Status: goal met      Assessment and Plan   Increased nutrient needs     Related to (etiology):   protein     Signs and Symptoms (as evidenced by):   Pt noted with mild muscle wasting     Interventions(treatment strategy):  Collaboration of care with providers.  General healthy diet.     Nutrition Diagnosis Status: Ongoing    Malnutrition Assessment  12/9                 Orbital Region (Subcutaneous Fat Loss): well nourished  Upper Arm Region (Subcutaneous Fat Loss): mild depletion  Thoracic and Lumbar Region: well nourished   [place] Region (Muscle Loss): well nourished  Clavicle Bone Region (Muscle Loss): well nourished  Clavicle and Acromion Bone Region (Muscle Loss): well nourished  Scapular Bone Region (Muscle Loss): mild depletion  Dorsal Hand (Muscle Loss): mild depletion  Patellar Region (Muscle Loss): well nourished  Anterior Thigh Region (Muscle Loss): well nourished  Posterior Calf Region (Muscle Loss): well nourished (weeping noted in both legs)   Edema (Fluid Accumulation): 2-->mild (calves)             Reason for Assessment    Reason For Assessment: RD follow-up  Diagnosis: pulmonary disease (COPD with acute exacerbation)  Relevant Medical History: COPD, HTN, HLD  Interdisciplinary Rounds: did not attend  General Information Comments: patient would like a lighter lunch, just soup and sandwich, PO 50-75%  Health Maintenance Due   Topic Date Due    Foot Exam  Never done    TETANUS VACCINE  Never done    Shingles Vaccine (2 of 3) 05/28/2014    Urine Microalbumin  06/20/2017    Eye Exam  04/17/2020    DEXA SCAN  05/08/2020    Lipid Panel  09/11/2020        was on hospice previously.  Nutrition Discharge Planning: General healthy diet with emphasis on adequate carbohydrate  intake.    Nutrition Risk Screen    Nutrition Risk Screen: no indicators present    Nutrition/Diet History    Patient Reported Diet/Restrictions/Preferences: general  Spiritual, Cultural Beliefs, Quaker Practices, Values that Affect Care: no    Anthropometrics    Temp: 98.4 °F (36.9 °C)  Height: 5' (152.4 cm)  Height (inches): 60 in  Weight Method: Bed Scale  Weight: 47.6 kg (104 lb 15 oz)  Weight (lb): 104.94 lb  Ideal Body Weight (IBW), Female: 100 lb  % Ideal Body Weight, Female (lb): 108.47 %  BMI (Calculated): 20.5       Lab/Procedures/Meds    Pertinent Labs Reviewed: reviewed  Pertinent Labs Comments: Hg 9.2, Hct 29.5, BUN 52  Pertinent Medications Reviewed: reviewed  Pertinent Medications Comments: lasix    Estimated/Assessed Needs    Weight Used For Calorie Calculations: 49.2 kg (108 lb 7.5 oz)  Energy Calorie Requirements (kcal): 1238 kcal  Energy Need Method: Kcal/kg (25 kcal/kg)  Protein Requirements: 49-59g  Weight Used For Protein Calculations: 49.2 kg (108 lb 7.5 oz)        RDA Method (mL): 1238         Nutrition Prescription Ordered    Current Diet Order: Regular    Evaluation of Received Nutrient/Fluid Intake    I/O: +1620 to date  Energy Calories Required: meeting needs  Protein Required: meeting needs  Fluid Required: meeting needs  Comments: LBM 12/14  Tolerance: tolerating  % Intake of Estimated Energy Needs: 75 - 100 %  % Meal Intake: 75 - 100 %    Nutrition Risk    Level of Risk/Frequency of Follow-up: low (one time per week)     Monitor and Evaluation    Food and Nutrient Intake: energy intake,food and beverage intake  Food and Nutrient Adminstration: diet order  Anthropometric Measurements: weight,weight change  Biochemical Data, Medical Tests and Procedures: electrolyte and renal panel,gastrointestinal profile,glucose/endocrine profile,inflammatory profile,lipid profile  Nutrition-Focused Physical Findings: overall appearance,extremities, muscles and bones,head and eyes,skin     Nutrition  Follow-Up    RD Follow-up?: Yes

## 2021-12-17 NOTE — PLAN OF CARE
Problem: Adult Inpatient Plan of Care  Goal: Plan of Care Review  Outcome: Ongoing, Progressing     Problem: Adult Inpatient Plan of Care  Goal: Patient-Specific Goal (Individualization)  Outcome: Ongoing, Progressing     Problem: Adult Inpatient Plan of Care  Goal: Absence of Hospital-Acquired Illness or Injury  Outcome: Ongoing, Progressing     Problem: Adult Inpatient Plan of Care  Goal: Optimal Comfort and Wellbeing  Outcome: Ongoing, Progressing     Problem: Adult Inpatient Plan of Care  Goal: Readiness for Transition of Care  Outcome: Ongoing, Progressing     Problem: Adult Inpatient Plan of Care  Goal: Rounds/Family Conference  Outcome: Ongoing, Progressing

## 2021-12-17 NOTE — PT/OT/SLP PROGRESS
"Occupational Therapy   Treatment    Name: Leyla Mari  MRN: [id number]  Admit Date: 12/6/2021  Admitting Diagnosis:  COPD with acute exacerbation    General Precautions: Standard, fall   Orthopedic Precautions:N/A   Braces: N/A     Recommendations:     Discharge Recommendations: home health OT  Level of Assistance Recommended at Discharge: 24 hours physical assistance for all ADL's and home management tasks  Discharge Equipment Recommendations:   (TBD)  Barriers to discharge:  None    Assessment:     Leyla Mari is a 76 y.o. female with a medical diagnosis of COPD with acute exacerbation.  She presents with performance deficits affecting function are weakness,impaired endurance,impaired self care skills,impaired functional mobilty,gait instability,impaired balance,decreased upper extremity function,decreased lower extremity function,impaired cardiopulmonary response to activity.   Pt tolerated session well with good participation throughout. Pt continues to demonstrate difficulty completing STS transfers requiring mod A to complete on this day. Pt functional endurance is progressing however she becomes SOB quickly requiring rest breaks and education on proper breathing techniques to continue activity. Pt would benefit from skilled OT services to maximize function and independence.     Rehab Potential is good    Activity tolerance:  Fair    Plan:     Patient to be seen 6 x/week to address the above listed problems via self-care/home management,therapeutic activities,therapeutic exercises    · Plan of Care Expires: 01/07/22  · Plan of Care Reviewed with: patient    Subjective   "I just feel weak in my arms and legs when I try to stand."   Communicated with: HANG prior to session.    Pain/Comfort:  · Pain Rating 1: 0/10  · Pain Rating Post-Intervention 1: 0/10    Patient's cultural, spiritual, Confucianism conflicts given the current situation:  · no    Objective:     Patient found up in chair with oxygen upon OT entry " to room.    Functional Mobility/Transfers:  · Patient completed 2 trials of Sit <> Stand Transfers during standing activity with moderate assistance  with  rolling walker and verbal cues for proper positioning of hands and feet  · Functional Mobility: NT    Therapeutic Activities:  Pt completed standing activity on this day for improved tolerance to standing for self care activities. 2 trials completed standing for 4 minutes each at raised counter with SBA in standing to complete visual perceptual activity. Pt becoming SOB in standing on O2 flow 3L and O2 sat at % throughout activity. Pt provided therapeutic rest break in between trials with education on proper pursed lip breathing techniques for maximum oxygen saturation.     [facility] 6 Click ADL: 18    OT Exercises:  UE Ergometer 15 minutes completed in sitting at minimum resistance for improved functional endurance during ADLs. Pt becoming SOB at end of activity with education provided on proper breathing techniques.   Pt self propelled w/c 100 ft for improved BUE strength needed for improved performance in STS transfers. Pt propelling at slow pace with verbal cues for proper steering and requiring 2 rest breaks to complete.     Treatment & Education:  Pt educated on:   - POC/OT role   - benefit of performing OOB activity   - breathing techniques  - proper hand placement, posture and positioning for safe STS transfers  Pt receptive to education providing verbal understanding on this day.     Patient left up in chair with all lines intact and call button in reachEducation:      GOALS:   Multidisciplinary Problems     Occupational Therapy Goals        Problem: Occupational Therapy Goal    Goal Priority Disciplines Outcome Interventions   Occupational Therapy Goal     OT, PT/OT Ongoing, Progressing    Description: Goals to be met by: 12/28/21     Patient will increase functional independence with ADLs by performing:    UE Dressing with Modified [place].  ANISHA  Dressing with Minimal Assistance.  Grooming while seated at sink with Modified [place].  Toileting from toilet or BSC with Minimal Assistance for hygiene and clothing management.   Bathing from  sitting at sink with Stand-by Assistance.  Rolling to Bilateral with Modified [place].   Supine to sit with Modified [place].  Toilet transfer to toilet or BSC with Stand-by Assistance with RW .  Stand Pivot transfer with Stand-by Assistance with RW .  Upper extremity exercise with assistance as needed.  Caregiver will be educated on level of assist required to safely perform self care tasks and functional transfers..                      Time Tracking:     OT Date of Treatment: OT Date of Treatment: 12/17/21  OT Total Time (min): Total Time (min): 40 min    Billable Minutes:Therapeutic Activity 10  Therapeutic Exercise 30    12/17/2021

## 2021-12-18 LAB
POCT GLUCOSE: 168 MG/DL (ref 70–110)
POCT GLUCOSE: 206 MG/DL (ref 70–110)
POCT GLUCOSE: 264 MG/DL (ref 70–110)
POCT GLUCOSE: 65 MG/DL (ref 70–110)
POCT GLUCOSE: 82 MG/DL (ref 70–110)

## 2021-12-18 PROCEDURE — 99900035 HC TECH TIME PER 15 MIN (STAT)

## 2021-12-18 PROCEDURE — 11000004 HC SNF PRIVATE

## 2021-12-18 PROCEDURE — 94761 N-INVAS EAR/PLS OXIMETRY MLT: CPT

## 2021-12-18 PROCEDURE — 27000221 HC OXYGEN, UP TO 24 HOURS

## 2021-12-18 PROCEDURE — 63600175 PHARM REV CODE 636 W HCPCS: Performed by: INTERNAL MEDICINE

## 2021-12-18 PROCEDURE — 25000003 PHARM REV CODE 250: Performed by: NURSE PRACTITIONER

## 2021-12-18 PROCEDURE — 25000003 PHARM REV CODE 250: Performed by: INTERNAL MEDICINE

## 2021-12-18 RX ADMIN — POTASSIUM CHLORIDE 10 MEQ: 10 CAPSULE, COATED, EXTENDED RELEASE ORAL at 08:12

## 2021-12-18 RX ADMIN — PANTOPRAZOLE SODIUM 40 MG: 40 TABLET, DELAYED RELEASE ORAL at 08:12

## 2021-12-18 RX ADMIN — FLUTICASONE FUROATE AND VILANTEROL TRIFENATATE 1 PUFF: 100; 25 POWDER RESPIRATORY (INHALATION) at 08:12

## 2021-12-18 RX ADMIN — PRAVASTATIN SODIUM 20 MG: 20 TABLET ORAL at 08:12

## 2021-12-18 RX ADMIN — FUROSEMIDE 20 MG: 20 TABLET ORAL at 08:12

## 2021-12-18 RX ADMIN — INSULIN ASPART 3 UNITS: 100 INJECTION, SOLUTION INTRAVENOUS; SUBCUTANEOUS at 06:12

## 2021-12-18 RX ADMIN — PREDNISONE 20 MG: 20 TABLET ORAL at 08:12

## 2021-12-18 RX ADMIN — APIXABAN 5 MG: 2.5 TABLET, FILM COATED ORAL at 08:12

## 2021-12-18 RX ADMIN — ALPRAZOLAM 0.25 MG: 0.25 TABLET ORAL at 08:12

## 2021-12-18 RX ADMIN — ALPRAZOLAM 0.25 MG: 0.25 TABLET ORAL at 02:12

## 2021-12-18 RX ADMIN — TIOTROPIUM BROMIDE INHALATION SPRAY 2 PUFF: 3.12 SPRAY, METERED RESPIRATORY (INHALATION) at 08:12

## 2021-12-18 RX ADMIN — ESCITALOPRAM 5 MG: 5 TABLET, FILM COATED ORAL at 08:12

## 2021-12-18 RX ADMIN — DILTIAZEM HYDROCHLORIDE 180 MG: 180 CAPSULE, COATED, EXTENDED RELEASE ORAL at 08:12

## 2021-12-18 RX ADMIN — Medication 16 G: at 07:12

## 2021-12-18 RX ADMIN — GABAPENTIN 100 MG: 100 CAPSULE ORAL at 08:12

## 2021-12-19 LAB
POCT GLUCOSE: 144 MG/DL (ref 70–110)
POCT GLUCOSE: 227 MG/DL (ref 70–110)
POCT GLUCOSE: 244 MG/DL (ref 70–110)
POCT GLUCOSE: 69 MG/DL (ref 70–110)
POCT GLUCOSE: 91 MG/DL (ref 70–110)

## 2021-12-19 PROCEDURE — 25000003 PHARM REV CODE 250: Performed by: NURSE PRACTITIONER

## 2021-12-19 PROCEDURE — 11000004 HC SNF PRIVATE

## 2021-12-19 PROCEDURE — 25000003 PHARM REV CODE 250: Performed by: INTERNAL MEDICINE

## 2021-12-19 PROCEDURE — 99900035 HC TECH TIME PER 15 MIN (STAT)

## 2021-12-19 PROCEDURE — 63600175 PHARM REV CODE 636 W HCPCS: Performed by: INTERNAL MEDICINE

## 2021-12-19 PROCEDURE — 97535 SELF CARE MNGMENT TRAINING: CPT | Mod: CO

## 2021-12-19 PROCEDURE — 97116 GAIT TRAINING THERAPY: CPT

## 2021-12-19 PROCEDURE — 94761 N-INVAS EAR/PLS OXIMETRY MLT: CPT

## 2021-12-19 PROCEDURE — 97110 THERAPEUTIC EXERCISES: CPT

## 2021-12-19 PROCEDURE — 27000221 HC OXYGEN, UP TO 24 HOURS

## 2021-12-19 RX ADMIN — PREDNISONE 20 MG: 20 TABLET ORAL at 08:12

## 2021-12-19 RX ADMIN — POTASSIUM CHLORIDE 10 MEQ: 10 CAPSULE, COATED, EXTENDED RELEASE ORAL at 08:12

## 2021-12-19 RX ADMIN — GABAPENTIN 100 MG: 100 CAPSULE ORAL at 09:12

## 2021-12-19 RX ADMIN — ESCITALOPRAM 5 MG: 5 TABLET, FILM COATED ORAL at 08:12

## 2021-12-19 RX ADMIN — APIXABAN 5 MG: 2.5 TABLET, FILM COATED ORAL at 08:12

## 2021-12-19 RX ADMIN — ALPRAZOLAM 0.25 MG: 0.25 TABLET ORAL at 08:12

## 2021-12-19 RX ADMIN — FUROSEMIDE 20 MG: 20 TABLET ORAL at 08:12

## 2021-12-19 RX ADMIN — ALPRAZOLAM 0.25 MG: 0.25 TABLET ORAL at 09:12

## 2021-12-19 RX ADMIN — INSULIN ASPART 3 UNITS: 100 INJECTION, SOLUTION INTRAVENOUS; SUBCUTANEOUS at 05:12

## 2021-12-19 RX ADMIN — DILTIAZEM HYDROCHLORIDE 180 MG: 180 CAPSULE, COATED, EXTENDED RELEASE ORAL at 08:12

## 2021-12-19 RX ADMIN — Medication 16 G: at 07:12

## 2021-12-19 RX ADMIN — PRAVASTATIN SODIUM 20 MG: 20 TABLET ORAL at 08:12

## 2021-12-19 RX ADMIN — TIOTROPIUM BROMIDE INHALATION SPRAY 2 PUFF: 3.12 SPRAY, METERED RESPIRATORY (INHALATION) at 08:12

## 2021-12-19 RX ADMIN — MELATONIN TAB 3 MG 3 MG: 3 TAB at 09:12

## 2021-12-19 RX ADMIN — APIXABAN 5 MG: 2.5 TABLET, FILM COATED ORAL at 09:12

## 2021-12-19 RX ADMIN — FLUTICASONE FUROATE AND VILANTEROL TRIFENATATE 1 PUFF: 100; 25 POWDER RESPIRATORY (INHALATION) at 08:12

## 2021-12-19 RX ADMIN — INSULIN ASPART 2 UNITS: 100 INJECTION, SOLUTION INTRAVENOUS; SUBCUTANEOUS at 12:12

## 2021-12-19 RX ADMIN — PANTOPRAZOLE SODIUM 40 MG: 40 TABLET, DELAYED RELEASE ORAL at 08:12

## 2021-12-19 RX ADMIN — ALPRAZOLAM 0.25 MG: 0.25 TABLET ORAL at 03:12

## 2021-12-19 NOTE — PT/OT/SLP PROGRESS
"Occupational Therapy   Treatment    Name: Leyla Mari  MRN: [id number]  Admit Date: 12/6/2021  Admitting Diagnosis:  COPD with acute exacerbation    General Precautions: Standard, fall   Orthopedic Precautions:N/A   Braces:       Recommendations:     Discharge Recommendations: home health OT  Level of Assistance Recommended at Discharge: 24 hours physical assistance for all ADL's and home management tasks  Discharge Equipment Recommendations:   (TBD)  Barriers to discharge:  None    Assessment:     Leyla Mari is a 76 y.o. female with a medical diagnosis of COPD with acute exacerbation . Performance deficits affecting function are weakness,impaired endurance,impaired self care skills,impaired functional mobilty,gait instability,impaired balance,decreased upper extremity function,decreased lower extremity function,impaired cardiopulmonary response to activity. Pt. participated well with session on this day. Pt is progressing well with session on this day still continues to requires cues with aspects of safety . Pt. Will continue to benefit from continued OT to progress towards goals    Rehab Potential is good    Activity tolerance:  Good    Plan:     Patient to be seen 6 x/week to address the above listed problems via self-care/home management,therapeutic activities,therapeutic exercises    · Plan of Care Expires: 01/07/22  · Plan of Care Reviewed with: patient    Subjective     Communicated with: bernie prior to session. "Goodmorning how are you"    Pain/Comfort:  Pain Rating 1: 0/10  Pain Rating Post-Intervention 1: 0/10    Patient's cultural, spiritual, Pentecostal conflicts given the current situation:  no    Objective:     Patient found HOB elevated with oxygen upon OT entry to room.    Bed Mobility:    · Patient completed Rolling/Turning to Left with  supervision  · Patient completed Scooting/Bridging with supervision  · Patient completed Supine to Sit with supervision     Functional " Mobility/Transfers:  · Patient completed Sit <> Stand Transfer with minimum assistance  with  rolling walker   · Patient completed Bed <> Chair Transfer using Step Transfer technique with contact guard assistance with rolling walker  · Patient completed Toilet Transfer Stand Pivot technique with minimum assistance with  rolling walker  · Functional Mobility: Pt. With fxl mobility throughout room and bathroom with RW and CGA    Activities of Daily Living:  · Feeding:  independence with breakfast  · Grooming: modified independence with grooming aspects while seated  · Bathing: stand by assistance with all  bathing aspects while seated at sink level   · Upper Body Dressing: modified independence to doff/bo pullover shirt   · Lower Body Dressing: minimum assistance to thread pants and undergarments through BLEs and manage over hips instance  · Toileting: stand by assistance with hygiene and clothing managament    [facility] 6 Click ADL: 18      Treatment & Education:  Pt. Educated on safety with ADL tasks as well as functional mobility and need for staff assist.     Patient left up in chair with all lines intact and call button in reachEducation:      GOALS:   Multidisciplinary Problems     Occupational Therapy Goals        Problem: Occupational Therapy Goal    Goal Priority Disciplines Outcome Interventions   Occupational Therapy Goal     OT, PT/OT Ongoing, Progressing    Description: Goals to be met by: 12/28/21     Patient will increase functional independence with ADLs by performing:    UE Dressing with Modified [place].  LE Dressing with Minimal Assistance.  Grooming while seated at sink with Modified [place].  Toileting from toilet or BSC with Minimal Assistance for hygiene and clothing management.   Bathing from  sitting at sink with Stand-by Assistance.  Rolling to Bilateral with Modified [place].   Supine to sit with Modified [place].  Toilet transfer to toilet or BSC with Stand-by Assistance  with RW .  Stand Pivot transfer with Stand-by Assistance with RW .  Upper extremity exercise with assistance as needed.  Caregiver will be educated on level of assist required to safely perform self care tasks and functional transfers..                      Time Tracking:     OT Date of Treatment: OT Date of Treatment: 12/19/21  OT Total Time (min): Total Time (min): 39 min    Billable Minutes:Self Care/Home Management 39    12/19/2021   OT and RAY have discussed the above patients goals and status in collaboration with Plan of Care.

## 2021-12-19 NOTE — PLAN OF CARE
Problem: Occupational Therapy Goal  Goal: Occupational Therapy Goal  Description: Goals to be met by: 12/28/21     Patient will increase functional independence with ADLs by performing:    UE Dressing with Modified [place].=MET  LE Dressing with Minimal Assistance.=MET  Grooming while seated at sink with Modified [place].=MET  Toileting from toilet or BSC with Minimal Assistance for hygiene and clothing management.   Bathing from  sitting at sink with Stand-by Assistance.  Rolling to Bilateral with Modified [place].   Supine to sit with Modified [place].  Toilet transfer to toilet or BSC with Stand-by Assistance with RW .  Stand Pivot transfer with Stand-by Assistance with RW .  Upper extremity exercise with assistance as needed.  Caregiver will be educated on level of assist required to safely perform self care tasks and functional transfers..     Outcome: Ongoing, Progressing

## 2021-12-19 NOTE — PT/OT/SLP PROGRESS
Physical Therapy Treatment    Patient Name:  Leyla Mari   MRN:  [id number]  Admit Date: 12/6/2021  Admitting Diagnosis: COPD with acute exacerbation  Recent Surgeries: N/A    General Precautions: Standard, fall   Orthopedic Precautions:N/A   Braces: N/A     Recommendations:     Discharge Recommendations:  home health PT   Level of Assistance Recommended at Discharge: 24 hours light assistance  Discharge Equipment Recommendations:  (TBD)   Barriers to discharge: None    Assessment:     Leyla Mari is a 76 y.o. female admitted with a medical diagnosis of COPD with acute exacerbation . Pt continues to make good functional progress requiring CGA for GT, ~144ft max without taking a seated rest break, and Mod/[place] for sit<>stand from elevated w/c surface using 2 w/c cushions.pt continues to benefit from snf rehab to help pt meet her POC goals prior to d/c home.    Performance deficits affecting function:  weakness,impaired endurance,impaired self care skills,impaired functional mobilty,gait instability,impaired balance,decreased lower extremity function,decreased safety awareness,impaired skin,edema,impaired cardiopulmonary response to activity .    Rehab Potential is good    Activity Tolerance: Good    Plan:     Patient to be seen 6 x/week to address the above listed problems via gait training,therapeutic activities,therapeutic exercises,therapeutic groups,neuromuscular re-education,wheelchair management/training    · Plan of Care Expires: 01/06/22  · Plan of Care Reviewed with: patient    Subjective     Pt agreeable to work with PT.     Pain/Comfort:  · Pain Rating 1: 0/10  · Pain Rating Post-Intervention 1: 0/10    Patient's cultural, spiritual, Restoration conflicts given the current situation:  · no    Objective:     Communicated with pt. prior to session.  Patient found up in chair with oxygen upon PT entry to room.     Therapeutic Activities and Exercises: Mini-eliptical x 15mins to help improve B l/E MMT and  endurance. Pt tolerates working at a Modified [place] of 3-4.    Functional Mobility:  · Transfers:     · Sit to Stand:  minimum assistance and moderate assistance with rolling walker; Min A 1st trial and Mod A 2nd and 3rd  trial from elevated w/c surface using 2 w/c cushions.  · Gait: 144ft +120ft with RW and CGA/SBA for safety. pt with fair juan and no LOB epsiodes.    AM-PAC 6 CLICK MOBILITY  14    Patient left up in chair with all lines intact and call button in reach.    GOALS:   Multidisciplinary Problems     Physical Therapy Goals        Problem: Physical Therapy Goal    Goal Priority Disciplines Outcome Goal Variances Interventions   Physical Therapy Goal     PT, PT/OT Ongoing, Progressing     Description: Goals to be met by: 12/28/21    Patient will increase functional independence with mobility by performing:    . Supine to sit with Set-up [place]  . Sit to supine with Set-up [place]  . Rolling to Left and Right with Set-up Assistance.  . Sit to stand transfer with Stand-by Assistance- not met  . Bed to chair transfer with Stand-by Assistance using Rolling Walker- not met  . Gait  x 50 feet with Contact Guard Assistance using Rolling Walker. -met  Updated 12/14/2021Gait  x 130 feet with Contact Guard Assistance using Rolling Walker-met  Updated 12/19/2021Gait  x 150 feet with Contact Guard Assistance/ SBA using Rolling Walker  . Wheelchair propulsion x50 feet with Minimal Assistance using bilateral uppper extremities  . Ascend/Descend 4 inch curb step with Minimal Assistance using Rolling Walker.                     Time Tracking:     PT Received On: 12/19/21  PT Total Time (min):   Billable Minutes: Gait Training 25mins and Therapeutic Exercise 17mins    Treatment Type: Treatment  PT/PTA: PT     PTA Visit Number: 0     12/19/2021

## 2021-12-19 NOTE — PROGRESS NOTES
Pt's daughter Laney visiting called this nurse and verbalized that while moving pt's table closer to patient who is sitting on recliner pt's right  knee was bumped with table and new skin tear noted with a drop of blood, no active bleeding. Site cleaned with normal saline and covered with 4x4 foam dressing.

## 2021-12-19 NOTE — PLAN OF CARE
Problem: Physical Therapy Goal  Goal: Physical Therapy Goal  Description: Goals to be met by: 12/28/21    Patient will increase functional independence with mobility by performing:    . Supine to sit with Set-up [place]  . Sit to supine with Set-up [place]  . Rolling to Left and Right with Set-up Assistance.  . Sit to stand transfer with Stand-by Assistance- not met  . Bed to chair transfer with Stand-by Assistance using Rolling Walker- not met  . Gait  x 50 feet with Contact Guard Assistance using Rolling Walker. -met  Updated 12/14/2021Gait  x 130 feet with Contact Guard Assistance using Rolling Walker-met  Updated 12/19/2021Gait  x 150 feet with Contact Guard Assistance/ SBA using Rolling Walker  . Wheelchair propulsion x50 feet with Minimal Assistance using bilateral uppper extremities  . Ascend/Descend 4 inch curb step with Minimal Assistance using Rolling Walker.    Outcome: Ongoing, Progressing

## 2021-12-20 LAB
POCT GLUCOSE: 114 MG/DL (ref 70–110)
POCT GLUCOSE: 154 MG/DL (ref 70–110)
POCT GLUCOSE: 233 MG/DL (ref 70–110)
POCT GLUCOSE: 245 MG/DL (ref 70–110)

## 2021-12-20 PROCEDURE — 25000003 PHARM REV CODE 250: Performed by: INTERNAL MEDICINE

## 2021-12-20 PROCEDURE — 97530 THERAPEUTIC ACTIVITIES: CPT | Mod: CQ

## 2021-12-20 PROCEDURE — 27000221 HC OXYGEN, UP TO 24 HOURS

## 2021-12-20 PROCEDURE — 97116 GAIT TRAINING THERAPY: CPT | Mod: CQ

## 2021-12-20 PROCEDURE — 25000242 PHARM REV CODE 250 ALT 637 W/ HCPCS: Performed by: INTERNAL MEDICINE

## 2021-12-20 PROCEDURE — 11000004 HC SNF PRIVATE

## 2021-12-20 PROCEDURE — 25000003 PHARM REV CODE 250: Performed by: NURSE PRACTITIONER

## 2021-12-20 PROCEDURE — 97110 THERAPEUTIC EXERCISES: CPT | Mod: CO

## 2021-12-20 PROCEDURE — 97110 THERAPEUTIC EXERCISES: CPT | Mod: CQ

## 2021-12-20 PROCEDURE — 63600175 PHARM REV CODE 636 W HCPCS: Performed by: INTERNAL MEDICINE

## 2021-12-20 PROCEDURE — 94761 N-INVAS EAR/PLS OXIMETRY MLT: CPT

## 2021-12-20 RX ADMIN — PANTOPRAZOLE SODIUM 40 MG: 40 TABLET, DELAYED RELEASE ORAL at 09:12

## 2021-12-20 RX ADMIN — DILTIAZEM HYDROCHLORIDE 180 MG: 180 CAPSULE, COATED, EXTENDED RELEASE ORAL at 09:12

## 2021-12-20 RX ADMIN — ESCITALOPRAM 5 MG: 5 TABLET, FILM COATED ORAL at 09:12

## 2021-12-20 RX ADMIN — ALPRAZOLAM 0.25 MG: 0.25 TABLET ORAL at 09:12

## 2021-12-20 RX ADMIN — GABAPENTIN 100 MG: 100 CAPSULE ORAL at 10:12

## 2021-12-20 RX ADMIN — APIXABAN 5 MG: 2.5 TABLET, FILM COATED ORAL at 09:12

## 2021-12-20 RX ADMIN — TIOTROPIUM BROMIDE INHALATION SPRAY 2 PUFF: 3.12 SPRAY, METERED RESPIRATORY (INHALATION) at 09:12

## 2021-12-20 RX ADMIN — APIXABAN 5 MG: 2.5 TABLET, FILM COATED ORAL at 10:12

## 2021-12-20 RX ADMIN — ALPRAZOLAM 0.25 MG: 0.25 TABLET ORAL at 10:12

## 2021-12-20 RX ADMIN — PREDNISONE 20 MG: 20 TABLET ORAL at 09:12

## 2021-12-20 RX ADMIN — ALPRAZOLAM 0.25 MG: 0.25 TABLET ORAL at 03:12

## 2021-12-20 RX ADMIN — POTASSIUM CHLORIDE 10 MEQ: 10 CAPSULE, COATED, EXTENDED RELEASE ORAL at 09:12

## 2021-12-20 RX ADMIN — FUROSEMIDE 20 MG: 20 TABLET ORAL at 09:12

## 2021-12-20 RX ADMIN — FLUTICASONE FUROATE AND VILANTEROL TRIFENATATE 1 PUFF: 100; 25 POWDER RESPIRATORY (INHALATION) at 09:12

## 2021-12-20 RX ADMIN — PRAVASTATIN SODIUM 20 MG: 20 TABLET ORAL at 09:12

## 2021-12-20 NOTE — PT/OT/SLP PROGRESS
Physical Therapy Treatment    Patient Name:  Leyla Mari   MRN:  [id number]  Admit Date: 12/6/2021  Admitting Diagnosis: COPD with acute exacerbation  Recent Surgeries:     General Precautions: Standard, fall   Orthopedic Precautions:N/A   Braces:       Recommendations:     Discharge Recommendations:  home health PT   Level of Assistance Recommended at Discharge: 24 hours light assistance  Discharge Equipment Recommendations:  (TBD)   Barriers to discharge: None    Assessment:     Leyla Mari is a 76 y.o. female admitted with a medical diagnosis of COPD with acute exacerbation. Patient tolerated therapeutic activity fairly well and is limited by SOB due to COPD.  Patient is making slow, but steady progress toward goals and will benefit from further skilled services to meet POC goals and return to PLOF.          Performance deficits affecting function:  impaired endurance,weakness,impaired self care skills,impaired functional mobilty,gait instability,impaired skin,edema,impaired cardiopulmonary response to activity .    Rehab Potential is fair    Activity Tolerance: Fair    Plan:     Patient to be seen 6 x/week to address the above listed problems via gait training,therapeutic activities,therapeutic exercises,therapeutic groups,neuromuscular re-education,wheelchair management/training    · Plan of Care Expires: 01/06/22  · Plan of Care Reviewed with: patient    Subjective     Patient reports no c/o and is motivated for therapy in order to return home.     Pain/Comfort:  · Pain Rating 1: 0/10  · Pain Rating Post-Intervention 1: 0/10    Patient's cultural, spiritual, Anglican conflicts given the current situation:  · no    Objective:   Patient found up in chair with oxygen upon PT entry to room.     Therapeutic Activities and Exercises:   Patient performed mini elliptical for B LE's x 10 minutes to build muscle strength and endurance.  Sit to stand transfer training, Stand pivot transfer, trg, gait trg completed  today.    Functional Mobility:  · Transfers:    ·   Sit to stand (x 3 trials)  from WC to RW with MIN Assist  ·   Sit to stand from EOB (x 2 trials)  to RW with   ·   Stand Pivot transfer training from WC -> therapy mat with CGA  · Gait: Gait trg using RW with SBA x 123' displays improved gait pattern  · Wheelchair Propulsion:  Propel WC on even surfaces x 50' with SBA, SOB and fatigue limits patient's distance.    AM-PAC 6 CLICK MOBILITY  14    Patient left up in chair with all lines intact, call button in reach and all needs met.    GOALS:   Multidisciplinary Problems     Physical Therapy Goals        Problem: Physical Therapy Goal    Goal Priority Disciplines Outcome Goal Variances Interventions   Physical Therapy Goal     PT, PT/OT Ongoing, Progressing     Description: Goals to be met by: 12/28/21    Patient will increase functional independence with mobility by performing:    . Supine to sit with Set-up [place]  . Sit to supine with Set-up [place]  . Rolling to Left and Right with Set-up Assistance.  . Sit to stand transfer with Stand-by Assistance- not met  . Bed to chair transfer with Stand-by Assistance using Rolling Walker- not met  . Gait  x 50 feet with Contact Guard Assistance using Rolling Walker. -met  Updated 12/14/2021Gait  x 130 feet with Contact Guard Assistance using Rolling Walker-met  Updated 12/19/2021Gait  x 150 feet with Contact Guard Assistance/ SBA using Rolling Walker  . Wheelchair propulsion x50 feet with Minimal Assistance using bilateral uppper extremities  . Ascend/Descend 4 inch curb step with Minimal Assistance using Rolling Walker.                     Time Tracking:     PT Received On: 12/20/21  PT Total Time (min):   40    Billable Minutes: Gait Training 10, Therapeutic Activity 16 and Therapeutic Exercise 14    Treatment Type: Treatment  PT/PTA: PTA     PTA Visit Number: 1     12/20/2021

## 2021-12-20 NOTE — PT/OT/SLP PROGRESS
"Occupational Therapy   Treatment    Name: Leyla Mari  MRN: [id number]  Admit Date: 12/6/2021  Admitting Diagnosis:  COPD with acute exacerbation    General Precautions: Standard, fall   Orthopedic Precautions:N/A   Braces:       Recommendations:     Discharge Recommendations: home health OT  Level of Assistance Recommended at Discharge: 24 hours physical assistance for all ADL's and home management tasks  Discharge Equipment Recommendations:   (TBD)  Barriers to discharge:  None    Assessment:     Leyla Mari is a 76 y.o. female with a medical diagnosis of COPD with acute exacerbation .  Performance deficits affecting function are  weakness,impaired endurance,impaired self care skills,impaired functional mobilty,gait instability,impaired balance,decreased upper extremity function,decreased lower extremity function,impaired cardiopulmonary response to activity.Pt. participated well with session on this day. Pt is progressing well with session on this day still continues to requires cues with aspects of safety . O2 stats monitored throughout session.Pt. Will continue to benefit from continued OT to progress towards goals     Rehab Potential is good    Activity tolerance:  Good    Plan:     Patient to be seen 6 x/week to address the above listed problems via self-care/home management,therapeutic activities,therapeutic exercises    · Plan of Care Expires: 01/07/22  · Plan of Care Reviewed with: patient    Subjective     Communicated with: nsjer prior to session. "I want to get back in bed when we are finished with therapy please"    Pain/Comfort:  · Pain Rating 1: 0/10  · Pain Rating Post-Intervention 1: 0/10    Patient's cultural, spiritual, Adventism conflicts given the current situation:  · no    Objective:     Patient found with bed in chair position with oxygen upon OT entry to room.    Bed Mobility:    · Patient completed Sit to Supine with minimum assistancewith BLE management      Functional " Mobility/Transfers:  · Patient completed Sit <> Stand Transfer with minimum assistance  with  rolling walker   · Patient completed Bed <> Chair Transfer using Stand Pivot technique with minimum assistance with rolling walker  · Functional Mobility: Pt. Able to propel self in w/c from room to nurse station approx 50ft with good use of BUEs noted    Activities of Daily Living:  · Lower Body Dressing: supervision to doff/bo BLE socks with fig 4 tech     [facility] 6 Click ADL: 20    OT Exercises: UE Ergometer 10 mins with moderate resistance    Treatment & Education:  Pt. With standing act on this day with task. Pt. With CGA/SBA for balance aspects with task with AD at raised counter Pt with visual perception task with discrimination of various shapes and sizes x 7 min with standing bal and min cues throught out. Pt.able to complete finish task but seated rest break required. Pt. Required seated rest break after 3 mins of standing on first trial    Pt. With 2# dowel activity with 2x20 reps with  shd flex, bicep curls horz adb/add and forward flex motion to increase BUE ROM and strength,.     Pt. With standing and therex performed to increase ROM, endurance selfcare task and fxl mobility for independence     Patient left HOB elevated with all lines intact, call button in reach and with O2 Education:      GOALS:   Multidisciplinary Problems     Occupational Therapy Goals        Problem: Occupational Therapy Goal    Goal Priority Disciplines Outcome Interventions   Occupational Therapy Goal     OT, PT/OT Ongoing, Progressing    Description: Goals to be met by: 12/28/21     Patient will increase functional independence with ADLs by performing:    UE Dressing with Modified [place].=MET  LE Dressing with Minimal Assistance.=MET  Grooming while seated at sink with Modified [place].=MET  Toileting from toilet or BSC with Minimal Assistance for hygiene and clothing management.   Bathing from  sitting at sink with  Stand-by Assistance.  Rolling to Bilateral with Modified [place].   Supine to sit with Modified [place].  Toilet transfer to toilet or BSC with Stand-by Assistance with RW .  Stand Pivot transfer with Stand-by Assistance with RW .  Upper extremity exercise with assistance as needed.  Caregiver will be educated on level of assist required to safely perform self care tasks and functional transfers..                      Time Tracking:     OT Date of Treatment: OT Date of Treatment: 12/20/21  OT Total Time (min): Total Time (min): 44 min    Billable Minutes:Therapeutic Exercise 44    12/20/2021   OT and RAY have discussed the above patients goals and status in collaboration with Plan of Care.

## 2021-12-21 LAB
ANION GAP SERPL CALC-SCNC: 8 MMOL/L (ref 8–16)
BASOPHILS # BLD AUTO: ABNORMAL K/UL (ref 0–0.2)
BASOPHILS NFR BLD: 0 % (ref 0–1.9)
BUN SERPL-MCNC: 17 MG/DL (ref 8–23)
CALCIUM SERPL-MCNC: 8.7 MG/DL (ref 8.7–10.5)
CHLORIDE SERPL-SCNC: 98 MMOL/L (ref 95–110)
CO2 SERPL-SCNC: 35 MMOL/L (ref 23–29)
CREAT SERPL-MCNC: 0.5 MG/DL (ref 0.5–1.4)
DIFFERENTIAL METHOD: ABNORMAL
EOSINOPHIL # BLD AUTO: ABNORMAL K/UL (ref 0–0.5)
EOSINOPHIL NFR BLD: 1 % (ref 0–8)
ERYTHROCYTE [DISTWIDTH] IN BLOOD BY AUTOMATED COUNT: 14.9 % (ref 11.5–14.5)
EST. GFR  (AFRICAN AMERICAN): >60 ML/MIN/1.73 M^2
EST. GFR  (NON AFRICAN AMERICAN): >60 ML/MIN/1.73 M^2
GLUCOSE SERPL-MCNC: 83 MG/DL (ref 70–110)
HCT VFR BLD AUTO: 33 % (ref 37–48.5)
HGB BLD-MCNC: 10.1 G/DL (ref 12–16)
IMM GRANULOCYTES # BLD AUTO: ABNORMAL K/UL (ref 0–0.04)
IMM GRANULOCYTES NFR BLD AUTO: ABNORMAL % (ref 0–0.5)
LYMPHOCYTES # BLD AUTO: ABNORMAL K/UL (ref 1–4.8)
LYMPHOCYTES NFR BLD: 5 % (ref 18–48)
MAGNESIUM SERPL-MCNC: 1.9 MG/DL (ref 1.6–2.6)
MCH RBC QN AUTO: 27.7 PG (ref 27–31)
MCHC RBC AUTO-ENTMCNC: 30.6 G/DL (ref 32–36)
MCV RBC AUTO: 91 FL (ref 82–98)
MONOCYTES # BLD AUTO: ABNORMAL K/UL (ref 0.3–1)
MONOCYTES NFR BLD: 4 % (ref 4–15)
MYELOCYTES NFR BLD MANUAL: 1 %
NEUTROPHILS NFR BLD: 87 % (ref 38–73)
NEUTS BAND NFR BLD MANUAL: 2 %
NRBC BLD-RTO: 0 /100 WBC
PHOSPHATE SERPL-MCNC: 3.1 MG/DL (ref 2.7–4.5)
PLATELET # BLD AUTO: 240 K/UL (ref 150–450)
PMV BLD AUTO: 9.4 FL (ref 9.2–12.9)
POCT GLUCOSE: 100 MG/DL (ref 70–110)
POCT GLUCOSE: 181 MG/DL (ref 70–110)
POCT GLUCOSE: 289 MG/DL (ref 70–110)
POCT GLUCOSE: 83 MG/DL (ref 70–110)
POTASSIUM SERPL-SCNC: 3.6 MMOL/L (ref 3.5–5.1)
RBC # BLD AUTO: 3.64 M/UL (ref 4–5.4)
SODIUM SERPL-SCNC: 141 MMOL/L (ref 136–145)
WBC # BLD AUTO: 7.4 K/UL (ref 3.9–12.7)

## 2021-12-21 PROCEDURE — 63600175 PHARM REV CODE 636 W HCPCS: Performed by: INTERNAL MEDICINE

## 2021-12-21 PROCEDURE — 97535 SELF CARE MNGMENT TRAINING: CPT | Mod: CO

## 2021-12-21 PROCEDURE — 85027 COMPLETE CBC AUTOMATED: CPT | Performed by: NURSE PRACTITIONER

## 2021-12-21 PROCEDURE — 84100 ASSAY OF PHOSPHORUS: CPT | Performed by: NURSE PRACTITIONER

## 2021-12-21 PROCEDURE — 97110 THERAPEUTIC EXERCISES: CPT | Mod: CQ

## 2021-12-21 PROCEDURE — 25000003 PHARM REV CODE 250: Performed by: NURSE PRACTITIONER

## 2021-12-21 PROCEDURE — 97116 GAIT TRAINING THERAPY: CPT | Mod: CQ

## 2021-12-21 PROCEDURE — 85007 BL SMEAR W/DIFF WBC COUNT: CPT | Performed by: NURSE PRACTITIONER

## 2021-12-21 PROCEDURE — 11000004 HC SNF PRIVATE

## 2021-12-21 PROCEDURE — 83735 ASSAY OF MAGNESIUM: CPT | Performed by: NURSE PRACTITIONER

## 2021-12-21 PROCEDURE — 97530 THERAPEUTIC ACTIVITIES: CPT | Mod: CQ

## 2021-12-21 PROCEDURE — 25000003 PHARM REV CODE 250: Performed by: INTERNAL MEDICINE

## 2021-12-21 PROCEDURE — 99900035 HC TECH TIME PER 15 MIN (STAT)

## 2021-12-21 PROCEDURE — 36415 COLL VENOUS BLD VENIPUNCTURE: CPT | Performed by: NURSE PRACTITIONER

## 2021-12-21 PROCEDURE — 80048 BASIC METABOLIC PNL TOTAL CA: CPT | Performed by: NURSE PRACTITIONER

## 2021-12-21 RX ORDER — FUROSEMIDE 20 MG/1
20 TABLET ORAL ONCE
Status: COMPLETED | OUTPATIENT
Start: 2021-12-21 | End: 2021-12-21

## 2021-12-21 RX ORDER — FUROSEMIDE 40 MG/1
40 TABLET ORAL DAILY
Status: COMPLETED | OUTPATIENT
Start: 2021-12-22 | End: 2021-12-24

## 2021-12-21 RX ORDER — POTASSIUM CHLORIDE 20 MEQ/1
20 TABLET, EXTENDED RELEASE ORAL DAILY
Status: COMPLETED | OUTPATIENT
Start: 2021-12-22 | End: 2021-12-24

## 2021-12-21 RX ORDER — POTASSIUM CHLORIDE 750 MG/1
10 CAPSULE, EXTENDED RELEASE ORAL DAILY
Status: DISCONTINUED | OUTPATIENT
Start: 2021-12-25 | End: 2021-12-28 | Stop reason: HOSPADM

## 2021-12-21 RX ORDER — PREDNISONE 10 MG/1
10 TABLET ORAL DAILY
Status: DISCONTINUED | OUTPATIENT
Start: 2021-12-22 | End: 2021-12-28 | Stop reason: HOSPADM

## 2021-12-21 RX ORDER — FUROSEMIDE 20 MG/1
20 TABLET ORAL DAILY
Status: DISCONTINUED | OUTPATIENT
Start: 2021-12-25 | End: 2021-12-28 | Stop reason: HOSPADM

## 2021-12-21 RX ORDER — POTASSIUM CHLORIDE 20 MEQ/1
40 TABLET, EXTENDED RELEASE ORAL ONCE
Status: COMPLETED | OUTPATIENT
Start: 2021-12-21 | End: 2021-12-21

## 2021-12-21 RX ORDER — PREDNISONE 5 MG/1
5 TABLET ORAL NIGHTLY
Status: DISCONTINUED | OUTPATIENT
Start: 2021-12-22 | End: 2021-12-28 | Stop reason: HOSPADM

## 2021-12-21 RX ADMIN — FUROSEMIDE 20 MG: 20 TABLET ORAL at 09:12

## 2021-12-21 RX ADMIN — APIXABAN 5 MG: 2.5 TABLET, FILM COATED ORAL at 09:12

## 2021-12-21 RX ADMIN — ALPRAZOLAM 0.25 MG: 0.25 TABLET ORAL at 02:12

## 2021-12-21 RX ADMIN — FUROSEMIDE 20 MG: 20 TABLET ORAL at 12:12

## 2021-12-21 RX ADMIN — FLUTICASONE FUROATE AND VILANTEROL TRIFENATATE 1 PUFF: 100; 25 POWDER RESPIRATORY (INHALATION) at 09:12

## 2021-12-21 RX ADMIN — POTASSIUM CHLORIDE 40 MEQ: 1500 TABLET, EXTENDED RELEASE ORAL at 12:12

## 2021-12-21 RX ADMIN — GABAPENTIN 100 MG: 100 CAPSULE ORAL at 09:12

## 2021-12-21 RX ADMIN — ESCITALOPRAM 5 MG: 5 TABLET, FILM COATED ORAL at 09:12

## 2021-12-21 RX ADMIN — PRAVASTATIN SODIUM 20 MG: 20 TABLET ORAL at 09:12

## 2021-12-21 RX ADMIN — PREDNISONE 20 MG: 20 TABLET ORAL at 10:12

## 2021-12-21 RX ADMIN — PANTOPRAZOLE SODIUM 40 MG: 40 TABLET, DELAYED RELEASE ORAL at 09:12

## 2021-12-21 RX ADMIN — TIOTROPIUM BROMIDE INHALATION SPRAY 2 PUFF: 3.12 SPRAY, METERED RESPIRATORY (INHALATION) at 09:12

## 2021-12-21 RX ADMIN — ALPRAZOLAM 0.25 MG: 0.25 TABLET ORAL at 09:12

## 2021-12-21 RX ADMIN — DILTIAZEM HYDROCHLORIDE 180 MG: 180 CAPSULE, COATED, EXTENDED RELEASE ORAL at 09:12

## 2021-12-21 RX ADMIN — MELATONIN TAB 3 MG 6 MG: 3 TAB at 09:12

## 2021-12-21 RX ADMIN — POTASSIUM CHLORIDE 10 MEQ: 10 CAPSULE, COATED, EXTENDED RELEASE ORAL at 09:12

## 2021-12-21 NOTE — PROGRESS NOTES
[facility]                                  Skilled Nursing Facility                   Progress Note     Admit Date: 12/6/2021  MARK ANTHONY TBD  Principal Problem:  COPD with acute exacerbation   HPI obtained from patient interview and chart review     Chief Complaint: Re-evaluation of medical treatment and therapy status: Lab review, hypokalemia, increase in bilateral lower extremity edema    HPI:   Ms. Mari is a 76 year old female with PMHx of end-stage COPD, PE (on eliquis), HTN and HLD who presents to SNF following hospitalization for COPD exacerbation.  Admission to SNF for secondary weakness and debility.     Interval history: All of today's labs reviewed and are listed below.  24 hr vital sign ranges listed below.  Blood glucose levels have been very labile, she is at times high at the nighttime reading, covered with sliding scale insulin and then hypoglycemic in the morning.  She is currently taking prednisone 20 mg daily which is higher than her home dose of 15 mg daily.  We discussed transitioning back to home dose of prednisone to see if this will help even out patient's blood glucose levels as patient feels her breathing is very close to her baseline.  Patient does display increase in her bilateral lower extremity edema, her right foot is very swollen.  Patient is compliant with her to be  compression stockings during the day and with elevating her legs.  Patient denies abdominal discomfort, nausea, or vomiting.  Patient reports an adequate appetite.  Patient denies dysuria.  Patient reports having regular bowel movements.  Patient progessing with PT/OT- Gait: amb with RW CG/close SBA ~ 110 ft wc/02 in tow. Continuing to follow and treat all acute and chronic conditions.      Past Medical History: Patient has a past medical history of Actinic keratosis, Arthritis, Cataract, Cellulitis of ankle, Colon polyps, COPD (chronic  obstructive pulmonary disease), Family history of colon cancer, History of Clostridium difficile infection (7/3/2012), Hyperlipidemia, Hypertension, Lung nodules, and Sinus tachycardia.    Past Surgical History: Patient has a past surgical history that includes Appendectomy; Eye surgery; Tonsillectomy;  section; Cataract extraction (Bilateral, ); and Colonoscopy (N/A, 10/10/2017).    Social History: Patient reports that she quit smoking about 26 years ago. Her smoking use included cigarettes. She has a 39.00 pack-year smoking history. She has never used smokeless tobacco. She reports current alcohol use of about 2.0 standard drinks of alcohol per week. She reports that she does not use drugs.    Family History: family history includes Blindness in her paternal grandmother; Breast cancer in her sister; COPD in her father; Cancer in her sister and son; Cancer (age of onset: 79) in her mother; Cataracts in her mother and sister; Diabetes in her father and paternal grandmother; Glaucoma in her mother; Heart disease in her father, mother, and sister; Hyperlipidemia in her father, mother, and sister; Hypertension in her father, mother, and sister; Skin cancer in her mother and sister; Thyroid disease in her daughter and mother.    Allergies: Patient is allergic to bactrim [sulfamethoxazole-trimethoprim], codeine, keflex [cephalexin], ceftriaxone, clindamycin hcl, doxycycline, and vancomycin analogues.    ROS  Constitutional: Negative for fever   Eyes: Negative for blurred vision, double vision   Respiratory:  +mild intermittent dry cough, TRAVIS  Cardiovascular: Negative for chest pain, palpitations.  + leg swelling.   Gastrointestinal: Negative for abdominal pain, constipation, diarrhea, nausea, vomiting.   Genitourinary: Negative for dysuria, frequency   Musculoskeletal:  + generalized weakness. Negative for back pain and myalgias.   Skin: Negative for itching and rash.   Neurological: Negative for dizziness,  headaches.   Psychiatric/Behavioral: Negative for depression. The patient is not nervous/anxious.      24 hour Vital Sign Range   Temp:  [98 °F (36.7 °C)-98.5 °F (36.9 °C)]   Pulse:  [84-90]   Resp:  [18]   BP: (122-129)/(58-62)   SpO2:  [95 %-97 %]     PEx  Constitutional: Patient appears debilitated.  No distress noted  HENT:   Head: Normocephalic and atraumatic.   Eyes: Pupils are equal, round  Neck: Normal range of motion. Neck supple.   Cardiovascular: Normal rate, regular rhythm and normal heart sounds.    Pulmonary/Chest: Effort normal and breath sounds are clear  Abdominal: Soft. Bowel sounds are normal.   Musculoskeletal: Normal range of motion.   Neurological: Alert and oriented to person, place, and time.   Psychiatric: Normal mood and affect. Behavior is normal.   Skin: Skin is warm and dry.       WOUND  Date identified - POA- 12/6/2021  Location:  [place]- now resolved    WOUND  Date identified - POA- 12/6/2021  Location:  Left knee  Type:  Skin tear  Appearance:  Ecchymotic, red, moist  Wound measurements: 4 cm L X 3 cm W x 0.1cm D   Drainage:  Skin to might serosanguineous  Odor: none   Edges: Irregular edges  Periwound: normal skin tone     WOUND  Date identified - POA- 12/6/2021  Location:  Right heel  Type:  Unstageable pressure injury  Appearance: tan moist slough   Wound length: 1cm   Wound width: 1.5cm  Wound depth: 0.3cm   Undermining/tunneling: No   Drainage:  None  Odor: none   Edges: Irregular edges,  Periwound: normal skin tone   Progress:  Progressing     Information obtained from another source:  measurements obtained from wound care RN     Recent Labs   Lab 12/21/21  0431      K 3.6   CL 98   CO2 35*   BUN 17   CREATININE 0.5   MG 1.9       Recent Labs   Lab 12/21/21  0431   WBC 7.40   RBC 3.64*   HGB 10.1*   HCT 33.0*      MCV 91   MCH 27.7   MCHC 30.6*         Assessment and Plan:    Acute on chronic respiratory failure with hypercapnia- improving   COPD with acute  exacerbation  Pulmonary edema  - chronic home oxygen at 2 LPM.   - Transition IV solumedrol to prednisone 60 mg daily with taper over the next 2 weeks back to home dose of 15 mg daily.   - completed 5 day course of azithromycin 250 mg  - continue home inhalesr.   - currently using 3 L nasal cannula, wean to home dose of 2 L  - continue Lasix 40 mg daily ( takes 20 mg at home and every other day)   - continue PRN levalbuterol nebulizer treatments  - 12/13 reduce Lasix to 20 mg daily, to begin weaning back to home dose patient feels symptom improvement, continues at 3 L needs to be weaned down to 2 L  - 12/21 initiating home dose of prednisone with 10 mg in the AM and 5 mg in the PM; increasing Lasix to 40 mg daily with potassium 20 mEq daily x3 days, resume home dose of Lasix 20 mg daily on 12/25.     Hypoglycemia  - patient originally admitted with hyperglycemia due to steroid use  - HA1c (5/2021): 7.7  - admitted on determir 10 units nightly while inpatient and LDSSI  - discharge recommendations per endocrine are for metformin   - 12/13 discontinuing detemir for now, can continue on low-dose sliding scale insulin PRN, do not think patient will need metformin at discharge, she will need to follow-up with Endocrine after discharge from [facility]  - 12/16 24 hour blood glucose range is .  Patient being treated with low-dose sliding scale insulin, I do not feel that metformin is appropriate this time  - 12/21 hoping that patient will have better glucose levels now back on the home dose of prednisone    Long term current use of systemic steroids  - Takes prednisone 15 mg daily for end-stage COPD  -  taper back 15mg as status improves. started on prednisone 60 mg --> 40 mg daily --> now currently receiving prednisone 20 mg daily   - 12/16 will continue 20 mg daily for now  - 12/21 initiating home dose prednisone 10 mg daily, 5 mg qHS.    Peripheral neuropathy  - continue home gabapentin 100 mg qHS.      Generalized  anxiety disorder  - Continue home alprazolam 0.25 mg TID, escitalopram 5 mg daily.      Encounter for palliative care  - patient is being followed by palliative care in an outpatient setting. Patient is DNR/DNI.   - as per previous notes, patient was enrolled in inpatient hospice and then revoked it.   - Patient's daughter is amendable to hospice if patient does not improve on current treatment.      Acute pulmonary embolism  - Continue apixaban 5 mg BID.     Cellulitis of right lower extremity  - Continue bactrim DS BID to complete a week.   - Continue to monitor response. Area is improving.      B/l lower extremity edema  - CXR showed bibasilar edema (R>L)  - resumed home lasix --> IV lasix 40 mg daily   - strict I/Os   - prior TTE in 5/2021 normal   - TTE showed EF 65% and normal LV diastolic function      Hyperlipidemia  - Continue home pravastatin 20 mg daily    Essential hypertension  - Continue diltiazem 180 mg daily.    Debility   - Continue with PT/OT for gait training and strengthening and restoration of ADL's   - Encourage mobility, OOB in chair, and early ambulation as appropriate  - Fall precautions   - Monitor for bowel and bladder dysfunction  - Monitor for and prevent skin breakdown and pressure ulcers  - Continue DVT prophylaxis with  apixaban 5 mg BID         Anticipate disposition:  Home with home health      Follow-up needed during SNF stay-    Follow-up needed after discharge from SNF: PCP, pulmonology    Future Appointments   Date Time Provider Department Center   12/22/2021  7:15 AM [facility] SPEC LAB [facility] SPECLAB [facility]           Emerald Clark NP  Department of Hospital Medicine   [facility]     DOS: 12/21/2021       Patient note was created using MModal Dictation.  Any errors in syntax or even information may not have been identified and edited on initial review prior to signing this note.

## 2021-12-21 NOTE — PT/OT/SLP PROGRESS
"Occupational Therapy   Treatment    Name: Leyla Mari  MRN: [id number]  Admit Date: 12/6/2021  Admitting Diagnosis:  COPD with acute exacerbation    General Precautions: Standard, fall   Orthopedic Precautions:N/A   Braces:       Recommendations:     Discharge Recommendations: home health OT  Level of Assistance Recommended at Discharge: 24 hours physical assistance for all ADL's and home management tasks  Discharge Equipment Recommendations:   (TBD)  Barriers to discharge:  None    Assessment:     Leyla Mari is a 76 y.o. female with a medical diagnosis of COPD with acute exacerbation .  She presents with performance deficits affecting function are weakness,impaired endurance,impaired self care skills,impaired functional mobility,gait instability,impaired balance,decreased upper extremity function,decreased lower extremity function,impaired cardiopulmonary response to activity. Pt. participated fair with session on this day. Pt required increase time with all adl's due to fatigue. Pt very emotional due to personal goals set for self requiring encouragement to continue session on today. Pt required verbal cues for proper body placement when performing sit to stand transfers. Pt is progressing well with session on this day still continues to requires cues with aspects of safety . Pt. Will continue to benefit from continued OT to progress towards goals  Rehab Potential is good    Activity tolerance:  Good    Plan:     Patient to be seen 6 x/week to address the above listed problems via self-care/home management,therapeutic activities,therapeutic exercises    · Plan of Care Expires: 01/07/22  · Plan of Care Reviewed with: patient    Subjective     Communicated with: Otilia prior to session ."Good morning"    Pain/Comfort:  Pain Rating 1: 4/10  Location - Side 1: Right  Location - Orientation 1: generalized  Location 1: heel  Pain Addressed 1: Pre-medicate for activity    Patient's cultural, spiritual, Confucianist " conflicts given the current situation:  no    Objective:     Patient found supine with oxygen upon OT entry to room.    Functional Mobility/Transfers:  · Patient completed Sit <> Stand Transfer with stand by assistance  with  rolling walker   · Patient completed Chair <> Mat Step Transfer technique with minimum assistance with rolling walker  · Patient completed Toilet Transfer Step Transfer technique with minimum assistance with  rolling walker  Functional Mobility: Pt perform ~28 steps from bed to bathroom with SBA/RW    Activities of Daily Living:  · Grooming: supervision to perform oral/hair care seated at seated at sink.  · Lower Body Dressing: minimum assistance to perform threading BLE into pants and over hips in standing   · Toileting: minimum assistance to perform sit to stand and cleaning mikey rear    AMPAC 6 Click ADL: 20    Treatment & Education:  the patient was educated  energy conservation/purse lip breath to increase safety when performing functional activities    · Pt completed ADLs and func mobility activities for tx session as noted above    · Pt educated on role of OT and POC    Patient left up in chair with call button in reachEducation:      GOALS:   Multidisciplinary Problems     Occupational Therapy Goals        Problem: Occupational Therapy Goal    Goal Priority Disciplines Outcome Interventions   Occupational Therapy Goal     OT, PT/OT Ongoing, Progressing    Description: Goals to be met by: 12/28/21     Patient will increase functional independence with ADLs by performing:    UE Dressing with Modified [place].=MET  LE Dressing with Minimal Assistance.=MET  Grooming while seated at sink with Modified [place].=MET  Toileting from toilet or BSC with Minimal Assistance for hygiene and clothing management.   Bathing from  sitting at sink with Stand-by Assistance.  Rolling to Bilateral with Modified [place].   Supine to sit with Modified [place].  Toilet transfer to toilet or  BSC with Stand-by Assistance with RW .  Stand Pivot transfer with Stand-by Assistance with RW .  Upper extremity exercise with assistance as needed.  Caregiver will be educated on level of assist required to safely perform self care tasks and functional transfers..                      Time Tracking:     OT Date of Treatment: OT Date of Treatment: 12/21/21  OT Total Time (min): Total Time (min): 53 min    Billable Minutes:Self Care/Home Management 53    12/21/2021

## 2021-12-21 NOTE — PLAN OF CARE
Problem: Occupational Therapy Goal  Goal: Occupational Therapy Goal  Description: Goals to be met by: 12/28/21     Patient will increase functional independence with ADLs by performing:    UE Dressing with Modified [place].=MET  LE Dressing with Minimal Assistance.=MET  Grooming while seated at sink with Modified [place].=MET  Toileting from toilet or BSC with Minimal Assistance for hygiene and clothing management.   Bathing from  sitting at sink with Stand-by Assistance.  Rolling to Bilateral with Modified [place].   Supine to sit with Modified [place].  Toilet transfer to toilet or BSC with Stand-by Assistance with RW .  Stand Pivot transfer with Stand-by Assistance with RW .  Upper extremity exercise with assistance as needed.  Caregiver will be educated on level of assist required to safely perform self care tasks and functional transfers..     Outcome: Ongoing, Not Progressing

## 2021-12-21 NOTE — PT/OT/SLP PROGRESS
"Physical Therapy Treatment    Patient Name:  Leyla Mari   MRN:  [id number]  Admit Date: 12/6/2021  Admitting Diagnosis: COPD with acute exacerbation  Recent Surgeries:     General Precautions: Standard, fall   Orthopedic Precautions:N/A   Braces:       Recommendations:     Discharge Recommendations:  home health PT   Level of Assistance Recommended at Discharge: 24 hours light assistance  Discharge Equipment Recommendations:  (TBD)   Barriers to discharge: None    Assessment:     Leyla Mari is a 76 y.o. female admitted with a medical diagnosis of COPD with acute exacerbation . Pt tolerated well, pt would continue to benefit from skilled PT services to improve overall functional mobility, strength and endurance.  .      Performance deficits affecting function:  impaired endurance,weakness,impaired self care skills,impaired functional mobilty,gait instability,impaired skin,edema,impaired cardiopulmonary response to activity .    Rehab Potential is good    Activity Tolerance: Fair    Plan:     Patient to be seen 6 x/week to address the above listed problems via gait training,therapeutic activities,therapeutic exercises,therapeutic groups,neuromuscular re-education,wheelchair management/training    · Plan of Care Expires: 01/06/22  · Plan of Care Reviewed with: patient    Subjective     "I don't feel I'm working on what I really need" (sit-std) post session "this was good".     Pain/Comfort:  · Pain Rating 1: 0/10  · Pain Rating Post-Intervention 1: 0/10    Patient's cultural, spiritual, Lutheran conflicts given the current situation:  · no    Objective:      Patient found with oxygen upon PT entry to room.     Therapeutic Activities and Exercises: mini elliptical x 10 min  2x5 reps mini squats with RW CGA    Functional Mobility:  · Transfers:     · Sit to Stand:  stand by assistance, contact guard assistance and minimum assistance with rolling walker  · (x 6 trials from high chair in gym CG/close SBA, x 3 trials " "from WC min A all with rest breaks)  · Gait: amb with RW CG/close SBA ~ 110 ft wc/02 in tow  · Stairs:  Asc/susie 4" curb with RW min/CGA vcs/demo for tech    AM-PAC 6 CLICK MOBILITY  14    Patient left up in chair with all lines intact, call button in reach and belongings in reach.    GOALS:   Multidisciplinary Problems     Physical Therapy Goals        Problem: Physical Therapy Goal    Goal Priority Disciplines Outcome Goal Variances Interventions   Physical Therapy Goal     PT, PT/OT Ongoing, Progressing     Description: Goals to be met by: 12/28/21    Patient will increase functional independence with mobility by performing:    . Supine to sit with Set-up [place]  . Sit to supine with Set-up [place]  . Rolling to Left and Right with Set-up Assistance.  . Sit to stand transfer with Stand-by Assistance- not met  . Bed to chair transfer with Stand-by Assistance using Rolling Walker- not met  . Gait  x 50 feet with Contact Guard Assistance using Rolling Walker. -met  Updated 12/14/2021Gait  x 130 feet with Contact Guard Assistance using Rolling Walker-met  Updated 12/19/2021Gait  x 150 feet with Contact Guard Assistance/ SBA using Rolling Walker  . Wheelchair propulsion x50 feet with Minimal Assistance using bilateral uppper extremities  . Ascend/Descend 4 inch curb step with Minimal Assistance using Rolling Walker.                     Time Tracking:     PT Received On: 12/21/21  PT Total Time (min):       Billable Minutes: Gait Training 10, Therapeutic Activity 25 and Therapeutic Exercise 10    Treatment Type: Treatment  PT/PTA: PTA     PTA Visit Number: 2     12/21/2021  "

## 2021-12-21 NOTE — PROGRESS NOTES
[facility] - Skilled Nursing  Wound Care    Patient Name:  Leyla Mari   MRN:  [id number]  Date: 2021  Diagnosis: COPD with acute exacerbation  Wound care follow-up-right heel  History:     Past Medical History:   Diagnosis Date    Actinic keratosis     Arthritis     Cataract     Cellulitis of ankle     Colon polyps     COPD (chronic obstructive pulmonary disease)     home O2 (2L/min)     Family history of colon cancer     History of Clostridium difficile infection 7/3/2012    Hyperlipidemia     Hypertension     Lung nodules     Sinus tachycardia        Social History     Socioeconomic History    Marital status:    Occupational History    Occupation: retired   Tobacco Use    Smoking status: Former Smoker     Packs/day: 1.00     Years: 39.00     Pack years: 39.00     Types: Cigarettes     Quit date: 1995     Years since quittin.1    Smokeless tobacco: Never Used   Substance and Sexual Activity    Alcohol use: Yes     Alcohol/week: 2.0 standard drinks     Types: 2 Glasses of wine per week     Comment: rarely has a glass of wine- not daily    Drug use: No    Sexual activity: Never   Other Topics Concern    Are you pregnant or think you may be? No    Breast-feeding No     Social Determinants of Health     Financial Resource Strain: Low Risk     Difficulty of Paying Living Expenses: Not hard at all   Food Insecurity: No Food Insecurity    Worried About Running Out of Food in the Last Year: Never true    Ran Out of Food in the Last Year: Never true   Transportation Needs: No Transportation Needs    Lack of Transportation (Medical): No    Lack of Transportation (Non-Medical): No   Physical Activity: Insufficiently Active    Days of Exercise per Week: 7 days    Minutes of Exercise per Session: 10 min   Stress: Stress Concern Present    Feeling of Stress : To some extent   Social Connections: Socially Isolated    Frequency of Communication with Friends and Family: More  than three times a week    Frequency of Social Gatherings with Friends and Family: Twice a week    Attends Worship Services: Never    Active Member of Clubs or Organizations: No    Attends Club or Organization Meetings: Never    Marital Status:    Housing Stability: Low Risk     Unable to Pay for Housing in the Last Year: No    Number of Places Lived in the Last Year: 1    Unstable Housing in the Last Year: No       Precautions:     Allergies as of 12/06/2021 - Reviewed 11/23/2021   Allergen Reaction Noted    Bactrim [sulfamethoxazole-trimethoprim] Nausea Only 07/02/2012    Codeine Itching 07/02/2012    Keflex [cephalexin] Other (See Comments) 07/12/2012    Ceftriaxone Rash 08/12/2014    Clindamycin hcl Rash 10/05/2016    Doxycycline Rash 05/08/2017    Vancomycin analogues Rash 08/12/2014       [facility] Assessment Details/Treatment     Ms. Mari is sitting up at bedside eating breakfast, legs dangling  The right heel wound has decreased in size, no drainage, no erythema, no odor.  There is tan slough to the wound bed in one area. The wound bed is red/moist.   The bilateral feet are edematous- 4+ pitting and the right leg has 3+ edema.  No weeping to BLE.  Tubigrip in place.     Plan:  Right heel- continue plan of care  Nursing to continue care, pressure prevention measures  Wound care will follow-up on left knee, swelling to BLE this week  Recommendations made to primary team per written report for above plan/assessment . Orders in place  .    12/21/21 0815        Altered Skin Integrity 12/07/21 0740 Right posterior Heel #2 Full thickness tissue loss. Base is covered by slough and/or eschar in the wound bed   Date First Assessed/Time First Assessed: 12/07/21 0740   Altered Skin Integrity Present on Admission: yes  Side: Right  Orientation: posterior  Location: Heel  Wound Number: #2  Description of Altered Skin Integrity: Full thickness tissue loss. Base i...   Wound Image    Dressing Appearance  Intact;Dry;Clean   Drainage Amount None   Drainage Characteristics/Odor No odor   Appearance Red;Moist;Tan;Slough   Tissue loss description Full thickness   Periwound Area Dry;Ecchymotic   Wound Edges Open   Wound Length (cm) 1 cm   Wound Width (cm) 1.5 cm   Wound Depth (cm) 0.3 cm   Wound Volume (cm^3) 0.45 cm^3   Wound Surface Area (cm^2) 1.5 cm^2   Care Cleansed with:;Wound cleanser;Applied:;Skin Barrier   Dressing Foam;Tubular bandage   Dressing Change Due 12/23/21 12/21/2021

## 2021-12-22 ENCOUNTER — LAB VISIT (OUTPATIENT)
Dept: LAB | Facility: OTHER | Age: 76
End: 2021-12-22
Payer: MEDICARE

## 2021-12-22 DIAGNOSIS — Z20.822 ENCOUNTER FOR LABORATORY TESTING FOR COVID-19 VIRUS: ICD-10-CM

## 2021-12-22 PROBLEM — E44.0 MODERATE MALNUTRITION: Status: ACTIVE | Noted: 2021-12-22

## 2021-12-22 LAB
POCT GLUCOSE: 114 MG/DL (ref 70–110)
POCT GLUCOSE: 148 MG/DL (ref 70–110)
POCT GLUCOSE: 229 MG/DL (ref 70–110)
POCT GLUCOSE: 78 MG/DL (ref 70–110)
SARS-COV-2 RNA RESP QL NAA+PROBE: NOT DETECTED
SARS-COV-2- CYCLE NUMBER: NORMAL

## 2021-12-22 PROCEDURE — 97110 THERAPEUTIC EXERCISES: CPT | Mod: CQ

## 2021-12-22 PROCEDURE — 25000003 PHARM REV CODE 250: Performed by: INTERNAL MEDICINE

## 2021-12-22 PROCEDURE — 11000004 HC SNF PRIVATE

## 2021-12-22 PROCEDURE — 97530 THERAPEUTIC ACTIVITIES: CPT | Mod: CQ

## 2021-12-22 PROCEDURE — 97535 SELF CARE MNGMENT TRAINING: CPT

## 2021-12-22 PROCEDURE — 94761 N-INVAS EAR/PLS OXIMETRY MLT: CPT

## 2021-12-22 PROCEDURE — 97116 GAIT TRAINING THERAPY: CPT | Mod: CQ

## 2021-12-22 PROCEDURE — 97530 THERAPEUTIC ACTIVITIES: CPT

## 2021-12-22 PROCEDURE — 99900035 HC TECH TIME PER 15 MIN (STAT)

## 2021-12-22 PROCEDURE — 27000221 HC OXYGEN, UP TO 24 HOURS

## 2021-12-22 PROCEDURE — U0003 INFECTIOUS AGENT DETECTION BY NUCLEIC ACID (DNA OR RNA); SEVERE ACUTE RESPIRATORY SYNDROME CORONAVIRUS 2 (SARS-COV-2) (CORONAVIRUS DISEASE [COVID-19]), AMPLIFIED PROBE TECHNIQUE, MAKING USE OF HIGH THROUGHPUT TECHNOLOGIES AS DESCRIBED BY CMS-2020-01-R: HCPCS | Performed by: NURSE PRACTITIONER

## 2021-12-22 PROCEDURE — 97110 THERAPEUTIC EXERCISES: CPT

## 2021-12-22 PROCEDURE — 25000003 PHARM REV CODE 250: Performed by: NURSE PRACTITIONER

## 2021-12-22 PROCEDURE — 63600175 PHARM REV CODE 636 W HCPCS: Performed by: NURSE PRACTITIONER

## 2021-12-22 RX ADMIN — ALPRAZOLAM 0.25 MG: 0.25 TABLET ORAL at 10:12

## 2021-12-22 RX ADMIN — MELATONIN TAB 3 MG 6 MG: 3 TAB at 10:12

## 2021-12-22 RX ADMIN — POTASSIUM CHLORIDE 20 MEQ: 1500 TABLET, EXTENDED RELEASE ORAL at 09:12

## 2021-12-22 RX ADMIN — DILTIAZEM HYDROCHLORIDE 180 MG: 180 CAPSULE, COATED, EXTENDED RELEASE ORAL at 09:12

## 2021-12-22 RX ADMIN — FUROSEMIDE 40 MG: 40 TABLET ORAL at 09:12

## 2021-12-22 RX ADMIN — PRAVASTATIN SODIUM 20 MG: 20 TABLET ORAL at 09:12

## 2021-12-22 RX ADMIN — FLUTICASONE FUROATE AND VILANTEROL TRIFENATATE 1 PUFF: 100; 25 POWDER RESPIRATORY (INHALATION) at 09:12

## 2021-12-22 RX ADMIN — ALPRAZOLAM 0.25 MG: 0.25 TABLET ORAL at 03:12

## 2021-12-22 RX ADMIN — APIXABAN 5 MG: 2.5 TABLET, FILM COATED ORAL at 09:12

## 2021-12-22 RX ADMIN — APIXABAN 5 MG: 2.5 TABLET, FILM COATED ORAL at 10:12

## 2021-12-22 RX ADMIN — ESCITALOPRAM 5 MG: 5 TABLET, FILM COATED ORAL at 09:12

## 2021-12-22 RX ADMIN — GABAPENTIN 100 MG: 100 CAPSULE ORAL at 10:12

## 2021-12-22 RX ADMIN — PANTOPRAZOLE SODIUM 40 MG: 40 TABLET, DELAYED RELEASE ORAL at 09:12

## 2021-12-22 RX ADMIN — PREDNISONE 10 MG: 10 TABLET ORAL at 09:12

## 2021-12-22 RX ADMIN — PREDNISONE 5 MG: 5 TABLET ORAL at 10:12

## 2021-12-22 RX ADMIN — INSULIN ASPART 2 UNITS: 100 INJECTION, SOLUTION INTRAVENOUS; SUBCUTANEOUS at 04:12

## 2021-12-22 RX ADMIN — TIOTROPIUM BROMIDE INHALATION SPRAY 2 PUFF: 3.12 SPRAY, METERED RESPIRATORY (INHALATION) at 09:12

## 2021-12-22 RX ADMIN — ALPRAZOLAM 0.25 MG: 0.25 TABLET ORAL at 09:12

## 2021-12-22 NOTE — PLAN OF CARE
Problem: Adult Inpatient Plan of Care  Goal: Plan of Care Review  Outcome: Ongoing, Progressing  Goal: Patient-Specific Goal (Individualization)  Outcome: Ongoing, Progressing  Goal: Absence of Hospital-Acquired Illness or Injury  Outcome: Ongoing, Progressing  Goal: Optimal Comfort and Wellbeing  Outcome: Ongoing, Progressing  Goal: Readiness for Transition of Care  Outcome: Ongoing, Progressing  Goal: Rounds/Family Conference  Outcome: Ongoing, Progressing     Problem: Fluid Imbalance (Pneumonia)  Goal: Fluid Balance  Outcome: Ongoing, Progressing     Problem: Infection (Pneumonia)  Goal: Resolution of Infection Signs/Symptoms  Outcome: Ongoing, Progressing     Problem: Respiratory Compromise (Pneumonia)  Goal: Effective Oxygenation and Ventilation  Outcome: Ongoing, Progressing     Problem: Wound  Goal: Optimal Wound Healing  Outcome: Ongoing, Progressing     Problem: Skin Injury Risk Increased  Goal: Skin Health and Integrity  Outcome: Ongoing, Progressing     Problem: Fall Injury Risk  Goal: Absence of Fall and Fall-Related Injury  Outcome: Ongoing, Progressing     Problem: Malnutrition  Goal: Improved Nutritional Intake  Outcome: Ongoing, Progressing

## 2021-12-22 NOTE — PLAN OF CARE
Problem: Malnutrition  Goal: Improved Nutritional Intake  Outcome: Ongoing, Progressing   Recommendations     1. Continue regular diet.  2. Add Jesus BID.  3. Add Boost Glucose Control BID.     Goals: 1. Pt's intake meals >75% by RD follow up.  Nutrition Goal Status: progressing towards goal,goal met  Communication of RD Recs:  (POC)     Assessment and Plan     Nutrition Problem:  Moderate Protein-Calorie Malnutrition  Malnutrition in the context of acute illness/injury     Related to (etiology):  Unintentional wt loss     Signs and Symptoms (as evidenced by):     Muscle Mass Depletion: mild depletion of hands, lower legs  Weight Loss: 9.2% x 2 weeks   Fluid Accumulation: trace to legs     Interventions(treatment strategy):  Collaboration of care with providers.  General healthy diet.  Commercial beverage: Boost glucose Control BID  Modular protein supplement: Jesus BID     Nutrition Diagnosis Status:  New

## 2021-12-22 NOTE — PT/OT/SLP PROGRESS
"Physical Therapy Treatment    Patient Name:  Leyla Mari   MRN:  [id number]  Admit Date: 12/6/2021  Admitting Diagnosis: COPD with acute exacerbation  Recent Surgeries:     General Precautions: Standard, fall   Orthopedic Precautions:N/A   Braces:       Recommendations:     Discharge Recommendations:  home health PT   Level of Assistance Recommended at Discharge: 24 hours light assistance  Discharge Equipment Recommendations:  (TBD)   Barriers to discharge: None    Assessment:     Leyla Mari is a 76 y.o. female admitted with a medical diagnosis of COPD with acute exacerbation . Pt tolerated well, however does get SOB with exertion, 02 sats 96% after gait, requires inc time for full recovery, pt would continue to benefit from skilled PT services to improve overall functional mobility, strength and endurance.        Performance deficits affecting function:  impaired endurance,weakness,impaired self care skills,impaired functional mobilty,gait instability,impaired skin,edema,impaired cardiopulmonary response to activity .    Rehab Potential is good    Activity Tolerance: Fair    Plan:     Patient to be seen 6 x/week to address the above listed problems via gait training,therapeutic activities,therapeutic exercises,therapeutic groups,neuromuscular re-education,wheelchair management/training    · Plan of Care Expires: 01/06/22  · Plan of Care Reviewed with: patient    Subjective     "alright".     Pain/Comfort:  · Pain Rating 1: 0/10  · Pain Rating Post-Intervention 1: 0/10    Patient's cultural, spiritual, Gnosticist conflicts given the current situation:  · no    Objective:     .  Patient found with oxygen upon PT entry to room.     Therapeutic Activities and Exercises: mini elliptical x 10 min ed to performed seated exs later in PM, pt in agreement    Functional Mobility:  · Transfers:     · Sit to Stand:  minimum assistance with rolling walker and vcs for tech to sustain transition with ant wt shifting x 5 trials " "seated rest break  · Gait: amb with RW SBA 02/wc in tow slow juan,no conversing to conserv energy, although SOB with exertion,  02 sats 96 %  after gait, inc time for full recovery  Stairs: asc/susie 3.5 curb step with BUE support x 5 trials each leg with seated rest break  Curb asc/susie 4" curb with RW CGA vcs for tech and some A with RW mgmt    AM-PAC 6 CLICK MOBILITY  15    Patient left up in chair with call button in reach and belongings in reach.    GOALS:   Multidisciplinary Problems     Physical Therapy Goals        Problem: Physical Therapy Goal    Goal Priority Disciplines Outcome Goal Variances Interventions   Physical Therapy Goal     PT, PT/OT Ongoing, Progressing     Description: Goals to be met by: 12/28/21    Patient will increase functional independence with mobility by performing:    . Supine to sit with Set-up [place]  . Sit to supine with Set-up [place]  . Rolling to Left and Right with Set-up Assistance.  . Sit to stand transfer with Stand-by Assistance- not met  . Bed to chair transfer with Stand-by Assistance using Rolling Walker- not met  . Gait  x 50 feet with Contact Guard Assistance using Rolling Walker. -met  Updated 12/14/2021Gait  x 130 feet with Contact Guard Assistance using Rolling Walker-met  Updated 12/19/2021Gait  x 150 feet with Contact Guard Assistance/ SBA using Rolling Walker  . Wheelchair propulsion x50 feet with Minimal Assistance using bilateral uppper extremities  . Ascend/Descend 4 inch curb step with Minimal Assistance using Rolling Walker.                     Time Tracking:     PT Received On: 12/22/21  PT Total Time (min):       Billable Minutes: Gait Training 13, Therapeutic Activity 20 and Therapeutic Exercise 10    Treatment Type: Treatment  PT/PTA: PTA     PTA Visit Number: 3     12/22/2021  "

## 2021-12-22 NOTE — PT/OT/SLP PROGRESS
"Occupational Therapy   Treatment    Name: Leyla Mari  MRN: [id number]  Admit Date: 12/6/2021  Admitting Diagnosis:  COPD with acute exacerbation    General Precautions: Standard, fall,respiratory   Orthopedic Precautions:N/A   Braces: N/A     Recommendations:     Discharge Recommendations: home health OT  Level of Assistance Recommended at Discharge: 24 hours physical assistance for all ADL's and home management tasks  Discharge Equipment Recommendations:  other (see comments) (TBD)  Barriers to discharge:  None    Assessment:     Leyla Mari is a 76 y.o. female with a medical diagnosis of COPD with acute exacerbation.  Pt had good participation this date, standing to perform functional task for 5 min.  However, after 5 min of standing, pt became SOB and her oxygen saturation level decreased to 88%.  She was returned to sitting, where she quickly recovered. She presents with the followqing. Performance deficits affecting function are weakness,impaired endurance,impaired self care skills,impaired functional mobilty,gait instability,impaired cardiopulmonary response to activity,impaired skin.     Rehab Potential is good    Activity tolerance:  Fair    Plan:     Patient to be seen 6 x/week to address the above listed problems via self-care/home management,therapeutic activities,therapeutic exercises    · Plan of Care Expires: 01/07/22  · Plan of Care Reviewed with: patient    Subjective   "Sorry, I just do things at a slower pace."  Communicated with: nursing prior to session.    Pain/Comfort:  Pain Rating 1: 0/10  Pain Rating Post-Intervention 1: 0/10    Patient's cultural, spiritual, Amish conflicts given the current situation:  no    Objective:     Patient found up in chair at the bathroom sink with nursing present with oxygen upon OT entry to room.    Bed Mobility:    · N/a due to pt sitting UI at beginning and end of session     Functional Mobility/Transfers:  · Patient completed Sit <> Stand Transfer " from w/c x 1 trial with minimum assistance  with  hand-held assist     Activities of Daily Living:  · Grooming: Setup assistance to brush her teeth, wash her face, and take her medications (IADL) while sitting at the bathroom sink   · Upper Body Dressing: Setup assistance to doff dirty scrub top/bo clean one while [facility] 21    OT Exercises: UE Ergometer x 8 minutes on minimal resistance for UE strengthening for daily tasks    Treatment & Education:  - Pt performed functional standing activity at simulated kitchen counter.  She was able to assort several cabinet items by category and then remove 2 coins from the simulated pill bottle without spilling any of the simulated pills.  Pt stood x 5 min on 3 L portable oxygen.  Her oxygen saturation rate was at 97% prior to activity but steadily decreased to 88% after 5 minutes.  Pt reported no dizziness but was SOB and was returned to sitting, where she recovered to 96% within 1 minute.    Pt edu on role of OT, POC, energy conservation technique and deep breathing via pursed lips, safety when performing self care tasks, benefit of performing OOB activity, and safety when performing functional transfers and mobility.    - Self care tasks completed-- as noted above       Patient left up in chair with all lines intact and call button in reachEducation:      GOALS:   Multidisciplinary Problems     Occupational Therapy Goals        Problem: Occupational Therapy Goal    Goal Priority Disciplines Outcome Interventions   Occupational Therapy Goal     OT, PT/OT Ongoing, Not Progressing    Description: Goals to be met by: 12/28/21     Patient will increase functional independence with ADLs by performing:    UE Dressing with Modified [place].=MET  LE Dressing with Minimal Assistance.=MET  Grooming while seated at sink with Modified [employer].=MET  Toileting from toilet or BSC with Minimal Assistance for hygiene and clothing management.   Bathing  from  sitting at sink with Stand-by Assistance.  Rolling to Bilateral with Modified [place].   Supine to sit with Modified [place].  Toilet transfer to toilet or BSC with Stand-by Assistance with RW .  Stand Pivot transfer with Stand-by Assistance with RW .  Upper extremity exercise with assistance as needed.  Caregiver will be educated on level of assist required to safely perform self care tasks and functional transfers..                      Time Tracking:     OT Date of Treatment: OT Date of Treatment: 12/22/21  OT Total Time (min): Total Time (min): 48 min    Billable Minutes:Self Care/Home Management 20 minutes  Therapeutic Activity 20 minutes   Therapeutic Exercise 8 minutes    12/22/2021

## 2021-12-22 NOTE — PROGRESS NOTES
[facility] - Skilled Nursing  Adult Nutrition  Progress Note    SUMMARY       Recommendations    1. Continue regular diet.  2. Add Jesus BID.  3. Add Boost Glucose Control BID.    Goals: 1. Pt's intake meals >75% by RD follow up.  Nutrition Goal Status: progressing towards goal,goal met  Communication of RD Recs:  (POC)    Assessment and Plan    Nutrition Problem:  Moderate Protein-Calorie Malnutrition  Malnutrition in the context of acute illness/injury    Related to (etiology):  Unintentional wt loss    Signs and Symptoms (as evidenced by):    Muscle Mass Depletion: mild depletion of hands, lower legs  Weight Loss: 9.2% x 2 weeks   Fluid Accumulation: trace to legs    Interventions(treatment strategy):  Collaboration of care with providers.  General healthy diet.  Commercial beverage: Boost glucose Control BID  Modular protein supplement: Jesus BID    Nutrition Diagnosis Status:  New      Malnutrition Assessment                 Orbital Region (Subcutaneous Fat Loss): well nourished  Upper Arm Region (Subcutaneous Fat Loss): mild depletion  Thoracic and Lumbar Region: well nourished   Buddhist Region (Muscle Loss): well nourished  Clavicle Bone Region (Muscle Loss): well nourished  Clavicle and Acromion Bone Region (Muscle Loss): well nourished  Scapular Bone Region (Muscle Loss): mild depletion  Dorsal Hand (Muscle Loss): mild depletion  Patellar Region (Muscle Loss): mild depletion  Anterior Thigh Region (Muscle Loss): well nourished  Posterior Calf Region (Muscle Loss): mild depletion   Edema (Fluid Accumulation): 1-->trace             Reason for Assessment    Reason For Assessment: RD follow-up  Diagnosis: pulmonary disease (COPD with acute exacerbation)  Relevant Medical History: COPD, HTN, HLD  Interdisciplinary Rounds: did not attend  General Information Comments: Pt with % intake meals, noted with 10 lb (9.2%) wt loss x 2 weeks. Most likely some fluid loss, as pt initially noted with weeping calves  which are no longer weeping but show some muscle wasting now. With wt loss and muscle wasting, pt meets ASPEN criteria for acute moderate malnutrition.  Pt open to trying Boost shakes.    Nutrition Discharge Planning: General healthy diet.    Nutrition Risk Screen    Nutrition Risk Screen: no indicators present    Nutrition/Diet History    Patient Reported Diet/Restrictions/Preferences: general  Spiritual, Cultural Beliefs, Episcopal Practices, Values that Affect Care: no    Anthropometrics    Temp: 98.3 °F (36.8 °C)  Height: 5' (152.4 cm)  Height (inches): 60 in  Weight Method: Standard Scale  Weight: 44.6 kg (98 lb 5.2 oz)  Weight (lb): 98.33 lb  Ideal Body Weight (IBW), Female: 100 lb  % Ideal Body Weight, Female (lb): 108.47 %  BMI (Calculated): 19.2       Lab/Procedures/Meds    Pertinent Labs Reviewed: reviewed  Pertinent Labs Comments: CO2 35, A1c 7.7% (5/23/21)  Pertinent Medications Reviewed: reviewed  Pertinent Medications Comments: colace, lasix, gabapentin, protonix, prednisone, psyllium husk    Estimated/Assessed Needs    Weight Used For Calorie Calculations: 49.2 kg (108 lb 7.5 oz)  Energy Calorie Requirements (kcal): 1238 kcal  Energy Need Method: Kcal/kg (25 kcal/kg)  Protein Requirements: 49-59g  Weight Used For Protein Calculations: 49.2 kg (108 lb 7.5 oz)        RDA Method (mL): 1238         Nutrition Prescription Ordered    Current Diet Order: Regular    Evaluation of Received Nutrient/Fluid Intake    I/O: +1620 to date  Energy Calories Required: meeting needs  Protein Required: meeting needs  Fluid Required: meeting needs  Comments: last BM 12/21  Tolerance: tolerating  % Intake of Estimated Energy Needs: 75 - 100 %  % Meal Intake: 75 - 100 %    Nutrition Risk    Level of Risk/Frequency of Follow-up: low     Monitor and Evaluation    Food and Nutrient Intake: energy intake,food and beverage intake  Food and Nutrient Adminstration: diet order  Anthropometric Measurements: weight,weight  change  Biochemical Data, Medical Tests and Procedures: electrolyte and renal panel,gastrointestinal profile,glucose/endocrine profile,inflammatory profile,lipid profile  Nutrition-Focused Physical Findings: overall appearance,extremities, muscles and bones,head and eyes,skin     Nutrition Follow-Up    RD Follow-up?: Yes

## 2021-12-22 NOTE — TREATMENT PLAN
Rehab Services' DME recommendations    Leyla Mari  MRN: [id number]      [x] Tub bench Standard (unpadded)     [x] Home [facility]ott PT, OT and Aidpj Faye, PTA 12/22/2021

## 2021-12-23 LAB
ANION GAP SERPL CALC-SCNC: 10 MMOL/L (ref 8–16)
BASOPHILS # BLD AUTO: 0.03 K/UL (ref 0–0.2)
BASOPHILS NFR BLD: 0.4 % (ref 0–1.9)
BUN SERPL-MCNC: 16 MG/DL (ref 8–23)
CALCIUM SERPL-MCNC: 9.4 MG/DL (ref 8.7–10.5)
CHLORIDE SERPL-SCNC: 95 MMOL/L (ref 95–110)
CO2 SERPL-SCNC: 33 MMOL/L (ref 23–29)
CREAT SERPL-MCNC: 0.5 MG/DL (ref 0.5–1.4)
DIFFERENTIAL METHOD: ABNORMAL
EOSINOPHIL # BLD AUTO: 0 K/UL (ref 0–0.5)
EOSINOPHIL NFR BLD: 0.1 % (ref 0–8)
ERYTHROCYTE [DISTWIDTH] IN BLOOD BY AUTOMATED COUNT: 15.4 % (ref 11.5–14.5)
EST. GFR  (AFRICAN AMERICAN): >60 ML/MIN/1.73 M^2
EST. GFR  (NON AFRICAN AMERICAN): >60 ML/MIN/1.73 M^2
GLUCOSE SERPL-MCNC: 114 MG/DL (ref 70–110)
HCT VFR BLD AUTO: 35.9 % (ref 37–48.5)
HGB BLD-MCNC: 10.8 G/DL (ref 12–16)
IMM GRANULOCYTES # BLD AUTO: 0.23 K/UL (ref 0–0.04)
IMM GRANULOCYTES NFR BLD AUTO: 2.7 % (ref 0–0.5)
LYMPHOCYTES # BLD AUTO: 0.5 K/UL (ref 1–4.8)
LYMPHOCYTES NFR BLD: 6.1 % (ref 18–48)
MAGNESIUM SERPL-MCNC: 1.9 MG/DL (ref 1.6–2.6)
MCH RBC QN AUTO: 28.1 PG (ref 27–31)
MCHC RBC AUTO-ENTMCNC: 30.1 G/DL (ref 32–36)
MCV RBC AUTO: 93 FL (ref 82–98)
MONOCYTES # BLD AUTO: 0.5 K/UL (ref 0.3–1)
MONOCYTES NFR BLD: 6.2 % (ref 4–15)
NEUTROPHILS # BLD AUTO: 7.2 K/UL (ref 1.8–7.7)
NEUTROPHILS NFR BLD: 84.5 % (ref 38–73)
NRBC BLD-RTO: 0 /100 WBC
PHOSPHATE SERPL-MCNC: 3.8 MG/DL (ref 2.7–4.5)
PLATELET # BLD AUTO: 217 K/UL (ref 150–450)
PMV BLD AUTO: 9.9 FL (ref 9.2–12.9)
POCT GLUCOSE: 113 MG/DL (ref 70–110)
POCT GLUCOSE: 137 MG/DL (ref 70–110)
POCT GLUCOSE: 173 MG/DL (ref 70–110)
POCT GLUCOSE: 189 MG/DL (ref 70–110)
POTASSIUM SERPL-SCNC: 3.9 MMOL/L (ref 3.5–5.1)
RBC # BLD AUTO: 3.85 M/UL (ref 4–5.4)
SODIUM SERPL-SCNC: 138 MMOL/L (ref 136–145)
WBC # BLD AUTO: 8.53 K/UL (ref 3.9–12.7)

## 2021-12-23 PROCEDURE — 36415 COLL VENOUS BLD VENIPUNCTURE: CPT | Performed by: NURSE PRACTITIONER

## 2021-12-23 PROCEDURE — 84100 ASSAY OF PHOSPHORUS: CPT | Performed by: NURSE PRACTITIONER

## 2021-12-23 PROCEDURE — 97116 GAIT TRAINING THERAPY: CPT | Mod: CQ

## 2021-12-23 PROCEDURE — 80048 BASIC METABOLIC PNL TOTAL CA: CPT | Performed by: NURSE PRACTITIONER

## 2021-12-23 PROCEDURE — 11000004 HC SNF PRIVATE

## 2021-12-23 PROCEDURE — 25000003 PHARM REV CODE 250: Performed by: INTERNAL MEDICINE

## 2021-12-23 PROCEDURE — 97530 THERAPEUTIC ACTIVITIES: CPT | Mod: CQ

## 2021-12-23 PROCEDURE — 25000003 PHARM REV CODE 250: Performed by: NURSE PRACTITIONER

## 2021-12-23 PROCEDURE — 83735 ASSAY OF MAGNESIUM: CPT | Performed by: NURSE PRACTITIONER

## 2021-12-23 PROCEDURE — 99900035 HC TECH TIME PER 15 MIN (STAT)

## 2021-12-23 PROCEDURE — 94761 N-INVAS EAR/PLS OXIMETRY MLT: CPT

## 2021-12-23 PROCEDURE — 85025 COMPLETE CBC W/AUTO DIFF WBC: CPT | Performed by: NURSE PRACTITIONER

## 2021-12-23 PROCEDURE — 97535 SELF CARE MNGMENT TRAINING: CPT | Mod: CO

## 2021-12-23 PROCEDURE — 63600175 PHARM REV CODE 636 W HCPCS: Performed by: NURSE PRACTITIONER

## 2021-12-23 PROCEDURE — 27000221 HC OXYGEN, UP TO 24 HOURS

## 2021-12-23 RX ADMIN — PANTOPRAZOLE SODIUM 40 MG: 40 TABLET, DELAYED RELEASE ORAL at 09:12

## 2021-12-23 RX ADMIN — PREDNISONE 10 MG: 10 TABLET ORAL at 09:12

## 2021-12-23 RX ADMIN — POTASSIUM CHLORIDE 20 MEQ: 1500 TABLET, EXTENDED RELEASE ORAL at 09:12

## 2021-12-23 RX ADMIN — ALPRAZOLAM 0.25 MG: 0.25 TABLET ORAL at 03:12

## 2021-12-23 RX ADMIN — TIOTROPIUM BROMIDE INHALATION SPRAY 2 PUFF: 3.12 SPRAY, METERED RESPIRATORY (INHALATION) at 09:12

## 2021-12-23 RX ADMIN — ESCITALOPRAM 5 MG: 5 TABLET, FILM COATED ORAL at 09:12

## 2021-12-23 RX ADMIN — PREDNISONE 5 MG: 5 TABLET ORAL at 08:12

## 2021-12-23 RX ADMIN — GABAPENTIN 100 MG: 100 CAPSULE ORAL at 08:12

## 2021-12-23 RX ADMIN — APIXABAN 5 MG: 2.5 TABLET, FILM COATED ORAL at 08:12

## 2021-12-23 RX ADMIN — APIXABAN 5 MG: 2.5 TABLET, FILM COATED ORAL at 09:12

## 2021-12-23 RX ADMIN — ALPRAZOLAM 0.25 MG: 0.25 TABLET ORAL at 08:12

## 2021-12-23 RX ADMIN — DILTIAZEM HYDROCHLORIDE 180 MG: 180 CAPSULE, COATED, EXTENDED RELEASE ORAL at 09:12

## 2021-12-23 RX ADMIN — PRAVASTATIN SODIUM 20 MG: 20 TABLET ORAL at 09:12

## 2021-12-23 RX ADMIN — ALPRAZOLAM 0.25 MG: 0.25 TABLET ORAL at 09:12

## 2021-12-23 RX ADMIN — MELATONIN TAB 3 MG 6 MG: 3 TAB at 08:12

## 2021-12-23 RX ADMIN — FUROSEMIDE 40 MG: 40 TABLET ORAL at 09:12

## 2021-12-23 RX ADMIN — FLUTICASONE FUROATE AND VILANTEROL TRIFENATATE 1 PUFF: 100; 25 POWDER RESPIRATORY (INHALATION) at 09:12

## 2021-12-23 NOTE — PT/OT/SLP PROGRESS
"Occupational Therapy   Treatment    Name: Leyla Mari  MRN: [id number]  Admit Date: 12/6/2021  Admitting Diagnosis:  COPD with acute exacerbation    General Precautions: Standard, fall,respiratory   Orthopedic Precautions:N/A   Braces:       Recommendations:     Discharge Recommendations: home health OT  Level of Assistance Recommended at Discharge: 24 hours physical assistance for all ADL's and home management tasks  Discharge Equipment Recommendations:  other (see comments) (TBD)  Barriers to discharge:  None    Assessment:     Leyla Mari is a 76 y.o. female with a medical diagnosis of COPD with acute exacerbation . Performance deficits affecting function are weakness,impaired endurance,impaired self care skills,impaired functional mobilty,gait instability,impaired balance,decreased upper extremity function,decreased lower extremity function,impaired cardiopulmonary response to activity. Pt. participated well with session on this day. Pt is progressing well with session on this day still continues to requires cues with aspects of safety . Pt. Will continue to benefit from continued OT to progress towards goals    Pt. With family training held on this day with reviewed t/f's, selfcare skills and DME and level of (A) for home upon d/c.    Rehab Potential is good    Activity tolerance:  Good    Plan:     Patient to be seen 6 x/week to address the above listed problems via self-care/home management,therapeutic activities,therapeutic exercises    · Plan of Care Expires: 01/07/22  · Plan of Care Reviewed with: patient    Subjective     Communicated with: bernie prior to session. "Kiel nixon this is my daughter"    Pain/Comfort:  · Pain Rating 1: 0/10  · Pain Rating Post-Intervention 1: 0/10    Patient's cultural, spiritual, Christian conflicts given the current situation:  · no    Objective:     Patient found up in chair with oxygen upon OT entry to room.    Bed Mobility:    · Patient completed Supine to Sit with stand " by assistance  · Patient completed Sit to Supine with minimum assistance with BLE management      Functional Mobility/Transfers:  · Patient completed Sit <> Stand Transfer with minimum assistance  with  rolling walker   · Patient completed Bed <> Chair Transfer using Step Transfer technique with contact guard assistance with rolling walker  · Patient completed Toilet Transfer Step Transfer technique with minimum assistance with  rolling walker  · Patient completed  Shower Transfer Step Transfer technique with minimum assistance with rolling walker  · Functional Mobility: Pt. With fxl mobility throughout room and bathroom with RW and CGA. No LOB noted    Activities of Daily Living:  · Upper Body Dressing: modified independence to doff/bo pullover shirt   · Lower Body Dressing: minimum assistance to doff/bo pants over hips instance    [facility]    Treatment & Education:  Pt. With family training held on this day with reviewed t/f's, selfcare skills and DME and level of (A) for home upon d/c.  Pt. Educated on safety with ADL tasks as well as functional mobility and need for staff assist.    Patient left up in chair with all lines intact, call button in reach and daughter presentEducation:      GOALS:   Multidisciplinary Problems     Occupational Therapy Goals        Problem: Occupational Therapy Goal    Goal Priority Disciplines Outcome Interventions   Occupational Therapy Goal     OT, PT/OT Ongoing, Not Progressing    Description: Goals to be met by: 12/28/21     Patient will increase functional independence with ADLs by performing:    UE Dressing with Modified [place].=MET  LE Dressing with Minimal Assistance.=MET  Grooming while seated at sink with Modified [place].=MET  Toileting from toilet or BSC with Minimal Assistance for hygiene and clothing management.   Bathing from  sitting at sink with Stand-by Assistance.  Rolling to Bilateral with Modified [place].   Supine to sit with  Modified [place].  Toilet transfer to toilet or BSC with Stand-by Assistance with RW .  Stand Pivot transfer with Stand-by Assistance with RW .  Upper extremity exercise with assistance as needed.  Caregiver will be educated on level of assist required to safely perform self care tasks and functional transfers..                      Time Tracking:     OT Date of Treatment: OT Date of Treatment: 12/23/21  OT Total Time (min): Total Time (min): 33 min    Billable Minutes:Self Care/Home Management 33    12/23/2021   OT and RAY have discussed the above patients goals and status in collaboration with Plan of Care.

## 2021-12-23 NOTE — PLAN OF CARE
Problem: Adult Inpatient Plan of Care  Goal: Plan of Care Review  Outcome: Ongoing, Progressing  Goal: Patient-Specific Goal (Individualization)  Outcome: Ongoing, Progressing     Problem: Fluid Imbalance (Pneumonia)  Goal: Fluid Balance  Outcome: Ongoing, Progressing     Problem: Infection (Pneumonia)  Goal: Resolution of Infection Signs/Symptoms  Outcome: Ongoing, Progressing     Problem: Respiratory Compromise (Pneumonia)  Goal: Effective Oxygenation and Ventilation  Outcome: Ongoing, Progressing     Problem: Wound  Goal: Optimal Wound Healing  Outcome: Ongoing, Progressing     Problem: Skin Injury Risk Increased  Goal: Skin Health and Integrity  Outcome: Ongoing, Progressing     Problem: Fall Injury Risk  Goal: Absence of Fall and Fall-Related Injury  Outcome: Ongoing, Progressing     Problem: Malnutrition  Goal: Improved Nutritional Intake  Outcome: Ongoing, Progressing

## 2021-12-23 NOTE — PT/OT/SLP PROGRESS
Physical Therapy Treatment    Patient Name:  Leyla Mari   MRN:  [id number]  Admit Date: 12/6/2021  Admitting Diagnosis: COPD with acute exacerbation  Recent Surgeries:     General Precautions: Standard, fall,respiratory   Orthopedic Precautions:N/A   Braces:       Recommendations:     Discharge Recommendations:  home health PT   Level of Assistance Recommended at Discharge: Intermittent assistance   Discharge Equipment Recommendations:  (TBD)   Barriers to discharge: None    Assessment:     Leyla Mari is a 76 y.o. female admitted with a medical diagnosis of COPD with acute exacerbation . Patient and family (daughter) educated on safety/proper tech with overall functional mobility to include bed mob, trfs using RW, gait, therex, level of A/S required upon D/C for patient care, DME, HHPT. All questions answered within PTA scope of practice, all verbalized understanding, Deferred discharge questions and medical questions to SW/NP/MD.     Performance deficits affecting function:  impaired endurance,weakness,impaired self care skills,impaired functional mobilty,gait instability,impaired skin,edema,impaired cardiopulmonary response to activity .    Rehab Potential is fair    Activity Tolerance: Fair    Plan:     Patient to be seen 6 x/week to address the above listed problems via gait training,therapeutic activities,therapeutic exercises,therapeutic groups,neuromuscular re-education,wheelchair management/training    · Plan of Care Expires: 01/06/22  · Plan of Care Reviewed with: patient    Subjective     Patient involved with family training and asked and answered questions throughout training regarding returning home.     Pain/Comfort:  · Pain Rating 1: 0/10  · Pain Rating Post-Intervention 1: 0/10    Patient's cultural, spiritual, Druze conflicts given the current situation:  · no    Objective:     Communicated with pt prior to session.  Patient found up in chair with oxygen upon PT entry to room.      Therapeutic Activities and Exercises:   Transfer training, safety education using WC and RW, Gait training with WC and Oxygen follow with daughter present.     Family Training completed with Patient and her daughter, Pt's family verbalized and demonstrated understanding of all transfers and gait and the amount of assistance pt will require upon return home.    Functional Mobility:  · Transfers:     · Sit to Stand:  contact guard assistance and minimum assistance with rolling walker  · Gait: Gait trg using RW on even surface and SBA x 150', demonstrating improved gait distance     AM-PAC 6 CLICK MOBILITY       Patient left up in chair with all lines intact, call button in reach and lunch tray set up.    GOALS:   Multidisciplinary Problems     Physical Therapy Goals        Problem: Physical Therapy Goal    Goal Priority Disciplines Outcome Goal Variances Interventions   Physical Therapy Goal     PT, PT/OT Ongoing, Progressing     Description: Goals to be met by: 12/28/21    Patient will increase functional independence with mobility by performing:    . Supine to sit with Set-up [place]  . Sit to supine with Set-up [place]  . Rolling to Left and Right with Set-up Assistance.  . Sit to stand transfer with Stand-by Assistance- not met  . Bed to chair transfer with Stand-by Assistance using Rolling Walker- not met  . Gait  x 50 feet with Contact Guard Assistance using Rolling Walker. -met  Updated 12/14/2021Gait  x 130 feet with Contact Guard Assistance using Rolling Walker-met  Updated 12/19/2021Gait  x 150 feet with Contact Guard Assistance/ SBA using Rolling Walker  . Wheelchair propulsion x50 feet with Minimal Assistance using bilateral uppper extremities  . Ascend/Descend 4 inch curb step with Minimal Assistance using Rolling Walker.                     Time Tracking:     PT Received On: 12/23/21  PT Total Time (min):   30  Billable Minutes: Gait Training 15 and Therapeutic Activity 15    Treatment  Type: Treatment  PT/PTA: PTA     PTA Visit Number: 4     12/23/2021

## 2021-12-23 NOTE — PLAN OF CARE
Problem: Adult Inpatient Plan of Care  Goal: Plan of Care Review  12/23/2021 1600 by Justa Ruiz LPN  Outcome: Ongoing, Progressing  12/23/2021 1600 by Justa Ruiz LPN  Outcome: Ongoing, Progressing  Goal: Patient-Specific Goal (Individualization)  Outcome: Ongoing, Progressing  Goal: Absence of Hospital-Acquired Illness or Injury  Outcome: Ongoing, Progressing  Goal: Optimal Comfort and Wellbeing  Outcome: Ongoing, Progressing  Goal: Readiness for Transition of Care  Outcome: Ongoing, Progressing  Goal: Rounds/Family Conference  Outcome: Ongoing, Progressing     Problem: Fluid Imbalance (Pneumonia)  Goal: Fluid Balance  Outcome: Ongoing, Progressing     Problem: Infection (Pneumonia)  Goal: Resolution of Infection Signs/Symptoms  Outcome: Ongoing, Progressing     Problem: Respiratory Compromise (Pneumonia)  Goal: Effective Oxygenation and Ventilation  Outcome: Ongoing, Progressing     Problem: Wound  Goal: Optimal Wound Healing  Outcome: Ongoing, Progressing     Problem: Skin Injury Risk Increased  Goal: Skin Health and Integrity  Outcome: Ongoing, Progressing     Problem: Fall Injury Risk  Goal: Absence of Fall and Fall-Related Injury  Outcome: Ongoing, Progressing     Problem: Malnutrition  Goal: Improved Nutritional Intake  Outcome: Ongoing, Progressing

## 2021-12-23 NOTE — PLAN OF CARE
[facility] - Skilled Nursing      HOME HEALTH ORDERS  FACE TO FACE ENCOUNTER    Patient Name: Leyla Mari  YOB: 1945    PCP: Lizbeth Dillard MD   PCP Address: [address] [facility] / [place] [address]  PCP Phone Number: [phone]  PCP Fax: [phone]    Encounter Date: 12/6/21    Admit to Home Health    Diagnoses:  Active Hospital Problems    Diagnosis  POA    *COPD with acute exacerbation [J44.1]  Yes    Moderate malnutrition [E44.0]  Unknown     Recommendations     1. Continue regular diet.  2. Add Jesus BID.  3. Add Boost Glucose Control BID.     Goals: 1. Pt's intake meals >75% by RD follow up.  Nutrition Goal Status: progressing towards goal,goal met  Communication of RD Recs:  (POC)     Assessment and Plan     Nutrition Problem:  Moderate Protein-Calorie Malnutrition  Malnutrition in the context of acute illness/injury     Related to (etiology):  Unintentional wt loss     Signs and Symptoms (as evidenced by):     Muscle Mass Depletion: mild depletion of hands, lower legs  Weight Loss: 9.2% x 2 weeks   Fluid Accumulation: trace to legs     Interventions(treatment strategy):  Collaboration of care with providers.  General healthy diet.  Commercial beverage: Boost glucose Control BID  Modular protein supplement: Jesus BID     Nutrition Diagnosis Status:  New      Steroid-induced hyperglycemia [R73.9, T38.0X5A]  Yes    Anxiety [F41.9]  Yes    Peripheral neuropathy [G62.9]  Yes    Acute on chronic respiratory failure with hypercapnia [J96.22]  Yes    History of pulmonary embolism [Z86.711]  Yes    Long term current use of systemic steroids [Z79.52]  Not Applicable     Chronic    Panlobular emphysema [J43.1]  Yes     Chronic    DNR (do not resuscitate) discussion [Z71.89]  Not Applicable     Dx updated per 2019 IMO Load      Hyperlipidemia [E78.5]  Yes     Chronic    Essential hypertension [I10]  Yes     Chronic      Resolved Hospital Problems   No resolved problems to  display.       Follow Up Appointments:  No future appointments.    Allergies:  Review of patient's allergies indicates:   Allergen Reactions    Bactrim [sulfamethoxazole-trimethoprim] Nausea Only    Codeine Itching    Keflex [cephalexin] Other (See Comments)     Rhinorrhea, nausea. Tolerated Ceftriaxone June 2014    Ceftriaxone Rash     Morbilliform rash after receiving IV ceftriaxone    Clindamycin hcl Rash    Doxycycline Rash    Vancomycin analogues Rash     Morbilliform rash after receiving IV vancomycin       Medications: Review discharge medications with patient and family and provide education.      I have seen and examined this patient within the last 30 days. My clinical findings that support the need for the home health skilled services and home bound status are the following:no   Weakness/numbness causing balance and gait disturbance due to COPD Exacerbation making it taxing to leave home.     Referrals/ Consults  Physical Therapy to evaluate and treat. Evaluate for home safety and equipment needs; Establish/upgrade home exercise program. Perform / instruct on therapeutic exercises, gait training, transfer training, and Range of Motion.  Occupational Therapy to evaluate and treat. Evaluate home environment for safety and equipment needs. Perform/Instruct on transfers, ADL training, ROM, and therapeutic exercises.  Aide to provide assistance with personal care, ADLs, and vital signs.    Activities:   activity as tolerated    Nursing:   Agency to admit patient within 24 hours of hospital discharge unless specified on physician order or at patient request    SN to complete comprehensive assessment including routine vital signs. Instruct on disease process and s/s of complications to report to MD. Review/verify medication list sent home with the patient at time of discharge  and instruct patient/caregiver as needed. Frequency may be adjusted depending on start of care date.     Skilled nurse to perform  up to 3 visits PRN for symptoms related to diagnosis    Notify MD if SBP > 160 or < 90; DBP > 90 or < 50; HR > 120 or < 50; Temp > 101; O2 < 88%    Ok to schedule additional visits based on staff availability and patient request on consecutive days within the home health episode.    Miscellaneous   COPD: Teach patient MDI/HFA usage and guidelines  Please provide smoking education, cessation, and nicotine replacement options if patient is a current smoker or if anyone in the household is a smoker  Please ensure that patient has a pulse oximeter and is educated on his normal oxygen saturations: >92%  Please ensure patient has a functioning nebulizer and provide education on its usage.  If patient has increased cough or symptoms, please initiate COPD protocol including  schedule appointment with PCP or pulmonologist within 24 hours    Home Health Aide:  Nursing Three times weekly, Physical Therapy Three times weekly, Occupational Therapy Three times weekly and Home Health Aide Three times weekly    Wound Care Orders    Left lower leg- twice weekly-  cleanse skin with cleansing cloth, apply Sween 24 lotion (pink top) to skin, ABD dressing to lower leg, secure with Kerlix from toes to below knee, apply Tubigrip F    Right lower leg-twice weekly- cleanse with cleansing cloth, apply Sween 24 lotion (pink top) to skin, then apply Tubigrip E from toes to below knee    Right heel- twice weekly-  cleanse with wound cleanser, apply Triad ointment to right heel wound, then cover with heel foam     cleanse perineal and affected area with perineal cleanser . Pat dry. Apply thin layer of Triad ointment BID and prn cleansing of incontinent episode. may apply cover dressing.    Right knee-heel  foam dressing in place over steri-strip skin tears- change weekly until resolved ( completed on Tuesdays at [facility]).      I certify that this patient is confined to her home and needs intermittent skilled nursing care, physical therapy and  occupational therapy.

## 2021-12-24 LAB
POCT GLUCOSE: 131 MG/DL (ref 70–110)
POCT GLUCOSE: 180 MG/DL (ref 70–110)
POCT GLUCOSE: 215 MG/DL (ref 70–110)
POCT GLUCOSE: 248 MG/DL (ref 70–110)

## 2021-12-24 PROCEDURE — 25000003 PHARM REV CODE 250: Performed by: NURSE PRACTITIONER

## 2021-12-24 PROCEDURE — 97110 THERAPEUTIC EXERCISES: CPT | Mod: CQ

## 2021-12-24 PROCEDURE — 99900035 HC TECH TIME PER 15 MIN (STAT)

## 2021-12-24 PROCEDURE — 63600175 PHARM REV CODE 636 W HCPCS: Performed by: NURSE PRACTITIONER

## 2021-12-24 PROCEDURE — 97530 THERAPEUTIC ACTIVITIES: CPT | Mod: CQ

## 2021-12-24 PROCEDURE — 94761 N-INVAS EAR/PLS OXIMETRY MLT: CPT

## 2021-12-24 PROCEDURE — 27000221 HC OXYGEN, UP TO 24 HOURS

## 2021-12-24 PROCEDURE — 25000242 PHARM REV CODE 250 ALT 637 W/ HCPCS: Performed by: INTERNAL MEDICINE

## 2021-12-24 PROCEDURE — 97535 SELF CARE MNGMENT TRAINING: CPT | Mod: CO

## 2021-12-24 PROCEDURE — 25000003 PHARM REV CODE 250: Performed by: INTERNAL MEDICINE

## 2021-12-24 PROCEDURE — 25000242 PHARM REV CODE 250 ALT 637 W/ HCPCS: Performed by: NURSE PRACTITIONER

## 2021-12-24 PROCEDURE — 97116 GAIT TRAINING THERAPY: CPT | Mod: CQ

## 2021-12-24 PROCEDURE — 11000004 HC SNF PRIVATE

## 2021-12-24 RX ADMIN — INSULIN ASPART 2 UNITS: 100 INJECTION, SOLUTION INTRAVENOUS; SUBCUTANEOUS at 12:12

## 2021-12-24 RX ADMIN — INSULIN ASPART 2 UNITS: 100 INJECTION, SOLUTION INTRAVENOUS; SUBCUTANEOUS at 06:12

## 2021-12-24 RX ADMIN — ALPRAZOLAM 0.25 MG: 0.25 TABLET ORAL at 02:12

## 2021-12-24 RX ADMIN — PREDNISONE 5 MG: 5 TABLET ORAL at 09:12

## 2021-12-24 RX ADMIN — TIOTROPIUM BROMIDE INHALATION SPRAY 2 PUFF: 3.12 SPRAY, METERED RESPIRATORY (INHALATION) at 10:12

## 2021-12-24 RX ADMIN — APIXABAN 5 MG: 2.5 TABLET, FILM COATED ORAL at 10:12

## 2021-12-24 RX ADMIN — PRAVASTATIN SODIUM 20 MG: 20 TABLET ORAL at 10:12

## 2021-12-24 RX ADMIN — ALPRAZOLAM 0.25 MG: 0.25 TABLET ORAL at 10:12

## 2021-12-24 RX ADMIN — POTASSIUM CHLORIDE 20 MEQ: 1500 TABLET, EXTENDED RELEASE ORAL at 10:12

## 2021-12-24 RX ADMIN — ESCITALOPRAM 5 MG: 5 TABLET, FILM COATED ORAL at 10:12

## 2021-12-24 RX ADMIN — MELATONIN TAB 3 MG 6 MG: 3 TAB at 09:12

## 2021-12-24 RX ADMIN — FLUTICASONE FUROATE AND VILANTEROL TRIFENATATE 1 PUFF: 100; 25 POWDER RESPIRATORY (INHALATION) at 10:12

## 2021-12-24 RX ADMIN — PREDNISONE 10 MG: 10 TABLET ORAL at 10:12

## 2021-12-24 RX ADMIN — GABAPENTIN 100 MG: 100 CAPSULE ORAL at 09:12

## 2021-12-24 RX ADMIN — PSYLLIUM HUSK 1 PACKET: 3.4 POWDER ORAL at 10:12

## 2021-12-24 RX ADMIN — ALPRAZOLAM 0.25 MG: 0.25 TABLET ORAL at 09:12

## 2021-12-24 RX ADMIN — DILTIAZEM HYDROCHLORIDE 180 MG: 180 CAPSULE, COATED, EXTENDED RELEASE ORAL at 10:12

## 2021-12-24 RX ADMIN — APIXABAN 5 MG: 2.5 TABLET, FILM COATED ORAL at 09:12

## 2021-12-24 RX ADMIN — FUROSEMIDE 40 MG: 40 TABLET ORAL at 10:12

## 2021-12-24 RX ADMIN — PANTOPRAZOLE SODIUM 40 MG: 40 TABLET, DELAYED RELEASE ORAL at 10:12

## 2021-12-24 NOTE — NURSING
LEFT LEG CLEANED WITH WIPE SWEEN LOTION ABD PAD AND KERLIX APPLIED FEW DRY PEELING SCABS BRUISES AND EDEMA NOTED NO DRAINAGE.RIGHT LEG CLEANED WITH WIPE AND SWEEN LOTION APPLIED.LEFT LEG KERLIX APPLIED AND BOTH LEGS TUBI  APPLIED.RIGHT LEG SCABS PEELING AND DRY SKIN NOTED.

## 2021-12-24 NOTE — PT/OT/SLP PROGRESS
Physical Therapy Treatment    Patient Name:  Leyla Mari   MRN:  [id number]  Admit Date: 12/6/2021  Admitting Diagnosis: COPD with acute exacerbation  Recent Surgeries: N/A    General Precautions: Standard, fall,respiratory   Orthopedic Precautions:N/A   Braces:   N/A    Recommendations:     Discharge Recommendations:  home health PT   Level of Assistance Recommended at Discharge: Intermittent assistance   Discharge Equipment Recommendations:  (TBD)   Barriers to discharge: None    Assessment:     Leyla Mari is a 76 y.o. female admitted with a medical diagnosis of COPD with acute exacerbation . Pt participated, and tolerated treatment well. Pt will continue to benefit from skilled PT services to improve all deficits noted below. Resume PT POC as indicated..      Performance deficits affecting function:  impaired endurance,weakness,impaired self care skills,impaired functional mobilty,gait instability,impaired skin,edema,impaired cardiopulmonary response to activity .    Rehab Potential is good    Activity Tolerance: Good    Plan:     Patient to be seen 6 x/week to address the above listed problems via gait training,therapeutic activities,therapeutic exercises,therapeutic groups,neuromuscular re-education,wheelchair management/training    · Plan of Care Expires: 01/06/22  · Plan of Care Reviewed with: patient    Subjective     Pt was willing to participate with therapy.     Pain/Comfort:  · Pain Rating 1: 0/10  · Pain Rating Post-Intervention 1: 0/10    Patient's cultural, spiritual, Mormon conflicts given the current situation:  · no    Objective:     Communicated with nursing prior to session.  Patient found up in chair with oxygen upon PT entry to room.     Therapeutic Activities and Exercises:   -mini-elliptical x15 min   -BLE therex x15 reps: AP,LAQ,HF,GS,Hip abd/add    Functional Mobility:  · Transfers:  Sit to Stand:  minimum assistance with rolling walker  · Gait: ~150 ft with RW and SBA. No LOB noted.      AM-PAC 6 CLICK MOBILITY  15    Patient left up in chair with all lines intact and call button in reach.    GOALS:   Multidisciplinary Problems     Physical Therapy Goals        Problem: Physical Therapy Goal    Goal Priority Disciplines Outcome Goal Variances Interventions   Physical Therapy Goal     PT, PT/OT Ongoing, Progressing     Description: Goals to be met by: 12/28/21    Patient will increase functional independence with mobility by performing:    . Supine to sit with Set-up [place]  . Sit to supine with Set-up [place]  . Rolling to Left and Right with Set-up Assistance.  . Sit to stand transfer with Stand-by Assistance- not met  . Bed to chair transfer with Stand-by Assistance using Rolling Walker- not met  . Gait  x 50 feet with Contact Guard Assistance using Rolling Walker. -met  Updated 12/14/2021Gait  x 130 feet with Contact Guard Assistance using Rolling Walker-met  Updated 12/19/2021Gait  x 150 feet with Contact Guard Assistance/ SBA using Rolling Walker  . Wheelchair propulsion x50 feet with Minimal Assistance using bilateral uppper extremities  . Ascend/Descend 4 inch curb step with Minimal Assistance using Rolling Walker.                     Time Tracking:     PT Received On: 12/24/21  PT Total Time (min):   38 minutes    Billable Minutes: Gait Training 10, Therapeutic Activity 10 and Therapeutic Exercise 18    Treatment Type: Treatment  PT/PTA: PTA     PTA Visit Number: 5     12/24/2021

## 2021-12-24 NOTE — PT/OT/SLP PROGRESS
"Occupational Therapy   Treatment    Name: Leyla Mari  MRN: [id number]  Admit Date: 12/6/2021  Admitting Diagnosis:  COPD with acute exacerbation    General Precautions: Standard, fall,respiratory   Orthopedic Precautions:N/A   Braces:       Recommendations:     Discharge Recommendations: home health OT  Level of Assistance Recommended at Discharge: 24 hours light assistance for ADL's and homemaking tasks  Discharge Equipment Recommendations:  other (see comments) (TBD)  Barriers to discharge:  None    Assessment:     Leyla Mari is a 76 y.o. female with a medical diagnosis of COPD with acute exacerbation . Performance deficits affecting function are weakness,impaired endurance,impaired self care skills,impaired functional mobilty,gait instability,impaired balance,decreased upper extremity function,decreased lower extremity function,impaired cardiopulmonary response to activity. Pt. participated well with session on this day. Pt is progressing well with session on this day still continues to requires cues with aspects of safety . Pt. Will continue to benefit from continued OT to progress towards goals    Rehab Potential is good    Activity tolerance:  Good    Plan:     Patient to be seen 6 x/week to address the above listed problems via self-care/home management,therapeutic activities,therapeutic exercises    · Plan of Care Expires: 01/07/22  · Plan of Care Reviewed with: patient    Subjective     Communicated with: nsjer prior to session. "Helga sutton"    Pain/Comfort:  Pain Rating 1: 0/10  Pain Rating Post-Intervention 1: 0/10    Patient's cultural, spiritual, Pentecostalism conflicts given the current situation:  no    Objective:     Patient found up in chair with oxygen upon OT entry to room.    Bed Mobility:    · Not tested    Functional Mobility/Transfers:  · Patient completed Sit <> Stand Transfer with minimum assistance  with  rolling walker   · Patient completed Toilet Transfer Stand Pivot technique " with minimum assistance with  rolling walker  · Functional Mobility: not tested    Activities of Daily Living:  · Grooming: modified independence with grooming aspects seated  · Bathing: minimum assistance with cleaning of BLEs while seated  · Upper Body Dressing: modified independence to doff/bo pullover shirt   · Lower Body Dressing: minimum assistance to thread pants through BLEs and manage over hips instance  · Toileting: stand by assistance with hygiene and clothing management    [facility] 6 Click ADL: 21    Treatment & Education:  Pt. Educated on safety with ADL tasks as well as functional mobility and need for staff assist.    Patient left up in chair with all lines intact and call button in reachEducation:      GOALS:   Multidisciplinary Problems     Occupational Therapy Goals        Problem: Occupational Therapy Goal    Goal Priority Disciplines Outcome Interventions   Occupational Therapy Goal     OT, PT/OT Ongoing, Not Progressing    Description: Goals to be met by: 12/28/21     Patient will increase functional independence with ADLs by performing:    UE Dressing with Modified [place].=MET  LE Dressing with Minimal Assistance.=MET  Grooming while seated at sink with Modified [place].=MET  Toileting from toilet or BSC with Minimal Assistance for hygiene and clothing management.   Bathing from  sitting at sink with Stand-by Assistance.  Rolling to Bilateral with Modified [place].   Supine to sit with Modified [place].  Toilet transfer to toilet or BSC with Stand-by Assistance with RW .  Stand Pivot transfer with Stand-by Assistance with RW .  Upper extremity exercise with assistance as needed.  Caregiver will be educated on level of assist required to safely perform self care tasks and functional transfers..                      Time Tracking:     OT Date of Treatment: OT Date of Treatment: 12/24/21  OT Total Time (min): Total Time (min): 38 min    Billable Minutes:Self Care/Home  Management 38    12/24/2021   OT and RAY have discussed the above patients goals and status in collaboration with Plan of Care.

## 2021-12-25 LAB
POCT GLUCOSE: 128 MG/DL (ref 70–110)
POCT GLUCOSE: 184 MG/DL (ref 70–110)
POCT GLUCOSE: 209 MG/DL (ref 70–110)
POCT GLUCOSE: 300 MG/DL (ref 70–110)

## 2021-12-25 PROCEDURE — 99900035 HC TECH TIME PER 15 MIN (STAT)

## 2021-12-25 PROCEDURE — 25000003 PHARM REV CODE 250: Performed by: INTERNAL MEDICINE

## 2021-12-25 PROCEDURE — 27000221 HC OXYGEN, UP TO 24 HOURS

## 2021-12-25 PROCEDURE — 11000004 HC SNF PRIVATE

## 2021-12-25 PROCEDURE — 25000003 PHARM REV CODE 250: Performed by: NURSE PRACTITIONER

## 2021-12-25 PROCEDURE — 63600175 PHARM REV CODE 636 W HCPCS: Performed by: NURSE PRACTITIONER

## 2021-12-25 PROCEDURE — 25000242 PHARM REV CODE 250 ALT 637 W/ HCPCS: Performed by: INTERNAL MEDICINE

## 2021-12-25 PROCEDURE — 94761 N-INVAS EAR/PLS OXIMETRY MLT: CPT

## 2021-12-25 RX ADMIN — TIOTROPIUM BROMIDE INHALATION SPRAY 2 PUFF: 3.12 SPRAY, METERED RESPIRATORY (INHALATION) at 06:12

## 2021-12-25 RX ADMIN — APIXABAN 5 MG: 2.5 TABLET, FILM COATED ORAL at 10:12

## 2021-12-25 RX ADMIN — APIXABAN 5 MG: 2.5 TABLET, FILM COATED ORAL at 09:12

## 2021-12-25 RX ADMIN — GABAPENTIN 100 MG: 100 CAPSULE ORAL at 09:12

## 2021-12-25 RX ADMIN — ALPRAZOLAM 0.25 MG: 0.25 TABLET ORAL at 10:12

## 2021-12-25 RX ADMIN — FUROSEMIDE 20 MG: 20 TABLET ORAL at 10:12

## 2021-12-25 RX ADMIN — INSULIN ASPART 2 UNITS: 100 INJECTION, SOLUTION INTRAVENOUS; SUBCUTANEOUS at 11:12

## 2021-12-25 RX ADMIN — PRAVASTATIN SODIUM 20 MG: 20 TABLET ORAL at 10:12

## 2021-12-25 RX ADMIN — PREDNISONE 5 MG: 5 TABLET ORAL at 09:12

## 2021-12-25 RX ADMIN — ALPRAZOLAM 0.25 MG: 0.25 TABLET ORAL at 09:12

## 2021-12-25 RX ADMIN — MELATONIN TAB 3 MG 3 MG: 3 TAB at 09:12

## 2021-12-25 RX ADMIN — FLUTICASONE FUROATE AND VILANTEROL TRIFENATATE 1 PUFF: 100; 25 POWDER RESPIRATORY (INHALATION) at 08:12

## 2021-12-25 RX ADMIN — POTASSIUM CHLORIDE 10 MEQ: 10 CAPSULE, COATED, EXTENDED RELEASE ORAL at 10:12

## 2021-12-25 RX ADMIN — ALPRAZOLAM 0.25 MG: 0.25 TABLET ORAL at 02:12

## 2021-12-25 RX ADMIN — DILTIAZEM HYDROCHLORIDE 180 MG: 180 CAPSULE, COATED, EXTENDED RELEASE ORAL at 10:12

## 2021-12-25 RX ADMIN — ESCITALOPRAM 5 MG: 5 TABLET, FILM COATED ORAL at 10:12

## 2021-12-25 RX ADMIN — INSULIN ASPART 1 UNITS: 100 INJECTION, SOLUTION INTRAVENOUS; SUBCUTANEOUS at 09:12

## 2021-12-25 RX ADMIN — PREDNISONE 10 MG: 10 TABLET ORAL at 10:12

## 2021-12-25 RX ADMIN — PANTOPRAZOLE SODIUM 40 MG: 40 TABLET, DELAYED RELEASE ORAL at 10:12

## 2021-12-26 LAB
POCT GLUCOSE: 145 MG/DL (ref 70–110)
POCT GLUCOSE: 154 MG/DL (ref 70–110)
POCT GLUCOSE: 170 MG/DL (ref 70–110)
POCT GLUCOSE: 198 MG/DL (ref 70–110)

## 2021-12-26 PROCEDURE — 99900035 HC TECH TIME PER 15 MIN (STAT)

## 2021-12-26 PROCEDURE — 11000004 HC SNF PRIVATE

## 2021-12-26 PROCEDURE — 63600175 PHARM REV CODE 636 W HCPCS: Performed by: NURSE PRACTITIONER

## 2021-12-26 PROCEDURE — 94761 N-INVAS EAR/PLS OXIMETRY MLT: CPT

## 2021-12-26 PROCEDURE — 25000242 PHARM REV CODE 250 ALT 637 W/ HCPCS: Performed by: NURSE PRACTITIONER

## 2021-12-26 PROCEDURE — 25000003 PHARM REV CODE 250: Performed by: INTERNAL MEDICINE

## 2021-12-26 PROCEDURE — 25000003 PHARM REV CODE 250: Performed by: NURSE PRACTITIONER

## 2021-12-26 PROCEDURE — 27000221 HC OXYGEN, UP TO 24 HOURS

## 2021-12-26 RX ADMIN — APIXABAN 5 MG: 2.5 TABLET, FILM COATED ORAL at 08:12

## 2021-12-26 RX ADMIN — PANTOPRAZOLE SODIUM 40 MG: 40 TABLET, DELAYED RELEASE ORAL at 08:12

## 2021-12-26 RX ADMIN — PREDNISONE 5 MG: 5 TABLET ORAL at 08:12

## 2021-12-26 RX ADMIN — PREDNISONE 10 MG: 10 TABLET ORAL at 08:12

## 2021-12-26 RX ADMIN — GABAPENTIN 100 MG: 100 CAPSULE ORAL at 08:12

## 2021-12-26 RX ADMIN — ESCITALOPRAM 5 MG: 5 TABLET, FILM COATED ORAL at 08:12

## 2021-12-26 RX ADMIN — ALPRAZOLAM 0.25 MG: 0.25 TABLET ORAL at 02:12

## 2021-12-26 RX ADMIN — TIOTROPIUM BROMIDE INHALATION SPRAY 2 PUFF: 3.12 SPRAY, METERED RESPIRATORY (INHALATION) at 08:12

## 2021-12-26 RX ADMIN — POTASSIUM CHLORIDE 10 MEQ: 10 CAPSULE, COATED, EXTENDED RELEASE ORAL at 08:12

## 2021-12-26 RX ADMIN — FUROSEMIDE 20 MG: 20 TABLET ORAL at 08:12

## 2021-12-26 RX ADMIN — PRAVASTATIN SODIUM 20 MG: 20 TABLET ORAL at 08:12

## 2021-12-26 RX ADMIN — FLUTICASONE FUROATE AND VILANTEROL TRIFENATATE 1 PUFF: 100; 25 POWDER RESPIRATORY (INHALATION) at 08:12

## 2021-12-26 RX ADMIN — DILTIAZEM HYDROCHLORIDE 180 MG: 180 CAPSULE, COATED, EXTENDED RELEASE ORAL at 08:12

## 2021-12-26 RX ADMIN — ALPRAZOLAM 0.25 MG: 0.25 TABLET ORAL at 08:12

## 2021-12-26 RX ADMIN — PSYLLIUM HUSK 1 PACKET: 3.4 POWDER ORAL at 08:12

## 2021-12-27 ENCOUNTER — LAB VISIT (OUTPATIENT)
Dept: LAB | Facility: OTHER | Age: 76
End: 2021-12-27
Payer: MEDICARE

## 2021-12-27 DIAGNOSIS — Z20.822 ENCOUNTER FOR LABORATORY TESTING FOR COVID-19 VIRUS: ICD-10-CM

## 2021-12-27 LAB
ANION GAP SERPL CALC-SCNC: 10 MMOL/L (ref 8–16)
BASOPHILS # BLD AUTO: 0.08 K/UL (ref 0–0.2)
BASOPHILS NFR BLD: 0.7 % (ref 0–1.9)
BUN SERPL-MCNC: 13 MG/DL (ref 8–23)
CALCIUM SERPL-MCNC: 8.9 MG/DL (ref 8.7–10.5)
CHLORIDE SERPL-SCNC: 99 MMOL/L (ref 95–110)
CO2 SERPL-SCNC: 29 MMOL/L (ref 23–29)
CREAT SERPL-MCNC: 0.5 MG/DL (ref 0.5–1.4)
DIFFERENTIAL METHOD: ABNORMAL
EOSINOPHIL # BLD AUTO: 0 K/UL (ref 0–0.5)
EOSINOPHIL NFR BLD: 0.1 % (ref 0–8)
ERYTHROCYTE [DISTWIDTH] IN BLOOD BY AUTOMATED COUNT: 15.9 % (ref 11.5–14.5)
EST. GFR  (AFRICAN AMERICAN): >60 ML/MIN/1.73 M^2
EST. GFR  (NON AFRICAN AMERICAN): >60 ML/MIN/1.73 M^2
GLUCOSE SERPL-MCNC: 123 MG/DL (ref 70–110)
HCT VFR BLD AUTO: 35.4 % (ref 37–48.5)
HGB BLD-MCNC: 10.4 G/DL (ref 12–16)
IMM GRANULOCYTES # BLD AUTO: 0.54 K/UL (ref 0–0.04)
IMM GRANULOCYTES NFR BLD AUTO: 4.9 % (ref 0–0.5)
LYMPHOCYTES # BLD AUTO: 0.7 K/UL (ref 1–4.8)
LYMPHOCYTES NFR BLD: 6.2 % (ref 18–48)
MAGNESIUM SERPL-MCNC: 1.9 MG/DL (ref 1.6–2.6)
MCH RBC QN AUTO: 28.6 PG (ref 27–31)
MCHC RBC AUTO-ENTMCNC: 29.4 G/DL (ref 32–36)
MCV RBC AUTO: 97 FL (ref 82–98)
MONOCYTES # BLD AUTO: 0.9 K/UL (ref 0.3–1)
MONOCYTES NFR BLD: 8.2 % (ref 4–15)
NEUTROPHILS # BLD AUTO: 8.8 K/UL (ref 1.8–7.7)
NEUTROPHILS NFR BLD: 79.9 % (ref 38–73)
NRBC BLD-RTO: 0 /100 WBC
PHOSPHATE SERPL-MCNC: 3.1 MG/DL (ref 2.7–4.5)
PLATELET # BLD AUTO: 285 K/UL (ref 150–450)
PMV BLD AUTO: 9.6 FL (ref 9.2–12.9)
POCT GLUCOSE: 110 MG/DL (ref 70–110)
POCT GLUCOSE: 127 MG/DL (ref 70–110)
POCT GLUCOSE: 174 MG/DL (ref 70–110)
POTASSIUM SERPL-SCNC: 3.8 MMOL/L (ref 3.5–5.1)
RBC # BLD AUTO: 3.64 M/UL (ref 4–5.4)
SARS-COV-2 RNA RESP QL NAA+PROBE: NOT DETECTED
SARS-COV-2- CYCLE NUMBER: NORMAL
SODIUM SERPL-SCNC: 138 MMOL/L (ref 136–145)
WBC # BLD AUTO: 11 K/UL (ref 3.9–12.7)

## 2021-12-27 PROCEDURE — 11000004 HC SNF PRIVATE

## 2021-12-27 PROCEDURE — 97110 THERAPEUTIC EXERCISES: CPT | Mod: CO

## 2021-12-27 PROCEDURE — 97535 SELF CARE MNGMENT TRAINING: CPT | Mod: CO

## 2021-12-27 PROCEDURE — 97116 GAIT TRAINING THERAPY: CPT

## 2021-12-27 PROCEDURE — 27000221 HC OXYGEN, UP TO 24 HOURS

## 2021-12-27 PROCEDURE — 25000003 PHARM REV CODE 250: Performed by: INTERNAL MEDICINE

## 2021-12-27 PROCEDURE — 84100 ASSAY OF PHOSPHORUS: CPT | Performed by: NURSE PRACTITIONER

## 2021-12-27 PROCEDURE — 63600175 PHARM REV CODE 636 W HCPCS: Performed by: NURSE PRACTITIONER

## 2021-12-27 PROCEDURE — 99900035 HC TECH TIME PER 15 MIN (STAT)

## 2021-12-27 PROCEDURE — 85025 COMPLETE CBC W/AUTO DIFF WBC: CPT | Performed by: NURSE PRACTITIONER

## 2021-12-27 PROCEDURE — 94761 N-INVAS EAR/PLS OXIMETRY MLT: CPT

## 2021-12-27 PROCEDURE — 97530 THERAPEUTIC ACTIVITIES: CPT

## 2021-12-27 PROCEDURE — U0003 INFECTIOUS AGENT DETECTION BY NUCLEIC ACID (DNA OR RNA); SEVERE ACUTE RESPIRATORY SYNDROME CORONAVIRUS 2 (SARS-COV-2) (CORONAVIRUS DISEASE [COVID-19]), AMPLIFIED PROBE TECHNIQUE, MAKING USE OF HIGH THROUGHPUT TECHNOLOGIES AS DESCRIBED BY CMS-2020-01-R: HCPCS | Performed by: NURSE PRACTITIONER

## 2021-12-27 PROCEDURE — 80048 BASIC METABOLIC PNL TOTAL CA: CPT | Performed by: NURSE PRACTITIONER

## 2021-12-27 PROCEDURE — 83735 ASSAY OF MAGNESIUM: CPT | Performed by: NURSE PRACTITIONER

## 2021-12-27 PROCEDURE — 36415 COLL VENOUS BLD VENIPUNCTURE: CPT | Performed by: NURSE PRACTITIONER

## 2021-12-27 PROCEDURE — 25000003 PHARM REV CODE 250: Performed by: NURSE PRACTITIONER

## 2021-12-27 PROCEDURE — 97110 THERAPEUTIC EXERCISES: CPT

## 2021-12-27 RX ORDER — TALC
6 POWDER (GRAM) TOPICAL NIGHTLY PRN
Refills: 0 | COMMUNITY
Start: 2021-12-27 | End: 2022-01-11 | Stop reason: SDUPTHER

## 2021-12-27 RX ORDER — FUROSEMIDE 20 MG/1
20 TABLET ORAL DAILY
Qty: 30 TABLET | Refills: 3 | Status: SHIPPED | OUTPATIENT
Start: 2021-12-28 | End: 2022-01-11 | Stop reason: SDUPTHER

## 2021-12-27 RX ORDER — METFORMIN HYDROCHLORIDE 500 MG/1
500 TABLET ORAL 2 TIMES DAILY WITH MEALS
Qty: 180 TABLET | Refills: 3
Start: 2021-12-27 | End: 2022-01-11 | Stop reason: SDUPTHER

## 2021-12-27 RX ORDER — GABAPENTIN 100 MG/1
100 CAPSULE ORAL NIGHTLY
Qty: 30 CAPSULE | Refills: 1 | Status: SHIPPED | OUTPATIENT
Start: 2021-12-27 | End: 2022-01-11 | Stop reason: SDUPTHER

## 2021-12-27 RX ORDER — PREDNISONE 10 MG/1
10 TABLET ORAL DAILY
Start: 2021-12-28 | End: 2022-01-11 | Stop reason: SDUPTHER

## 2021-12-27 RX ORDER — POTASSIUM CHLORIDE 750 MG/1
10 CAPSULE, EXTENDED RELEASE ORAL DAILY
Qty: 30 CAPSULE | Refills: 3 | Status: SHIPPED | OUTPATIENT
Start: 2021-12-28 | End: 2022-01-11 | Stop reason: SDUPTHER

## 2021-12-27 RX ORDER — PREDNISONE 5 MG/1
5 TABLET ORAL NIGHTLY
Start: 2021-12-27 | End: 2022-01-11 | Stop reason: SDUPTHER

## 2021-12-27 RX ADMIN — FLUTICASONE FUROATE AND VILANTEROL TRIFENATATE 1 PUFF: 100; 25 POWDER RESPIRATORY (INHALATION) at 09:12

## 2021-12-27 RX ADMIN — ALPRAZOLAM 0.25 MG: 0.25 TABLET ORAL at 02:12

## 2021-12-27 RX ADMIN — PRAVASTATIN SODIUM 20 MG: 20 TABLET ORAL at 09:12

## 2021-12-27 RX ADMIN — DILTIAZEM HYDROCHLORIDE 180 MG: 180 CAPSULE, COATED, EXTENDED RELEASE ORAL at 09:12

## 2021-12-27 RX ADMIN — APIXABAN 5 MG: 2.5 TABLET, FILM COATED ORAL at 09:12

## 2021-12-27 RX ADMIN — APIXABAN 5 MG: 2.5 TABLET, FILM COATED ORAL at 10:12

## 2021-12-27 RX ADMIN — TIOTROPIUM BROMIDE INHALATION SPRAY 2 PUFF: 3.12 SPRAY, METERED RESPIRATORY (INHALATION) at 09:12

## 2021-12-27 RX ADMIN — PANTOPRAZOLE SODIUM 40 MG: 40 TABLET, DELAYED RELEASE ORAL at 09:12

## 2021-12-27 RX ADMIN — ALPRAZOLAM 0.25 MG: 0.25 TABLET ORAL at 09:12

## 2021-12-27 RX ADMIN — PREDNISONE 10 MG: 10 TABLET ORAL at 09:12

## 2021-12-27 RX ADMIN — ESCITALOPRAM 5 MG: 5 TABLET, FILM COATED ORAL at 09:12

## 2021-12-27 RX ADMIN — FUROSEMIDE 20 MG: 20 TABLET ORAL at 09:12

## 2021-12-27 RX ADMIN — PREDNISONE 5 MG: 5 TABLET ORAL at 10:12

## 2021-12-27 RX ADMIN — POTASSIUM CHLORIDE 10 MEQ: 10 CAPSULE, COATED, EXTENDED RELEASE ORAL at 09:12

## 2021-12-27 RX ADMIN — ALPRAZOLAM 0.25 MG: 0.25 TABLET ORAL at 10:12

## 2021-12-27 RX ADMIN — GABAPENTIN 100 MG: 100 CAPSULE ORAL at 10:12

## 2021-12-27 NOTE — PT/OT/SLP PROGRESS
"Occupational Therapy   Treatment/Discharge Summary    Name: Leyla Mari  MRN: [id number]  Admit Date: 12/6/2021  Admitting Diagnosis:  COPD with acute exacerbation    General Precautions: Standard, fall,respiratory   Orthopedic Precautions:N/A   Braces:       Recommendations:     Discharge Recommendations: home health OT  Level of Assistance Recommended at Discharge: 24 hours physical assistance for all ADL's and home management tasks  Discharge Equipment Recommendations:  other (see comments) (TBD)  Barriers to discharge:  None    Assessment:     Leyla Mari is a 76 y.o. female with a medical diagnosis of COPD with acute exacerbation . Performance deficits affecting function are weakness,impaired endurance,impaired self care skills,impaired functional mobilty,gait instability,impaired balance,decreased upper extremity function,decreased lower extremity function,impaired cardiopulmonary response to activity. Pt. participated well with session on this day. Pt is progressing well with session on this day still continues to requires cues with aspects of safety . Pt. Will continue to benefit from continued OT to progress towards goals    Rehab Potential is good    Activity tolerance:  Good    Plan:     Patient to be seen 6 x/week to address the above listed problems via self-care/home management,therapeutic activities,therapeutic exercises    · Plan of Care Expires: 01/07/22  · Plan of Care Reviewed with: patient    Subjective     Communicated with: nsjer prior to session. "Goodmorning"    Pain/Comfort:  · Pain Rating 1: 0/10  · Pain Rating Post-Intervention 1: 0/10    Patient's cultural, spiritual, Adventism conflicts given the current situation:  · no    Objective:     Patient found HOB elevated with oxygen upon OT entry to room.    Bed Mobility:    · Patient completed Rolling/Turning to Left with  supervision  · Patient completed Scooting/Bridging with supervision  · Patient completed Supine to Sit with supervision "     Functional Mobility/Transfers:  · Patient completed Sit <> Stand Transfer with minimum assistance  with  rolling walker   · Patient completed Bed <> Chair Transfer using Stand Pivot technique with contact guard assistance with rolling walker  · Patient completed Toilet Transfer Stand Pivot technique with minimum assistance with  bedside commode  · Functional Mobility: Pt. With fxl mobility throughout room and bathroom with RW and CGA/SBA. Pt. Also able to propel self in w/c from room<>gym with good use of BUEs noted    Activities of Daily Living:  · Feeding:  independence with breakfast  · Grooming: modified independence with all grooming aspects while seated  · Bathing: stand by assistance with all bathing aspects at sink level. BLEs not cleaned on this day 2* to being wrapped  · Upper Body Dressing: modified independence to doff/bo pullover shirt  · Lower Body Dressing: supervision to thread pants through BLEs and manage over hips instance  · Toileting: minimum assistance with hygiene and clothing management     [facility] 6 Click ADL: 21    OT Exercises: UE Ergometer 10 mins with moderate resistance    Treatment & Education:  Pt. With standing and therex performed to increase ROM, endurance selfcare task and fxl mobility for independence     Patient left up in chair with all lines intact and call button in reachEducation:      GOALS:   Multidisciplinary Problems     Occupational Therapy Goals        Problem: Occupational Therapy Goal    Goal Priority Disciplines Outcome Interventions   Occupational Therapy Goal     OT, PT/OT Ongoing, Not Progressing    Description: Goals to be met by: 12/28/21     Patient will increase functional independence with ADLs by performing:    UE Dressing with Modified [place].=MET  LE Dressing with Minimal Assistance.=MET  Grooming while seated at sink with Modified [place].=MET  Toileting from toilet or BSC with Minimal Assistance for hygiene and clothing management.    Bathing from  sitting at sink with Stand-by Assistance.  Rolling to Bilateral with Modified [place].   Supine to sit with Modified [place].  Toilet transfer to toilet or BSC with Stand-by Assistance with RW .  Stand Pivot transfer with Stand-by Assistance with RW .  Upper extremity exercise with assistance as needed.  Caregiver will be educated on level of assist required to safely perform self care tasks and functional transfers..                      Time Tracking:     OT Date of Treatment: OT Date of Treatment: 12/27/21  OT Total Time (min): Total Time (min): 53 min    Billable Minutes:Self Care/Home Management 38  Therapeutic Exercise 15    12/27/2021   OT and RAY have discussed the above patients goals and status in collaboration with Plan of Care.

## 2021-12-27 NOTE — PT/OT/SLP PROGRESS
Physical Therapy Treatment    Patient Name:  Leyla Mari   MRN:  [id number]  Admit Date: 12/6/2021  Admitting Diagnosis: COPD with acute exacerbation  Recent Surgeries: none    General Precautions: Standard, fall,respiratory   Orthopedic Precautions:N/A   Braces:   none    Recommendations:     Discharge Recommendations:  home health PT   Level of Assistance Recommended at Discharge: Intermittent assistance   Discharge Equipment Recommendations:  (TBD)   Barriers to discharge: None    Assessment:     Leyla Mari is a 76 y.o. female admitted with a medical diagnosis of COPD with acute exacerbation . Pt anat session well w/ good participation. She remains at a Oswald level for transfers due to generalized weakness but is making progress. She is scheduled to D/C home tomorrow w/ family assistance along w/ home health for continued progress. Formal D/C to follow.      Performance deficits affecting function:  impaired endurance,weakness,impaired self care skills,impaired functional mobilty,gait instability,impaired skin,edema,impaired cardiopulmonary response to activity .    Rehab Potential is good    Activity Tolerance: Good    Plan:     Patient to be seen 6 x/week to address the above listed problems via gait training,therapeutic activities,therapeutic exercises,therapeutic groups,neuromuscular re-education,wheelchair management/training    · Plan of Care Expires: 01/06/22  · Plan of Care Reviewed with: patient    Subjective     Pt agreeable to session.     Pain/Comfort:  · Pain Rating 1: 0/10  · Pain Rating Post-Intervention 1: 0/10    Patient's cultural, spiritual, Baptist conflicts given the current situation:  · no    Objective:     Communicated with patient prior to session.  Patient found up in chair with oxygen upon PT entry to room.     Therapeutic Activities and Exercises:   Supine BLE therex 2x10 reps (bridges w/ bolster and SAQ)  Seated BLE therex 2x10 reps (HF, LAQ, AP, GS)    Functional Mobility:  · Bed  "Mobility:   · Sit>Supine on mat w/ Oswald for RLE  · Supine>sit on bed w/ SPV using rail  · Transfers:    · Sit<>Stand to/from w/c w/ RW and Oswald to rise  · Stand pivot w/c<>EOM w/ RW and Min/CGA to rise and pivot  · Cues for hand/foot placement  · Gait:   · 150ft w/ RW and SBA  · Cues for posture  · Stairs:   · Asc/susie 4" Curb w/ RW and CGA  · Cues/demo for technique/sequencing  · Wheelchair Propulsion:    · 50ft w/ BUE and SBA  · Limited in distance by fatigue    AM-PAC 6 CLICK MOBILITY  17    Patient left up in chair with all lines intact and call button in reach.    GOALS:   Multidisciplinary Problems     Physical Therapy Goals        Problem: Physical Therapy Goal    Goal Priority Disciplines Outcome Goal Variances Interventions   Physical Therapy Goal     PT, PT/OT Ongoing, Progressing     Description: Goals to be met by: 12/28/21    Patient will increase functional independence with mobility by performing:    . Supine to sit with Set-up Max- Met 12/27/2021  . Sit to supine with Set-up Max- not met  . Rolling to Left and Right with Set-up Assistance.- Met 12/27/2021  . Sit to stand transfer with Stand-by Assistance- not met  . Bed to chair transfer with Stand-by Assistance using Rolling Walker- not met  . Gait  x 50 feet with Contact Guard Assistance using Rolling Walker. -met  Updated 12/14/2021Gait  x 130 feet with Contact Guard Assistance using Rolling Walker-met  Updated 12/19/2021Gait  x 150 feet with Contact Guard Assistance/ SBA using Rolling Walker- Met 12/27/2021  . Wheelchair propulsion x50 feet with Minimal Assistance using bilateral uppper extremities- Met 12/27/2021  . Ascend/Descend 4 inch curb step with Minimal Assistance using Rolling Walker.- Met 12/27/2021                     Time Tracking:     PT Received On: 12/27/21  PT Total Time (min):   38 min    Billable Minutes: Gait Training 13, Therapeutic Activity 10, Therapeutic Exercise 15 and Total Time 38    Treatment Type: " Treatment,6th Visit  PT/PTA: PT     PTA Visit Number: 0     PT had a face-to-face encounter with regards to the patient's plan of care with the PTA who has been seeing this patient regularly.      12/27/2021

## 2021-12-27 NOTE — PLAN OF CARE
Problem: Occupational Therapy Goal  Goal: Occupational Therapy Goal  Description: Goals to be met by: 12/28/21     Patient will increase functional independence with ADLs by performing:    UE Dressing with Modified [place].=MET  LE Dressing with Minimal Assistance.=MET  Grooming while seated at sink with Modified [place].=MET  Toileting from toilet or BSC with Minimal Assistance for hygiene and clothing management. =MET  Bathing from  sitting at sink with Stand-by Assistance.  Rolling to Bilateral with Modified [place]. =MET  Supine to sit with Modified [place].  Toilet transfer to toilet or BSC with Stand-by Assistance with RW .  Stand Pivot transfer with Stand-by Assistance with RW .  Upper extremity exercise with assistance as needed.  Caregiver will be educated on level of assist required to safely perform self care tasks and functional transfers..     Outcome: Ongoing, Progressing

## 2021-12-27 NOTE — PROGRESS NOTES
[facility]                                  Skilled Nursing Facility                   Progress Note     Admit Date: 2021  MARK ANTHONY TBD  Principal Problem:  COPD with acute exacerbation   HPI obtained from patient interview and chart review     Chief Complaint: Re-evaluation of medical treatment and therapy status: Lab review    HPI:   Ms. Mari is a 76 year old female with PMHx of end-stage COPD, PE (on eliquis), HTN and HLD who presents to SNF following hospitalization for COPD exacerbation.  Admission to SNF for secondary weakness and debility.     Interval history: All of today's labs reviewed and are listed below.  24 hr vital sign ranges listed below.  Patient with overall weight reduction throughout SNF stay.  Bilateral lower extremity edema appears slightly improved today, patient states that it appears near her home baseline.  Patient states that her respiratory status is at its baseline.  Patient denies abdominal discomfort, nausea, or vomiting.  Patient reports an adequate appetite.  Patient denies dysuria.  Patient reports having regular bowel movements.  Patient progessing with PT/OT- Gait: ~150 ft with RW and SBA. No LOB noted. Continuing to follow and treat all acute and chronic conditions.    Past Medical History: Patient has a past medical history of Actinic keratosis, Arthritis, Cataract, Cellulitis of ankle, Colon polyps, COPD (chronic obstructive pulmonary disease), Family history of colon cancer, History of Clostridium difficile infection (7/3/2012), Hyperlipidemia, Hypertension, Lung nodules, and Sinus tachycardia.    Past Surgical History: Patient has a past surgical history that includes Appendectomy; Eye surgery; Tonsillectomy;  section; Cataract extraction (Bilateral, ); and Colonoscopy (N/A, 10/10/2017).    Social History: Patient reports that she quit smoking about 26 years ago. Her smoking use  included cigarettes. She has a 39.00 pack-year smoking history. She has never used smokeless tobacco. She reports current alcohol use of about 2.0 standard drinks of alcohol per week. She reports that she does not use drugs.    Family History: family history includes Blindness in her paternal grandmother; Breast cancer in her sister; COPD in her father; Cancer in her sister and son; Cancer (age of onset: 79) in her mother; Cataracts in her mother and sister; Diabetes in her father and paternal grandmother; Glaucoma in her mother; Heart disease in her father, mother, and sister; Hyperlipidemia in her father, mother, and sister; Hypertension in her father, mother, and sister; Skin cancer in her mother and sister; Thyroid disease in her daughter and mother.    Allergies: Patient is allergic to bactrim [sulfamethoxazole-trimethoprim], codeine, keflex [cephalexin], ceftriaxone, clindamycin hcl, doxycycline, and vancomycin analogues.    ROS  Constitutional: Negative for fever   Eyes: Negative for blurred vision, double vision   Respiratory:  +mild intermittent dry cough, TRAVIS  Cardiovascular: Negative for chest pain, palpitations.  + leg swelling.   Gastrointestinal: Negative for abdominal pain, constipation, diarrhea, nausea, vomiting.   Genitourinary: Negative for dysuria, frequency   Musculoskeletal:  + generalized weakness. Negative for back pain and myalgias.   Skin: Negative for itching and rash.   Neurological: Negative for dizziness, headaches.   Psychiatric/Behavioral: Negative for depression. The patient is not nervous/anxious.      24 hour Vital Sign Range   Temp:  [98.2 °F (36.8 °C)]   Pulse:  [65-68]   Resp:  [18]   BP: (123-142)/(60-69)   SpO2:  [96 %-100 %]     PEx  Constitutional: Patient appears debilitated.  No distress noted  HENT:   Head: Normocephalic and atraumatic.   Eyes: Pupils are equal, round  Neck: Normal range of motion. Neck supple.   Cardiovascular: Normal rate, regular rhythm and normal  heart sounds.    Pulmonary/Chest: Effort normal and breath sounds are clear  Abdominal: Soft. Bowel sounds are normal.   Musculoskeletal: Normal range of motion.   Neurological: Alert and oriented to person, place, and time.   Psychiatric: Normal mood and affect. Behavior is normal.   Skin: Skin is warm and dry.     Wound that you did not assess today:  Wound location(s)/type(s):  Right knee, left heel  Not visualized today. No signs/symptoms of infection or wound-related changes reported.         Recent Labs   Lab 12/27/21  0521      K 3.8   CL 99   CO2 29   BUN 13   CREATININE 0.5   MG 1.9       Recent Labs   Lab 12/27/21  0521   WBC 11.00   RBC 3.64*   HGB 10.4*   HCT 35.4*      MCV 97   MCH 28.6   MCHC 29.4*         Assessment and Plan:    Acute on chronic respiratory failure with hypercapnia- improving   COPD with acute exacerbation  Pulmonary edema  - chronic home oxygen at 2 LPM.   - Transition IV solumedrol to prednisone 60 mg daily with taper over the next 2 weeks back to home dose of 15 mg daily.   - completed 5 day course of azithromycin 250 mg  - continue home inhalesr.   - currently using 3 L nasal cannula, wean to home dose of 2 L  - continue home Lasix 20 mg daily   - continue PRN levalbuterol nebulizer treatments  - 12/13 reduce Lasix to 20 mg daily, to begin weaning back to home dose patient feels symptom improvement, continues at 3 L needs to be weaned down to 2 L  - 12/27 continue home dose of prednisone with 10 mg in the AM and 5 mg in the PM; continue home Lasix    Long term current use of systemic steroids  - Takes prednisone 15 mg daily for end-stage COPD  -  taper back 15mg as status improves. started on prednisone 60 mg --> 40 mg daily --> now currently receiving prednisone 20 mg daily   - 12/16 will continue 20 mg daily for now  - 12/21 initiating home dose prednisone 10 mg daily, 5 mg qHS.    Peripheral neuropathy  - continue home gabapentin 100 mg qHS.      Generalized  anxiety disorder  - Continue home alprazolam 0.25 mg TID, escitalopram 5 mg daily.      Encounter for palliative care  - patient is being followed by palliative care in an outpatient setting. Patient is DNR/DNI.   - as per previous notes, patient was enrolled in inpatient hospice and then revoked it.   - Patient's daughter is amendable to hospice if patient does not improve on current treatment.      Acute pulmonary embolism  - Continue apixaban 5 mg BID.     Cellulitis of right lower extremity  - Continue bactrim DS BID to complete a week.   - Continue to monitor response. Area is improving.      B/l lower extremity edema  - CXR showed bibasilar edema (R>L)  - resumed home lasix --> IV lasix 40 mg daily   - strict I/Os   - prior TTE in 5/2021 normal   - TTE showed EF 65% and normal LV diastolic function      Hyperlipidemia  - Continue home pravastatin 20 mg daily    Essential hypertension  - Continue diltiazem 180 mg daily.    Debility   - Continue with PT/OT for gait training and strengthening and restoration of ADL's   - Encourage mobility, OOB in chair, and early ambulation as appropriate  - Fall precautions   - Monitor for bowel and bladder dysfunction  - Monitor for and prevent skin breakdown and pressure ulcers  - Continue DVT prophylaxis with  apixaban 5 mg BID         Anticipate disposition:  Home with home health      Follow-up needed during SNF stay-    Follow-up needed after discharge from SNF: PCP, pulmonology    No future appointments.        Emerald Clark NP  Department of Hospital Medicine   [facility]     DOS: 12/27/2021       Patient note was created using MModal Dictation.  Any errors in syntax or even information may not have been identified and edited on initial review prior to signing this note.

## 2021-12-28 VITALS
TEMPERATURE: 98 F | HEIGHT: 60 IN | HEART RATE: 70 BPM | BODY MASS INDEX: 19.87 KG/M2 | WEIGHT: 101.19 LBS | SYSTOLIC BLOOD PRESSURE: 130 MMHG | OXYGEN SATURATION: 96 % | RESPIRATION RATE: 18 BRPM | DIASTOLIC BLOOD PRESSURE: 72 MMHG

## 2021-12-28 LAB
POCT GLUCOSE: 120 MG/DL (ref 70–110)
POCT GLUCOSE: 93 MG/DL (ref 70–110)

## 2021-12-28 PROCEDURE — 94761 N-INVAS EAR/PLS OXIMETRY MLT: CPT

## 2021-12-28 PROCEDURE — 27000221 HC OXYGEN, UP TO 24 HOURS

## 2021-12-28 PROCEDURE — 63600175 PHARM REV CODE 636 W HCPCS: Performed by: NURSE PRACTITIONER

## 2021-12-28 PROCEDURE — 25000003 PHARM REV CODE 250: Performed by: NURSE PRACTITIONER

## 2021-12-28 PROCEDURE — 99900035 HC TECH TIME PER 15 MIN (STAT)

## 2021-12-28 PROCEDURE — 25000242 PHARM REV CODE 250 ALT 637 W/ HCPCS: Performed by: NURSE PRACTITIONER

## 2021-12-28 PROCEDURE — 25000003 PHARM REV CODE 250: Performed by: INTERNAL MEDICINE

## 2021-12-28 RX ADMIN — PREDNISONE 10 MG: 10 TABLET ORAL at 09:12

## 2021-12-28 RX ADMIN — ESCITALOPRAM 5 MG: 5 TABLET, FILM COATED ORAL at 09:12

## 2021-12-28 RX ADMIN — FUROSEMIDE 20 MG: 20 TABLET ORAL at 09:12

## 2021-12-28 RX ADMIN — DOCUSATE SODIUM 100 MG: 100 CAPSULE, LIQUID FILLED ORAL at 09:12

## 2021-12-28 RX ADMIN — POTASSIUM CHLORIDE 10 MEQ: 10 CAPSULE, COATED, EXTENDED RELEASE ORAL at 09:12

## 2021-12-28 RX ADMIN — PRAVASTATIN SODIUM 20 MG: 20 TABLET ORAL at 09:12

## 2021-12-28 RX ADMIN — TIOTROPIUM BROMIDE INHALATION SPRAY 2 PUFF: 3.12 SPRAY, METERED RESPIRATORY (INHALATION) at 09:12

## 2021-12-28 RX ADMIN — ALPRAZOLAM 0.25 MG: 0.25 TABLET ORAL at 09:12

## 2021-12-28 RX ADMIN — PANTOPRAZOLE SODIUM 40 MG: 40 TABLET, DELAYED RELEASE ORAL at 09:12

## 2021-12-28 RX ADMIN — FLUTICASONE FUROATE AND VILANTEROL TRIFENATATE 1 PUFF: 100; 25 POWDER RESPIRATORY (INHALATION) at 09:12

## 2021-12-28 RX ADMIN — DILTIAZEM HYDROCHLORIDE 180 MG: 180 CAPSULE, COATED, EXTENDED RELEASE ORAL at 09:12

## 2021-12-28 RX ADMIN — PSYLLIUM HUSK 1 PACKET: 3.4 POWDER ORAL at 09:12

## 2021-12-28 RX ADMIN — APIXABAN 5 MG: 2.5 TABLET, FILM COATED ORAL at 09:12

## 2021-12-28 NOTE — NURSING
Discharge instruction reviewed with patient and patient daughter during Epic downtime. Patient daughter has access to MyChart and stated that she can access discharge instructions. Refills confirmed at patient pharmacy. Patient verbalized understanding and left SNF with daughter.

## 2021-12-28 NOTE — PLAN OF CARE
SW met with Pt's daughter to confirm discharge. No HME needed. Patient's daughter will be transporting pt to her home. Home Health has been referred to N for assignment. D/C set for 2 PM.    Rhoda Bauman [facility]  Case Management Department  [facility]  [email or web address]

## 2021-12-28 NOTE — PROGRESS NOTES
Patient finishing his dinner. New skin tear has new skin tear to lt hand, L shape. No bleeding noted. Site cleaned with sterile normal saline ,4x4 foam dressing applied.

## 2021-12-28 NOTE — PROGRESS NOTES
[facility] - Skilled Nursing  Wound Care    Patient Name:  Leyla Mari   MRN:  [id number]  Date: 2021  Diagnosis: COPD with acute exacerbation    History:     Past Medical History:   Diagnosis Date    Actinic keratosis     Arthritis     Cataract     Cellulitis of ankle     Colon polyps     COPD (chronic obstructive pulmonary disease)     home O2 (2L/min)     Family history of colon cancer     History of Clostridium difficile infection 7/3/2012    Hyperlipidemia     Hypertension     Lung nodules     Sinus tachycardia        Social History     Socioeconomic History    Marital status:    Occupational History    Occupation: retired   Tobacco Use    Smoking status: Former Smoker     Packs/day: 1.00     Years: 39.00     Pack years: 39.00     Types: Cigarettes     Quit date: 1995     Years since quittin.1    Smokeless tobacco: Never Used   Substance and Sexual Activity    Alcohol use: Yes     Alcohol/week: 2.0 standard drinks     Types: 2 Glasses of wine per week     Comment: rarely has a glass of wine- not daily    Drug use: No    Sexual activity: Never   Other Topics Concern    Are you pregnant or think you may be? No    Breast-feeding No     Social Determinants of Health     Financial Resource Strain: Low Risk     Difficulty of Paying Living Expenses: Not hard at all   Food Insecurity: No Food Insecurity    Worried About Running Out of Food in the Last Year: Never true    Ran Out of Food in the Last Year: Never true   Transportation Needs: No Transportation Needs    Lack of Transportation (Medical): No    Lack of Transportation (Non-Medical): No   Physical Activity: Insufficiently Active    Days of Exercise per Week: 7 days    Minutes of Exercise per Session: 10 min   Stress: Stress Concern Present    Feeling of Stress : To some extent   Social Connections: Socially Isolated    Frequency of Communication with Friends and Family: More than three times a week    Frequency of Social Gatherings with  Friends and Family: Twice a week    Attends Latter day Services: Never    Active Member of Clubs or Organizations: No    Attends Club or Organization Meetings: Never    Marital Status:    Housing Stability: Low Risk     Unable to Pay for Housing in the Last Year: No    Number of Places Lived in the Last Year: 1    Unstable Housing in the Last Year: No       Precautions:     Allergies as of 12/06/2021 - Reviewed 11/23/2021   Allergen Reaction Noted    Bactrim [sulfamethoxazole-trimethoprim] Nausea Only 07/02/2012    Codeine Itching 07/02/2012    Keflex [cephalexin] Other (See Comments) 07/12/2012    Ceftriaxone Rash 08/12/2014    Clindamycin hcl Rash 10/05/2016    Doxycycline Rash 05/08/2017    Vancomycin analogues Rash 08/12/2014       [facility] Assessment Details/Treatment   Ms. Mari is performing ADLs per self in wheelchair.  She is preparing for a shower with assist.  The left knee skin tear is approximated/closed, no erythema, no drainage  The right knee has a partial thickness skin tear, ecchymotic skin changes. Cleansed with wound cleanser, bleeding controled, mepore dressing placed over wound bed.  The right heel has decreased in size, no tenderness/pain, the wound bed has a tan slough over 1/3 of the wound base, 2/3 red/pink/moist. The periwound is dry intact. Wound cleanser with wound cleanser, triad ointment applied to wound base, sock placed over foot.    The BLE skin is intact, no weeping, no erythema, swelling throughout the BLE/feet.  The Tubigrip stocking were removed, socks applied in preparation for shower.   Dressings and tubigrip stockings at bedside. Ms. Mari plans on going home today.  Plan:  Right knee- foam dressing to knee- change 2xweek until resolved  BLE- tubigrip F stockings- remove, wash legs, moisturize then reapply  Right heel- cleanse with wound cleanser, apply Triad ointment every M-W-F then apply dressing.  Nursing to continue care, pressure prevention measures  Wound care will  follow-up prn as needed.   Recommendations made to primary team per written report for above plan . Orders placed.      12/28/21 0715   [REMOVED]      Altered Skin Integrity 12/07/21 0740 Sacral spine #1 Partial thickness tissue loss. Shallow open ulcer with a red or pink wound bed, without slough. Intact or Open/Ruptured Serum-filled blister.   Final Assessment Date: 12/28/21  Date First Assessed/Time First Assessed: 12/07/21 0740   Altered Skin Integrity Present on Admission: yes  Location: Sacral spine  Wound Number: #1  Is this injury device related?: No  Description of Altered Skin Integ...   Dressing Appearance Open to air   Drainage Amount None   Tissue loss description Not applicable        Altered Skin Integrity 12/07/21 0740 Right posterior Heel #2 Full thickness tissue loss. Base is covered by slough and/or eschar in the wound bed   Date First Assessed/Time First Assessed: 12/07/21 0740   Altered Skin Integrity Present on Admission: yes  Side: Right  Orientation: posterior  Location: Heel  Wound Number: #2  Description of Altered Skin Integrity: Full thickness tissue loss. Base i...   Description of Altered Skin Integrity Full thickness tissue loss. Base is covered by slough and/or eschar in the wound bed   Dressing Appearance Intact;Clean   Drainage Amount None   Drainage Characteristics/Odor No odor   Appearance Pink;Tan;Slough;Moist   Tissue loss description Full thickness   Periwound Area Intact;Dry   Wound Edges Open   Wound Length (cm) 1 cm   Wound Width (cm) 1 cm   Wound Surface Area (cm^2) 1 cm^2   Care Cleansed with:;Wound cleanser;Applied:;Skin Barrier   Dressing Removed   Dressing Change Due 12/28/21   [REMOVED]      Altered Skin Integrity 12/10/21 Left anterior Knee Skin Tear Partial thickness tissue loss. Shallow open ulcer with a red or pink wound bed, without slough. Intact or Open/Ruptured Serum-filled blister.   Final Assessment Date: 12/28/21  Date First Assessed: 12/10/21   Side: Left   Orientation: anterior  Location: Knee  Is this injury device related?: No  Primary Wound Type: Skin Tear  Description of Altered Skin Integrity: Partial thickness tissue loss...   Wound Image    Dressing Appearance Dry;Intact;Clean   Drainage Amount None   Appearance Intact;Pink;Dry   Tissue loss description Not applicable   Periwound Area Ecchymotic;Intact;Dry   Wound Edges Approximated;Rolled/closed   Dressing Removed        12/28/21 0715   [REMOVED]      Altered Skin Integrity 12/07/21 0740 Sacral spine #1 Partial thickness tissue loss. Shallow open ulcer with a red or pink wound bed, without slough. Intact or Open/Ruptured Serum-filled blister.   Final Assessment Date: 12/28/21  Date First Assessed/Time First Assessed: 12/07/21 0740   Altered Skin Integrity Present on Admission: yes  Location: Sacral spine  Wound Number: #1  Is this injury device related?: No  Description of Altered Skin Integ...   Dressing Appearance Open to air   Drainage Amount None   Tissue loss description Not applicable        Altered Skin Integrity 12/07/21 0740 Right posterior Heel #2 Full thickness tissue loss. Base is covered by slough and/or eschar in the wound bed   Date First Assessed/Time First Assessed: 12/07/21 0740   Altered Skin Integrity Present on Admission: yes  Side: Right  Orientation: posterior  Location: Heel  Wound Number: #2  Description of Altered Skin Integrity: Full thickness tissue loss. Base i...   Description of Altered Skin Integrity Full thickness tissue loss. Base is covered by slough and/or eschar in the wound bed   Dressing Appearance Intact;Clean   Drainage Amount None   Drainage Characteristics/Odor No odor   Appearance Pink;Tan;Slough;Moist   Tissue loss description Full thickness   Periwound Area Intact;Dry   Wound Edges Open   Wound Length (cm) 1 cm   Wound Width (cm) 1 cm   Wound Surface Area (cm^2) 1 cm^2   Care Cleansed with:;Wound cleanser;Applied:;Skin Barrier   Dressing Removed   Dressing Change  Due 12/28/21   [REMOVED]      Altered Skin Integrity 12/10/21 Left anterior Knee Skin Tear Partial thickness tissue loss. Shallow open ulcer with a red or pink wound bed, without slough. Intact or Open/Ruptured Serum-filled blister.   Final Assessment Date: 12/28/21  Date First Assessed: 12/10/21   Side: Left  Orientation: anterior  Location: Knee  Is this injury device related?: No  Primary Wound Type: Skin Tear  Description of Altered Skin Integrity: Partial thickness tissue loss...   Wound Image    Dressing Appearance Dry;Intact;Clean   Drainage Amount None   Appearance Intact;Pink;Dry   Tissue loss description Not applicable   Periwound Area Ecchymotic;Intact;Dry   Wound Edges Approximated;Rolled/closed   Dressing Removed     12/28/2021

## 2021-12-29 ENCOUNTER — TELEPHONE (OUTPATIENT)
Dept: PRIMARY CARE CLINIC | Facility: CLINIC | Age: 76
End: 2021-12-29
Payer: MEDICARE

## 2021-12-29 ENCOUNTER — TELEPHONE (OUTPATIENT)
Dept: INTERNAL MEDICINE | Facility: CLINIC | Age: 76
End: 2021-12-29
Payer: MEDICARE

## 2021-12-29 NOTE — HOSPITAL COURSE
Patient progressed well with PT and OT- last PT note states that patient ambulated***. Patient had no significant events during their stay at SNF. Home health was set up. DME was ordered if needed. Follow up appointment to be made by patient within one week. All prescriptions and discharge instructions were ordered to be given to the patient prior to discharge.

## 2021-12-29 NOTE — DISCHARGE SUMMARY
[facility] Medicine  Discharge Summary      Patient Name: Leyla Mari  MRN: [id number]  Patient Class: IP- SNF  Admission Date: 12/6/2021  Hospital Length of Stay: 22 days  Discharge Date and Time:12/28/21  Attending Physician: No att. providers found   Discharging Provider: Emerald Clark NP  Primary Care Provider: Lizbeth Dillard MD      HPI:   Ms. Mari is a 76 year old female with PMHx of end-stage COPD, PE (on eliquis), HTN and HLD who presents to SNF following hospitalization for COPD exacerbation.  Admission to SNF for secondary weakness and debility.      [place] [facility] with worsening SOB. History obtained from daughter and EMS records given patient's severe respiratory status. Of note, patient's daughter is a respiratory therapist w/ [facility]. Daughter states patient has been increasingly SOB for approximately 1 week. She is on continuous 2L O2 at home and has required more SpO2 to maintain SpO2 88-92%. Baseline function is limited by COPD but patient is able to ambulate w/ walker and requires an AID throughout the day.  On 11/22, patient became severely short of breath and was not able to talk in full sentances. Also endorsed increased sputum production and decreased PO intake. Patient is on long term steroids and takes prednisone 15mg daily. In the ED, patient hypertensive and tachycardic w/ SpO2 89% on CPAP. On examination, patient in respiratory distress w/ accessory muscle use and pursed lips. Decreased breath sounds in all lung fields w/ expiratory wheezes. Labs significant for WBC 14.47, pH 7.3, pCO2 81.9 and pO2 33. CXR w/ emphysematous changes. Patient received 1 x dose of Solumedrol 125mg in the ED. MICU consulted for increasing BiPAP requirements. Patient admitted to ICU for further evaluation. Patient admitted for COPD exacerbation and treated w/ duonebs, levoquin, IV Solu-Medrol and put on continuous BiPAP. Patient now w/  improving respiratory status - hypercapnia improved on VBG. Now w/ SpO2 88-92% on 5L NC. Will order BiPAP 4 hours on/off and QHS. Of note, patient w/ R LE cellulitis. Given hx of MRSA, will start Bactrim. Persistent BLE edema and CXR showed pulmonary edema so started on IV lasix 40 mg daily. TTE showed EF 65% and normal LV diastolic function. Transitioned to oral lasix 40 mg daily prior to discharge.       Today, patient reports dyspnea on exertion that is slightly beyond her baseline, her breathing at rest is at her baseline.  She is currently utilizing 3 L nasal cannula with SpO2 of 93-95%.  Patient states she is now having normal bowel movements.  Patient continues to have lower extremity edema that is improving.     Patient will be treated at [facility] with PT and OT to improve functional status and ability to perform ADLs.    Hospital Course:  Patient progressed well with PT and OT- last PT note states that patient [id number] w/ RW and SBA. Patient had no significant events during their stay at [facility]. pts chronic  BLE edema persisted.  Patient was able to be weaned back down to her home prednisone dose without any changes in her respiratory status.  Patient remained on her home dose of supplemental O2.  SNF Pharmacist met with patient to answer discharge medication questions. Home health was set up. DME was ordered if needed. Follow up appointment to be made by patient within one week. All prescriptions and discharge instructions were ordered to be given to the patient prior to discharge.     Goals of Care Treatment Preferences:  Code Status: DNR    Living Will: Yes  [place]POST: Yes           Consults:   Consults (From admission, onward)        Status Ordering Provider     Inpatient consult to Registered Dietitian/Nutritionist  Once        Provider:  (Not yet assigned)    Completed MARY STRINGER          No new Assessment & Plan notes have been filed under this hospital service since the last note was  generated.  Service: Hospital Medicine    Final Active Diagnoses:    Diagnosis Date Noted POA    PRINCIPAL PROBLEM:  COPD with acute exacerbation [J44.1] 12/03/2014 Yes    Moderate malnutrition [E44.0] 12/22/2021 Yes    Steroid-induced hyperglycemia [R73.9, T38.0X5A] 12/06/2021 Yes    Anxiety [F41.9] 11/26/2021 Yes    Peripheral neuropathy [G62.9] 11/26/2021 Yes    Acute on chronic respiratory failure with hypercapnia [J96.22] 08/16/2021 Yes    History of pulmonary embolism [Z86.711] 08/10/2021 Yes    Long term current use of systemic steroids [Z79.52] 08/22/2020 Not Applicable     Chronic    Panlobular emphysema [J43.1]  Yes     Chronic    DNR (do not resuscitate) discussion [Z71.89] 06/24/2015 Not Applicable    Hyperlipidemia [E78.5]  Yes     Chronic    Essential hypertension [I10]  Yes     Chronic      Problems Resolved During this Admission:       Discharged Condition: good    [place] [facility]    Follow Up:   Follow-up Information     Schedule an appointment as soon as possible for a visit with Lizbeth Dillard MD.    Specialty: Internal Medicine  Why: WITHIN 1 WEEK  Contact information:  [address]  [place] [id number] [phone]             Schedule an appointment as soon as possible for a visit with [facility] PULMONARY MEDICINE.    Specialty: Pulmonology  Contact information:  [address] [facility] [address]  [phone]                     Patient Instructions:      TRANSFER TUB BENCH FOR HOME USE     Order Specific Question Answer Comments   Type of Transfer Tub Bench: Unpadded    Height: 5' (1.524 m)    Weight: 43.6 kg (96 lb 1.9 oz)    Does patient have medical equipment at home? walker, rolling    Does patient have medical equipment at home? wheelchair    Does patient have medical equipment at home? bedside commode    Does patient have medical equipment at home? hospital bed    Does patient have medical equipment at home? lift device    Length  of need (1-99 months): 99    Patient notified - Not covered by insurance considered a convenience item Yes    Discussed financial responsibility with responsible party Yes      No driving until:   Order Comments: Cleared by PCP     Notify your health care provider if you experience any of the following:  temperature >100.4     Notify your health care provider if you experience any of the following:  persistent nausea and vomiting or diarrhea     Notify your health care provider if you experience any of the following:  severe uncontrolled pain     Notify your health care provider if you experience any of the following:  redness, tenderness, or signs of infection (pain, swelling, redness, odor or green/yellow discharge around incision site)     Notify your health care provider if you experience any of the following:  difficulty breathing or increased cough     Notify your health care provider if you experience any of the following:  severe persistent headache     Notify your health care provider if you experience any of the following:  worsening rash     Notify your health care provider if you experience any of the following:  persistent dizziness, light-headedness, or visual disturbances     Notify your health care provider if you experience any of the following:  increased confusion or weakness     Activity as tolerated       Significant Diagnostic Studies: Labs: BMP: No results for input(s): GLU, NA, K, CL, CO2, BUN, CREATININE, CALCIUM, MG in the last 48 hours. and CBC No results for input(s): WBC, HGB, HCT, PLT in the last 48 hours.    Pending Diagnostic Studies:     None         Medications:  Reconciled Home Medications:      Medication List      START taking these medications    melatonin 3 mg tablet  Commonly known as: MELATIN  Take 2 tablets (6 mg total) by mouth nightly as needed for Insomnia.     potassium chloride 10 MEQ Cpsr  Commonly known as: MICRO-K  Take 1 capsule (10 mEq total) by mouth once daily.      psyllium husk (aspartame) 3.4 gram Pwpk packet  Commonly known as: METAMUCIL  Take 1 packet by mouth once daily.        CHANGE how you take these medications    furosemide 20 MG tablet  Commonly known as: LASIX  Take 1 tablet (20 mg total) by mouth once daily.  What changed:   · medication strength  · how much to take     metFORMIN 500 MG tablet  Commonly known as: GLUCOPHAGE  Take 1 tablet (500 mg total) by mouth 2 (two) times daily with meals. Don't start taking until evaluated by primary care provider  What changed: additional instructions     * predniSONE 5 MG tablet  Commonly known as: DELTASONE  Take 1 tablet (5 mg total) by mouth every evening.  What changed: You were already taking a medication with the same name, and this prescription was added. Make sure you understand how and when to take each.     * predniSONE 10 MG tablet  Commonly known as: DELTASONE  Take 1 tablet (10 mg total) by mouth once daily.  What changed:   · medication strength  · See the new instructions.         * This list has 2 medication(s) that are the same as other medications prescribed for you. Read the directions carefully, and ask your doctor or other care provider to review them with you.            CONTINUE taking these medications    albuterol 90 mcg/actuation inhaler  Commonly known as: PROVENTIL/VENTOLIN HFA  INHALE 2 PUFFS INTO LUNGS EVERY 6 HOURS AS NEEDED FOR WHEEZING OR SHORTNESS OF BREATH     ALPRAZolam 0.25 MG tablet  Commonly known as: XANAX  Take 1 tablet (0.25 mg total) by mouth 3 (three) times daily as needed for Anxiety.     capsaicin 0.1 % Crea  Apply 1 drop topically daily as needed (foot pain).     cholecalciferol (vitamin D3) 10 mcg (400 unit) Cap  Take 1,000 Units by mouth once daily.     diltiaZEM 180 MG 24 hr capsule  Commonly known as: CARDIZEM CD  TAKE 1 CAPSULE (180 MG TOTAL) BY MOUTH ONCE DAILY.     ELIQUIS 5 mg Tab  Generic drug: apixaban  Take 1 tablet (5 mg total) by mouth 2 (two) times daily.      EScitalopram oxalate 5 MG Tab  Commonly known as: LEXAPRO  Take 1 tablet (5 mg total) by mouth once daily.     gabapentin 100 MG capsule  Commonly known as: NEURONTIN  Take 1 capsule (100 mg total) by mouth every evening.     INCRUSE ELLIPTA 62.5 mcg/actuation inhalation capsule  Generic drug: umeclidinium  INHALE 1 PUFF BY MOUTH DAILY     levalbuterol 1.25 mg/3 mL nebulizer solution  Commonly known as: XOPENEX  Take 1 vial (3 mLs, 1.25 mg total) by nebulization every 4 (four) hours as needed for Wheezing. Rescue     pantoprazole 40 MG tablet  Commonly known as: PROTONIX  Take 1 tablet (40 mg total) by mouth once daily.     polyethylene glycol 17 gram Pwpk  Commonly known as: GLYCOLAX  Take 17 g by mouth daily as needed (constipation).     pravastatin 20 MG tablet  Commonly known as: PRAVACHOL  TAKE 1 TABLET BY MOUTH EVERY DAY     SYMBICORT 160-4.5 mcg/actuation Hfaa  Generic drug: budesonide-formoterol 160-4.5 mcg  INHALE 2 PUFFS INTO THE LUNGS EVERY 12 HOURS        STOP taking these medications    ipratropium 0.02 % nebulizer solution  Commonly known as: ATROVENT            Indwelling Lines/Drains at time of discharge:   Lines/Drains/Airways     None                 Time spent on the discharge of patient: 0 minutes         Emerald Clark NP  Department of [facility] Medicine  [facility] - Skilled Nursing

## 2022-01-11 ENCOUNTER — TELEPHONE (OUTPATIENT)
Dept: PRIMARY CARE CLINIC | Facility: CLINIC | Age: 77
End: 2022-01-11

## 2022-01-11 ENCOUNTER — OFFICE VISIT (OUTPATIENT)
Dept: PRIMARY CARE CLINIC | Facility: CLINIC | Age: 77
End: 2022-01-11
Payer: MEDICARE

## 2022-01-11 DIAGNOSIS — E44.0 MODERATE MALNUTRITION: ICD-10-CM

## 2022-01-11 DIAGNOSIS — J43.2 CENTRILOBULAR EMPHYSEMA: ICD-10-CM

## 2022-01-11 DIAGNOSIS — I50.9 ACUTE ON CHRONIC HEART FAILURE, UNSPECIFIED HEART FAILURE TYPE: ICD-10-CM

## 2022-01-11 DIAGNOSIS — F41.9 ANXIETY: ICD-10-CM

## 2022-01-11 DIAGNOSIS — I26.99 ACUTE PULMONARY EMBOLISM, UNSPECIFIED PULMONARY EMBOLISM TYPE, UNSPECIFIED WHETHER ACUTE COR PULMONALE PRESENT: ICD-10-CM

## 2022-01-11 DIAGNOSIS — D69.2 SENILE PURPURA: ICD-10-CM

## 2022-01-11 DIAGNOSIS — T38.0X5A STEROID-INDUCED HYPERGLYCEMIA: ICD-10-CM

## 2022-01-11 DIAGNOSIS — J44.1 CHRONIC OBSTRUCTIVE PULMONARY DISEASE WITH (ACUTE) EXACERBATION: ICD-10-CM

## 2022-01-11 DIAGNOSIS — J96.22 ACUTE ON CHRONIC RESPIRATORY FAILURE WITH HYPERCAPNIA: Primary | ICD-10-CM

## 2022-01-11 DIAGNOSIS — R73.9 STEROID-INDUCED HYPERGLYCEMIA: ICD-10-CM

## 2022-01-11 DIAGNOSIS — I10 ESSENTIAL HYPERTENSION: ICD-10-CM

## 2022-01-11 DIAGNOSIS — E11.49 TYPE 2 DIABETES MELLITUS WITH OTHER NEUROLOGIC COMPLICATION, WITHOUT LONG-TERM CURRENT USE OF INSULIN: ICD-10-CM

## 2022-01-11 PROBLEM — J18.9 PNEUMONIA OF RIGHT LOWER LOBE DUE TO INFECTIOUS ORGANISM: Status: RESOLVED | Noted: 2020-08-22 | Resolved: 2022-01-11

## 2022-01-11 PROBLEM — E11.9 TYPE 2 DIABETES MELLITUS, WITHOUT LONG-TERM CURRENT USE OF INSULIN: Status: ACTIVE | Noted: 2022-01-11

## 2022-01-11 PROCEDURE — 3051F HG A1C>EQUAL 7.0%<8.0%: CPT | Mod: CPTII,95,, | Performed by: INTERNAL MEDICINE

## 2022-01-11 PROCEDURE — 1157F ADVNC CARE PLAN IN RCRD: CPT | Mod: CPTII,95,, | Performed by: INTERNAL MEDICINE

## 2022-01-11 PROCEDURE — 3051F PR MOST RECENT HEMOGLOBIN A1C LEVEL 7.0 - < 8.0%: ICD-10-PCS | Mod: CPTII,95,, | Performed by: INTERNAL MEDICINE

## 2022-01-11 PROCEDURE — 99215 OFFICE O/P EST HI 40 MIN: CPT | Mod: 95,,, | Performed by: INTERNAL MEDICINE

## 2022-01-11 PROCEDURE — 1157F PR ADVANCE CARE PLAN OR EQUIV PRESENT IN MEDICAL RECORD: ICD-10-PCS | Mod: CPTII,95,, | Performed by: INTERNAL MEDICINE

## 2022-01-11 PROCEDURE — 1111F PR DISCHARGE MEDS RECONCILED W/ CURRENT OUTPATIENT MED LIST: ICD-10-PCS | Mod: CPTII,95,, | Performed by: INTERNAL MEDICINE

## 2022-01-11 PROCEDURE — 99215 PR OFFICE/OUTPT VISIT, EST, LEVL V, 40-54 MIN: ICD-10-PCS | Mod: 95,,, | Performed by: INTERNAL MEDICINE

## 2022-01-11 PROCEDURE — 1111F DSCHRG MED/CURRENT MED MERGE: CPT | Mod: CPTII,95,, | Performed by: INTERNAL MEDICINE

## 2022-01-11 RX ORDER — FUROSEMIDE 20 MG/1
20 TABLET ORAL DAILY
Qty: 30 TABLET | Refills: 3 | Status: SHIPPED | OUTPATIENT
Start: 2022-01-11

## 2022-01-11 RX ORDER — TALC
6 POWDER (GRAM) TOPICAL NIGHTLY PRN
Refills: 0 | COMMUNITY
Start: 2022-01-11 | End: 2022-02-17

## 2022-01-11 RX ORDER — GABAPENTIN 100 MG/1
100 CAPSULE ORAL NIGHTLY
Qty: 90 CAPSULE | Refills: 3 | Status: SHIPPED | OUTPATIENT
Start: 2022-01-11 | End: 2022-04-27

## 2022-01-11 RX ORDER — POTASSIUM CHLORIDE 750 MG/1
10 CAPSULE, EXTENDED RELEASE ORAL DAILY
Qty: 90 CAPSULE | Refills: 3 | Status: SHIPPED | OUTPATIENT
Start: 2022-01-11

## 2022-01-11 RX ORDER — DILTIAZEM HYDROCHLORIDE 180 MG/1
180 CAPSULE, COATED, EXTENDED RELEASE ORAL DAILY
Qty: 90 CAPSULE | Refills: 3 | Status: SHIPPED | OUTPATIENT
Start: 2022-01-11

## 2022-01-11 RX ORDER — METFORMIN HYDROCHLORIDE 500 MG/1
500 TABLET ORAL 2 TIMES DAILY WITH MEALS
Qty: 180 TABLET | Refills: 3 | Status: SHIPPED | OUTPATIENT
Start: 2022-01-11 | End: 2023-01-11

## 2022-01-11 RX ORDER — UMECLIDINIUM 62.5 UG/1
AEROSOL, POWDER ORAL
Qty: 90 CAPSULE | Refills: 3 | Status: SHIPPED | OUTPATIENT
Start: 2022-01-11

## 2022-01-11 RX ORDER — INSULIN PUMP SYRINGE, 3 ML
EACH MISCELLANEOUS
Qty: 1 EACH | Refills: 0 | Status: SHIPPED | OUTPATIENT
Start: 2022-01-11

## 2022-01-11 RX ORDER — LANCETS
1 EACH MISCELLANEOUS 2 TIMES DAILY
Qty: 200 EACH | Refills: 3 | Status: SHIPPED | OUTPATIENT
Start: 2022-01-11

## 2022-01-11 RX ORDER — ALPRAZOLAM 0.25 MG/1
0.25 TABLET ORAL 3 TIMES DAILY PRN
Qty: 90 TABLET | Refills: 3 | Status: SHIPPED | OUTPATIENT
Start: 2022-01-11

## 2022-01-11 RX ORDER — CALCIUM CARB/VITAMIN D3/VIT K1 500-500-40
1000 TABLET,CHEWABLE ORAL DAILY
Qty: 30 CAPSULE | Refills: 11
Start: 2022-01-11 | End: 2022-02-17

## 2022-01-11 RX ORDER — PREDNISONE 5 MG/1
5 TABLET ORAL NIGHTLY
Qty: 30 TABLET | Refills: 11 | Status: SHIPPED | OUTPATIENT
Start: 2022-01-11

## 2022-01-11 RX ORDER — PREDNISONE 10 MG/1
10 TABLET ORAL DAILY
Qty: 30 TABLET | Refills: 11 | Status: SHIPPED | OUTPATIENT
Start: 2022-01-11

## 2022-01-11 RX ORDER — BUDESONIDE AND FORMOTEROL FUMARATE DIHYDRATE 160; 4.5 UG/1; UG/1
AEROSOL RESPIRATORY (INHALATION)
Qty: 10.2 G | Refills: 11 | Status: SHIPPED | OUTPATIENT
Start: 2022-01-11

## 2022-01-11 RX ORDER — PRAVASTATIN SODIUM 20 MG/1
TABLET ORAL
Qty: 90 TABLET | Refills: 3 | Status: SHIPPED | OUTPATIENT
Start: 2022-01-11

## 2022-01-11 RX ORDER — ALBUTEROL SULFATE 90 UG/1
AEROSOL, METERED RESPIRATORY (INHALATION)
Qty: 8.5 G | Refills: 12 | Status: SHIPPED | OUTPATIENT
Start: 2022-01-11

## 2022-01-11 RX ORDER — ESCITALOPRAM OXALATE 5 MG/1
5 TABLET ORAL DAILY
Qty: 90 TABLET | Refills: 3 | Status: SHIPPED | OUTPATIENT
Start: 2022-01-11 | End: 2023-01-11

## 2022-01-11 NOTE — TELEPHONE ENCOUNTER
Called [facility].  Confirmed that they had received fax with orders for diabetic supplies.

## 2022-01-11 NOTE — PROGRESS NOTES
"[facility] Provider Telemedicine Appointment      The patient location is:  Patient Home   The chief complaint leading to consultation is: Hospital Follow-up  Total time spent with patient: 40min    Visit type: Virtual visit with synchronous audio only and video  Each patient to whom he or she provides medical services by telemedicine is:  (1) informed of the relationship between the physician and patient and the respective role of any other health care provider with respect to management of the patient; and (2) notified that he or she may decline to receive medical services by telemedicine and may withdraw from such care at any time.      Subjective:      Patient ID: Leyla Mari is a 76 y.o. female with DM, end-stage COPD, a fib, PE    Chief Complaint: Hospital Follow Up    Prior to this visit, patient's last encounter with PCP was 10/1/2021.    HOSP DC: She had a recent hospital admission. Went to rehab and reportedly with intensive PT/OT:      Patient states "at this point I would go back to the hospital if this happens again, say tomorrow. I did benefit from the therapy while I was there." She does not have her med refills from the hospital discharge.    DM: She reports that her glucose increased repeatedly while she was in rehab. She continues to be on steroids. She was unaware of a diagnosis of DM, but she has had an elevated A1c of >7 for a year. She was discharged from hospital with metformin, but she has not picked this up or started it.    She does not have a glucometer at this time.     COPD: She is on prednisone 15mg daily. Needs pulm follow-up. She wants to focus treatments for her COPD that support comfort and keeping her in the home. Needs refills of all inhalers.    HM: She is due for for routine screening labs, does not want to leave the home for anything. Has Christ .    Advance Care Planning     Patient remains DNR, and is homebound, but is open to going back to the hospital " "should she have another COPD exacerbation. Patient states "at this point I would go back to the hospital if this happens again, say tomorrow. I did benefit from the therapy while I was there."         Past Surgical History:   Procedure Laterality Date    APPENDECTOMY      CATARACT EXTRACTION Bilateral      SECTION      x2    COLONOSCOPY N/A 10/10/2017    Procedure: COLONOSCOPY;  Surgeon: Omid Lino MD;  Location: [address]);  Service: Endoscopy;  Laterality: N/A;  pt unable to be checked in with previous order    EYE SURGERY      TONSILLECTOMY         Past Medical History:   Diagnosis Date    Actinic keratosis     Arthritis     Cataract     Cellulitis of ankle     Colon polyps     COPD (chronic obstructive pulmonary disease)     home O2 (2L/min)     Family history of colon cancer     History of Clostridium difficile infection 7/3/2012    Hyperlipidemia     Hypertension     Lung nodules     Sinus tachycardia     Type 2 diabetes mellitus, without long-term current use of insulin 2022       Review of Systems   Constitutional: Positive for activity change and fatigue.   Respiratory: Positive for cough and shortness of breath.    Psychiatric/Behavioral: The patient is nervous/anxious.        Objective:   There were no vitals taken for this visit.    Physical Exam    Lab Results   Component Value Date    WBC 11.00 2021    HGB 10.4 (L) 2021    HCT 35.4 (L) 2021     2021    CHOL 228 (H) 2019    TRIG 78 2019    HDL 79 (H) 2019    ALT 25 2021    AST 23 2021     2021    K 3.8 2021    CL 99 2021    CREATININE 0.5 2021    BUN 13 2021    CO2 29 2021    TSH 0.498 2020    INR 1.0 2021    HGBA1C 7.7 (H) 2021         Assessment:   76 y.o. female with multiple co-morbid illnesses here to continue work-up of chronic issues     Plan:     Problem List Items Addressed " This Visit        Psychiatric    Anxiety    Relevant Medications    melatonin (MELATIN) 3 mg tablet    gabapentin (NEURONTIN) 100 MG capsule    EScitalopram oxalate (LEXAPRO) 5 MG Tab    ALPRAZolam (XANAX) 0.25 MG tablet       Pulmonary    Chronic obstructive pulmonary disease with (acute) exacerbation    Relevant Medications    predniSONE (DELTASONE) 5 MG tablet    predniSONE (DELTASONE) 10 MG tablet    umeclidinium (INCRUSE ELLIPTA) 62.5 mcg/actuation inhalation capsule    cholecalciferol, vitamin D3, 10 mcg (400 unit) Cap    Other Relevant Orders    Ambulatory referral/consult to Pulmonology    Ambulatory referral/consult to [facility]    Centrilobular emphysema    Relevant Medications    albuterol (PROVENTIL/VENTOLIN HFA) 90 mcg/actuation inhaler    budesonide-formoterol 160-4.5 mcg (SYMBICORT) 160-4.5 mcg/actuation HFAA    Acute on chronic respiratory failure with hypercapnia - Primary     Needs refills of numerous inhalers, steroids, and pulm follow-up. Is not taking Vit D  · Refill all meds  · Referred to Pulm  · Daughter will make the appt            Cardiac/Vascular    Essential hypertension (Chronic)    Relevant Medications    diltiaZEM (CARDIZEM CD) 180 MG 24 hr capsule    Acute on chronic heart failure    Relevant Medications    potassium chloride (MICRO-K) 10 MEQ CpSR    furosemide (LASIX) 20 MG tablet       Hematology    Acute pulmonary embolism    Relevant Medications    apixaban (ELIQUIS) 5 mg Tab    Senile purpura     Ecchymoses and purpura on UE bilaterally  · monitor            Endocrine    Steroid-induced hyperglycemia     Now with DM, elevated related to steroid use  · Check A1c         Moderate malnutrition     Consistently low albumin, decreased functional status, homebound, low muscle tone, electrolyte abnormalities  · Monitor  · Advised realistic goals of care         Type 2 diabetes mellitus, without long-term current use of insulin     A1c elevated for past  year, steroid induced  · monitor         Relevant Medications    pravastatin (PRAVACHOL) 20 MG tablet    metFORMIN (GLUCOPHAGE) 500 MG tablet    blood-glucose meter kit    lancets (ACCU-CHEK SOFTCLIX LANCETS) Misc    blood sugar diagnostic Strp    Other Relevant Orders    Hemoglobin A1C    Comprehensive Metabolic Panel    CBC Auto Differential    Microalbumin/Creatinine Ratio, Urine    SUBSEQUENT HOME HEALTH ORDERS          Health Maintenance       Date Due Completion Date    Diabetes Urine Screening Never done ---TODAY    TETANUS VACCINE Never done ---    Shingles Vaccine (2 of 3) 05/28/2014 4/2/2014    DEXA SCAN 05/08/2020 5/8/2017    Hemoglobin A1c 11/23/2021 5/23/2021- TODAY    Colonoscopy 10/10/2022 10/10/2017          Follow up in about 3 months (around 4/11/2022). One hour spent with this patient today, more than half of that in counseling on the following issues: COPD, goals of care,     Lizbeth Dillard MD/MPH  [facility]  [phone]

## 2022-01-11 NOTE — ASSESSMENT & PLAN NOTE
Consistently low albumin, decreased functional status, homebound, low muscle tone, electrolyte abnormalities  · Monitor  · Advised realistic goals of care

## 2022-01-11 NOTE — ASSESSMENT & PLAN NOTE
Needs refills of numerous inhalers, steroids, and pulm follow-up. Is not taking Vit D  · Refill all meds  · Referred to Pulm  · Daughter will make the appt

## 2022-01-14 PROCEDURE — G0179 PR HOME HEALTH MD RECERTIFICATION: ICD-10-PCS | Mod: ,,, | Performed by: SURGERY

## 2022-01-14 PROCEDURE — G0179 MD RECERTIFICATION HHA PT: HCPCS | Mod: ,,, | Performed by: SURGERY

## 2022-01-15 ENCOUNTER — PES CALL (OUTPATIENT)
Dept: HOME HEALTH SERVICES | Facility: CLINIC | Age: 77
End: 2022-01-15
Payer: MEDICARE

## 2022-01-21 ENCOUNTER — EXTERNAL HOME HEALTH (OUTPATIENT)
Dept: HOME HEALTH SERVICES | Facility: HOSPITAL | Age: 77
End: 2022-01-21
Payer: MEDICARE

## 2022-01-21 ENCOUNTER — PES CALL (OUTPATIENT)
Dept: ADMINISTRATIVE | Facility: CLINIC | Age: 77
End: 2022-01-21
Payer: MEDICARE

## 2022-01-25 ENCOUNTER — CARE AT HOME (OUTPATIENT)
Dept: HOME HEALTH SERVICES | Facility: CLINIC | Age: 77
End: 2022-01-25
Payer: MEDICARE

## 2022-01-25 DIAGNOSIS — Z51.5 PALLIATIVE CARE ENCOUNTER: Primary | ICD-10-CM

## 2022-01-25 DIAGNOSIS — R53.83 FATIGUE, UNSPECIFIED TYPE: ICD-10-CM

## 2022-01-25 DIAGNOSIS — F41.9 ANXIETY: ICD-10-CM

## 2022-01-25 DIAGNOSIS — R06.02 SHORTNESS OF BREATH: ICD-10-CM

## 2022-01-25 DIAGNOSIS — R53.81 PHYSICAL DEBILITY: ICD-10-CM

## 2022-01-25 DIAGNOSIS — J43.9 PULMONARY EMPHYSEMA, UNSPECIFIED EMPHYSEMA TYPE: ICD-10-CM

## 2022-01-25 DIAGNOSIS — J44.1 CHRONIC OBSTRUCTIVE PULMONARY DISEASE WITH (ACUTE) EXACERBATION: ICD-10-CM

## 2022-01-25 DIAGNOSIS — Z99.81 OXYGEN DEPENDENT: ICD-10-CM

## 2022-01-25 PROCEDURE — 99497 PR ADVNCD CARE PLAN 30 MIN: ICD-10-PCS | Mod: S$GLB,,, | Performed by: NURSE PRACTITIONER

## 2022-01-25 PROCEDURE — 1111F PR DISCHARGE MEDS RECONCILED W/ CURRENT OUTPATIENT MED LIST: ICD-10-PCS | Mod: CPTII,S$GLB,, | Performed by: NURSE PRACTITIONER

## 2022-01-25 PROCEDURE — 99499 UNLISTED E&M SERVICE: CPT | Mod: S$GLB,,, | Performed by: NURSE PRACTITIONER

## 2022-01-25 PROCEDURE — 99350 PR HOME VISIT,ESTAB PATIENT,LEVEL IV: ICD-10-PCS | Mod: S$GLB,,, | Performed by: NURSE PRACTITIONER

## 2022-01-25 PROCEDURE — 1111F DSCHRG MED/CURRENT MED MERGE: CPT | Mod: CPTII,S$GLB,, | Performed by: NURSE PRACTITIONER

## 2022-01-25 PROCEDURE — 99350 HOME/RES VST EST HIGH MDM 60: CPT | Mod: S$GLB,,, | Performed by: NURSE PRACTITIONER

## 2022-01-25 PROCEDURE — 99499 RISK ADDL DX/OHS AUDIT: ICD-10-PCS | Mod: S$GLB,,, | Performed by: NURSE PRACTITIONER

## 2022-01-25 PROCEDURE — 99497 ADVNCD CARE PLAN 30 MIN: CPT | Mod: S$GLB,,, | Performed by: NURSE PRACTITIONER

## 2022-01-25 NOTE — PROGRESS NOTES
[facility]  Palliative Care Home Visit    Visit Date:  2022  Encounter Provider:   PCP:  Lizbeth Dillard MD    Subjective:      Patient ID: Leyla Mari is a 76 y.o. female.    Consult Requested By:  Lizbeth Dillard M.D.  Reason for Consult:  Palliative Care     Chief Complaint: Palliative Care follow-up    Outpatient Palliative Care Encounter:    Patient is being seen at home due to physical debility that presents a taxing effort to leave the home, to mitigate high risk of hospital readmission and/or due to the limited availability of reliable or safe options for transportation to the point of access to the provider. Prior to treatment on this visit the chart was reviewed and patient/family verbal consent was obtained.        PMHx:  Ms. Leyla Mari is a 75 y/o with PMHx of Chronic obstructive pulmonary disease, lung nodules, centrilobular emphysema, pneumonia of right lower lobe, essential hypertension, hyperlipidemia, sinus tachycardia, venous statis, hyponatremia, hypercalcemia, mixed acid base balance disorder, acute and chronic pulmonary embolism, long term use of anticoagulation, bilateral lower extremity edema, colon polyps, arthritis, cataract, c-diff 7/3/2012, and blister of left lower leg.     Former smoker 1 ppd for 39 years. Quit 1995. 2 standard drinks of alcohol/week    PSHx:  Past surgical history colonoscopy 10/10/2017, bilateral cataract extraction , appendectomy,  section, eye surgery, and tonsillectomy.      Social History:  Ms. Mari was  to her  Kee for 42 years until he passed away in  from Good Pasture's Syndrome (effecting kidneys and lungs). Patient has 2 adult children (1 son and 1 daughter), and she has 2 grandchildren. Ms. Mayer has 1 sister, who is 82 years old and living.  served 6 months active duty in the [employer] and then 6-8 years in the [employer]. Patient worked doing secretarial work. 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 Retired in 2005. Lives at home with her son.    Ms. Mari liked to go to festivals, drink alcohol, dance, and play cards.       Impression:    Ms. Mari was seen today@home for a palliative care follow-up visit. Patient is sitting at the kitchen table. Spoke with daughter Laney prior to my arrival and states her son Aleks has been staying at the house with Ms. Mari. He will leave the door unlocked for NP visit.    No c/o pain and no recent falls; she is ambulatory short distances with walker.       No fever, chills, sore throat  +SOB with exertion; 02 dependent on 3LNC continuous  +runny nose in the morning      Appetite is good and eating well  Sleeping good at night>>>takes Gabapentin for tingling in the feet  Occasionally takes Melatonin at night    No burning with urination      Note:  Currently has [facility] seeing patient  Receives wound care for callus 1 time a week; nurse coming 1 x week  PT coming and ends 1/13  OT coming and ends 2/1  Nurse visits end 1/31    Right heel >>>cracked heel per patient  Wounds on legs have improved    Bilateral LE edema noted      Goals of Care:  I initiated the process of advance care planning today and explained the importance of this process to the patient and family.  I introduced the concept of advance directives to the patient, as well. Then the patient received detailed information about the importance of designating a Health Care Power of  (HCPOA). She was also instructed to communicate with this person about her wishes for future healthcare, should she become sick and lose decision-making capacity.    I Introduced LaPOST form with patient/family, explaining this is the patient's wishes, and this form will be uploaded into the patient's [facility] and the [place] Registry.     1/25 Discussed goals of care and she would like to walk down steps in house to go  outside      PLAN:  1. Follow-up with Palliative Care NP in 4-6 weeks on 3/22/2022@home visit  2. Continue all medications  3. F/u with PCP as needed  4. In Emergency, call 911 or go to ED; notify PCP or Palliative Care NP  5. Recommend Hospice evaluation and admission  6. Continue HH to do wound care>>until patient signs to hospice  7. Keep BLE elevated to reduce edema    Review of Systems   Constitutional: Positive for activity change (improved), fatigue. Negative for appetite change, chills and fever.   HENT: Positive for rhinorrhea. Negative for postnasal drip, sore throat and trouble swallowing.    Respiratory: Positive for cough (np), shortness of breath (with exertion) and wheezing (occasional wheezing).  Anxiety brings on SOB   Cardiovascular: Negative for palpitations and leg swelling (feet and calf swelling). Negative for chest pain.   Gastrointestinal: Negative for diarrhea, nausea and vomiting.   Genitourinary: No burning with urination   Musculoskeletal: Positive for gait problem. Negative for arthralgias, back pain and neck pain.   Skin: Positive for wound (right leg). Negative for rash.   Neurological: Positive for dizziness (at times and with bending), light-headedness and numbness tingling (bilateral feet). Negative for seizures and syncope.   Hematological: Bruises/bleeds easily.   Psychiatric/Behavioral: Negative for confusion, hallucinations and sleep disturbance. The patient is nervous/anxious.      Assessments:  · Environmental: single story house, clean, uncluttered, good lighting  · Functional Status: needs assistance with bathing, dressing; able to feed self   · Safety: fall precautions and covid 19 precautions  · Nutritional: eats 2-3 meals a day; needs to drink protein shakes  · Home Health/DME/Supplies: rollator, wheelchair, bedside commode, walker, toilet extender, 0xygen concentrator, portable 02, 02 tanks  · [facility]    History:  Past Medical History:   Diagnosis Date     Actinic keratosis     Arthritis     Cataract     Cellulitis of ankle     Colon polyps     COPD (chronic obstructive pulmonary disease)     home O2 (2L/min)     Family history of colon cancer     History of Clostridium difficile infection 7/3/2012    Hyperlipidemia     Hypertension     Lung nodules     Sinus tachycardia      Family History   Problem Relation Age of Onset    Cancer Mother 79        colon    Heart disease Mother     Hyperlipidemia Mother     Hypertension Mother     Skin cancer Mother     Cataracts Mother     Glaucoma Mother     Thyroid disease Mother     COPD Father     Diabetes Father     Heart disease Father     Hyperlipidemia Father     Hypertension Father     Cancer Sister     Heart disease Sister     Hyperlipidemia Sister     Hypertension Sister     Skin cancer Sister     Cataracts Sister     Breast cancer Sister     Cancer Son     Blindness Paternal Grandmother     Diabetes Paternal Grandmother     Thyroid disease Daughter     Melanoma Neg Hx     Psoriasis Neg Hx     Lupus Neg Hx     Eczema Neg Hx     Amblyopia Neg Hx     Macular degeneration Neg Hx     Retinal detachment Neg Hx     Strabismus Neg Hx     Stroke Neg Hx      Past Surgical History:   Procedure Laterality Date    APPENDECTOMY      CATARACT EXTRACTION Bilateral      SECTION      x2    COLONOSCOPY N/A 10/10/2017    Procedure: COLONOSCOPY;  Surgeon: Omid Lino MD;  Location: [facility] ([address] [place] [address]);  Service: Endoscopy;  Laterality: N/A;  pt unable to be checked in with previous order    EYE SURGERY      TONSILLECTOMY       Review of patient's allergies indicates:   Allergen Reactions    Bactrim [sulfamethoxazole-trimethoprim] Nausea Only    Codeine Itching    Keflex [cephalexin] Other (See Comments)     Rhinorrhea, nausea. Tolerated Ceftriaxone 2014    Ceftriaxone Rash     Morbilliform rash after receiving IV ceftriaxone    Clindamycin hcl Rash    Doxycycline Rash     Vancomycin analogues Rash     Morbilliform rash after receiving IV vancomycin       Medications:    Current Outpatient Medications:     albuterol (PROVENTIL/VENTOLIN HFA) 90 mcg/actuation inhaler, INHALE 2 PUFFS INTO LUNGS EVERY 6 HOURS AS NEEDED FOR WHEEZING OR SHORTNESS OF BREATH, Disp: 18 g, Rfl: 12    ALPRAZolam (XANAX) 0.25 MG tablet, Take 1 tablet (0.25 mg total) by mouth 3 (three) times daily as needed for Anxiety., Disp: 90 tablet, Rfl: 3    apixaban (ELIQUIS) 5 mg Tab, Take 1 tablet (5 mg total) by mouth 2 (two) times daily., Disp: 60 tablet, Rfl: 2    budesonide-formoterol 160-4.5 mcg (SYMBICORT) 160-4.5 mcg/actuation HFAA, INHALE 2 PUFFS INTO THE LUNGS EVERY 12 HOURS, Disp: 10.2 g, Rfl: 11    cholecalciferol, vitamin D3, 10 mcg (400 unit) Cap, Take 1,000 Units by mouth once daily., Disp: , Rfl:     diltiaZEM (CARDIZEM CD) 180 MG 24 hr capsule, TAKE 1 CAPSULE (180 MG TOTAL) BY MOUTH ONCE DAILY., Disp: 90 capsule, Rfl: 3    gabapentin (NEURONTIN) 100 MG capsule, Take 1 capsule (100 mg total) by mouth every evening., Disp: 30 capsule, Rfl: 2    pravastatin (PRAVACHOL) 20 MG tablet, TAKE 1 TABLET BY MOUTH EVERY DAY, Disp: 90 tablet, Rfl: 2    predniSONE (DELTASONE) 5 MG tablet, take 1 or 2 tablets by mouth once daily (Patient taking differently: Take 2 in the morning and one at night.), Disp: 60 tablet, Rfl: 11    umeclidinium (INCRUSE ELLIPTA) 62.5 mcg/actuation inhalation capsule, INHALE 1 PUFF BY MOUTH DAILY, Disp: 30 each, Rfl: 11    capsaicin 0.1 % Crea, Apply 1 drop topically daily as needed (foot pain)., Disp: 42.5 g, Rfl: 0    EScitalopram oxalate (LEXAPRO) 5 MG Tab, Take 1 tablet (5 mg total) by mouth once daily., Disp: 30 tablet, Rfl: 11    ipratropium (ATROVENT) 0.02 % nebulizer solution, Take 2.5 mLs (500 mcg total) by nebulization 4 (four) times daily. Rescue, Disp: 62.5 mL, Rfl: 5    levalbuterol (XOPENEX) 1.25 mg/3 mL nebulizer solution, Take 1 vial (3 mLs, 1.25 mg total) by  nebulization every 4 (four) hours as needed for Wheezing. Rescue, Disp: 540 mL, Rfl: 3    pantoprazole (PROTONIX) 40 MG tablet, Take 1 tablet (40 mg total) by mouth once daily., Disp: 30 tablet, Rfl: 11    24h Oral Morphine Equivalents (OME):  n/a    Objective:     Physical Exam:  Vitals:    1/25/2022   BP: 140/70 left arm   Pulse: 79   Resp: 18   Temp: 97.7 °F (36.3 °C)   TempSrc: Temporal   SpO2: (!) 96% on 02 3LNC continuous   PainSc: 0-No pain     There is no height or weight on file to calculate BMI.    Physical Exam  Vitals and nursing note reviewed.   Constitutional:       Appearance: Normal appearance.   HENT:      Head: Normocephalic and atraumatic.      Nose: Rhinorrhea present.      Mouth/Throat:      Mouth: Mucous membranes are moist.      Pharynx: Oropharynx is clear.   Eyes:      Extraocular Movements: Extraocular movements intact.      Conjunctiva/sclera: Conjunctivae normal.   Cardiovascular:      Rate and Rhythm: Normal rate and regular rhythm.      Pulses: Normal pulses.      Heart sounds: Murmur (grade II/VI) heard.   Pulmonary:      Effort: Pulmonary effort is normal.      Breath sounds: Normal breath sounds.      Comments: BBS CTA diminished to bases;wearing 02 3LNC continuous  Abdominal:      General: Bowel sounds are normal. There is no distension.   Musculoskeletal:         General: Swelling present. Normal range of motion.      Cervical back: Normal range of motion and neck supple. No rigidity.      Right lower leg: Edema (BLE edema 3-4+ pitting) present.      Left lower leg: Edema (BLE edema 2-3+ pitting) present.   Skin:     General: Skin is warm and dry.      Capillary Refill: Capillary refill takes 2 to 3 seconds.      Coloration: Skin is pale.      Findings: Bruising (dark purple bruising to bilateral hands) present.      Comments: Wound to left lower extremity with dressing c/d/i. Wounds are improving  Neurological:      Mental Status: She is alert and oriented to person, place, and  time.      Motor: Weakness present.      Gait: Gait abnormal.      Comments: AA&Ox 2 person, place; forgetful   Psychiatric:         Mood and Affect: Mood normal.         Behavior: Behavior normal.         Thought Content: Thought content normal.         Judgment: Judgment normal.     Review of Symptoms    Symptom Assessment (ESAS 0-10 Scale)  Pain:  0  Dyspnea:  0  Anxiety:  0  Nausea:  0  Depression:  2  Anorexia:  0  Fatigue: 2  Insomnia:  0  Restlessness:  0  Agitation:  0     CAM / Delirium:  Negative  Constipation:  Positive  Diarrhea:  Negative    Comments:  LBM  1/24 normal    Pain Assessment:  Location(s): none      Modified Sheila Scale:  7    Performance Status:  50    Karnofsky Performance Scale:  50%    Living Arrangements:  Lives with family    Spiritual:  F - Mili and Belief:  [employer]  I - Importance:  Highly   C - [facility]:  [facility]  A - Address in Care:  No spiritual needs identified      Time-Based Charting:  No      Advance Care Planning   Advance Directives:   Living Will: Yes        Copy on chart: Yes        Oral Declaration: No    LaPOST: No (form left at home with patient and family)    Do Not Resuscitate Status: Yes    Medical Power of : Yes        Oral Declaration: No    Agent's Name:   Laney Martinez (daughter)   Agent's Contact Number:  [phone]    Decision Making:  Patient answered questions and Family answered questions         Labs:  CBC:   WBC   Date Value Ref Range Status   08/16/2021 7.58 3.90 - 12.70 K/uL Final       Hemoglobin   Date Value Ref Range Status   08/16/2021 11.7 (L) 12.0 - 16.0 g/dL Final       POC Hematocrit   Date Value Ref Range Status   08/15/2021 36 36 - 54 %PCV Final     Hematocrit   Date Value Ref Range Status   08/16/2021 37.2 37.0 - 48.5 % Final       MCV   Date Value Ref Range Status   08/16/2021 99 (H) 82 - 98 fL Final       Platelets   Date Value Ref Range Status   08/16/2021 214 150 - 450 K/uL Final       LFT:   Lab  Results   Component Value Date    AST 17 08/16/2021    ALKPHOS 51 (L) 08/16/2021    BILITOT 0.3 08/16/2021       Albumin:   Albumin   Date Value Ref Range Status   08/16/2021 2.5 (L) 3.5 - 5.2 g/dL Final     Protein:   Total Protein   Date Value Ref Range Status   08/16/2021 5.1 (L) 6.0 - 8.4 g/dL Final       Radiology:  I have reviewed all pertinent imaging results/findings within the past 24 hours.      Assessment:     1. Palliative care encounter    2. Pulmonary emphysema, unspecified emphysema type    3. Counseling regarding advance care planning and goals of care    4. Oxygen dependent    5. Physical debility    6. Chronic obstructive pulmonary disease, unspecified COPD type    7. Fatigue, unspecified type    8. anxiety       Plan:     Assessment and Plan:    Palliative care encounter  -palliative care referral placed  -initiate palliative care services today  -Reviewed LaPOST Form & left form at patient's home    -CODE STATUS>>>DNR  -Discussed Palliative care and Hospice  -Patient/family want to continue with Palliative care services  -9/28 left LaPOST form at patient's house to have family to complete      Counseling regarding advance care planning and goals of care  -9/28 Discussed goals of care with Ms. Mari and family and she would like to get stronger to be able to walk again and to ambulate on her own  -1/25 discussed goals of care with patient and she would like to get stronger and able to ambulate down steps in her living room    Were controlled substances prescribed?  No    Total clinical care time was 60min. Reviewed Chart and PCP's notes thenThe following issues were discussed:  PMHx, PSHx, Social history, medications, diet, COPD, oxygen dependent, fatigue, wound to Bilateral lower extremities, pulmonary emphysema, physical debility, bilateral lower extremity edema, anxiety,     An additional 30 minutes of the visit was spent in advanced care planning.  Explained to patient what palliative care is  and palliative care vs home health and hospice. Reviewed LaPOST and AD with patient. Discussed goals of care     Follow Up Appointments:     1. F/u appt on 3/22/2022 with Palliative Care NP Joanne [email or web address]@home visit    Patient and family agreed via verbal consent to access medical records and for assessment and treatment during this visit.    Attestation: Screening criteria to assess the level of the patient's risk for infection with COVID-19 as recommended by the CDC at the time of the above documented home visit concluded appropriateness to proceed.     Universal precautions were maintained at all times, including provider use of >60% alcohol gel hand  immediately prior to entry and upon departing the patient's home as well as cleaning of equipment used in home visit with antibacterial/germicidal disposable wipes.    Patient has not been exposed to anyone with the virus (to the patient's knowledge)  Patient has not been out of the country in the last 14 days.    NP Nurse wore face mask entire length of visit    Joanne Durham DNP, FNP-C  [facility]  [phone]

## 2022-01-26 ENCOUNTER — DOCUMENT SCAN (OUTPATIENT)
Dept: HOME HEALTH SERVICES | Facility: HOSPITAL | Age: 77
End: 2022-01-26
Payer: MEDICARE

## 2022-02-04 ENCOUNTER — TELEPHONE (OUTPATIENT)
Dept: ADMINISTRATIVE | Facility: CLINIC | Age: 77
End: 2022-02-04
Payer: MEDICARE

## 2022-02-07 ENCOUNTER — EXTERNAL HOME HEALTH (OUTPATIENT)
Dept: HOME HEALTH SERVICES | Facility: HOSPITAL | Age: 77
End: 2022-02-07
Payer: MEDICARE

## 2022-02-07 ENCOUNTER — DOCUMENT SCAN (OUTPATIENT)
Dept: HOME HEALTH SERVICES | Facility: HOSPITAL | Age: 77
End: 2022-02-07
Payer: MEDICARE

## 2022-02-07 ENCOUNTER — TELEPHONE (OUTPATIENT)
Dept: ADMINISTRATIVE | Facility: CLINIC | Age: 77
End: 2022-02-07
Payer: MEDICARE

## 2022-02-07 ENCOUNTER — OFFICE VISIT (OUTPATIENT)
Dept: INTERNAL MEDICINE | Facility: CLINIC | Age: 77
End: 2022-02-07
Payer: MEDICARE

## 2022-02-07 DIAGNOSIS — R26.9 ABNORMALITY OF GAIT AND MOBILITY: ICD-10-CM

## 2022-02-07 DIAGNOSIS — I70.0 ATHEROSCLEROSIS OF AORTA: ICD-10-CM

## 2022-02-07 DIAGNOSIS — E11.40 TYPE 2 DIABETES, CONTROLLED, WITH NEUROPATHY: ICD-10-CM

## 2022-02-07 DIAGNOSIS — E44.0 MODERATE MALNUTRITION: ICD-10-CM

## 2022-02-07 DIAGNOSIS — J84.10 CALCIFIED GRANULOMA OF LUNG: ICD-10-CM

## 2022-02-07 DIAGNOSIS — J43.1 PANLOBULAR EMPHYSEMA: Chronic | ICD-10-CM

## 2022-02-07 DIAGNOSIS — Z99.81 DEPENDENCE ON SUPPLEMENTAL OXYGEN: ICD-10-CM

## 2022-02-07 DIAGNOSIS — I27.82 CHRONIC PULMONARY EMBOLISM, UNSPECIFIED PULMONARY EMBOLISM TYPE, UNSPECIFIED WHETHER ACUTE COR PULMONALE PRESENT: ICD-10-CM

## 2022-02-07 DIAGNOSIS — Z00.00 ENCOUNTER FOR PREVENTIVE HEALTH EXAMINATION: Primary | ICD-10-CM

## 2022-02-07 DIAGNOSIS — J43.2 CENTRILOBULAR EMPHYSEMA: ICD-10-CM

## 2022-02-07 DIAGNOSIS — Z79.01 CURRENT USE OF LONG TERM ANTICOAGULATION: ICD-10-CM

## 2022-02-07 DIAGNOSIS — Z99.89 DEPENDENCE ON OTHER ENABLING MACHINES AND DEVICES: ICD-10-CM

## 2022-02-07 DIAGNOSIS — D69.2 SENILE PURPURA: ICD-10-CM

## 2022-02-07 PROCEDURE — 3051F HG A1C>EQUAL 7.0%<8.0%: CPT | Mod: CPTII,S$GLB,, | Performed by: NURSE PRACTITIONER

## 2022-02-07 PROCEDURE — 1170F FXNL STATUS ASSESSED: CPT | Mod: CPTII,S$GLB,, | Performed by: NURSE PRACTITIONER

## 2022-02-07 PROCEDURE — 3288F FALL RISK ASSESSMENT DOCD: CPT | Mod: CPTII,S$GLB,, | Performed by: NURSE PRACTITIONER

## 2022-02-07 PROCEDURE — 1101F PT FALLS ASSESS-DOCD LE1/YR: CPT | Mod: CPTII,S$GLB,, | Performed by: NURSE PRACTITIONER

## 2022-02-07 PROCEDURE — 99499 RISK ADDL DX/OHS AUDIT: ICD-10-PCS | Mod: 95,,, | Performed by: NURSE PRACTITIONER

## 2022-02-07 PROCEDURE — G0439 PR MEDICARE ANNUAL WELLNESS SUBSEQUENT VISIT: ICD-10-PCS | Mod: 95,,, | Performed by: NURSE PRACTITIONER

## 2022-02-07 PROCEDURE — 1159F PR MEDICATION LIST DOCUMENTED IN MEDICAL RECORD: ICD-10-PCS | Mod: CPTII,S$GLB,, | Performed by: NURSE PRACTITIONER

## 2022-02-07 PROCEDURE — G0439 PPPS, SUBSEQ VISIT: HCPCS | Mod: 95,,, | Performed by: NURSE PRACTITIONER

## 2022-02-07 PROCEDURE — 1159F MED LIST DOCD IN RCRD: CPT | Mod: CPTII,S$GLB,, | Performed by: NURSE PRACTITIONER

## 2022-02-07 PROCEDURE — 99499 UNLISTED E&M SERVICE: CPT | Mod: 95,,, | Performed by: NURSE PRACTITIONER

## 2022-02-07 PROCEDURE — 1157F ADVNC CARE PLAN IN RCRD: CPT | Mod: CPTII,S$GLB,, | Performed by: NURSE PRACTITIONER

## 2022-02-07 PROCEDURE — 1101F PR PT FALLS ASSESS DOC 0-1 FALLS W/OUT INJ PAST YR: ICD-10-PCS | Mod: CPTII,S$GLB,, | Performed by: NURSE PRACTITIONER

## 2022-02-07 PROCEDURE — 1160F RVW MEDS BY RX/DR IN RCRD: CPT | Mod: CPTII,S$GLB,, | Performed by: NURSE PRACTITIONER

## 2022-02-07 PROCEDURE — 1160F PR REVIEW ALL MEDS BY PRESCRIBER/CLIN PHARMACIST DOCUMENTED: ICD-10-PCS | Mod: CPTII,S$GLB,, | Performed by: NURSE PRACTITIONER

## 2022-02-07 PROCEDURE — 1170F PR FUNCTIONAL STATUS ASSESSED: ICD-10-PCS | Mod: CPTII,S$GLB,, | Performed by: NURSE PRACTITIONER

## 2022-02-07 PROCEDURE — 1157F PR ADVANCE CARE PLAN OR EQUIV PRESENT IN MEDICAL RECORD: ICD-10-PCS | Mod: CPTII,S$GLB,, | Performed by: NURSE PRACTITIONER

## 2022-02-07 PROCEDURE — 3288F PR FALLS RISK ASSESSMENT DOCUMENTED: ICD-10-PCS | Mod: CPTII,S$GLB,, | Performed by: NURSE PRACTITIONER

## 2022-02-07 PROCEDURE — 3051F PR MOST RECENT HEMOGLOBIN A1C LEVEL 7.0 - < 8.0%: ICD-10-PCS | Mod: CPTII,S$GLB,, | Performed by: NURSE PRACTITIONER

## 2022-02-07 NOTE — PATIENT INSTRUCTIONS
Counseling and Referral of Other Preventative  (Italic type indicates deductible and co-insurance are waived)    Patient Name: Leyla Mari  Today's Date: 2/7/2022    Health Maintenance       Date Due Completion Date    Diabetes Urine Screening Never done ---    TETANUS VACCINE Never done ---    Shingles Vaccine (2 of 3) 05/28/2014 4/2/2014    DEXA SCAN 05/08/2020 5/8/2017    Hemoglobin A1c 11/23/2021 5/23/2021    Colonoscopy 10/10/2022 10/10/2017        No orders of the defined types were placed in this encounter.    The following information is provided to all patients.  This information is to help you find resources for any of the problems found today that may be affecting your health:                Living healthy guide: www.[facility].[place].gov      Understanding Diabetes: www.diabetes.org      Eating healthy: www.cdc.gov/healthyweight      [facility] home safety checklist: www.cdc.gov/steadi/patient.html      Agency on Aging: www.goea.[place].gov      Alcoholics anonymous (AA): www.aa.org      Physical Activity: www.johnna.nih.gov/zm8sgiv      Tobacco use: www.quitwithusla.org

## 2022-02-07 NOTE — PROGRESS NOTES
The patient location is: [place]  The chief complaint leading to consultation is: HRA    Visit type: audiovisual    Face to Face time with patient: 30  60 minutes of total time spent on the encounter, which includes face to face time and non-face to face time preparing to see the patient (eg, review of tests), Obtaining and/or reviewing separately obtained history, Documenting clinical information in the electronic or other health record, Independently interpreting results (not separately reported) and communicating results to the patient/family/caregiver, or Care coordination (not separately reported).         Each patient to whom he or she provides medical services by telemedicine is:  (1) informed of the relationship between the physician and patient and the respective role of any other health care provider with respect to management of the patient; and (2) notified that he or she may decline to receive medical services by telemedicine and may withdraw from such care at any time.    Notes:       Leyla Mari presented for a  Medicare AWV and comprehensive Health Risk Assessment today. The following components were reviewed and updated:    · Medical history  · Family History  · Social history  · Allergies and Current Medications  · Health Risk Assessment  · Health Maintenance  · Care Team         ** See Completed Assessments for Annual Wellness Visit within the encounter summary.**         The following assessments were completed:  · Living Situation  · CAGE  · Depression Screening  · Fall Risk Assessment (MACH 10)  · Hearing Assessment(HHI)  · Cognitive Function Screening  · Nutrition Screening  · ADL Screening  · PAQ Screening      There were no vitals filed for this visit.  There is no height or weight on file to calculate BMI.  Physical Exam  HENT:      Head: Normocephalic and atraumatic.      Nose: Nose normal.      Comments: Wearing nasal cannula  Pulmonary:      Effort: Pulmonary effort is normal.    Neurological:      Mental Status: She is alert and oriented to person, place, and time.   Psychiatric:         Mood and Affect: Mood normal.         Behavior: Behavior normal.               Diagnoses and health risks identified today and associated recommendations/orders:    1. Encounter for preventive health examination  Health Maintenance updated   Records reviewed   Exam done   Encouraged patient to schedule DXA scan and labs ordered by PCP. Discussed benefits of vaccine.     2. Calcified granuloma of lung  Noted on CTA 02/26/2021 on the left lobe. Stable and chronic. Followed by PCP.     3. Atherosclerosis of aorta  Noted on CTA 08/06/2021. Stable and chronic. Followed by PCP.     4. Panlobular emphysema  Stable and chronic. Continue current medications. Followed by PCP.     5. Centrilobular emphysema  Stable and chronic. Continue current medications. Followed by PCP.     6. Moderate malnutrition  Discussed protein rich diet and eating small, frequent meals.     7. Type 2 diabetes, controlled, with neuropathy  Stable and chronic. Continue current medications. Followed by PCP.     8. Chronic pulmonary embolism, unspecified pulmonary embolism type, unspecified whether acute cor pulmonale present  Stable and chronic. Continue current medications. Followed by PCP.     9. Current use of long term anticoagulation  Stable and chronic. Continue current medications. Followed by PCP.     10. Dependence on other enabling machines and devices  Patient currently uses walker. Declines PT referral, states she was recently in PT.     11. Dependence on supplemental oxygen  Patient uses O2@3L nasal cannula. Stable and chronic. Followed by PCP.     12. Abnormality of gait and mobility  Patient currently uses walker. Declines PT referral, states she was recently in PT.     13. Senile purpura  Stable and chronic. Followed by PCP.         Counseling and Referral of Other Preventative  (Italic type indicates deductible and  co-insurance are waived)    Patient Name: Leyla Mari  Today's Date: 2/7/2022    Health Maintenance       Date Due Completion Date    Diabetes Urine Screening Never done ---    TETANUS VACCINE Never done ---    Shingles Vaccine (2 of 3) 05/28/2014 4/2/2014    DEXA SCAN 05/08/2020 5/8/2017    Hemoglobin A1c 11/23/2021 5/23/2021    Colonoscopy 10/10/2022 10/10/2017        No orders of the defined types were placed in this encounter.      Provided Leyla with a 5-10 year written screening schedule and personal prevention plan. Recommendations were developed using the USPSTF age appropriate recommendations. Education, counseling, and referrals were provided as needed. After Visit Summary printed and given to patient which includes a list of additional screenings\tests needed.    Follow up in about 2 months (around 4/22/2022) for follow up with PCP.    Azeb Ghosh, NP    I offered to discuss advanced care planning, including how to pick a person who would make decisions for you if you were unable to make them for yourself, called a health care power of , and what kind of decisions you might make such as use of life sustaining treatments such as ventilators and tube feeding when faced with a life limiting illness recorded on a living will that they will need to know. (How you want to be cared for as you near the end of your natural life)     X  Patient has advanced directives on file, which we reviewed, and they do not wish to make changes.

## 2022-02-17 ENCOUNTER — HOSPITAL ENCOUNTER (INPATIENT)
Facility: HOSPITAL | Age: 77
LOS: 2 days | Discharge: HOME HEALTH | DRG: 193 | End: 2022-02-23
Attending: EMERGENCY MEDICINE | Admitting: INTERNAL MEDICINE
Payer: MEDICARE

## 2022-02-17 ENCOUNTER — RESEARCH ENCOUNTER (OUTPATIENT)
Dept: RESEARCH | Facility: HOSPITAL | Age: 77
End: 2022-02-17
Payer: MEDICARE

## 2022-02-17 DIAGNOSIS — J18.9 PNEUMONIA OF RIGHT MIDDLE LOBE DUE TO INFECTIOUS ORGANISM: Primary | ICD-10-CM

## 2022-02-17 DIAGNOSIS — A41.9 SEPSIS, DUE TO UNSPECIFIED ORGANISM, UNSPECIFIED WHETHER ACUTE ORGAN DYSFUNCTION PRESENT: ICD-10-CM

## 2022-02-17 DIAGNOSIS — R00.0 TACHYCARDIA: ICD-10-CM

## 2022-02-17 DIAGNOSIS — R07.9 CHEST PAIN: ICD-10-CM

## 2022-02-17 PROBLEM — J96.01 ACUTE HYPOXEMIC RESPIRATORY FAILURE: Status: ACTIVE | Noted: 2022-02-17

## 2022-02-17 LAB
ALBUMIN SERPL BCP-MCNC: 2.7 G/DL (ref 3.5–5.2)
ALLENS TEST: ABNORMAL
ALP SERPL-CCNC: 122 U/L (ref 55–135)
ALT SERPL W/O P-5'-P-CCNC: 13 U/L (ref 10–44)
ANION GAP SERPL CALC-SCNC: 8 MMOL/L (ref 8–16)
AST SERPL-CCNC: 17 U/L (ref 10–40)
BASOPHILS # BLD AUTO: 0.03 K/UL (ref 0–0.2)
BASOPHILS NFR BLD: 0.2 % (ref 0–1.9)
BILIRUB SERPL-MCNC: 0.5 MG/DL (ref 0.1–1)
BILIRUB UR QL STRIP: NEGATIVE
BUN SERPL-MCNC: 17 MG/DL (ref 8–23)
CALCIUM SERPL-MCNC: 8.6 MG/DL (ref 8.7–10.5)
CHLORIDE SERPL-SCNC: 101 MMOL/L (ref 95–110)
CLARITY UR REFRACT.AUTO: CLEAR
CO2 SERPL-SCNC: 29 MMOL/L (ref 23–29)
COLOR UR AUTO: NORMAL
CREAT SERPL-MCNC: 0.5 MG/DL (ref 0.5–1.4)
CTP QC/QA: YES
DIFFERENTIAL METHOD: ABNORMAL
EOSINOPHIL # BLD AUTO: 0 K/UL (ref 0–0.5)
EOSINOPHIL NFR BLD: 0.1 % (ref 0–8)
ERYTHROCYTE [DISTWIDTH] IN BLOOD BY AUTOMATED COUNT: 15.8 % (ref 11.5–14.5)
EST. GFR  (AFRICAN AMERICAN): >60 ML/MIN/1.73 M^2
EST. GFR  (NON AFRICAN AMERICAN): >60 ML/MIN/1.73 M^2
ESTIMATED AVG GLUCOSE: 143 MG/DL (ref 68–131)
GLUCOSE SERPL-MCNC: 108 MG/DL (ref 70–110)
GLUCOSE SERPL-MCNC: 141 MG/DL (ref 70–110)
GLUCOSE UR QL STRIP: NEGATIVE
HBA1C MFR BLD: 6.6 % (ref 4–5.6)
HCO3 UR-SCNC: 37.1 MMOL/L (ref 24–28)
HCT VFR BLD AUTO: 46.1 % (ref 37–48.5)
HGB BLD-MCNC: 14.1 G/DL (ref 12–16)
HGB UR QL STRIP: NEGATIVE
IMM GRANULOCYTES # BLD AUTO: 0.49 K/UL (ref 0–0.04)
IMM GRANULOCYTES NFR BLD AUTO: 2.5 % (ref 0–0.5)
KETONES UR QL STRIP: NEGATIVE
LACTATE SERPL-SCNC: 1.2 MMOL/L (ref 0.5–2.2)
LACTATE SERPL-SCNC: 1.8 MMOL/L (ref 0.5–2.2)
LEUKOCYTE ESTERASE UR QL STRIP: NEGATIVE
LYMPHOCYTES # BLD AUTO: 0.2 K/UL (ref 1–4.8)
LYMPHOCYTES NFR BLD: 1.2 % (ref 18–48)
MCH RBC QN AUTO: 28.7 PG (ref 27–31)
MCHC RBC AUTO-ENTMCNC: 30.6 G/DL (ref 32–36)
MCV RBC AUTO: 94 FL (ref 82–98)
MONOCYTES # BLD AUTO: 1.3 K/UL (ref 0.3–1)
MONOCYTES NFR BLD: 6.6 % (ref 4–15)
NEUTROPHILS # BLD AUTO: 17.8 K/UL (ref 1.8–7.7)
NEUTROPHILS NFR BLD: 89.4 % (ref 38–73)
NITRITE UR QL STRIP: NEGATIVE
NRBC BLD-RTO: 0 /100 WBC
PCO2 BLDA: 53.6 MMHG (ref 35–45)
PH SMN: 7.45 [PH] (ref 7.35–7.45)
PH UR STRIP: 7 [PH] (ref 5–8)
PLATELET # BLD AUTO: 183 K/UL (ref 150–450)
PMV BLD AUTO: 10.4 FL (ref 9.2–12.9)
PO2 BLDA: 49 MMHG (ref 40–60)
POC BE: 13 MMOL/L
POC SATURATED O2: 85 % (ref 95–100)
POC TCO2: 39 MMOL/L (ref 24–29)
POTASSIUM SERPL-SCNC: 3.5 MMOL/L (ref 3.5–5.1)
PROCALCITONIN SERPL IA-MCNC: 0.62 NG/ML
PROT SERPL-MCNC: 5.2 G/DL (ref 6–8.4)
PROT UR QL STRIP: NEGATIVE
RBC # BLD AUTO: 4.91 M/UL (ref 4–5.4)
SAMPLE: ABNORMAL
SARS-COV-2 RDRP RESP QL NAA+PROBE: NEGATIVE
SITE: ABNORMAL
SODIUM SERPL-SCNC: 138 MMOL/L (ref 136–145)
SP GR UR STRIP: 1.01 (ref 1–1.03)
TROPONIN I SERPL DL<=0.01 NG/ML-MCNC: 0.02 NG/ML (ref 0–0.03)
URN SPEC COLLECT METH UR: NORMAL
WBC # BLD AUTO: 19.91 K/UL (ref 3.9–12.7)

## 2022-02-17 PROCEDURE — 63600175 PHARM REV CODE 636 W HCPCS: Performed by: EMERGENCY MEDICINE

## 2022-02-17 PROCEDURE — 96366 THER/PROPH/DIAG IV INF ADDON: CPT

## 2022-02-17 PROCEDURE — 99291 PR CRITICAL CARE, E/M 30-74 MINUTES: ICD-10-PCS | Mod: CS,,, | Performed by: EMERGENCY MEDICINE

## 2022-02-17 PROCEDURE — 99220 PR INITIAL OBSERVATION CARE,LEVL III: ICD-10-PCS | Mod: ,,, | Performed by: PHYSICIAN ASSISTANT

## 2022-02-17 PROCEDURE — 82803 BLOOD GASES ANY COMBINATION: CPT

## 2022-02-17 PROCEDURE — 25000003 PHARM REV CODE 250: Performed by: PHYSICIAN ASSISTANT

## 2022-02-17 PROCEDURE — 93005 ELECTROCARDIOGRAM TRACING: CPT

## 2022-02-17 PROCEDURE — 81003 URINALYSIS AUTO W/O SCOPE: CPT | Performed by: EMERGENCY MEDICINE

## 2022-02-17 PROCEDURE — 25000003 PHARM REV CODE 250

## 2022-02-17 PROCEDURE — 63600175 PHARM REV CODE 636 W HCPCS: Performed by: PHYSICIAN ASSISTANT

## 2022-02-17 PROCEDURE — 86803 HEPATITIS C AB TEST: CPT | Performed by: EMERGENCY MEDICINE

## 2022-02-17 PROCEDURE — 94640 AIRWAY INHALATION TREATMENT: CPT

## 2022-02-17 PROCEDURE — 93010 EKG 12-LEAD: ICD-10-PCS | Mod: ,,, | Performed by: INTERNAL MEDICINE

## 2022-02-17 PROCEDURE — 80053 COMPREHEN METABOLIC PANEL: CPT | Performed by: EMERGENCY MEDICINE

## 2022-02-17 PROCEDURE — 99291 CRITICAL CARE FIRST HOUR: CPT | Mod: CS,,, | Performed by: EMERGENCY MEDICINE

## 2022-02-17 PROCEDURE — 84145 PROCALCITONIN (PCT): CPT | Performed by: EMERGENCY MEDICINE

## 2022-02-17 PROCEDURE — U0002 COVID-19 LAB TEST NON-CDC: HCPCS | Performed by: EMERGENCY MEDICINE

## 2022-02-17 PROCEDURE — 99900035 HC TECH TIME PER 15 MIN (STAT)

## 2022-02-17 PROCEDURE — 93010 ELECTROCARDIOGRAM REPORT: CPT | Mod: ,,, | Performed by: INTERNAL MEDICINE

## 2022-02-17 PROCEDURE — 83605 ASSAY OF LACTIC ACID: CPT | Mod: 91 | Performed by: EMERGENCY MEDICINE

## 2022-02-17 PROCEDURE — 85025 COMPLETE CBC W/AUTO DIFF WBC: CPT | Performed by: EMERGENCY MEDICINE

## 2022-02-17 PROCEDURE — G0378 HOSPITAL OBSERVATION PER HR: HCPCS

## 2022-02-17 PROCEDURE — 87040 BLOOD CULTURE FOR BACTERIA: CPT | Mod: 59 | Performed by: EMERGENCY MEDICINE

## 2022-02-17 PROCEDURE — 99220 PR INITIAL OBSERVATION CARE,LEVL III: CPT | Mod: ,,, | Performed by: PHYSICIAN ASSISTANT

## 2022-02-17 PROCEDURE — 25000242 PHARM REV CODE 250 ALT 637 W/ HCPCS: Performed by: PHYSICIAN ASSISTANT

## 2022-02-17 PROCEDURE — 83036 HEMOGLOBIN GLYCOSYLATED A1C: CPT | Performed by: EMERGENCY MEDICINE

## 2022-02-17 PROCEDURE — 94761 N-INVAS EAR/PLS OXIMETRY MLT: CPT

## 2022-02-17 PROCEDURE — 99291 CRITICAL CARE FIRST HOUR: CPT | Mod: 25

## 2022-02-17 PROCEDURE — 84484 ASSAY OF TROPONIN QUANT: CPT | Performed by: EMERGENCY MEDICINE

## 2022-02-17 PROCEDURE — 96365 THER/PROPH/DIAG IV INF INIT: CPT

## 2022-02-17 RX ORDER — PREDNISONE 20 MG/1
40 TABLET ORAL DAILY
Status: DISCONTINUED | OUTPATIENT
Start: 2022-02-18 | End: 2022-02-17

## 2022-02-17 RX ORDER — LANOLIN ALCOHOL/MO/W.PET/CERES
800 CREAM (GRAM) TOPICAL
Status: DISCONTINUED | OUTPATIENT
Start: 2022-02-17 | End: 2022-02-23 | Stop reason: HOSPADM

## 2022-02-17 RX ORDER — LEVOFLOXACIN 5 MG/ML
750 INJECTION, SOLUTION INTRAVENOUS
Status: DISCONTINUED | OUTPATIENT
Start: 2022-02-18 | End: 2022-02-23 | Stop reason: HOSPADM

## 2022-02-17 RX ORDER — ENOXAPARIN SODIUM 100 MG/ML
40 INJECTION SUBCUTANEOUS EVERY 24 HOURS
Status: DISCONTINUED | OUTPATIENT
Start: 2022-02-17 | End: 2022-02-17

## 2022-02-17 RX ORDER — INSULIN ASPART 100 [IU]/ML
0-5 INJECTION, SOLUTION INTRAVENOUS; SUBCUTANEOUS
Status: DISCONTINUED | OUTPATIENT
Start: 2022-02-17 | End: 2022-02-23 | Stop reason: HOSPADM

## 2022-02-17 RX ORDER — PREDNISONE 10 MG/1
10 TABLET ORAL DAILY
Status: DISCONTINUED | OUTPATIENT
Start: 2022-02-17 | End: 2022-02-17

## 2022-02-17 RX ORDER — PROMETHAZINE HYDROCHLORIDE 25 MG/1
25 TABLET ORAL EVERY 6 HOURS PRN
Status: DISCONTINUED | OUTPATIENT
Start: 2022-02-17 | End: 2022-02-23 | Stop reason: HOSPADM

## 2022-02-17 RX ORDER — GABAPENTIN 100 MG/1
100 CAPSULE ORAL NIGHTLY
Status: DISCONTINUED | OUTPATIENT
Start: 2022-02-17 | End: 2022-02-23 | Stop reason: HOSPADM

## 2022-02-17 RX ORDER — SODIUM CHLORIDE 0.9 % (FLUSH) 0.9 %
10 SYRINGE (ML) INJECTION EVERY 8 HOURS PRN
Status: DISCONTINUED | OUTPATIENT
Start: 2022-02-17 | End: 2022-02-23 | Stop reason: HOSPADM

## 2022-02-17 RX ORDER — CHOLECALCIFEROL (VITAMIN D3) 25 MCG
1000 TABLET ORAL DAILY
Refills: 11 | Status: DISCONTINUED | OUTPATIENT
Start: 2022-02-17 | End: 2022-02-23 | Stop reason: HOSPADM

## 2022-02-17 RX ORDER — GLUCAGON 1 MG
1 KIT INJECTION
Status: DISCONTINUED | OUTPATIENT
Start: 2022-02-17 | End: 2022-02-23 | Stop reason: HOSPADM

## 2022-02-17 RX ORDER — ACETAMINOPHEN 325 MG/1
650 TABLET ORAL EVERY 4 HOURS PRN
Status: DISCONTINUED | OUTPATIENT
Start: 2022-02-17 | End: 2022-02-23 | Stop reason: HOSPADM

## 2022-02-17 RX ORDER — POLYETHYLENE GLYCOL 3350 17 G/17G
17 POWDER, FOR SOLUTION ORAL DAILY PRN
Status: DISCONTINUED | OUTPATIENT
Start: 2022-02-17 | End: 2022-02-23 | Stop reason: HOSPADM

## 2022-02-17 RX ORDER — LEVOFLOXACIN 5 MG/ML
750 INJECTION, SOLUTION INTRAVENOUS ONCE
Status: COMPLETED | OUTPATIENT
Start: 2022-02-17 | End: 2022-02-17

## 2022-02-17 RX ORDER — IBUPROFEN 200 MG
24 TABLET ORAL
Status: DISCONTINUED | OUTPATIENT
Start: 2022-02-17 | End: 2022-02-23 | Stop reason: HOSPADM

## 2022-02-17 RX ORDER — ESCITALOPRAM OXALATE 5 MG/1
5 TABLET ORAL DAILY
Status: DISCONTINUED | OUTPATIENT
Start: 2022-02-17 | End: 2022-02-23 | Stop reason: HOSPADM

## 2022-02-17 RX ORDER — IPRATROPIUM BROMIDE AND ALBUTEROL SULFATE 2.5; .5 MG/3ML; MG/3ML
3 SOLUTION RESPIRATORY (INHALATION) EVERY 4 HOURS PRN
Status: DISCONTINUED | OUTPATIENT
Start: 2022-02-17 | End: 2022-02-21

## 2022-02-17 RX ORDER — PRAVASTATIN SODIUM 20 MG/1
20 TABLET ORAL NIGHTLY
Status: DISCONTINUED | OUTPATIENT
Start: 2022-02-17 | End: 2022-02-23 | Stop reason: HOSPADM

## 2022-02-17 RX ORDER — NALOXONE HCL 0.4 MG/ML
0.02 VIAL (ML) INJECTION
Status: DISCONTINUED | OUTPATIENT
Start: 2022-02-17 | End: 2022-02-23 | Stop reason: HOSPADM

## 2022-02-17 RX ORDER — LIDOCAINE 50 MG/G
1 PATCH TOPICAL
Status: DISCONTINUED | OUTPATIENT
Start: 2022-02-17 | End: 2022-02-23 | Stop reason: HOSPADM

## 2022-02-17 RX ORDER — PREDNISONE 5 MG/1
5 TABLET ORAL NIGHTLY
Status: DISCONTINUED | OUTPATIENT
Start: 2022-02-17 | End: 2022-02-17

## 2022-02-17 RX ORDER — IBUPROFEN 200 MG
16 TABLET ORAL
Status: DISCONTINUED | OUTPATIENT
Start: 2022-02-17 | End: 2022-02-23 | Stop reason: HOSPADM

## 2022-02-17 RX ORDER — DILTIAZEM HYDROCHLORIDE 180 MG/1
180 CAPSULE, COATED, EXTENDED RELEASE ORAL DAILY
Status: DISCONTINUED | OUTPATIENT
Start: 2022-02-18 | End: 2022-02-23 | Stop reason: HOSPADM

## 2022-02-17 RX ORDER — PREDNISONE 20 MG/1
40 TABLET ORAL ONCE
Status: COMPLETED | OUTPATIENT
Start: 2022-02-17 | End: 2022-02-17

## 2022-02-17 RX ORDER — VIT C/E/ZN/COPPR/LUTEIN/ZEAXAN 250MG-90MG
1000 CAPSULE ORAL DAILY
COMMUNITY

## 2022-02-17 RX ORDER — PANTOPRAZOLE SODIUM 40 MG/1
40 TABLET, DELAYED RELEASE ORAL DAILY
Status: DISCONTINUED | OUTPATIENT
Start: 2022-02-17 | End: 2022-02-23 | Stop reason: HOSPADM

## 2022-02-17 RX ORDER — BISACODYL 10 MG
10 SUPPOSITORY, RECTAL RECTAL DAILY PRN
Status: DISCONTINUED | OUTPATIENT
Start: 2022-02-17 | End: 2022-02-23 | Stop reason: HOSPADM

## 2022-02-17 RX ORDER — TALC
6 POWDER (GRAM) TOPICAL NIGHTLY PRN
Status: DISCONTINUED | OUTPATIENT
Start: 2022-02-17 | End: 2022-02-23 | Stop reason: HOSPADM

## 2022-02-17 RX ORDER — ONDANSETRON 8 MG/1
8 TABLET, ORALLY DISINTEGRATING ORAL EVERY 8 HOURS PRN
Status: DISCONTINUED | OUTPATIENT
Start: 2022-02-17 | End: 2022-02-23 | Stop reason: HOSPADM

## 2022-02-17 RX ORDER — ALPRAZOLAM 0.25 MG/1
0.25 TABLET ORAL 3 TIMES DAILY PRN
Status: DISCONTINUED | OUTPATIENT
Start: 2022-02-17 | End: 2022-02-22

## 2022-02-17 RX ORDER — FLUTICASONE FUROATE AND VILANTEROL 100; 25 UG/1; UG/1
1 POWDER RESPIRATORY (INHALATION) DAILY
Refills: 11 | Status: DISCONTINUED | OUTPATIENT
Start: 2022-02-17 | End: 2022-02-23 | Stop reason: HOSPADM

## 2022-02-17 RX ORDER — METHOCARBAMOL 500 MG/1
500 TABLET, FILM COATED ORAL
Status: COMPLETED | OUTPATIENT
Start: 2022-02-17 | End: 2022-02-17

## 2022-02-17 RX ORDER — FUROSEMIDE 20 MG/1
20 TABLET ORAL DAILY
Status: DISCONTINUED | OUTPATIENT
Start: 2022-02-17 | End: 2022-02-23 | Stop reason: HOSPADM

## 2022-02-17 RX ADMIN — ACETAMINOPHEN 650 MG: 325 TABLET ORAL at 01:02

## 2022-02-17 RX ADMIN — FLUTICASONE FUROATE AND VILANTEROL TRIFENATATE 1 PUFF: 100; 25 POWDER RESPIRATORY (INHALATION) at 01:02

## 2022-02-17 RX ADMIN — CHOLECALCIFEROL TAB 25 MCG (1000 UNIT) 1000 UNITS: 25 TAB at 01:02

## 2022-02-17 RX ADMIN — LEVOFLOXACIN 750 MG: 750 INJECTION, SOLUTION INTRAVENOUS at 11:02

## 2022-02-17 RX ADMIN — APIXABAN 5 MG: 5 TABLET, FILM COATED ORAL at 08:02

## 2022-02-17 RX ADMIN — METHOCARBAMOL 500 MG: 500 TABLET ORAL at 10:02

## 2022-02-17 RX ADMIN — GABAPENTIN 100 MG: 100 CAPSULE ORAL at 08:02

## 2022-02-17 RX ADMIN — MELATONIN TAB 3 MG 6 MG: 3 TAB at 08:02

## 2022-02-17 RX ADMIN — PREDNISONE 40 MG: 20 TABLET ORAL at 04:02

## 2022-02-17 RX ADMIN — LIDOCAINE 5% 1 PATCH: 700 PATCH TOPICAL at 10:02

## 2022-02-17 NOTE — PROGRESS NOTES
T2 Resistance Panel 510 (k) Study  IRB: 2021.289  PI: Dr. Andrea Landa    Date: 02/17/2022  Subject Number:     After the informed consent process was completed, paired (in-house) blood cultures were drawn by the RN. The time of the blood draw was 09:59. The site of the blood draw was Left hand. The T2 research samples were drawn immediately following the paired blood cultures using the same site/needlestick.     Tube A was drawn at 09:59.   Tube B was drawn at 10:00.   Tube C was drawn at 10:01.    Accession number of paired blood culture is [id number].    The subject did not have any Adverse Events.        Subjects research blood sample was brought to the research lab for testing. Results will not be shared with the subject.      Barbi RODRIGUEZ Infectious Diseases  T2 Resistance Panel Clinical Trial

## 2022-02-17 NOTE — ASSESSMENT & PLAN NOTE
Patient with Hypoxic Respiratory failure which is Acute on chronic.  she is on home oxygen at 3 LPM. Supplemental oxygen was provided and noted O2 sat improved to 95-96% at rest.   Signs/symptoms of respiratory failure include- respiratory distress and lethargy. Contributing diagnoses includes - COPD and Pneumonia Labs and images were reviewed. Patient Has recent ABG, which has been reviewed. Will treat underlying causes and adjust management of respiratory failure as follows  -continuous O2, currently 4 L NC  -rafaela symbicort

## 2022-02-17 NOTE — ASSESSMENT & PLAN NOTE
Patient with current diagnosis of pneumonia due to infectious organism. Which is controlled. Current antimicrobial regimen consists of   Antibiotics (From admission, onward)            Start     Stop Route Frequency Ordered    02/18/22 0000  levoFLOXacin 750 mg/150 mL IVPB 750 mg         -- IV Every 24 hours (non-standard times) 02/17/22 1201    02/17/22 1230  levoFLOXacin 750 mg/150 mL IVPB 750 mg         -- IV Once 02/17/22 1119      . Cultures currently pending.  Will monitor patient closely and continue current treatment plan unchanged.

## 2022-02-17 NOTE — ED TRIAGE NOTES
Patient is a 76 year old female that presents to the ED with c/o increased SOB and upper back pain that radiates down right leg and lower back. Patient is on home O2 at 2-3L and states having been increased SOB since last week. Pt reports getting easily exerted while doing simple things around the house.

## 2022-02-17 NOTE — ED PROVIDER NOTES
"Encounter Date: 2/17/2022       History     Chief Complaint   Patient presents with    Back Pain    Shortness of Breath     On 3 L NC at home requiring more and more O2     Ms. Mari is a 75 yo M w/ end-stage COPD, PE on eliquis, HTN and HLD presenting with back pains and increasing oxygen requirements. The patient is normally on 3L NC and is currently on 4L NC with SpO2 around 95-96%. Denies fevers, chills, CP, wheezes, changes in cough character. Family at bedside says that she doesn't appear to have increased work of breathing from normal.      Patient also reports that she is having back pains at 3 areas. The first 2 areas are mid thoracic and lumbar back where it is described as tightness like a "brasserie wire around her back" that does not radiate to the front. These pains have been chronic, initially started when she was in SNF in December receiving therapy twice per day. The pain has been increasing lately. The pain feels better with palpation/massage. The third area is in her left flank and side radiating to the front of her waist, described as burning sensation that comes and goes. Does not radiate to R side. Is not worsened with urination. Denies any provoking, palliating activities/factors. Patient does have a history of chickenpox and shingles, last had an exacerbation over 40 years ago.     The history is provided by the patient and medical records.     Review of patient's allergies indicates:   Allergen Reactions    Bactrim [sulfamethoxazole-trimethoprim] Nausea Only    Codeine Itching    Keflex [cephalexin] Other (See Comments)     Rhinorrhea, nausea. Tolerated Ceftriaxone June 2014    Ceftriaxone Rash     Morbilliform rash after receiving IV ceftriaxone    Clindamycin hcl Rash    Doxycycline Rash    Vancomycin analogues Rash     Morbilliform rash after receiving IV vancomycin     Past Medical History:   Diagnosis Date    Actinic keratosis     Arthritis     Cataract     Cellulitis of " ankle     Colon polyps     COPD (chronic obstructive pulmonary disease)     home O2 (2L/min)     Family history of colon cancer     History of Clostridium difficile infection 7/3/2012    Hyperlipidemia     Hypertension     Lung nodules     Sinus tachycardia     Type 2 diabetes mellitus, without long-term current use of insulin 2022     Past Surgical History:   Procedure Laterality Date    APPENDECTOMY      CATARACT EXTRACTION Bilateral 2007     SECTION      x2    COLONOSCOPY N/A 10/10/2017    Procedure: COLONOSCOPY;  Surgeon: Omid Lino MD;  Location: [facility] ([address] [place] [address]);  Service: Endoscopy;  Laterality: N/A;  pt unable to be checked in with previous order    EYE SURGERY      TONSILLECTOMY       Family History   Problem Relation Age of Onset    Cancer Mother 79        colon    Heart disease Mother     Hyperlipidemia Mother     Hypertension Mother     Skin cancer Mother     Cataracts Mother     Glaucoma Mother     Thyroid disease Mother     COPD Father     Diabetes Father     Heart disease Father     Hyperlipidemia Father     Hypertension Father     Cancer Sister     Heart disease Sister     Hyperlipidemia Sister     Hypertension Sister     Skin cancer Sister     Cataracts Sister     Breast cancer Sister     Cancer Son     Blindness Paternal Grandmother     Diabetes Paternal Grandmother     Thyroid disease Daughter     Melanoma Neg Hx     Psoriasis Neg Hx     Lupus Neg Hx     Eczema Neg Hx     Amblyopia Neg Hx     Macular degeneration Neg Hx     Retinal detachment Neg Hx     Strabismus Neg Hx     Stroke Neg Hx      Social History     Tobacco Use    Smoking status: Former Smoker     Packs/day: 1.00     Years: 39.00     Pack years: 39.00     Types: Cigarettes     Quit date: 1995     Years since quittin.3    Smokeless tobacco: Never Used   Substance Use Topics    Alcohol use: Yes     Alcohol/week: 2.0 standard drinks     Types: 2 Glasses of  wine per week     Comment: rarely has a glass of wine- not daily    Drug use: No     Review of Systems   Constitutional: Negative for appetite change, chills and fever.   HENT: Positive for rhinorrhea (reports chronic secondary nasal cannula). Negative for sore throat and trouble swallowing.    Eyes: Negative for photophobia, pain and visual disturbance.   Respiratory: Negative for choking, chest tightness and wheezing.    Cardiovascular: Negative for chest pain and palpitations.   Gastrointestinal: Negative for abdominal distention, abdominal pain, blood in stool, constipation, diarrhea, nausea and vomiting.   Genitourinary: Negative for difficulty urinating, dysuria and hematuria.   Musculoskeletal: Positive for back pain. Negative for gait problem.   Neurological: Negative for dizziness, syncope, weakness, light-headedness and headaches.       Physical Exam     Initial Vitals   BP Pulse Resp Temp SpO2   02/17/22 0913 02/17/22 0913 02/17/22 0913 02/17/22 0945 02/17/22 0913   (!) 123/58 (!) 125 (!) 26 99.6 °F (37.6 °C) (!) 94 %      MAP       --                Physical Exam    Nursing note and vitals reviewed.  Constitutional: She appears well-developed and well-nourished. She is not diaphoretic. No distress.   HENT:   Head: Normocephalic and atraumatic.   Eyes: Right eye exhibits no discharge. Left eye exhibits no discharge.   Cardiovascular: Normal rate, regular rhythm, normal heart sounds and intact distal pulses. Exam reveals no friction rub.    No murmur heard.  Pulmonary/Chest: Breath sounds normal. No tachypnea. No respiratory distress. She has no wheezes. She exhibits no tenderness.   Abdominal: Abdomen is soft. She exhibits no distension. There is no abdominal tenderness.   Musculoskeletal:         General: Edema present.      Comments: BLE swelling, in compression stockings  Spine palpated without midline or point tenderness along spinous processes. No stepoff palpated.  Midthoracic and lumbar paraspinal  "musculature tonic  No visible deformity, lacerations or injury  No flank tenderness  No trauma, laceration, deformity noted to left abdomen or back where "burning" pain reported     Neurological: She is alert. GCS score is 15. GCS eye subscore is 4. GCS verbal subscore is 5. GCS motor subscore is 6.   Skin: Skin is warm and dry.   Psychiatric: She has a normal mood and affect. Her behavior is normal. She is not agitated and not aggressive.         ED Course   Procedures  Labs Reviewed   CBC W/ AUTO DIFFERENTIAL - Abnormal; Notable for the following components:       Result Value    WBC 19.91 (*)     MCHC 30.6 (*)     RDW 15.8 (*)     Immature Granulocytes 2.5 (*)     Gran # (ANC) 17.8 (*)     Immature Grans (Abs) 0.49 (*)     Lymph # 0.2 (*)     Mono # 1.3 (*)     Gran % 89.4 (*)     Lymph % 1.2 (*)     All other components within normal limits    Narrative:     Release to patient->Immediate   PROCALCITONIN - Abnormal; Notable for the following components:    Procalcitonin 0.62 (*)     All other components within normal limits    Narrative:     Release to patient->Immediate   COMPREHENSIVE METABOLIC PANEL - Abnormal; Notable for the following components:    Calcium 8.6 (*)     Total Protein 5.2 (*)     Albumin 2.7 (*)     All other components within normal limits   ISTAT PROCEDURE - Abnormal; Notable for the following components:    POC PCO2 53.6 (*)     POC HCO3 37.1 (*)     POC SATURATED O2 85 (*)     POC TCO2 39 (*)     All other components within normal limits   CULTURE, BLOOD   CULTURE, BLOOD   LACTIC ACID, PLASMA    Narrative:     Release to patient->Immediate   URINALYSIS, REFLEX TO URINE CULTURE    Narrative:     Specimen Source->Urine   TROPONIN I    Narrative:     Release to patient->Immediate   HEPATITIS C ANTIBODY   LACTIC ACID, PLASMA   HEMOGLOBIN A1C   SARS-COV-2 RDRP GENE    Narrative:     This test utilizes isothermal nucleic acid amplification   technology to detect the SARS-CoV-2 RdRp nucleic acid " segment.   The analytical sensitivity (limit of detection) is 125 genome   equivalents/mL.   A POSITIVE result implies infection with the SARS-CoV-2 virus;   the patient is presumed to be contagious.     A NEGATIVE result means that SARS-CoV-2 nucleic acids are not   present above the limit of detection. A NEGATIVE result should be   treated as presumptive. It does not rule out the possibility of   COVID-19 and should not be the sole basis for treatment decisions.   If COVID-19 is strongly suspected based on clinical and exposure   history, re-testing using an alternate molecular assay should be   considered.   This test is only for use under the Food and Drug   Administration s Emergency Use Authorization (EUA).   Commercial kits are provided by [employer].   Performance characteristics of the EUA have been independently   verified by [facility] Department of   Pathology and Laboratory Medicine.   _________________________________________________________________   The authorized Fact Sheet for Healthcare Providers and the authorized Fact   Sheet for Patients of the ID NOW COVID-19 are available on the FDA   website:     https://www.fda.gov/media/633814/download  https://www.fda.gov/media/234614/download         EKG Readings: (Independently Interpreted)   Initial Reading: No STEMI. Rhythm: Sinus Tachycardia.   Sinus tachycardia, baseline artifact     ECG Results          EKG 12-lead (Final result)  Result time 02/17/22 12:13:56    Final result by Interface, Lab In [facility] (02/17/22 12:13:56)                 Narrative:    Test Reason : R00.0,    Vent. Rate : 120 BPM     Atrial Rate : 120 BPM     P-R Int : 118 ms          QRS Dur : 072 ms      QT Int : 324 ms       P-R-T Axes : 068 -82 065 degrees     QTc Int : 457 ms    Sinus tachycardia with Premature supraventricular complexes and baseline  artifact  Left axis deviation  Cannot exclude Inferior infarct (cited on or before 22-MAY-2021)  Low  anterior forces  Abnormal ECG  When compared with ECG of 23-NOV-2021 06:51,  Premature supraventricular complexes are now Present  Confirmed by SUKUMAR BURKS MD ([id number]) on 2/17/2022 12:13:45 PM    Referred By: TOLU   SELF           Confirmed By:SUKUMAR BURKS MD                            Imaging Results          X-Ray Chest AP Portable (Final result)  Result time 02/17/22 10:41:53    Final result by Miguel A Tirado MD (02/17/22 10:41:53)                 Impression:      Some consolidation in the right lung, new finding which could indicate a pneumonia.  Clinical correlation and follow-up recommended.      Electronically signed by: Miguel A Tirado MD  Date:    02/17/2022  Time:    10:41             Narrative:    EXAMINATION:  XR CHEST AP PORTABLE    CLINICAL HISTORY:  Sepsis;    TECHNIQUE:  Single frontal view of the chest was performed.    COMPARISON:  12/01/2021    FINDINGS:  The heart size is normal.  Mediastinum shows aortic atherosclerosis.  Lungs are not fully expanded.  Left lung looks clear without consolidation, though small effusion blunts the sulcus.  Right lung now has patchy consolidation in the middle zone; this could represent pneumonia, aspiration, asymmetric edema, etcetera.  Prior right pleural effusion looks resolved.  Skeletal structures show no acute finding.                                 Medications   LIDOcaine 5 % patch 1 patch (1 patch Transdermal Patch Applied 2/17/22 1057)   sodium chloride 0.9% flush 10 mL (has no administration in time range)   magnesium oxide tablet 800 mg (has no administration in time range)   magnesium oxide tablet 800 mg (has no administration in time range)   acetaminophen tablet 650 mg (650 mg Oral Given 2/17/22 1317)   polyethylene glycol packet 17 g (has no administration in time range)   bisacodyL suppository 10 mg (has no administration in time range)   glucose chewable tablet 16 g (has no administration in time range)   glucose chewable  tablet 24 g (has no administration in time range)   glucagon (human recombinant) injection 1 mg (has no administration in time range)   albuterol-ipratropium 2.5 mg-0.5 mg/3 mL nebulizer solution 3 mL (has no administration in time range)   melatonin tablet 6 mg (has no administration in time range)   naloxone 0.4 mg/mL injection 0.02 mg (has no administration in time range)   enoxaparin injection 40 mg (has no administration in time range)   ondansetron disintegrating tablet 8 mg (has no administration in time range)   promethazine tablet 25 mg (has no administration in time range)   dextrose 10% bolus 125 mL (has no administration in time range)   dextrose 10% bolus 250 mL (has no administration in time range)   levoFLOXacin 750 mg/150 mL IVPB 750 mg (has no administration in time range)   insulin aspart U-100 pen 0-5 Units (has no administration in time range)   ALPRAZolam tablet 0.25 mg (has no administration in time range)   apixaban tablet 5 mg (has no administration in time range)   fluticasone furoate-vilanteroL 100-25 mcg/dose diskus inhaler 1 puff (has no administration in time range)   vitamin D 1000 units tablet 1,000 Units (1,000 Units Oral Given 2/17/22 1338)   diltiaZEM 24 hr capsule 180 mg (has no administration in time range)   EScitalopram oxalate tablet 5 mg (has no administration in time range)   furosemide tablet 20 mg (has no administration in time range)   gabapentin capsule 100 mg (has no administration in time range)   pantoprazole EC tablet 40 mg (has no administration in time range)   pravastatin tablet 20 mg (has no administration in time range)   tiotropium bromide 2.5 mcg/actuation inhaler 2 puff (has no administration in time range)   methocarbamoL tablet 500 mg (500 mg Oral Given 2/17/22 1057)   levoFLOXacin 750 mg/150 mL IVPB 750 mg (0 mg Intravenous Stopped 2/17/22 1313)     Medical Decision Making:   History:   Old Medical Records: I decided to obtain old medical records.  Old  "Records Summarized: records from previous admission(s).  Initial Assessment:   77 yo F p/w increasing oxygen requirements today and back/flank pain, worsening the past couple days. Afebrile, hemodynamically stable, tachycardic 102-110, SpO2 stable 95-96% on 4L NC. No cyanosis, not tachypneic. Appears comfortable. Uses accessory muscles, effort at baseline per daughter. No visible trauma/lacertions/deformity to flank or back.  Differential Diagnosis:   Including but not limited to: COPD exacerbation vs. PNA vs. UTI vs. Nephrolithiasis vs. MSK back pain vs. Osteoporosis  Independently Interpreted Test(s):   I have ordered and independently interpreted X-rays - see prior notes.  I have ordered and independently interpreted EKG Reading(s) - see prior notes  Clinical Tests:   Lab Tests: Ordered and Reviewed  The following lab test(s) were unremarkable: CMP, Urinalysis, Lactate and Troponin       <> Summary of Lab: CBC w/ leukocytosis 19  VBG w/ increased bicarb and CO2, with normal pH, seems baseline for her and improved from last hospitalization. Deviations from normal expected in patient with COPD.  Radiological Study: Ordered and Reviewed  Medical Tests: Reviewed and Ordered  Sepsis Perfusion Assessment: "I attest a sepsis perfusion exam was performed within 6 hours of sepsis, severe sepsis, or septic shock presentation, following fluid resuscitation."  ED Management:  CBC, CMP, UA, Lactate, Troponin, Procal, VBG ordered  Blood cultures sent  CXR w/ consolidation RML concerning for PNA  EKG w/ sinus tachy, no STEMI  Methocarbamal 500mg given for back/muscle pain  Lidocaine patch applied  Levaquin 750mg IV given   Admitted to Hospital Medicine  Other:   I have discussed this case with another health care provider.       <> Summary of the Discussion:             Attending Attestation:         Attending Critical Care:   Critical Care Times:   ==============================================================  · Total " Critical Care Time - exclusive of procedural time: 35 minutes.  ==============================================================  Critical care was necessary to treat or prevent imminent or life-threatening deterioration of the following conditions: sepsis.       Attending ED Notes:   Attending Note:  I have seen the patient, have repeated the key portions of the history and physical, reviewed and agree with the medical documentation, and supervised and managed the medical care of the patient. Additionally, I was present for the critical portion of any procedure(s) performed.    76-year-old female history of end-stage COPD on home oxygen presenting today with worsening shortness of breath, possible fever at home.  On arrival, she is tachycardic, hypoxic, tachypneic but afebrile.    She was increased to 4 L with improvement of her respiratory status.  Labs concerning for leukocytosis of 19,000, elevated pro count.  Chest x-ray concerning for right middle lobe pneumonia.  Patient treated with Levaquin as she has multiple medication allergies.  Admit to Hospital Medicine for further treatment and evaluation      ED Course as of 02/17/22 1345   Thu Feb 17, 2022   1114 WBC(!): 19.91 [GM]   1114 Procalcitonin(!): 0.62 [GM]   1114 SARS-CoV-2 RNA, Amplification, Qual: Negative [GM]      ED Course User Index  [GM] Cornelia Sandra MD             Clinical Impression:   Final diagnoses:  [R00.0] Tachycardia  [J18.9] Pneumonia of right middle lobe due to infectious organism (Primary)  [A41.9] Sepsis, due to unspecified organism, unspecified whether acute organ dysfunction present          ED Disposition Condition    Observation               Candido Luna,   Resident  02/17/22 1229       Cornelia Sandra MD  02/17/22 1347

## 2022-02-17 NOTE — ASSESSMENT & PLAN NOTE
-continue duoneb prn, home prednisone 10 mg qam and 5 mg qhs, symbicort and umeclidinium daily

## 2022-02-17 NOTE — SUBJECTIVE & OBJECTIVE
Past Medical History:   Diagnosis Date    Actinic keratosis     Arthritis     Cataract     Cellulitis of ankle     Colon polyps     COPD (chronic obstructive pulmonary disease)     home O2 (2L/min)     Family history of colon cancer     History of Clostridium difficile infection 7/3/2012    Hyperlipidemia     Hypertension     Lung nodules     Sinus tachycardia     Type 2 diabetes mellitus, without long-term current use of insulin 2022       Past Surgical History:   Procedure Laterality Date    APPENDECTOMY      CATARACT EXTRACTION Bilateral 2007     SECTION      x2    COLONOSCOPY N/A 10/10/2017    Procedure: COLONOSCOPY;  Surgeon: Omid Lino MD;  Location: [address];  Service: Endoscopy;  Laterality: N/A;  pt unable to be checked in with previous order    EYE SURGERY      TONSILLECTOMY         Review of patient's allergies indicates:   Allergen Reactions    Bactrim [sulfamethoxazole-trimethoprim] Nausea Only    Codeine Itching    Keflex [cephalexin] Other (See Comments)     Rhinorrhea, nausea. Tolerated Ceftriaxone 2014    Ceftriaxone Rash     Morbilliform rash after receiving IV ceftriaxone    Clindamycin hcl Rash    Doxycycline Rash    Vancomycin analogues Rash     Morbilliform rash after receiving IV vancomycin       No current facility-administered medications on file prior to encounter.     Current Outpatient Medications on File Prior to Encounter   Medication Sig    albuterol (PROVENTIL/VENTOLIN HFA) 90 mcg/actuation inhaler INHALE 2 PUFFS INTO LUNGS EVERY 6 HOURS AS NEEDED FOR WHEEZING OR SHORTNESS OF BREATH    ALPRAZolam (XANAX) 0.25 MG tablet Take 1 tablet (0.25 mg total) by mouth 3 (three) times daily as needed for Anxiety.    apixaban (ELIQUIS) 5 mg Tab Take 1 tablet (5 mg total) by mouth 2 (two) times daily.    blood sugar diagnostic Strp 1 strip by Misc.(Non-Drug; Combo Route) route 2 (two) times daily.    blood-glucose meter kit Use as  instructed    budesonide-formoterol 160-4.5 mcg (SYMBICORT) 160-4.5 mcg/actuation HFAA INHALE 2 PUFFS INTO THE LUNGS EVERY 12 HOURS    capsaicin 0.1 % Crea Apply 1 drop topically daily as needed (foot pain).    cholecalciferol, vitamin D3, 10 mcg (400 unit) Cap Take 3 capsules (1,200 Units total) by mouth once daily.    diltiaZEM (CARDIZEM CD) 180 MG 24 hr capsule TAKE 1 CAPSULE (180 MG TOTAL) BY MOUTH ONCE DAILY.    EScitalopram oxalate (LEXAPRO) 5 MG Tab Take 1 tablet (5 mg total) by mouth once daily.    furosemide (LASIX) 20 MG tablet Take 1 tablet (20 mg total) by mouth once daily.    gabapentin (NEURONTIN) 100 MG capsule Take 1 capsule (100 mg total) by mouth every evening.    lancets (ACCU-CHEK SOFTCLIX LANCETS) Misc 1 lancet by Misc.(Non-Drug; Combo Route) route 2 (two) times daily.    melatonin (MELATIN) 3 mg tablet Take 2 tablets (6 mg total) by mouth nightly as needed for Insomnia.    metFORMIN (GLUCOPHAGE) 500 MG tablet Take 1 tablet (500 mg total) by mouth 2 (two) times daily with meals. Don't start taking until evaluated by primary care provider    pantoprazole (PROTONIX) 40 MG tablet Take 1 tablet (40 mg total) by mouth once daily. (Patient not taking: Reported on 2/7/2022)    polyethylene glycol (GLYCOLAX) 17 gram PwPk Take 17 g by mouth daily as needed (constipation). (Patient not taking: Reported on 2/7/2022)    potassium chloride (MICRO-K) 10 MEQ CpSR Take 1 capsule (10 mEq total) by mouth once daily.    pravastatin (PRAVACHOL) 20 MG tablet TAKE 1 TABLET BY MOUTH EVERY DAY    predniSONE (DELTASONE) 10 MG tablet Take 1 tablet (10 mg total) by mouth once daily.    predniSONE (DELTASONE) 5 MG tablet Take 1 tablet (5 mg total) by mouth every evening.    psyllium husk, aspartame, (METAMUCIL) 3.4 gram PwPk packet Take 1 packet by mouth once daily.    umeclidinium (INCRUSE ELLIPTA) 62.5 mcg/actuation inhalation capsule INHALE 1 PUFF BY MOUTH DAILY     Family History     Problem Relation  (Age of Onset)    Blindness Paternal Grandmother    Breast cancer Sister    COPD Father    Cancer Mother (79), Sister, Son    Cataracts Mother, Sister    Diabetes Father, Paternal Grandmother    Glaucoma Mother    Heart disease Mother, Father, Sister    Hyperlipidemia Mother, Father, Sister    Hypertension Mother, Father, Sister    Skin cancer Mother, Sister    Thyroid disease Mother, Daughter        Tobacco Use    Smoking status: Former Smoker     Packs/day: 1.00     Years: 39.00     Pack years: 39.00     Types: Cigarettes     Quit date: 1995     Years since quittin.3    Smokeless tobacco: Never Used   Substance and Sexual Activity    Alcohol use: Yes     Alcohol/week: 2.0 standard drinks     Types: 2 Glasses of wine per week     Comment: rarely has a glass of wine- not daily    Drug use: No    Sexual activity: Never     Review of Systems   Constitutional: Positive for activity change and unexpected weight change. Negative for chills, diaphoresis, fatigue and fever.   HENT: Positive for congestion, postnasal drip and sneezing. Negative for facial swelling, rhinorrhea and sore throat.    Eyes: Negative for photophobia, itching and visual disturbance.   Respiratory: Positive for cough (productive, intermittent), chest tightness (posterior) and shortness of breath (acute on chronic). Negative for wheezing.    Cardiovascular: Positive for leg swelling (chronic b/l). Negative for chest pain and palpitations.   Gastrointestinal: Positive for constipation. Negative for abdominal distention, abdominal pain, blood in stool, diarrhea, nausea and vomiting.   Genitourinary: Negative for difficulty urinating, dysuria, frequency, hematuria and urgency.   Musculoskeletal: Positive for back pain (posterior RUQ, L lower flank). Negative for arthralgias, myalgias and neck stiffness.   Skin: Positive for color change (BLE).   Neurological: Negative for dizziness, tremors, seizures, syncope, weakness, light-headedness,  numbness and headaches.   Psychiatric/Behavioral: Negative for agitation, confusion and hallucinations.     Objective:     Vital Signs (Most Recent):  Temp: 99.6 °F (37.6 °C) (02/17/22 0945)  Pulse: 102 (02/17/22 1103)  Resp: 16 (02/17/22 1051)  BP: (!) 122/57 (02/17/22 1103)  SpO2: 96 % (02/17/22 1103) Vital Signs (24h Range):  Temp:  [99.6 °F (37.6 °C)] 99.6 °F (37.6 °C)  Pulse:  [102-125] 102  Resp:  [16-26] 16  SpO2:  [94 %-99 %] 96 %  BP: (122-134)/(57-65) 122/57        Body mass index is 19.76 kg/m².    Physical Exam  Vitals and nursing note reviewed.   Constitutional:       General: She is awake. She is not in acute distress.     Appearance: She is well-developed. She is not ill-appearing or diaphoretic.      Interventions: Nasal cannula in place.   HENT:      Head: Normocephalic and atraumatic.      Right Ear: External ear normal.      Left Ear: External ear normal.      Nose: Nose normal. No congestion.      Mouth/Throat:      Pharynx: Oropharynx is clear.   Eyes:      General: No scleral icterus.     Extraocular Movements: Extraocular movements intact.   Cardiovascular:      Rate and Rhythm: Regular rhythm. Tachycardia present.      Pulses: Normal pulses.      Heart sounds: Normal heart sounds. No murmur heard.      Pulmonary:      Effort: Pulmonary effort is normal. No respiratory distress.      Breath sounds: No wheezing or rales.   Chest:      Chest wall: Tenderness (posterior chest wall pain in RUL) present.   Abdominal:      General: Bowel sounds are normal. There is no distension.      Palpations: Abdomen is soft.      Tenderness: There is no abdominal tenderness. There is no guarding or rebound.   Musculoskeletal:      Cervical back: Normal range of motion.      Right lower leg: Edema (3+ chronic) present.      Left lower leg: Edema (3+ chronic) present.   Skin:     General: Skin is warm and dry.      Capillary Refill: Capillary refill takes less than 2 seconds.   Neurological:      General: No  focal deficit present.      Mental Status: She is alert and oriented to person, place, and time. Mental status is at baseline.   Psychiatric:         Mood and Affect: Mood normal.         Behavior: Behavior normal.         Thought Content: Thought content normal.             Significant Labs:   All pertinent labs within the past 24 hours have been reviewed.  CBC:   Recent Labs   Lab 02/17/22  1001   WBC 19.91*   HGB 14.1   HCT 46.1        CMP:   Recent Labs   Lab 02/17/22  1114      K 3.5      CO2 29      BUN 17   CREATININE 0.5   CALCIUM 8.6*   PROT 5.2*   ALBUMIN 2.7*   BILITOT 0.5   ALKPHOS 122   AST 17   ALT 13   ANIONGAP 8   EGFRNONAA >60.0     Cardiac Markers: No results for input(s): CKMB, MYOGLOBIN, BNP, TROPISTAT in the last 48 hours.  Lactic Acid:   Recent Labs   Lab 02/17/22  1001   LACTATE 1.8     Troponin:   Recent Labs   Lab 02/17/22  1001   TROPONINI 0.016     Urine Studies:   Recent Labs   Lab 02/17/22  1052   COLORU Straw   APPEARANCEUA Clear   PHUR 7.0   SPECGRAV 1.010   PROTEINUA Negative   GLUCUA Negative   KETONESU Negative   BILIRUBINUA Negative   OCCULTUA Negative   NITRITE Negative   LEUKOCYTESUR Negative       Significant Imaging: I have reviewed all pertinent imaging results/findings within the past 24 hours.   Imaging Results          X-Ray Chest AP Portable (Final result)  Result time 02/17/22 10:41:53    Final result by Miguel A Tirado MD (02/17/22 10:41:53)                 Impression:      Some consolidation in the right lung, new finding which could indicate a pneumonia.  Clinical correlation and follow-up recommended.      Electronically signed by: Miguel A Tirado MD  Date:    02/17/2022  Time:    10:41             Narrative:    EXAMINATION:  XR CHEST AP PORTABLE    CLINICAL HISTORY:  Sepsis;    TECHNIQUE:  Single frontal view of the chest was performed.    COMPARISON:  12/01/2021    FINDINGS:  The heart size is normal.  Mediastinum shows aortic  atherosclerosis.  Lungs are not fully expanded.  Left lung looks clear without consolidation, though small effusion blunts the sulcus.  Right lung now has patchy consolidation in the middle zone; this could represent pneumonia, aspiration, asymmetric edema, etcetera.  Prior right pleural effusion looks resolved.  Skeletal structures show no acute finding.

## 2022-02-17 NOTE — HPI
"Ms. Mari is a 77 yo M w/ end-stage COPD, PE on eliquis, HTN and HLD presenting with back pains and increasing oxygen requirements. Pt reports back pain mostly in the right upper back and described as tightness like a "brasserie wire around her back" and w/o radiation. These pains have been chronic, initially started when she was in SNF in December receiving therapy twice per day, however she has noticed the pain has been increasing lately. The pain feels better with palpation/massage. She further complains of left flank pain radiating to the front of her waist, described as burning and tingling sensation that comes and goes. Denies any provoking, or alleviating factors. Patient does have a history of chickenpox and shingles, last had an exacerbation over 40 years ago. Pt says she is normally on 3L NC and noticed that she has had increased SOB over the last 2 days. Her daughter at bedside reports no increased work of breathing. Pt endorses a mild intermittent productive cough. Denies fevers, chills, CP, wheezes, n/v/d, dysuria, and myalgia.     In ED, -125 VS otherwise stable. WBC 19.91, procal 0.62. CXR revealed some consolidation in the right lung, new finding which could indicate a pneumonia. Started on levofloxacin IV and given lidoderm patch and methocarbamol w/ some improvement of back pain.     "

## 2022-02-17 NOTE — PHARMACY MED REC
"  Admission Medication History     The home medication history was taken by Dino Colvin.    You may go to "Admission" then "Reconcile Home Medications" tabs to review and/or act upon these items.      The home medication list has been updated by the Pharmacy department.    Please read ALL comments highlighted in yellow.    Please address this information as you see fit.     Feel free to contact us if you have any questions or require assistance.      The medications listed below were removed from the home medication list. Please reorder if appropriate:  Patient reports no longer taking the following medication(s):   MELATONIN 3 MG   PANTOPRAZOLE 40 MG   METAMUCIL 3.47 GM PWPK    Medications listed below were obtained from: Patient/family     Current Outpatient Medications on File Prior to Encounter   Medication Sig    albuterol (PROVENTIL/VENTOLIN HFA) 90 mcg/actuation inhaler   INHALE 2 PUFFS INTO LUNGS EVERY 6 HOURS AS NEEDED FOR WHEEZING OR SHORTNESS OF BREATH    ALPRAZolam (XANAX) 0.25 MG tablet   Take 1 tablet (0.25 mg total) by mouth 3 (three) times daily as needed for Anxiety.    apixaban (ELIQUIS) 5 mg Tab   Take 1 tablet (5 mg total) by mouth 2 (two) times daily.    budesonide-formoterol 160-4.5 mcg (SYMBICORT) 160-4.5 mcg/actuation HFAA   INHALE 2 PUFFS INTO THE LUNGS EVERY 12 HOURS    cholecalciferol, vitamin D3, (VITAMIN D3) 25 mcg (1,000 unit) capsule   Take 1,000 Units by mouth once daily.    diltiaZEM (CARDIZEM CD) 180 MG 24 hr capsule   TAKE 1 CAPSULE (180 MG TOTAL) BY MOUTH ONCE DAILY.    EScitalopram oxalate (LEXAPRO) 5 MG Tab   Take 1 tablet (5 mg total) by mouth once daily.    furosemide (LASIX) 20 MG tablet   Take 1 tablet (20 mg total) by mouth once daily.    gabapentin (NEURONTIN) 100 MG capsule   Take 1 capsule (100 mg total) by mouth every evening.    lanolin/mineral oil/petrolatum (ARTIFICIAL TEARS OPHT)   Place 1 drop into both eyes as needed (Dry eye).    polyethylene " glycol (GLYCOLAX) 17 gram PwPk   Take 17 g by mouth daily as needed (constipation).    potassium chloride (MICRO-K) 10 MEQ CpSR   Take 1 capsule (10 mEq total) by mouth once daily.    pravastatin (PRAVACHOL) 20 MG tablet   TAKE 1 TABLET BY MOUTH EVERY DAY    predniSONE (DELTASONE) 10 MG tablet   Take 1 tablet (10 mg total) by mouth once daily.    predniSONE (DELTASONE) 5 MG tablet   Take 1 tablet (5 mg total) by mouth every evening.    umeclidinium (INCRUSE ELLIPTA) 62.5 mcg/actuation inhalation capsule   INHALE 1 PUFF BY MOUTH DAILY    blood sugar diagnostic Strp     1 strip by Misc.(Non-Drug; Combo Route) route 2 (two) times daily.    blood-glucose meter kit   Use as instructed    lancets (ACCU-CHEK SOFTCLIX LANCETS) Misc   1 lancet by Misc.(Non-Drug; Combo Route) route 2 (two) times daily.       Potential issues to be addressed PRIOR TO DISCHARGE  Patient reported not taking the following medications: (METFORMIN ). This medication remain on the home medication list. Please address accordingly.     Dino Colvin CPhT  EXT [phone]                  .

## 2022-02-17 NOTE — H&P
"[facility] - Emergency Dept  [facility] Medicine  History & Physical    Patient Name: Leyla Mari  MRN: [id number]  Patient Class: OP- Observation  Admission Date: 2/17/2022  Attending Physician: Nichole Clay MD   Primary Care Provider: Lizbeth Dillard MD         Patient information was obtained from patient, relative(s), past medical records and ER records.     Subjective:     Principal Problem:Pneumonia of right middle lobe due to infectious organism    Chief Complaint:   Chief Complaint   Patient presents with    Back Pain    Shortness of Breath     On 3 L NC at home requiring more and more O2        HPI: Ms. Mari is a 77 yo M w/ end-stage COPD, PE on eliquis, HTN and HLD presenting with back pains and increasing oxygen requirements. Pt reports back pain mostly in the right upper back and described as tightness like a "brasserie wire around her back" and w/o radiation. These pains have been chronic, initially started when she was in SNF in December receiving therapy twice per day, however she has noticed the pain has been increasing lately. The pain feels better with palpation/massage. She further complains of left flank pain radiating to the front of her waist, described as burning and tingling sensation that comes and goes. Denies any provoking, or alleviating factors. Patient does have a history of chickenpox and shingles, last had an exacerbation over 40 years ago. Pt says she is normally on 3L NC and noticed that she has had increased SOB over the last 2 days. Her daughter at bedside reports no increased work of breathing. Pt endorses a mild intermittent productive cough. Denies fevers, chills, CP, wheezes, n/v/d, dysuria, and myalgia.     In ED, -125 VS otherwise stable. WBC 19.91, procal 0.62. CXR revealed some consolidation in the right lung, new finding which could indicate a pneumonia. Started on levofloxacin IV and given lidoderm patch and methocarbamol w/ some improvement of back " pain.         Past Medical History:   Diagnosis Date    Actinic keratosis     Arthritis     Cataract     Cellulitis of ankle     Colon polyps     COPD (chronic obstructive pulmonary disease)     home O2 (2L/min)     Family history of colon cancer     History of Clostridium difficile infection 7/3/2012    Hyperlipidemia     Hypertension     Lung nodules     Sinus tachycardia     Type 2 diabetes mellitus, without long-term current use of insulin 2022       Past Surgical History:   Procedure Laterality Date    APPENDECTOMY      CATARACT EXTRACTION Bilateral      SECTION      x2    COLONOSCOPY N/A 10/10/2017    Procedure: COLONOSCOPY;  Surgeon: Omid Lino MD;  Location: [address];  Service: Endoscopy;  Laterality: N/A;  pt unable to be checked in with previous order    EYE SURGERY      TONSILLECTOMY         Review of patient's allergies indicates:   Allergen Reactions    Bactrim [sulfamethoxazole-trimethoprim] Nausea Only    Codeine Itching    Keflex [cephalexin] Other (See Comments)     Rhinorrhea, nausea. Tolerated Ceftriaxone 2014    Ceftriaxone Rash     Morbilliform rash after receiving IV ceftriaxone    Clindamycin hcl Rash    Doxycycline Rash    Vancomycin analogues Rash     Morbilliform rash after receiving IV vancomycin       No current facility-administered medications on file prior to encounter.     Current Outpatient Medications on File Prior to Encounter   Medication Sig    albuterol (PROVENTIL/VENTOLIN HFA) 90 mcg/actuation inhaler INHALE 2 PUFFS INTO LUNGS EVERY 6 HOURS AS NEEDED FOR WHEEZING OR SHORTNESS OF BREATH    ALPRAZolam (XANAX) 0.25 MG tablet Take 1 tablet (0.25 mg total) by mouth 3 (three) times daily as needed for Anxiety.    apixaban (ELIQUIS) 5 mg Tab Take 1 tablet (5 mg total) by mouth 2 (two) times daily.    blood sugar diagnostic Strp 1 strip by Misc.(Non-Drug; Combo Route) route 2 (two) times daily.    blood-glucose meter  kit Use as instructed    budesonide-formoterol 160-4.5 mcg (SYMBICORT) 160-4.5 mcg/actuation HFAA INHALE 2 PUFFS INTO THE LUNGS EVERY 12 HOURS    capsaicin 0.1 % Crea Apply 1 drop topically daily as needed (foot pain).    cholecalciferol, vitamin D3, 10 mcg (400 unit) Cap Take 3 capsules (1,200 Units total) by mouth once daily.    diltiaZEM (CARDIZEM CD) 180 MG 24 hr capsule TAKE 1 CAPSULE (180 MG TOTAL) BY MOUTH ONCE DAILY.    EScitalopram oxalate (LEXAPRO) 5 MG Tab Take 1 tablet (5 mg total) by mouth once daily.    furosemide (LASIX) 20 MG tablet Take 1 tablet (20 mg total) by mouth once daily.    gabapentin (NEURONTIN) 100 MG capsule Take 1 capsule (100 mg total) by mouth every evening.    lancets (ACCU-CHEK SOFTCLIX LANCETS) Misc 1 lancet by Misc.(Non-Drug; Combo Route) route 2 (two) times daily.    melatonin (MELATIN) 3 mg tablet Take 2 tablets (6 mg total) by mouth nightly as needed for Insomnia.    metFORMIN (GLUCOPHAGE) 500 MG tablet Take 1 tablet (500 mg total) by mouth 2 (two) times daily with meals. Don't start taking until evaluated by primary care provider    pantoprazole (PROTONIX) 40 MG tablet Take 1 tablet (40 mg total) by mouth once daily. (Patient not taking: Reported on 2/7/2022)    polyethylene glycol (GLYCOLAX) 17 gram PwPk Take 17 g by mouth daily as needed (constipation). (Patient not taking: Reported on 2/7/2022)    potassium chloride (MICRO-K) 10 MEQ CpSR Take 1 capsule (10 mEq total) by mouth once daily.    pravastatin (PRAVACHOL) 20 MG tablet TAKE 1 TABLET BY MOUTH EVERY DAY    predniSONE (DELTASONE) 10 MG tablet Take 1 tablet (10 mg total) by mouth once daily.    predniSONE (DELTASONE) 5 MG tablet Take 1 tablet (5 mg total) by mouth every evening.    psyllium husk, aspartame, (METAMUCIL) 3.4 gram PwPk packet Take 1 packet by mouth once daily.    umeclidinium (INCRUSE ELLIPTA) 62.5 mcg/actuation inhalation capsule INHALE 1 PUFF BY MOUTH DAILY     Family History      Problem Relation (Age of Onset)    Blindness Paternal Grandmother    Breast cancer Sister    COPD Father    Cancer Mother (79), Sister, Son    Cataracts Mother, Sister    Diabetes Father, Paternal Grandmother    Glaucoma Mother    Heart disease Mother, Father, Sister    Hyperlipidemia Mother, Father, Sister    Hypertension Mother, Father, Sister    Skin cancer Mother, Sister    Thyroid disease Mother, Daughter        Tobacco Use    Smoking status: Former Smoker     Packs/day: 1.00     Years: 39.00     Pack years: 39.00     Types: Cigarettes     Quit date: 1995     Years since quittin.3    Smokeless tobacco: Never Used   Substance and Sexual Activity    Alcohol use: Yes     Alcohol/week: 2.0 standard drinks     Types: 2 Glasses of wine per week     Comment: rarely has a glass of wine- not daily    Drug use: No    Sexual activity: Never     Review of Systems   Constitutional: Positive for activity change and unexpected weight change. Negative for chills, diaphoresis, fatigue and fever.   HENT: Positive for congestion, postnasal drip and sneezing. Negative for facial swelling, rhinorrhea and sore throat.    Eyes: Negative for photophobia, itching and visual disturbance.   Respiratory: Positive for cough (productive, intermittent), chest tightness (posterior) and shortness of breath (acute on chronic). Negative for wheezing.    Cardiovascular: Positive for leg swelling (chronic b/l). Negative for chest pain and palpitations.   Gastrointestinal: Positive for constipation. Negative for abdominal distention, abdominal pain, blood in stool, diarrhea, nausea and vomiting.   Genitourinary: Negative for difficulty urinating, dysuria, frequency, hematuria and urgency.   Musculoskeletal: Positive for back pain (posterior RUQ, L lower flank). Negative for arthralgias, myalgias and neck stiffness.   Skin: Positive for color change (BLE).   Neurological: Negative for dizziness, tremors, seizures, syncope, weakness,  light-headedness, numbness and headaches.   Psychiatric/Behavioral: Negative for agitation, confusion and hallucinations.     Objective:     Vital Signs (Most Recent):  Temp: 99.6 °F (37.6 °C) (02/17/22 0945)  Pulse: 102 (02/17/22 1103)  Resp: 16 (02/17/22 1051)  BP: (!) 122/57 (02/17/22 1103)  SpO2: 96 % (02/17/22 1103) Vital Signs (24h Range):  Temp:  [99.6 °F (37.6 °C)] 99.6 °F (37.6 °C)  Pulse:  [102-125] 102  Resp:  [16-26] 16  SpO2:  [94 %-99 %] 96 %  BP: (122-134)/(57-65) 122/57        Body mass index is 19.76 kg/m².    Physical Exam  Vitals and nursing note reviewed.   Constitutional:       General: She is awake. She is not in acute distress.     Appearance: She is well-developed. She is not ill-appearing or diaphoretic.      Interventions: Nasal cannula in place.   HENT:      Head: Normocephalic and atraumatic.      Right Ear: External ear normal.      Left Ear: External ear normal.      Nose: Nose normal. No congestion.      Mouth/Throat:      Pharynx: Oropharynx is clear.   Eyes:      General: No scleral icterus.     Extraocular Movements: Extraocular movements intact.   Cardiovascular:      Rate and Rhythm: Regular rhythm. Tachycardia present.      Pulses: Normal pulses.      Heart sounds: Normal heart sounds. No murmur heard.      Pulmonary:      Effort: Pulmonary effort is normal. No respiratory distress.      Breath sounds: No wheezing or rales.   Chest:      Chest wall: Tenderness (posterior chest wall pain in RUL) present.   Abdominal:      General: Bowel sounds are normal. There is no distension.      Palpations: Abdomen is soft.      Tenderness: There is no abdominal tenderness. There is no guarding or rebound.   Musculoskeletal:      Cervical back: Normal range of motion.      Right lower leg: Edema (3+ chronic) present.      Left lower leg: Edema (3+ chronic) present.   Skin:     General: Skin is warm and dry.      Capillary Refill: Capillary refill takes less than 2 seconds.   Neurological:       General: No focal deficit present.      Mental Status: She is alert and oriented to person, place, and time. Mental status is at baseline.   Psychiatric:         Mood and Affect: Mood normal.         Behavior: Behavior normal.         Thought Content: Thought content normal.             Significant Labs:   All pertinent labs within the past 24 hours have been reviewed.  CBC:   Recent Labs   Lab 02/17/22  1001   WBC 19.91*   HGB 14.1   HCT 46.1        CMP:   Recent Labs   Lab 02/17/22  1114      K 3.5      CO2 29      BUN 17   CREATININE 0.5   CALCIUM 8.6*   PROT 5.2*   ALBUMIN 2.7*   BILITOT 0.5   ALKPHOS 122   AST 17   ALT 13   ANIONGAP 8   EGFRNONAA >60.0     Cardiac Markers: No results for input(s): CKMB, MYOGLOBIN, BNP, TROPISTAT in the last 48 hours.  Lactic Acid:   Recent Labs   Lab 02/17/22  1001   LACTATE 1.8     Troponin:   Recent Labs   Lab 02/17/22  1001   TROPONINI 0.016     Urine Studies:   Recent Labs   Lab 02/17/22  1052   COLORU Straw   APPEARANCEUA Clear   PHUR 7.0   SPECGRAV 1.010   PROTEINUA Negative   GLUCUA Negative   KETONESU Negative   BILIRUBINUA Negative   OCCULTUA Negative   NITRITE Negative   LEUKOCYTESUR Negative       Significant Imaging: I have reviewed all pertinent imaging results/findings within the past 24 hours.   Imaging Results          X-Ray Chest AP Portable (Final result)  Result time 02/17/22 10:41:53    Final result by Miguel A Tirado MD (02/17/22 10:41:53)                 Impression:      Some consolidation in the right lung, new finding which could indicate a pneumonia.  Clinical correlation and follow-up recommended.      Electronically signed by: Miguel A Tirado MD  Date:    02/17/2022  Time:    10:41             Narrative:    EXAMINATION:  XR CHEST AP PORTABLE    CLINICAL HISTORY:  Sepsis;    TECHNIQUE:  Single frontal view of the chest was performed.    COMPARISON:  12/01/2021    FINDINGS:  The heart size is normal.  Mediastinum shows  aortic atherosclerosis.  Lungs are not fully expanded.  Left lung looks clear without consolidation, though small effusion blunts the sulcus.  Right lung now has patchy consolidation in the middle zone; this could represent pneumonia, aspiration, asymmetric edema, etcetera.  Prior right pleural effusion looks resolved.  Skeletal structures show no acute finding.                                  Assessment/Plan:     * Pneumonia of right middle lobe due to infectious organism  Patient with current diagnosis of pneumonia due to infectious organism. Which is controlled. Current antimicrobial regimen consists of   Antibiotics (From admission, onward)            Start     Stop Route Frequency Ordered    02/18/22 0000  levoFLOXacin 750 mg/150 mL IVPB 750 mg         -- IV Every 24 hours (non-standard times) 02/17/22 1201    02/17/22 1230  levoFLOXacin 750 mg/150 mL IVPB 750 mg         -- IV Once 02/17/22 1119      . Cultures currently pending.  Will monitor patient closely and continue current treatment plan unchanged.      Acute hypoxemic respiratory failure  Patient with Hypoxic Respiratory failure which is Acute on chronic.  she is on home oxygen at 3 LPM. Supplemental oxygen was provided and noted O2 sat improved to 95-96% at rest.   Signs/symptoms of respiratory failure include- respiratory distress and lethargy. Contributing diagnoses includes - COPD and Pneumonia Labs and images were reviewed. Patient Has recent ABG, which has been reviewed. Will treat underlying causes and adjust management of respiratory failure as follows  -continuous O2, currently 4 L NC  -delia russo    Type 2 diabetes mellitus, without long-term current use of insulin  -last A1c 7.7, recheck  -hold home metformin  -low dose SSI, diabetic diet, q4h CBG    Chronic pulmonary embolism  -continue home eliquis 5 mg bid      Hyperlipidemia  -continue statin      Essential hypertension  -continue diltiazem 180 mg qd, lasix 20 mg qd      Chronic  obstructive pulmonary disease  -continue duoneb prn, home prednisone 10 mg qam and 5 mg qhs, symbicort and umeclidinium daily          VTE Risk Mitigation (From admission, onward)         Ordered     apixaban tablet 5 mg  2 times daily         02/17/22 1309     IP VTE HIGH RISK PATIENT  Once         02/17/22 1158     Place sequential compression device  Until discontinued         02/17/22 1158                   Mekhi Johnson PA-C  Department of Hospital Medicine   [facility] - Emergency Dept

## 2022-02-18 LAB
ANION GAP SERPL CALC-SCNC: 10 MMOL/L (ref 8–16)
BASOPHILS # BLD AUTO: 0.02 K/UL (ref 0–0.2)
BASOPHILS NFR BLD: 0.1 % (ref 0–1.9)
BUN SERPL-MCNC: 20 MG/DL (ref 8–23)
CALCIUM SERPL-MCNC: 9.6 MG/DL (ref 8.7–10.5)
CHLORIDE SERPL-SCNC: 97 MMOL/L (ref 95–110)
CO2 SERPL-SCNC: 32 MMOL/L (ref 23–29)
CREAT SERPL-MCNC: 0.5 MG/DL (ref 0.5–1.4)
DIFFERENTIAL METHOD: ABNORMAL
EOSINOPHIL # BLD AUTO: 0 K/UL (ref 0–0.5)
EOSINOPHIL NFR BLD: 0 % (ref 0–8)
ERYTHROCYTE [DISTWIDTH] IN BLOOD BY AUTOMATED COUNT: 15.1 % (ref 11.5–14.5)
EST. GFR  (AFRICAN AMERICAN): >60 ML/MIN/1.73 M^2
EST. GFR  (NON AFRICAN AMERICAN): >60 ML/MIN/1.73 M^2
GLUCOSE SERPL-MCNC: 104 MG/DL (ref 70–110)
HCT VFR BLD AUTO: 39.6 % (ref 37–48.5)
HCV AB SERPL QL IA: NEGATIVE
HGB BLD-MCNC: 12.3 G/DL (ref 12–16)
IMM GRANULOCYTES # BLD AUTO: 0.27 K/UL (ref 0–0.04)
IMM GRANULOCYTES NFR BLD AUTO: 1.7 % (ref 0–0.5)
LYMPHOCYTES # BLD AUTO: 0.2 K/UL (ref 1–4.8)
LYMPHOCYTES NFR BLD: 1.4 % (ref 18–48)
MAGNESIUM SERPL-MCNC: 1.8 MG/DL (ref 1.6–2.6)
MCH RBC QN AUTO: 28.5 PG (ref 27–31)
MCHC RBC AUTO-ENTMCNC: 31.1 G/DL (ref 32–36)
MCV RBC AUTO: 92 FL (ref 82–98)
MONOCYTES # BLD AUTO: 0.8 K/UL (ref 0.3–1)
MONOCYTES NFR BLD: 4.8 % (ref 4–15)
NEUTROPHILS # BLD AUTO: 15 K/UL (ref 1.8–7.7)
NEUTROPHILS NFR BLD: 92 % (ref 38–73)
NRBC BLD-RTO: 0 /100 WBC
PLATELET # BLD AUTO: 196 K/UL (ref 150–450)
PMV BLD AUTO: 9.8 FL (ref 9.2–12.9)
POCT GLUCOSE: 104 MG/DL (ref 70–110)
POCT GLUCOSE: 118 MG/DL (ref 70–110)
POCT GLUCOSE: 137 MG/DL (ref 70–110)
POCT GLUCOSE: 163 MG/DL (ref 70–110)
POCT GLUCOSE: 181 MG/DL (ref 70–110)
POCT GLUCOSE: 75 MG/DL (ref 70–110)
POTASSIUM SERPL-SCNC: 4 MMOL/L (ref 3.5–5.1)
RBC # BLD AUTO: 4.32 M/UL (ref 4–5.4)
SODIUM SERPL-SCNC: 139 MMOL/L (ref 136–145)
WBC # BLD AUTO: 16.28 K/UL (ref 3.9–12.7)

## 2022-02-18 PROCEDURE — 25000003 PHARM REV CODE 250

## 2022-02-18 PROCEDURE — 97161 PT EVAL LOW COMPLEX 20 MIN: CPT

## 2022-02-18 PROCEDURE — 99900035 HC TECH TIME PER 15 MIN (STAT)

## 2022-02-18 PROCEDURE — 99226 PR SUBSEQUENT OBSERVATION CARE,LEVEL III: CPT | Mod: ,,, | Performed by: PHYSICIAN ASSISTANT

## 2022-02-18 PROCEDURE — 97165 OT EVAL LOW COMPLEX 30 MIN: CPT

## 2022-02-18 PROCEDURE — 97535 SELF CARE MNGMENT TRAINING: CPT

## 2022-02-18 PROCEDURE — 94761 N-INVAS EAR/PLS OXIMETRY MLT: CPT

## 2022-02-18 PROCEDURE — 96366 THER/PROPH/DIAG IV INF ADDON: CPT

## 2022-02-18 PROCEDURE — 36415 COLL VENOUS BLD VENIPUNCTURE: CPT | Performed by: PHYSICIAN ASSISTANT

## 2022-02-18 PROCEDURE — 27000221 HC OXYGEN, UP TO 24 HOURS

## 2022-02-18 PROCEDURE — 25000242 PHARM REV CODE 250 ALT 637 W/ HCPCS: Performed by: PHYSICIAN ASSISTANT

## 2022-02-18 PROCEDURE — 94640 AIRWAY INHALATION TREATMENT: CPT

## 2022-02-18 PROCEDURE — 25000003 PHARM REV CODE 250: Performed by: PHYSICIAN ASSISTANT

## 2022-02-18 PROCEDURE — 63600175 PHARM REV CODE 636 W HCPCS: Performed by: PHYSICIAN ASSISTANT

## 2022-02-18 PROCEDURE — G0378 HOSPITAL OBSERVATION PER HR: HCPCS

## 2022-02-18 PROCEDURE — 97116 GAIT TRAINING THERAPY: CPT

## 2022-02-18 PROCEDURE — 83735 ASSAY OF MAGNESIUM: CPT | Performed by: PHYSICIAN ASSISTANT

## 2022-02-18 PROCEDURE — 97530 THERAPEUTIC ACTIVITIES: CPT

## 2022-02-18 PROCEDURE — 99226 PR SUBSEQUENT OBSERVATION CARE,LEVEL III: ICD-10-PCS | Mod: ,,, | Performed by: PHYSICIAN ASSISTANT

## 2022-02-18 PROCEDURE — 85025 COMPLETE CBC W/AUTO DIFF WBC: CPT | Performed by: PHYSICIAN ASSISTANT

## 2022-02-18 PROCEDURE — 80048 BASIC METABOLIC PNL TOTAL CA: CPT | Performed by: PHYSICIAN ASSISTANT

## 2022-02-18 RX ADMIN — FUROSEMIDE 20 MG: 20 TABLET ORAL at 09:02

## 2022-02-18 RX ADMIN — FLUTICASONE FUROATE AND VILANTEROL TRIFENATATE 1 PUFF: 100; 25 POWDER RESPIRATORY (INHALATION) at 09:02

## 2022-02-18 RX ADMIN — PANTOPRAZOLE SODIUM 40 MG: 40 TABLET, DELAYED RELEASE ORAL at 09:02

## 2022-02-18 RX ADMIN — MELATONIN TAB 3 MG 6 MG: 3 TAB at 08:02

## 2022-02-18 RX ADMIN — PREDNISONE 50 MG: 20 TABLET ORAL at 09:02

## 2022-02-18 RX ADMIN — ALPRAZOLAM 0.25 MG: 0.25 TABLET ORAL at 09:02

## 2022-02-18 RX ADMIN — APIXABAN 5 MG: 5 TABLET, FILM COATED ORAL at 08:02

## 2022-02-18 RX ADMIN — ESCITALOPRAM OXALATE 5 MG: 5 TABLET, FILM COATED ORAL at 09:02

## 2022-02-18 RX ADMIN — ALPRAZOLAM 0.25 MG: 0.25 TABLET ORAL at 12:02

## 2022-02-18 RX ADMIN — GABAPENTIN 100 MG: 100 CAPSULE ORAL at 08:02

## 2022-02-18 RX ADMIN — LIDOCAINE 5% 1 PATCH: 700 PATCH TOPICAL at 12:02

## 2022-02-18 RX ADMIN — DILTIAZEM HYDROCHLORIDE 180 MG: 180 CAPSULE, COATED, EXTENDED RELEASE ORAL at 09:02

## 2022-02-18 RX ADMIN — CHOLECALCIFEROL TAB 25 MCG (1000 UNIT) 1000 UNITS: 25 TAB at 09:02

## 2022-02-18 RX ADMIN — APIXABAN 5 MG: 5 TABLET, FILM COATED ORAL at 09:02

## 2022-02-18 RX ADMIN — LEVOFLOXACIN 750 MG: 750 INJECTION, SOLUTION INTRAVENOUS at 12:02

## 2022-02-18 RX ADMIN — PRAVASTATIN SODIUM 20 MG: 20 TABLET ORAL at 08:02

## 2022-02-18 NOTE — ASSESSMENT & PLAN NOTE
Patient with current diagnosis of pneumonia due to infectious organism. Which is controlled. Current antimicrobial regimen consists of   Antibiotics (From admission, onward)            Start     Stop Route Frequency Ordered    02/18/22 1100  levoFLOXacin 750 mg/150 mL IVPB 750 mg         -- IV Every 24 hours (non-standard times) 02/17/22 1201      . Cultures currently pending.  Will monitor patient closely and continue current treatment plan unchanged.

## 2022-02-18 NOTE — PLAN OF CARE
Patient tolerated PT session fairly due to SOB despite being on 3L NC O2 . She required contact guard assistance for bed mobility, standing, and 6 steps to bedside chair with RW. She is okay to perform ambulation with 1 person assistance and RW. She would benefit from  PT at discharge.     Problem: Physical Therapy Goal  Goal: Physical Therapy Goal  Description: Goals to be met by: 3/18/2022    Patient will increase functional independence with mobility by performin. Supine to sit with supervision   2. Sit to stand transfer with supervision   3. Bed to chair transfer with supervision using Rolling Walker  4. Gait x50 feet with Supervision using Rolling Walker    Outcome: Ongoing, Progressing

## 2022-02-18 NOTE — PLAN OF CARE
[facility] - Telemetry Stepdown ([place]-[facility])  Initial Discharge Assessment       Primary Care Provider: Lizbeth Dillard MD    Admission Diagnosis: Tachycardia [R00.0]  Chest pain [R07.9]  Pneumonia of right middle lobe due to infectious organism [J18.9]  Sepsis, due to unspecified organism, unspecified whether acute organ dysfunction present [A41.9]    Admission Date: 2/17/2022  Expected Discharge Date: 2/20/2022         Payor: [employer] MEDICARE / Plan: [employer] 65 / Product Type: Medicare Advantage /     Extended Emergency Contact Information  Primary Emergency Contact: Laney Martinez  Address: [address] [place] [address] [place] [address] [place]  Mobile Phone: [phone]  Relation: Daughter    Discharge Plan A: (P) Other (TBD)  Discharge Plan B: (P) Home with family,Home Health      [facility]  [address] [place] [facility] [address]  Phone: [phone] Fax: [phone]    [facility]/pharmacy #[id number] - [place], [place] - [address]  [address]  [facility] [address]  Phone: [phone] Fax: [phone]    [facility]  [address]  [facility] [address]  Phone: [phone] Fax: [phone]               SW completed Discharge Planning Assessment with patient via bedside. Discharge planning booklet given to patient/family and whiteboard updated with MARK ANTHONY and phone #. All questions answered.    Patient reported that family will provide transportation upon discharge.     Patient reported that she lives at home with her son and prior to hospitalization she was mostly independent with her ADL's. Patient and daughter reported patient does need some assistance with bathing. Patient reported that she uses a walker around the home, and a wheelchair when necessary. Patient reported that she also has a BSC. Patient is not on dialysis and she does not go to a Coumadin clinic.     Patient lives in a single  story home with five steps to enter. Patient reported that she need assistance with going up and down the steps.        Lo Batres LMSW  [facility]  Ext. [phone]

## 2022-02-18 NOTE — PT/OT/SLP EVAL
Occupational Therapy   Co-Evaluation/Treatment    Name: Leyla Mari  MRN: [id number]  Admitting Diagnosis:  Pneumonia of right middle lobe due to infectious organism  Recent Surgery: * No surgery found *      Recommendations:     Discharge Recommendations: home health OT  Discharge Equipment Recommendations:  hip kit,shower chair (if family needed to put her in the shower, patient has been doing spongebaths)  Barriers to discharge:  None    Assessment:     Leyla Mari is a 76 y.o. female with a medical diagnosis of Pneumonia of right middle lobe due to infectious organism.  Performance deficits affecting function: weakness,impaired endurance,impaired self care skills,impaired functional mobilty,gait instability,impaired cardiopulmonary response to activity,pain,decreased lower extremity function. Patient was motivated to participate with therapy, but was limited due to SOB and being on 3 L NC of oxygen. Patient would benefit from continued skilled acute OT 3x/wk to improve functional mobility, increase independence with ADLs, and address established goals. Recommending HHOT once medically appropriate for discharge to increase maximal independence, reduce burden of care, and ensure safety.     Rehab Prognosis: Good; patient would benefit from acute skilled OT services to address these deficits and reach maximum level of function.       Plan:     Patient to be seen 3 x/week to address the above listed problems via self-care/home management,therapeutic activities,therapeutic exercises  · Plan of Care Expires: 03/18/22  · Plan of Care Reviewed with: patient,daughter    Subjective     Chief Complaint: Back pain and SOB  Patient/Family Comments/goals: to get better    Occupational Profile:  Living Environment: Patient lives with her son in a [facility] with 6 steps and bilateral handrails (too wide) to enter. Her family bumps her up/down steps in the wheelchair. Patient has a walk in shower with a grab bar. Patient has been  doing spongebaths at home. Patient has a BSC over the toilet. Prior to admission, patients level of function was modified independent for short distance ambulation within her house using RW. She is limited in ambulation distance due to decreased endurance. Equipment at home: walker, rolling,wheelchair,rollator,bedside commode,grab bar,oxygen. Upon discharge, patient will have assistance from son, daughter, and grandson.    Pain/Comfort:  · Pain Rating 1:  (patient did not rate)  · Location - Side 1: Bilateral  · Location - Orientation 1: generalized  · Location 1: back  · Pain Addressed 1: Distraction,Nurse notified  · Pain Rating Post-Intervention 1:  (patient did not rate)    Patients cultural, spiritual, Rastafarian conflicts given the current situation: no    Objective:     Communicated with: NSG prior to session.  Patient found HOB elevated with oxygen,pulse ox (continuous),PureWick upon OT entry to room.    General Precautions: Standard, fall   Orthopedic Precautions:N/A   Braces: N/A  Respiratory Status: Nasal cannula, flow 3 L/min    Occupational Performance:    Bed Mobility:    · Patient completed Scooting/Bridging with contact guard assistance  · Patient completed Supine to Sit with contact guard assistance    Functional Mobility/Transfers:  · Patient completed Sit <> Stand Transfer with contact guard assistance  with  rolling walker   · Patient completed Bed > Chair Transfer using Stand Pivot technique with contact guard assistance with rolling walker    Activities of Daily Living:  · Lower Body Dressing: total assistance Donning socks    Cognitive/Visual Perceptual:  Cognitive/Psychosocial Skills:     -       Oriented to: Person, Place, Time and Situation   -       Follows Commands/attention:Follows multistep  commands  -       Communication: clear/fluent  -       Memory: No Deficits noted  -       Safety awareness/insight to disability: intact   -       Mood/Affect/Coping skills/emotional control:  Appropriate to situation  Visual/Perceptual:      -Intact     Edema- swelling in B LEs    Physical Exam:  Upper Extremity Range of Motion:     -       Right Upper Extremity: WFL  -       Left Upper Extremity: WFL  Upper Extremity Strength:    -       Right Upper Extremity: 4/5  -       Left Upper Extremity: 4/5   Strength:    -       Right Upper Extremity: WFL  -       Left Upper Extremity: WFL  Fine Motor Coordination:    -       Intact  Gross motor coordination:   WFL    AMPAC 6 Click ADL:  AMPAC Total Score: 15    Treatment & Education:  Role of OT and POC  ADL retraining  Functional mobility training  Safety  Discharge planning    Co-treatment performed due to patient's multiple deficits requiring two skilled therapists to appropriately and safely assess patient's strength and endurance while facilitating functional tasks in addition to accommodating for patient's activity tolerance.     Education:  Patient left up in chair with call button in reach and all needs met.     GOALS:   Multidisciplinary Problems     Occupational Therapy Goals        Problem: Occupational Therapy Goal    Goal Priority Disciplines Outcome Interventions   Occupational Therapy Goal     OT, PT/OT Ongoing, Progressing    Description: Goals to be met by: 3/11/2022     Patient will increase functional independence with ADLs by performing:    UE Dressing with Set-up Assistance.  LE Dressing with min A using AE as needed.  Grooming while standing at sink with Stand-by Assistance.  Toileting from toilet with Stand-by Assistance for hygiene and clothing management.   Supine to sit with Stand-by Assistance.  Stand pivot transfers with Stand-by Assistance.  Toilet transfer to toilet with Stand-by Assistance.                     History:     Past Medical History:   Diagnosis Date    Actinic keratosis     Arthritis     Cataract     Cellulitis of ankle     Colon polyps     COPD (chronic obstructive pulmonary disease)     home O2 (2L/min)      Family history of colon cancer     History of Clostridium difficile infection 7/3/2012    Hyperlipidemia     Hypertension     Lung nodules     Sinus tachycardia     Type 2 diabetes mellitus, without long-term current use of insulin 2022       Past Surgical History:   Procedure Laterality Date    APPENDECTOMY      CATARACT EXTRACTION Bilateral 2007     SECTION      x2    COLONOSCOPY N/A 10/10/2017    Procedure: COLONOSCOPY;  Surgeon: Omid Lino MD;  Location: [address]);  Service: Endoscopy;  Laterality: N/A;  pt unable to be checked in with previous order    EYE SURGERY      TONSILLECTOMY         Time Tracking:     OT Date of Treatment: 22  OT Start Time: 1116  OT Stop Time: 1136  OT Total Time (min): 20 min    Billable Minutes:Evaluation 12  Therapeutic Activity 8    2022

## 2022-02-18 NOTE — SUBJECTIVE & OBJECTIVE
Interval History: NAEON. Pt seen and evaluated at bedside this am. Pt reports feeling improved from yesterday. Was placed on 2L NC for supportive care. Continuing IV abx and prednisone.     Review of Systems   Constitutional: Negative for activity change, chills, diaphoresis, fatigue, fever and unexpected weight change.   HENT: Positive for congestion, postnasal drip and sneezing. Negative for facial swelling, rhinorrhea and sore throat.    Eyes: Negative for photophobia, itching and visual disturbance.   Respiratory: Positive for cough (productive, intermittent), chest tightness (posterior) and shortness of breath (acute on chronic). Negative for wheezing.    Cardiovascular: Positive for leg swelling (chronic b/l). Negative for chest pain and palpitations.   Gastrointestinal: Negative for abdominal distention, abdominal pain, blood in stool, constipation, diarrhea, nausea and vomiting.   Genitourinary: Negative for difficulty urinating, dysuria, frequency, hematuria and urgency.   Musculoskeletal: Positive for back pain (posterior RUQ). Negative for arthralgias, myalgias and neck stiffness.   Skin: Positive for color change (BLE).   Neurological: Negative for dizziness, tremors, seizures, syncope, weakness, light-headedness, numbness and headaches.   Psychiatric/Behavioral: Negative for agitation, confusion and hallucinations.     Objective:     Vital Signs (Most Recent):  Temp: 98.9 °F (37.2 °C) (02/18/22 1603)  Pulse: 82 (02/18/22 1603)  Resp: 18 (02/18/22 1603)  BP: (!) 120/58 (02/18/22 1603)  SpO2: 96 % (02/18/22 1603) Vital Signs (24h Range):  Temp:  [98 °F (36.7 °C)-99.1 °F (37.3 °C)] 98.9 °F (37.2 °C)  Pulse:  [63-98] 82  Resp:  [14-20] 18  SpO2:  [94 %-98 %] 96 %  BP: (108-142)/(58-65) 120/58     Weight: 44 kg (97 lb)  Body mass index is 18.94 kg/m².  No intake or output data in the 24 hours ending 02/18/22 1625   Physical Exam  Vitals and nursing note reviewed.   Constitutional:       General: She is  awake. She is not in acute distress.     Appearance: She is well-developed. She is not ill-appearing or diaphoretic.      Interventions: Nasal cannula in place.   HENT:      Head: Normocephalic and atraumatic.      Right Ear: External ear normal.      Left Ear: External ear normal.      Nose: Nose normal. No congestion.      Mouth/Throat:      Pharynx: Oropharynx is clear.   Eyes:      General: No scleral icterus.     Extraocular Movements: Extraocular movements intact.   Cardiovascular:      Rate and Rhythm: Regular rhythm. Tachycardia present.      Pulses: Normal pulses.      Heart sounds: Normal heart sounds. No murmur heard.      Pulmonary:      Effort: Pulmonary effort is normal. No respiratory distress.      Breath sounds: No wheezing or rales.   Chest:      Chest wall: Tenderness (posterior chest wall pain in RUL) present.   Abdominal:      General: Bowel sounds are normal. There is no distension.      Palpations: Abdomen is soft.      Tenderness: There is no abdominal tenderness. There is no guarding or rebound.   Musculoskeletal:      Cervical back: Normal range of motion.      Right lower leg: Edema (3+ chronic) present.      Left lower leg: Edema (3+ chronic) present.   Skin:     General: Skin is warm and dry.      Capillary Refill: Capillary refill takes less than 2 seconds.   Neurological:      General: No focal deficit present.      Mental Status: She is alert and oriented to person, place, and time. Mental status is at baseline.   Psychiatric:         Mood and Affect: Mood normal.         Behavior: Behavior normal.         Thought Content: Thought content normal.         Significant Labs: All pertinent labs within the past 24 hours have been reviewed.    Significant Imaging: I have reviewed all pertinent imaging results/findings within the past 24 hours.

## 2022-02-18 NOTE — PLAN OF CARE
Problem: Occupational Therapy Goal  Goal: Occupational Therapy Goal  Description: Goals to be met by: 3/11/2022     Patient will increase functional independence with ADLs by performing:    UE Dressing with Set-up Assistance.  LE Dressing with min A using AE as needed.  Grooming while standing at sink with Stand-by Assistance.  Toileting from toilet with Stand-by Assistance for hygiene and clothing management.   Supine to sit with Stand-by Assistance.  Stand pivot transfers with Stand-by Assistance.  Toilet transfer to toilet with Stand-by Assistance.    Outcome: Ongoing, Progressing   Patient's goals are set.

## 2022-02-18 NOTE — PT/OT/SLP EVAL
Physical Therapy Evaluation and Treatment     Patient Name:  Leyla Mari   MRN:  [id number]    Recommendations:     Discharge Recommendations:  home health PT   Discharge Equipment Recommendations: none   Barriers to discharge: None    Assessment:     Leyla Mari is a 76 y.o. female admitted with a medical diagnosis of Pneumonia of right middle lobe due to infectious organism.  She presents with the following impairments/functional limitations:  weakness,impaired endurance,impaired functional mobilty,gait instability,impaired balance,decreased lower extremity function,pain,impaired cardiopulmonary response to activity. Patient tolerated PT session fairly due to SOB despite being on 3L NC O2 . She required contact guard assistance for bed mobility, standing, and 6 steps to bedside chair with RW. She is okay to perform ambulation with 1 person assistance and RW. She would benefit from  PT at discharge.     Rehab Prognosis: Fair; patient would benefit from acute skilled PT services to address these deficits and reach maximum level of function.    Recent Surgery: * No surgery found *      Plan:     During this hospitalization, patient to be seen 3 x/week to address the identified rehab impairments via gait training,therapeutic activities,therapeutic exercises and progress toward the following goals:    · Plan of Care Expires:  03/18/22    Subjective     Chief Complaint: Back pain. Short of breath.   Patient/Family Comments/goals: To increase endurance.   Pain/Comfort:  · Pain Rating 1:  (yes but did not rate with number)  · Location 1: back  · Pain Addressed 1: Reposition,Distraction,Cessation of Activity,Nurse notified    Patients cultural, spiritual, Hinduism conflicts given the current situation:   n/a    Living Environment:  Patient lives with her son in a [facility] with 6 steps and bilateral handrails (too wide) to enter. Her family bumps her up/down steps in the wheelchair. Prior to admission, patients level of  function was modified independent for short distance ambulation within her house using RW. She is limited in ambulation distance due to decreased endurance. Equipment at home: walker, rolling,wheelchair,rollator,bedside commode,grab bar,oxygen. Upon discharge, patient will have assistance from son, daughter, and grandson.    Objective:     Communicated with RN prior to session.  Patient found supine with oxygen,PureWick,pulse ox (continuous) upon PT entry to room. Daughter present in room.     General Precautions: Standard, fall   Orthopedic Precautions:N/A   Braces: N/A  Respiratory Status: Nasal cannula, flow 3 L/min    Exams:  · Cognitive Exam:  Patient is oriented to Person, Place, Time and Situation  · Gross Motor Coordination:  WFL  · Skin Integrity/Edema:      · -       Edema: Moderate B LE's and feet  · RLE ROM: WFL  · RLE Strength: WFL  · LLE ROM: WFL  · LLE Strength: WFL    Functional Mobility: Patient required increased rest breaks after all functional mobility due to SOB.   · Bed Mobility:     · Scooting: contact guard assistance  · Supine to Sit: contact guard assistance  · Transfers:     · Sit to Stand:  contact guard assistance with rolling walker x1 from bed   · Gait:  Patient ambulated 6 steps from bed to beside chair with contact guard assistance and rolling walker. She was limited in ambulation distance due to SOB despite being on 3L NC O2.    Therapeutic Activities and Exercises:  Patient and daughter educated in:  -PT role and POC  -safety with transfers including hand placement  -gait sequencing and RW management  -OOB activity to maximize recovery including ambulating with RW and nursing staff assistance as able to tolerate due to her SOB      AM-PAC 6 CLICK MOBILITY  Total Score:17     Patient left up in chair with all lines intact, call button in reach, RN notified and daughter present.    GOALS:   Multidisciplinary Problems     Physical Therapy Goals        Problem: Physical Therapy Goal     Goal Priority Disciplines Outcome Goal Variances Interventions   Physical Therapy Goal     PT, PT/OT Ongoing, Progressing     Description: Goals to be met by: 3/18/2022    Patient will increase functional independence with mobility by performin. Supine to sit with supervision   2. Sit to stand transfer with supervision   3. Bed to chair transfer with supervision using Rolling Walker  4. Gait x50 feet with Supervision using Rolling Walker                     History:     Past Medical History:   Diagnosis Date    Actinic keratosis     Arthritis     Cataract     Cellulitis of ankle     Colon polyps     COPD (chronic obstructive pulmonary disease)     home O2 (2L/min)     Family history of colon cancer     History of Clostridium difficile infection 7/3/2012    Hyperlipidemia     Hypertension     Lung nodules     Sinus tachycardia     Type 2 diabetes mellitus, without long-term current use of insulin 2022       Past Surgical History:   Procedure Laterality Date    APPENDECTOMY      CATARACT EXTRACTION Bilateral      SECTION      x2    COLONOSCOPY N/A 10/10/2017    Procedure: COLONOSCOPY;  Surgeon: Omid Lino MD;  Location: [facility] ([address] [place] [address]);  Service: Endoscopy;  Laterality: N/A;  pt unable to be checked in with previous order    EYE SURGERY      TONSILLECTOMY         Time Tracking:     PT Received On: 22  PT Start Time: 1116     PT Stop Time: 1136  PT Total Time (min): 20 min     Billable Minutes: Evaluation 10 and Gait Training 10    Co-treat performed for this visit due to patient requiring 2 skilled therapists to accommodate patient activity tolerance     2022

## 2022-02-18 NOTE — PROGRESS NOTES
"[facility] - Tele[facility] Stepdown ([place] [address])  [facility] Medicine  Progress Note    Patient Name: Leyla Mari  MRN: [id number]  Patient Class: OP- Observation   Admission Date: 2/17/2022  Length of Stay: 0 days  Attending Physician: Nichole Clay MD  Primary Care Provider: Lizbeth Dillard MD        Subjective:     Principal Problem:Pneumonia of right middle lobe due to infectious organism        HPI:  Ms. Mari is a 75 yo M w/ end-stage COPD, PE on eliquis, HTN and HLD presenting with back pains and increasing oxygen requirements. Pt reports back pain mostly in the right upper back and described as tightness like a "brasserie wire around her back" and w/o radiation. These pains have been chronic, initially started when she was in SNF in December receiving therapy twice per day, however she has noticed the pain has been increasing lately. The pain feels better with palpation/massage. She further complains of left flank pain radiating to the front of her waist, described as burning and tingling sensation that comes and goes. Denies any provoking, or alleviating factors. Patient does have a history of chickenpox and shingles, last had an exacerbation over 40 years ago. Pt says she is normally on 3L NC and noticed that she has had increased SOB over the last 2 days. Her daughter at bedside reports no increased work of breathing. Pt endorses a mild intermittent productive cough. Denies fevers, chills, CP, wheezes, n/v/d, dysuria, and myalgia.     In ED, -125 VS otherwise stable. WBC 19.91, procal 0.62. CXR revealed some consolidation in the right lung, new finding which could indicate a pneumonia. Started on levofloxacin IV and given lidoderm patch and methocarbamol w/ some improvement of back pain.         Overview/Hospital Course:  Pt placed in observation for R middle lobe pneumonia. AFVSS. Pt had leukocytosis of 19.91 on CBC and procal 0.62. CXR revealed R middle lobe consolidation. Pt initiated on " levofloxacin IV and treated for pleuritic pain associated w/ the pneumonia. Increased prednisone dose to 50 mg qd x 5 days in the setting of COPD.       Interval History: NAEON. Pt seen and evaluated at bedside this am. Pt reports feeling improved from yesterday. Was placed on 2L NC for supportive care. Continuing IV abx and prednisone.     Review of Systems   Constitutional: Negative for activity change, chills, diaphoresis, fatigue, fever and unexpected weight change.   HENT: Positive for congestion, postnasal drip and sneezing. Negative for facial swelling, rhinorrhea and sore throat.    Eyes: Negative for photophobia, itching and visual disturbance.   Respiratory: Positive for cough (productive, intermittent), chest tightness (posterior) and shortness of breath (acute on chronic). Negative for wheezing.    Cardiovascular: Positive for leg swelling (chronic b/l). Negative for chest pain and palpitations.   Gastrointestinal: Negative for abdominal distention, abdominal pain, blood in stool, constipation, diarrhea, nausea and vomiting.   Genitourinary: Negative for difficulty urinating, dysuria, frequency, hematuria and urgency.   Musculoskeletal: Positive for back pain (posterior RUQ). Negative for arthralgias, myalgias and neck stiffness.   Skin: Positive for color change (BLE).   Neurological: Negative for dizziness, tremors, seizures, syncope, weakness, light-headedness, numbness and headaches.   Psychiatric/Behavioral: Negative for agitation, confusion and hallucinations.     Objective:     Vital Signs (Most Recent):  Temp: 98.9 °F (37.2 °C) (02/18/22 1603)  Pulse: 82 (02/18/22 1603)  Resp: 18 (02/18/22 1603)  BP: (!) 120/58 (02/18/22 1603)  SpO2: 96 % (02/18/22 1603) Vital Signs (24h Range):  Temp:  [98 °F (36.7 °C)-99.1 °F (37.3 °C)] 98.9 °F (37.2 °C)  Pulse:  [63-98] 82  Resp:  [14-20] 18  SpO2:  [94 %-98 %] 96 %  BP: (108-142)/(58-65) 120/58     Weight: 44 kg (97 lb)  Body mass index is 18.94 kg/m².  No  intake or output data in the 24 hours ending 02/18/22 6773   Physical Exam  Vitals and nursing note reviewed.   Constitutional:       General: She is awake. She is not in acute distress.     Appearance: She is well-developed. She is not ill-appearing or diaphoretic.      Interventions: Nasal cannula in place.   HENT:      Head: Normocephalic and atraumatic.      Right Ear: External ear normal.      Left Ear: External ear normal.      Nose: Nose normal. No congestion.      Mouth/Throat:      Pharynx: Oropharynx is clear.   Eyes:      General: No scleral icterus.     Extraocular Movements: Extraocular movements intact.   Cardiovascular:      Rate and Rhythm: Regular rhythm. Tachycardia present.      Pulses: Normal pulses.      Heart sounds: Normal heart sounds. No murmur heard.      Pulmonary:      Effort: Pulmonary effort is normal. No respiratory distress.      Breath sounds: No wheezing or rales.   Chest:      Chest wall: Tenderness (posterior chest wall pain in RUL) present.   Abdominal:      General: Bowel sounds are normal. There is no distension.      Palpations: Abdomen is soft.      Tenderness: There is no abdominal tenderness. There is no guarding or rebound.   Musculoskeletal:      Cervical back: Normal range of motion.      Right lower leg: Edema (3+ chronic) present.      Left lower leg: Edema (3+ chronic) present.   Skin:     General: Skin is warm and dry.      Capillary Refill: Capillary refill takes less than 2 seconds.   Neurological:      General: No focal deficit present.      Mental Status: She is alert and oriented to person, place, and time. Mental status is at baseline.   Psychiatric:         Mood and Affect: Mood normal.         Behavior: Behavior normal.         Thought Content: Thought content normal.         Significant Labs: All pertinent labs within the past 24 hours have been reviewed.    Significant Imaging: I have reviewed all pertinent imaging results/findings within the past 24  hours.      Assessment/Plan:      * Pneumonia of right middle lobe due to infectious organism  Patient with current diagnosis of pneumonia due to infectious organism. Which is controlled. Current antimicrobial regimen consists of   Antibiotics (From admission, onward)            Start     Stop Route Frequency Ordered    02/18/22 1100  levoFLOXacin 750 mg/150 mL IVPB 750 mg         -- IV Every 24 hours (non-standard times) 02/17/22 1201      . Cultures currently pending.  Will monitor patient closely and continue current treatment plan unchanged.      Acute hypoxemic respiratory failure  Patient with Hypoxic Respiratory failure which is Acute on chronic.  she is on home oxygen at 3 LPM. Supplemental oxygen was provided and noted O2 sat improved to 95-96% at rest.   Signs/symptoms of respiratory failure include- respiratory distress and lethargy. Contributing diagnoses includes - COPD and Pneumonia Labs and images were reviewed. Patient Has recent ABG, which has been reviewed. Will treat underlying causes and adjust management of respiratory failure as follows  -continuous O2, currently 4 L NC  -duonebs, symbicort    Type 2 diabetes mellitus, without long-term current use of insulin  -last A1c 7.7, recheck  -hold home metformin  -low dose SSI, diabetic diet, q4h CBG    Chronic pulmonary embolism  -continue home eliquis 5 mg bid      Hyperlipidemia  -continue statin      Essential hypertension  -continue diltiazem 180 mg qd, lasix 20 mg qd      Chronic obstructive pulmonary disease  -continue duoneb prn,   -prednisone 50 mg qd,   -symbicort and umeclidinium daily          VTE Risk Mitigation (From admission, onward)         Ordered     apixaban tablet 5 mg  2 times daily         02/17/22 1309     IP VTE HIGH RISK PATIENT  Once         02/17/22 1158     Place sequential compression device  Until discontinued         02/17/22 1158                Discharge Planning   MARK ANTHONY: 2/20/2022     Code Status: Full Code   Is the  patient medically ready for discharge?: No    Reason for patient still in hospital (select all that apply): Patient trending condition, Laboratory test, Treatment, PT / OT recommendations and Pending disposition  Discharge Plan A: Other (TBD)                  Mekhi Johnson PA-C  Department of Hospital Medicine   [facility] - Tele[facility] Stepdown ([place])

## 2022-02-18 NOTE — HOSPITAL COURSE
Pt placed in observation for R middle lobe pneumonia. AFVSS. Pt had leukocytosis of 19.91 on CBC and procal 0.62. CXR revealed R middle lobe consolidation. Pt initiated on levofloxacin IV 2/18 x6 days and treated for pleuritic pain associated w/ the pneumonia. Increased prednisone dose to 50 mg qd x 5 days in the setting of COPD. Started IV metronidazole 2/21 x2 days for concerns of aspiration. Continued duonebs, levalbuterol. Pt at bl oxygen requirement. Given levofloxacin 500 mg qd x8 days for 14 day total and metronidazole 500 mg tid x5 days for 7 day total. Pt given  services. Pt medically ready for discharge home. Pt educated on hospital course and plan, verbally agrees and understands. All questions answered.

## 2022-02-18 NOTE — PLAN OF CARE
Problem: Adult Inpatient Plan of Care  Goal: Absence of Hospital-Acquired Illness or Injury  Outcome: Ongoing, Progressing  Goal: Optimal Comfort and Wellbeing  Outcome: Ongoing, Progressing     Problem: Fluid Imbalance (Pneumonia)  Goal: Fluid Balance  Outcome: Ongoing, Progressing     Problem: Infection (Pneumonia)  Goal: Resolution of Infection Signs and Symptoms  Outcome: Ongoing, Progressing     Problem: Impaired Wound Healing  Goal: Optimal Wound Healing  Outcome: Ongoing, Progressing     Problem: Fall Injury Risk  Goal: Absence of Fall and Fall-Related Injury  Outcome: Ongoing, Progressing    Pt. AAOx4, no complaints reported. NAD. VS stable. Purewick in place. Incontinent pad and briefs changed, clean, and dry. 3L NC, with suitable o2 saturation according to MD order. Bed in lowest position, call bell and belongings within reach.

## 2022-02-19 LAB
BASOPHILS # BLD AUTO: 0.02 K/UL (ref 0–0.2)
BASOPHILS NFR BLD: 0.1 % (ref 0–1.9)
DIFFERENTIAL METHOD: ABNORMAL
EOSINOPHIL # BLD AUTO: 0 K/UL (ref 0–0.5)
EOSINOPHIL NFR BLD: 0 % (ref 0–8)
ERYTHROCYTE [DISTWIDTH] IN BLOOD BY AUTOMATED COUNT: 15.4 % (ref 11.5–14.5)
HCT VFR BLD AUTO: 40.2 % (ref 37–48.5)
HGB BLD-MCNC: 12.3 G/DL (ref 12–16)
IMM GRANULOCYTES # BLD AUTO: 0.18 K/UL (ref 0–0.04)
IMM GRANULOCYTES NFR BLD AUTO: 1.2 % (ref 0–0.5)
LYMPHOCYTES # BLD AUTO: 0.3 K/UL (ref 1–4.8)
LYMPHOCYTES NFR BLD: 1.9 % (ref 18–48)
MCH RBC QN AUTO: 29.1 PG (ref 27–31)
MCHC RBC AUTO-ENTMCNC: 30.6 G/DL (ref 32–36)
MCV RBC AUTO: 95 FL (ref 82–98)
MONOCYTES # BLD AUTO: 1 K/UL (ref 0.3–1)
MONOCYTES NFR BLD: 6.2 % (ref 4–15)
NEUTROPHILS # BLD AUTO: 14.2 K/UL (ref 1.8–7.7)
NEUTROPHILS NFR BLD: 90.6 % (ref 38–73)
NRBC BLD-RTO: 0 /100 WBC
PLATELET # BLD AUTO: 195 K/UL (ref 150–450)
PMV BLD AUTO: 10.4 FL (ref 9.2–12.9)
POCT GLUCOSE: 199 MG/DL (ref 70–110)
POCT GLUCOSE: 207 MG/DL (ref 70–110)
POCT GLUCOSE: 79 MG/DL (ref 70–110)
POCT GLUCOSE: 85 MG/DL (ref 70–110)
RBC # BLD AUTO: 4.23 M/UL (ref 4–5.4)
WBC # BLD AUTO: 15.63 K/UL (ref 3.9–12.7)

## 2022-02-19 PROCEDURE — 99226 PR SUBSEQUENT OBSERVATION CARE,LEVEL III: CPT | Mod: ,,, | Performed by: PHYSICIAN ASSISTANT

## 2022-02-19 PROCEDURE — 94640 AIRWAY INHALATION TREATMENT: CPT

## 2022-02-19 PROCEDURE — 63600175 PHARM REV CODE 636 W HCPCS: Performed by: PHYSICIAN ASSISTANT

## 2022-02-19 PROCEDURE — 94760 N-INVAS EAR/PLS OXIMETRY 1: CPT

## 2022-02-19 PROCEDURE — 85025 COMPLETE CBC W/AUTO DIFF WBC: CPT | Performed by: PHYSICIAN ASSISTANT

## 2022-02-19 PROCEDURE — 96366 THER/PROPH/DIAG IV INF ADDON: CPT

## 2022-02-19 PROCEDURE — G0378 HOSPITAL OBSERVATION PER HR: HCPCS

## 2022-02-19 PROCEDURE — 99226 PR SUBSEQUENT OBSERVATION CARE,LEVEL III: ICD-10-PCS | Mod: ,,, | Performed by: PHYSICIAN ASSISTANT

## 2022-02-19 PROCEDURE — 94761 N-INVAS EAR/PLS OXIMETRY MLT: CPT

## 2022-02-19 PROCEDURE — 99900035 HC TECH TIME PER 15 MIN (STAT)

## 2022-02-19 PROCEDURE — 36415 COLL VENOUS BLD VENIPUNCTURE: CPT | Performed by: PHYSICIAN ASSISTANT

## 2022-02-19 PROCEDURE — 25000242 PHARM REV CODE 250 ALT 637 W/ HCPCS: Performed by: PHYSICIAN ASSISTANT

## 2022-02-19 PROCEDURE — 94799 UNLISTED PULMONARY SVC/PX: CPT

## 2022-02-19 PROCEDURE — 27000221 HC OXYGEN, UP TO 24 HOURS

## 2022-02-19 PROCEDURE — 25000003 PHARM REV CODE 250: Performed by: PHYSICIAN ASSISTANT

## 2022-02-19 PROCEDURE — 25000003 PHARM REV CODE 250

## 2022-02-19 RX ORDER — SENNOSIDES 8.6 MG/1
8.6 TABLET ORAL DAILY
Status: DISCONTINUED | OUTPATIENT
Start: 2022-02-19 | End: 2022-02-23 | Stop reason: HOSPADM

## 2022-02-19 RX ORDER — SENNOSIDES 8.6 MG/1
8.6 TABLET ORAL DAILY
Status: DISCONTINUED | OUTPATIENT
Start: 2022-02-20 | End: 2022-02-19

## 2022-02-19 RX ADMIN — PRAVASTATIN SODIUM 20 MG: 20 TABLET ORAL at 10:02

## 2022-02-19 RX ADMIN — FLUTICASONE FUROATE AND VILANTEROL TRIFENATATE 1 PUFF: 100; 25 POWDER RESPIRATORY (INHALATION) at 09:02

## 2022-02-19 RX ADMIN — APIXABAN 5 MG: 5 TABLET, FILM COATED ORAL at 10:02

## 2022-02-19 RX ADMIN — LEVOFLOXACIN 750 MG: 750 INJECTION, SOLUTION INTRAVENOUS at 01:02

## 2022-02-19 RX ADMIN — CHOLECALCIFEROL TAB 25 MCG (1000 UNIT) 1000 UNITS: 25 TAB at 09:02

## 2022-02-19 RX ADMIN — PANTOPRAZOLE SODIUM 40 MG: 40 TABLET, DELAYED RELEASE ORAL at 09:02

## 2022-02-19 RX ADMIN — TIOTROPIUM BROMIDE INHALATION SPRAY 2 PUFF: 3.12 SPRAY, METERED RESPIRATORY (INHALATION) at 09:02

## 2022-02-19 RX ADMIN — SENNOSIDES 8.6 MG: 8.6 TABLET ORAL at 06:02

## 2022-02-19 RX ADMIN — GABAPENTIN 100 MG: 100 CAPSULE ORAL at 10:02

## 2022-02-19 RX ADMIN — APIXABAN 5 MG: 5 TABLET, FILM COATED ORAL at 09:02

## 2022-02-19 RX ADMIN — PREDNISONE 50 MG: 20 TABLET ORAL at 09:02

## 2022-02-19 RX ADMIN — ALPRAZOLAM 0.25 MG: 0.25 TABLET ORAL at 01:02

## 2022-02-19 RX ADMIN — FUROSEMIDE 20 MG: 20 TABLET ORAL at 09:02

## 2022-02-19 RX ADMIN — ESCITALOPRAM OXALATE 5 MG: 5 TABLET, FILM COATED ORAL at 09:02

## 2022-02-19 RX ADMIN — LIDOCAINE 5% 1 PATCH: 700 PATCH TOPICAL at 01:02

## 2022-02-19 RX ADMIN — DILTIAZEM HYDROCHLORIDE 180 MG: 180 CAPSULE, COATED, EXTENDED RELEASE ORAL at 09:02

## 2022-02-19 RX ADMIN — MELATONIN TAB 3 MG 6 MG: 3 TAB at 10:02

## 2022-02-19 NOTE — ASSESSMENT & PLAN NOTE
Patient with Hypoxic Respiratory failure which is Acute on chronic.  she is on home oxygen at 3 LPM. Supplemental oxygen was provided and noted O2 sat improved to 95-96% at rest.   Signs/symptoms of respiratory failure include- respiratory distress and lethargy. Contributing diagnoses includes - COPD and Pneumonia Labs and images were reviewed. Patient Has recent ABG, which has been reviewed. Will treat underlying causes and adjust management of respiratory failure as follows  -unable to maintain sat > 88% on 4L NC switched to 50% ventimask   -repeat CXR  -duonebs, symbicort

## 2022-02-19 NOTE — PROGRESS NOTES
"[facility] - Tele[facility] Stepdown ([place] [address])  [facility] Medicine  Progress Note    Patient Name: Leyla Mari  MRN: [id number]  Patient Class: OP- Observation   Admission Date: 2/17/2022  Length of Stay: 0 days  Attending Physician: Nichole Clay MD  Primary Care Provider: Lizbeth Dillard MD        Subjective:     Principal Problem:Pneumonia of right middle lobe due to infectious organism        HPI:  Ms. Mari is a 75 yo M w/ end-stage COPD, PE on eliquis, HTN and HLD presenting with back pains and increasing oxygen requirements. Pt reports back pain mostly in the right upper back and described as tightness like a "brasserie wire around her back" and w/o radiation. These pains have been chronic, initially started when she was in SNF in December receiving therapy twice per day, however she has noticed the pain has been increasing lately. The pain feels better with palpation/massage. She further complains of left flank pain radiating to the front of her waist, described as burning and tingling sensation that comes and goes. Denies any provoking, or alleviating factors. Patient does have a history of chickenpox and shingles, last had an exacerbation over 40 years ago. Pt says she is normally on 3L NC and noticed that she has had increased SOB over the last 2 days. Her daughter at bedside reports no increased work of breathing. Pt endorses a mild intermittent productive cough. Denies fevers, chills, CP, wheezes, n/v/d, dysuria, and myalgia.     In ED, -125 VS otherwise stable. WBC 19.91, procal 0.62. CXR revealed some consolidation in the right lung, new finding which could indicate a pneumonia. Started on levofloxacin IV and given lidoderm patch and methocarbamol w/ some improvement of back pain.         Overview/Hospital Course:  Pt placed in observation for R middle lobe pneumonia. AFVSS. Pt had leukocytosis of 19.91 on CBC and procal 0.62. CXR revealed R middle lobe consolidation. Pt initiated on " levofloxacin IV and treated for pleuritic pain associated w/ the pneumonia. Increased prednisone dose to 50 mg qd x 5 days in the setting of COPD.       Interval History: NAEON. Pt seen and evaluated this am. Pt sitting up in chair and in acute respiratory distress. Increased work of breathing, pursed lips. Lungs CTAB/L. Desat to 88% on 4L NC switched to 40% ventimask. CXR ordered.    Review of Systems   Constitutional:  Negative for activity change, chills, diaphoresis, fatigue, fever and unexpected weight change.   HENT:  Positive for congestion. Negative for facial swelling, postnasal drip, rhinorrhea, sneezing and sore throat.    Eyes:  Negative for photophobia, itching and visual disturbance.   Respiratory:  Positive for cough (productive, intermittent), chest tightness (posterior) and shortness of breath (acute on chronic). Negative for wheezing.    Cardiovascular:  Positive for leg swelling (chronic b/l). Negative for chest pain and palpitations.   Gastrointestinal:  Negative for abdominal distention, abdominal pain, blood in stool, constipation, diarrhea, nausea and vomiting.   Genitourinary:  Negative for difficulty urinating, dysuria, frequency, hematuria and urgency.   Musculoskeletal:  Positive for back pain (posterior RUQ). Negative for arthralgias, myalgias and neck stiffness.   Skin:  Positive for color change (BLE).   Neurological:  Negative for dizziness, tremors, seizures, syncope, weakness, light-headedness, numbness and headaches.   Psychiatric/Behavioral:  Negative for agitation, confusion and hallucinations.    Objective:     Vital Signs (Most Recent):  Temp: 98 °F (36.7 °C) (02/19/22 1136)  Pulse: 104 (02/19/22 1136)  Resp: 16 (02/19/22 1136)  BP: (!) 145/65 (02/19/22 1145)  SpO2: 96 % (02/19/22 1136)   Vital Signs (24h Range):  Temp:  [97.1 °F (36.2 °C)-98.9 °F (37.2 °C)] 98 °F (36.7 °C)  Pulse:  [] 104  Resp:  [16-20] 16  SpO2:  [85 %-96 %] 96 %  BP: (117-179)/(58-77) 145/65      Weight: 44 kg (97 lb)  Body mass index is 18.94 kg/m².    Intake/Output Summary (Last 24 hours) at 2/19/2022 1333  Last data filed at 2/19/2022 0500  Gross per 24 hour   Intake --   Output 200 ml   Net -200 ml      Physical Exam  Vitals and nursing note reviewed.   Constitutional:       General: She is awake. She is not in acute distress.     Appearance: She is well-developed. She is not ill-appearing or diaphoretic.      Interventions: Nasal cannula in place.   HENT:      Head: Normocephalic and atraumatic.      Right Ear: External ear normal.      Left Ear: External ear normal.      Nose: Nose normal. No congestion.      Mouth/Throat:      Pharynx: Oropharynx is clear.   Eyes:      General: No scleral icterus.     Extraocular Movements: Extraocular movements intact.   Cardiovascular:      Rate and Rhythm: Regular rhythm. Tachycardia present.      Pulses: Normal pulses.      Heart sounds: Normal heart sounds. No murmur heard.  Pulmonary:      Effort: Respiratory distress present.      Breath sounds: No wheezing or rales.      Comments: Pursed lip breathing  Chest:      Chest wall: Tenderness (posterior chest wall pain in RUL) present.   Abdominal:      General: Bowel sounds are normal. There is no distension.      Palpations: Abdomen is soft.      Tenderness: There is no abdominal tenderness. There is no guarding or rebound.   Musculoskeletal:      Cervical back: Normal range of motion.      Right lower leg: Edema (3+ chronic) present.      Left lower leg: Edema (3+ chronic) present.   Skin:     General: Skin is warm and dry.      Capillary Refill: Capillary refill takes less than 2 seconds.   Neurological:      General: No focal deficit present.      Mental Status: She is alert and oriented to person, place, and time. Mental status is at baseline.   Psychiatric:         Mood and Affect: Mood normal.         Behavior: Behavior normal.         Thought Content: Thought content normal.       Significant Labs: All  pertinent labs within the past 24 hours have been reviewed.    Significant Imaging: I have reviewed all pertinent imaging results/findings within the past 24 hours.      Assessment/Plan:      * Pneumonia of right middle lobe due to infectious organism  Patient with current diagnosis of pneumonia due to infectious organism. Which is controlled. Current antimicrobial regimen consists of   Antibiotics (From admission, onward)            Start     Stop Route Frequency Ordered    02/18/22 1100  levoFLOXacin 750 mg/150 mL IVPB 750 mg         -- IV Every 24 hours (non-standard times) 02/17/22 1201      . Cultures currently pending.  Will monitor patient closely and continue current treatment plan unchanged.      Acute hypoxemic respiratory failure  Patient with Hypoxic Respiratory failure which is Acute on chronic.  she is on home oxygen at 3 LPM. Supplemental oxygen was provided and noted O2 sat improved to 95-96% at rest.   Signs/symptoms of respiratory failure include- respiratory distress and lethargy. Contributing diagnoses includes - COPD and Pneumonia Labs and images were reviewed. Patient Has recent ABG, which has been reviewed. Will treat underlying causes and adjust management of respiratory failure as follows  -unable to maintain sat > 88% on 4L NC switched to 50% ventimask   -repeat CXR  -duonebs, symbicort    Type 2 diabetes mellitus, without long-term current use of insulin  -last A1c 7.7, recheck  -hold home metformin  -low dose SSI, diabetic diet, q4h CBG    Chronic pulmonary embolism  -continue home eliquis 5 mg bid      Hyperlipidemia  -continue statin      Essential hypertension  -continue diltiazem 180 mg qd, lasix 20 mg qd      Chronic obstructive pulmonary disease  -continue duoneb prn,   -prednisone 50 mg qd,   -symbicort and umeclidinium daily          VTE Risk Mitigation (From admission, onward)         Ordered     apixaban tablet 5 mg  2 times daily         02/17/22 1309     IP VTE HIGH RISK  PATIENT  Once         02/17/22 1158     Place sequential compression device  Until discontinued         02/17/22 1158                Discharge Planning   MARK ANTHONY: 2/20/2022     Code Status: Full Code   Is the patient medically ready for discharge?: No    Reason for patient still in hospital (select all that apply): Patient trending condition, Laboratory test, Treatment and Imaging  Discharge Plan A: Other (TBD)                  Mekhi Johnson PA-C  Department of Hospital Medicine   [facility] - Telemetry Stepdown ([place])

## 2022-02-19 NOTE — NURSING
1055-Patient unable to maintain saturations greater than 88 % while sitting in recliner, nasal canula flow increased to 4L; she has taken inhalers and is practicing pursed lip breathing, she remains tachycardiac and was assisted back to bed, RT notified and placed patient on 50% Ventimask, team notified.    This evening writer accompanied patient to radiology for xray, she transferred to [facility] with Ventimask on 10L and continuous POX in place for the trip, which the patient tolerated w/o difficulty of desaturatng. Upon return back to her room, writer request respiratory to wean patient. Daughter remains at bedside, pure wick replaced and B/L LE off loaded on pillows

## 2022-02-19 NOTE — SUBJECTIVE & OBJECTIVE
Interval History: NAEON. Pt seen and evaluated this am. Pt sitting up in chair and in acute respiratory distress. Increased work of breathing, pursed lips. Lungs CTAB/L. Desat to 88% on 4L NC switched to 40% ventimask. CXR ordered.    Review of Systems   Constitutional:  Negative for activity change, chills, diaphoresis, fatigue, fever and unexpected weight change.   HENT:  Positive for congestion. Negative for facial swelling, postnasal drip, rhinorrhea, sneezing and sore throat.    Eyes:  Negative for photophobia, itching and visual disturbance.   Respiratory:  Positive for cough (productive, intermittent), chest tightness (posterior) and shortness of breath (acute on chronic). Negative for wheezing.    Cardiovascular:  Positive for leg swelling (chronic b/l). Negative for chest pain and palpitations.   Gastrointestinal:  Negative for abdominal distention, abdominal pain, blood in stool, constipation, diarrhea, nausea and vomiting.   Genitourinary:  Negative for difficulty urinating, dysuria, frequency, hematuria and urgency.   Musculoskeletal:  Positive for back pain (posterior RUQ). Negative for arthralgias, myalgias and neck stiffness.   Skin:  Positive for color change (BLE).   Neurological:  Negative for dizziness, tremors, seizures, syncope, weakness, light-headedness, numbness and headaches.   Psychiatric/Behavioral:  Negative for agitation, confusion and hallucinations.    Objective:     Vital Signs (Most Recent):  Temp: 98 °F (36.7 °C) (02/19/22 1136)  Pulse: 104 (02/19/22 1136)  Resp: 16 (02/19/22 1136)  BP: (!) 145/65 (02/19/22 1145)  SpO2: 96 % (02/19/22 1136)   Vital Signs (24h Range):  Temp:  [97.1 °F (36.2 °C)-98.9 °F (37.2 °C)] 98 °F (36.7 °C)  Pulse:  [] 104  Resp:  [16-20] 16  SpO2:  [85 %-96 %] 96 %  BP: (117-179)/(58-77) 145/65     Weight: 44 kg (97 lb)  Body mass index is 18.94 kg/m².    Intake/Output Summary (Last 24 hours) at 2/19/2022 1333  Last data filed at 2/19/2022 0500  Gross per  24 hour   Intake --   Output 200 ml   Net -200 ml      Physical Exam  Vitals and nursing note reviewed.   Constitutional:       General: She is awake. She is not in acute distress.     Appearance: She is well-developed. She is not ill-appearing or diaphoretic.      Interventions: Nasal cannula in place.   HENT:      Head: Normocephalic and atraumatic.      Right Ear: External ear normal.      Left Ear: External ear normal.      Nose: Nose normal. No congestion.      Mouth/Throat:      Pharynx: Oropharynx is clear.   Eyes:      General: No scleral icterus.     Extraocular Movements: Extraocular movements intact.   Cardiovascular:      Rate and Rhythm: Regular rhythm. Tachycardia present.      Pulses: Normal pulses.      Heart sounds: Normal heart sounds. No murmur heard.  Pulmonary:      Effort: Respiratory distress present.      Breath sounds: No wheezing or rales.      Comments: Pursed lip breathing  Chest:      Chest wall: Tenderness (posterior chest wall pain in RUL) present.   Abdominal:      General: Bowel sounds are normal. There is no distension.      Palpations: Abdomen is soft.      Tenderness: There is no abdominal tenderness. There is no guarding or rebound.   Musculoskeletal:      Cervical back: Normal range of motion.      Right lower leg: Edema (3+ chronic) present.      Left lower leg: Edema (3+ chronic) present.   Skin:     General: Skin is warm and dry.      Capillary Refill: Capillary refill takes less than 2 seconds.   Neurological:      General: No focal deficit present.      Mental Status: She is alert and oriented to person, place, and time. Mental status is at baseline.   Psychiatric:         Mood and Affect: Mood normal.         Behavior: Behavior normal.         Thought Content: Thought content normal.       Significant Labs: All pertinent labs within the past 24 hours have been reviewed.    Significant Imaging: I have reviewed all pertinent imaging results/findings within the past 24  hours.

## 2022-02-19 NOTE — PLAN OF CARE
Problem: Adult Inpatient Plan of Care  Goal: Absence of Hospital-Acquired Illness or Injury  Outcome: Ongoing, Progressing  Goal: Optimal Comfort and Wellbeing  Outcome: Ongoing, Progressing     Problem: Diabetes Comorbidity  Goal: Blood Glucose Level Within Targeted Range  Outcome: Ongoing, Progressing     Problem: Infection (Pneumonia)  Goal: Resolution of Infection Signs and Symptoms  Outcome: Ongoing, Progressing     Problem: Impaired Wound Healing  Goal: Optimal Wound Healing  Outcome: Ongoing, Progressing     Problem: Skin Injury Risk Increased  Goal: Skin Health and Integrity  Outcome: Ongoing, Progressing     Problem: Fall Injury Risk  Goal: Absence of Fall and Fall-Related Injury  Outcome: Ongoing, Progressing

## 2022-02-20 PROBLEM — D72.829 LEUKOCYTOSIS: Status: ACTIVE | Noted: 2022-02-20

## 2022-02-20 PROBLEM — E87.6 HYPOKALEMIA: Status: ACTIVE | Noted: 2022-02-20

## 2022-02-20 LAB
ANION GAP SERPL CALC-SCNC: 13 MMOL/L (ref 8–16)
BASOPHILS # BLD AUTO: 0.03 K/UL (ref 0–0.2)
BASOPHILS NFR BLD: 0.2 % (ref 0–1.9)
BUN SERPL-MCNC: 18 MG/DL (ref 8–23)
CALCIUM SERPL-MCNC: 9.3 MG/DL (ref 8.7–10.5)
CHLORIDE SERPL-SCNC: 96 MMOL/L (ref 95–110)
CO2 SERPL-SCNC: 29 MMOL/L (ref 23–29)
CREAT SERPL-MCNC: 0.4 MG/DL (ref 0.5–1.4)
DIFFERENTIAL METHOD: ABNORMAL
EOSINOPHIL # BLD AUTO: 0 K/UL (ref 0–0.5)
EOSINOPHIL NFR BLD: 0 % (ref 0–8)
ERYTHROCYTE [DISTWIDTH] IN BLOOD BY AUTOMATED COUNT: 15.3 % (ref 11.5–14.5)
EST. GFR  (AFRICAN AMERICAN): >60 ML/MIN/1.73 M^2
EST. GFR  (NON AFRICAN AMERICAN): >60 ML/MIN/1.73 M^2
GLUCOSE SERPL-MCNC: 97 MG/DL (ref 70–110)
HCT VFR BLD AUTO: 42.2 % (ref 37–48.5)
HGB BLD-MCNC: 13 G/DL (ref 12–16)
IMM GRANULOCYTES # BLD AUTO: 0.22 K/UL (ref 0–0.04)
IMM GRANULOCYTES NFR BLD AUTO: 1.4 % (ref 0–0.5)
LYMPHOCYTES # BLD AUTO: 0.3 K/UL (ref 1–4.8)
LYMPHOCYTES NFR BLD: 1.9 % (ref 18–48)
MAGNESIUM SERPL-MCNC: 1.7 MG/DL (ref 1.6–2.6)
MCH RBC QN AUTO: 29 PG (ref 27–31)
MCHC RBC AUTO-ENTMCNC: 30.8 G/DL (ref 32–36)
MCV RBC AUTO: 94 FL (ref 82–98)
MONOCYTES # BLD AUTO: 0.9 K/UL (ref 0.3–1)
MONOCYTES NFR BLD: 5.8 % (ref 4–15)
NEUTROPHILS # BLD AUTO: 14.6 K/UL (ref 1.8–7.7)
NEUTROPHILS NFR BLD: 90.7 % (ref 38–73)
NRBC BLD-RTO: 0 /100 WBC
PLATELET # BLD AUTO: 207 K/UL (ref 150–450)
PMV BLD AUTO: 10.1 FL (ref 9.2–12.9)
POCT GLUCOSE: 107 MG/DL (ref 70–110)
POCT GLUCOSE: 119 MG/DL (ref 70–110)
POCT GLUCOSE: 167 MG/DL (ref 70–110)
POCT GLUCOSE: 81 MG/DL (ref 70–110)
POTASSIUM SERPL-SCNC: 3.1 MMOL/L (ref 3.5–5.1)
RBC # BLD AUTO: 4.48 M/UL (ref 4–5.4)
SODIUM SERPL-SCNC: 138 MMOL/L (ref 136–145)
WBC # BLD AUTO: 16.14 K/UL (ref 3.9–12.7)

## 2022-02-20 PROCEDURE — 99226 PR SUBSEQUENT OBSERVATION CARE,LEVEL III: ICD-10-PCS | Mod: ,,, | Performed by: PHYSICIAN ASSISTANT

## 2022-02-20 PROCEDURE — 94799 UNLISTED PULMONARY SVC/PX: CPT

## 2022-02-20 PROCEDURE — 25000003 PHARM REV CODE 250: Performed by: INTERNAL MEDICINE

## 2022-02-20 PROCEDURE — 63600175 PHARM REV CODE 636 W HCPCS: Performed by: PHYSICIAN ASSISTANT

## 2022-02-20 PROCEDURE — G0378 HOSPITAL OBSERVATION PER HR: HCPCS

## 2022-02-20 PROCEDURE — 99900035 HC TECH TIME PER 15 MIN (STAT)

## 2022-02-20 PROCEDURE — 96366 THER/PROPH/DIAG IV INF ADDON: CPT

## 2022-02-20 PROCEDURE — 36415 COLL VENOUS BLD VENIPUNCTURE: CPT | Performed by: PHYSICIAN ASSISTANT

## 2022-02-20 PROCEDURE — 25000003 PHARM REV CODE 250: Performed by: PHYSICIAN ASSISTANT

## 2022-02-20 PROCEDURE — 99226 PR SUBSEQUENT OBSERVATION CARE,LEVEL III: CPT | Mod: ,,, | Performed by: PHYSICIAN ASSISTANT

## 2022-02-20 PROCEDURE — 25000242 PHARM REV CODE 250 ALT 637 W/ HCPCS: Performed by: PHYSICIAN ASSISTANT

## 2022-02-20 PROCEDURE — 83735 ASSAY OF MAGNESIUM: CPT | Performed by: PHYSICIAN ASSISTANT

## 2022-02-20 PROCEDURE — 94761 N-INVAS EAR/PLS OXIMETRY MLT: CPT

## 2022-02-20 PROCEDURE — 80048 BASIC METABOLIC PNL TOTAL CA: CPT | Performed by: PHYSICIAN ASSISTANT

## 2022-02-20 PROCEDURE — 94640 AIRWAY INHALATION TREATMENT: CPT

## 2022-02-20 PROCEDURE — 27000221 HC OXYGEN, UP TO 24 HOURS

## 2022-02-20 PROCEDURE — 85025 COMPLETE CBC W/AUTO DIFF WBC: CPT | Performed by: PHYSICIAN ASSISTANT

## 2022-02-20 PROCEDURE — 25000003 PHARM REV CODE 250

## 2022-02-20 RX ORDER — POTASSIUM CHLORIDE 20 MEQ/1
40 TABLET, EXTENDED RELEASE ORAL EVERY 4 HOURS
Status: DISCONTINUED | OUTPATIENT
Start: 2022-02-20 | End: 2022-02-20

## 2022-02-20 RX ORDER — POTASSIUM CHLORIDE 750 MG/1
40 CAPSULE, EXTENDED RELEASE ORAL EVERY 4 HOURS
Status: COMPLETED | OUTPATIENT
Start: 2022-02-20 | End: 2022-02-20

## 2022-02-20 RX ADMIN — ALPRAZOLAM 0.25 MG: 0.25 TABLET ORAL at 08:02

## 2022-02-20 RX ADMIN — CHOLECALCIFEROL TAB 25 MCG (1000 UNIT) 1000 UNITS: 25 TAB at 08:02

## 2022-02-20 RX ADMIN — POTASSIUM CHLORIDE 40 MEQ: 10 CAPSULE, COATED, EXTENDED RELEASE ORAL at 09:02

## 2022-02-20 RX ADMIN — GABAPENTIN 100 MG: 100 CAPSULE ORAL at 09:02

## 2022-02-20 RX ADMIN — LEVOFLOXACIN 750 MG: 750 INJECTION, SOLUTION INTRAVENOUS at 11:02

## 2022-02-20 RX ADMIN — DILTIAZEM HYDROCHLORIDE 180 MG: 180 CAPSULE, COATED, EXTENDED RELEASE ORAL at 08:02

## 2022-02-20 RX ADMIN — APIXABAN 5 MG: 5 TABLET, FILM COATED ORAL at 08:02

## 2022-02-20 RX ADMIN — POTASSIUM CHLORIDE 40 MEQ: 10 CAPSULE, COATED, EXTENDED RELEASE ORAL at 03:02

## 2022-02-20 RX ADMIN — ESCITALOPRAM OXALATE 5 MG: 5 TABLET, FILM COATED ORAL at 08:02

## 2022-02-20 RX ADMIN — PRAVASTATIN SODIUM 20 MG: 20 TABLET ORAL at 09:02

## 2022-02-20 RX ADMIN — APIXABAN 5 MG: 5 TABLET, FILM COATED ORAL at 09:02

## 2022-02-20 RX ADMIN — PREDNISONE 50 MG: 20 TABLET ORAL at 11:02

## 2022-02-20 RX ADMIN — LIDOCAINE 5% 1 PATCH: 700 PATCH TOPICAL at 11:02

## 2022-02-20 RX ADMIN — FUROSEMIDE 20 MG: 20 TABLET ORAL at 08:02

## 2022-02-20 RX ADMIN — FLUTICASONE FUROATE AND VILANTEROL TRIFENATATE 1 PUFF: 100; 25 POWDER RESPIRATORY (INHALATION) at 08:02

## 2022-02-20 RX ADMIN — ACETAMINOPHEN 650 MG: 325 TABLET ORAL at 11:02

## 2022-02-20 RX ADMIN — SENNOSIDES 8.6 MG: 8.6 TABLET ORAL at 08:02

## 2022-02-20 RX ADMIN — TIOTROPIUM BROMIDE INHALATION SPRAY 2 PUFF: 3.12 SPRAY, METERED RESPIRATORY (INHALATION) at 08:02

## 2022-02-20 RX ADMIN — PANTOPRAZOLE SODIUM 40 MG: 40 TABLET, DELAYED RELEASE ORAL at 09:02

## 2022-02-20 NOTE — ASSESSMENT & PLAN NOTE
Patient with Hypoxic Respiratory failure which is Acute on chronic.  she is on home oxygen at 3 LPM. Supplemental oxygen was provided and noted O2 sat improved to 95-96% at rest.   Signs/symptoms of respiratory failure include- respiratory distress and lethargy. Contributing diagnoses includes - COPD and Pneumonia Labs and images were reviewed. Patient Has recent ABG, which has been reviewed. Will treat underlying causes and adjust management of respiratory failure as follows  -4L NC  -repeat CXR Right lower lobe and pleural opacity.  Correlate for pneumonia and parapneumonic effusion.  -duonebs, symbicort

## 2022-02-20 NOTE — SUBJECTIVE & OBJECTIVE
Interval History: Pt seen and evaluated at bedside this am. She was able to be weaned from ventimask last night and is now on 4L NC. CXR reviewed. Pt is improved from yesterday. Given tylenol for back pain. D/c likely 2/22.    Review of Systems   Constitutional:  Negative for activity change, chills, diaphoresis, fatigue, fever and unexpected weight change.   HENT:  Positive for congestion. Negative for facial swelling, postnasal drip, rhinorrhea, sneezing and sore throat.    Eyes:  Negative for photophobia, itching and visual disturbance.   Respiratory:  Positive for cough (productive, intermittent), chest tightness (posterior) and shortness of breath (acute on chronic). Negative for wheezing.    Cardiovascular:  Positive for leg swelling (chronic b/l). Negative for chest pain and palpitations.   Gastrointestinal:  Negative for abdominal distention, abdominal pain, blood in stool, constipation, diarrhea, nausea and vomiting.   Genitourinary:  Negative for difficulty urinating, dysuria, frequency, hematuria and urgency.   Musculoskeletal:  Positive for back pain (posterior RUQ). Negative for arthralgias, myalgias and neck stiffness.   Skin:  Positive for color change (BLE).   Neurological:  Negative for dizziness, tremors, seizures, syncope, weakness, light-headedness, numbness and headaches.   Psychiatric/Behavioral:  Negative for agitation, confusion and hallucinations.    Objective:     Vital Signs (Most Recent):  Temp: 97.4 °F (36.3 °C) (02/20/22 0838)  Pulse: 98 (02/20/22 0839)  Resp: (!) 22 (02/20/22 0839)  BP: (!) 135/90 (02/20/22 0838)  SpO2: (!) 91 % (02/20/22 0839)   Vital Signs (24h Range):  Temp:  [97.4 °F (36.3 °C)-98.6 °F (37 °C)] 97.4 °F (36.3 °C)  Pulse:  [] 98  Resp:  [16-22] 22  SpO2:  [91 %-97 %] 91 %  BP: (113-147)/(58-90) 135/90     Weight: 44 kg (97 lb)  Body mass index is 18.94 kg/m².    Intake/Output Summary (Last 24 hours) at 2/20/2022 1154  Last data filed at 2/20/2022 0500  Gross per  24 hour   Intake --   Output 100 ml   Net -100 ml      Physical Exam  Vitals and nursing note reviewed.   Constitutional:       General: She is awake. She is not in acute distress.     Appearance: She is well-developed. She is not ill-appearing or diaphoretic.      Interventions: Nasal cannula in place.   HENT:      Head: Normocephalic and atraumatic.      Right Ear: External ear normal.      Left Ear: External ear normal.      Nose: Nose normal. No congestion.      Mouth/Throat:      Pharynx: Oropharynx is clear.   Eyes:      General: No scleral icterus.     Extraocular Movements: Extraocular movements intact.   Cardiovascular:      Rate and Rhythm: Regular rhythm. Tachycardia present.      Pulses: Normal pulses.      Heart sounds: Normal heart sounds. No murmur heard.  Pulmonary:      Effort: No respiratory distress.      Breath sounds: No wheezing or rales.      Comments: Pursed lip breathing  Chest:      Chest wall: Tenderness (posterior chest wall pain in RUL) present.   Abdominal:      General: Bowel sounds are normal. There is no distension.      Palpations: Abdomen is soft.      Tenderness: There is no abdominal tenderness. There is no guarding or rebound.   Musculoskeletal:      Cervical back: Normal range of motion.      Right lower leg: Edema (3+ chronic) present.      Left lower leg: Edema (3+ chronic) present.   Skin:     General: Skin is warm and dry.      Capillary Refill: Capillary refill takes less than 2 seconds.   Neurological:      General: No focal deficit present.      Mental Status: She is alert and oriented to person, place, and time. Mental status is at baseline.   Psychiatric:         Mood and Affect: Mood normal.         Behavior: Behavior normal.         Thought Content: Thought content normal.       Significant Labs: All pertinent labs within the past 24 hours have been reviewed.    Significant Imaging: I have reviewed all pertinent imaging results/findings within the past 24 hours.

## 2022-02-20 NOTE — PLAN OF CARE
Problem: Adult Inpatient Plan of Care  Goal: Plan of Care Review  Outcome: Ongoing, Progressing  Flowsheets (Taken 2/20/2022 1215)  Plan of Care Reviewed With: patient  Goal: Patient-Specific Goal (Individualized)  Outcome: Ongoing, Progressing     Problem: Diabetes Comorbidity  Goal: Blood Glucose Level Within Targeted Range  Outcome: Ongoing, Progressing  Intervention: Monitor and Manage Glycemia  Flowsheets (Taken 2/20/2022 1215)  Glycemic Management:   blood glucose monitored   carbohydrate replacement provided   oral hydration promoted     Problem: Fluid Imbalance (Pneumonia)  Goal: Fluid Balance  Outcome: Ongoing, Progressing  Intervention: Monitor and Manage Fluid Balance  Flowsheets (Taken 2/20/2022 1215)  Fluid/Electrolyte Management: electrolyte supplement adjusted   Patient in bed awake and alert, complains of discomfort near right shoulder blade, states she informed her doctor at bedside, tylenol ordered and administered, tolerates medication well by mouth, currently O2 @4L per nasal canula in progress, oxygen sat between 91%.

## 2022-02-20 NOTE — CARE UPDATE
RAPID RESPONSE NURSE ROUND       Rounding completed with charge RNEdita. No concerns verbalized at this time. Instructed to call [phone] for further concerns or assistance.

## 2022-02-20 NOTE — PROGRESS NOTES
"[facility] - Telemetry Stepdown ([place] [id number])  [facility] Medicine  Progress Note    Patient Name: Leyla Mari  MRN: [id number]  Patient Class: OP- Observation   Admission Date: 2/17/2022  Length of Stay: 0 days  Attending Physician: Nichole Clay MD  Primary Care Provider: Lizbeth Dillard MD        Subjective:     Principal Problem:Pneumonia of right middle lobe due to infectious organism        HPI:  Ms. Mari is a 77 yo M w/ end-stage COPD, PE on eliquis, HTN and HLD presenting with back pains and increasing oxygen requirements. Pt reports back pain mostly in the right upper back and described as tightness like a "brasserie wire around her back" and w/o radiation. These pains have been chronic, initially started when she was in SNF in December receiving therapy twice per day, however she has noticed the pain has been increasing lately. The pain feels better with palpation/massage. She further complains of left flank pain radiating to the front of her waist, described as burning and tingling sensation that comes and goes. Denies any provoking, or alleviating factors. Patient does have a history of chickenpox and shingles, last had an exacerbation over 40 years ago. Pt says she is normally on 3L NC and noticed that she has had increased SOB over the last 2 days. Her daughter at bedside reports no increased work of breathing. Pt endorses a mild intermittent productive cough. Denies fevers, chills, CP, wheezes, n/v/d, dysuria, and myalgia.     In ED, -125 VS otherwise stable. WBC 19.91, procal 0.62. CXR revealed some consolidation in the right lung, new finding which could indicate a pneumonia. Started on levofloxacin IV and given lidoderm patch and methocarbamol w/ some improvement of back pain.         Overview/Hospital Course:  Pt placed in observation for R middle lobe pneumonia. AFVSS. Pt had leukocytosis of 19.91 on CBC and procal 0.62. CXR revealed R middle lobe consolidation. Pt initiated on " levofloxacin IV and treated for pleuritic pain associated w/ the pneumonia. Increased prednisone dose to 50 mg qd x 5 days in the setting of COPD.       Interval History: Pt seen and evaluated at bedside this am. She was able to be weaned from ventimask last night and is now on 4L NC. CXR reviewed. Pt is improved from yesterday. Given tylenol for back pain. D/c likely 2/22.    Review of Systems   Constitutional:  Negative for activity change, chills, diaphoresis, fatigue, fever and unexpected weight change.   HENT:  Positive for congestion. Negative for facial swelling, postnasal drip, rhinorrhea, sneezing and sore throat.    Eyes:  Negative for photophobia, itching and visual disturbance.   Respiratory:  Positive for cough (productive, intermittent), chest tightness (posterior) and shortness of breath (acute on chronic). Negative for wheezing.    Cardiovascular:  Positive for leg swelling (chronic b/l). Negative for chest pain and palpitations.   Gastrointestinal:  Negative for abdominal distention, abdominal pain, blood in stool, constipation, diarrhea, nausea and vomiting.   Genitourinary:  Negative for difficulty urinating, dysuria, frequency, hematuria and urgency.   Musculoskeletal:  Positive for back pain (posterior RUQ). Negative for arthralgias, myalgias and neck stiffness.   Skin:  Positive for color change (BLE).   Neurological:  Negative for dizziness, tremors, seizures, syncope, weakness, light-headedness, numbness and headaches.   Psychiatric/Behavioral:  Negative for agitation, confusion and hallucinations.    Objective:     Vital Signs (Most Recent):  Temp: 97.4 °F (36.3 °C) (02/20/22 0838)  Pulse: 98 (02/20/22 0839)  Resp: (!) 22 (02/20/22 0839)  BP: (!) 135/90 (02/20/22 0838)  SpO2: (!) 91 % (02/20/22 0839)   Vital Signs (24h Range):  Temp:  [97.4 °F (36.3 °C)-98.6 °F (37 °C)] 97.4 °F (36.3 °C)  Pulse:  [] 98  Resp:  [16-22] 22  SpO2:  [91 %-97 %] 91 %  BP: (113-147)/(58-90) 135/90      Weight: 44 kg (97 lb)  Body mass index is 18.94 kg/m².    Intake/Output Summary (Last 24 hours) at 2/20/2022 1158  Last data filed at 2/20/2022 0500  Gross per 24 hour   Intake --   Output 100 ml   Net -100 ml      Physical Exam  Vitals and nursing note reviewed.   Constitutional:       General: She is awake. She is not in acute distress.     Appearance: She is well-developed. She is not ill-appearing or diaphoretic.      Interventions: Nasal cannula in place.   HENT:      Head: Normocephalic and atraumatic.      Right Ear: External ear normal.      Left Ear: External ear normal.      Nose: Nose normal. No congestion.      Mouth/Throat:      Pharynx: Oropharynx is clear.   Eyes:      General: No scleral icterus.     Extraocular Movements: Extraocular movements intact.   Cardiovascular:      Rate and Rhythm: Regular rhythm. Tachycardia present.      Pulses: Normal pulses.      Heart sounds: Normal heart sounds. No murmur heard.  Pulmonary:      Effort: No respiratory distress.      Breath sounds: No wheezing or rales.      Comments: Pursed lip breathing  Chest:      Chest wall: Tenderness (posterior chest wall pain in RUL) present.   Abdominal:      General: Bowel sounds are normal. There is no distension.      Palpations: Abdomen is soft.      Tenderness: There is no abdominal tenderness. There is no guarding or rebound.   Musculoskeletal:      Cervical back: Normal range of motion.      Right lower leg: Edema (3+ chronic) present.      Left lower leg: Edema (3+ chronic) present.   Skin:     General: Skin is warm and dry.      Capillary Refill: Capillary refill takes less than 2 seconds.   Neurological:      General: No focal deficit present.      Mental Status: She is alert and oriented to person, place, and time. Mental status is at baseline.   Psychiatric:         Mood and Affect: Mood normal.         Behavior: Behavior normal.         Thought Content: Thought content normal.       Significant Labs: All  pertinent labs within the past 24 hours have been reviewed.    Significant Imaging: I have reviewed all pertinent imaging results/findings within the past 24 hours.      Assessment/Plan:      * Pneumonia of right middle lobe due to infectious organism  Patient with current diagnosis of pneumonia due to infectious organism. Which is controlled. Current antimicrobial regimen consists of   Antibiotics (From admission, onward)            Start     Stop Route Frequency Ordered    02/18/22 1100  levoFLOXacin 750 mg/150 mL IVPB 750 mg         -- IV Every 24 hours (non-standard times) 02/17/22 1201      . Cultures currently pending.  Will monitor patient closely and continue current treatment plan unchanged.      Acute hypoxemic respiratory failure  Patient with Hypoxic Respiratory failure which is Acute on chronic.  she is on home oxygen at 3 LPM. Supplemental oxygen was provided and noted O2 sat improved to 95-96% at rest.   Signs/symptoms of respiratory failure include- respiratory distress and lethargy. Contributing diagnoses includes - COPD and Pneumonia Labs and images were reviewed. Patient Has recent ABG, which has been reviewed. Will treat underlying causes and adjust management of respiratory failure as follows  -4L NC  -repeat CXR Right lower lobe and pleural opacity.  Correlate for pneumonia and parapneumonic effusion.  -duonebs, symbicort    Type 2 diabetes mellitus, without long-term current use of insulin  -last A1c 7.7, recheck  -hold home metformin  -low dose SSI, diabetic diet, q4h CBG    Chronic pulmonary embolism  -continue home eliquis 5 mg bid      Hyperlipidemia  -continue statin      Essential hypertension  -continue diltiazem 180 mg qd, lasix 20 mg qd      Chronic obstructive pulmonary disease  -continue duoneb prn,   -prednisone 50 mg qd,   -symbicort and umeclidinium daily          VTE Risk Mitigation (From admission, onward)         Ordered     apixaban tablet 5 mg  2 times daily         02/17/22  1309     IP VTE HIGH RISK PATIENT  Once         02/17/22 1158     Place sequential compression device  Until discontinued         02/17/22 1158                Discharge Planning   MARK ANTHONY: 2/22/2022     Code Status: Full Code   Is the patient medically ready for discharge?: No    Reason for patient still in hospital (select all that apply): Patient trending condition, Laboratory test, Treatment and PT / OT recommendations  Discharge Plan A: Other (TBD)                  Mekhi Johnson PA-C  Department of Hospital Medicine   [facility] - Telemetry Stepdown ([place]-[facility])

## 2022-02-21 ENCOUNTER — TELEPHONE (OUTPATIENT)
Dept: PRIMARY CARE CLINIC | Facility: CLINIC | Age: 77
End: 2022-02-21
Payer: MEDICARE

## 2022-02-21 LAB
ALLENS TEST: ABNORMAL
BASOPHILS # BLD AUTO: 0.08 K/UL (ref 0–0.2)
BASOPHILS NFR BLD: 0.4 % (ref 0–1.9)
DELSYS: ABNORMAL
DIFFERENTIAL METHOD: ABNORMAL
EOSINOPHIL # BLD AUTO: 0.1 K/UL (ref 0–0.5)
EOSINOPHIL NFR BLD: 0.3 % (ref 0–8)
ERYTHROCYTE [DISTWIDTH] IN BLOOD BY AUTOMATED COUNT: 15.7 % (ref 11.5–14.5)
FIO2: 50
FLOW: 12
HCO3 UR-SCNC: 30.1 MMOL/L (ref 24–28)
HCT VFR BLD AUTO: 43 % (ref 37–48.5)
HGB BLD-MCNC: 13.7 G/DL (ref 12–16)
IMM GRANULOCYTES # BLD AUTO: 0.27 K/UL (ref 0–0.04)
IMM GRANULOCYTES NFR BLD AUTO: 1.2 % (ref 0–0.5)
LYMPHOCYTES # BLD AUTO: 0.8 K/UL (ref 1–4.8)
LYMPHOCYTES NFR BLD: 3.8 % (ref 18–48)
MCH RBC QN AUTO: 29.2 PG (ref 27–31)
MCHC RBC AUTO-ENTMCNC: 31.9 G/DL (ref 32–36)
MCV RBC AUTO: 92 FL (ref 82–98)
MODE: ABNORMAL
MONOCYTES # BLD AUTO: 1.5 K/UL (ref 0.3–1)
MONOCYTES NFR BLD: 6.8 % (ref 4–15)
NEUTROPHILS # BLD AUTO: 19.1 K/UL (ref 1.8–7.7)
NEUTROPHILS NFR BLD: 87.5 % (ref 38–73)
NRBC BLD-RTO: 0 /100 WBC
PCO2 BLDA: 46.5 MMHG (ref 35–45)
PH SMN: 7.42 [PH] (ref 7.35–7.45)
PLATELET # BLD AUTO: 264 K/UL (ref 150–450)
PMV BLD AUTO: 10.9 FL (ref 9.2–12.9)
PO2 BLDA: 63 MMHG (ref 80–100)
POC BE: 6 MMOL/L
POC SATURATED O2: 92 % (ref 95–100)
POC TCO2: 31 MMOL/L (ref 23–27)
POCT GLUCOSE: 139 MG/DL (ref 70–110)
POCT GLUCOSE: 141 MG/DL (ref 70–110)
POCT GLUCOSE: 196 MG/DL (ref 70–110)
POCT GLUCOSE: 72 MG/DL (ref 70–110)
POCT GLUCOSE: 88 MG/DL (ref 70–110)
RBC # BLD AUTO: 4.69 M/UL (ref 4–5.4)
SAMPLE: ABNORMAL
SITE: ABNORMAL
SP02: 94
WBC # BLD AUTO: 21.77 K/UL (ref 3.9–12.7)

## 2022-02-21 PROCEDURE — 99233 SBSQ HOSP IP/OBS HIGH 50: CPT | Mod: ,,, | Performed by: PHYSICIAN ASSISTANT

## 2022-02-21 PROCEDURE — 11000001 HC ACUTE MED/SURG PRIVATE ROOM

## 2022-02-21 PROCEDURE — 25000242 PHARM REV CODE 250 ALT 637 W/ HCPCS: Performed by: INTERNAL MEDICINE

## 2022-02-21 PROCEDURE — 63600175 PHARM REV CODE 636 W HCPCS: Performed by: PHYSICIAN ASSISTANT

## 2022-02-21 PROCEDURE — 94761 N-INVAS EAR/PLS OXIMETRY MLT: CPT

## 2022-02-21 PROCEDURE — 25000003 PHARM REV CODE 250: Performed by: PHYSICIAN ASSISTANT

## 2022-02-21 PROCEDURE — 36415 COLL VENOUS BLD VENIPUNCTURE: CPT | Performed by: PHYSICIAN ASSISTANT

## 2022-02-21 PROCEDURE — A4216 STERILE WATER/SALINE, 10 ML: HCPCS | Performed by: PHYSICIAN ASSISTANT

## 2022-02-21 PROCEDURE — 36600 WITHDRAWAL OF ARTERIAL BLOOD: CPT

## 2022-02-21 PROCEDURE — 25000003 PHARM REV CODE 250

## 2022-02-21 PROCEDURE — 25000242 PHARM REV CODE 250 ALT 637 W/ HCPCS: Performed by: PHYSICIAN ASSISTANT

## 2022-02-21 PROCEDURE — 99900035 HC TECH TIME PER 15 MIN (STAT)

## 2022-02-21 PROCEDURE — 94640 AIRWAY INHALATION TREATMENT: CPT

## 2022-02-21 PROCEDURE — 99233 PR SUBSEQUENT HOSPITAL CARE,LEVL III: ICD-10-PCS | Mod: ,,, | Performed by: PHYSICIAN ASSISTANT

## 2022-02-21 PROCEDURE — 85025 COMPLETE CBC W/AUTO DIFF WBC: CPT | Performed by: PHYSICIAN ASSISTANT

## 2022-02-21 PROCEDURE — 27000221 HC OXYGEN, UP TO 24 HOURS

## 2022-02-21 RX ORDER — IPRATROPIUM BROMIDE AND ALBUTEROL SULFATE 2.5; .5 MG/3ML; MG/3ML
3 SOLUTION RESPIRATORY (INHALATION) EVERY 6 HOURS
Status: DISCONTINUED | OUTPATIENT
Start: 2022-02-21 | End: 2022-02-21

## 2022-02-21 RX ORDER — LEVALBUTEROL INHALATION SOLUTION 0.63 MG/3ML
0.63 SOLUTION RESPIRATORY (INHALATION) EVERY 6 HOURS
Status: DISCONTINUED | OUTPATIENT
Start: 2022-02-21 | End: 2022-02-23 | Stop reason: HOSPADM

## 2022-02-21 RX ORDER — METRONIDAZOLE 500 MG/1
500 TABLET ORAL EVERY 8 HOURS
Status: CANCELLED | OUTPATIENT
Start: 2022-02-21

## 2022-02-21 RX ADMIN — SENNOSIDES 8.6 MG: 8.6 TABLET ORAL at 09:02

## 2022-02-21 RX ADMIN — LEVALBUTEROL HYDROCHLORIDE 0.63 MG: 0.63 SOLUTION RESPIRATORY (INHALATION) at 08:02

## 2022-02-21 RX ADMIN — PRAVASTATIN SODIUM 20 MG: 20 TABLET ORAL at 08:02

## 2022-02-21 RX ADMIN — Medication 10 ML: at 09:02

## 2022-02-21 RX ADMIN — LIDOCAINE 5% 1 PATCH: 700 PATCH TOPICAL at 12:02

## 2022-02-21 RX ADMIN — ACETAMINOPHEN 650 MG: 325 TABLET ORAL at 09:02

## 2022-02-21 RX ADMIN — FUROSEMIDE 20 MG: 20 TABLET ORAL at 09:02

## 2022-02-21 RX ADMIN — GABAPENTIN 100 MG: 100 CAPSULE ORAL at 08:02

## 2022-02-21 RX ADMIN — CHOLECALCIFEROL TAB 25 MCG (1000 UNIT) 1000 UNITS: 25 TAB at 09:02

## 2022-02-21 RX ADMIN — PANTOPRAZOLE SODIUM 40 MG: 40 TABLET, DELAYED RELEASE ORAL at 09:02

## 2022-02-21 RX ADMIN — ALPRAZOLAM 0.25 MG: 0.25 TABLET ORAL at 08:02

## 2022-02-21 RX ADMIN — ALPRAZOLAM 0.25 MG: 0.25 TABLET ORAL at 01:02

## 2022-02-21 RX ADMIN — PREDNISONE 50 MG: 20 TABLET ORAL at 02:02

## 2022-02-21 RX ADMIN — TIOTROPIUM BROMIDE INHALATION SPRAY 2 PUFF: 3.12 SPRAY, METERED RESPIRATORY (INHALATION) at 08:02

## 2022-02-21 RX ADMIN — DILTIAZEM HYDROCHLORIDE 180 MG: 180 CAPSULE, COATED, EXTENDED RELEASE ORAL at 09:02

## 2022-02-21 RX ADMIN — ESCITALOPRAM OXALATE 5 MG: 5 TABLET, FILM COATED ORAL at 09:02

## 2022-02-21 RX ADMIN — FLUTICASONE FUROATE AND VILANTEROL TRIFENATATE 1 PUFF: 100; 25 POWDER RESPIRATORY (INHALATION) at 08:02

## 2022-02-21 RX ADMIN — APIXABAN 5 MG: 5 TABLET, FILM COATED ORAL at 09:02

## 2022-02-21 RX ADMIN — APIXABAN 5 MG: 5 TABLET, FILM COATED ORAL at 08:02

## 2022-02-21 RX ADMIN — LEVOFLOXACIN 750 MG: 750 INJECTION, SOLUTION INTRAVENOUS at 05:02

## 2022-02-21 RX ADMIN — IPRATROPIUM BROMIDE AND ALBUTEROL SULFATE 3 ML: .5; 2.5 SOLUTION RESPIRATORY (INHALATION) at 02:02

## 2022-02-21 NOTE — ASSESSMENT & PLAN NOTE
Patient with Hypoxic Respiratory failure which is Acute on chronic.  she is on home oxygen at 3 LPM. Supplemental oxygen was provided and noted O2 sat improved to 95-96% at rest.   Signs/symptoms of respiratory failure include- respiratory distress and lethargy. Contributing diagnoses includes - COPD and Pneumonia Labs and images were reviewed. Patient Has recent ABG, which has been reviewed. Will treat underlying causes and adjust management of respiratory failure as follows  -4L NC, ventimask  -repeat CXR Right lower lobe and pleural opacity.  Correlate for pneumonia and parapneumonic effusion.  -delia russo  -Arterial Blood Gas result:  pO2 63 pCO2 46.5; pH 7.41;  HCO3 30.1, %O2 Sat 92.

## 2022-02-21 NOTE — PT/OT/SLP PROGRESS
Occupational Therapy      Patient Name:  Leyla Mari   MRN:  [id number]    Patient not seen today secondary to patient O2 sats between 70's and 80's while lying in bed. Nurse in room to assess patient. Will hold and follow up when scheduled next visit.    2/21/2022

## 2022-02-21 NOTE — NURSING
Care assumed at 1900. Patient resting in bed with eyes open. AAOx4. Denies pain at this time. Bed in lowest position. Call light within reach. Will continue to monitor. Safety maintained.

## 2022-02-21 NOTE — TELEPHONE ENCOUNTER
I attempt to reach out to pt. about his apt on 2/22/22 at 11am  and was unable to reach pt. so I left a voice mail and a call back number also after review pt chart I seen pt have been admission ED since 2/17/22

## 2022-02-21 NOTE — PROGRESS NOTES
"[facility] - Tele[facility] Stepdown ([place] [address])  [facility] Medicine  Progress Note    Patient Name: Leyla Mari  MRN: [id number]  Patient Class: IP- Inpatient   Admission Date: 2/17/2022  Length of Stay: 0 days  Attending Physician: Vin Laguna MD  Primary Care Provider: Lizbeth Dillard MD        Subjective:     Principal Problem:Pneumonia of right middle lobe due to infectious organism        HPI:  Ms. Mari is a 77 yo M w/ end-stage COPD, PE on eliquis, HTN and HLD presenting with back pains and increasing oxygen requirements. Pt reports back pain mostly in the right upper back and described as tightness like a "brasserie wire around her back" and w/o radiation. These pains have been chronic, initially started when she was in SNF in December receiving therapy twice per day, however she has noticed the pain has been increasing lately. The pain feels better with palpation/massage. She further complains of left flank pain radiating to the front of her waist, described as burning and tingling sensation that comes and goes. Denies any provoking, or alleviating factors. Patient does have a history of chickenpox and shingles, last had an exacerbation over 40 years ago. Pt says she is normally on 3L NC and noticed that she has had increased SOB over the last 2 days. Her daughter at bedside reports no increased work of breathing. Pt endorses a mild intermittent productive cough. Denies fevers, chills, CP, wheezes, n/v/d, dysuria, and myalgia.     In ED, -125 VS otherwise stable. WBC 19.91, procal 0.62. CXR revealed some consolidation in the right lung, new finding which could indicate a pneumonia. Started on levofloxacin IV and given lidoderm patch and methocarbamol w/ some improvement of back pain.         Overview/Hospital Course:  Pt placed in observation for R middle lobe pneumonia. AFVSS. Pt had leukocytosis of 19.91 on CBC and procal 0.62. CXR revealed R middle lobe consolidation. Pt initiated on " levofloxacin IV 2/18 and treated for pleuritic pain associated w/ the pneumonia. Increased prednisone dose to 50 mg qd x 5 days in the setting of COPD. Started IV metronidazole 2/21 for concerns of aspiration.       Interval History: JEFFREY. Pt seen and evaluated at bedside this am.     Review of Systems   Constitutional:  Negative for activity change, chills, diaphoresis, fatigue, fever and unexpected weight change.   HENT:  Positive for congestion. Negative for facial swelling, postnasal drip, rhinorrhea, sneezing and sore throat.    Eyes:  Negative for photophobia, itching and visual disturbance.   Respiratory:  Positive for cough (productive, intermittent), chest tightness (posterior) and shortness of breath (acute on chronic). Negative for wheezing.    Cardiovascular:  Positive for leg swelling (chronic b/l). Negative for chest pain and palpitations.   Gastrointestinal:  Negative for abdominal distention, abdominal pain, blood in stool, constipation, diarrhea, nausea and vomiting.   Genitourinary:  Negative for difficulty urinating, dysuria, frequency, hematuria and urgency.   Musculoskeletal:  Positive for back pain (posterior RUQ). Negative for arthralgias, myalgias and neck stiffness.   Skin:  Positive for color change (BLE).   Neurological:  Negative for dizziness, tremors, seizures, syncope, weakness, light-headedness, numbness and headaches.   Psychiatric/Behavioral:  Negative for agitation, confusion and hallucinations.    Objective:     Vital Signs (Most Recent):  Temp: 97.8 °F (36.6 °C) (02/21/22 1122)  Pulse: 92 (02/21/22 1426)  Resp: (!) 21 (02/21/22 1426)  BP: 139/65 (02/21/22 1122)  SpO2: (!) 92 % (02/21/22 1439)   Vital Signs (24h Range):  Temp:  [97.4 °F (36.3 °C)-98.2 °F (36.8 °C)] 97.8 °F (36.6 °C)  Pulse:  [] 92  Resp:  [16-22] 21  SpO2:  [90 %-97 %] 92 %  BP: (120-139)/(58-65) 139/65     Weight: 44 kg (97 lb)  Body mass index is 18.94 kg/m².    Intake/Output Summary (Last 24 hours) at  2/21/2022 1449  Last data filed at 2/21/2022 1200  Gross per 24 hour   Intake 200 ml   Output --   Net 200 ml      Physical Exam  Vitals and nursing note reviewed.   Constitutional:       General: She is awake. She is not in acute distress.     Appearance: She is well-developed. She is not ill-appearing or diaphoretic.      Interventions: Nasal cannula in place.   HENT:      Head: Normocephalic and atraumatic.      Right Ear: External ear normal.      Left Ear: External ear normal.      Nose: Nose normal. No congestion.      Mouth/Throat:      Pharynx: Oropharynx is clear.   Eyes:      General: No scleral icterus.     Extraocular Movements: Extraocular movements intact.   Cardiovascular:      Rate and Rhythm: Regular rhythm. Tachycardia present.      Pulses: Normal pulses.      Heart sounds: Normal heart sounds. No murmur heard.  Pulmonary:      Effort: Respiratory distress present.      Breath sounds: No wheezing or rales.      Comments: Pursed lip breathing  Chest:      Chest wall: Tenderness (posterior chest wall pain in RUL) present.   Abdominal:      General: Bowel sounds are normal. There is no distension.      Palpations: Abdomen is soft.      Tenderness: There is no abdominal tenderness. There is no guarding or rebound.   Musculoskeletal:      Cervical back: Normal range of motion.      Right lower leg: Edema (3+ chronic) present.      Left lower leg: Edema (3+ chronic) present.   Skin:     General: Skin is warm and dry.      Capillary Refill: Capillary refill takes less than 2 seconds.   Neurological:      General: No focal deficit present.      Mental Status: She is alert and oriented to person, place, and time. Mental status is at baseline.   Psychiatric:         Mood and Affect: Mood normal.         Behavior: Behavior normal.         Thought Content: Thought content normal.       Significant Labs: All pertinent labs within the past 24 hours have been reviewed.    Significant Imaging: I have reviewed all  pertinent imaging results/findings within the past 24 hours.      Assessment/Plan:      * Pneumonia of right middle lobe due to infectious organism  Patient with current diagnosis of pneumonia due to infectious organism. Which is controlled. Current antimicrobial regimen consists of   Antibiotics (From admission, onward)            Start     Stop Route Frequency Ordered    02/18/22 1100  levoFLOXacin 750 mg/150 mL IVPB 750 mg         -- IV Every 24 hours (non-standard times) 02/17/22 1201      . Cultures currently pending.  Will monitor patient closely and continue current treatment plan unchanged.      Acute hypoxemic respiratory failure  Patient with Hypoxic Respiratory failure which is Acute on chronic.  she is on home oxygen at 3 LPM. Supplemental oxygen was provided and noted O2 sat improved to 95-96% at rest.   Signs/symptoms of respiratory failure include- respiratory distress and lethargy. Contributing diagnoses includes - COPD and Pneumonia Labs and images were reviewed. Patient Has recent ABG, which has been reviewed. Will treat underlying causes and adjust management of respiratory failure as follows  -4L NC, ventimask  -repeat CXR Right lower lobe and pleural opacity.  Correlate for pneumonia and parapneumonic effusion.  -delia russo  -Arterial Blood Gas result:  pO2 63 pCO2 46.5; pH 7.41;  HCO3 30.1, %O2 Sat 92.      Hypokalemia  -K 3.1, trending  -replaced w/ KCl  -daily bmp      Leukocytosis  -2/2 active bacterial pneumonia vs aspiration vs steroid induced  -continue levofloxacin  -started on flagyl for anaerobic coverage      Type 2 diabetes mellitus, without long-term current use of insulin  -last A1c 7.7, recheck  -hold home metformin  -low dose SSI, diabetic diet, q4h CBG    Chronic pulmonary embolism  -continue home eliquis 5 mg bid      Hyperlipidemia  -continue statin      Essential hypertension  -continue diltiazem 180 mg qd, lasix 20 mg qd      Chronic obstructive pulmonary  disease  -continue duoneb prn,   -prednisone 50 mg qd,   -symbicort and umeclidinium daily        VTE Risk Mitigation (From admission, onward)         Ordered     apixaban tablet 5 mg  2 times daily         02/17/22 1309     IP VTE HIGH RISK PATIENT  Once         02/17/22 1158     Place sequential compression device  Until discontinued         02/17/22 1158                Discharge Planning   MARK ANTHONY: 2/23/2022     Code Status: Full Code   Is the patient medically ready for discharge?: No    Reason for patient still in hospital (select all that apply): Patient trending condition, Laboratory test and Treatment  Discharge Plan A: Other (TBD)                  Mekhi Johnson PA-C  Department of Hospital Medicine   [facility] - Telemetry Stepdown ([place] [address])

## 2022-02-21 NOTE — PT/OT/SLP PROGRESS
Physical Therapy      Patient Name:  Leyla Mari   MRN:  [id number]    Patient not seen today secondary to patient with O2 sats between 70's to 80's while laying in bed. Nurse notified and came to assess patient. Will follow-up again at next scheduled visit.

## 2022-02-21 NOTE — SUBJECTIVE & OBJECTIVE
Interval History: NAEON. Pt seen and evaluated at bedside this am.     Review of Systems   Constitutional:  Negative for activity change, chills, diaphoresis, fatigue, fever and unexpected weight change.   HENT:  Positive for congestion. Negative for facial swelling, postnasal drip, rhinorrhea, sneezing and sore throat.    Eyes:  Negative for photophobia, itching and visual disturbance.   Respiratory:  Positive for cough (productive, intermittent), chest tightness (posterior) and shortness of breath (acute on chronic). Negative for wheezing.    Cardiovascular:  Positive for leg swelling (chronic b/l). Negative for chest pain and palpitations.   Gastrointestinal:  Negative for abdominal distention, abdominal pain, blood in stool, constipation, diarrhea, nausea and vomiting.   Genitourinary:  Negative for difficulty urinating, dysuria, frequency, hematuria and urgency.   Musculoskeletal:  Positive for back pain (posterior RUQ). Negative for arthralgias, myalgias and neck stiffness.   Skin:  Positive for color change (BLE).   Neurological:  Negative for dizziness, tremors, seizures, syncope, weakness, light-headedness, numbness and headaches.   Psychiatric/Behavioral:  Negative for agitation, confusion and hallucinations.    Objective:     Vital Signs (Most Recent):  Temp: 97.8 °F (36.6 °C) (02/21/22 1122)  Pulse: 92 (02/21/22 1426)  Resp: (!) 21 (02/21/22 1426)  BP: 139/65 (02/21/22 1122)  SpO2: (!) 92 % (02/21/22 1439)   Vital Signs (24h Range):  Temp:  [97.4 °F (36.3 °C)-98.2 °F (36.8 °C)] 97.8 °F (36.6 °C)  Pulse:  [] 92  Resp:  [16-22] 21  SpO2:  [90 %-97 %] 92 %  BP: (120-139)/(58-65) 139/65     Weight: 44 kg (97 lb)  Body mass index is 18.94 kg/m².    Intake/Output Summary (Last 24 hours) at 2/21/2022 1449  Last data filed at 2/21/2022 1200  Gross per 24 hour   Intake 200 ml   Output --   Net 200 ml      Physical Exam  Vitals and nursing note reviewed.   Constitutional:       General: She is awake. She is  not in acute distress.     Appearance: She is well-developed. She is not ill-appearing or diaphoretic.      Interventions: Nasal cannula in place.   HENT:      Head: Normocephalic and atraumatic.      Right Ear: External ear normal.      Left Ear: External ear normal.      Nose: Nose normal. No congestion.      Mouth/Throat:      Pharynx: Oropharynx is clear.   Eyes:      General: No scleral icterus.     Extraocular Movements: Extraocular movements intact.   Cardiovascular:      Rate and Rhythm: Regular rhythm. Tachycardia present.      Pulses: Normal pulses.      Heart sounds: Normal heart sounds. No murmur heard.  Pulmonary:      Effort: Respiratory distress present.      Breath sounds: No wheezing or rales.      Comments: Pursed lip breathing  Chest:      Chest wall: Tenderness (posterior chest wall pain in RUL) present.   Abdominal:      General: Bowel sounds are normal. There is no distension.      Palpations: Abdomen is soft.      Tenderness: There is no abdominal tenderness. There is no guarding or rebound.   Musculoskeletal:      Cervical back: Normal range of motion.      Right lower leg: Edema (3+ chronic) present.      Left lower leg: Edema (3+ chronic) present.   Skin:     General: Skin is warm and dry.      Capillary Refill: Capillary refill takes less than 2 seconds.   Neurological:      General: No focal deficit present.      Mental Status: She is alert and oriented to person, place, and time. Mental status is at baseline.   Psychiatric:         Mood and Affect: Mood normal.         Behavior: Behavior normal.         Thought Content: Thought content normal.       Significant Labs: All pertinent labs within the past 24 hours have been reviewed.    Significant Imaging: I have reviewed all pertinent imaging results/findings within the past 24 hours.

## 2022-02-21 NOTE — ASSESSMENT & PLAN NOTE
-2/2 active bacterial pneumonia vs aspiration vs steroid induced  -continue levofloxacin  -started on flagyl for anaerobic coverage

## 2022-02-22 ENCOUNTER — TELEPHONE (OUTPATIENT)
Dept: PRIMARY CARE CLINIC | Facility: CLINIC | Age: 77
End: 2022-02-22
Payer: MEDICARE

## 2022-02-22 LAB
ANION GAP SERPL CALC-SCNC: 14 MMOL/L (ref 8–16)
BACTERIA BLD CULT: NORMAL
BACTERIA BLD CULT: NORMAL
BASOPHILS # BLD AUTO: 0.09 K/UL (ref 0–0.2)
BASOPHILS NFR BLD: 0.5 % (ref 0–1.9)
BUN SERPL-MCNC: 20 MG/DL (ref 8–23)
CALCIUM SERPL-MCNC: 9.7 MG/DL (ref 8.7–10.5)
CHLORIDE SERPL-SCNC: 95 MMOL/L (ref 95–110)
CO2 SERPL-SCNC: 27 MMOL/L (ref 23–29)
CREAT SERPL-MCNC: 0.4 MG/DL (ref 0.5–1.4)
DIFFERENTIAL METHOD: ABNORMAL
EOSINOPHIL # BLD AUTO: 0.1 K/UL (ref 0–0.5)
EOSINOPHIL NFR BLD: 0.4 % (ref 0–8)
ERYTHROCYTE [DISTWIDTH] IN BLOOD BY AUTOMATED COUNT: 15.9 % (ref 11.5–14.5)
EST. GFR  (AFRICAN AMERICAN): >60 ML/MIN/1.73 M^2
EST. GFR  (NON AFRICAN AMERICAN): >60 ML/MIN/1.73 M^2
GLUCOSE SERPL-MCNC: 115 MG/DL (ref 70–110)
HCT VFR BLD AUTO: 46.3 % (ref 37–48.5)
HGB BLD-MCNC: 14.2 G/DL (ref 12–16)
IMM GRANULOCYTES # BLD AUTO: 0.45 K/UL (ref 0–0.04)
IMM GRANULOCYTES NFR BLD AUTO: 2.3 % (ref 0–0.5)
LYMPHOCYTES # BLD AUTO: 0.3 K/UL (ref 1–4.8)
LYMPHOCYTES NFR BLD: 1.4 % (ref 18–48)
MAGNESIUM SERPL-MCNC: 1.7 MG/DL (ref 1.6–2.6)
MCH RBC QN AUTO: 28.6 PG (ref 27–31)
MCHC RBC AUTO-ENTMCNC: 30.7 G/DL (ref 32–36)
MCV RBC AUTO: 93 FL (ref 82–98)
MONOCYTES # BLD AUTO: 1 K/UL (ref 0.3–1)
MONOCYTES NFR BLD: 4.9 % (ref 4–15)
NEUTROPHILS # BLD AUTO: 17.6 K/UL (ref 1.8–7.7)
NEUTROPHILS NFR BLD: 90.5 % (ref 38–73)
NRBC BLD-RTO: 0 /100 WBC
PLATELET # BLD AUTO: 300 K/UL (ref 150–450)
PMV BLD AUTO: 9.8 FL (ref 9.2–12.9)
POCT GLUCOSE: 109 MG/DL (ref 70–110)
POCT GLUCOSE: 141 MG/DL (ref 70–110)
POCT GLUCOSE: 169 MG/DL (ref 70–110)
POCT GLUCOSE: 178 MG/DL (ref 70–110)
POTASSIUM SERPL-SCNC: 4.3 MMOL/L (ref 3.5–5.1)
RBC # BLD AUTO: 4.97 M/UL (ref 4–5.4)
SODIUM SERPL-SCNC: 136 MMOL/L (ref 136–145)
WBC # BLD AUTO: 19.42 K/UL (ref 3.9–12.7)

## 2022-02-22 PROCEDURE — 25000003 PHARM REV CODE 250: Performed by: PHYSICIAN ASSISTANT

## 2022-02-22 PROCEDURE — 80048 BASIC METABOLIC PNL TOTAL CA: CPT | Performed by: PHYSICIAN ASSISTANT

## 2022-02-22 PROCEDURE — 36415 COLL VENOUS BLD VENIPUNCTURE: CPT | Performed by: PHYSICIAN ASSISTANT

## 2022-02-22 PROCEDURE — 83735 ASSAY OF MAGNESIUM: CPT | Performed by: PHYSICIAN ASSISTANT

## 2022-02-22 PROCEDURE — 94799 UNLISTED PULMONARY SVC/PX: CPT

## 2022-02-22 PROCEDURE — 63600175 PHARM REV CODE 636 W HCPCS: Performed by: PHYSICIAN ASSISTANT

## 2022-02-22 PROCEDURE — 27000221 HC OXYGEN, UP TO 24 HOURS

## 2022-02-22 PROCEDURE — 94640 AIRWAY INHALATION TREATMENT: CPT

## 2022-02-22 PROCEDURE — S0030 INJECTION, METRONIDAZOLE: HCPCS | Performed by: PHYSICIAN ASSISTANT

## 2022-02-22 PROCEDURE — 97535 SELF CARE MNGMENT TRAINING: CPT

## 2022-02-22 PROCEDURE — 25000242 PHARM REV CODE 250 ALT 637 W/ HCPCS: Performed by: INTERNAL MEDICINE

## 2022-02-22 PROCEDURE — 99233 SBSQ HOSP IP/OBS HIGH 50: CPT | Mod: ,,, | Performed by: PHYSICIAN ASSISTANT

## 2022-02-22 PROCEDURE — 99233 PR SUBSEQUENT HOSPITAL CARE,LEVL III: ICD-10-PCS | Mod: ,,, | Performed by: PHYSICIAN ASSISTANT

## 2022-02-22 PROCEDURE — 25000003 PHARM REV CODE 250

## 2022-02-22 PROCEDURE — 11000001 HC ACUTE MED/SURG PRIVATE ROOM

## 2022-02-22 PROCEDURE — 92610 EVALUATE SWALLOWING FUNCTION: CPT

## 2022-02-22 PROCEDURE — 94761 N-INVAS EAR/PLS OXIMETRY MLT: CPT

## 2022-02-22 PROCEDURE — 99900035 HC TECH TIME PER 15 MIN (STAT)

## 2022-02-22 PROCEDURE — 85025 COMPLETE CBC W/AUTO DIFF WBC: CPT | Performed by: PHYSICIAN ASSISTANT

## 2022-02-22 RX ORDER — METRONIDAZOLE 500 MG/100ML
500 INJECTION, SOLUTION INTRAVENOUS
Status: DISCONTINUED | OUTPATIENT
Start: 2022-02-22 | End: 2022-02-23 | Stop reason: HOSPADM

## 2022-02-22 RX ORDER — ALPRAZOLAM 0.5 MG/1
0.5 TABLET ORAL 3 TIMES DAILY PRN
Status: DISCONTINUED | OUTPATIENT
Start: 2022-02-22 | End: 2022-02-23 | Stop reason: HOSPADM

## 2022-02-22 RX ORDER — PREDNISONE 5 MG/1
5 TABLET ORAL NIGHTLY
Status: DISCONTINUED | OUTPATIENT
Start: 2022-02-22 | End: 2022-02-23 | Stop reason: HOSPADM

## 2022-02-22 RX ORDER — PREDNISONE 10 MG/1
10 TABLET ORAL DAILY
Status: DISCONTINUED | OUTPATIENT
Start: 2022-02-22 | End: 2022-02-23 | Stop reason: HOSPADM

## 2022-02-22 RX ADMIN — ALPRAZOLAM 0.5 MG: 0.5 TABLET ORAL at 03:02

## 2022-02-22 RX ADMIN — LEVALBUTEROL HYDROCHLORIDE 0.63 MG: 0.63 SOLUTION RESPIRATORY (INHALATION) at 08:02

## 2022-02-22 RX ADMIN — PREDNISONE 10 MG: 10 TABLET ORAL at 10:02

## 2022-02-22 RX ADMIN — PRAVASTATIN SODIUM 20 MG: 20 TABLET ORAL at 09:02

## 2022-02-22 RX ADMIN — PREDNISONE 5 MG: 5 TABLET ORAL at 09:02

## 2022-02-22 RX ADMIN — CHOLECALCIFEROL TAB 25 MCG (1000 UNIT) 1000 UNITS: 25 TAB at 08:02

## 2022-02-22 RX ADMIN — DILTIAZEM HYDROCHLORIDE 180 MG: 180 CAPSULE, COATED, EXTENDED RELEASE ORAL at 08:02

## 2022-02-22 RX ADMIN — LIDOCAINE 5% 1 PATCH: 700 PATCH TOPICAL at 12:02

## 2022-02-22 RX ADMIN — PANTOPRAZOLE SODIUM 40 MG: 40 TABLET, DELAYED RELEASE ORAL at 08:02

## 2022-02-22 RX ADMIN — LEVALBUTEROL HYDROCHLORIDE 0.63 MG: 0.63 SOLUTION RESPIRATORY (INHALATION) at 12:02

## 2022-02-22 RX ADMIN — FLUTICASONE FUROATE AND VILANTEROL TRIFENATATE 1 PUFF: 100; 25 POWDER RESPIRATORY (INHALATION) at 07:02

## 2022-02-22 RX ADMIN — APIXABAN 5 MG: 5 TABLET, FILM COATED ORAL at 08:02

## 2022-02-22 RX ADMIN — LEVALBUTEROL HYDROCHLORIDE 0.63 MG: 0.63 SOLUTION RESPIRATORY (INHALATION) at 01:02

## 2022-02-22 RX ADMIN — ESCITALOPRAM OXALATE 5 MG: 5 TABLET, FILM COATED ORAL at 08:02

## 2022-02-22 RX ADMIN — SENNOSIDES 8.6 MG: 8.6 TABLET ORAL at 08:02

## 2022-02-22 RX ADMIN — TIOTROPIUM BROMIDE INHALATION SPRAY 2 PUFF: 3.12 SPRAY, METERED RESPIRATORY (INHALATION) at 07:02

## 2022-02-22 RX ADMIN — METRONIDAZOLE 500 MG: 500 INJECTION, SOLUTION INTRAVENOUS at 10:02

## 2022-02-22 RX ADMIN — LEVOFLOXACIN 750 MG: 750 INJECTION, SOLUTION INTRAVENOUS at 12:02

## 2022-02-22 RX ADMIN — LEVALBUTEROL HYDROCHLORIDE 0.63 MG: 0.63 SOLUTION RESPIRATORY (INHALATION) at 07:02

## 2022-02-22 RX ADMIN — ALPRAZOLAM 0.5 MG: 0.5 TABLET ORAL at 09:02

## 2022-02-22 RX ADMIN — ALPRAZOLAM 0.25 MG: 0.25 TABLET ORAL at 08:02

## 2022-02-22 RX ADMIN — GABAPENTIN 100 MG: 100 CAPSULE ORAL at 09:02

## 2022-02-22 RX ADMIN — APIXABAN 5 MG: 5 TABLET, FILM COATED ORAL at 09:02

## 2022-02-22 RX ADMIN — FUROSEMIDE 20 MG: 20 TABLET ORAL at 08:02

## 2022-02-22 RX ADMIN — METRONIDAZOLE 500 MG: 500 INJECTION, SOLUTION INTRAVENOUS at 05:02

## 2022-02-22 RX ADMIN — Medication 1 CAPSULE: at 10:02

## 2022-02-22 RX ADMIN — POLYETHYLENE GLYCOL 3350 17 G: 17 POWDER, FOR SOLUTION ORAL at 08:02

## 2022-02-22 NOTE — PLAN OF CARE
Pt is AAOx4. 3L NC. Tele maintained. BG WNL.  No falls or new injuries reported during shift, safety precautions maintained.  Pending swallow study.    Problem: Adult Inpatient Plan of Care  Goal: Plan of Care Review  Outcome: Ongoing, Progressing     Problem: Adult Inpatient Plan of Care  Goal: Optimal Comfort and Wellbeing  Outcome: Ongoing, Progressing

## 2022-02-22 NOTE — PROGRESS NOTES
"[facility] - Tele[facility] Step[facility] ([place] [address])  [facility] Medicine  Progress Note    Patient Name: Leyla Mari  MRN: [id number]  Patient Class: IP- Inpatient   Admission Date: 2/17/2022  Length of Stay: 1 days  Attending Physician: Vin Laguna MD  Primary Care Provider: Lizbeth Dillard MD        Subjective:     Principal Problem:Pneumonia of right middle lobe due to infectious organism        HPI:  Ms. Mari is a 77 yo M w/ end-stage COPD, PE on eliquis, HTN and HLD presenting with back pains and increasing oxygen requirements. Pt reports back pain mostly in the right upper back and described as tightness like a "brasserie wire around her back" and w/o radiation. These pains have been chronic, initially started when she was in SNF in December receiving therapy twice per day, however she has noticed the pain has been increasing lately. The pain feels better with palpation/massage. She further complains of left flank pain radiating to the front of her waist, described as burning and tingling sensation that comes and goes. Denies any provoking, or alleviating factors. Patient does have a history of chickenpox and shingles, last had an exacerbation over 40 years ago. Pt says she is normally on 3L NC and noticed that she has had increased SOB over the last 2 days. Her daughter at bedside reports no increased work of breathing. Pt endorses a mild intermittent productive cough. Denies fevers, chills, CP, wheezes, n/v/d, dysuria, and myalgia.     In ED, -125 VS otherwise stable. WBC 19.91, procal 0.62. CXR revealed some consolidation in the right lung, new finding which could indicate a pneumonia. Started on levofloxacin IV and given lidoderm patch and methocarbamol w/ some improvement of back pain.         Overview/Hospital Course:  Pt placed in observation for R middle lobe pneumonia. AFVSS. Pt had leukocytosis of 19.91 on CBC and procal 0.62. CXR revealed R middle lobe consolidation. Pt initiated on " levofloxacin IV 2/18 and treated for pleuritic pain associated w/ the pneumonia. Increased prednisone dose to 50 mg qd x 5 days in the setting of COPD. Started IV metronidazole 2/21 for concerns of aspiration.       Interval History: VINITAON. Pt seen and evaluated at bedside this am. Pt reports breathing better today and is at home O2 requirement needs on 3L NC. Continuing IV flagyl and levofloxacin. Anticipating dc tomorrow    Review of Systems   Constitutional:  Negative for activity change, chills, diaphoresis, fatigue, fever and unexpected weight change.   HENT:  Negative for congestion, facial swelling, postnasal drip, rhinorrhea, sneezing and sore throat.    Eyes:  Negative for photophobia, itching and visual disturbance.   Respiratory:  Positive for cough (productive, intermittent), chest tightness (posterior) and shortness of breath (acute on chronic). Negative for wheezing.    Cardiovascular:  Positive for leg swelling (chronic b/l). Negative for chest pain and palpitations.   Gastrointestinal:  Negative for abdominal distention, abdominal pain, blood in stool, constipation, diarrhea, nausea and vomiting.   Genitourinary:  Negative for difficulty urinating, dysuria, frequency, hematuria and urgency.   Musculoskeletal:  Positive for back pain (posterior RUQ). Negative for arthralgias, myalgias and neck stiffness.   Skin:  Positive for color change (BLE).   Neurological:  Negative for dizziness, tremors, seizures, syncope, weakness, light-headedness, numbness and headaches.   Psychiatric/Behavioral:  Negative for agitation, confusion and hallucinations.    Objective:     Vital Signs (Most Recent):  Temp: 99.1 °F (37.3 °C) (02/22/22 1107)  Pulse: 103 (02/22/22 1316)  Resp: (!) 22 (02/22/22 1316)  BP: (!) 159/72 (02/22/22 1107)  SpO2: (!) 92 % (02/22/22 1316)   Vital Signs (24h Range):  Temp:  [96.6 °F (35.9 °C)-99.1 °F (37.3 °C)] 99.1 °F (37.3 °C)  Pulse:  [] 103  Resp:  [16-27] 22  SpO2:  [90 %-96 %] 92  %  BP: (143-163)/(65-80) 159/72     Weight: 44 kg (97 lb)  Body mass index is 18.94 kg/m².    Intake/Output Summary (Last 24 hours) at 2/22/2022 1321  Last data filed at 2/21/2022 1715  Gross per 24 hour   Intake 120 ml   Output --   Net 120 ml      Physical Exam  Vitals and nursing note reviewed.   Constitutional:       General: She is awake. She is not in acute distress.     Appearance: She is well-developed. She is not ill-appearing or diaphoretic.      Interventions: Nasal cannula in place.   HENT:      Head: Normocephalic and atraumatic.      Right Ear: External ear normal.      Left Ear: External ear normal.      Nose: Nose normal. No congestion.      Mouth/Throat:      Pharynx: Oropharynx is clear.   Eyes:      General: No scleral icterus.     Extraocular Movements: Extraocular movements intact.   Cardiovascular:      Rate and Rhythm: Regular rhythm. Tachycardia present.      Pulses: Normal pulses.      Heart sounds: Normal heart sounds. No murmur heard.  Pulmonary:      Effort: No respiratory distress.      Breath sounds: No wheezing or rales.      Comments: Pursed lip breathing  Chest:      Chest wall: Tenderness (posterior chest wall pain in RUL) present.   Abdominal:      General: Bowel sounds are normal. There is no distension.      Palpations: Abdomen is soft.      Tenderness: There is no abdominal tenderness. There is no guarding or rebound.   Musculoskeletal:      Cervical back: Normal range of motion.      Right lower leg: Edema (3+ chronic) present.      Left lower leg: Edema (3+ chronic) present.   Skin:     General: Skin is warm and dry.      Capillary Refill: Capillary refill takes less than 2 seconds.   Neurological:      General: No focal deficit present.      Mental Status: She is alert and oriented to person, place, and time. Mental status is at baseline.   Psychiatric:         Mood and Affect: Mood normal.         Behavior: Behavior normal.         Thought Content: Thought content normal.        Significant Labs: All pertinent labs within the past 24 hours have been reviewed.    Significant Imaging: I have reviewed all pertinent imaging results/findings within the past 24 hours.      Assessment/Plan:      * Pneumonia of right middle lobe due to infectious organism  Patient with current diagnosis of pneumonia due to infectious organism. Which is controlled. Current antimicrobial regimen consists of   Antibiotics (From admission, onward)            Start     Stop Route Frequency Ordered    02/22/22 0848  metronidazole IVPB 500 mg         -- IV Every 8 hours (non-standard times) 02/22/22 0849    02/18/22 1100  levoFLOXacin 750 mg/150 mL IVPB 750 mg         -- IV Every 24 hours (non-standard times) 02/17/22 1201      . Cultures currently pending.  Will monitor patient closely and continue current treatment plan unchanged.      Acute hypoxemic respiratory failure  Patient with Hypoxic Respiratory failure which is Acute on chronic.  she is on home oxygen at 3 LPM. Supplemental oxygen was provided and noted O2 sat improved to 95-96% at rest.   Signs/symptoms of respiratory failure include- respiratory distress and lethargy. Contributing diagnoses includes - COPD and Pneumonia Labs and images were reviewed. Patient Has recent ABG, which has been reviewed. Will treat underlying causes and adjust management of respiratory failure as follows  -4L NC, ventimask  -repeat CXR Right lower lobe and pleural opacity.  Correlate for pneumonia and parapneumonic effusion.  -delia russo  -Arterial Blood Gas result:  pO2 63 pCO2 46.5; pH 7.41;  HCO3 30.1, %O2 Sat 92.      Hypokalemia  -K 3.1, improved  -replaced w/ KCl  -daily bmp      Leukocytosis  -2/2 active bacterial pneumonia vs aspiration vs steroid induced  -continue levofloxacin  -started on flagyl for anaerobic coverage      Type 2 diabetes mellitus, without long-term current use of insulin  -last A1c 7.7, recheck  -hold home metformin  -low dose SSI,  diabetic diet, q4h CBG    Chronic pulmonary embolism  -continue home eliquis 5 mg bid      Hyperlipidemia  -continue statin      Essential hypertension  -continue diltiazem 180 mg qd, lasix 20 mg qd      Chronic obstructive pulmonary disease  -continue duoneb prn,   -prednisone 50 mg qd x5 days. Resuming home dose 10 mg qam, 5 mg qhs   -symbicort and umeclidinium daily          VTE Risk Mitigation (From admission, onward)         Ordered     apixaban tablet 5 mg  2 times daily         02/17/22 1309     IP VTE HIGH RISK PATIENT  Once         02/17/22 1158     Place sequential compression device  Until discontinued         02/17/22 1158                Discharge Planning   MARK ANTHONY: 2/23/2022     Code Status: Full Code   Is the patient medically ready for discharge?: No    Reason for patient still in hospital (select all that apply): Patient trending condition, Laboratory test and Treatment  Discharge Plan A: Other (TBD)                  Mekhi Johnson PA-C  Department of Hospital Medicine   [facility] - Telemetry Stepdown ([place])

## 2022-02-22 NOTE — PT/OT/SLP EVAL
"Speech Language Pathology Evaluation  Bedside Swallow    Patient Name:  Leyla Mari   MRN:  [id number]  Admitting Diagnosis: Pneumonia of right middle lobe due to infectious organism    Recommendations:                 General Recommendations:  Dysphagia therapy  Diet recommendations:   , Full liquids   Aspiration Precautions: Meds crushed in puree , sit as upright as tolerated   General Precautions: Standard, fall  Communication strategies:  none    History:     Past Medical History:   Diagnosis Date    Actinic keratosis     Arthritis     Cataract     Cellulitis of ankle     Colon polyps     COPD (chronic obstructive pulmonary disease)     home O2 (2L/min)     Family history of colon cancer     History of Clostridium difficile infection 7/3/2012    Hyperlipidemia     Hypertension     Lung nodules     Sinus tachycardia     Type 2 diabetes mellitus, without long-term current use of insulin 2022       Past Surgical History:   Procedure Laterality Date    APPENDECTOMY      CATARACT EXTRACTION Bilateral      SECTION      x2    COLONOSCOPY N/A 10/10/2017    Procedure: COLONOSCOPY;  Surgeon: Omid Lino MD;  Location: [address];  Service: Endoscopy;  Laterality: N/A;  pt unable to be checked in with previous order    EYE SURGERY      TONSILLECTOMY         Prior Intubation HX:  None this admission     Modified Barium Swallow: none prior     Prior diet: regular solids and thin liquids     Subjective     Pt awake and pleasant although kept eyes closed throughout sessions  "It's all backed up" pt pointing to chest and stomach region complaining of breathing discomfort, RN notified and aware     Pain/Comfort:  · Pain Rating 1: 0/10  · Pain Rating Post-Intervention 1: 0/10    Respiratory Status: Nasal cannula, flow 3  L/min    Objective:     Oral Musculature Evaluation  · Oral Musculature: WFL  · Structural Abnormalities: neck tremors observed  · Dentition: present and " adequate  · Oral Labial Strength and Mobility: WFL  · Lingual Strength and Mobility: WFL  · Volitional Cough: fair strength  · Volitional Swallow: prompt to palpation  · Voice Prior to PO Intake: strong and clear      Notable to mention pt with difficulty sitting upright with shortness of breath and desat to mid-upper 80s, . PO trials offered jeannette semi-reclined position for pt tolerance.     Bedside Swallow Eval:   Consistencies Assessed:  · Thin liquids via single straw sips across 6oz  · Puree x3  · Soft solids x2     Oral Phase:   · WFL   · Pt frequently short of breath while chewing and reports softer smooth foods are easiest and would prefer items like yogurt and her boost      Pharyngeal Phase:   · no overt clinical signs/symptoms of aspiration  · no overt clinical signs/symptoms of pharyngeal dysphagia   · Pt respiratory places her at increased aspiration risk      Treatment:  Education provided to Pt re: SLP role in acute care setting, overall impressions and therapeutic goals. Whiteboard updated.    SLP discussed with pt strategies to optimize mealtime safety. SLP discussed with pt and medical team given shortness of breath considering transitioning to a full liquid diet to optimize swallow safety and minimize respiratory effort during meals. All parties in agreement with plan. SLP also reviewed with RN. Pt preference for crushed meds at this time and SL Pin agreement with continuing.     Assessment:     Lyela Mari is a 76 y.o. female with an SLP diagnosis of Dysphagia.   Goals:   Multidisciplinary Problems     SLP Goals        Problem: SLP Goal    Goal Priority Disciplines Outcome   SLP Goal     SLP    Description: Speech Language Pathology Goals  Goals expected to be met by 3/1    1. Pt will tolerate least restrictive without overt clinical signs of aspiration                    Plan:     · Patient to be seen:  4 x/week   · SLP Follow-Up:  Yes       Barriers to Discharge:  None    Time Tracking:      SLP Treatment Date:   02/22/22  Speech Start Time:  0837  Speech Stop Time:  0848     Speech Total Time (min):  11 min    Billable Minutes: Eval Swallow and Oral Function 11    02/22/2022

## 2022-02-22 NOTE — NURSING
"While pt was receiving scheduled 2100 meds, pt was placed w HOB elevated to administer medication.  Pt started to cough and choke after pt took medication, pt was placed upright w head foreward, this nurse then patted on pts back. Pt then spit out medication into lap.  Pt stated ,"This happens sometimes and I think its from my anxiety."  Meds given crushed w pudding.    Michael NP made aware.  CXR ordered per NP.  Discussed concerns of having swallow study w provider.    Pt states she feels better at this time, call light within reach.      "

## 2022-02-22 NOTE — TELEPHONE ENCOUNTER
----- Message from Ivelisse Killian sent at 2/22/2022 11:47 AM CST -----  Contact: [phone]  Caller is requesting an earlier appointment then we can schedule.  Caller is requesting a message be sent to the provider.  If this is for urgent care symptoms, did you offer other providers at this location, providers at other locations, or [facility]? (yes, no, n/a):    If this is for the patients physical, did you offer to schedule next available and put on wait list, or to see NP or PA for their physical?  (yes, no, n/a):    When is the next available appointment with their provider:    Reason for the appointment:  hospital follow up  Patient preference of timeframe to be scheduled:  within a week  Would the patient like a call back, or a response through their [facility] portal?:  Call back  Comments:

## 2022-02-22 NOTE — SUBJECTIVE & OBJECTIVE
Interval History: NAEON. Pt seen and evaluated at bedside this am. Pt reports breathing better today and is at home O2 requirement needs on 3L NC. Continuing IV flagyl and levofloxacin. Anticipating dc tomorrow    Review of Systems   Constitutional:  Negative for activity change, chills, diaphoresis, fatigue, fever and unexpected weight change.   HENT:  Negative for congestion, facial swelling, postnasal drip, rhinorrhea, sneezing and sore throat.    Eyes:  Negative for photophobia, itching and visual disturbance.   Respiratory:  Positive for cough (productive, intermittent), chest tightness (posterior) and shortness of breath (acute on chronic). Negative for wheezing.    Cardiovascular:  Positive for leg swelling (chronic b/l). Negative for chest pain and palpitations.   Gastrointestinal:  Negative for abdominal distention, abdominal pain, blood in stool, constipation, diarrhea, nausea and vomiting.   Genitourinary:  Negative for difficulty urinating, dysuria, frequency, hematuria and urgency.   Musculoskeletal:  Positive for back pain (posterior RUQ). Negative for arthralgias, myalgias and neck stiffness.   Skin:  Positive for color change (BLE).   Neurological:  Negative for dizziness, tremors, seizures, syncope, weakness, light-headedness, numbness and headaches.   Psychiatric/Behavioral:  Negative for agitation, confusion and hallucinations.    Objective:     Vital Signs (Most Recent):  Temp: 99.1 °F (37.3 °C) (02/22/22 1107)  Pulse: 103 (02/22/22 1316)  Resp: (!) 22 (02/22/22 1316)  BP: (!) 159/72 (02/22/22 1107)  SpO2: (!) 92 % (02/22/22 1316)   Vital Signs (24h Range):  Temp:  [96.6 °F (35.9 °C)-99.1 °F (37.3 °C)] 99.1 °F (37.3 °C)  Pulse:  [] 103  Resp:  [16-27] 22  SpO2:  [90 %-96 %] 92 %  BP: (143-163)/(65-80) 159/72     Weight: 44 kg (97 lb)  Body mass index is 18.94 kg/m².    Intake/Output Summary (Last 24 hours) at 2/22/2022 1321  Last data filed at 2/21/2022 1715  Gross per 24 hour   Intake 120  ml   Output --   Net 120 ml      Physical Exam  Vitals and nursing note reviewed.   Constitutional:       General: She is awake. She is not in acute distress.     Appearance: She is well-developed. She is not ill-appearing or diaphoretic.      Interventions: Nasal cannula in place.   HENT:      Head: Normocephalic and atraumatic.      Right Ear: External ear normal.      Left Ear: External ear normal.      Nose: Nose normal. No congestion.      Mouth/Throat:      Pharynx: Oropharynx is clear.   Eyes:      General: No scleral icterus.     Extraocular Movements: Extraocular movements intact.   Cardiovascular:      Rate and Rhythm: Regular rhythm. Tachycardia present.      Pulses: Normal pulses.      Heart sounds: Normal heart sounds. No murmur heard.  Pulmonary:      Effort: No respiratory distress.      Breath sounds: No wheezing or rales.      Comments: Pursed lip breathing  Chest:      Chest wall: Tenderness (posterior chest wall pain in RUL) present.   Abdominal:      General: Bowel sounds are normal. There is no distension.      Palpations: Abdomen is soft.      Tenderness: There is no abdominal tenderness. There is no guarding or rebound.   Musculoskeletal:      Cervical back: Normal range of motion.      Right lower leg: Edema (3+ chronic) present.      Left lower leg: Edema (3+ chronic) present.   Skin:     General: Skin is warm and dry.      Capillary Refill: Capillary refill takes less than 2 seconds.   Neurological:      General: No focal deficit present.      Mental Status: She is alert and oriented to person, place, and time. Mental status is at baseline.   Psychiatric:         Mood and Affect: Mood normal.         Behavior: Behavior normal.         Thought Content: Thought content normal.       Significant Labs: All pertinent labs within the past 24 hours have been reviewed.    Significant Imaging: I have reviewed all pertinent imaging results/findings within the past 24 hours.

## 2022-02-22 NOTE — PT/OT/SLP PROGRESS
Occupational Therapy   Treatment    Name: Leyla Mari  MRN: [id number]  Admitting Diagnosis:  Pneumonia of right middle lobe due to infectious organism       Recommendations:     Discharge Recommendations: home health OT  Discharge Equipment Recommendations:  hip kit, shower chair  Barriers to discharge:       Assessment:     Leyla Mari is a 76 y.o. female with a medical diagnosis of Pneumonia of right middle lobe due to infectious organism.  She presents with c/o of not feeling well and very SOB despite sats in the upper 80-lower 90s. Agreed to ADLs EOB but then requested to lie down. Performance deficits affecting function are weakness, impaired endurance, impaired self care skills, impaired functional mobilty, gait instability, impaired balance, decreased coordination, decreased safety awareness, impaired cardiopulmonary response to activity.     Rehab Prognosis:  Fair; patient would benefit from acute skilled OT services to address these deficits and reach maximum level of function.       Plan:     Patient to be seen 3 x/week to address the above listed problems via self-care/home management, therapeutic activities, therapeutic exercises  · Plan of Care Expires: 03/18/22  · Plan of Care Reviewed with: patient    Subjective     Pain/Comfort:  · Pain Rating 1: 0/10    Objective:     Communicated with: rn prior to session.  Patient found supine with oconnell catheter, oxygen, peripheral IV, pulse ox (continuous), telemetry upon OT entry to room.    General Precautions: Standard, fall, respiratory   Orthopedic Precautions:N/A   Braces:    Respiratory Status: Nasal cannula, flow 3 L/min     Occupational Performance:     Bed Mobility:    · Patient completed Scooting/Bridging with minimum assistance  · Patient completed Supine to Sit with minimum assistance  · Patient completed Sit to Supine with minimum assistance     Functional Mobility/Transfers:  Declined.    Activities of Daily Living:  · Grooming: stand by  assistance sitting EOB.    [facility] 6 Click ADL: 17    Treatment & Education:  Discussed OT POC.  Encouraged OOB sitting later if possible.    Patient left supine with all lines intact and call button in reachEducation:      GOALS:   Multidisciplinary Problems     Occupational Therapy Goals        Problem: Occupational Therapy Goal    Goal Priority Disciplines Outcome Interventions   Occupational Therapy Goal     OT, PT/OT Ongoing, Progressing    Description: Goals to be met by: 3/11/2022     Patient will increase functional independence with ADLs by performing:    UE Dressing with Set-up Assistance.  LE Dressing with min A using AE as needed.  Grooming while standing at sink with Stand-by Assistance.  Toileting from toilet with Stand-by Assistance for hygiene and clothing management.   Supine to sit with Stand-by Assistance.  Stand pivot transfers with Stand-by Assistance.  Toilet transfer to toilet with Stand-by Assistance.                     Time Tracking:     OT Date of Treatment: 02/22/22  OT Start Time: 1257  OT Stop Time: 1313  OT Total Time (min): 16 min    Billable Minutes:Self Care/Home Management 16    OT/TR: OT          2/22/2022

## 2022-02-22 NOTE — ASSESSMENT & PLAN NOTE
Patient with current diagnosis of pneumonia due to infectious organism. Which is controlled. Current antimicrobial regimen consists of   Antibiotics (From admission, onward)            Start     Stop Route Frequency Ordered    02/22/22 0848  metronidazole IVPB 500 mg         -- IV Every 8 hours (non-standard times) 02/22/22 0849    02/18/22 1100  levoFLOXacin 750 mg/150 mL IVPB 750 mg         -- IV Every 24 hours (non-standard times) 02/17/22 1201      . Cultures currently pending.  Will monitor patient closely and continue current treatment plan unchanged.

## 2022-02-22 NOTE — RESPIRATORY THERAPY
RAPID RESPONSE RESPIRATORY THERAPY PROACTIVE NOTE           Time of visit:     Code Status: Full Code   : 1945  Bed: 7098/7098 A:   MRN: [id number]  Time spent at the bedside: 15 -30 min    SITUATION    Evaluated patient for: O2 status    BACKGROUND    Why is the patient in the hospital?: Pneumonia of right middle lobe due to infectious organism    Patient has a past medical history of Actinic keratosis, Arthritis, Cataract, Cellulitis of ankle, Colon polyps, COPD (chronic obstructive pulmonary disease), Family history of colon cancer, History of Clostridium difficile infection, Hyperlipidemia, Hypertension, Lung nodules, Sinus tachycardia, and Type 2 diabetes mellitus, without long-term current use of insulin.    24 Hours Vitals Range:  Temp:  [96.6 °F (35.9 °C)-98.2 °F (36.8 °C)]   Pulse:  []   Resp:  [16-22]   BP: (120-163)/(58-71)   SpO2:  [90 %-97 %]     Labs:    Recent Labs     22  0412      K 3.1*   CL 96   CO2 29   CREATININE 0.4*   GLU 97   MG 1.7        Recent Labs     22  1221   PH 7.419   PCO2 46.5*   PO2 63*   HCO3 30.1*   POCSATURATED 92*   BE 6       ASSESSMENT/INTERVENTIONS    Upon arrival in room pt on venti mask.  I switched her to 3L which is her home O2 setting.  She was purse lip breathing and she stated that that was her baseline.  I added Xop 0.63 Q6H to help open lungs from the pneumonia.  I also taught her how to do pulmonary toilet techniques and how to do IS correctly    Last VS   Temp: 96.6 °F (35.9 °C) ( 161)  Pulse: 104 ( 1615)  Resp: 21 ( 1426)  BP: 163/71 ( 161)  SpO2: 91 % ( 1740)    Level of Consciousness: Level of Consciousness (AVPU): alert  Respiratory Effort: Respiratory Effort: Pursed lips (pt states this is normal) Expansion/Accessory Muscle Usage: Expansion/Accessory Muscles/Retractions: expansion symmetric, no use of accessory muscles, no retractions  All Lung Field Breath Sounds: All Lung Fields Breath Sounds:  diminished  RLL Breath Sounds: diminished  O2 Device/Concentration: Flow (L/min): 3, Oxygen Concentration (%): 50, O2 Device (Oxygen Therapy): nasal cannula w/ humidification  NIPPV: No Surgical airway: No  ETCO2 monitored:    Ambu at bedside: Ambu bag with the patient?: Yes, Adult Ambu    Active Orders   Respiratory Care    Incentive spirometry     Frequency: Q1H     Number of Occurrences: Until Specified    Oxygen PRN     Frequency: PRN     Number of Occurrences: Until Specified     Order Comments: 3L home O2       Order Questions:      Device type: Low flow      Device: Nasal Cannula (1- 5 Liters)      LPM: 1-5      Titrate O2 per Oxygen Titration Protocol: Yes      To maintain SpO2 goal of: Other      SpO2 goal: 88-92      Notify MD of: Inability to achieve desired SpO2; Sudden change in patient status and requires 20% increase in FiO2; Patient requires >60% FiO2    Pulse Oximetry Continuous     Frequency: Continuous     Number of Occurrences: Until Specified       RECOMMENDATIONS    We recommend: RRT Recs: Continue POC per primary team.    ESCALATION        FOLLOW-UP    Please call back the Rapid Response RTMarissa RRT at x [phone] for any questions or concerns.

## 2022-02-22 NOTE — ASSESSMENT & PLAN NOTE
-continue duoneb prn,   -prednisone 50 mg qd x5 days. Resuming home dose 10 mg qam, 5 mg qhs   -symbicort and umeclidinium daily

## 2022-02-23 VITALS
HEIGHT: 60 IN | WEIGHT: 97 LBS | RESPIRATION RATE: 16 BRPM | TEMPERATURE: 98 F | SYSTOLIC BLOOD PRESSURE: 131 MMHG | OXYGEN SATURATION: 94 % | HEART RATE: 98 BPM | DIASTOLIC BLOOD PRESSURE: 60 MMHG | BODY MASS INDEX: 19.04 KG/M2

## 2022-02-23 LAB
ANION GAP SERPL CALC-SCNC: 10 MMOL/L (ref 8–16)
BASOPHILS # BLD AUTO: 0.15 K/UL (ref 0–0.2)
BASOPHILS NFR BLD: 0.8 % (ref 0–1.9)
BUN SERPL-MCNC: 17 MG/DL (ref 8–23)
CALCIUM SERPL-MCNC: 9.7 MG/DL (ref 8.7–10.5)
CHLORIDE SERPL-SCNC: 100 MMOL/L (ref 95–110)
CO2 SERPL-SCNC: 28 MMOL/L (ref 23–29)
CREAT SERPL-MCNC: 0.4 MG/DL (ref 0.5–1.4)
DIFFERENTIAL METHOD: ABNORMAL
EOSINOPHIL # BLD AUTO: 0 K/UL (ref 0–0.5)
EOSINOPHIL NFR BLD: 0.1 % (ref 0–8)
ERYTHROCYTE [DISTWIDTH] IN BLOOD BY AUTOMATED COUNT: 15.9 % (ref 11.5–14.5)
EST. GFR  (AFRICAN AMERICAN): >60 ML/MIN/1.73 M^2
EST. GFR  (NON AFRICAN AMERICAN): >60 ML/MIN/1.73 M^2
GLUCOSE SERPL-MCNC: 91 MG/DL (ref 70–110)
HCT VFR BLD AUTO: 47.5 % (ref 37–48.5)
HGB BLD-MCNC: 14.1 G/DL (ref 12–16)
IMM GRANULOCYTES # BLD AUTO: 0.78 K/UL (ref 0–0.04)
IMM GRANULOCYTES NFR BLD AUTO: 4.2 % (ref 0–0.5)
LYMPHOCYTES # BLD AUTO: 0.5 K/UL (ref 1–4.8)
LYMPHOCYTES NFR BLD: 2.8 % (ref 18–48)
MCH RBC QN AUTO: 28.6 PG (ref 27–31)
MCHC RBC AUTO-ENTMCNC: 29.7 G/DL (ref 32–36)
MCV RBC AUTO: 96 FL (ref 82–98)
MONOCYTES # BLD AUTO: 1.1 K/UL (ref 0.3–1)
MONOCYTES NFR BLD: 6.1 % (ref 4–15)
NEUTROPHILS # BLD AUTO: 15.9 K/UL (ref 1.8–7.7)
NEUTROPHILS NFR BLD: 86 % (ref 38–73)
NRBC BLD-RTO: 0 /100 WBC
PLATELET # BLD AUTO: 232 K/UL (ref 150–450)
PMV BLD AUTO: 9.8 FL (ref 9.2–12.9)
POCT GLUCOSE: 132 MG/DL (ref 70–110)
POCT GLUCOSE: 151 MG/DL (ref 70–110)
POCT GLUCOSE: 83 MG/DL (ref 70–110)
POTASSIUM SERPL-SCNC: 4 MMOL/L (ref 3.5–5.1)
RBC # BLD AUTO: 4.93 M/UL (ref 4–5.4)
SODIUM SERPL-SCNC: 138 MMOL/L (ref 136–145)
WBC # BLD AUTO: 18.43 K/UL (ref 3.9–12.7)

## 2022-02-23 PROCEDURE — 25000003 PHARM REV CODE 250

## 2022-02-23 PROCEDURE — 99900035 HC TECH TIME PER 15 MIN (STAT)

## 2022-02-23 PROCEDURE — 94640 AIRWAY INHALATION TREATMENT: CPT

## 2022-02-23 PROCEDURE — 63600175 PHARM REV CODE 636 W HCPCS: Performed by: PHYSICIAN ASSISTANT

## 2022-02-23 PROCEDURE — 94799 UNLISTED PULMONARY SVC/PX: CPT

## 2022-02-23 PROCEDURE — 27000221 HC OXYGEN, UP TO 24 HOURS

## 2022-02-23 PROCEDURE — 25000003 PHARM REV CODE 250: Performed by: PHYSICIAN ASSISTANT

## 2022-02-23 PROCEDURE — 85025 COMPLETE CBC W/AUTO DIFF WBC: CPT | Performed by: PHYSICIAN ASSISTANT

## 2022-02-23 PROCEDURE — 36415 COLL VENOUS BLD VENIPUNCTURE: CPT | Performed by: PHYSICIAN ASSISTANT

## 2022-02-23 PROCEDURE — 25000242 PHARM REV CODE 250 ALT 637 W/ HCPCS: Performed by: INTERNAL MEDICINE

## 2022-02-23 PROCEDURE — 99239 PR HOSPITAL DISCHARGE DAY,>30 MIN: ICD-10-PCS | Mod: ,,, | Performed by: PHYSICIAN ASSISTANT

## 2022-02-23 PROCEDURE — 92526 ORAL FUNCTION THERAPY: CPT

## 2022-02-23 PROCEDURE — 99239 HOSP IP/OBS DSCHRG MGMT >30: CPT | Mod: ,,, | Performed by: PHYSICIAN ASSISTANT

## 2022-02-23 PROCEDURE — 94761 N-INVAS EAR/PLS OXIMETRY MLT: CPT

## 2022-02-23 PROCEDURE — 80048 BASIC METABOLIC PNL TOTAL CA: CPT | Performed by: PHYSICIAN ASSISTANT

## 2022-02-23 PROCEDURE — S0030 INJECTION, METRONIDAZOLE: HCPCS | Performed by: PHYSICIAN ASSISTANT

## 2022-02-23 RX ORDER — LIDOCAINE 50 MG/G
1 PATCH TOPICAL DAILY
Qty: 30 PATCH | Refills: 0 | Status: SHIPPED | OUTPATIENT
Start: 2022-02-23 | End: 2022-03-25

## 2022-02-23 RX ORDER — METRONIDAZOLE 500 MG/1
500 TABLET ORAL 3 TIMES DAILY
Qty: 15 TABLET | Refills: 0 | Status: SHIPPED | OUTPATIENT
Start: 2022-02-24 | End: 2022-03-01

## 2022-02-23 RX ORDER — LEVOFLOXACIN 500 MG/1
500 TABLET, FILM COATED ORAL DAILY
Qty: 8 TABLET | Refills: 0 | Status: SHIPPED | OUTPATIENT
Start: 2022-02-24 | End: 2022-03-04

## 2022-02-23 RX ADMIN — LEVALBUTEROL HYDROCHLORIDE 0.63 MG: 0.63 SOLUTION RESPIRATORY (INHALATION) at 01:02

## 2022-02-23 RX ADMIN — APIXABAN 5 MG: 5 TABLET, FILM COATED ORAL at 08:02

## 2022-02-23 RX ADMIN — LIDOCAINE 5% 1 PATCH: 700 PATCH TOPICAL at 11:02

## 2022-02-23 RX ADMIN — ALPRAZOLAM 0.5 MG: 0.5 TABLET ORAL at 08:02

## 2022-02-23 RX ADMIN — ESCITALOPRAM OXALATE 5 MG: 5 TABLET, FILM COATED ORAL at 08:02

## 2022-02-23 RX ADMIN — LEVOFLOXACIN 750 MG: 750 INJECTION, SOLUTION INTRAVENOUS at 10:02

## 2022-02-23 RX ADMIN — SENNOSIDES 8.6 MG: 8.6 TABLET ORAL at 08:02

## 2022-02-23 RX ADMIN — METRONIDAZOLE 500 MG: 500 INJECTION, SOLUTION INTRAVENOUS at 04:02

## 2022-02-23 RX ADMIN — LEVALBUTEROL HYDROCHLORIDE 0.63 MG: 0.63 SOLUTION RESPIRATORY (INHALATION) at 07:02

## 2022-02-23 RX ADMIN — FUROSEMIDE 20 MG: 20 TABLET ORAL at 08:02

## 2022-02-23 RX ADMIN — Medication 1 CAPSULE: at 08:02

## 2022-02-23 RX ADMIN — PREDNISONE 10 MG: 10 TABLET ORAL at 08:02

## 2022-02-23 RX ADMIN — FLUTICASONE FUROATE AND VILANTEROL TRIFENATATE 1 PUFF: 100; 25 POWDER RESPIRATORY (INHALATION) at 07:02

## 2022-02-23 RX ADMIN — PANTOPRAZOLE SODIUM 40 MG: 40 TABLET, DELAYED RELEASE ORAL at 08:02

## 2022-02-23 RX ADMIN — METRONIDAZOLE 500 MG: 500 INJECTION, SOLUTION INTRAVENOUS at 01:02

## 2022-02-23 RX ADMIN — DILTIAZEM HYDROCHLORIDE 180 MG: 180 CAPSULE, COATED, EXTENDED RELEASE ORAL at 08:02

## 2022-02-23 RX ADMIN — LEVALBUTEROL HYDROCHLORIDE 0.63 MG: 0.63 SOLUTION RESPIRATORY (INHALATION) at 12:02

## 2022-02-23 RX ADMIN — METRONIDAZOLE 500 MG: 500 INJECTION, SOLUTION INTRAVENOUS at 08:02

## 2022-02-23 RX ADMIN — CHOLECALCIFEROL TAB 25 MCG (1000 UNIT) 1000 UNITS: 25 TAB at 08:02

## 2022-02-23 RX ADMIN — TIOTROPIUM BROMIDE INHALATION SPRAY 2 PUFF: 3.12 SPRAY, METERED RESPIRATORY (INHALATION) at 07:02

## 2022-02-23 NOTE — PLAN OF CARE
[facility] - Telemetry Stepdown ([place] [id number])      HOME HEALTH ORDERS  FACE TO FACE ENCOUNTER    Patient Name: Leyla Mari  YOB: 1945    PCP: Lizbeth Dillard MD   PCP Address: [address] / [place] [address]  PCP Phone Number: [phone]  PCP Fax: [phone]    Encounter Date: 2/17/22    Admit to Home Health    Diagnoses:  Active Hospital Problems    Diagnosis  POA    *Pneumonia of right middle lobe due to infectious organism [J18.9]  Yes    Acute hypoxemic respiratory failure [J96.01]  Yes     Priority: 2     Leukocytosis [D72.829]  Yes    Hypokalemia [E87.6]  Yes    Type 2 diabetes mellitus, without long-term current use of insulin [E11.9]  Yes     A1c elevated for past year, steroid induced      Chronic pulmonary embolism [I27.82]  Yes    Essential hypertension [I10]  Yes     Chronic    Hyperlipidemia [E78.5]  Yes     Chronic    Chronic obstructive pulmonary disease [J44.9]  Yes      Resolved Hospital Problems   No resolved problems to display.       Follow Up Appointments:  Future Appointments   Date Time Provider Department Center   2/23/2022 12:00 PM Lizbeth Dillard MD [facility]   4/22/2022 12:00 PM Lizbeth Dillard MD [facility]       Allergies:  Review of patient's allergies indicates:   Allergen Reactions    Bactrim [sulfamethoxazole-trimethoprim] Nausea Only    Codeine Itching    Keflex [cephalexin] Other (See Comments)     Rhinorrhea, nausea. Tolerated Ceftriaxone June 2014    Ceftriaxone Rash     Morbilliform rash after receiving IV ceftriaxone    Clindamycin hcl Rash    Doxycycline Rash    Vancomycin analogues Rash     Morbilliform rash after receiving IV vancomycin       Medications: Review discharge medications with patient and family and provide education.    Current Facility-Administered Medications   Medication Dose Route Frequency Provider Last Rate Last Admin    acetaminophen tablet 650 mg  650 mg  Oral Q4H PRN Hue Wood PA-C   650 mg at 02/21/22 0938    ALPRAZolam tablet 0.5 mg  0.5 mg Oral TID PRN Mekhi Johnson PA-C   0.5 mg at 02/23/22 0814    apixaban tablet 5 mg  5 mg Oral BID Mekhi Johnson PA-C   5 mg at 02/23/22 0814    bisacodyL suppository 10 mg  10 mg Rectal Daily PRN Hue Wood PA-C        dextrose 10% bolus 125 mL  12.5 g Intravenous PRN Hue Wood PA-C        dextrose 10% bolus 250 mL  25 g Intravenous PRN Hue Wood PA-C        diltiaZEM 24 hr capsule 180 mg  180 mg Oral Daily Mekhi Johnson PA-C   180 mg at 02/23/22 0814    EScitalopram oxalate tablet 5 mg  5 mg Oral Daily Mekhi Johnson PA-C   5 mg at 02/23/22 0813    fluticasone furoate-vilanteroL 100-25 mcg/dose diskus inhaler 1 puff  1 puff Inhalation Daily Mekhi Johnson PA-C   1 puff at 02/23/22 0746    furosemide tablet 20 mg  20 mg Oral Daily Mekhi Johnson PA-C   20 mg at 02/23/22 0813    gabapentin capsule 100 mg  100 mg Oral QHS Mekhi Johnson PA-C   100 mg at 02/22/22 2110    glucagon (human recombinant) injection 1 mg  1 mg Intramuscular PRN Hue Wood PA-C        glucose chewable tablet 16 g  16 g Oral TESFAYE Wood PA-C        glucose chewable tablet 24 g  24 g Oral KANDIN Hue Wood PA-C        insulin aspart U-100 pen 0-5 Units  0-5 Units Subcutaneous QID (AC + HS) PRN Hue Wood PA-C        Lactobacillus rhamnosus GG capsule 1 capsule  1 capsule Oral Daily Mekhi Johnson PA-C   1 capsule at 02/23/22 0813    levalbuterol nebulizer solution 0.63 mg  0.63 mg Nebulization Q6H Vin Laguna MD   0.63 mg at 02/23/22 0746    levoFLOXacin 750 mg/150 mL IVPB 750 mg  750 mg Intravenous Q24H Hue Wood PA-C   Stopped at 02/22/22 1419    LIDOcaine 5 % patch 1 patch  1 patch Transdermal Q24H Candido Luna DO   1 patch at 02/22/22 1235    magnesium oxide tablet 800 mg  800 mg Oral PRN Hue Wood PA-C         magnesium oxide tablet 800 mg  800 mg Oral PRN Hue Wood PA-C        melatonin tablet 6 mg  6 mg Oral Nightly PRN Hue Wood PA-C   6 mg at 02/19/22 2207    metronidazole IVPB 500 mg  500 mg Intravenous Q8H Mekhi Johnson PA-C 100 mL/hr at 02/23/22 0831 500 mg at 02/23/22 0831    naloxone 0.4 mg/mL injection 0.02 mg  0.02 mg Intravenous PRN Hue Wood PA-C        ondansetron disintegrating tablet 8 mg  8 mg Oral Q8H PRHAYDE Wood PA-C        pantoprazole EC tablet 40 mg  40 mg Oral Daily Mekhi Johnson PA-C   40 mg at 02/23/22 0813    polyethylene glycol packet 17 g  17 g Oral Daily PRHAYDE Wood PA-C   17 g at 02/22/22 0809    pravastatin tablet 20 mg  20 mg Oral QHS Mekhi Johnson PA-C   20 mg at 02/22/22 2111    predniSONE tablet 10 mg  10 mg Oral Daily Mekhi Johnson PA-C   10 mg at 02/23/22 0814    predniSONE tablet 5 mg  5 mg Oral QHS Mekhi Johnson PA-C   5 mg at 02/22/22 2110    promethazine tablet 25 mg  25 mg Oral Q6H PRHAYDE Wood PA-C        senna tablet 8.6 mg  8.6 mg Oral Daily Mekhi Johnson PA-C   8.6 mg at 02/23/22 0813    sodium chloride 0.9% flush 10 mL  10 mL Intravenous Q8H PRHAYDE Wood PA-C   10 mL at 02/21/22 0944    tiotropium bromide 2.5 mcg/actuation inhaler 2 puff  2 puff Inhalation Daily Mekhi Johnson PA-C   2 puff at 02/23/22 0746    vitamin D 1000 units tablet 1,000 Units  1,000 Units Oral Daily Mekhi Johnson PA-C   1,000 Units at 02/23/22 0813     Current Discharge Medication List      CONTINUE these medications which have NOT CHANGED    Details   albuterol (PROVENTIL/VENTOLIN HFA) 90 mcg/actuation inhaler INHALE 2 PUFFS INTO LUNGS EVERY 6 HOURS AS NEEDED FOR WHEEZING OR SHORTNESS OF BREATH  Qty: 8.5 g, Refills: 12    Associated Diagnoses: Centrilobular emphysema      ALPRAZolam (XANAX) 0.25 MG tablet Take 1 tablet (0.25 mg total) by mouth 3 (three) times daily as  needed for Anxiety.  Qty: 90 tablet, Refills: 3    Comments: This request is for a new prescription for a controlled substance as required by Federal/State law..  Associated Diagnoses: Anxiety      apixaban (ELIQUIS) 5 mg Tab Take 1 tablet (5 mg total) by mouth 2 (two) times daily.  Qty: 180 tablet, Refills: 3    Associated Diagnoses: Acute pulmonary embolism, unspecified pulmonary embolism type, unspecified whether acute cor pulmonale present      budesonide-formoterol 160-4.5 mcg (SYMBICORT) 160-4.5 mcg/actuation HFAA INHALE 2 PUFFS INTO THE LUNGS EVERY 12 HOURS  Qty: 10.2 g, Refills: 11    Associated Diagnoses: Centrilobular emphysema      cholecalciferol, vitamin D3, (VITAMIN D3) 25 mcg (1,000 unit) capsule Take 1,000 Units by mouth once daily.      diltiaZEM (CARDIZEM CD) 180 MG 24 hr capsule TAKE 1 CAPSULE (180 MG TOTAL) BY MOUTH ONCE DAILY.  Qty: 90 capsule, Refills: 3    Comments: .  Associated Diagnoses: Essential hypertension      EScitalopram oxalate (LEXAPRO) 5 MG Tab Take 1 tablet (5 mg total) by mouth once daily.  Qty: 90 tablet, Refills: 3    Associated Diagnoses: Anxiety      furosemide (LASIX) 20 MG tablet Take 1 tablet (20 mg total) by mouth once daily.  Qty: 30 tablet, Refills: 3    Associated Diagnoses: Acute on chronic heart failure, unspecified heart failure type      gabapentin (NEURONTIN) 100 MG capsule Take 1 capsule (100 mg total) by mouth every evening.  Qty: 90 capsule, Refills: 3    Associated Diagnoses: Anxiety      lanolin/mineral oil/petrolatum (ARTIFICIAL TEARS OPHT) Place 1 drop into both eyes as needed (Dry eye).      polyethylene glycol (GLYCOLAX) 17 gram PwPk Take 17 g by mouth daily as needed (constipation).  Qty: 30 each, Refills: 1      potassium chloride (MICRO-K) 10 MEQ CpSR Take 1 capsule (10 mEq total) by mouth once daily.  Qty: 90 capsule, Refills: 3    Associated Diagnoses: Acute on chronic heart failure, unspecified heart failure type      pravastatin (PRAVACHOL) 20 MG  tablet TAKE 1 TABLET BY MOUTH EVERY DAY  Qty: 90 tablet, Refills: 3    Associated Diagnoses: Type 2 diabetes mellitus with other neurologic complication, without long-term current use of insulin      !! predniSONE (DELTASONE) 10 MG tablet Take 1 tablet (10 mg total) by mouth once daily.  Qty: 30 tablet, Refills: 11    Associated Diagnoses: Chronic obstructive pulmonary disease with (acute) exacerbation      !! predniSONE (DELTASONE) 5 MG tablet Take 1 tablet (5 mg total) by mouth every evening.  Qty: 30 tablet, Refills: 11    Associated Diagnoses: Chronic obstructive pulmonary disease with (acute) exacerbation      umeclidinium (INCRUSE ELLIPTA) 62.5 mcg/actuation inhalation capsule INHALE 1 PUFF BY MOUTH DAILY  Qty: 90 capsule, Refills: 3    Associated Diagnoses: Chronic obstructive pulmonary disease with (acute) exacerbation      blood sugar diagnostic Strp 1 strip by Misc.(Non-Drug; Combo Route) route 2 (two) times daily.  Qty: 200 strip, Refills: 3    Associated Diagnoses: Type 2 diabetes mellitus with other neurologic complication, without long-term current use of insulin      blood-glucose meter kit Use as instructed  Qty: 1 each, Refills: 0    Associated Diagnoses: Type 2 diabetes mellitus with other neurologic complication, without long-term current use of insulin      lancets (ACCU-CHEK SOFTCLIX LANCETS) Misc 1 lancet by Misc.(Non-Drug; Combo Route) route 2 (two) times daily.  Qty: 200 each, Refills: 3    Associated Diagnoses: Type 2 diabetes mellitus with other neurologic complication, without long-term current use of insulin      metFORMIN (GLUCOPHAGE) 500 MG tablet Take 1 tablet (500 mg total) by mouth 2 (two) times daily with meals. Don't start taking until evaluated by primary care provider  Qty: 180 tablet, Refills: 3    Associated Diagnoses: Type 2 diabetes mellitus with other neurologic complication, without long-term current use of insulin       !! - Potential duplicate medications found. Please  discuss with provider.      STOP taking these medications       pantoprazole (PROTONIX) 40 MG tablet Comments:   Reason for Stopping:                 I have seen and examined this patient within the last 30 days. My clinical findings that support the need for the home health skilled services and home bound status are the following:no   Weakness/numbness causing balance and gait disturbance due to COPD Exacerbation making it taxing to leave home.     Diet:   diabetic diet 2000 calorie    Labs:  Report Lab results to PCP.    Referrals/ Consults  Physical Therapy to evaluate and treat. Evaluate for home safety and equipment needs; Establish/upgrade home exercise program. Perform / instruct on therapeutic exercises, gait training, transfer training, and Range of Motion.  Occupational Therapy to evaluate and treat. Evaluate home environment for safety and equipment needs. Perform/Instruct on transfers, ADL training, ROM, and therapeutic exercises.  Aide to provide assistance with personal care, ADLs, and vital signs.    Activities:   activity as tolerated    Nursing:   Agency to admit patient within 24 hours of hospital discharge unless specified on physician order or at patient request    SN to complete comprehensive assessment including routine vital signs. Instruct on disease process and s/s of complications to report to MD. Review/verify medication list sent home with the patient at time of discharge  and instruct patient/caregiver as needed. Frequency may be adjusted depending on start of care date.     Skilled nurse to perform up to 3 visits PRN for symptoms related to diagnosis    Notify MD if SBP > 160 or < 90; DBP > 90 or < 50; HR > 120 or < 50; Temp > 101; O2 < 88%    Ok to schedule additional visits based on staff availability and patient request on consecutive days within the home health episode.    When multiple disciplines ordered:    Start of Care occurs on Sunday - Wednesday schedule remaining discipline  evaluations as ordered on separate consecutive days following the start of care.    Thursday SOC -schedule subsequent evaluations Friday and Monday the following week.     Friday - Saturday SOC - schedule subsequent discipline evaluations on consecutive days starting Monday of the following week.    For all post-discharge communication and subsequent orders please contact patient's primary care physician. If unable to reach primary care physician or do not receive response within 30 minutes, please contact [phone] for clinical staff order clarification    Miscellaneous   Diabetic Care:   Report CBG < 60 or > 350 to physician.        I certify that this patient is confined to her home and needs intermittent skilled nursing care, physical therapy and occupational therapy.

## 2022-02-23 NOTE — PLAN OF CARE
Pt is AAOx4. 3L NC. Tele maintained.  No falls or new injuries reported during shift, safety precautions maintained.    Problem: Adult Inpatient Plan of Care  Goal: Plan of Care Review  Outcome: Ongoing, Progressing     Problem: Adult Inpatient Plan of Care  Goal: Optimal Comfort and Wellbeing  Outcome: Ongoing, Progressing

## 2022-02-23 NOTE — PT/OT/SLP PROGRESS
Speech Language Pathology Treatment/ Discharge Summary     Patient Name:  Leyla Mari   MRN:  [id number]  Admitting Diagnosis: Pneumonia of right middle lobe due to infectious organism    Recommendations:                 General Recommendations:  Follow-up not indicated  Diet recommendations:  Dental Soft (as interested), Liquid Diet Level: Full liquids   Aspiration Precautions: Meds crushed in puree, Meds whole buried in puree and Standard aspiration precautions   General Precautions: Standard, fall    Subjective     Pt awake and pleasant     Pain/Comfort:  · Pain Rating 1: 0/10  · Pain Rating Post-Intervention 1: 0/10    Respiratory Status: Nasal cannula, flow 3 L/min consistent with baseline     Objective:     Has the patient been evaluated by SLP for swallowing?   Yes  Keep patient NPO? No     Pt seen for follow up. Pt reports has been tolerating diet without difficulty and continues to prefer thin liquids such as boost and soft smooth foods (yogurt). Pt continues to report shortness of breath and difficulty sitting fully upright for meals. SLP offered PO in a semi-upright reclined position. Pt consumed ~ 4oz of thin liquids via cyclic straw sips with adequate oral control and no clinical signs of aspiration. Vocal quality clear post thin liquid trials. Pt also observed to consume soft solid x2 functionally without any notable residue or shortness of breath. Pt to continue with unrestricted PO diet and SLP discussed with pt for preference and to optimize intake softer foods and full liquids may be most efficient and beneficial. Pt reporting agreement with plan and yogurt and boost continues to be her preference. Notbale to mention pt with intermittent neck tremors, but not clinically significant or impacting feeding and swallowing function. No further killed speech services warranted in the acute care setting at this time.     Assessment:     Leyla Mari is a 76 y.o. female with an SLP diagnosis of intact  oral feeding and swallowing skills to resume pt preferred diet without concern for oral feeding and swallowing function. No further skilled speech services warranted in the acute setting.      Goals:   Multidisciplinary Problems     SLP Goals        Problem: SLP Goal    Goal Priority Disciplines Outcome   SLP Goal     SLP    Description: Speech Language Pathology Goals  Goals expected to be met by 3/1    1. Pt will tolerate least restrictive without overt clinical signs of aspiration                    Plan:     · Patient to be seen:  4 x/week   · Plan of Care expires:     · Plan of Care reviewed with:  patient   · SLP Follow-Up:  No       Discharge recommendations:  home (No ST follow up warranted)   Barriers to Discharge:  None    Time Tracking:     SLP Treatment Date:   02/23/22  Speech Start Time:  1414  Speech Stop Time:  1424     Speech Total Time (min):  10 min    Billable Minutes: Treatment Swallowing Dysfunction 10    02/23/2022

## 2022-02-23 NOTE — PLAN OF CARE
02/23/22 1629   Post-Acute Status   Post-Acute Authorization Home Health   Home Health Status Set-up Complete/Auth obtained     SW received a call from N to report that pt has been accepted with [facility].    Lo Batres LMSW  [facility]  Ext. [phone]

## 2022-02-23 NOTE — DISCHARGE SUMMARY
"[facility] ([place] [address])  [facility] Medicine  Discharge Summary      Patient Name: Leyla Mari  MRN: [id number]  Patient Class: IP- Inpatient  Admission Date: 2/17/2022  Hospital Length of Stay: 2 days  Discharge Date and Time: 2/23/2022  7:18 PM  Attending Physician: Vin Laguna MD   Discharging Provider: Mekhi Johnson PA-C  Primary Care Provider: Lizbeth Dillard MD  Hospital Medicine Team: [facility] Mekhi Johnson PA-C    HPI:   Ms. Mari is a 77 yo M w/ end-stage COPD, PE on eliquis, HTN and HLD presenting with back pains and increasing oxygen requirements. Pt reports back pain mostly in the right upper back and described as tightness like a "brasserie wire around her back" and w/o radiation. These pains have been chronic, initially started when she was in SNF in December receiving therapy twice per day, however she has noticed the pain has been increasing lately. The pain feels better with palpation/massage. She further complains of left flank pain radiating to the front of her waist, described as burning and tingling sensation that comes and goes. Denies any provoking, or alleviating factors. Patient does have a history of chickenpox and shingles, last had an exacerbation over 40 years ago. Pt says she is normally on 3L NC and noticed that she has had increased SOB over the last 2 days. Her daughter at bedside reports no increased work of breathing. Pt endorses a mild intermittent productive cough. Denies fevers, chills, CP, wheezes, n/v/d, dysuria, and myalgia.     In ED, -125 VS otherwise stable. WBC 19.91, procal 0.62. CXR revealed some consolidation in the right lung, new finding which could indicate a pneumonia. Started on levofloxacin IV and given lidoderm patch and methocarbamol w/ some improvement of back pain.         * No surgery found *      Hospital Course:   Pt placed in observation for R middle lobe pneumonia. AFVSS. Pt had leukocytosis of " 19.91 on CBC and procal 0.62. CXR revealed R middle lobe consolidation. Pt initiated on levofloxacin IV 2/18 x6 days and treated for pleuritic pain associated w/ the pneumonia. Increased prednisone dose to 50 mg qd x 5 days in the setting of COPD. Started IV metronidazole 2/21 x2 days for concerns of aspiration. Continued duonebs, levalbuterol. Pt at bl oxygen requirement. Given levofloxacin 500 mg qd x8 days for 14 day total and metronidazole 500 mg tid x5 days for 7 day total. Pt given  services. Pt medically ready for discharge home. Pt educated on hospital course and plan, verbally agrees and understands. All questions answered.        Goals of Care Treatment Preferences:  Code Status: Full Code    Living Will: Yes  LaPOST: Yes           Consults:     * Pneumonia of right middle lobe due to infectious organism  Patient with current diagnosis of pneumonia due to infectious organism. Which is controlled. Current antimicrobial regimen consists of   Antibiotics (From admission, onward)                Start     Stop Route Frequency Ordered    02/22/22 0848  metronidazole IVPB 500 mg         -- IV Every 8 hours (non-standard times) 02/22/22 0849    02/18/22 1100  levoFLOXacin 750 mg/150 mL IVPB 750 mg         -- IV Every 24 hours (non-standard times) 02/17/22 1201        . Cultures currently pending.  Will monitor patient closely and continue current treatment plan unchanged.      Leukocytosis  -2/2 active bacterial pneumonia vs aspiration vs steroid induced  -continue levofloxacin  -started on flagyl for anaerobic coverage        Final Active Diagnoses:    Diagnosis Date Noted POA    PRINCIPAL PROBLEM:  Pneumonia of right middle lobe due to infectious organism [J18.9] 02/17/2022 Yes    Acute hypoxemic respiratory failure [J96.01] 02/17/2022 Yes    Leukocytosis [D72.829] 02/20/2022 Yes    Hypokalemia [E87.6] 02/20/2022 Yes    Type 2 diabetes mellitus, without long-term current use of insulin [E11.9] 01/11/2022 Yes     Chronic pulmonary embolism [I27.82] 08/15/2021 Yes    Essential hypertension [I10]  Yes     Chronic    Hyperlipidemia [E78.5]  Yes     Chronic    Chronic obstructive pulmonary disease [J44.9] 07/03/2012 Yes      Problems Resolved During this Admission:       Discharged Condition: good    Disposition: Home-Health Care Svc    Follow Up:   Follow-up Information       Lizbeth Dillard MD Follow up.    Specialty: Internal Medicine  Why: The nurse with Dr. Dillard's office will notify you for a hospital follow up visit.  Contact information:  [address] [facility] [address]  [phone]               [facility] .    Contact information:  [address] [facility] [address]  [facility] [id number] [phone]                         Patient Instructions:      Ambulatory referral/consult to [facility]   Standing Status: Future   Referral Priority: Routine Referral Type: Consultation   Referral Reason: Specialty Services Required   Number of Visits Requested: 1     Ambulatory referral/consult to [facility]   Standing Status: Future   Referral Priority: Routine Referral Type: Consultation   Referral Location: [facility]   Requested Specialty: Community Care   Number of Visits Requested: 1     Notify your health care provider if you experience any of the following:  temperature >100.4     Notify your health care provider if you experience any of the following:  severe uncontrolled pain     Notify your health care provider if you experience any of the following:  redness, tenderness, or signs of infection (pain, swelling, redness, odor or green/yellow discharge around incision site)     Notify your health care provider if you experience any of the following:  difficulty breathing or increased cough     Notify your health care provider if you experience any of the following:  persistent dizziness, light-headedness, or visual  disturbances     Notify your health care provider if you experience any of the following:  increased confusion or weakness     Activity as tolerated       Significant Diagnostic Studies: Labs: All labs within the past 24 hours have been reviewed    Pending Diagnostic Studies:       None           Medications:  Reconciled Home Medications:      Medication List        START taking these medications      levoFLOXacin 500 MG tablet  Commonly known as: LEVAQUIN  Take 1 tablet (500 mg total) by mouth once daily. for 8 days  Start taking on: February 24, 2022     LIDOcaine 5 %  Commonly known as: LIDODERM  Place 1 patch onto the skin once daily. Remove & Discard patch within 12 hours or as directed by MD     metroNIDAZOLE 500 MG tablet  Commonly known as: FLAGYL  Take 1 tablet (500 mg total) by mouth 3 (three) times daily. for 5 days  Start taking on: February 24, 2022            CHANGE how you take these medications      cholecalciferol (vitamin D3) 25 mcg (1,000 unit) capsule  Commonly known as: VITAMIN D3  Take 1,000 Units by mouth once daily.  What changed: Another medication with the same name was removed. Continue taking this medication, and follow the directions you see here.            CONTINUE taking these medications      albuterol 90 mcg/actuation inhaler  Commonly known as: PROVENTIL/VENTOLIN HFA  INHALE 2 PUFFS INTO LUNGS EVERY 6 HOURS AS NEEDED FOR WHEEZING OR SHORTNESS OF BREATH     ALPRAZolam 0.25 MG tablet  Commonly known as: XANAX  Take 1 tablet (0.25 mg total) by mouth 3 (three) times daily as needed for Anxiety.     ARTIFICIAL TEARS OPHT  Place 1 drop into both eyes as needed (Dry eye).     blood sugar diagnostic Strp  1 strip by Misc.(Non-Drug; Combo Route) route 2 (two) times daily.     blood-glucose meter kit  Use as instructed     diltiaZEM 180 MG 24 hr capsule  Commonly known as: CARDIZEM CD  TAKE 1 CAPSULE (180 MG TOTAL) BY MOUTH ONCE DAILY.     ELIQUIS 5 mg Tab  Generic drug: apixaban  Take 1  tablet (5 mg total) by mouth 2 (two) times daily.     EScitalopram oxalate 5 MG Tab  Commonly known as: LEXAPRO  Take 1 tablet (5 mg total) by mouth once daily.     furosemide 20 MG tablet  Commonly known as: LASIX  Take 1 tablet (20 mg total) by mouth once daily.     gabapentin 100 MG capsule  Commonly known as: NEURONTIN  Take 1 capsule (100 mg total) by mouth every evening.     INCRUSE ELLIPTA 62.5 mcg/actuation inhalation capsule  Generic drug: umeclidinium  INHALE 1 PUFF BY MOUTH DAILY     lancets Misc  Commonly known as: ACCU-CHEK SOFTCLIX LANCETS  1 lancet by Misc.(Non-Drug; Combo Route) route 2 (two) times daily.     metFORMIN 500 MG tablet  Commonly known as: GLUCOPHAGE  Take 1 tablet (500 mg total) by mouth 2 (two) times daily with meals. Don't start taking until evaluated by primary care provider     polyethylene glycol 17 gram Pwpk  Commonly known as: GLYCOLAX  Take 17 g by mouth daily as needed (constipation).     potassium chloride 10 MEQ Cpsr  Commonly known as: MICRO-K  Take 1 capsule (10 mEq total) by mouth once daily.     pravastatin 20 MG tablet  Commonly known as: PRAVACHOL  TAKE 1 TABLET BY MOUTH EVERY DAY     * predniSONE 5 MG tablet  Commonly known as: DELTASONE  Take 1 tablet (5 mg total) by mouth every evening.     * predniSONE 10 MG tablet  Commonly known as: DELTASONE  Take 1 tablet (10 mg total) by mouth once daily.     SYMBICORT 160-4.5 mcg/actuation Hfaa  Generic drug: budesonide-formoterol 160-4.5 mcg  INHALE 2 PUFFS INTO THE LUNGS EVERY 12 HOURS           * This list has 2 medication(s) that are the same as other medications prescribed for you. Read the directions carefully, and ask your doctor or other care provider to review them with you.                  Indwelling Lines/Drains at time of discharge:   Lines/Drains/Airways       Drain  Duration             Female External Urinary Catheter 02/17/22 1900 5 days                    Time spent on the discharge of patient: 36 minutes          Mekhi Johnson PA-C  Department of Hospital Medicine  [facility] Tele[facility] ([place]

## 2022-02-23 NOTE — PLAN OF CARE
02/23/22 1205   Post-Acute Status   Post-Acute Authorization Home Health   Home Health Status Referrals Sent     Pt is expected to discharge home with home health. Pt has [employer] for insurance. SW faxed referral to [facility] for them to assign pt to a home health agency.    DAVID will continue to follow up.    Lo Batres LMSW  [facility]  Ext. [phone]

## 2022-02-24 ENCOUNTER — PATIENT OUTREACH (OUTPATIENT)
Dept: ADMINISTRATIVE | Facility: CLINIC | Age: 77
End: 2022-02-24
Payer: MEDICARE

## 2022-02-24 ENCOUNTER — TELEPHONE (OUTPATIENT)
Dept: PRIMARY CARE CLINIC | Facility: CLINIC | Age: 77
End: 2022-02-24
Payer: MEDICARE

## 2022-02-24 ENCOUNTER — PATIENT MESSAGE (OUTPATIENT)
Dept: ADMINISTRATIVE | Facility: CLINIC | Age: 77
End: 2022-02-24
Payer: MEDICARE

## 2022-02-24 ENCOUNTER — TELEPHONE (OUTPATIENT)
Dept: PHARMACY | Facility: CLINIC | Age: 77
End: 2022-02-24
Payer: MEDICARE

## 2022-02-24 NOTE — TELEPHONE ENCOUNTER
Call made to patient to schedule post hospital follow up appointment.  No answer to phone.  Message left to call back.

## 2022-02-24 NOTE — PLAN OF CARE
Problem: Adult Inpatient Plan of Care  Goal: Plan of Care Review  2/23/2022 1814 by Mavis Elizondo RN  Outcome: Adequate for Care Transition  2/23/2022 1112 by Mavis Elizondo RN  Outcome: Ongoing, Progressing  2/23/2022 1111 by Mavis Elizondo RN  Outcome: Ongoing, Progressing  Goal: Patient-Specific Goal (Individualized)  2/23/2022 1814 by Mavis Elizondo RN  Outcome: Adequate for Care Transition  2/23/2022 1112 by Mavis Elizondo RN  Outcome: Ongoing, Progressing  2/23/2022 1111 by Mavis Elizondo RN  Outcome: Ongoing, Progressing  Goal: Absence of Hospital-Acquired Illness or Injury  2/23/2022 1814 by Mavis Elizondo RN  Outcome: Adequate for Care Transition  2/23/2022 1112 by Mavis Elizondo RN  Outcome: Ongoing, Progressing  2/23/2022 1111 by Mavis Elizondo RN  Outcome: Ongoing, Progressing  Goal: Optimal Comfort and Wellbeing  2/23/2022 1814 by Mavis Elizondo RN  Outcome: Adequate for Care Transition  2/23/2022 1112 by Mavis Elizondo RN  Outcome: Ongoing, Progressing  2/23/2022 1111 by Mavis Elizondo RN  Outcome: Ongoing, Progressing  Goal: Readiness for Transition of Care  2/23/2022 1814 by Mavis Elizondo RN  Outcome: Adequate for Care Transition  2/23/2022 1112 by Mavis Elizondo RN  Outcome: Ongoing, Progressing  2/23/2022 1111 by Mavis Elizondo RN  Outcome: Ongoing, Progressing     Problem: Diabetes Comorbidity  Goal: Blood Glucose Level Within Targeted Range  2/23/2022 1814 by Mavis Elizondo RN  Outcome: Adequate for Care Transition  2/23/2022 1112 by Mavis Elizondo RN  Outcome: Ongoing, Progressing  2/23/2022 1111 by Mavis Elizondo RN  Outcome: Ongoing, Progressing     Problem: Fluid Imbalance (Pneumonia)  Goal: Fluid Balance  2/23/2022 1814 by Mavis Elizondo RN  Outcome: Adequate for Care Transition  2/23/2022 1112 by Mavis Elizondo RN  Outcome: Ongoing, Progressing  2/23/2022 1111 by Mavis Elizondo RN  Outcome: Ongoing, Progressing     Problem: Infection (Pneumonia)  Goal: Resolution of Infection Signs and Symptoms  2/23/2022 1814 by Mavis Elizondo,  RN  Outcome: Adequate for Care Transition  2/23/2022 1112 by Mavis Elizondo RN  Outcome: Ongoing, Progressing  2/23/2022 1111 by Mavis Elizondo RN  Outcome: Ongoing, Progressing     Problem: Respiratory Compromise (Pneumonia)  Goal: Effective Oxygenation and Ventilation  2/23/2022 1814 by Mavis Elizondo RN  Outcome: Adequate for Care Transition  2/23/2022 1112 by Mavis Elizondo RN  Outcome: Ongoing, Progressing  2/23/2022 1111 by Mavis Elizondo RN  Outcome: Ongoing, Progressing     Problem: Impaired Wound Healing  Goal: Optimal Wound Healing  2/23/2022 1814 by Mavis Elizondo RN  Outcome: Adequate for Care Transition  2/23/2022 1112 by Mavis Elizondo RN  Outcome: Ongoing, Progressing  2/23/2022 1111 by Mavis Elizondo RN  Outcome: Ongoing, Progressing     Problem: Skin Injury Risk Increased  Goal: Skin Health and Integrity  2/23/2022 1814 by Mavis Elizondo RN  Outcome: Adequate for Care Transition  2/23/2022 1112 by Mavis Elizondo RN  Outcome: Ongoing, Progressing  2/23/2022 1111 by Mavis Elizondo RN  Outcome: Ongoing, Progressing     Problem: Fall Injury Risk  Goal: Absence of Fall and Fall-Related Injury  2/23/2022 1814 by Mavis Elizondo RN  Outcome: Adequate for Care Transition  2/23/2022 1112 by Mavis Elizondo RN  Outcome: Ongoing, Progressing  2/23/2022 1111 by Mavis Elizondo RN  Outcome: Ongoing, Progressing     Problem: Skin or Soft Tissue Infection  Goal: Absence of Infection Signs and Symptoms  2/23/2022 1814 by Mavis Elizondo RN  Outcome: Adequate for Care Transition  2/23/2022 1112 by Mavis Elizondo RN  Outcome: Ongoing, Progressing  2/23/2022 1111 by Mavis Elizondo RN  Outcome: Ongoing, Progressing     Problem: Adjustment to Illness COPD (Chronic Obstructive Pulmonary Disease)  Goal: Optimal Chronic Illness Coping  2/23/2022 1814 by Mavis Elizondo RN  Outcome: Adequate for Care Transition  2/23/2022 1112 by Mavis Elizondo RN  Outcome: Ongoing, Progressing  2/23/2022 1111 by Mavis Elizondo RN  Outcome: Ongoing, Progressing     Problem: Functional  Ability Impaired COPD (Chronic Obstructive Pulmonary Disease)  Goal: Optimal Level of Functional [place]  2/23/2022 1814 by Mavis Elizondo RN  Outcome: Adequate for Care Transition  2/23/2022 1112 by Mavis Elizondo RN  Outcome: Ongoing, Progressing  2/23/2022 1111 by Mavis Elizondo RN  Outcome: Ongoing, Progressing     Problem: Infection COPD (Chronic Obstructive Pulmonary Disease)  Goal: Absence of Infection Signs and Symptoms  2/23/2022 1814 by Mavis Elizondo RN  Outcome: Adequate for Care Transition  2/23/2022 1112 by Mavis Elizondo RN  Outcome: Ongoing, Progressing  2/23/2022 1111 by Mavis Elizondo RN  Outcome: Ongoing, Progressing     Problem: Oral Intake Inadequate COPD (Chronic Obstructive Pulmonary Disease)  Goal: Improved Nutrition Intake  2/23/2022 1814 by Mavis Elizondo RN  Outcome: Adequate for Care Transition  2/23/2022 1112 by Mavis Elizondo RN  Outcome: Ongoing, Progressing  2/23/2022 1111 by Mavis Elizondo RN  Outcome: Ongoing, Progressing     Problem: Respiratory Compromise COPD (Chronic Obstructive Pulmonary Disease)  Goal: Effective Oxygenation and Ventilation  2/23/2022 1814 by Mavis Elizondo RN  Outcome: Adequate for Care Transition  2/23/2022 1112 by Mavis Elizondo RN  Outcome: Ongoing, Progressing  2/23/2022 1111 by Mavis Elizondo RN  Outcome: Ongoing, Progressing    VSS on 3L NC. A/Ox4. Denies pain. Patient discharged home with daughter around 1900. AVS printed and all questions answered. Family providing oxygen for transport.

## 2022-02-24 NOTE — TELEPHONE ENCOUNTER
Call made to daughter, Laney Ghanshyamangela.  Virtual appointment made per daughter's request.  Verbalized appreation and availability    Appointment scheduled.

## 2022-02-24 NOTE — PROGRESS NOTES
C3 nurse attempted to contact Leyla Mari for a TCC post hospital discharge follow up call. No answer. Left voicemail with callback information. The patient does not have a scheduled HOSFU appointment. Message sent to PCP staff for assistance with scheduling visit with patient.

## 2022-02-24 NOTE — PROGRESS NOTES
C3 nurse spoke with Leyla Mari  for a TCC post hospital discharge follow up call. The patient has a scheduled \[facility] [place] [facility]\"" appointment with Lizbeth Dillard MD on 03/02/2022  @ [phone]

## 2022-02-24 NOTE — PLAN OF CARE
[facility] ([facility])  Discharge Final Note    Primary Care Provider: Lizbeth Dillard MD    Expected Discharge Date: 2/23/2022    Patient discharged to home via personal transportation.     Patient's bedside nurse and patient notified of the above.      Final Discharge Note (most recent)       Final Note - 02/24/22 0939          Final Note    Assessment Type Final Discharge Note (P)      Anticipated Discharge Disposition Home-Health Care Svc (P)         Post-Acute Status    Post-Acute Authorization Home Health (P)      Home Health Status Set-up Complete/Auth obtained (P)                      Important Message from Medicare  Important Message from Medicare regarding Discharge Appeal Rights: Given to patient/caregiver, Explained to patient/caregiver, Signed/date by patient/caregiver     Date IMM was signed: 02/22/22  Time IMM was signed: 0919    Contact Info       Lizbeth Dillard MD   Specialty: Internal Medicine   Relationship: PCP - General    [address]  [facility] [address]   Phone: [phone]       Next Steps: Follow up    Instructions: The nurse with Dr. Dillard's office will notify you for a hospital follow up visit.    [facility]    [address] [address]   Phone: [phone]       Next Steps: Follow up            Future Appointments   Date Time Provider Department Center   4/22/2022 12:00 PM Lizbeth Dillard MD [facility] CLN [facility] scheduled post-discharge follow-up appointment and information added to AVS.     Patient was accepted by [facility] for home health services.    Lo Batres LMSW  [facility]  Ext. [phone]

## 2022-02-25 PROCEDURE — G0180 MD CERTIFICATION HHA PATIENT: HCPCS | Mod: ,,, | Performed by: INTERNAL MEDICINE

## 2022-02-25 PROCEDURE — G0180 PR HOME HEALTH MD CERTIFICATION: ICD-10-PCS | Mod: ,,, | Performed by: INTERNAL MEDICINE

## 2022-03-02 ENCOUNTER — TELEPHONE (OUTPATIENT)
Dept: PRIMARY CARE CLINIC | Facility: CLINIC | Age: 77
End: 2022-03-02

## 2022-03-02 ENCOUNTER — OFFICE VISIT (OUTPATIENT)
Dept: PRIMARY CARE CLINIC | Facility: CLINIC | Age: 77
End: 2022-03-02
Payer: MEDICARE

## 2022-03-02 DIAGNOSIS — I50.23 ACUTE ON CHRONIC SYSTOLIC HEART FAILURE: ICD-10-CM

## 2022-03-02 DIAGNOSIS — E11.49 TYPE 2 DIABETES MELLITUS WITH OTHER NEUROLOGIC COMPLICATION, WITHOUT LONG-TERM CURRENT USE OF INSULIN: Primary | ICD-10-CM

## 2022-03-02 DIAGNOSIS — J44.9 END STAGE COPD: ICD-10-CM

## 2022-03-02 PROCEDURE — 1111F DSCHRG MED/CURRENT MED MERGE: CPT | Mod: CPTII,95,, | Performed by: INTERNAL MEDICINE

## 2022-03-02 PROCEDURE — 1111F PR DISCHARGE MEDS RECONCILED W/ CURRENT OUTPATIENT MED LIST: ICD-10-PCS | Mod: CPTII,95,, | Performed by: INTERNAL MEDICINE

## 2022-03-02 PROCEDURE — 1157F PR ADVANCE CARE PLAN OR EQUIV PRESENT IN MEDICAL RECORD: ICD-10-PCS | Mod: CPTII,95,, | Performed by: INTERNAL MEDICINE

## 2022-03-02 PROCEDURE — 99215 OFFICE O/P EST HI 40 MIN: CPT | Mod: 95,,, | Performed by: INTERNAL MEDICINE

## 2022-03-02 PROCEDURE — 99215 PR OFFICE/OUTPT VISIT, EST, LEVL V, 40-54 MIN: ICD-10-PCS | Mod: 95,,, | Performed by: INTERNAL MEDICINE

## 2022-03-02 PROCEDURE — 1157F ADVNC CARE PLAN IN RCRD: CPT | Mod: CPTII,95,, | Performed by: INTERNAL MEDICINE

## 2022-03-02 NOTE — PROGRESS NOTES
[facility]      The patient location is:  Patient Home   The chief complaint leading to consultation is: COPD, hospital discharge  Total time spent with patient: 40min    Visit type: Virtual visit with synchronous audio only and video  Each patient to whom he or she provides medical services by telemedicine is:  (1) informed of the relationship between the physician and patient and the respective role of any other health care provider with respect to management of the patient; and (2) notified that he or she may decline to receive medical services by telemedicine and may withdraw from such care at any time.      Subjective:      Patient ID: Leyla Mari is a 76 y.o. female with end stage COPD, chronic PE, and PNA of right middle lobe.    Chief Complaint: Follow-up of Hospital admission on 02/17/2022 for an acute R middle lobe pneumonia     Patient was admitted to the ED on 02/17/2022  Prior to this visit, patient's last encounter with PCP was 1/11/2022.     HOSP DC: She had a recent hospital admission on 02/17/2022 for an acute R middle lobe pneumonia. Blood cultures did not show any growth. Patient was discharged on Flagyl 500mg 3x and Levaquin 500mg daily. Patient will complete antibiotics course tomorrow 03/03. Denies any GI upset but reports overall feeling unwell from the antibiotics. Continues to have a productive cough. Denies any fevers/chills. Has lost weight since admission due to decreased appetite. Patient has had difficulty swallowing, specifically pills since the hospital admission. Does not report any difficulty with foods or liquids. Pt and OT have been coming home to help. Is bed-bound at this time.    COPD:  Patient is on long-term use of 3L oxygen supplementation via nasal cannula. Patient is on prednisone 15mg daily, SYMBICORT, and albuterol inhaler.  She wants comfort-focused treatments for her COPD which keep her in the home.     [facility]: Last HTN  recording was 2022 which was 100/80. On diltiazem 34 hr capsule 180 mg. On pravastatin 20mg for HLD. Denies any chest pain or syncope. Slight bilateral leg swelling. On Lasix 20 mg daily. Denies any leg pain. Denies any urinary symptoms.      DM: Patient's last POCT glucose on  was 151.  She continues to be on steroids. Patient has metformin ordered, but she states she never got it, so as a result, does not take any medication for it. She does not have a glucometer at this time. Due for her diabetic screenings: eye exam, foot exam, urine microalbumin/creatinine ratio.    HM: She is due for for Diabetes urine screening, foot exam, eye exam and DEXA scan. Patient is made aware of this. Has [facility] .     Advance Care Planning     Patient remains DNR. She has declined significantly since last exam.           Past Surgical History:   Procedure Laterality Date    APPENDECTOMY      CATARACT EXTRACTION Bilateral      SECTION      x2    COLONOSCOPY N/A 10/10/2017    Procedure: COLONOSCOPY;  Surgeon: Omid Lino MD;  Location: [address];  Service: Endoscopy;  Laterality: N/A;  pt unable to be checked in with previous order    EYE SURGERY      TONSILLECTOMY         Past Medical History:   Diagnosis Date    Actinic keratosis     Arthritis     Cataract     Cellulitis of ankle     Colon polyps     COPD (chronic obstructive pulmonary disease)     home O2 (2L/min)     Family history of colon cancer     History of Clostridium difficile infection 7/3/2012    Hyperlipidemia     Hypertension     Lung nodules     Sinus tachycardia     Type 2 diabetes mellitus, without long-term current use of insulin 2022     Review of Systems   Constitutional: Positive for appetite change (decreased appetite since hospital admission) and fatigue. Negative for chills and fever.   HENT: Positive for trouble swallowing (to pills specifically). Negative for congestion, ear pain, facial  swelling, sinus pressure, sinus pain, sneezing and sore throat.    Eyes: Negative for pain and redness.   Respiratory: Positive for cough (productive) and shortness of breath. Negative for apnea, choking, chest tightness and wheezing.    Cardiovascular: Positive for leg swelling. Negative for chest pain and palpitations.   Gastrointestinal: Negative for abdominal pain, blood in stool, constipation, diarrhea, nausea and vomiting.   Genitourinary: Negative for decreased urine volume, difficulty urinating, flank pain, frequency, hematuria and urgency.   Musculoskeletal: Positive for back pain (from wheelchair). Negative for joint swelling.   Skin: Negative for rash.   Neurological: Positive for dizziness (postural; not everytime). Negative for headaches.   Hematological: Negative for adenopathy. Does not bruise/bleed easily.   Psychiatric/Behavioral: Negative for agitation, confusion, dysphoric mood and sleep disturbance.       Objective:   There were no vitals taken for this visit.    Physical Exam  Constitutional:       Appearance: She is ill-appearing and toxic-appearing.   Pulmonary:      Effort: Respiratory distress present.   Skin:     Findings: Bruising present.   Psychiatric:      Comments: Lying in bed  Not verbalizing          Lab Results   Component Value Date    WBC 18.43 (H) 02/23/2022    HGB 14.1 02/23/2022    HCT 47.5 02/23/2022     02/23/2022    CHOL 228 (H) 09/11/2019    TRIG 78 09/11/2019    HDL 79 (H) 09/11/2019    ALT 13 02/17/2022    AST 17 02/17/2022     02/23/2022    K 4.0 02/23/2022     02/23/2022    CREATININE 0.4 (L) 02/23/2022    BUN 17 02/23/2022    CO2 28 02/23/2022    TSH 0.498 05/09/2020    INR 1.0 05/01/2021    HGBA1C 6.6 (H) 02/17/2022         Assessment:   76 y.o. female with multiple co-morbid illnesses here to continue work-up of chronic issues.     Plan:     Problem List Items Addressed This Visit        Pulmonary    End stage COPD     High O2 requirement,  bedbound, low functional status  · Advised realistic goals of care           Relevant Orders    Ambulatory referral/consult to Hospice       Cardiac/Vascular    Acute on chronic heart failure     CHF on echo, improved with lasix   · Refer to hospice              Endocrine    Type 2 diabetes mellitus, without long-term current use of insulin - Primary     A1c elevated for past year, steroid induced  · monitor                 Health Maintenance       Date Due Completion Date    Diabetes Urine Screening Never done ---    TETANUS VACCINE Never done ---    Shingles Vaccine (2 of 3) 05/28/2014 4/2/2014    DEXA Scan 05/08/2020 5/8/2017    Hemoglobin A1c 08/17/2022 2/17/2022    Colonoscopy 10/10/2027 10/10/2017          Follow up in about 4 weeks (around 3/30/2022). One hour spent with this patient today, more than half of that in counseling on the following issues: issues address include end-stage COPD    Lizbeth Dillard MD/MPH  [facility]  [phone]

## 2022-03-02 NOTE — TELEPHONE ENCOUNTER
Hospice referral faxed to [facility].  Spoke with [facility].  Made aware of fax being sent over.

## 2022-03-07 ENCOUNTER — TELEPHONE (OUTPATIENT)
Dept: PULMONOLOGY | Facility: CLINIC | Age: 77
End: 2022-03-07
Payer: MEDICARE

## 2022-03-07 NOTE — TELEPHONE ENCOUNTER
Left message on patient voicemail, informing her that I'm contacting her in regards to scheduling her appointment with one of our providers. I also advised patient that if she has any questions or concerns, she may contact the office. Office number has been provided.

## 2022-03-09 NOTE — PATIENT INSTRUCTIONS
FOLLOW-UP INSTRUCTIONS:    Follow-up with palliative care NP in 4-6 weeks on 3/22/2022@home visit  Continue all medications, treatments, and therapies as ordered  Fall precautions at all times  Maintain Safety precautions at all times  Attend all medical appointments as scheduled  F/u with PCP as needed  Patient to have 6 feet between each other  In an Emergency, call 911 or go to ED; notify PCP's office  9. Limit Risks of environmental exposure to coronavirus as discussed including: social distancing, hand hygiene, and limiting departures from the home for necessities only.   10. Keep bLE elevated to reduce edema; compression stockings recommended  11. Recommend hospice evaluation and admission

## 2022-03-26 VITALS
RESPIRATION RATE: 18 BRPM | HEART RATE: 79 BPM | SYSTOLIC BLOOD PRESSURE: 140 MMHG | TEMPERATURE: 98 F | OXYGEN SATURATION: 96 % | DIASTOLIC BLOOD PRESSURE: 70 MMHG

## 2022-04-01 ENCOUNTER — OFFICE VISIT (OUTPATIENT)
Dept: PRIMARY CARE CLINIC | Facility: CLINIC | Age: 77
End: 2022-04-01
Payer: MEDICARE

## 2022-04-01 DIAGNOSIS — J44.1 CHRONIC OBSTRUCTIVE PULMONARY DISEASE WITH (ACUTE) EXACERBATION: ICD-10-CM

## 2022-04-01 PROCEDURE — 1157F PR ADVANCE CARE PLAN OR EQUIV PRESENT IN MEDICAL RECORD: ICD-10-PCS | Mod: CPTII,95,, | Performed by: INTERNAL MEDICINE

## 2022-04-01 PROCEDURE — 1157F ADVNC CARE PLAN IN RCRD: CPT | Mod: CPTII,95,, | Performed by: INTERNAL MEDICINE

## 2022-04-01 PROCEDURE — 99442 PR PHYSICIAN TELEPHONE EVALUATION 11-20 MIN: ICD-10-PCS | Mod: 95,GV,, | Performed by: INTERNAL MEDICINE

## 2022-04-01 PROCEDURE — 99442 PR PHYSICIAN TELEPHONE EVALUATION 11-20 MIN: CPT | Mod: 95,GV,, | Performed by: INTERNAL MEDICINE

## 2022-04-01 NOTE — PROGRESS NOTES
"Established Patient - Audio Only Telehealth Visit  [facility]   Shared Note:      The patient location is: Home  The chief complaint leading to consultation is: follow up  Visit type: Virtual visit with audio only (telephone) with patient's daughter, Laney.  Total time spent with patient: 20 min.        The reason for the audio only service rather than synchronous audio and video virtual visit was related to technical difficulties or patient preference/necessity.     Each patient to whom I provide medical services by telemedicine is:  (1) informed of the relationship between the physician and patient and the respective role of any other health care provider with respect to management of the patient; and (2) notified that they may decline to receive medical services by telemedicine and may withdraw from such care at any time. Patient verbally consented to receive this service via voice-only telephone call.    Patient ID: Leyla Mari is a 76 y.o. female with end stage COPD, chronic PE, and PNA of right middle lobe.     Chief Complaint: follow up      Prior to this visit, patient's last encounter with PCP was 3/2/22 (virtual audio visit).     Hospice: She was referred to hospice at that time on 3/2/22. Daughter (Laney) reports [facility] visits once a week then another personnel with hospice goes to home 3 times per week for baths. Laney's son has online classes and visits patient daily and helps with meals. Hospice started her on morphine 15 mg daily "to help with ease of breathing". Denied pain. Laney reports improvement in mobility since start. She gets up frequently during the day with walker and spends time in her recliner. She was previously more immobile and mostly in bed. Hospice has stopped her eliquis and started on asa 81 mg.     COPD:  Patient is on long-term use of 3-4L [place] home oxygen. Laney reports they will increase liters when ambulating but maintains 3-4 L. Patient is on prednisone 15mg " daily, SYMBICORT, and albuterol inhaler.     Recent hospital admission:    - Hospital admission on 02/17/2022 for an acute R middle lobe pneumonia.   - Blood cultures did not show any growth.   - discharged on Flagyl 500mg 3x and Levaquin 500mg daily with completion.   - had difficulty swallowing, specifically pills since the hospital admission.  - Daughter states this has now improved.   - PT & OT was ordered. Per Laney, once hospice took over, PT stopped.   - Mobility is stable with walker. No longer bed-bound. She spends time in a recliner.     Problem List Items Addressed This Visit        Pulmonary    Chronic obstructive pulmonary disease with (acute) exacerbation    Current Assessment & Plan     Seen on imaging, has end-stage COPD  · newly enrolled in hospice  · Continue this care plan                 Kitty Daniels RN, FNP-S    Lizbeth Dillard MD/MPH  [facility]  [phone] [facility]        This service was not originating from a related E/M service provided within the previous 7 days nor will  to an E/M service or procedure within the next 24 hours or my soonest available appointment.  Prevailing standard of care was able to be met in this audio-only visit.

## 2022-04-05 ENCOUNTER — TELEPHONE (OUTPATIENT)
Dept: HOME HEALTH SERVICES | Facility: CLINIC | Age: 77
End: 2022-04-05
Payer: MEDICARE

## 2022-04-05 NOTE — TELEPHONE ENCOUNTER
3/22/2022    Patient on Palliative Care NP's schedule tomorrow. Called patient to setup time for appointment. Notified by patient's daughter Laney that patient is on service with [facility]. States she is doing well. Did hit her left leg on WC and has large hematoma and it drained 1 time.     Instructed daughter to reach out if she has any concerns.       Joanne Durham, LEW, FNP-C  [email or web address]  [phone]

## 2022-04-22 ENCOUNTER — OFFICE VISIT (OUTPATIENT)
Dept: PRIMARY CARE CLINIC | Facility: CLINIC | Age: 77
End: 2022-04-22
Payer: MEDICARE

## 2022-04-22 DIAGNOSIS — J44.1 CHRONIC OBSTRUCTIVE PULMONARY DISEASE WITH (ACUTE) EXACERBATION: ICD-10-CM

## 2022-04-22 PROCEDURE — 1157F PR ADVANCE CARE PLAN OR EQUIV PRESENT IN MEDICAL RECORD: ICD-10-PCS | Mod: CPTII,95,, | Performed by: INTERNAL MEDICINE

## 2022-04-22 PROCEDURE — 99441 PR PHYSICIAN TELEPHONE EVALUATION 5-10 MIN: CPT | Mod: 95,GV,, | Performed by: INTERNAL MEDICINE

## 2022-04-22 PROCEDURE — 1157F ADVNC CARE PLAN IN RCRD: CPT | Mod: CPTII,95,, | Performed by: INTERNAL MEDICINE

## 2022-04-22 PROCEDURE — 99441 PR PHYSICIAN TELEPHONE EVALUATION 5-10 MIN: ICD-10-PCS | Mod: 95,GV,, | Performed by: INTERNAL MEDICINE

## 2022-04-22 NOTE — ASSESSMENT & PLAN NOTE
Seen on imaging, has end-stage COPD  · newly enrolled in hospice and happy with the support  · Continue this care plan

## 2022-04-22 NOTE — PROGRESS NOTES
Established Patient - Audio Only Telehealth Visit with [facility] Provider  Shared Note: Alejandra Chen (MS4) & Dr Lizbeth Dillard (Attending)     The patient location is: HOME  The chief complaint leading to consultation is: routine care  Visit type: Virtual visit with audio only (telephone)     The reason for the audio only service rather than synchronous audio and video virtual visit was related to technical difficulties or patient preference/necessity.     Each patient to whom I provide medical services by telemedicine is:  (1) informed of the relationship between the physician and patient and the respective role of any other health care provider with respect to management of the patient; and (2) notified that they may decline to receive medical services by telemedicine and may withdraw from such care at any time. Patient verbally consented to receive this service via voice-only telephone call.       Subjective:      Patient ID: Leyla Mari is a 76 y.o. female with PMHx of end stage COPD, HTN, HLD, CHF, and DM2    Prior to this visit, patient's last encounter with PCP was 4/1/2022.     Patient was recently enrolled in [facility] for end-stage lung disease. Patient is doing well with hospice visiting once a week. Patient has helps from daughter and grandchildren for daily needs. She is able to mobilize short distance in room. Patient and daughter agreed to call for PCP appointments if needed, but currently has no concerns.     COPD: Patient is stable on home O2 3L, symbicort, albuterol inhaler, and daily prednisone. Patient had a recent hospital admission 02/22 with pneumonia of the R lobe, treated with levofloxacin 500 mg qd x8 days for 14 day total and metronidazole 500 mg tid x5 days for 7 day total. Dyspnea and pleuritic chest pain associating with hospital admission has resolved.     HTN: Last BP recorded- 131/60 on 02/22. Patient is on Diltiazem.      HLD: Last lipid panel 2019- cholesterol  elevated- 228. Patient is on Pravastatin.    CHF: Patient is on Lasix daily 20 mg. No complaints of fluid overload, chest pain.     DM2: A1C- 6.6 on 02/22. Patient is on insulin.       Objective:     Lab Results   Component Value Date    WBC 18.43 (H) 02/23/2022    HGB 14.1 02/23/2022    HCT 47.5 02/23/2022     02/23/2022    CHOL 228 (H) 09/11/2019    TRIG 78 09/11/2019    HDL 79 (H) 09/11/2019    ALT 13 02/17/2022    AST 17 02/17/2022     02/23/2022    K 4.0 02/23/2022     02/23/2022    CREATININE 0.4 (L) 02/23/2022    BUN 17 02/23/2022    CO2 28 02/23/2022    TSH 0.498 05/09/2020    INR 1.0 05/01/2021    HGBA1C 6.6 (H) 02/17/2022         Assessment:   76 y.o. female with PMHx of end stage COPD, HTN, HLD, CHF, and DM2 is here for follow up after recent hospice admission. Patient is doing well with hospice care.   Continuing current medical treatments.     Problem List Items Addressed This Visit        Pulmonary    Chronic obstructive pulmonary disease with (acute) exacerbation     Seen on imaging, has end-stage COPD  · newly enrolled in hospice and happy with the support  · Continue this care plan                 Health Maintenance       Date Due Completion Date    Diabetes Urine Screening Never done ---    TETANUS VACCINE Never done ---    Shingles Vaccine (2 of 3) 05/28/2014 4/2/2014    DEXA Scan 05/08/2020 5/8/2017    Hemoglobin A1c 08/17/2022 2/17/2022    Colonoscopy 10/10/2027 10/10/2017          No follow-ups on file. Thirty minutes spent with this patient today.    Lizbeth Dillard MD/MPH  Internal Medicine  [facility]  [facility] [phone]    This service was not originating from a related E/M service provided within the previous 7 days nor will  to an E/M service or procedure within the next 24 hours or my soonest available appointment.  Prevailing standard of care was able to be met in this audio-only visit.

## 2022-05-09 ENCOUNTER — DOCUMENT SCAN (OUTPATIENT)
Dept: HOME HEALTH SERVICES | Facility: HOSPITAL | Age: 77
End: 2022-05-09
Payer: MEDICARE

## 2022-05-11 ENCOUNTER — TELEPHONE (OUTPATIENT)
Dept: PRIMARY CARE CLINIC | Facility: CLINIC | Age: 77
End: 2022-05-11
Payer: MEDICARE

## 2022-05-11 NOTE — TELEPHONE ENCOUNTER
Patient  in the home in hospice, with [facility]. Please jose m her as .    Thanks,  Lizbeth Dillard MD/MPH  [facility]  [phone] [facility]

## 2022-05-16 ENCOUNTER — EXTERNAL HOME HEALTH (OUTPATIENT)
Dept: HOME HEALTH SERVICES | Facility: HOSPITAL | Age: 77
End: 2022-05-16
Payer: MEDICARE

## 2022-08-23 NOTE — ED NOTES
Bed: [facility]  Expected date:   Expected time:   Means of arrival:   Comments:   23-Aug-2022

## 2023-04-19 NOTE — PLAN OF CARE
Problem: Adult Inpatient Plan of Care  Goal: Plan of Care Review  Outcome: Ongoing, Progressing  Goal: Patient-Specific Goal (Individualized)  Outcome: Ongoing, Progressing  Goal: Absence of Hospital-Acquired Illness or Injury  Outcome: Ongoing, Progressing  Goal: Optimal Comfort and Wellbeing  Outcome: Ongoing, Progressing  Goal: Readiness for Transition of Care  Outcome: Ongoing, Progressing      Impression: Nexdtve age-related mclr degn, right eye, early dry stage: H35.3111. Plan: Educated patient on diagnosis, in detail, explained there is no treatment for dry macular degeneration, there is a chance of progression (with progression can come VA restrictions), will continue to monitor for changes.  Patient to RTC if any sudden/new VA loss/complication

## 2023-09-05 NOTE — PATIENT INSTRUCTIONS
TODAY:  - labs with [facility]  - meds refilled to [facility] pharmacy  - order a glucometer with [facility]  - referral to [facility] mobile NP

## 2024-09-01 NOTE — PROGRESS NOTES
Subjective:       Patient ID: Leyla Mari is a 72 y.o. female.    Chief Complaint: COPD and Shortness of Breath    HPI   71 yo female with severe emphysema on oxygen comes for her six month check up. She is doing well, Uses her oxygen for activities. No significant medical encounters since last visit.  Today's PFT's are 11% better than the values n February!/ She is on Symbicort and Tudorza FVC 1;13  447 % and Fev-1 0.42 liters 43%. Does remarkably with these parameters/      No flowsheet data found.]  Review of Systems   Constitutional: Negative.    HENT: Negative.    Eyes: Negative.    Respiratory: Negative.  Positive for dyspnea on extertion.         On oxygen and has emphysema   Cardiovascular: Negative.    Genitourinary: Negative.    Musculoskeletal: Negative.    Skin: Negative.         S/P cellulitis   Gastrointestinal: Negative.    Neurological: Negative.    Psychiatric/Behavioral: Negative.        Objective:      Physical Exam   Pulmonary/Chest:   Decreased air entry without wheezes or rales.   Musculoskeletal: She exhibits no edema.           Assessment:       1. Chronic obstructive pulmonary disease, unspecified COPD type        Outpatient Encounter Prescriptions as of 9/26/2017   Medication Sig Dispense Refill    aclidinium bromide (TUDORZA PRESSAIR) 400 mcg/actuation AePB Inhale 1 puff into the lungs 2 (two) times daily. Controller 1 each 11    albuterol (PROVENTIL) 2.5 mg /3 mL (0.083 %) nebulizer solution Take 3 mLs (2.5 mg total) by nebulization every 4 (four) hours as needed for Wheezing or Shortness of Breath. 120 each 11    albuterol 90 mcg/actuation inhaler Inhale 2 puffs into the lungs every 6 (six) hours as needed for Wheezing (ventolin hfa per pts insurance). Rescue 1 Inhaler 11    alprazolam (XANAX) 0.25 MG tablet TAKE 1 TABLET BY MOUTH 3 TIMES A DAY AS NEEDED. 90 tablet 1    aspirin (ECOTRIN) 81 MG EC tablet Take 81 mg by mouth once daily.        budesonide-formoterol 160-4.5 mcg  (SYMBICORT) 160-4.5 mcg/actuation HFAA Inhale 2 puffs into the lungs every 12 (twelve) hours. Controller 1 Inhaler 6    diltiaZEM (CARDIZEM CD) 180 MG 24 hr capsule Take 1 capsule (180 mg total) by mouth once daily. 90 capsule 3    furosemide (LASIX) 20 MG tablet Take 1 tablet (20 mg total) by mouth once daily. 3 tablet 0    hydrochlorothiazide (HYDRODIURIL) 25 MG tablet TAKE 1 TABLET (25 MG TOTAL) BY MOUTH ONCE DAILY. 90 tablet 3    potassium chloride SA (K-DUR,KLOR-CON) 20 MEQ tablet Take on tablet by mouth every day 90 tablet 3    pravastatin (PRAVACHOL) 20 MG tablet TAKE 1 TABLET BY MOUTH EVERY DAY 90 tablet 2    [DISCONTINUED] GLYCOPYRROLATE/FORMOTEROL FUM (BEVESPI AEROSPHERE INHL) Inhale 2 puffs into the lungs 2 (two) times daily.       No facility-administered encounter medications on file as of 9/26/2017.      No orders of the defined types were placed in this encounter.    Plan:             Stable chronic respiratory failure from emphysema.   PFT 11% better than Feb!!   Yes

## 2024-11-27 NOTE — PROGRESS NOTES
CC: followup of hypertension  HPI:  The patient is a 72 y.o. year old female who presents to the office for followup of hypertension.  The patient denies any chest pain,headache, blurred vision, excessive fatigue, nausea or vomiting, but reports shortness of breath is stable.  She went to the ED for rectal bleeding.  She was diagnosed with colitis, and has followup with colorectal surgery.    PAST MEDICAL HISTORY:  Past Medical History:   Diagnosis Date    Actinic keratosis     Allergy     Arthritis     Cataract     Cellulitis of ankle     Colon polyps     COPD (chronic obstructive pulmonary disease)     Family history of colon cancer     Hyperlipidemia     Hypertension     Lung nodules     Sinus tachycardia        SURGICAL HISTORY:  Past Surgical History:   Procedure Laterality Date    APPENDECTOMY      CATARACT EXTRACTION Bilateral 2007     SECTION      x2    EYE SURGERY      TONSILLECTOMY         MEDS:  Medcard reviewed and updated    ALLERGIES: Allergy Card reviewed and updated    SOCIAL HISTORY:   The patient is a nonsmoker.    PE:   APPEARANCE: Well nourished, well developed, in no acute distress.    CHEST: Lungs clear to auscultation with unlabored respirations, on oxygen.  CARDIOVASCULAR: Normal S1, S2. No murmurs. No carotid bruits. No pedal edema.  ABDOMEN: Bowel sounds normal. Not distended. Soft. No tenderness or masses.   PSYCHIATRIC: The patient is oriented to person, place, and time and has a pleasant affect.        ASSESSMENT/PLAN:  Leyla was seen today for results.    Diagnoses and all orders for this visit:    Essential hypertension  -     Blood pressure is well controlled      
Never smoker